# Patient Record
Sex: FEMALE | Race: WHITE | NOT HISPANIC OR LATINO | Employment: OTHER | URBAN - METROPOLITAN AREA
[De-identification: names, ages, dates, MRNs, and addresses within clinical notes are randomized per-mention and may not be internally consistent; named-entity substitution may affect disease eponyms.]

---

## 2017-01-01 LAB — INR PPP: 3.9 (ref 2–3.5)

## 2017-01-03 ENCOUNTER — ANTICOAGULATION MONITORING (OUTPATIENT)
Dept: VASCULAR LAB | Facility: MEDICAL CENTER | Age: 67
End: 2017-01-03

## 2017-01-03 DIAGNOSIS — G45.9 TRANSIENT CEREBRAL ISCHEMIA, UNSPECIFIED TYPE: ICD-10-CM

## 2017-01-03 DIAGNOSIS — Z79.01 LONG TERM CURRENT USE OF ANTICOAGULANT THERAPY: ICD-10-CM

## 2017-01-03 DIAGNOSIS — Z95.2 HX OF MECHANICAL AORTIC VALVE REPLACEMENT: ICD-10-CM

## 2017-01-16 LAB — INR PPP: 3.4 (ref 2–3.5)

## 2017-01-17 ENCOUNTER — ANTICOAGULATION MONITORING (OUTPATIENT)
Dept: VASCULAR LAB | Facility: MEDICAL CENTER | Age: 67
End: 2017-01-17

## 2017-01-17 DIAGNOSIS — Z95.2 HX OF MECHANICAL AORTIC VALVE REPLACEMENT: ICD-10-CM

## 2017-01-17 DIAGNOSIS — G45.9 TRANSIENT CEREBRAL ISCHEMIA, UNSPECIFIED TYPE: ICD-10-CM

## 2017-01-17 DIAGNOSIS — Z79.01 LONG TERM CURRENT USE OF ANTICOAGULANT THERAPY: ICD-10-CM

## 2017-01-18 DIAGNOSIS — E78.5 HYPERLIPIDEMIA, UNSPECIFIED HYPERLIPIDEMIA TYPE: ICD-10-CM

## 2017-01-18 NOTE — PROGRESS NOTES
Anticoagulation Summary as of 1/17/2017     INR goal 2.5-3.5   Selected INR 3.4 (1/16/2017)   Maintenance plan 4 mg (4 mg x 1) on Mon; 8 mg (4 mg x 2) all other days   Weekly total 52 mg   Plan last modified Yinka Church PHARMD (7/11/2016)   Next INR check 1/30/2017   Priority Routine   Target end date Indefinite    Indications   Hx of mechanical aortic valve replacement [V43.3] [Z95.2]  Long term current use of anticoagulant therapy [Z79.01]  Transient cerebral ischemia [G45.9]         Anticoagulation Episode Summary     INR check location Home Draw    Preferred lab     Send INR reminders to     Rosalio Acevedo       Anticoagulation Care Providers     Provider Role Specialty Phone number    Conrado Nava M.D. Referring Cardiac Electrophysiology [phone]    Yinka Church PHARMD Responsible          Anticoagulation Patient Findings   Negatives Missed Doses, Extra Doses, Medication Changes, Antibiotic Use, Diet Changes, Dental/Other Procedures, Hospitalization, Bleeding Gums, Nose Bleeds, Blood in Urine, Blood in Stool, Any Bruising, Other Complaints        Spoke with patient today regarding therapeutic INR of 3.4.  Patient denies any signs/symptoms of bruising or bleeding or any changes in diet and medications.  Instructed patient to call clinic with any questions or concerns.  Pt is to continue with current warfarin dosing regimen.  Follow up in 2 weeks.    Yinka Church PHARMD

## 2017-01-30 ENCOUNTER — ANTICOAGULATION MONITORING (OUTPATIENT)
Dept: VASCULAR LAB | Facility: MEDICAL CENTER | Age: 67
End: 2017-01-30

## 2017-01-30 DIAGNOSIS — Z79.01 LONG TERM CURRENT USE OF ANTICOAGULANT THERAPY: ICD-10-CM

## 2017-01-30 DIAGNOSIS — G45.9 TRANSIENT CEREBRAL ISCHEMIA, UNSPECIFIED TYPE: ICD-10-CM

## 2017-01-30 DIAGNOSIS — Z95.2 HX OF MECHANICAL AORTIC VALVE REPLACEMENT: ICD-10-CM

## 2017-01-30 LAB — INR PPP: 3.2 (ref 2–3.5)

## 2017-01-31 ENCOUNTER — ANTICOAGULATION MONITORING (OUTPATIENT)
Dept: VASCULAR LAB | Facility: MEDICAL CENTER | Age: 67
End: 2017-01-31

## 2017-01-31 DIAGNOSIS — G45.0 VERTEBROBASILAR ARTERY SYNDROME: ICD-10-CM

## 2017-01-31 DIAGNOSIS — Z95.2 HX OF MECHANICAL AORTIC VALVE REPLACEMENT: ICD-10-CM

## 2017-01-31 DIAGNOSIS — Z79.01 LONG TERM CURRENT USE OF ANTICOAGULANT THERAPY: ICD-10-CM

## 2017-01-31 NOTE — PROGRESS NOTES
Anticoagulation Summary as of 1/30/2017     INR goal 2.5-3.5   Selected INR 3.2 (1/30/2017)   Maintenance plan 4 mg (4 mg x 1) on Mon; 8 mg (4 mg x 2) all other days   Weekly total 52 mg   No change documented Raheem Miles, Med Ass't   Plan last modified Yinka Church PHARMD (7/11/2016)   Next INR check 2/13/2017   Priority Routine   Target end date Indefinite    Indications   Hx of mechanical aortic valve replacement [V43.3] [Z95.2]  Long term current use of anticoagulant therapy [Z79.01]  Transient cerebral ischemia [G45.9]         Anticoagulation Episode Summary     INR check location Home Draw    Preferred lab     Send INR reminders to     Rosalio EL      Anticoagulation Care Providers     Provider Role Specialty Phone number    Conrado Nava M.D. Referring Cardiac Electrophysiology [phone]    Yinka Church PHARMD Responsible          Anticoagulation Patient Findings   Negatives Missed Doses, Extra Doses, Medication Changes, Antibiotic Use, Diet Changes, Dental/Other Procedures, Hospitalization, Bleeding Gums, Nose Bleeds, Blood in Urine, Blood in Stool, Any Bruising, Other Complaints        Spoke with patient to report a therapeutic INR.  Pt instructed to continue with current warfarin dosing regimen. Pt denies any s/s of bleeding, bruising, clotting or any changes to diet or medication.  Will follow up in 2 weeks.    Raheem Miles, Med Ass't    2/1/2017    Concur with plan outlined above    Juan Arias, Pasha

## 2017-02-09 ENCOUNTER — TELEPHONE (OUTPATIENT)
Dept: VASCULAR LAB | Facility: MEDICAL CENTER | Age: 67
End: 2017-02-09

## 2017-02-10 NOTE — TELEPHONE ENCOUNTER
Received documentation from [facility] that prior authorization for praluent was approved.  Medical assistant to inform patient    Michael J. Bloch, MD  [facility]

## 2017-02-13 LAB — INR PPP: 3.2 (ref 2–3.5)

## 2017-02-15 ENCOUNTER — ANTICOAGULATION MONITORING (OUTPATIENT)
Dept: VASCULAR LAB | Facility: MEDICAL CENTER | Age: 67
End: 2017-02-15

## 2017-02-15 DIAGNOSIS — D68.9 COAGULOPATHY (HCC): ICD-10-CM

## 2017-02-15 DIAGNOSIS — G45.0 VERTEBROBASILAR ARTERY SYNDROME: ICD-10-CM

## 2017-02-15 DIAGNOSIS — Z95.2 HX OF MECHANICAL AORTIC VALVE REPLACEMENT: ICD-10-CM

## 2017-02-15 NOTE — PROGRESS NOTES
Anticoagulation Summary as of 2/15/2017     INR goal 2.5-3.5   Selected INR 3.2 (2/13/2017)   Maintenance plan 4 mg (4 mg x 1) on Mon; 8 mg (4 mg x 2) all other days   Weekly total 52 mg   Plan last modified Yinka Church PHARMD (2/15/2017)   Next INR check 2/27/2017   Target end date Indefinite    Indications   Hx of mechanical aortic valve replacement [V43.3] [Z95.2]  TIA (transient ischemic attack) [G45.9]  Coagulopathy (CMS-HCC) [D68.9]         Anticoagulation Episode Summary     INR check location     Preferred lab     Send INR reminders to     Comments       Anticoagulation Care Providers     Provider Role Specialty Phone number    Michael J Bloch, M.D. Referring Internal Medicine [phone]    Yinka Church PHARMD Responsible          Anticoagulation Patient Findings    Left voicemail message to report a therapeutic INR of 3.2.  Pt to continue with current warfarin dosing regimen. Requested pt contact the clinic for any s/s of unusual bleeding, bruising, clotting or any changes to diet or medication. FU 2 weeks.  Yinka Church, JACK

## 2017-02-16 ENCOUNTER — TELEPHONE (OUTPATIENT)
Dept: VASCULAR LAB | Facility: MEDICAL CENTER | Age: 67
End: 2017-02-16

## 2017-02-16 NOTE — TELEPHONE ENCOUNTER
[facility]    Received VM from pt stating she started Doxycycline about 7 days ago.  She had a recent INR from yesterday that was therapeutic.  Not likely doxycycline will affect her dosing regimen.  Called pt and informed her to continue warfarin and follow up at next scheduled interval.    [place], PHARMD

## 2017-03-01 ENCOUNTER — ANTICOAGULATION MONITORING (OUTPATIENT)
Dept: VASCULAR LAB | Facility: MEDICAL CENTER | Age: 67
End: 2017-03-01

## 2017-03-01 DIAGNOSIS — Z95.2 HX OF MECHANICAL AORTIC VALVE REPLACEMENT: ICD-10-CM

## 2017-03-01 DIAGNOSIS — G45.0 VERTEBROBASILAR ARTERY SYNDROME: ICD-10-CM

## 2017-03-01 DIAGNOSIS — D68.9 COAGULOPATHY (HCC): ICD-10-CM

## 2017-03-01 LAB — INR PPP: 4 (ref 2–3.5)

## 2017-03-02 NOTE — PROGRESS NOTES
Anticoagulation Summary as of 3/1/2017     INR goal 2.5-3.5   Selected INR 4.0! (3/1/2017)   Maintenance plan 4 mg (4 mg x 1) on Mon; 8 mg (4 mg x 2) all other days   Weekly total 52 mg   Plan last modified Yinka Church PHARMD (3/1/2017)   Next INR check 3/15/2017   Target end date Indefinite    Indications   Hx of mechanical aortic valve replacement [V43.3] [Z95.2]  TIA (transient ischemic attack) [G45.9]  Coagulopathy (CMS-HCC) [D68.9]         Anticoagulation Episode Summary     INR check location     Preferred lab     Send INR reminders to     Comments       Anticoagulation Care Providers     Provider Role Specialty Phone number    Michael J Bloch, M.D. Referring Internal Medicine [phone]    Yinka Church PHARMD Responsible          Anticoagulation Patient Findings   Positives Diet Changes    Negatives Missed Doses, Extra Doses, Medication Changes, Antibiotic Use, Dental/Other Procedures, Hospitalization, Bleeding Gums, Nose Bleeds, Blood in Urine, Blood in Stool, Any Bruising, Other Complaints    Comments Less greens this past week        Spoke with patient today regarding supratherapeutic INR of 4.0.  Patient denies any signs/symptoms of bruising or bleeding or any changes in diet and medications.  Instructed patient to call clinic with any questions or concerns.  Instructed patient to take 4mg X 1, then resume current warfarin regimen.  Follow up in 2 weeks.    Yinka Church, PIPPAD

## 2017-03-06 ENCOUNTER — HOSPITAL ENCOUNTER (OUTPATIENT)
Dept: CARDIOLOGY | Facility: MEDICAL CENTER | Age: 67
End: 2017-03-06
Attending: INTERNAL MEDICINE
Payer: MEDICARE

## 2017-03-06 DIAGNOSIS — Z95.2 HX OF MECHANICAL AORTIC VALVE REPLACEMENT: ICD-10-CM

## 2017-03-06 LAB
LV EJECT FRACT  99904: 55
LV EJECT FRACT MOD 2C 99903: 46.23
LV EJECT FRACT MOD 4C 99902: 29.29
LV EJECT FRACT MOD BP 99901: 33.71

## 2017-03-06 PROCEDURE — 93306 TTE W/DOPPLER COMPLETE: CPT | Mod: 26 | Performed by: INTERNAL MEDICINE

## 2017-03-06 PROCEDURE — 93306 TTE W/DOPPLER COMPLETE: CPT

## 2017-03-14 ENCOUNTER — HOSPITAL ENCOUNTER (OUTPATIENT)
Dept: LAB | Facility: MEDICAL CENTER | Age: 67
End: 2017-03-14
Attending: NURSE PRACTITIONER
Payer: MEDICARE

## 2017-03-14 LAB
INR PPP: 3.37 (ref 0.87–1.13)
PROTHROMBIN TIME: 35.1 SEC (ref 12–14.6)

## 2017-03-14 PROCEDURE — 85610 PROTHROMBIN TIME: CPT

## 2017-03-14 PROCEDURE — 36415 COLL VENOUS BLD VENIPUNCTURE: CPT

## 2017-03-15 ENCOUNTER — ANTICOAGULATION MONITORING (OUTPATIENT)
Dept: VASCULAR LAB | Facility: MEDICAL CENTER | Age: 67
End: 2017-03-15

## 2017-03-15 DIAGNOSIS — Z95.2 HX OF MECHANICAL AORTIC VALVE REPLACEMENT: ICD-10-CM

## 2017-03-15 DIAGNOSIS — D68.9 COAGULOPATHY (HCC): ICD-10-CM

## 2017-03-15 DIAGNOSIS — G45.0 VERTEBROBASILAR ARTERY SYNDROME: ICD-10-CM

## 2017-03-15 NOTE — PROGRESS NOTES
OP Anticoagulation Telephone Note    Date: 3/15/2017  Anticoagulation Summary as of 3/15/2017     INR goal 2.5-3.5   Selected INR 3.37 (3/14/2017)   Maintenance plan 4 mg (4 mg x 1) on Mon; 8 mg (4 mg x 2) all other days   Weekly total 52 mg   No change documented Maryellen Alvares, Med Ass't   Plan last modified Yinka Church PHARMD (3/1/2017)   Next INR check 3/28/2017   Target end date Indefinite    Indications   Hx of mechanical aortic valve replacement [V43.3] [Z95.2]  TIA (transient ischemic attack) [G45.9]  Coagulopathy (CMS-HCC) [D68.9]         Anticoagulation Episode Summary     INR check location     Preferred lab     Send INR reminders to     Comments       Anticoagulation Care Providers     Provider Role Specialty Phone number    Michael J Bloch, M.D. Referring Internal Medicine [phone]    Yinka Church PHARMD Responsible          Anticoagulation Patient Findings   Negatives Missed Doses, Extra Doses, Medication Changes, Antibiotic Use, Diet Changes, Dental/Other Procedures, Hospitalization, Bleeding Gums, Nose Bleeds, Blood in Urine, Blood in Stool, Any Bruising, Other Complaints      Plan:  Left message on patient's answering machine/ voicemail. Instructed patient to call back with any concerns regarding any unusual bleeding or bruising, any medication or diet changes, or any signs or symptoms of thrombosis. Instructed patient to resume medication as outlined above. Patient to follow up in 2 weeks.     Maryellen Alvares, Medical Assistant    I have reviewed and agree with the plan above on 03/16/2017      Katelyn Perez, PIPPAD

## 2017-03-16 ENCOUNTER — ANTICOAGULATION MONITORING (OUTPATIENT)
Dept: VASCULAR LAB | Facility: MEDICAL CENTER | Age: 67
End: 2017-03-16

## 2017-03-16 DIAGNOSIS — G45.0 VERTEBROBASILAR ARTERY SYNDROME: ICD-10-CM

## 2017-03-16 DIAGNOSIS — Z95.2 HX OF MECHANICAL AORTIC VALVE REPLACEMENT: ICD-10-CM

## 2017-03-16 DIAGNOSIS — D68.9 COAGULOPATHY (HCC): ICD-10-CM

## 2017-03-30 ENCOUNTER — HOSPITAL ENCOUNTER (OUTPATIENT)
Dept: LAB | Facility: MEDICAL CENTER | Age: 67
End: 2017-03-30
Attending: NURSE PRACTITIONER
Payer: MEDICARE

## 2017-03-30 LAB
INR PPP: 3.37 (ref 0.87–1.13)
PROTHROMBIN TIME: 35.1 SEC (ref 12–14.6)

## 2017-03-30 PROCEDURE — 85610 PROTHROMBIN TIME: CPT

## 2017-03-30 PROCEDURE — 36415 COLL VENOUS BLD VENIPUNCTURE: CPT

## 2017-04-04 ENCOUNTER — ANTICOAGULATION MONITORING (OUTPATIENT)
Dept: VASCULAR LAB | Facility: MEDICAL CENTER | Age: 67
End: 2017-04-04

## 2017-04-04 DIAGNOSIS — G45.0 VERTEBROBASILAR ARTERY SYNDROME: ICD-10-CM

## 2017-04-04 DIAGNOSIS — Z95.2 HX OF MECHANICAL AORTIC VALVE REPLACEMENT: ICD-10-CM

## 2017-04-04 DIAGNOSIS — D68.9 COAGULOPATHY (HCC): ICD-10-CM

## 2017-04-04 NOTE — PROGRESS NOTES
Anticoagulation Summary as of 4/4/2017     INR goal 2.5-3.5   Selected INR 3.37 (3/30/2017)   Maintenance plan 4 mg (4 mg x 1) on Mon; 8 mg (4 mg x 2) all other days   Weekly total 52 mg   Plan last modified Yinka Church PHARMD (3/1/2017)   Next INR check 4/20/2017   Target end date Indefinite    Indications   Hx of mechanical aortic valve replacement [V43.3] [Z95.2]  TIA (transient ischemic attack) [G45.9]  Coagulopathy (CMS-HCC) [D68.9]         Anticoagulation Episode Summary     INR check location     Preferred lab     Send INR reminders to     Comments TTR is 50%, consider alternative      Anticoagulation Care Providers     Provider Role Specialty Phone number    Michael J Bloch, M.D. Referring Internal Medicine [phone]    Yinka Church PHARMD Responsible          Anticoagulation Patient Findings   Negatives Missed Doses, Extra Doses, Medication Changes, Antibiotic Use, Diet Changes, Dental/Other Procedures, Hospitalization, Bleeding Gums, Nose Bleeds, Blood in Urine, Blood in Stool, Any Bruising, Other Complaints        Spoke with patient today regarding therapeutic INR of 3.37.  Patient denies any signs/symptoms of bruising or bleeding or any changes in diet and medications.  Instructed patient to call clinic with any questions or concerns.  Pt is to continue with current warfarin dosing regimen.  Follow up in 3 weeks.    Yinka Church PHARMD

## 2017-04-11 ENCOUNTER — HOSPITAL ENCOUNTER (OUTPATIENT)
Dept: LAB | Facility: MEDICAL CENTER | Age: 67
End: 2017-04-11
Attending: INTERNAL MEDICINE
Payer: MEDICARE

## 2017-04-11 DIAGNOSIS — E78.5 DYSLIPIDEMIA: ICD-10-CM

## 2017-04-11 DIAGNOSIS — E03.9 HYPOTHYROIDISM, UNSPECIFIED TYPE: ICD-10-CM

## 2017-04-11 LAB
ALBUMIN SERPL BCP-MCNC: 3.8 G/DL (ref 3.2–4.9)
ALBUMIN/GLOB SERPL: 1.3 G/DL
ALP SERPL-CCNC: 57 U/L (ref 30–99)
ALT SERPL-CCNC: 13 U/L (ref 2–50)
ANION GAP SERPL CALC-SCNC: 5 MMOL/L (ref 0–11.9)
AST SERPL-CCNC: 18 U/L (ref 12–45)
BILIRUB SERPL-MCNC: 0.6 MG/DL (ref 0.1–1.5)
BUN SERPL-MCNC: 21 MG/DL (ref 8–22)
CALCIUM SERPL-MCNC: 9.6 MG/DL (ref 8.5–10.5)
CHLORIDE SERPL-SCNC: 106 MMOL/L (ref 96–112)
CHOLEST SERPL-MCNC: 200 MG/DL (ref 100–199)
CO2 SERPL-SCNC: 26 MMOL/L (ref 20–33)
CREAT SERPL-MCNC: 0.91 MG/DL (ref 0.5–1.4)
GFR SERPL CREATININE-BSD FRML MDRD: >60 ML/MIN/1.73 M 2
GLOBULIN SER CALC-MCNC: 3 G/DL (ref 1.9–3.5)
GLUCOSE SERPL-MCNC: 96 MG/DL (ref 65–99)
HDLC SERPL-MCNC: 49 MG/DL
LDLC SERPL CALC-MCNC: 102 MG/DL
POTASSIUM SERPL-SCNC: 4.5 MMOL/L (ref 3.6–5.5)
PROT SERPL-MCNC: 6.8 G/DL (ref 6–8.2)
SODIUM SERPL-SCNC: 137 MMOL/L (ref 135–145)
TRIGL SERPL-MCNC: 247 MG/DL (ref 0–149)
TSH SERPL DL<=0.005 MIU/L-ACNC: 0.33 UIU/ML (ref 0.3–3.7)

## 2017-04-11 PROCEDURE — 80053 COMPREHEN METABOLIC PANEL: CPT

## 2017-04-11 PROCEDURE — 36415 COLL VENOUS BLD VENIPUNCTURE: CPT

## 2017-04-11 PROCEDURE — 82570 ASSAY OF URINE CREATININE: CPT

## 2017-04-11 PROCEDURE — 80061 LIPID PANEL: CPT

## 2017-04-11 PROCEDURE — 82043 UR ALBUMIN QUANTITATIVE: CPT

## 2017-04-11 PROCEDURE — 84443 ASSAY THYROID STIM HORMONE: CPT

## 2017-04-12 LAB
CREAT UR-MCNC: 120.3 MG/DL
MICROALBUMIN UR-MCNC: <0.7 MG/DL
MICROALBUMIN/CREAT UR: NORMAL MG/G (ref 0–30)

## 2017-04-13 ENCOUNTER — OFFICE VISIT (OUTPATIENT)
Dept: VASCULAR LAB | Facility: MEDICAL CENTER | Age: 67
End: 2017-04-13
Attending: INTERNAL MEDICINE
Payer: MEDICARE

## 2017-04-13 VITALS
WEIGHT: 227 LBS | DIASTOLIC BLOOD PRESSURE: 44 MMHG | HEART RATE: 69 BPM | BODY MASS INDEX: 42.86 KG/M2 | HEIGHT: 61 IN | SYSTOLIC BLOOD PRESSURE: 120 MMHG

## 2017-04-13 DIAGNOSIS — Z79.01 LONG TERM CURRENT USE OF ANTICOAGULANT THERAPY: ICD-10-CM

## 2017-04-13 DIAGNOSIS — E78.5 HYPERLIPIDEMIA, UNSPECIFIED HYPERLIPIDEMIA TYPE: ICD-10-CM

## 2017-04-13 DIAGNOSIS — Z95.2 HX OF MECHANICAL AORTIC VALVE REPLACEMENT: ICD-10-CM

## 2017-04-13 DIAGNOSIS — I25.811 CORONARY ARTERY DISEASE INVOLVING NATIVE ARTERY OF TRANSPLANTED HEART WITHOUT ANGINA PECTORIS: ICD-10-CM

## 2017-04-13 PROCEDURE — 3017F COLORECTAL CA SCREEN DOC REV: CPT | Mod: 8P | Performed by: INTERNAL MEDICINE

## 2017-04-13 PROCEDURE — 99212 OFFICE O/P EST SF 10 MIN: CPT

## 2017-04-13 PROCEDURE — G8432 DEP SCR NOT DOC, RNG: HCPCS | Performed by: INTERNAL MEDICINE

## 2017-04-13 PROCEDURE — G8598 ASA/ANTIPLAT THER USED: HCPCS | Performed by: INTERNAL MEDICINE

## 2017-04-13 PROCEDURE — G8419 CALC BMI OUT NRM PARAM NOF/U: HCPCS | Performed by: INTERNAL MEDICINE

## 2017-04-13 PROCEDURE — 1101F PT FALLS ASSESS-DOCD LE1/YR: CPT | Mod: 8P | Performed by: INTERNAL MEDICINE

## 2017-04-13 PROCEDURE — 1036F TOBACCO NON-USER: CPT | Performed by: INTERNAL MEDICINE

## 2017-04-13 PROCEDURE — 3014F SCREEN MAMMO DOC REV: CPT | Performed by: INTERNAL MEDICINE

## 2017-04-13 PROCEDURE — 4040F PNEUMOC VAC/ADMIN/RCVD: CPT | Mod: 8P | Performed by: INTERNAL MEDICINE

## 2017-04-13 PROCEDURE — 99214 OFFICE O/P EST MOD 30 MIN: CPT | Performed by: INTERNAL MEDICINE

## 2017-04-13 RX ORDER — EZETIMIBE 10 MG/1
10 TABLET ORAL DAILY
Qty: 30 TAB | Refills: 11 | Status: SHIPPED | OUTPATIENT
Start: 2017-04-13 | End: 2018-09-20 | Stop reason: SDUPTHER

## 2017-04-13 ASSESSMENT — ENCOUNTER SYMPTOMS
BRUISES/BLEEDS EASILY: 0
CLAUDICATION: 0
COUGH: 0
HEADACHES: 0
MYALGIAS: 0
PALPITATIONS: 0
FOCAL WEAKNESS: 0
SHORTNESS OF BREATH: 0

## 2017-04-13 NOTE — PROGRESS NOTES
[facility] Follow Up Visit    Date of Service: 4/13/17    Yenifer Beasley is here for follow up of dyslipidemia, chronic anticoagulation, and hypothyroidism    HPI  Pertinent Interval History since last visit:   Tolerating praluent - no side-effects  Good adherence  No bleeding on anticoagulation  Had recent echo  Not taking BP at home  Took zetia in the past, but not regularly  Current Prescription Lipid Lowering Medications - including dose:   Statin: None  Non-Statin: praluent 150 mg 2x per week  Current Lipid Lowering and Related Supplements:   Omega 3s  Vit D  coQ  Any Current Side Effects Potentially Related to Lipid Lowering therapy?   No  Current Adherence to Lipid Lowering Therapies:  Complete  Any Previous History of Statin Intolerance?   Yes, Details: severe myalgias on multiple statins  Baseline Lipids Prior to Treatment:  Shown here:  LDL-C: 170      SOCIAL HISTORY  History   Smoking status   • Never Smoker    Smokeless tobacco   • Never Used      Change in weight: up about 10-15 pounds  Exercise habits: sporadic irregular exercise   Diet: common adult, low carbohydrate    Review of Systems   Respiratory: Negative for cough and shortness of breath.    Cardiovascular: Positive for leg swelling. Negative for chest pain, palpitations and claudication.   Musculoskeletal: Negative for myalgias.   Neurological: Negative for focal weakness and headaches.   Endo/Heme/Allergies: Does not bruise/bleed easily.     Physical Exam   Constitutional: She appears well-developed and well-nourished.   Cardiovascular: Normal rate, regular rhythm and intact distal pulses.  Exam reveals no gallop and no friction rub.    Murmur heard.  Pulmonary/Chest: No respiratory distress. She has no wheezes. She has no rales.   Abdominal: There is no tenderness.   Musculoskeletal: She exhibits no edema.   Neurological: No cranial nerve deficit.   Skin: No rash noted. No erythema.   Vitals reviewed.      DATA REVIEW  Most  Recent Lipid Panel:   Lab Results   Component Value Date    CHOLSTRLTOT 200* 04/11/2017    CHOLSTRLTOT 247* 12/31/2015    TRIGLYCERIDE 247* 04/11/2017    TRIGLYCERIDE 314* 12/31/2015    HDL 49 04/11/2017    HDL 45 12/31/2015    * 04/11/2017       Other Pertinent Blood Work:   Lab Results   Component Value Date    SODIUM 137 04/11/2017    POTASSIUM 4.5 04/11/2017    CHLORIDE 106 04/11/2017    CO2 26 04/11/2017    ANION 5.0 04/11/2017    GLUCOSE 96 04/11/2017    BUN 21 04/11/2017    CREATININE 0.91 04/11/2017    CALCIUM 9.6 04/11/2017    ASTSGOT 18 04/11/2017    ALTSGPT 13 04/11/2017    ALKPHOSPHAT 57 04/11/2017    TBILIRUBIN 0.6 04/11/2017    ALBUMIN 3.8 04/11/2017    AGRATIO 1.3 04/11/2017    TSHULTRASEN 0.330 04/11/2017     Other:  NA    Recent Imaging Studies:      Echo march 2017:  1. Left ventricular ejection fraction is visually estimated to be 55%.   Normal regional wall motion. Grade I diastolic dysfunction.  2. Known mechanical aortic valve that is functioning normally.   3. Mild mitral stenosis.  4. Estimated right ventricular systolic pressure is 40 mmHg.      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):   Possible  ACC/AHA Indication for Statin Therapy, toby all that apply:   Established ASCVD: Indication for High intensity statin    Calculated Risk for ASCVD, if applicable:  N/A  Other Significant Risk Markers, if any, toby all that apply:  Family history of premature ASCVD in first degree relative  National Lipid Association (NLA) Goal (if applicable):  LDL-C:   <70 mg/dL and nonHDL <100 - improved but still above goal  Lifestyle Recommendations  From Today’s Visit:    Diet - low simple carb like [place]  Exercise - daily home exercise for 15-20 min  Non-Statin Medications Recommendations from Today’s Visit:   Restart zetia 10 mg daily  Indication for PCSK9 Inhibitor, if applicable:  ASCVD with suboptimal control of LDL-C despite maximally tolerated statin - continue praluent 150 mg 2x permonth  Supplements Recommended at this visit:  Continue omega 3's, vit D and coq10  Recommendations for Other Cardiovascular Risk Factors, toby all that apply:   Hypertension- appears well controlled. Continue current rx   IFG - intensity TLC; recheck fasting glucose; consider a1c in future  Anticoagulation - continue warfarin and f/u in anticoag clinic  Other issues  - Hypothyroidism - seems under reasonable control per TSH; continue current thyroid replection; otherwise defer management to PCP    Studies Ordered at Todays Visit: None, but will repeat echo in march 2017 if not ordered sooner by cards    Blood Work Ordered At Today’s visit:   Fasting lipid panel; lp(a) cmp, tsh  Follow-Up:   1) anticoagulation clinic as scheduled  2) vascular care with me in 6 months    Michael J Bloch, M.D.    CC:  Alina Hale M.D.

## 2017-04-20 LAB — INR PPP: 3 (ref 2–3.5)

## 2017-04-21 ENCOUNTER — HOSPITAL ENCOUNTER (OUTPATIENT)
Dept: RADIOLOGY | Facility: MEDICAL CENTER | Age: 67
End: 2017-04-21
Attending: FAMILY MEDICINE
Payer: MEDICARE

## 2017-04-21 DIAGNOSIS — Z12.31 VISIT FOR SCREENING MAMMOGRAM: ICD-10-CM

## 2017-04-21 PROCEDURE — 77063 BREAST TOMOSYNTHESIS BI: CPT

## 2017-04-24 ENCOUNTER — ANTICOAGULATION MONITORING (OUTPATIENT)
Dept: VASCULAR LAB | Facility: MEDICAL CENTER | Age: 67
End: 2017-04-24

## 2017-04-24 DIAGNOSIS — Z95.2 HX OF MECHANICAL AORTIC VALVE REPLACEMENT: ICD-10-CM

## 2017-04-24 DIAGNOSIS — D68.9 COAGULOPATHY (HCC): ICD-10-CM

## 2017-04-24 DIAGNOSIS — G45.0 VERTEBROBASILAR ARTERY SYNDROME: ICD-10-CM

## 2017-04-24 NOTE — PROGRESS NOTES
Anticoagulation Summary as of 4/24/2017     INR goal 2.5-3.5   Selected INR 3.0 (4/20/2017)   Maintenance plan 4 mg (4 mg x 1) on Mon; 8 mg (4 mg x 2) all other days   Weekly total 52 mg   Plan last modified Yinka Church PHARMD (3/1/2017)   Next INR check 5/4/2017   Target end date Indefinite    Indications   Hx of mechanical aortic valve replacement [V43.3] [Z95.2]  TIA (transient ischemic attack) [G45.9]  Coagulopathy (CMS-HCC) [D68.9]         Anticoagulation Episode Summary     INR check location     Preferred lab     Send INR reminders to     Comments TTR is 50%, consider alternative      Anticoagulation Care Providers     Provider Role Specialty Phone number    Michael J Bloch, M.D. Referring Internal Medicine [phone]    Yinka Church PHARMD Responsible          Anticoagulation Patient Findings   Negatives Missed Doses, Extra Doses, Medication Changes, Antibiotic Use, Diet Changes, Dental/Other Procedures, Hospitalization, Bleeding Gums, Nose Bleeds, Blood in Urine, Blood in Stool, Any Bruising, Other Complaints        Spoke with patient today regarding therapeutic INR of 3.0.  Patient denies any signs/symptoms of bruising or bleeding or any changes in diet and medications.  Instructed patient to call clinic with any questions or concerns.  Pt is to continue with current warfarin dosing regimen.  Follow up in 2 weeks.    Yinka Church PHARMD

## 2017-05-04 ENCOUNTER — ANTICOAGULATION MONITORING (OUTPATIENT)
Dept: VASCULAR LAB | Facility: MEDICAL CENTER | Age: 67
End: 2017-05-04

## 2017-05-04 DIAGNOSIS — D68.9 COAGULOPATHY (HCC): ICD-10-CM

## 2017-05-04 DIAGNOSIS — Z95.2 HX OF MECHANICAL AORTIC VALVE REPLACEMENT: ICD-10-CM

## 2017-05-04 LAB — INR PPP: 4.5 (ref 2–3.5)

## 2017-05-18 DIAGNOSIS — Z95.2 HX OF MECHANICAL AORTIC VALVE REPLACEMENT: ICD-10-CM

## 2017-05-20 LAB — INR PPP: 4.5 (ref 2–3.5)

## 2017-05-22 ENCOUNTER — ANTICOAGULATION MONITORING (OUTPATIENT)
Dept: VASCULAR LAB | Facility: MEDICAL CENTER | Age: 67
End: 2017-05-22

## 2017-05-22 DIAGNOSIS — Z95.2 HX OF MECHANICAL AORTIC VALVE REPLACEMENT: ICD-10-CM

## 2017-05-22 DIAGNOSIS — D68.9 COAGULOPATHY (HCC): ICD-10-CM

## 2017-05-22 NOTE — PROGRESS NOTES
OP Anticoagulation Service Note    Date: 5/22/2017    Anticoagulation Summary as of 5/22/2017     INR goal 2.5-3.5   Selected INR 4.5! (5/20/2017)   Maintenance plan 4 mg (4 mg x 1) on Mon, Fri; 8 mg (4 mg x 2) all other days   Weekly total 48 mg   Plan last modified Bushra Smith PHARMD (5/22/2017)   Next INR check 5/29/2017   Target end date Indefinite    Indications   Hx of mechanical aortic valve replacement [V43.3] [Z95.2]  TIA (transient ischemic attack) [G45.9]  Coagulopathy (CMS-HCC) [D68.9]         Anticoagulation Episode Summary     INR check location     Preferred lab     Send INR reminders to     Comments TTR is 50%, consider alternative      Anticoagulation Care Providers     Provider Role Specialty Phone number    Michael J Bloch, M.D. Referring Internal Medicine [phone]    Yinka Church PHARMD Responsible          Anticoagulation Patient Findings      Plan:  INR is supratherapeutic. Left message on patient's answering machine/voicemail. Instructed patient to call back with any concerns regarding any unusual bleeding or bruising, an medication or diet changes or any signs or symptoms of thrombosis. Instructed patient to HOLD x 1 dose then begin reduced weekly regimen.  Patient to follow up in 1 week.         Bushra Smith, PIPPAD

## 2017-05-29 LAB — INR PPP: 3.1 (ref 2–3.5)

## 2017-05-31 ENCOUNTER — ANTICOAGULATION MONITORING (OUTPATIENT)
Dept: VASCULAR LAB | Facility: MEDICAL CENTER | Age: 67
End: 2017-05-31

## 2017-05-31 DIAGNOSIS — Z95.2 HX OF MECHANICAL AORTIC VALVE REPLACEMENT: ICD-10-CM

## 2017-05-31 DIAGNOSIS — G45.9 TRANSIENT CEREBRAL ISCHEMIA, UNSPECIFIED TYPE: ICD-10-CM

## 2017-05-31 DIAGNOSIS — D68.9 COAGULOPATHY (HCC): ICD-10-CM

## 2017-05-31 NOTE — PROGRESS NOTES
Anticoagulation Summary as of 5/31/2017     INR goal 2.5-3.5   Selected INR 3.1 (5/29/2017)   Maintenance plan 4 mg (4 mg x 1) on Mon, Fri; 8 mg (4 mg x 2) all other days   Weekly total 48 mg   Plan last modified Bushra Smith, PHARMD (5/22/2017)   Next INR check 6/5/2017   Target end date Indefinite    Indications   Hx of mechanical aortic valve replacement [V43.3] [Z95.2]  TIA (transient ischemic attack) [G45.9]  Coagulopathy (CMS-HCC) [D68.9]         Anticoagulation Episode Summary     INR check location     Preferred lab     Send INR reminders to     Comments TTR is 50%, consider alternative      Anticoagulation Care Providers     Provider Role Specialty Phone number    Michael J Bloch, M.D. Referring Internal Medicine [phone]    Yinka Church, PIPPAD Responsible          Anticoagulation Patient Findings    Left voicemail message to report a therapeutic INR of 3.1.  Pt to continue with current warfarin dosing regimen. Requested pt contact the clinic for any s/s of unusual bleeding, bruising, clotting or any changes to diet or medication. FU 1 weeks.  Yinka Church, PIPPAD

## 2017-06-09 ENCOUNTER — HOSPITAL ENCOUNTER (OUTPATIENT)
Dept: LAB | Facility: MEDICAL CENTER | Age: 67
End: 2017-06-09
Attending: INTERNAL MEDICINE
Payer: MEDICARE

## 2017-06-09 ENCOUNTER — HOSPITAL ENCOUNTER (OUTPATIENT)
Dept: RADIOLOGY | Facility: MEDICAL CENTER | Age: 67
End: 2017-06-09
Attending: FAMILY MEDICINE
Payer: MEDICARE

## 2017-06-09 DIAGNOSIS — Z95.2 HX OF MECHANICAL AORTIC VALVE REPLACEMENT: ICD-10-CM

## 2017-06-09 DIAGNOSIS — E03.9 UNSPECIFIED HYPOTHYROIDISM: ICD-10-CM

## 2017-06-09 DIAGNOSIS — Z78.0 MENOPAUSE: ICD-10-CM

## 2017-06-09 LAB
INR PPP: 2.51 (ref 0.87–1.13)
PROTHROMBIN TIME: 27.9 SEC (ref 12–14.6)

## 2017-06-09 PROCEDURE — 36415 COLL VENOUS BLD VENIPUNCTURE: CPT

## 2017-06-09 PROCEDURE — 85610 PROTHROMBIN TIME: CPT

## 2017-06-12 ENCOUNTER — ANTICOAGULATION MONITORING (OUTPATIENT)
Dept: VASCULAR LAB | Facility: MEDICAL CENTER | Age: 67
End: 2017-06-12

## 2017-06-12 DIAGNOSIS — D68.9 COAGULOPATHY (HCC): ICD-10-CM

## 2017-06-12 DIAGNOSIS — G45.9 TRANSIENT CEREBRAL ISCHEMIA, UNSPECIFIED TYPE: ICD-10-CM

## 2017-06-12 DIAGNOSIS — Z95.2 HX OF MECHANICAL AORTIC VALVE REPLACEMENT: ICD-10-CM

## 2017-06-12 NOTE — PROGRESS NOTES
Pt called to report she started Zetia about 3 weeks ago. Instructed pt continue with current warfarin dosing regimen. Follow up as planned    PIPPA SosaD

## 2017-06-12 NOTE — PROGRESS NOTES
Anticoagulation Summary as of 6/12/2017     INR goal 2.5-3.5   Selected INR 2.51 (6/9/2017)   Maintenance plan 4 mg (4 mg x 1) on Mon, Fri; 8 mg (4 mg x 2) all other days   Weekly total 48 mg   Plan last modified Bushra Smith, PHARMD (5/22/2017)   Next INR check 6/30/2017   Target end date Indefinite    Indications   Hx of mechanical aortic valve replacement [V43.3] [Z95.2]  TIA (transient ischemic attack) [G45.9]  Coagulopathy (CMS-HCC) [D68.9]         Anticoagulation Episode Summary     INR check location     Preferred lab     Send INR reminders to     Comments TTR is 50%, consider alternative      Anticoagulation Care Providers     Provider Role Specialty Phone number    Michael J Bloch, M.D. Referring Internal Medicine [phone]    Yinka Church, PIPPAD Responsible          Anticoagulation Patient Findings    Left voicemail message to report a therapeutic INR of 2.51.  Pt to continue with current warfarin dosing regimen. Requested pt contact the clinic for any s/s of unusual bleeding, bruising, clotting or any changes to diet or medication. FU 3 weeks.  Yinka Church, PIPPAD

## 2017-06-28 ENCOUNTER — ANTICOAGULATION MONITORING (OUTPATIENT)
Dept: VASCULAR LAB | Facility: MEDICAL CENTER | Age: 67
End: 2017-06-28

## 2017-06-28 ENCOUNTER — HOSPITAL ENCOUNTER (OUTPATIENT)
Dept: LAB | Facility: MEDICAL CENTER | Age: 67
End: 2017-06-28
Attending: NURSE PRACTITIONER
Payer: MEDICARE

## 2017-06-28 DIAGNOSIS — Z95.2 HX OF MECHANICAL AORTIC VALVE REPLACEMENT: ICD-10-CM

## 2017-06-28 DIAGNOSIS — G45.9 TRANSIENT CEREBRAL ISCHEMIA, UNSPECIFIED TYPE: ICD-10-CM

## 2017-06-28 DIAGNOSIS — D68.9 COAGULOPATHY (HCC): ICD-10-CM

## 2017-06-28 LAB
INR PPP: 2.4 (ref 0.87–1.13)
PROTHROMBIN TIME: 26.9 SEC (ref 12–14.6)

## 2017-06-28 PROCEDURE — 85610 PROTHROMBIN TIME: CPT

## 2017-06-28 PROCEDURE — 36415 COLL VENOUS BLD VENIPUNCTURE: CPT

## 2017-06-28 NOTE — PROGRESS NOTES
Anticoagulation Summary as of 6/28/2017     INR goal 2.5-3.5   Selected INR 2.40! (6/28/2017)   Maintenance plan 4 mg (4 mg x 1) on Mon, Fri; 8 mg (4 mg x 2) all other days   Weekly total 48 mg   Plan last modified Bushra Smith, PHARMD (5/22/2017)   Next INR check 7/12/2017   Target end date Indefinite    Indications   Hx of mechanical aortic valve replacement [V43.3] [Z95.2]  TIA (transient ischemic attack) [G45.9]  Coagulopathy (CMS-HCC) [D68.9]         Anticoagulation Episode Summary     INR check location     Preferred lab     Send INR reminders to     Comments TTR is 50%, consider alternative      Anticoagulation Care Providers     Provider Role Specialty Phone number    Michael J Bloch, M.D. Referring Internal Medicine [phone]    Yinka Church, PIPPAD Responsible          Anticoagulation Patient Findings    INR is SUB therapeutic.   Pt missed a pill sometime last week, found it under her chair.   Pt denies any unusual s/s of bleeding, bruising, clotting or any changes to diet or medications. Pt confirms normal warfarin dosing schedule.     Will bolus pt's warfarin dose today and then resume her normal warfarin dosing regimen.    INR in 2 weeks.     Estela Sanchez, PHARMD

## 2017-07-14 ENCOUNTER — HOSPITAL ENCOUNTER (OUTPATIENT)
Dept: LAB | Facility: MEDICAL CENTER | Age: 67
End: 2017-07-14
Attending: NURSE PRACTITIONER
Payer: MEDICARE

## 2017-07-14 ENCOUNTER — ANTICOAGULATION MONITORING (OUTPATIENT)
Dept: VASCULAR LAB | Facility: MEDICAL CENTER | Age: 67
End: 2017-07-14

## 2017-07-14 ENCOUNTER — HOSPITAL ENCOUNTER (OUTPATIENT)
Dept: LAB | Facility: MEDICAL CENTER | Age: 67
End: 2017-07-14
Attending: FAMILY MEDICINE
Payer: MEDICARE

## 2017-07-14 DIAGNOSIS — G45.9 TRANSIENT CEREBRAL ISCHEMIA, UNSPECIFIED TYPE: ICD-10-CM

## 2017-07-14 DIAGNOSIS — D68.9 COAGULOPATHY (HCC): ICD-10-CM

## 2017-07-14 DIAGNOSIS — Z95.2 HX OF MECHANICAL AORTIC VALVE REPLACEMENT: ICD-10-CM

## 2017-07-14 LAB
ALBUMIN SERPL BCP-MCNC: 4.1 G/DL (ref 3.2–4.9)
ALBUMIN/GLOB SERPL: 1.6 G/DL
ALP SERPL-CCNC: 50 U/L (ref 30–99)
ALT SERPL-CCNC: 13 U/L (ref 2–50)
ANION GAP SERPL CALC-SCNC: 8 MMOL/L (ref 0–11.9)
AST SERPL-CCNC: 22 U/L (ref 12–45)
BILIRUB SERPL-MCNC: 0.5 MG/DL (ref 0.1–1.5)
BUN SERPL-MCNC: 26 MG/DL (ref 8–22)
CALCIUM SERPL-MCNC: 9.6 MG/DL (ref 8.5–10.5)
CHLORIDE SERPL-SCNC: 103 MMOL/L (ref 96–112)
CO2 SERPL-SCNC: 23 MMOL/L (ref 20–33)
CREAT SERPL-MCNC: 0.99 MG/DL (ref 0.5–1.4)
EST. AVERAGE GLUCOSE BLD GHB EST-MCNC: 117 MG/DL
GFR SERPL CREATININE-BSD FRML MDRD: 56 ML/MIN/1.73 M 2
GLOBULIN SER CALC-MCNC: 2.6 G/DL (ref 1.9–3.5)
GLUCOSE SERPL-MCNC: 98 MG/DL (ref 65–99)
HBA1C MFR BLD: 5.7 % (ref 0–5.6)
INR PPP: 2.15 (ref 0.87–1.13)
POTASSIUM SERPL-SCNC: 4.4 MMOL/L (ref 3.6–5.5)
PROT SERPL-MCNC: 6.7 G/DL (ref 6–8.2)
PROTHROMBIN TIME: 24.7 SEC (ref 12–14.6)
SODIUM SERPL-SCNC: 134 MMOL/L (ref 135–145)
TSH SERPL DL<=0.005 MIU/L-ACNC: 1.96 UIU/ML (ref 0.3–3.7)

## 2017-07-14 PROCEDURE — 36415 COLL VENOUS BLD VENIPUNCTURE: CPT

## 2017-07-14 PROCEDURE — 85610 PROTHROMBIN TIME: CPT

## 2017-07-14 NOTE — PROGRESS NOTES
OP Anticoagulation Telephone Note    Date: 7/14/2017       Plan:  Left message with patient.  Pt is again, subtherapeutic. Clinic phone number left with patient and they were instructed to call back with any questions or concerns. Recheck INR in 1wk. Will continue to increase warfarin dosing as follows:      Anticoagulation Summary as of 7/14/2017     INR goal 2.5-3.5   Selected INR 2.15! (7/14/2017)   Maintenance plan 8 mg (4 mg x 2) every day   Weekly total 56 mg   Plan last modified Delia Joseph, A.P.N. (7/14/2017)   Next INR check 7/21/2017   Target end date Indefinite    Indications   Hx of mechanical aortic valve replacement [V43.3] [Z95.2]  TIA (transient ischemic attack) [G45.9]  Coagulopathy (CMS-HCC) [D68.9]         Anticoagulation Episode Summary     INR check location     Preferred lab     Send INR reminders to     Comments TTR is 50%, consider alternative      Anticoagulation Care Providers     Provider Role Specialty Phone number    Michael J Bloch, M.D. Referring Internal Medicine [phone]    Yinka Church, PHARMD Responsible              TAMERA Flores  [facility]

## 2017-07-21 ENCOUNTER — HOSPITAL ENCOUNTER (OUTPATIENT)
Dept: LAB | Facility: MEDICAL CENTER | Age: 67
End: 2017-07-21
Attending: NURSE PRACTITIONER
Payer: MEDICARE

## 2017-07-21 ENCOUNTER — ANTICOAGULATION MONITORING (OUTPATIENT)
Dept: VASCULAR LAB | Facility: MEDICAL CENTER | Age: 67
End: 2017-07-21

## 2017-07-21 DIAGNOSIS — G45.9 TRANSIENT CEREBRAL ISCHEMIA, UNSPECIFIED TYPE: ICD-10-CM

## 2017-07-21 DIAGNOSIS — D68.9 COAGULOPATHY (HCC): ICD-10-CM

## 2017-07-21 DIAGNOSIS — Z95.2 HX OF MECHANICAL AORTIC VALVE REPLACEMENT: ICD-10-CM

## 2017-07-21 LAB
INR PPP: 2.87 (ref 0.87–1.13)
PROTHROMBIN TIME: 31 SEC (ref 12–14.6)

## 2017-07-21 PROCEDURE — 36415 COLL VENOUS BLD VENIPUNCTURE: CPT

## 2017-07-21 PROCEDURE — 85610 PROTHROMBIN TIME: CPT

## 2017-07-21 NOTE — PROGRESS NOTES
OP Anticoagulation Telephone Note    Date: 7/21/2017  Anticoagulation Summary as of 7/21/2017     INR goal 2.5-3.5   Selected INR 2.87 (7/21/2017)   Maintenance plan 8 mg (4 mg x 2) every day   Weekly total 56 mg   No change documented Maryellen Alvares, Med Ass't   Plan last modified STEPH Guerrero (7/14/2017)   Next INR check 8/4/2017   Target end date Indefinite    Indications   Hx of mechanical aortic valve replacement [V43.3] [Z95.2]  TIA (transient ischemic attack) [G45.9]  Coagulopathy (CMS-HCC) [D68.9]         Anticoagulation Episode Summary     INR check location     Preferred lab     Send INR reminders to     Comments TTR is 50%, consider alternative      Anticoagulation Care Providers     Provider Role Specialty Phone number    Michael J Bloch, M.D. Referring Internal Medicine [phone]    Yinka Church, PHARMD Responsible          Anticoagulation Patient Findings   Negatives Missed Doses, Extra Doses, Medication Changes, Antibiotic Use, Diet Changes, Dental/Other Procedures, Hospitalization, Bleeding Gums, Nose Bleeds, Blood in Urine, Blood in Stool, Any Bruising, Other Complaints      Plan:  Spoke with patient on the phone. Patient is therapeutic today. Patient denies any changes in medications or diet. Patient denies any signs or symptoms of bleeding or clotting. Instructed patient to call clinic if any unusual bleeding or bruising occurs. Will continue dosing as outlined above. Will follow-up with patient in 2 weeks.    Maryellen Alvares, Medical Assistant

## 2017-08-09 ENCOUNTER — ANTICOAGULATION MONITORING (OUTPATIENT)
Dept: VASCULAR LAB | Facility: MEDICAL CENTER | Age: 67
End: 2017-08-09

## 2017-08-09 ENCOUNTER — APPOINTMENT (OUTPATIENT)
Dept: LAB | Facility: MEDICAL CENTER | Age: 67
End: 2017-08-09
Attending: NURSE PRACTITIONER
Payer: MEDICARE

## 2017-08-09 DIAGNOSIS — Z95.2 HX OF MECHANICAL AORTIC VALVE REPLACEMENT: ICD-10-CM

## 2017-08-09 DIAGNOSIS — G45.9 TRANSIENT CEREBRAL ISCHEMIA, UNSPECIFIED TYPE: ICD-10-CM

## 2017-08-09 DIAGNOSIS — D68.9 COAGULOPATHY (HCC): ICD-10-CM

## 2017-08-09 LAB
INR PPP: 4.2 (ref 0.87–1.13)
PROTHROMBIN TIME: 41.8 SEC (ref 12–14.6)

## 2017-08-09 PROCEDURE — 36415 COLL VENOUS BLD VENIPUNCTURE: CPT

## 2017-08-09 PROCEDURE — 85610 PROTHROMBIN TIME: CPT

## 2017-08-10 NOTE — PROGRESS NOTES
Anticoagulation Summary as of 8/9/2017     INR goal 2.5-3.5   Selected INR 4.20! (8/9/2017)   Maintenance plan 8 mg (4 mg x 2) every day   Weekly total 56 mg   Plan last modified STEPH Guerrero (7/14/2017)   Next INR check 8/23/2017   Target end date Indefinite    Indications   Hx of mechanical aortic valve replacement [V43.3] [Z95.2]  TIA (transient ischemic attack) [G45.9]  Coagulopathy (CMS-HCC) [D68.9]         Anticoagulation Episode Summary     INR check location     Preferred lab     Send INR reminders to     Comments TTR is 50%, consider alternative      Anticoagulation Care Providers     Provider Role Specialty Phone number    Michael J Bloch, M.D. Referring Internal Medicine [phone]    Yinka Church, PIPPAD Responsible          Anticoagulation Patient Findings      Left voicemail message to report a SUPRA therapeutic INR of 4.2.    Will have pt take reduced dose of warfarin today of 2 mg and then   Pt to continue with current warfarin dosing regimen. Requested pt contact the clinic for any s/s of unusual bleeding, bruising, clotting or any changes to diet or medication.    FU INR in 2 weeks.    Estela Sanchez, PHARMD

## 2017-08-23 ENCOUNTER — HOSPITAL ENCOUNTER (OUTPATIENT)
Dept: LAB | Facility: MEDICAL CENTER | Age: 67
End: 2017-08-23
Attending: NURSE PRACTITIONER
Payer: MEDICARE

## 2017-08-23 LAB
INR PPP: 3.18 (ref 0.87–1.13)
PROTHROMBIN TIME: 33.6 SEC (ref 12–14.6)

## 2017-08-23 PROCEDURE — 36415 COLL VENOUS BLD VENIPUNCTURE: CPT

## 2017-08-23 PROCEDURE — 85610 PROTHROMBIN TIME: CPT

## 2017-08-24 ENCOUNTER — ANTICOAGULATION MONITORING (OUTPATIENT)
Dept: VASCULAR LAB | Facility: MEDICAL CENTER | Age: 67
End: 2017-08-24

## 2017-08-24 DIAGNOSIS — G45.9 TRANSIENT CEREBRAL ISCHEMIA, UNSPECIFIED TYPE: ICD-10-CM

## 2017-08-24 DIAGNOSIS — Z95.2 HX OF MECHANICAL AORTIC VALVE REPLACEMENT: ICD-10-CM

## 2017-08-24 DIAGNOSIS — D68.9 COAGULOPATHY (HCC): ICD-10-CM

## 2017-08-24 NOTE — PROGRESS NOTES
Anticoagulation Summary as of 8/24/2017     INR goal 2.5-3.5   Selected INR 3.18 (8/23/2017)   Maintenance plan 8 mg (4 mg x 2) every day   Weekly total 56 mg   Plan last modified STEPH Guerrero (7/14/2017)   Next INR check 9/6/2017   Target end date Indefinite    Indications   Hx of mechanical aortic valve replacement [V43.3] [Z95.2]  TIA (transient ischemic attack) [G45.9]  Coagulopathy (CMS-HCC) [D68.9]         Anticoagulation Episode Summary     INR check location     Preferred lab     Send INR reminders to     Comments TTR is 50%, consider alternative      Anticoagulation Care Providers     Provider Role Specialty Phone number    Michael J Bloch, M.D. Referring Internal Medicine [phone]    Yinka Church, PIPPAD Responsible          Anticoagulation Patient Findings      Left voicemail message to report a therapeutic INR of 3.18.    Pt to continue with current warfarin dosing regimen. Requested pt contact the clinic for any s/s of unusual bleeding, bruising, clotting or any changes to diet or medication.    FU INR in 2 weeks.    Estela Sanchez, PHARMD

## 2017-09-06 ENCOUNTER — ANTICOAGULATION MONITORING (OUTPATIENT)
Dept: VASCULAR LAB | Facility: MEDICAL CENTER | Age: 67
End: 2017-09-06

## 2017-09-06 ENCOUNTER — HOSPITAL ENCOUNTER (OUTPATIENT)
Dept: LAB | Facility: MEDICAL CENTER | Age: 67
End: 2017-09-06
Attending: NURSE PRACTITIONER
Payer: MEDICARE

## 2017-09-06 DIAGNOSIS — G45.9 TRANSIENT CEREBRAL ISCHEMIA, UNSPECIFIED TYPE: ICD-10-CM

## 2017-09-06 DIAGNOSIS — D68.9 COAGULOPATHY (HCC): ICD-10-CM

## 2017-09-06 DIAGNOSIS — Z95.2 HX OF MECHANICAL AORTIC VALVE REPLACEMENT: ICD-10-CM

## 2017-09-06 LAB
INR PPP: 3.93 (ref 0.87–1.13)
PROTHROMBIN TIME: 39.7 SEC (ref 12–14.6)

## 2017-09-06 PROCEDURE — 85610 PROTHROMBIN TIME: CPT

## 2017-09-06 PROCEDURE — 36415 COLL VENOUS BLD VENIPUNCTURE: CPT

## 2017-09-07 NOTE — PROGRESS NOTES
Anticoagulation Summary  As of 9/6/2017    INR goal:   2.5-3.5   TTR:   42.7 % (6.4 mo)   Today's INR:   3.93!   Maintenance plan:   4 mg (4 mg x 1) on Wed; 8 mg (4 mg x 2) all other days   Weekly total:   52 mg   Plan last modified:   Yinka Church PharmD (9/6/2017)   Next INR check:   9/20/2017   Target end date:   Indefinite    Indications    Hx of mechanical aortic valve replacement [V43.3] [Z95.2]  TIA (transient ischemic attack) [G45.9]  Coagulopathy (CMS-HCC) [D68.9]             Anticoagulation Episode Summary     INR check location:       Preferred lab:       Send INR reminders to:       Comments:   TTR is 50%, consider alternative      Anticoagulation Care Providers     Provider Role Specialty Phone number    Michael J Bloch, M.D. Referring Internal Medicine [phone]    Yinka Church PharmD Responsible          Anticoagulation Patient Findings  Patient Findings     Positives:   Change in diet/appetite    Negatives:   Signs/symptoms of thrombosis, Signs/symptoms of bleeding, Laboratory test error suspected, Change in health, Change in alcohol use, Change in activity, Upcoming invasive procedure, Emergency department visit, Upcoming dental procedure, Missed doses, Extra doses, Change in medications, Hospital admission, Bruising, Other complaints    Comments:   Less greens         Spoke with patient today regarding supratherapeutic INR of 3.93.  Patient denies any signs/symptoms of bruising or bleeding or any changes in diet and medications.  Instructed patient to call clinic with any questions or concerns.  Instructed patient to decrease weekly warfarin regimen by ~7% as detailed above.  Follow up in 2 weeks.    Yinka Church PharmD

## 2017-09-21 ENCOUNTER — HOSPITAL ENCOUNTER (OUTPATIENT)
Dept: LAB | Facility: MEDICAL CENTER | Age: 67
End: 2017-09-21
Attending: NURSE PRACTITIONER
Payer: MEDICARE

## 2017-09-21 DIAGNOSIS — Z95.2 HX OF MECHANICAL AORTIC VALVE REPLACEMENT: ICD-10-CM

## 2017-09-21 LAB
INR PPP: 2.85 (ref 0.87–1.13)
PROTHROMBIN TIME: 30.8 SEC (ref 12–14.6)

## 2017-09-21 PROCEDURE — 85610 PROTHROMBIN TIME: CPT

## 2017-09-21 PROCEDURE — 36415 COLL VENOUS BLD VENIPUNCTURE: CPT

## 2017-09-22 ENCOUNTER — ANTICOAGULATION MONITORING (OUTPATIENT)
Dept: VASCULAR LAB | Facility: MEDICAL CENTER | Age: 67
End: 2017-09-22

## 2017-09-22 DIAGNOSIS — D68.9 COAGULOPATHY (HCC): ICD-10-CM

## 2017-09-22 DIAGNOSIS — Z95.2 HX OF MECHANICAL AORTIC VALVE REPLACEMENT: ICD-10-CM

## 2017-09-22 NOTE — PROGRESS NOTES
Anticoagulation Summary  As of 9/22/2017    INR goal:   2.5-3.5   TTR:   44.0 % (6.9 mo)   Today's INR:   2.85 (9/21/2017)   Maintenance plan:   4 mg (4 mg x 1) on Wed; 8 mg (4 mg x 2) all other days   Weekly total:   52 mg   Plan last modified:   Yinka Church PharmD (9/6/2017)   Next INR check:   10/12/2017   Target end date:   Indefinite    Indications    Hx of mechanical aortic valve replacement [V43.3] [Z95.2]  TIA (transient ischemic attack) [G45.9]  Coagulopathy (CMS-HCC) [D68.9]             Anticoagulation Episode Summary     INR check location:       Preferred lab:       Send INR reminders to:       Comments:   TTR is 50%, consider alternative      Anticoagulation Care Providers     Provider Role Specialty Phone number    Michael J Bloch, M.D. Referring Internal Medicine [phone]    Yinka Church PharmD Responsible          Anticoagulation Patient Findings      Left voicemail message to report a therapeutic INR of 2.85.    Pt to continue with current warfarin dosing regimen. Requested pt contact the clinic for any s/s of unusual bleeding, bruising, clotting or any changes to diet or medication.    FU INR in 3 weeks.    Estela Sanchez, PharmD

## 2017-10-11 LAB — INR PPP: 2.6 (ref 2–3.5)

## 2017-10-12 ENCOUNTER — HOSPITAL ENCOUNTER (OUTPATIENT)
Dept: LAB | Facility: MEDICAL CENTER | Age: 67
End: 2017-10-12
Attending: INTERNAL MEDICINE
Payer: MEDICARE

## 2017-10-12 ENCOUNTER — HOSPITAL ENCOUNTER (OUTPATIENT)
Dept: LAB | Facility: MEDICAL CENTER | Age: 67
End: 2017-10-12
Attending: NURSE PRACTITIONER
Payer: MEDICARE

## 2017-10-12 DIAGNOSIS — E78.5 HYPERLIPIDEMIA, UNSPECIFIED HYPERLIPIDEMIA TYPE: ICD-10-CM

## 2017-10-12 DIAGNOSIS — Z95.2 HX OF MECHANICAL AORTIC VALVE REPLACEMENT: ICD-10-CM

## 2017-10-12 LAB
ALBUMIN SERPL BCP-MCNC: 4.1 G/DL (ref 3.2–4.9)
ALBUMIN/GLOB SERPL: 1.5 G/DL
ALP SERPL-CCNC: 56 U/L (ref 30–99)
ALT SERPL-CCNC: 14 U/L (ref 2–50)
ANION GAP SERPL CALC-SCNC: 9 MMOL/L (ref 0–11.9)
AST SERPL-CCNC: 22 U/L (ref 12–45)
BILIRUB SERPL-MCNC: 0.5 MG/DL (ref 0.1–1.5)
BUN SERPL-MCNC: 28 MG/DL (ref 8–22)
CALCIUM SERPL-MCNC: 9.5 MG/DL (ref 8.5–10.5)
CHLORIDE SERPL-SCNC: 105 MMOL/L (ref 96–112)
CHOLEST SERPL-MCNC: 153 MG/DL (ref 100–199)
CO2 SERPL-SCNC: 22 MMOL/L (ref 20–33)
CREAT SERPL-MCNC: 1.03 MG/DL (ref 0.5–1.4)
GFR SERPL CREATININE-BSD FRML MDRD: 54 ML/MIN/1.73 M 2
GLOBULIN SER CALC-MCNC: 2.8 G/DL (ref 1.9–3.5)
GLUCOSE SERPL-MCNC: 101 MG/DL (ref 65–99)
HDLC SERPL-MCNC: 49 MG/DL
INR PPP: 2.69 (ref 0.87–1.13)
LDLC SERPL CALC-MCNC: 57 MG/DL
POTASSIUM SERPL-SCNC: 4.5 MMOL/L (ref 3.6–5.5)
PROT SERPL-MCNC: 6.9 G/DL (ref 6–8.2)
PROTHROMBIN TIME: 29.4 SEC (ref 12–14.6)
SODIUM SERPL-SCNC: 136 MMOL/L (ref 135–145)
TRIGL SERPL-MCNC: 234 MG/DL (ref 0–149)
TSH SERPL DL<=0.005 MIU/L-ACNC: 1.54 UIU/ML (ref 0.3–3.7)

## 2017-10-12 PROCEDURE — 36415 COLL VENOUS BLD VENIPUNCTURE: CPT

## 2017-10-12 PROCEDURE — 85610 PROTHROMBIN TIME: CPT

## 2017-10-12 PROCEDURE — 84443 ASSAY THYROID STIM HORMONE: CPT

## 2017-10-12 PROCEDURE — 83695 ASSAY OF LIPOPROTEIN(A): CPT

## 2017-10-12 PROCEDURE — 80061 LIPID PANEL: CPT

## 2017-10-12 PROCEDURE — 80053 COMPREHEN METABOLIC PANEL: CPT

## 2017-10-13 ENCOUNTER — ANTICOAGULATION MONITORING (OUTPATIENT)
Dept: VASCULAR LAB | Facility: MEDICAL CENTER | Age: 67
End: 2017-10-13

## 2017-10-13 DIAGNOSIS — G45.9 TRANSIENT CEREBRAL ISCHEMIA, UNSPECIFIED TYPE: ICD-10-CM

## 2017-10-13 DIAGNOSIS — D68.9 COAGULOPATHY (HCC): ICD-10-CM

## 2017-10-13 DIAGNOSIS — Z95.2 HX OF MECHANICAL AORTIC VALVE REPLACEMENT: ICD-10-CM

## 2017-10-13 NOTE — PROGRESS NOTES
Anticoagulation Summary  As of 10/13/2017    INR goal:   2.5-3.5   TTR:   49.1 % (7.6 mo)   Today's INR:   2.69 (10/12/2017)   Maintenance plan:   4 mg (4 mg x 1) on Wed; 8 mg (4 mg x 2) all other days   Weekly total:   52 mg   No change documented:   Raheem Miles Med Ass't   Plan last modified:   Yinka Church PharmD (9/6/2017)   Next INR check:   11/2/2017   Target end date:   Indefinite    Indications    Hx of mechanical aortic valve replacement [V43.3] [Z95.2]  TIA (transient ischemic attack) [G45.9]  Coagulopathy (CMS-HCC) [D68.9]             Anticoagulation Episode Summary     INR check location:       Preferred lab:       Send INR reminders to:       Comments:   TTR is 50%, consider alternative      Anticoagulation Care Providers     Provider Role Specialty Phone number    Michael J Bloch, M.D. Referring Internal Medicine [phone]    Yinka Church, PharmD Responsible          Anticoagulation Patient Findings  Patient Findings     Negatives:   Signs/symptoms of thrombosis, Signs/symptoms of bleeding, Laboratory test error suspected, Change in health, Change in alcohol use, Change in activity, Upcoming invasive procedure, Emergency department visit, Upcoming dental procedure, Missed doses, Extra doses, Change in medications, Change in diet/appetite, Hospital admission, Bruising, Other complaints        Spoke with patient to report a therapeutic INR.  Pt instructed to continue with current warfarin dosing regimen. Pt denies any s/s of bleeding, bruising, clotting or any changes to diet or medication.  Will follow up in 3 weeks.    Raheem Miles, Med Ass't

## 2017-10-14 LAB — LPA SERPL-MCNC: 57 MG/DL

## 2017-10-16 ENCOUNTER — ANTICOAGULATION MONITORING (OUTPATIENT)
Dept: VASCULAR LAB | Facility: MEDICAL CENTER | Age: 67
End: 2017-10-16

## 2017-10-16 DIAGNOSIS — G45.9 TRANSIENT CEREBRAL ISCHEMIA, UNSPECIFIED TYPE: ICD-10-CM

## 2017-10-16 DIAGNOSIS — D68.9 COAGULOPATHY (HCC): ICD-10-CM

## 2017-10-16 DIAGNOSIS — Z95.2 HX OF MECHANICAL AORTIC VALVE REPLACEMENT: ICD-10-CM

## 2017-10-19 ENCOUNTER — OFFICE VISIT (OUTPATIENT)
Dept: VASCULAR LAB | Facility: MEDICAL CENTER | Age: 67
End: 2017-10-19
Attending: INTERNAL MEDICINE
Payer: MEDICARE

## 2017-10-19 VITALS
WEIGHT: 229.4 LBS | OXYGEN SATURATION: 94 % | BODY MASS INDEX: 45.04 KG/M2 | DIASTOLIC BLOOD PRESSURE: 54 MMHG | HEIGHT: 60 IN | SYSTOLIC BLOOD PRESSURE: 130 MMHG | HEART RATE: 90 BPM

## 2017-10-19 DIAGNOSIS — Z79.01 LONG TERM CURRENT USE OF ANTICOAGULANT THERAPY: ICD-10-CM

## 2017-10-19 DIAGNOSIS — I25.811 CORONARY ARTERY DISEASE INVOLVING NATIVE ARTERY OF TRANSPLANTED HEART WITHOUT ANGINA PECTORIS: ICD-10-CM

## 2017-10-19 DIAGNOSIS — E78.5 DYSLIPIDEMIA: ICD-10-CM

## 2017-10-19 DIAGNOSIS — E78.5 HYPERLIPIDEMIA, UNSPECIFIED HYPERLIPIDEMIA TYPE: ICD-10-CM

## 2017-10-19 DIAGNOSIS — Z95.2 HX OF MECHANICAL AORTIC VALVE REPLACEMENT: ICD-10-CM

## 2017-10-19 PROCEDURE — 99214 OFFICE O/P EST MOD 30 MIN: CPT | Performed by: INTERNAL MEDICINE

## 2017-10-19 PROCEDURE — 99212 OFFICE O/P EST SF 10 MIN: CPT

## 2017-10-19 ASSESSMENT — ENCOUNTER SYMPTOMS
HEADACHES: 0
MYALGIAS: 0
NERVOUS/ANXIOUS: 0
BRUISES/BLEEDS EASILY: 0
BLOOD IN STOOL: 0
CLAUDICATION: 0
DEPRESSION: 0
FOCAL WEAKNESS: 0
COUGH: 0
PALPITATIONS: 0
SHORTNESS OF BREATH: 0

## 2017-10-19 NOTE — PROGRESS NOTES
[facility] Follow Up Visit    Date of Service: 10/19/17    Yenifer Beasley is here for follow up of dyslipidemia, htn, chronic anticoagulation, and hypothyroidism    HPI  Pertinent Interval History since last visit:   Tolerating praluent - no side-effects  Good adherence  No bleeding on anticoagulation  Had recent echo in march  Not taking BP at home  Back on zetia  No bleeding on anticaoagulation  PCP has adjusted thyroid dose  Not checking BP at home  Current Prescription Lipid Lowering Medications - including dose:   Statin: None  Non-Statin: praluent 150 mg 2x per week  Current Lipid Lowering and Related Supplements:   Omega 3s  Vit D  coQ  Any Current Side Effects Potentially Related to Lipid Lowering therapy?   No  Current Adherence to Lipid Lowering Therapies:  Complete  Any Previous History of Statin Intolerance?   Yes, Details: severe myalgias on multiple statins  Baseline Lipids Prior to Treatment:  Shown here:  LDL-C: 170      SOCIAL HISTORY  History   Smoking Status   • Never Smoker   Smokeless Tobacco   • Never Used      Change in weight: lost and regained  Exercise habits: doing a lot more exercise  Diet: common adult, low carbohydrate    Review of Systems   Respiratory: Negative for cough and shortness of breath.    Cardiovascular: Positive for leg swelling. Negative for chest pain, palpitations and claudication.   Gastrointestinal: Negative for blood in stool and melena.   Genitourinary: Negative for hematuria.   Musculoskeletal: Positive for joint pain. Negative for myalgias.   Neurological: Negative for focal weakness and headaches.   Endo/Heme/Allergies: Does not bruise/bleed easily.   Psychiatric/Behavioral: Negative for depression. The patient is not nervous/anxious.      Physical Exam   Constitutional: No distress.   Cardiovascular: Normal rate, regular rhythm and intact distal pulses.    Murmur heard.  Pulmonary/Chest: No respiratory distress. She has no wheezes. She has no rales.    Musculoskeletal: She exhibits edema.   Tr edema bilat   Neurological: No cranial nerve deficit.   Skin: No rash noted. She is not diaphoretic. No erythema.   Vitals reviewed.      DATA REVIEW  Most Recent Lipid Panel:   Lab Results   Component Value Date    CHOLSTRLTOT 153 10/12/2017    TRIGLYCERIDE 234 (H) 10/12/2017    HDL 49 10/12/2017    LDL 57 10/12/2017       Other Pertinent Blood Work:   Lab Results   Component Value Date    SODIUM 136 10/12/2017    POTASSIUM 4.5 10/12/2017    CHLORIDE 105 10/12/2017    CO2 22 10/12/2017    ANION 9.0 10/12/2017    GLUCOSE 101 (H) 10/12/2017    BUN 28 (H) 10/12/2017    CREATININE 1.03 10/12/2017    CALCIUM 9.5 10/12/2017    ASTSGOT 22 10/12/2017    ALTSGPT 14 10/12/2017    ALKPHOSPHAT 56 10/12/2017    TBILIRUBIN 0.5 10/12/2017    ALBUMIN 4.1 10/12/2017    AGRATIO 1.5 10/12/2017    LIPOPROTA 57 (H) 10/12/2017    TSHULTRASEN 1.540 10/12/2017     Other:  Lp(a) - 57 (oct 2017)  TSH 1.5  (oct 2017)    Recent Imaging Studies:      Echo march 2017:  1. Left ventricular ejection fraction is visually estimated to be 55%.   Normal regional wall motion. Grade I diastolic dysfunction.  2. Known mechanical aortic valve that is functioning normally.   3. Mild mitral stenosis.  4. Estimated right ventricular systolic pressure is 40 mmHg.    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  LDL at goal and nonHDL nearly so  Lifestyle Recommendations From Today’s Visit:    Diet - low simple carb like [place]  Exercise - daily home exercise for 15-20 min  Non-Statin Medications Recommendations from Today’s Visit:   - Continue zetia 10 mg daily  - Consider addition of fibrate if nonHDL >130 in future  Indication for PCSK9 Inhibitor, if applicable:  ASCVD with suboptimal control of LDL-C despite maximally tolerated statin - continue praluent 150 mg 2x permonth  Supplements Recommended at this visit:  - Continue omega 3's, vit D and coq10  Recommendations for Other Cardiovascular Risk Factors, toby all that apply:   Hypertension- appears well controlled. Continue current rx   IFG -relatively stable.  Continue TLC  Anticoagulation - continue warfarin and f/u in anticoag clinic  Other issues  - Hypothyroidism - seems under reasonable control per TSH; continue current thyroid replection; otherwise defer management to PCP    Studies Ordered at Todays Visit:   None, but will repeat echo in march 2019 if not ordered sooner by cards  Blood Work Ordered At Today’s visit:   Fasting lipid panel; cmp, tsh  Follow-Up:   1) anticoagulation clinic as scheduled  2) vascular care with vasc med in 6 months    Michael J Bloch, M.D.    CC:  Alina Hale M.D.

## 2017-11-08 ENCOUNTER — HOSPITAL ENCOUNTER (OUTPATIENT)
Dept: LAB | Facility: MEDICAL CENTER | Age: 67
End: 2017-11-08
Attending: NURSE PRACTITIONER
Payer: MEDICARE

## 2017-11-08 LAB
INR PPP: 3.01 (ref 0.87–1.13)
PROTHROMBIN TIME: 30.9 SEC (ref 12–14.6)

## 2017-11-08 PROCEDURE — 85610 PROTHROMBIN TIME: CPT

## 2017-11-08 PROCEDURE — 36415 COLL VENOUS BLD VENIPUNCTURE: CPT

## 2017-11-09 ENCOUNTER — ANTICOAGULATION MONITORING (OUTPATIENT)
Dept: VASCULAR LAB | Facility: MEDICAL CENTER | Age: 67
End: 2017-11-09

## 2017-11-09 DIAGNOSIS — Z95.2 HX OF MECHANICAL AORTIC VALVE REPLACEMENT: ICD-10-CM

## 2017-11-09 DIAGNOSIS — G45.0 VERTEBROBASILAR ARTERY SYNDROME: ICD-10-CM

## 2017-11-09 DIAGNOSIS — D68.9 COAGULOPATHY (HCC): ICD-10-CM

## 2017-11-09 NOTE — PROGRESS NOTES
Anticoagulation Summary  As of 11/9/2017    INR goal:   2.5-3.5   TTR:   54.4 % (8.5 mo)   Today's INR:   3.01 (11/8/2017)   Maintenance plan:   4 mg (4 mg x 1) on Wed; 8 mg (4 mg x 2) all other days   Weekly total:   52 mg   Plan last modified:   Yinka Church PharmD (9/6/2017)   Next INR check:   12/8/2017   Target end date:   Indefinite    Indications    Hx of mechanical aortic valve replacement [V43.3] [Z95.2]  TIA (transient ischemic attack) [G45.9]  Coagulopathy (CMS-HCC) [D68.9]             Anticoagulation Episode Summary     INR check location:       Preferred lab:       Send INR reminders to:       Comments:   TTR is 50%, consider alternative      Anticoagulation Care Providers     Provider Role Specialty Phone number    Michael J Bloch, M.D. Referring Internal Medicine [phone]    Yinka Church, Pasha Responsible          Anticoagulation Patient Findings    See note by Meme Valle 08/06/2014    Katelyn Perez PharmD

## 2017-12-06 ENCOUNTER — HOSPITAL ENCOUNTER (OUTPATIENT)
Dept: LAB | Facility: MEDICAL CENTER | Age: 67
End: 2017-12-06
Attending: NURSE PRACTITIONER
Payer: MEDICARE

## 2017-12-06 ENCOUNTER — ANTICOAGULATION MONITORING (OUTPATIENT)
Dept: VASCULAR LAB | Facility: MEDICAL CENTER | Age: 67
End: 2017-12-06

## 2017-12-06 DIAGNOSIS — G45.0 VERTEBROBASILAR ARTERY SYNDROME: ICD-10-CM

## 2017-12-06 DIAGNOSIS — D68.9 COAGULOPATHY (HCC): ICD-10-CM

## 2017-12-06 DIAGNOSIS — Z95.2 HX OF MECHANICAL AORTIC VALVE REPLACEMENT: ICD-10-CM

## 2017-12-06 LAB
INR PPP: 2.54 (ref 0.87–1.13)
PROTHROMBIN TIME: 27 SEC (ref 12–14.6)

## 2017-12-06 PROCEDURE — 85610 PROTHROMBIN TIME: CPT

## 2017-12-06 PROCEDURE — 36415 COLL VENOUS BLD VENIPUNCTURE: CPT

## 2017-12-07 NOTE — PROGRESS NOTES
OP Anticoagulation Telephone Note    Date: 12/6/2017  Anticoagulation Summary  As of 12/6/2017    INR goal:   2.5-3.5   TTR:   58.9 % (9.5 mo)   Today's INR:   2.54   Maintenance plan:   4 mg (4 mg x 1) on Wed; 8 mg (4 mg x 2) all other days   Weekly total:   52 mg   No change documented:   Maryellen Alvares, Med Ass't   Plan last modified:   Yinka Church PharmD (9/6/2017)   Next INR check:   1/3/2018   Target end date:   Indefinite    Indications    Hx of mechanical aortic valve replacement [V43.3] [Z95.2]  TIA (transient ischemic attack) [G45.9]  Coagulopathy (CMS-HCC) [D68.9]             Anticoagulation Episode Summary     INR check location:       Preferred lab:       Send INR reminders to:       Comments:   TTR is 50%, consider alternative      Anticoagulation Care Providers     Provider Role Specialty Phone number    Michael J Bloch, M.D. Referring Internal Medicine [phone]    Yinka Church, PharmD Responsible          Anticoagulation Patient Findings  Patient Findings     Negatives:   Signs/symptoms of thrombosis, Signs/symptoms of bleeding, Laboratory test error suspected, Change in health, Change in alcohol use, Change in activity, Upcoming invasive procedure, Emergency department visit, Upcoming dental procedure, Missed doses, Extra doses, Change in medications, Change in diet/appetite, Hospital admission, Bruising, Other complaints      Plan:  Left message on patient's answering machine/ voicemail. Instructed patient to call back with any concerns regarding any unusual bleeding or bruising, any medication or diet changes, or any signs or symptoms of thrombosis. Instructed patient to resume medication as outlined above. Patient to follow up in 4 weeks.     Maryellen Alvares, Medical Assistant

## 2017-12-08 ENCOUNTER — ANTICOAGULATION MONITORING (OUTPATIENT)
Dept: VASCULAR LAB | Facility: MEDICAL CENTER | Age: 67
End: 2017-12-08

## 2017-12-08 DIAGNOSIS — Z95.2 HX OF MECHANICAL AORTIC VALVE REPLACEMENT: ICD-10-CM

## 2017-12-08 DIAGNOSIS — D68.9 COAGULOPATHY (HCC): ICD-10-CM

## 2017-12-08 DIAGNOSIS — G45.0 VERTEBROBASILAR ARTERY SYNDROME: ICD-10-CM

## 2017-12-08 LAB — INR PPP: 2.5 (ref 2–3.5)

## 2017-12-14 ENCOUNTER — TELEPHONE (OUTPATIENT)
Dept: VASCULAR LAB | Facility: MEDICAL CENTER | Age: 67
End: 2017-12-14

## 2017-12-14 ENCOUNTER — HOSPITAL ENCOUNTER (OUTPATIENT)
Dept: LAB | Facility: MEDICAL CENTER | Age: 67
End: 2017-12-14
Attending: NURSE PRACTITIONER
Payer: MEDICARE

## 2017-12-14 LAB
INR PPP: 2.38 (ref 0.87–1.13)
PROTHROMBIN TIME: 25.7 SEC (ref 12–14.6)

## 2017-12-14 PROCEDURE — 85610 PROTHROMBIN TIME: CPT

## 2017-12-14 PROCEDURE — 36415 COLL VENOUS BLD VENIPUNCTURE: CPT

## 2017-12-14 NOTE — TELEPHONE ENCOUNTER
Spoke with Yenifer, who reports hematuria/urgency. Abdominal pain.  Requested she get her INR drawn to r/o supratherapeutic iNR  Otherwise recommend UTI screening with pcp or UC  Katelyn Perez, EmilyD

## 2017-12-18 ENCOUNTER — ANTICOAGULATION MONITORING (OUTPATIENT)
Dept: VASCULAR LAB | Facility: MEDICAL CENTER | Age: 67
End: 2017-12-18

## 2017-12-18 DIAGNOSIS — D68.9 COAGULOPATHY (HCC): ICD-10-CM

## 2017-12-18 DIAGNOSIS — Z95.2 HX OF MECHANICAL AORTIC VALVE REPLACEMENT: ICD-10-CM

## 2017-12-18 DIAGNOSIS — G45.0 VERTEBROBASILAR ARTERY SYNDROME: ICD-10-CM

## 2017-12-19 NOTE — PROGRESS NOTES
Anticoagulation Summary  As of 12/18/2017    INR goal:   2.5-3.5   TTR:   57.9 % (9.7 mo)   Today's INR:   2.38! (12/14/2017)   Maintenance plan:   4 mg (4 mg x 1) on Wed; 8 mg (4 mg x 2) all other days   Weekly total:   52 mg   Plan last modified:   Yinka Church PharmD (9/6/2017)   Next INR check:   1/3/2018   Target end date:   Indefinite    Indications    Hx of mechanical aortic valve replacement [V43.3] [Z95.2]  TIA (transient ischemic attack) [G45.9]  Coagulopathy (CMS-HCC) [D68.9]             Anticoagulation Episode Summary     INR check location:       Preferred lab:       Send INR reminders to:       Comments:   TTR is 50%, consider alternative      Anticoagulation Care Providers     Provider Role Specialty Phone number    Michael J Bloch, M.D. Referring Internal Medicine [phone]    Yinka Church, Pasha Responsible          Anticoagulation Patient Findings    Left voicemail message to report a SUB therapeutic INR of 2.4.  Pt to continue with current warfarin dosing regimen as INR is from 4 days ago.. Requested pt contact the clinic for any s/s of unusual bleeding, bruising, clotting or any changes to diet or medication. Requested pt to have medical f/u if she still has hematuria/urgency.  FU 2 weeks.    [place], PharmD

## 2017-12-20 DIAGNOSIS — E78.5 HYPERLIPIDEMIA, UNSPECIFIED HYPERLIPIDEMIA TYPE: ICD-10-CM

## 2018-01-04 ENCOUNTER — ANTICOAGULATION MONITORING (OUTPATIENT)
Dept: VASCULAR LAB | Facility: MEDICAL CENTER | Age: 68
End: 2018-01-04

## 2018-01-04 ENCOUNTER — HOSPITAL ENCOUNTER (OUTPATIENT)
Dept: LAB | Facility: MEDICAL CENTER | Age: 68
End: 2018-01-04
Attending: NURSE PRACTITIONER
Payer: MEDICARE

## 2018-01-04 DIAGNOSIS — Z95.2 HX OF MECHANICAL AORTIC VALVE REPLACEMENT: ICD-10-CM

## 2018-01-04 DIAGNOSIS — D68.9 COAGULOPATHY (HCC): ICD-10-CM

## 2018-01-04 LAB
INR PPP: 2.39 (ref 0.87–1.13)
PROTHROMBIN TIME: 25.8 SEC (ref 12–14.6)

## 2018-01-04 PROCEDURE — 85610 PROTHROMBIN TIME: CPT

## 2018-01-04 PROCEDURE — 36415 COLL VENOUS BLD VENIPUNCTURE: CPT

## 2018-01-05 NOTE — PROGRESS NOTES
Anticoagulation Summary  As of 1/4/2018    INR goal:   2.5-3.5   TTR:   54.0 % (10.4 mo)   Today's INR:   2.39!   Maintenance plan:   8 mg (4 mg x 2) every day   Weekly total:   56 mg   Plan last modified:   Estela Sanchez, PharmD (1/4/2018)   Next INR check:   1/18/2018   Target end date:   Indefinite    Indications    Hx of mechanical aortic valve replacement [V43.3] [Z95.2]  TIA (transient ischemic attack) [G45.9]  Coagulopathy (CMS-HCC) [D68.9]             Anticoagulation Episode Summary     INR check location:       Preferred lab:       Send INR reminders to:       Comments:   TTR is 50%, consider alternative      Anticoagulation Care Providers     Provider Role Specialty Phone number    Michael J Bloch, M.D. Referring Internal Medicine [phone]    Yinka Church, PharmD Responsible          Anticoagulation Patient Findings      Spoke with patient.  INR is SUB therapeutic.   Pt denies any unusual s/s of bleeding, bruising, clotting or any changes to diet or medications. Denies any etoh, cranberries, supplements, or illness.   Pt verifies warfarin weekly dosing.     Will have pt start an increased weekly dose of 7%.     Repeat INR in 2 weeks.     Estela Sanchez, PharmD

## 2018-01-19 ENCOUNTER — HOSPITAL ENCOUNTER (OUTPATIENT)
Dept: LAB | Facility: MEDICAL CENTER | Age: 68
End: 2018-01-19
Attending: NURSE PRACTITIONER
Payer: MEDICARE

## 2018-01-19 LAB
INR PPP: 3.94 (ref 0.87–1.13)
PROTHROMBIN TIME: 38.3 SEC (ref 12–14.6)

## 2018-01-19 PROCEDURE — 36415 COLL VENOUS BLD VENIPUNCTURE: CPT

## 2018-01-19 PROCEDURE — 85610 PROTHROMBIN TIME: CPT

## 2018-01-22 ENCOUNTER — ANTICOAGULATION MONITORING (OUTPATIENT)
Dept: VASCULAR LAB | Facility: MEDICAL CENTER | Age: 68
End: 2018-01-22

## 2018-01-22 DIAGNOSIS — D68.9 COAGULOPATHY (HCC): ICD-10-CM

## 2018-01-22 DIAGNOSIS — Z95.2 HX OF MECHANICAL AORTIC VALVE REPLACEMENT: ICD-10-CM

## 2018-01-22 NOTE — PROGRESS NOTES
Anticoagulation Summary  As of 1/22/2018    INR goal:   2.5-3.5   TTR:   54.5 % (10.9 mo)   Today's INR:   3.94! (1/19/2018)   Maintenance plan:   4 mg (4 mg x 1) on Mon; 8 mg (4 mg x 2) all other days   Weekly total:   52 mg   Plan last modified:   Coleman Dozier PharmD (1/22/2018)   Next INR check:   2/5/2018   Target end date:   Indefinite    Indications    Hx of mechanical aortic valve replacement [V43.3] [Z95.2]  TIA (transient ischemic attack) [G45.9]  Coagulopathy (CMS-HCC) [D68.9]             Anticoagulation Episode Summary     INR check location:       Preferred lab:       Send INR reminders to:       Comments:   TTR is 50%, consider alternative      Anticoagulation Care Providers     Provider Role Specialty Phone number    Michael J Bloch, M.D. Referring Internal Medicine [phone]    Yinka Church, PharmD Responsible          Anticoagulation Patient Findings      INR  supra-therapeutic.   Left a voice message for the patient, asked patient to please call the anticoagulation clinic if they have any signs/symptoms of bleeding and/or thrombosis or any changes to diet or medications.    Follow up appointment in 2 week(s)    Decrease weekly warfarin dose as noted       Coleman Dozier, PharmD

## 2018-01-31 ENCOUNTER — TELEPHONE (OUTPATIENT)
Dept: VASCULAR LAB | Facility: MEDICAL CENTER | Age: 68
End: 2018-01-31

## 2018-01-31 DIAGNOSIS — Z95.2 HX OF MECHANICAL AORTIC VALVE REPLACEMENT: ICD-10-CM

## 2018-01-31 DIAGNOSIS — Z95.2 S/P AORTIC VALVE REPLACEMENT: ICD-10-CM

## 2018-02-02 ENCOUNTER — HOSPITAL ENCOUNTER (OUTPATIENT)
Dept: LAB | Facility: MEDICAL CENTER | Age: 68
End: 2018-02-02
Attending: NURSE PRACTITIONER
Payer: MEDICARE

## 2018-02-02 LAB
INR PPP: 3.04 (ref 0.87–1.13)
PROTHROMBIN TIME: 31.2 SEC (ref 12–14.6)

## 2018-02-02 PROCEDURE — 36415 COLL VENOUS BLD VENIPUNCTURE: CPT

## 2018-02-02 PROCEDURE — 85610 PROTHROMBIN TIME: CPT

## 2018-02-05 ENCOUNTER — ANTICOAGULATION MONITORING (OUTPATIENT)
Dept: VASCULAR LAB | Facility: MEDICAL CENTER | Age: 68
End: 2018-02-05

## 2018-02-05 DIAGNOSIS — Z95.2 HX OF MECHANICAL AORTIC VALVE REPLACEMENT: ICD-10-CM

## 2018-02-05 DIAGNOSIS — D68.9 COAGULOPATHY (HCC): ICD-10-CM

## 2018-02-05 DIAGNOSIS — G45.9 TRANSIENT CEREBRAL ISCHEMIA, UNSPECIFIED TYPE: ICD-10-CM

## 2018-02-06 NOTE — PROGRESS NOTES
Anticoagulation Summary  As of 2/5/2018    INR goal:   2.5-3.5   TTR:   54.3 % (11.4 mo)   Today's INR:   3.04 (2/2/2018)   Maintenance plan:   4 mg (4 mg x 1) on Mon; 8 mg (4 mg x 2) all other days   Weekly total:   52 mg   Plan last modified:   Emily MccrayD (1/22/2018)   Next INR check:   2/16/2018   Target end date:   Indefinite    Indications    Hx of mechanical aortic valve replacement [V43.3] [Z95.2]  TIA (transient ischemic attack) [G45.9]  Coagulopathy (CMS-HCC) [D68.9]             Anticoagulation Episode Summary     INR check location:       Preferred lab:       Send INR reminders to:       Comments:   TTR is 50%, consider alternative      Anticoagulation Care Providers     Provider Role Specialty Phone number    Michael J Bloch, M.D. Referring Internal Medicine [phone]    Yinka Church, PharmD Responsible          Anticoagulation Patient Findings  Patient Findings     Negatives:   Signs/symptoms of thrombosis, Signs/symptoms of bleeding, Laboratory test error suspected, Change in health, Change in alcohol use, Change in activity, Upcoming invasive procedure, Emergency department visit, Upcoming dental procedure, Missed doses, Extra doses, Change in medications, Change in diet/appetite, Hospital admission, Bruising, Other complaints        Spoke with patient today regarding therapeutic INR of 3.04.  Patient denies any signs/symptoms of bruising or bleeding or any changes in diet and medications.  Instructed patient to call clinic with any questions or concerns.  Pt is to continue with current warfarin dosing regimen.  Follow up in 2 weeks, to reduce risk of adverse events related to this high risk medication,  Warfarin.    Yinka Church, EmilyD

## 2018-02-07 ENCOUNTER — ANTICOAGULATION MONITORING (OUTPATIENT)
Dept: VASCULAR LAB | Facility: MEDICAL CENTER | Age: 68
End: 2018-02-07

## 2018-02-07 DIAGNOSIS — D68.9 COAGULOPATHY (HCC): ICD-10-CM

## 2018-02-07 DIAGNOSIS — G45.0 VERTEBROBASILAR ARTERY SYNDROME: ICD-10-CM

## 2018-02-07 DIAGNOSIS — Z95.2 HX OF MECHANICAL AORTIC VALVE REPLACEMENT: ICD-10-CM

## 2018-02-21 ENCOUNTER — HOSPITAL ENCOUNTER (OUTPATIENT)
Dept: LAB | Facility: MEDICAL CENTER | Age: 68
End: 2018-02-21
Attending: NURSE PRACTITIONER
Payer: MEDICARE

## 2018-02-21 LAB
INR PPP: 3.32 (ref 0.87–1.13)
PROTHROMBIN TIME: 33.4 SEC (ref 12–14.6)

## 2018-02-21 PROCEDURE — 85610 PROTHROMBIN TIME: CPT

## 2018-02-21 PROCEDURE — 36415 COLL VENOUS BLD VENIPUNCTURE: CPT

## 2018-02-22 ENCOUNTER — ANTICOAGULATION MONITORING (OUTPATIENT)
Dept: VASCULAR LAB | Facility: MEDICAL CENTER | Age: 68
End: 2018-02-22

## 2018-02-22 DIAGNOSIS — D68.9 COAGULOPATHY (HCC): ICD-10-CM

## 2018-02-22 DIAGNOSIS — Z95.2 HX OF MECHANICAL AORTIC VALVE REPLACEMENT: ICD-10-CM

## 2018-02-22 DIAGNOSIS — G45.0 VERTEBROBASILAR ARTERY SYNDROME: ICD-10-CM

## 2018-02-22 NOTE — PROGRESS NOTES
Anticoagulation Summary  As of 2/22/2018    INR goal:   2.5-3.5   TTR:   56.7 % (1 y)   Today's INR:   3.32 (2/21/2018)   Maintenance plan:   4 mg (4 mg x 1) on Mon; 8 mg (4 mg x 2) all other days   Weekly total:   52 mg   Plan last modified:   Coleman Dozier, Pasha (1/22/2018)   Next INR check:   3/29/2018   Target end date:   Indefinite    Indications    Hx of mechanical aortic valve replacement [V43.3] [Z95.2]  TIA (transient ischemic attack) [G45.9]  Coagulopathy (CMS-HCC) [D68.9]             Anticoagulation Episode Summary     INR check location:       Preferred lab:       Send INR reminders to:       Comments:   TTR is 50%, consider alternative      Anticoagulation Care Providers     Provider Role Specialty Phone number    Michael J Bloch, M.D. Referring Internal Medicine [phone]    Yinka Church, PharmD Responsible          Anticoagulation Patient Findings        Spoke to patient on the phone.   INR  therapeutic.   Denies signs/symptoms of bleeding and/or thrombosis.   Denies changes to diet or medications.   Follow up appointment in 5 week(s).    Continue weekly warfarin dose as noted      Coleman Dozier, PharmD

## 2018-02-27 ENCOUNTER — ANTICOAGULATION MONITORING (OUTPATIENT)
Dept: VASCULAR LAB | Facility: MEDICAL CENTER | Age: 68
End: 2018-02-27

## 2018-02-27 DIAGNOSIS — D68.9 COAGULOPATHY (HCC): ICD-10-CM

## 2018-02-27 DIAGNOSIS — G45.8 OTHER SPECIFIED TRANSIENT CEREBRAL ISCHEMIAS: ICD-10-CM

## 2018-02-27 DIAGNOSIS — Z95.2 HX OF MECHANICAL AORTIC VALVE REPLACEMENT: ICD-10-CM

## 2018-03-07 ENCOUNTER — TELEPHONE (OUTPATIENT)
Dept: VASCULAR LAB | Facility: MEDICAL CENTER | Age: 68
End: 2018-03-07

## 2018-03-08 ENCOUNTER — TELEPHONE (OUTPATIENT)
Dept: VASCULAR LAB | Facility: MEDICAL CENTER | Age: 68
End: 2018-03-08

## 2018-03-08 NOTE — TELEPHONE ENCOUNTER
Received documentation from [employer] that PA for praluent was approved  MA to inform patient    Michael J Bloch, MD  Certified Clinical Lipid Specialist  Medial Director, [facility]

## 2018-03-28 ENCOUNTER — TELEPHONE (OUTPATIENT)
Dept: VASCULAR LAB | Facility: MEDICAL CENTER | Age: 68
End: 2018-03-28

## 2018-03-28 NOTE — TELEPHONE ENCOUNTER
Spoke with patient this morning regarding co pay for Praluent. patient states that her co pay is over $300. Informed patient about the PASS program, she states that she is unable to pay $300 out of pocket for the Praluent/year as she is only living on disability. unfortunately if the patient is unable to pay the out of pocket expense she does not qualify for the program. I let the patient know that I will explore more options as far as co pay assistance. Patient will come in to  another sample as she is out of the medication.

## 2018-03-29 ENCOUNTER — HOSPITAL ENCOUNTER (OUTPATIENT)
Dept: LAB | Facility: MEDICAL CENTER | Age: 68
End: 2018-03-29
Attending: NURSE PRACTITIONER
Payer: MEDICARE

## 2018-03-29 ENCOUNTER — ANTICOAGULATION MONITORING (OUTPATIENT)
Dept: VASCULAR LAB | Facility: MEDICAL CENTER | Age: 68
End: 2018-03-29

## 2018-03-29 DIAGNOSIS — G45.8 OTHER SPECIFIED TRANSIENT CEREBRAL ISCHEMIAS: ICD-10-CM

## 2018-03-29 DIAGNOSIS — Z95.2 HX OF MECHANICAL AORTIC VALVE REPLACEMENT: ICD-10-CM

## 2018-03-29 DIAGNOSIS — D68.9 COAGULOPATHY (HCC): ICD-10-CM

## 2018-03-29 LAB
INR PPP: 3.13 (ref 0.87–1.13)
PROTHROMBIN TIME: 31.9 SEC (ref 12–14.6)

## 2018-03-29 PROCEDURE — 36415 COLL VENOUS BLD VENIPUNCTURE: CPT

## 2018-03-29 PROCEDURE — 85610 PROTHROMBIN TIME: CPT

## 2018-03-30 ENCOUNTER — ANTICOAGULATION MONITORING (OUTPATIENT)
Dept: VASCULAR LAB | Facility: MEDICAL CENTER | Age: 68
End: 2018-03-30

## 2018-03-30 DIAGNOSIS — Z95.2 HX OF MECHANICAL AORTIC VALVE REPLACEMENT: ICD-10-CM

## 2018-03-30 DIAGNOSIS — G45.9 TRANSIENT CEREBRAL ISCHEMIA, UNSPECIFIED TYPE: ICD-10-CM

## 2018-03-30 DIAGNOSIS — D68.9 COAGULOPATHY (HCC): ICD-10-CM

## 2018-03-30 LAB — INR PPP: 3.1 (ref 2–3.5)

## 2018-03-30 NOTE — PROGRESS NOTES
OP Anticoagulation Telephone Note    Date: 3/30/2018  Anticoagulation Summary  As of 3/30/2018    INR goal:   2.5-3.5   TTR:   60.7 % (1.1 y)   Today's INR:   3.13 (3/29/2018)   Maintenance plan:   4 mg (4 mg x 1) on Mon; 8 mg (4 mg x 2) all other days   Weekly total:   52 mg   No change documented:   Maryellen Alvares, Med Ass't   Plan last modified:   Emily MccrayD (1/22/2018)   Next INR check:   5/4/2018   Target end date:   Indefinite    Indications    Hx of mechanical aortic valve replacement [V43.3] [Z95.2]  TIA (transient ischemic attack) [G45.9]  Coagulopathy (CMS-HCC) [D68.9]             Anticoagulation Episode Summary     INR check location:       Preferred lab:       Send INR reminders to:       Comments:   TTR is 50%, consider alternative      Anticoagulation Care Providers     Provider Role Specialty Phone number    Michael J Bloch, M.D. Referring Internal Medicine [phone]    Yinka Church, PharmD Responsible          Anticoagulation Patient Findings  Patient Findings     Negatives:   Signs/symptoms of thrombosis, Signs/symptoms of bleeding, Laboratory test error suspected, Change in health, Change in alcohol use, Change in activity, Upcoming invasive procedure, Emergency department visit, Upcoming dental procedure, Missed doses, Extra doses, Change in medications, Change in diet/appetite, Hospital admission, Bruising, Other complaints      Plan:  Left message on patient's answering machine/ voicemail. Instructed patient to call back with any concerns regarding any unusual bleeding or bruising, any medication or diet changes, or any signs or symptoms of thrombosis. Instructed patient to resume medication as outlined above. Patient to follow up in 5 weeks.     Maryellen Alvarse, Medical Assistant    I have reviewed and am in agreement with the above stated plan on 3-30-18.  Yinka Church, PharmD

## 2018-04-02 ENCOUNTER — ANTICOAGULATION MONITORING (OUTPATIENT)
Dept: VASCULAR LAB | Facility: MEDICAL CENTER | Age: 68
End: 2018-04-02

## 2018-04-02 DIAGNOSIS — Z95.2 HX OF MECHANICAL AORTIC VALVE REPLACEMENT: ICD-10-CM

## 2018-04-02 DIAGNOSIS — G45.9 TRANSIENT CEREBRAL ISCHEMIA, UNSPECIFIED TYPE: ICD-10-CM

## 2018-04-02 DIAGNOSIS — D68.9 COAGULOPATHY (HCC): ICD-10-CM

## 2018-04-02 NOTE — PROGRESS NOTES
Anticoagulation Summary  As of 4/2/2018    INR goal:   2.5-3.5   TTR:   60.7 % (1.1 y)   Today's INR:   3.1 (3/30/2018)   Maintenance plan:   4 mg (4 mg x 1) on Mon; 8 mg (4 mg x 2) all other days   Weekly total:   52 mg   Plan last modified:   Coleman Dozier PharmD (1/22/2018)   Next INR check:   4/30/2018   Target end date:   Indefinite    Indications    Hx of mechanical aortic valve replacement [V43.3] [Z95.2]  TIA (transient ischemic attack) [G45.9]  Coagulopathy (CMS-HCC) [D68.9]             Anticoagulation Episode Summary     INR check location:       Preferred lab:       Send INR reminders to:       Comments:   TTR is 50%, consider alternative      Anticoagulation Care Providers     Provider Role Specialty Phone number    Michael J Bloch, M.D. Referring Internal Medicine [phone]    Yinka Church, PharmD Responsible          Anticoagulation Patient Findings      INR  therapeutic.   Left a voice message for the patient, asked patient to please call the anticoagulation clinic if they have any signs/symptoms of bleeding and/or thrombosis or any changes to diet or medications.    Follow up appointment in 4 week(s)    Continue weekly warfarin dose as noted      Coleman Dozier, PharmD

## 2018-04-10 ENCOUNTER — HOSPITAL ENCOUNTER (OUTPATIENT)
Dept: LAB | Facility: MEDICAL CENTER | Age: 68
End: 2018-04-10
Attending: FAMILY MEDICINE
Payer: MEDICARE

## 2018-04-10 ENCOUNTER — NON-PROVIDER VISIT (OUTPATIENT)
Dept: VASCULAR LAB | Facility: MEDICAL CENTER | Age: 68
End: 2018-04-10
Attending: INTERNAL MEDICINE
Payer: MEDICARE

## 2018-04-10 DIAGNOSIS — G45.9 TRANSIENT CEREBRAL ISCHEMIA, UNSPECIFIED TYPE: ICD-10-CM

## 2018-04-10 DIAGNOSIS — E78.5 HYPERLIPIDEMIA, UNSPECIFIED HYPERLIPIDEMIA TYPE: ICD-10-CM

## 2018-04-10 DIAGNOSIS — Z95.2 HX OF MECHANICAL AORTIC VALVE REPLACEMENT: ICD-10-CM

## 2018-04-10 DIAGNOSIS — D68.9 COAGULOPATHY (HCC): ICD-10-CM

## 2018-04-10 LAB — INR PPP: 4 (ref 2–3.5)

## 2018-04-10 PROCEDURE — 85610 PROTHROMBIN TIME: CPT

## 2018-04-10 PROCEDURE — 87086 URINE CULTURE/COLONY COUNT: CPT

## 2018-04-10 PROCEDURE — 99213 OFFICE O/P EST LOW 20 MIN: CPT

## 2018-04-10 NOTE — PROGRESS NOTES
OP Anticoagulation Service Note    Date: 4/10/2018  There were no vitals filed for this visit.    Anticoagulation Summary  As of 4/10/2018    INR goal:   2.5-3.5   TTR:   60.2 % (1.1 y)   Today's INR:   4!   Maintenance plan:   4 mg (4 mg x 1) on Mon; 8 mg (4 mg x 2) all other days   Weekly total:   52 mg   Plan last modified:   Coleman Dozier, PharmD (1/22/2018)   Next INR check:   4/24/2018   Target end date:   Indefinite    Indications    Hx of mechanical aortic valve replacement [V43.3] [Z95.2]  TIA (transient ischemic attack) [G45.9]  Coagulopathy (CMS-HCC) [D68.9]             Anticoagulation Episode Summary     INR check location:       Preferred lab:       Send INR reminders to:       Comments:   TTR is 50%, consider alternative      Anticoagulation Care Providers     Provider Role Specialty Phone number    Michael J Bloch, M.D. Referring Internal Medicine [phone]    Yinka Church, PharmD Responsible          Anticoagulation Patient Findings      HPI:   Yenifermargoth Pang Gale seen in clinic today, they are here today for a INR check on anticoagulation therapy with warfarin because they have a PMH of a TIA and a mechanical aortic valve    The reason for today's visit is to prevent morbidity and mortality from a stroke  and to reduce the risk of bleeding while on a anticoagulant.     Reason for today's visit (per our collaborative practice policy) is because their last INR was 3.1  on  3/30/2018.   Intervention at the last visit:  none    Additional education provided today regarding reducing bleed risk and dietary constraints:  About diet   She also had some general questions about Praluent,      Any upcoming procedures:   none    Confirmed warfarin dosing regimen  Interval Changes with foods rich in vitamin K: No  Interval Changes in ETOH:   No  Interval Changes in smoking status:  No  Interval Changes in medication:  No   Cost restriction:  No    S/S of bleeding or bruising:  No  Signs/symptoms   thrombosis since the last appt:  No  Bleed risk is:  moderate,     3 vitals included with today's appt :No  (BP, HR, weight, ht, RR)     Assessment:   INR  supra-therapeutic.     Possible reason(s) for INR today:   she was on Augmentin     Intervention required today to prevent:    They are at a increased risk of bleeding because INR above goal.    They have a TTR of 60.2  which is not at target (TTR target/goal is 100%) and requires close follow up to prevent a adverse event (the lower the TTR the higher risk of clots, strokes, or bleeding).     She is not able to afford Praluent as it is 300 per month, so I gave her two samples. She will see our APRN in a few weeks and will get labs before that appt for LDLs. Our MA are looking into options but she makes too much money. We will continue to help her with medications samples as long as we have them.     She is having more frequent UTIs and Praluent is noted to have UTIs as a S/E. If they become too bothersome she might need to take some time off the med to make sure they clear up.      Plan:  Hold today then continue weekly warfarin dose as noted      Follow up:  Follow up appointment in 1 week(s)       Other info:  Pt educated to contact our clinic with any changes in medications or s/s of bleeding or thrombosis    CHEST guidelines recommend frequent INR monitoring at regular intervals (a few days up to a max of 12 weeks) to ensure they are on the proper dose of warfarin and not having any complications from therapy.  INRs can dramatically change over a short time period due to diet, medications, and medical conditions.

## 2018-04-12 LAB
BACTERIA UR CULT: NORMAL
SIGNIFICANT IND 70042: NORMAL
SITE SITE: NORMAL
SOURCE SOURCE: NORMAL

## 2018-04-13 ENCOUNTER — ANTICOAGULATION MONITORING (OUTPATIENT)
Dept: VASCULAR LAB | Facility: MEDICAL CENTER | Age: 68
End: 2018-04-13

## 2018-04-13 DIAGNOSIS — D68.9 COAGULOPATHY (HCC): ICD-10-CM

## 2018-04-13 DIAGNOSIS — Z95.2 HX OF MECHANICAL AORTIC VALVE REPLACEMENT: ICD-10-CM

## 2018-04-13 LAB — INR PPP: 4 (ref 2–3.5)

## 2018-04-13 NOTE — PROGRESS NOTES
Not able to report inr of 4, the VM is full. She does have a appt with us in clinic on Monday     Coleman Dozier, EmilyD

## 2018-04-16 ENCOUNTER — HOSPITAL ENCOUNTER (OUTPATIENT)
Dept: CARDIOLOGY | Facility: MEDICAL CENTER | Age: 68
End: 2018-04-16
Attending: NURSE PRACTITIONER
Payer: MEDICARE

## 2018-04-16 ENCOUNTER — ANTICOAGULATION VISIT (OUTPATIENT)
Dept: VASCULAR LAB | Facility: MEDICAL CENTER | Age: 68
End: 2018-04-16
Attending: INTERNAL MEDICINE
Payer: MEDICARE

## 2018-04-16 ENCOUNTER — TELEPHONE (OUTPATIENT)
Dept: VASCULAR LAB | Facility: MEDICAL CENTER | Age: 68
End: 2018-04-16

## 2018-04-16 VITALS — SYSTOLIC BLOOD PRESSURE: 114 MMHG | DIASTOLIC BLOOD PRESSURE: 58 MMHG | HEART RATE: 70 BPM

## 2018-04-16 DIAGNOSIS — Z95.2 S/P AORTIC VALVE REPLACEMENT: ICD-10-CM

## 2018-04-16 DIAGNOSIS — Z95.2 HX OF MECHANICAL AORTIC VALVE REPLACEMENT: ICD-10-CM

## 2018-04-16 DIAGNOSIS — G45.9 TRANSIENT CEREBRAL ISCHEMIA, UNSPECIFIED TYPE: ICD-10-CM

## 2018-04-16 DIAGNOSIS — D68.9 COAGULOPATHY (HCC): ICD-10-CM

## 2018-04-16 LAB
INR BLD: 4.3 (ref 0.9–1.2)
INR PPP: 4.3 (ref 2–3.5)
LV EJECT FRACT  99904: 65
LV EJECT FRACT MOD 2C 99903: 75.81
LV EJECT FRACT MOD 4C 99902: 61.76
LV EJECT FRACT MOD BP 99901: 68.02

## 2018-04-16 PROCEDURE — 93306 TTE W/DOPPLER COMPLETE: CPT | Mod: 26 | Performed by: INTERNAL MEDICINE

## 2018-04-16 PROCEDURE — 93306 TTE W/DOPPLER COMPLETE: CPT

## 2018-04-16 PROCEDURE — 85610 PROTHROMBIN TIME: CPT

## 2018-04-16 PROCEDURE — 99212 OFFICE O/P EST SF 10 MIN: CPT | Performed by: NURSE PRACTITIONER

## 2018-04-16 NOTE — PROGRESS NOTES
Anticoagulation Summary  As of 4/16/2018    INR goal:   2.5-3.5   TTR:   59.3 % (1.1 y)   Today's INR:   4.3!   Maintenance plan:   4 mg (4 mg x 1) on Mon, Fri; 8 mg (4 mg x 2) all other days   Weekly total:   48 mg   Plan last modified:   ELI GarnerPMARRY (4/16/2018)   Next INR check:   4/23/2018   Target end date:   Indefinite    Indications    Hx of mechanical aortic valve replacement [V43.3] [Z95.2]  TIA (transient ischemic attack) [G45.9]  Coagulopathy (CMS-HCC) [D68.9]             Anticoagulation Episode Summary     INR check location:       Preferred lab:       Send INR reminders to:       Comments:   TTR is 50%, consider alternative      Anticoagulation Care Providers     Provider Role Specialty Phone number    Michael J Bloch, M.D. Referring Internal Medicine [phone]    Yinka Church, PharmD Responsible          Anticoagulation Patient Findings      HPI:  Yenifer Beasley seen in clinic today for follow up on anticoagulation therapy in the presence of TIA, MVR. Denies any changes to current medical/health status since last appointment. Denies any medication or diet changes. No current symptoms of bleeding or thrombosis reported.    A/P:   INR supratherapeutic. INR trending high. Will hold tonight and decrease regimen. BP recorded in vitals.    Follow up appointment in 1 week(s).    Next Appointment: Monday, April 23, 2018 at 9:45 am @ [facility].     Bernadine PHILIP

## 2018-04-17 ENCOUNTER — TELEPHONE (OUTPATIENT)
Dept: VASCULAR LAB | Facility: MEDICAL CENTER | Age: 68
End: 2018-04-17

## 2018-04-23 ENCOUNTER — ANTICOAGULATION VISIT (OUTPATIENT)
Dept: MEDICAL GROUP | Facility: PHYSICIAN GROUP | Age: 68
End: 2018-04-23
Payer: MEDICARE

## 2018-04-23 DIAGNOSIS — Z79.01 CHRONIC ANTICOAGULATION: ICD-10-CM

## 2018-04-23 DIAGNOSIS — Z95.2 HX OF MECHANICAL AORTIC VALVE REPLACEMENT: ICD-10-CM

## 2018-04-23 LAB — INR PPP: 4.6 (ref 2–3.5)

## 2018-04-23 PROCEDURE — 99211 OFF/OP EST MAY X REQ PHY/QHP: CPT | Performed by: INTERNAL MEDICINE

## 2018-04-23 PROCEDURE — 85610 PROTHROMBIN TIME: CPT | Performed by: INTERNAL MEDICINE

## 2018-04-23 NOTE — PROGRESS NOTES
Anticoagulation Summary  As of 4/23/2018    INR goal:   2.5-3.5   TTR:   58.3 % (1.2 y)   Today's INR:   4.6!   Maintenance plan:   4 mg (4 mg x 1) on Mon, Wed, Fri; 8 mg (4 mg x 2) all other days   Weekly total:   44 mg   Plan last modified:   Yinka Church, PharmD (4/23/2018)   Next INR check:   4/30/2018   Target end date:   Indefinite    Indications    Chronic anticoagulation [Z79.01]  Hx of mechanical aortic valve replacement [V43.3] [Z95.2]  TIA (transient ischemic attack) [G45.9]             Anticoagulation Episode Summary     INR check location:       Preferred lab:       Send INR reminders to:       Comments:   TTR is 50%, consider alternative      Anticoagulation Care Providers     Provider Role Specialty Phone number    Michael J Bloch, M.D. Referring Internal Medicine [phone]    Yinka Church, PharmD Responsible          Anticoagulation Patient Findings  Patient Findings     Negatives:   Signs/symptoms of thrombosis, Signs/symptoms of bleeding, Laboratory test error suspected, Change in health, Change in alcohol use, Change in activity, Upcoming invasive procedure, Emergency department visit, Upcoming dental procedure, Missed doses, Extra doses, Change in medications, Change in diet/appetite, Hospital admission, Bruising, Other complaints          HPI:   Yenifer Beasley seen in clinic today, on anticoagulation therapy with warfarin for anticoagulation due to history of mechanical aortic valve replacement, TIA, and coagulopathy.    Patient's previous INR was supratherapeutic at 4.3 on 4/16/2018, at which time patient was instructed to hold a dose and then decrease the weekly regimen by taking only one tablet on Friday.  She returns to clinic today to recheck INR to ensure it is therapeutic and thus preventing possible clotting and/or bleeding/bruising complications.    CHADS-VASc = N/A  (unadjusted ischemic stroke risk/year:  N/A)    Does patient have any changes to current medical/health status  since last appt (Y/N):  N  Does patient have any signs/symptoms of bleeding and/or thrombosis since the last appt (Y/N):  N  Does patient have any interval changes to diet or medications since last appt (Y/N):  N  Are there any complications or cost restrictions with current therapy (Y/N):  N      Vitals:  BP declined  HR declined    Weight  declined   Height   1.524m     Asssessment:      INR supratherapeutic at 4.6, therefore patient is at increased risk of bleeding complications   Reason(s) for out of range INR today:  Warfarin dosing too high      Plan:  HOLD tonight's dose, then decrease weekly regimen by ~8% by decreasing Wednesday's dose to one tablet (4 mg)     Follow up:  Because warfarin is a high risk medication and current CHEST guidelines recommend regular monitoring intervals (few days up to 12 weeks), will have patient return to clinic in 1 weeks to recheck INR.    Kirsty Willoughby, pharmacy intern  Yinka Church, PharmD

## 2018-04-24 ENCOUNTER — HOSPITAL ENCOUNTER (OUTPATIENT)
Dept: RADIOLOGY | Facility: MEDICAL CENTER | Age: 68
End: 2018-04-24
Attending: FAMILY MEDICINE
Payer: MEDICARE

## 2018-04-24 DIAGNOSIS — Z12.39 SCREENING BREAST EXAMINATION: ICD-10-CM

## 2018-04-24 PROCEDURE — 77067 SCR MAMMO BI INCL CAD: CPT

## 2018-04-30 ENCOUNTER — ANTICOAGULATION VISIT (OUTPATIENT)
Dept: MEDICAL GROUP | Facility: PHYSICIAN GROUP | Age: 68
End: 2018-04-30
Payer: MEDICARE

## 2018-04-30 DIAGNOSIS — Z79.01 CHRONIC ANTICOAGULATION: Primary | ICD-10-CM

## 2018-04-30 LAB — INR PPP: 3.2 (ref 2–3.5)

## 2018-04-30 PROCEDURE — 85610 PROTHROMBIN TIME: CPT | Performed by: NURSE PRACTITIONER

## 2018-04-30 PROCEDURE — 99999 PR NO CHARGE: CPT | Performed by: NURSE PRACTITIONER

## 2018-04-30 NOTE — PROGRESS NOTES
Anticoagulation Summary  As of 4/30/2018    INR goal:   2.5-3.5   TTR:   57.7 % (1.2 y)   Today's INR:   3.2   Warfarin maintenance plan:   4 mg (4 mg x 1) on Mon, Wed, Fri; 8 mg (4 mg x 2) all other days   Weekly warfarin total:   44 mg   Plan last modified:   Yinka Church, PharmD (4/23/2018)   Next INR check:   5/7/2018   Target end date:   Indefinite    Indications    Chronic anticoagulation [Z79.01]  Hx of mechanical aortic valve replacement [V43.3] [Z95.2]  TIA (transient ischemic attack) [G45.9]             Anticoagulation Episode Summary     INR check location:       Preferred lab:       Send INR reminders to:       Comments:         Anticoagulation Care Providers     Provider Role Specialty Phone number    Michael J Bloch, M.D. Referring Internal Medicine [phone]    Yinka Church, PharmD Responsible          Anticoagulation Patient Findings  Patient Findings     Negatives:   Signs/symptoms of thrombosis, Signs/symptoms of bleeding, Laboratory test error suspected, Change in health, Change in alcohol use, Change in activity, Upcoming invasive procedure, Emergency department visit, Upcoming dental procedure, Missed doses, Extra doses, Change in medications, Change in diet/appetite, Hospital admission, Bruising, Other complaints        HPI:   Yenifer Beasley seen in clinic today, on anticoagulation therapy with warfarin for stroke prevention due to history of mechanical valve replacement and TIA.    Patient's previous INR was supratherapeutic at 4.6 on 4-23-18, at which time patient was instructed to hold one dose, then decrease weekly warfarin regimen.  She returns to clinic today to recheck INR to ensure it is therapeutic and thus preventing possible clotting and/or bleeding/bruising complications.    CHADS-VASc = n/a  (unadjusted ischemic stroke risk/year:  n/a)    Does patient have any changes to current medical/health status since last appt (Y/N):  Recurrent UTI's over the last four to five  "months  Does patient have any signs/symptoms of bleeding and/or thrombosis since the last appt (Y/N):  NO  Does patient have any interval changes to diet or medications since last appt (Y/N):  NO  Are there any complications or cost restrictions with current therapy (Y/N):  NO      Vitals:  BP check declined at today's visit, cuff too small    Weight  declines   Height   5' 0\"     Asssessment:      INR therapeutic at 3.2, therefore decreasing patient's risk of stroke and/or bleeding complications   Reason(s) for out of range INR today:  n/a      Plan:  Pt is to continue with current warfarin dosing regimen in order to maintain INR in therapeutic range.     Follow up:  Because warfarin is a high risk medication and current CHEST guidelines recommend regular monitoring intervals (few days up to 12 weeks), will have patient visit lab in 1 weeks to recheck INR.  She would like to be seen back in clinic on subsequent appointments, will need to schedule based on next INR via lab.    Yinka Church, PharmD    "

## 2018-05-07 ENCOUNTER — HOSPITAL ENCOUNTER (OUTPATIENT)
Dept: LAB | Facility: MEDICAL CENTER | Age: 68
End: 2018-05-07
Attending: INTERNAL MEDICINE
Payer: MEDICARE

## 2018-05-07 ENCOUNTER — HOSPITAL ENCOUNTER (OUTPATIENT)
Dept: LAB | Facility: MEDICAL CENTER | Age: 68
End: 2018-05-07
Attending: NURSE PRACTITIONER
Payer: MEDICARE

## 2018-05-07 DIAGNOSIS — E78.5 HYPERLIPIDEMIA, UNSPECIFIED HYPERLIPIDEMIA TYPE: ICD-10-CM

## 2018-05-07 LAB
ALBUMIN SERPL BCP-MCNC: 4 G/DL (ref 3.2–4.9)
ALBUMIN/GLOB SERPL: 1.5 G/DL
ALP SERPL-CCNC: 52 U/L (ref 30–99)
ALT SERPL-CCNC: 13 U/L (ref 2–50)
ANION GAP SERPL CALC-SCNC: 8 MMOL/L (ref 0–11.9)
AST SERPL-CCNC: 19 U/L (ref 12–45)
BILIRUB SERPL-MCNC: 0.5 MG/DL (ref 0.1–1.5)
BUN SERPL-MCNC: 22 MG/DL (ref 8–22)
CALCIUM SERPL-MCNC: 9.2 MG/DL (ref 8.5–10.5)
CHLORIDE SERPL-SCNC: 107 MMOL/L (ref 96–112)
CHOLEST SERPL-MCNC: 161 MG/DL (ref 100–199)
CO2 SERPL-SCNC: 24 MMOL/L (ref 20–33)
CREAT SERPL-MCNC: 0.88 MG/DL (ref 0.5–1.4)
GLOBULIN SER CALC-MCNC: 2.6 G/DL (ref 1.9–3.5)
GLUCOSE SERPL-MCNC: 116 MG/DL (ref 65–99)
HDLC SERPL-MCNC: 48 MG/DL
INR PPP: 2.78 (ref 0.87–1.13)
LDLC SERPL CALC-MCNC: 59 MG/DL
POTASSIUM SERPL-SCNC: 4.4 MMOL/L (ref 3.6–5.5)
PROT SERPL-MCNC: 6.6 G/DL (ref 6–8.2)
PROTHROMBIN TIME: 29 SEC (ref 12–14.6)
SODIUM SERPL-SCNC: 139 MMOL/L (ref 135–145)
TRIGL SERPL-MCNC: 268 MG/DL (ref 0–149)
TSH SERPL DL<=0.005 MIU/L-ACNC: 1.13 UIU/ML (ref 0.38–5.33)

## 2018-05-07 PROCEDURE — 84443 ASSAY THYROID STIM HORMONE: CPT

## 2018-05-07 PROCEDURE — 85610 PROTHROMBIN TIME: CPT

## 2018-05-07 PROCEDURE — 80061 LIPID PANEL: CPT

## 2018-05-07 PROCEDURE — 80053 COMPREHEN METABOLIC PANEL: CPT

## 2018-05-07 PROCEDURE — 36415 COLL VENOUS BLD VENIPUNCTURE: CPT

## 2018-05-08 ENCOUNTER — ANTICOAGULATION MONITORING (OUTPATIENT)
Dept: VASCULAR LAB | Facility: MEDICAL CENTER | Age: 68
End: 2018-05-08

## 2018-05-08 DIAGNOSIS — Z95.2 HX OF MECHANICAL AORTIC VALVE REPLACEMENT: ICD-10-CM

## 2018-05-08 DIAGNOSIS — Z79.01 CHRONIC ANTICOAGULATION: ICD-10-CM

## 2018-05-08 DIAGNOSIS — G45.0 VERTEBROBASILAR ARTERY SYNDROME: ICD-10-CM

## 2018-05-08 NOTE — PROGRESS NOTES
Unable to reach patient. voicemailbox is full  Emergency contact left voicemail message  Katelyn Perez, EmilyD

## 2018-05-08 NOTE — PROGRESS NOTES
OP Anticoagulation Service Note    Date: 5/8/2018  Anticoagulation Summary  As of 5/8/2018    INR goal:   2.5-3.5   TTR:   58.3 % (1.2 y)   Today's INR:   2.78 (5/7/2018)   Warfarin maintenance plan:   4 mg (4 mg x 1) on Mon, Wed, Fri; 8 mg (4 mg x 2) all other days   Weekly warfarin total:   44 mg   No change documented:   Donell Ernandez Ass't   Plan last modified:   Yinka Church, PharmD (4/23/2018)   Next INR check:   5/14/2018   Target end date:   Indefinite    Indications    Chronic anticoagulation [Z79.01]  Hx of mechanical aortic valve replacement [V43.3] [Z95.2]  TIA (transient ischemic attack) [G45.9]             Anticoagulation Episode Summary     INR check location:       Preferred lab:       Send INR reminders to:       Comments:         Anticoagulation Care Providers     Provider Role Specialty Phone number    Michael J Bloch, M.D. Referring Internal Medicine [phone]    Yinka Church, PharmD Responsible          Anticoagulation Patient Findings  Patient Findings     Negatives:   Signs/symptoms of thrombosis, Signs/symptoms of bleeding, Laboratory test error suspected, Change in health, Change in alcohol use, Change in activity, Upcoming invasive procedure, Emergency department visit, Upcoming dental procedure, Missed doses, Extra doses, Change in medications, Change in diet/appetite, Hospital admission, Bruising, Other complaints        Plan: Unable to reach pt or LM. VM is full. Will call back later.    Donell Ernandez. Ass't  [facility]

## 2018-05-09 NOTE — PROGRESS NOTES
Anticoagulation Summary  As of 5/8/2018    INR goal:   2.5-3.5   TTR:   58.3 % (1.2 y)   Today's INR:   2.78 (5/7/2018)   Warfarin maintenance plan:   4 mg (4 mg x 1) on Mon, Wed, Fri; 8 mg (4 mg x 2) all other days   Weekly warfarin total:   44 mg   No change documented:   Donell Ernandez Ass't   Plan last modified:   Yinka Church, PharmD (4/23/2018)   Next INR check:   5/21/2018   Target end date:   Indefinite    Indications    Chronic anticoagulation [Z79.01]  Hx of mechanical aortic valve replacement [V43.3] [Z95.2]  TIA (transient ischemic attack) [G45.9]             Anticoagulation Episode Summary     INR check location:       Preferred lab:       Send INR reminders to:       Comments:         Anticoagulation Care Providers     Provider Role Specialty Phone number    Michael J Bloch, M.D. Referring Internal Medicine [phone]    Yinka Church, PharmD Responsible          Anticoagulation Patient Findings  Patient Findings     Negatives:   Signs/symptoms of thrombosis, Signs/symptoms of bleeding, Laboratory test error suspected, Change in health, Change in alcohol use, Change in activity, Upcoming invasive procedure, Emergency department visit, Upcoming dental procedure, Missed doses, Extra doses, Change in medications, Change in diet/appetite, Hospital admission, Bruising, Other complaints        Spoke with patient today regarding therapeutic INR of 2.78.  Patient denies any signs/symptoms of bruising or bleeding or any changes in diet and medications.  Instructed patient to call clinic with any questions or concerns.  Pt is to continue with current warfarin dosing regimen.  Follow up in 2 weeks, to reduce risk of adverse events related to this high risk medication,  Warfarin.    Yinka Church, PharmD

## 2018-05-11 ENCOUNTER — ANTICOAGULATION MONITORING (OUTPATIENT)
Dept: VASCULAR LAB | Facility: MEDICAL CENTER | Age: 68
End: 2018-05-11

## 2018-05-11 ENCOUNTER — OFFICE VISIT (OUTPATIENT)
Dept: VASCULAR LAB | Facility: MEDICAL CENTER | Age: 68
End: 2018-05-11
Attending: INTERNAL MEDICINE
Payer: MEDICARE

## 2018-05-11 VITALS
HEART RATE: 60 BPM | WEIGHT: 225.5 LBS | DIASTOLIC BLOOD PRESSURE: 60 MMHG | SYSTOLIC BLOOD PRESSURE: 117 MMHG | HEIGHT: 60 IN | BODY MASS INDEX: 44.27 KG/M2

## 2018-05-11 DIAGNOSIS — L08.9 SKIN INFECTION: ICD-10-CM

## 2018-05-11 DIAGNOSIS — E78.5 HYPERLIPIDEMIA, UNSPECIFIED HYPERLIPIDEMIA TYPE: ICD-10-CM

## 2018-05-11 DIAGNOSIS — G45.0 VERTEBROBASILAR ARTERY SYNDROME: ICD-10-CM

## 2018-05-11 DIAGNOSIS — I10 ESSENTIAL HYPERTENSION: ICD-10-CM

## 2018-05-11 DIAGNOSIS — E87.1 HYPONATREMIA: ICD-10-CM

## 2018-05-11 DIAGNOSIS — Z95.2 HX OF MECHANICAL AORTIC VALVE REPLACEMENT: ICD-10-CM

## 2018-05-11 DIAGNOSIS — Z79.01 CHRONIC ANTICOAGULATION: ICD-10-CM

## 2018-05-11 DIAGNOSIS — E78.5 DYSLIPIDEMIA: ICD-10-CM

## 2018-05-11 DIAGNOSIS — E03.9 HYPOTHYROIDISM, UNSPECIFIED TYPE: ICD-10-CM

## 2018-05-11 LAB — INR PPP: 2.7 (ref 2–3.5)

## 2018-05-11 PROCEDURE — 99212 OFFICE O/P EST SF 10 MIN: CPT | Performed by: NURSE PRACTITIONER

## 2018-05-11 PROCEDURE — 99213 OFFICE O/P EST LOW 20 MIN: CPT | Performed by: NURSE PRACTITIONER

## 2018-05-11 RX ORDER — AMOXICILLIN AND CLAVULANATE POTASSIUM 875; 125 MG/1; MG/1
1 TABLET, FILM COATED ORAL 2 TIMES DAILY
Qty: 20 TAB | Refills: 0 | Status: SHIPPED | OUTPATIENT
Start: 2018-05-11 | End: 2018-07-20

## 2018-05-11 ASSESSMENT — ENCOUNTER SYMPTOMS
HEADACHES: 0
COUGH: 0
NERVOUS/ANXIOUS: 0
PALPITATIONS: 0
CLAUDICATION: 0
BRUISES/BLEEDS EASILY: 0
BLOOD IN STOOL: 0
MYALGIAS: 0
SHORTNESS OF BREATH: 0

## 2018-05-11 NOTE — PROGRESS NOTES
OP Anticoagulation Telephone Note    Date: 5/11/2018  Anticoagulation Summary  As of 5/11/2018    INR goal:   2.5-3.5   TTR:   58.3 % (1.2 y)   Today's INR:   2.7 (5/7/2018)   Warfarin maintenance plan:   4 mg (4 mg x 1) on Mon, Wed, Fri; 8 mg (4 mg x 2) all other days   Weekly warfarin total:   44 mg   No change documented:   Maryellen Alvares, Med Ass't   Plan last modified:   Yinka Church, PharmD (4/23/2018)   Next INR check:   5/21/2018   Target end date:   Indefinite    Indications    Chronic anticoagulation [Z79.01]  Hx of mechanical aortic valve replacement [V43.3] [Z95.2]  TIA (transient ischemic attack) [G45.9]             Anticoagulation Episode Summary     INR check location:       Preferred lab:       Send INR reminders to:       Comments:         Anticoagulation Care Providers     Provider Role Specialty Phone number    Michael J Bloch, M.D. Referring Internal Medicine [phone]    Yinka Church, PharmD Responsible          Anticoagulation Patient Findings  Patient Findings     Negatives:   Signs/symptoms of thrombosis, Signs/symptoms of bleeding, Laboratory test error suspected, Change in health, Change in alcohol use, Change in activity, Upcoming invasive procedure, Emergency department visit, Upcoming dental procedure, Missed doses, Extra doses, Change in medications, Change in diet/appetite, Hospital admission, Bruising, Other complaints      Plan:  Left message on patient's answering machine/ voicemail. Instructed patient to call back with any concerns regarding any unusual bleeding or bruising, any medication or diet changes, or any signs or symptoms of thrombosis. Instructed patient to resume medication as outlined above. Patient to follow up in 2 weeks.     Maryellen Alvares, Medical Assistant    I have reviewed and agree with the plan above on 05/11/18    [place], PharmD

## 2018-05-11 NOTE — PROGRESS NOTES
"[facility] Follow [facility] Visit    Date of Service: 5/11/18    Yenifer Beasley is here for follow up of dyslipidemia, htn, chronic anticoagulation, and hypothyroidism    HPI  Pertinent Interval History since last visit:   Tolerating praluent - no side-effects  Having difficulties paying for Praluent; in process of patient assistance.  Samples given today  Good med adherence  No bleeding on anticoagulation  Not taking BP at home  C/o \"skin infection\"; unable to [place]CP [place]  Current Prescription Lipid Lowering Medications - including dose:   Statin: None  Non-Statin: praluent 150 mg 2x per week  Zetia  Current Lipid Lowering and Related Supplements:   Omega 3s, 1000u daily  Vit D  coQ  Any Current Side Effects Potentially Related to Lipid Lowering therapy?   No  Current Adherence to Lipid Lowering Therapies:  Complete  Any Previous History of Statin Intolerance?   Yes, Details: severe myalgias on multiple statins  Baseline Lipids Prior to Treatment:  Shown here:  LDL-C: 170      SOCIAL HISTORY  History   Smoking Status   • Never Smoker   Smokeless Tobacco   • Never Used      Change in weight: down about 5lbs  Exercise habits: mod exercise  Diet: common adult, consistent vit K    Review of Systems   Constitutional: Negative for malaise/fatigue.   Respiratory: Negative for cough and shortness of breath.    Cardiovascular: Positive for leg swelling. Negative for chest pain, palpitations and claudication.   Gastrointestinal: Negative for blood in stool and melena.   Genitourinary: Negative for hematuria.   Musculoskeletal: Positive for joint pain. Negative for myalgias.   Neurological: Negative for headaches.   Endo/Heme/Allergies: Does not bruise/bleed easily.   Psychiatric/Behavioral: The patient is not nervous/anxious.      Physical Exam   Constitutional: She is oriented to person, place, and time. She appears well-developed and well-nourished. No distress.   Cardiovascular: Normal " rate, regular rhythm and intact distal pulses.    Murmur heard.  Pulmonary/Chest: Effort normal and breath sounds normal. No respiratory distress. She has no wheezes.   Musculoskeletal: Normal range of motion. She exhibits edema.   Tr edema bilat   Neurological: She is alert and oriented to person, place, and time.   Skin: Skin is warm. She is not diaphoretic. There is erythema.   Psychiatric: She has a normal mood and affect. Her behavior is normal.   Vitals reviewed.      DATA REVIEW  Most Recent Lipid Panel:   Lab Results   Component Value Date    CHOLSTRLTOT 161 05/07/2018    TRIGLYCERIDE 268 (H) 05/07/2018    HDL 48 05/07/2018    LDL 59 05/07/2018       Other Pertinent Blood Work:   Lab Results   Component Value Date    SODIUM 139 05/07/2018    POTASSIUM 4.4 05/07/2018    CHLORIDE 107 05/07/2018    CO2 24 05/07/2018    ANION 8.0 05/07/2018    GLUCOSE 116 (H) 05/07/2018    BUN 22 05/07/2018    CREATININE 0.88 05/07/2018    CALCIUM 9.2 05/07/2018    ASTSGOT 19 05/07/2018    ALTSGPT 13 05/07/2018    ALKPHOSPHAT 52 05/07/2018    TBILIRUBIN 0.5 05/07/2018    ALBUMIN 4.0 05/07/2018    AGRATIO 1.5 05/07/2018    LIPOPROTA 57 (H) 10/12/2017    TSHULTRASEN 1.130 05/07/2018     Other:  Lp(a) - 57 (oct 2017)  TSH 1.5  (oct 2017)    Recent Imaging Studies:      Echo march 2017:  1. Left ventricular ejection fraction is visually estimated to be 55%.   Normal regional wall motion. Grade I diastolic dysfunction.  2. Known mechanical aortic valve that is functioning normally.   3. Mild mitral stenosis.  4. Estimated right ventricular systolic pressure is 40 mmHg.    ASSESSMENT AND PLAN  Patient Type, check all that apply:   Secondary Prevention  Established Atherosclerotic Cardiovascular Disease (ASCVD)  Yes, Details: CAD s/p CABG  Other Established (non-atherosclerotic) Vascular/Heart Disease, if Present:    Valvular heart disease s/p mechanical AVR - continue anticoagulation; recheck echo every 1-2 years  Evidence of  Heterozygous Familial Hypercholesterolemia (FH):   Possible  ACC/AHA Indication for Statin Therapy, toby all that apply:   Established ASCVD: Indication for High intensity statin    Calculated Risk for ASCVD, if applicable:  N/A  Other Significant Risk Markers, if any, toby all that apply:  Family history of premature ASCVD in first degree relative   High lp(a)  National Lipid Association (NLA) Goal (if applicable):  LDL-C:   <70 mg/dL and nonHDL <100 - improved with addition of zetia.  LDL at goal and nonHDL nearly so  5/7/18: NonHDL-113, LDL-59  Lifestyle Recommendations From Today’s Visit:    Diet - low simple carb like [place]; limiting sugars  Exercise - daily home exercise for 15-20 min  Non-Statin Medications Recommendations from Today’s Visit:   - Continue zetia 10 mg daily  - Consider addition of fibrate if nonHDL >130 in future  Indication for PCSK9 Inhibitor, if applicable:  ASCVD with suboptimal control of LDL-C despite maximally tolerated statin - continue praluent 150 mg 2x per month  Supplements Recommended at this visit:  - Continue omega 3's although increase to 3,000u/day, vit D and coq10  Recommendations for Other Cardiovascular Risk Factors, toby all that apply:   Hypertension- appears well controlled. Continue current rx   IFG -relatively stable.  Continue TLC  Anticoagulation - continue warfarin and f/u in anticoag clinic  Other issues  - Hypothyroidism - seems under reasonable control per TSH; continue current thyroid replection; otherwise defer management to PCP  - Skin infection- pt with possible cellulitis; states augmentin has worked well for her in the past.  Will order today, as courtesy.  Pt to f/u with PCP  Studies Ordered at Todays Visit:   None, but will repeat echo in March 2019 if not ordered sooner by cards  Blood Work Ordered At Today’s visit:   NMR, CMP, CBC  Follow-Up:   1) anticoagulation clinic as scheduled  2) vascular care with vasc med in 6 months    Delia MORAN  STEPH Joseph    CC:  Marion Jones M.D.

## 2018-05-14 ENCOUNTER — TELEPHONE (OUTPATIENT)
Dept: VASCULAR LAB | Facility: MEDICAL CENTER | Age: 68
End: 2018-05-14

## 2018-05-14 DIAGNOSIS — L08.9 SKIN INFECTION: ICD-10-CM

## 2018-05-14 NOTE — TELEPHONE ENCOUNTER
Received call back from insurance PA stating augmentin recently prescribed by our office has a high copay.  Changed to amoxicillin 500mg PO Q12 hrs.  MA called into [facility].    Lisbeth PHILIP  [facility]

## 2018-05-15 ENCOUNTER — TELEPHONE (OUTPATIENT)
Dept: VASCULAR LAB | Facility: MEDICAL CENTER | Age: 68
End: 2018-05-15

## 2018-05-15 NOTE — TELEPHONE ENCOUNTER
Left message for patient to call back and set up a time with me to enroll in the PASS program for her Praluent.

## 2018-05-23 DIAGNOSIS — Z86.73 HISTORY OF STROKE: ICD-10-CM

## 2018-06-01 ENCOUNTER — ANTICOAGULATION MONITORING (OUTPATIENT)
Dept: VASCULAR LAB | Facility: MEDICAL CENTER | Age: 68
End: 2018-06-01

## 2018-06-01 DIAGNOSIS — Z95.2 HX OF MECHANICAL AORTIC VALVE REPLACEMENT: ICD-10-CM

## 2018-06-01 DIAGNOSIS — Z79.01 CHRONIC ANTICOAGULATION: ICD-10-CM

## 2018-06-01 LAB — INR PPP: 1.6 (ref 2–3.5)

## 2018-06-01 NOTE — PROGRESS NOTES
Anticoagulation Summary  As of 6/1/2018    INR goal:   2.5-3.5   TTR:   56.1 % (1.3 y)   Today's INR:   1.6!   Warfarin maintenance plan:   4 mg (4 mg x 1) on Mon, Wed, Fri; 8 mg (4 mg x 2) all other days   Weekly warfarin total:   44 mg   Plan last modified:   Yinka Church PharmD (4/23/2018)   Next INR check:   6/5/2018   Target end date:   Indefinite    Indications    Chronic anticoagulation [Z79.01]  Hx of mechanical aortic valve replacement [V43.3] [Z95.2]  TIA (transient ischemic attack) [G45.9]             Anticoagulation Episode Summary     INR check location:       Preferred lab:       Send INR reminders to:       Comments:         Anticoagulation Care Providers     Provider Role Specialty Phone number    Michael J Bloch, M.D. Referring Internal Medicine [phone]    Yinka Church, PharmD Responsible          Anticoagulation Patient Findings  Patient Findings     Negatives:   Signs/symptoms of thrombosis, Signs/symptoms of bleeding, Laboratory test error suspected, Change in health, Change in alcohol use, Change in activity, Upcoming invasive procedure, Emergency department visit, Upcoming dental procedure, Missed doses, Extra doses, Change in medications, Change in diet/appetite, Hospital admission, Bruising, Other complaints        Left voicemail message to report a SUB therapeutic INR of 1.6.  Pt to bolus 12 mg x 2 days then continue with current warfarin dosing regimen. Requested pt contact the clinic to discuss initiating enoxaparin.  Follow up in 4 days.     [place], EmilyD

## 2018-06-05 ENCOUNTER — HOSPITAL ENCOUNTER (OUTPATIENT)
Dept: LAB | Facility: MEDICAL CENTER | Age: 68
End: 2018-06-05
Attending: NURSE PRACTITIONER
Payer: MEDICARE

## 2018-06-05 ENCOUNTER — ANTICOAGULATION MONITORING (OUTPATIENT)
Dept: VASCULAR LAB | Facility: MEDICAL CENTER | Age: 68
End: 2018-06-05

## 2018-06-05 DIAGNOSIS — Z95.2 HX OF MECHANICAL AORTIC VALVE REPLACEMENT: ICD-10-CM

## 2018-06-05 DIAGNOSIS — Z79.01 CHRONIC ANTICOAGULATION: ICD-10-CM

## 2018-06-05 LAB
INR PPP: 2.6 (ref 2–3.5)
INR PPP: 2.69 (ref 0.87–1.13)
PROTHROMBIN TIME: 28.3 SEC (ref 12–14.6)

## 2018-06-05 PROCEDURE — 85610 PROTHROMBIN TIME: CPT

## 2018-06-05 PROCEDURE — 36415 COLL VENOUS BLD VENIPUNCTURE: CPT

## 2018-06-06 NOTE — PROGRESS NOTES
OP Anticoagulation Telephone Note    Date: 6/5/2018  Anticoagulation Summary  As of 6/5/2018    INR goal:   2.5-3.5   TTR:   55.8 % (1.3 y)   Today's INR:   2.69   Warfarin maintenance plan:   4 mg (4 mg x 1) on Mon, Wed, Fri; 8 mg (4 mg x 2) all other days   Weekly warfarin total:   44 mg   No change documented:   Maryellen Alvares, Med Ass't   Plan last modified:   Yinka Church, PharmD (4/23/2018)   Next INR check:   6/12/2018   Target end date:   Indefinite    Indications    Chronic anticoagulation [Z79.01]  Hx of mechanical aortic valve replacement [V43.3] [Z95.2]  TIA (transient ischemic attack) [G45.9]             Anticoagulation Episode Summary     INR check location:       Preferred lab:       Send INR reminders to:       Comments:         Anticoagulation Care Providers     Provider Role Specialty Phone number    Michael J Bloch, M.D. Referring Internal Medicine [phone]    Yinka Church, PharmD Responsible          Anticoagulation Patient Findings  Patient Findings     Negatives:   Signs/symptoms of thrombosis, Signs/symptoms of bleeding, Laboratory test error suspected, Change in health, Change in alcohol use, Change in activity, Upcoming invasive procedure, Emergency department visit, Upcoming dental procedure, Missed doses, Extra doses, Change in medications, Change in diet/appetite, Hospital admission, Bruising, Other complaints      Plan:  Spoke with patient on the phone. Patient is therapeutic today. Patient denies any changes in medications or diet. Patient denies any signs or symptoms of bleeding or clotting. Instructed patient to call clinic if any unusual bleeding or bruising occurs. Will continue dosing as outlined above. Will follow-up with patient in 1 week.    Maryellen Alvares, Medical Assistant    I have reviewed and agree with the plan above on 06/06/2018      Katelyn Perez, EmilyD

## 2018-06-11 ENCOUNTER — ANTICOAGULATION MONITORING (OUTPATIENT)
Dept: VASCULAR LAB | Facility: MEDICAL CENTER | Age: 68
End: 2018-06-11

## 2018-06-11 DIAGNOSIS — Z79.01 CHRONIC ANTICOAGULATION: ICD-10-CM

## 2018-06-11 DIAGNOSIS — Z95.2 HX OF MECHANICAL AORTIC VALVE REPLACEMENT: ICD-10-CM

## 2018-06-15 ENCOUNTER — ANTICOAGULATION VISIT (OUTPATIENT)
Dept: MEDICAL GROUP | Facility: PHYSICIAN GROUP | Age: 68
End: 2018-06-15
Payer: MEDICARE

## 2018-06-15 DIAGNOSIS — Z95.2 HX OF MECHANICAL AORTIC VALVE REPLACEMENT: ICD-10-CM

## 2018-06-15 DIAGNOSIS — Z79.01 CHRONIC ANTICOAGULATION: Primary | ICD-10-CM

## 2018-06-15 LAB — INR PPP: 1.9 (ref 2–3.5)

## 2018-06-15 PROCEDURE — 99211 OFF/OP EST MAY X REQ PHY/QHP: CPT | Performed by: FAMILY MEDICINE

## 2018-06-15 PROCEDURE — 85610 PROTHROMBIN TIME: CPT | Performed by: FAMILY MEDICINE

## 2018-06-15 NOTE — PROGRESS NOTES
Anticoagulation Summary  As of 6/15/2018    INR goal:   2.5-3.5   TTR:   54.8 % (1.3 y)   Today's INR:   1.9!   Warfarin maintenance plan:   4 mg (4 mg x 1) on Mon, Fri; 8 mg (4 mg x 2) all other days   Weekly warfarin total:   48 mg   Plan last modified:   [place], PharmD (6/15/2018)   Next INR check:   6/22/2018   Target end date:   Indefinite    Indications    Chronic anticoagulation [Z79.01]  Hx of mechanical aortic valve replacement [V43.3] [Z95.2]  TIA (transient ischemic attack) [G45.9]             Anticoagulation Episode Summary     INR check location:       Preferred lab:       Send INR reminders to:       Comments:         Anticoagulation Care Providers     Provider Role Specialty Phone number    Michael J Bloch, M.D. Referring Internal Medicine [phone]    Yinka Church, PharmD Responsible          Anticoagulation Patient Findings      HPI:  Yenifer Pang Gale seen in clinic today, on anticoagulation therapy with warfarin for TIA.  Reason for today's visit (per our collaborative practice policy) is because their last INR was 2.7 on 6/5. Intervention at the last visit:none  Changes to current medical/health status since last appt: none  No signs/symptoms of bleeding and/or thrombosis since the last appt.    No interval changes to diet or any interval changes to medications since last appt.   No complications or cost restrictions with current therapy.   Declines vitals.  Confirmed dosing regimen.     A/P   INR  SUB-therapeutic.   Possible reason(s) INR is not in range today: Maintenance dose is too low.  Pt wishes to forgo bridging.   Bolus today 12 mg, then begin increased regimen.     Follow up appointment in 1 weeks to reduce risk of adverse events related to this high risk medication, Warfarin.    Purpose of next visit:    They are at a increased risk of stroke because INR is below goal.     Other info:  Pt educated to contact our clinic with any changes in medications or s/s of bleeding or  thrombosis  CHEST guidelines recommend frequent INR monitoring at regular intervals (a few days up to a max of 12 weeks) to ensure they are on the proper dose of warfarin and not having any complications from therapy. INRs can dramatically change over a short time period due to diet, medications, and medical conditions.     [place], PharmD

## 2018-06-22 ENCOUNTER — ANTICOAGULATION VISIT (OUTPATIENT)
Dept: MEDICAL GROUP | Facility: PHYSICIAN GROUP | Age: 68
End: 2018-06-22
Payer: MEDICARE

## 2018-06-22 VITALS — HEART RATE: 78 BPM | SYSTOLIC BLOOD PRESSURE: 123 MMHG | DIASTOLIC BLOOD PRESSURE: 78 MMHG

## 2018-06-22 DIAGNOSIS — Z79.01 CHRONIC ANTICOAGULATION: ICD-10-CM

## 2018-06-22 DIAGNOSIS — Z95.2 HX OF MECHANICAL AORTIC VALVE REPLACEMENT: ICD-10-CM

## 2018-06-22 LAB — INR PPP: 3.2 (ref 2–3.5)

## 2018-06-22 PROCEDURE — 99999 PR NO CHARGE: CPT | Performed by: FAMILY MEDICINE

## 2018-06-22 PROCEDURE — 85610 PROTHROMBIN TIME: CPT | Performed by: INTERNAL MEDICINE

## 2018-06-22 NOTE — PROGRESS NOTES
Anticoagulation Summary  As of 6/22/2018    INR goal:   2.5-3.5   TTR:   --   Today's INR:   3.2   Warfarin maintenance plan:   4 mg (4 mg x 1) on Mon, Fri; 8 mg (4 mg x 2) all other days   Weekly warfarin total:   48 mg   Plan last modified:   [place], PharmD (6/15/2018)   Next INR check:   7/6/2018   Target end date:   Indefinite    Indications    Chronic anticoagulation [Z79.01]  Hx of mechanical aortic valve replacement [V43.3] [Z95.2]  TIA (transient ischemic attack) [G45.9]             Anticoagulation Episode Summary     INR check location:       Preferred lab:       Send INR reminders to:       Comments:         Anticoagulation Care Providers     Provider Role Specialty Phone number    Michael J Bloch, M.D. Referring Internal Medicine [phone]    Yinka Church, PharmD Responsible          Anticoagulation Patient Findings  Patient Findings     Negatives:   Signs/symptoms of thrombosis, Signs/symptoms of bleeding, Laboratory test error suspected, Change in health, Change in alcohol use, Change in activity, Upcoming invasive procedure, Emergency department visit, Upcoming dental procedure, Missed doses, Extra doses, Change in medications, Change in diet/appetite, Hospital admission, Bruising, Other complaints        Encounter Vitals  Standard Vitals  Vitals  Blood Pressure : 123/78  Pulse: 78  Encounter Vitals  Blood Pressure : 123/78  BP Location: Right, Upper Arm  Patient BP Position: Sitting  Pulse: 78  Pulmonary-Specific Vitals     Durable Medical Equipment-Specific Vitals       History of Present Illness: follow up appointment for chronic anticoagulation with the high risk medication, warfarin for stroke prevention with a mechanical aortic valve and previous TIA.    INR therapeutic  No dose adjustment  F/U INR in 2 weeks  Elisabet seeing increase from 12mg dose still  Ancipitate next INR to be slightly lower  No complaints from patient    Dioni Stauffer, PharmD

## 2018-07-03 ENCOUNTER — ANTICOAGULATION MONITORING (OUTPATIENT)
Dept: VASCULAR LAB | Facility: MEDICAL CENTER | Age: 68
End: 2018-07-03

## 2018-07-03 DIAGNOSIS — Z95.2 HX OF MECHANICAL AORTIC VALVE REPLACEMENT: ICD-10-CM

## 2018-07-03 DIAGNOSIS — G45.9 TRANSIENT CEREBRAL ISCHEMIA, UNSPECIFIED TYPE: ICD-10-CM

## 2018-07-03 DIAGNOSIS — Z79.01 CHRONIC ANTICOAGULATION: ICD-10-CM

## 2018-07-03 NOTE — PROGRESS NOTES
Patient came in to receive samples of Praluent 150 mg.     LOT: [id number]  EXP: 2/2019    Raheem Miles, [facility]  [facility]

## 2018-07-13 ENCOUNTER — TELEPHONE (OUTPATIENT)
Dept: VASCULAR LAB | Facility: MEDICAL CENTER | Age: 68
End: 2018-07-13

## 2018-07-14 NOTE — TELEPHONE ENCOUNTER
"Pt called and left msg, concerned that \"new medication\" is causing her some memory loss. Left msg and advised that she medical attention if she is concerned she could be having another TIA, or having any other extreme symptoms. Otherwise advised that she call the clinic back next week to discuss.     Estela Sanchez, PharmD    "

## 2018-07-20 ENCOUNTER — ANTICOAGULATION VISIT (OUTPATIENT)
Dept: MEDICAL GROUP | Facility: PHYSICIAN GROUP | Age: 68
End: 2018-07-20
Payer: MEDICARE

## 2018-07-20 DIAGNOSIS — G45.9 TRANSIENT CEREBRAL ISCHEMIA, UNSPECIFIED TYPE: ICD-10-CM

## 2018-07-20 DIAGNOSIS — Z79.01 CHRONIC ANTICOAGULATION: Primary | ICD-10-CM

## 2018-07-20 DIAGNOSIS — Z95.2 HX OF MECHANICAL AORTIC VALVE REPLACEMENT: ICD-10-CM

## 2018-07-20 LAB — INR PPP: 2.6 (ref 2–3.5)

## 2018-07-20 PROCEDURE — 85610 PROTHROMBIN TIME: CPT | Performed by: FAMILY MEDICINE

## 2018-07-20 PROCEDURE — 99999 PR NO CHARGE: CPT | Performed by: FAMILY MEDICINE

## 2018-07-20 NOTE — PROGRESS NOTES
Anticoagulation Summary  As of 7/20/2018    INR goal:   2.5-3.5   TTR:   57.3 % (1.4 y)   Today's INR:   2.6   Warfarin maintenance plan:   4 mg (4 mg x 1) on Mon, Fri; 8 mg (4 mg x 2) all other days   Weekly warfarin total:   48 mg   Plan last modified:   [place], PharmD (6/15/2018)   Next INR check:   8/24/2018   Target end date:   Indefinite    Indications    Chronic anticoagulation [Z79.01]  Hx of mechanical aortic valve replacement [V43.3] [Z95.2]  TIA (transient ischemic attack) [G45.9]             Anticoagulation Episode Summary     INR check location:       Preferred lab:       Send INR reminders to:       Comments:         Anticoagulation Care Providers     Provider Role Specialty Phone number    Michael J Bloch, M.D. Referring Internal Medicine [phone]    Yinka Church, PharmD Responsible          Anticoagulation Patient Findings  Patient Findings     Positives:   Change in medications    Negatives:   Signs/symptoms of thrombosis, Signs/symptoms of bleeding, Laboratory test error suspected, Change in health, Change in alcohol use, Change in activity, Upcoming invasive procedure, Emergency department visit, Upcoming dental procedure, Missed doses, Extra doses, Change in diet/appetite, Hospital admission, Bruising, Other complaints        HPI:   Yenifer Beasley seen in clinic today, on anticoagulation therapy with warfarin for stroke prevention due to history of mechanical aortic valve replacement and TIA.    Patient's previous INR was therapeutic at 3.2 on 6-22-18, at which time patient was instructed to continue with current warfarin regimen.  She has missed a couple appointments, but returns to clinic today to recheck INR to ensure it is therapeutic and thus preventing possible clotting and/or bleeding/bruising complications.    CHADS-VASc = n/a  (unadjusted ischemic stroke risk/year:  n/a)    Does patient have any changes to current medical/health status since last appt (Y/N):  NO  Does  "patient have any signs/symptoms of bleeding and/or thrombosis since the last appt (Y/N):  NO  Does patient have any interval changes to diet or medications since last appt (Y/N):  Was started on gabapentin, had to discontinue due to adverse effects (loss of memory, sedation)  Are there any complications or cost restrictions with current therapy (Y/N):  NO      Vitals:  BP check declined at today's visit, cuff too small    Weight  declines   Height   5' 0\"     Asssessment:      INR remains therapeutic at 2.6, therefore decreasing patient's risk of stroke and/or bleeding complications due to warfarin.   Reason(s) for out of range INR today:  n/a      Plan:  Pt is to continue with current warfarin dosing regimen in order to maintain INR in therapeutic range.     Follow up:  Because warfarin is a high risk medication and current CHEST guidelines recommend regular monitoring intervals (few days up to 12 weeks), will have patient return to clinic in 5 weeks to recheck INR.    Yinka Church, PharmD    "

## 2018-08-21 ENCOUNTER — ANTICOAGULATION MONITORING (OUTPATIENT)
Dept: VASCULAR LAB | Facility: MEDICAL CENTER | Age: 68
End: 2018-08-21

## 2018-08-21 DIAGNOSIS — Z95.2 HX OF MECHANICAL AORTIC VALVE REPLACEMENT: ICD-10-CM

## 2018-08-21 DIAGNOSIS — Z79.01 CHRONIC ANTICOAGULATION: ICD-10-CM

## 2018-08-21 DIAGNOSIS — G45.9 TRANSIENT CEREBRAL ISCHEMIA, UNSPECIFIED TYPE: ICD-10-CM

## 2018-08-21 NOTE — PROGRESS NOTES
Patient came in to receive Praluent 150 mg Samples.     LOT: [id number]  EXP: 2/28/2019    Raheem Miles, [facility]

## 2018-08-24 ENCOUNTER — HOSPITAL ENCOUNTER (OUTPATIENT)
Dept: LAB | Facility: MEDICAL CENTER | Age: 68
End: 2018-08-24
Attending: NURSE PRACTITIONER
Payer: MEDICARE

## 2018-08-24 LAB
INR PPP: 2.48 (ref 0.87–1.13)
INR PPP: 2.48 (ref 2–3.5)
PROTHROMBIN TIME: 26.5 SEC (ref 12–14.6)

## 2018-08-24 PROCEDURE — 85610 PROTHROMBIN TIME: CPT

## 2018-08-24 PROCEDURE — 36415 COLL VENOUS BLD VENIPUNCTURE: CPT

## 2018-08-27 ENCOUNTER — ANTICOAGULATION MONITORING (OUTPATIENT)
Dept: VASCULAR LAB | Facility: MEDICAL CENTER | Age: 68
End: 2018-08-27

## 2018-08-27 DIAGNOSIS — Z79.01 CHRONIC ANTICOAGULATION: ICD-10-CM

## 2018-08-27 DIAGNOSIS — Z95.2 HX OF MECHANICAL AORTIC VALVE REPLACEMENT: ICD-10-CM

## 2018-08-27 DIAGNOSIS — G45.9 TRANSIENT CEREBRAL ISCHEMIA, UNSPECIFIED TYPE: ICD-10-CM

## 2018-08-27 NOTE — PROGRESS NOTES
Anticoagulation Summary  As of 8/27/2018    INR goal:   2.5-3.5   TTR:   58.9 % (1.5 y)   Today's INR:   2.48! (8/24/2018)   Warfarin maintenance plan:   4 mg (4 mg x 1) on Mon, Fri; 8 mg (4 mg x 2) all other days   Weekly warfarin total:   48 mg   Plan last modified:   [place], PharmD (6/15/2018)   Next INR check:      Target end date:   Indefinite    Indications    Chronic anticoagulation [Z79.01]  Hx of mechanical aortic valve replacement [V43.3] [Z95.2]  TIA (transient ischemic attack) [G45.9]             Anticoagulation Episode Summary     INR check location:       Preferred lab:       Send INR reminders to:       Comments:         Anticoagulation Care Providers     Provider Role Specialty Phone number    Michael J Bloch, M.D. Referring Internal Medicine [phone]    Yinka Church, PharmD Responsible          Anticoagulation Patient Findings        Spoke with patient by phone.     INR  sub-therapeutic.    Changes to current medical/health status since last appt: none.   Denies signs/symptoms of bleeding and/or thrombosis since the last appt.   Denies any interval changes to diet  Denies any interval changes to medications since last appt.   Denies any complications or cost restrictions with current therapy  Follow up appointment in 2 week(s).      Take 8 mg today one time only then back to usual dose and test in clinic in 2 weeks.   The patient is on a high risk medication and is sub- therapeutic. This could lead to clot formation or risk of stroke. Therefore this patient requires close monitoring and follow up.          CHEST guidelines recommend frequent INR monitoring at regular intervals (a few days up to a max of 12 weeks) to ensure they are on the proper dose of warfarin and not having any complications from therapy.  INRs can dramatically change over a short time period due to diet, medications, and medical conditions.     The patient instructed to go to the ER for falls with a head injury,   blood in urine or stool or any bleeding that last longer than 20 min.      RADHA Meza.

## 2018-08-27 NOTE — PROGRESS NOTES
Attempted to reach pt regarding INR   INR   Date Value Ref Range Status   08/24/2018 2.48 (H) 0.87 - 1.13 Final     Comment:     INR - Non-therapeutic Reference Range: 0.87-1.13  INR - Therapeutic Reference Range: 2.0-4.0       Will try to reach pt at later date    Bushra Smith, Pharm D

## 2018-08-30 ENCOUNTER — ANTICOAGULATION MONITORING (OUTPATIENT)
Dept: VASCULAR LAB | Facility: MEDICAL CENTER | Age: 68
End: 2018-08-30

## 2018-08-30 DIAGNOSIS — Z95.2 HX OF MECHANICAL AORTIC VALVE REPLACEMENT: ICD-10-CM

## 2018-08-30 DIAGNOSIS — Z79.01 CHRONIC ANTICOAGULATION: ICD-10-CM

## 2018-09-14 ENCOUNTER — ANTICOAGULATION VISIT (OUTPATIENT)
Dept: MEDICAL GROUP | Facility: PHYSICIAN GROUP | Age: 68
End: 2018-09-14
Payer: MEDICARE

## 2018-09-14 DIAGNOSIS — G45.9 TRANSIENT CEREBRAL ISCHEMIA, UNSPECIFIED TYPE: ICD-10-CM

## 2018-09-14 DIAGNOSIS — Z79.01 CHRONIC ANTICOAGULATION: Primary | ICD-10-CM

## 2018-09-14 DIAGNOSIS — Z95.2 HX OF MECHANICAL AORTIC VALVE REPLACEMENT: ICD-10-CM

## 2018-09-14 LAB — INR PPP: 3.2 (ref 2–3.5)

## 2018-09-14 PROCEDURE — 85610 PROTHROMBIN TIME: CPT | Performed by: FAMILY MEDICINE

## 2018-09-14 PROCEDURE — 99999 PR NO CHARGE: CPT | Performed by: FAMILY MEDICINE

## 2018-09-14 NOTE — PROGRESS NOTES
Anticoagulation Summary  As of 9/14/2018    INR goal:   2.5-3.5   TTR:   60.4 % (1.6 y)   Today's INR:   3.2   Warfarin maintenance plan:   4 mg (4 mg x 1) on Mon, Fri; 8 mg (4 mg x 2) all other days   Weekly warfarin total:   48 mg   Plan last modified:   [place], PharmD (6/15/2018)   Next INR check:   11/9/2018   Target end date:   Indefinite    Indications    Chronic anticoagulation [Z79.01]  Hx of mechanical aortic valve replacement [V43.3] [Z95.2]  TIA (transient ischemic attack) [G45.9]             Anticoagulation Episode Summary     INR check location:       Preferred lab:       Send INR reminders to:       Comments:         Anticoagulation Care Providers     Provider Role Specialty Phone number    Michael J Bloch, M.D. Referring Internal Medicine [phone]    Yinka hCurch, PharmD Responsible          Anticoagulation Patient Findings  Patient Findings     Positives:   Change in diet/appetite    Negatives:   Signs/symptoms of thrombosis, Signs/symptoms of bleeding, Laboratory test error suspected, Change in health, Change in alcohol use, Change in activity, Upcoming invasive procedure, Emergency department visit, Upcoming dental procedure, Missed doses, Extra doses, Change in medications, Hospital admission, Bruising, Other complaints        HPI:   Yenifer Beasley seen in clinic today, on anticoagulation therapy with warfarin for stroke prevention due to history of mechanical aortic valve and TIA.    Patient's previous INR was therapeutic at 2.6 on 7-20-18, at which time patient was instructed to continue with current warfarin regimen.  She returns to clinic today to recheck INR to ensure it is therapeutic and thus preventing possible clotting and/or bleeding/bruising complications.    CHADS-VASc = n/a  (unadjusted ischemic stroke risk/year:  n/a)    Does patient have any changes to current medical/health status since last appt (Y/N):  NO  Does patient have any signs/symptoms of bleeding and/or  "thrombosis since the last appt (Y/N):  NO  Does patient have any interval changes to diet or medications since last appt (Y/N):  Started drinking powdered supplement from [employer], she is unsure of exact ingredients   Are there any complications or cost restrictions with current therapy (Y/N):  NO      Vitals:  BP check declined at today's visit, cuff too small    Weight  Declines    Height   5' 0\"     Asssessment:      INR remains therapeutic at 3.2, therefore decreasing patient's risk of stroke and/or bleeding complications.   Reason(s) for out of range INR today:  n/a      Plan:  Pt is to continue with current warfarin dosing regimen in order to maintain INR in therapeutic range.     Follow up:  Because warfarin is a high risk medication and current CHEST guidelines recommend regular monitoring intervals (few days up to 12 weeks), will have patient return to clinic in 8 weeks to recheck INR.    Yinka Church, PharmD    "

## 2018-09-20 DIAGNOSIS — E78.5 HYPERLIPIDEMIA, UNSPECIFIED HYPERLIPIDEMIA TYPE: ICD-10-CM

## 2018-09-20 RX ORDER — EZETIMIBE 10 MG/1
TABLET ORAL
Qty: 90 TAB | Refills: 3 | Status: SHIPPED | OUTPATIENT
Start: 2018-09-20 | End: 2020-01-02

## 2018-09-26 ENCOUNTER — HOSPITAL ENCOUNTER (OUTPATIENT)
Dept: RADIOLOGY | Facility: MEDICAL CENTER | Age: 68
End: 2018-09-26
Attending: NURSE PRACTITIONER
Payer: MEDICARE

## 2018-09-26 DIAGNOSIS — M17.0 BILATERAL PRIMARY OSTEOARTHRITIS OF KNEE: ICD-10-CM

## 2018-09-26 DIAGNOSIS — M79.672 LEFT FOOT PAIN: ICD-10-CM

## 2018-09-26 PROCEDURE — 73560 X-RAY EXAM OF KNEE 1 OR 2: CPT | Mod: RT

## 2018-09-26 PROCEDURE — 73620 X-RAY EXAM OF FOOT: CPT | Mod: LT

## 2018-10-15 ENCOUNTER — OFFICE VISIT (OUTPATIENT)
Dept: URGENT CARE | Facility: PHYSICIAN GROUP | Age: 68
End: 2018-10-15
Payer: MEDICARE

## 2018-10-15 VITALS
DIASTOLIC BLOOD PRESSURE: 68 MMHG | TEMPERATURE: 98.4 F | OXYGEN SATURATION: 96 % | HEART RATE: 87 BPM | WEIGHT: 232 LBS | BODY MASS INDEX: 45.55 KG/M2 | RESPIRATION RATE: 16 BRPM | HEIGHT: 60 IN | SYSTOLIC BLOOD PRESSURE: 142 MMHG

## 2018-10-15 DIAGNOSIS — R25.2 SPASM: ICD-10-CM

## 2018-10-15 DIAGNOSIS — M25.561 ACUTE PAIN OF RIGHT KNEE: ICD-10-CM

## 2018-10-15 PROCEDURE — 99213 OFFICE O/P EST LOW 20 MIN: CPT | Performed by: FAMILY MEDICINE

## 2018-10-15 RX ORDER — CYCLOBENZAPRINE HCL 10 MG
10 TABLET ORAL 3 TIMES DAILY PRN
Qty: 20 TAB | Refills: 0 | Status: SHIPPED | OUTPATIENT
Start: 2018-10-15 | End: 2021-01-13

## 2018-10-15 ASSESSMENT — ENCOUNTER SYMPTOMS
ROS SKIN COMMENTS: NO ABRASION OR LACERATION
WEIGHT LOSS: 0
FEVER: 0

## 2018-10-15 NOTE — LETTER
October 15, 2018         Patient: Yenifer Beasley   YOB: 1950   Date of Visit: 10/15/2018           To Whom it May Concern:    Yenifer Beasley was seen in my clinic on 10/15/2018. Please excuse 10/17 and 10/20/2018.       Sincerely,           Michelet Parra M.D.  Electronically Signed

## 2018-10-15 NOTE — PROGRESS NOTES
Subjective:      Yenifer Beasley is a 67 y.o. female who presents with Knee Pain (foot issue x 1 week)            1 week right knee pain possibly triggered by right foot pain that is improving. Knee pain has worsened over the last 2 days and is now severe with limp. She recently got an xray that showed mild OA. There is occas locking. She also feel muscle spasm and is wondering if muscle relaxer will improve sx's.  Improvement with NSAID that she takes sparingly due to coumadin. No other aggravating or alleviating factors.          Review of Systems   Constitutional: Negative for fever and weight loss.   Musculoskeletal:        No hip or ankle pain   Skin:        No abrasion or laceration         .  Medications, Allergies, and current problem list reviewed today in Epic     Objective:     /68 (BP Location: Left arm, Patient Position: Sitting, BP Cuff Size: Large adult)   Pulse 87   Temp 36.9 °C (98.4 °F)   Resp 16   Ht 1.524 m (5')   Wt 105.2 kg (232 lb)   SpO2 96%   BMI 45.31 kg/m²      Physical Exam   Constitutional: She is oriented to person, place, and time. She appears well-developed and well-nourished. No distress.   HENT:   Head: Normocephalic and atraumatic.   Eyes: Conjunctivae are normal.   Musculoskeletal:   Right knee: no point tenderness or deformity. No obvious effusion. FROM. Stable. Distal neuro/vascular intact.      Neurological: She is alert and oriented to person, place, and time.   Skin: Skin is warm and dry.               Assessment/Plan:     1. Acute pain of right knee     2. Spasm  cyclobenzaprine (FLEXERIL) 10 MG Tab     Differential diagnosis, natural history, supportive care, and indications for immediate follow-up discussed at length.     Discussed differential diagnosis including internal derangement.  At this point she wants to stay with conservative treatment and follow-up with primary care if it does not get better.

## 2018-11-09 ENCOUNTER — ANTICOAGULATION VISIT (OUTPATIENT)
Dept: MEDICAL GROUP | Facility: PHYSICIAN GROUP | Age: 68
End: 2018-11-09
Payer: MEDICARE

## 2018-11-09 DIAGNOSIS — Z79.01 CHRONIC ANTICOAGULATION: Primary | ICD-10-CM

## 2018-11-09 DIAGNOSIS — G45.9 TIA (TRANSIENT ISCHEMIC ATTACK): ICD-10-CM

## 2018-11-09 DIAGNOSIS — Z95.2 HX OF MECHANICAL AORTIC VALVE REPLACEMENT: ICD-10-CM

## 2018-11-09 LAB — INR PPP: 3.5 (ref 2–3.5)

## 2018-11-09 PROCEDURE — 85610 PROTHROMBIN TIME: CPT | Performed by: FAMILY MEDICINE

## 2018-11-09 PROCEDURE — 99999 PR NO CHARGE: CPT | Performed by: FAMILY MEDICINE

## 2018-11-09 NOTE — PROGRESS NOTES
Anticoagulation Summary  As of 11/9/2018    INR goal:   2.5-3.5   TTR:   64.0 % (1.7 y)   Today's INR:   3.5   Warfarin maintenance plan:   4 mg (4 mg x 1) on Mon, Fri; 8 mg (4 mg x 2) all other days   Weekly warfarin total:   48 mg   Plan last modified:   [place], PharmD (6/15/2018)   Next INR check:   1/11/2019   Target end date:   Indefinite    Indications    Chronic anticoagulation [Z79.01]  Hx of mechanical aortic valve replacement [V43.3] [Z95.2]  TIA (transient ischemic attack) [G45.9]             Anticoagulation Episode Summary     INR check location:       Preferred lab:       Send INR reminders to:       Comments:         Anticoagulation Care Providers     Provider Role Specialty Phone number    Michael J Bloch, M.D. Referring Internal Medicine [phone]    Yinka Church, PharmD Responsible          Anticoagulation Patient Findings  Patient Findings     Negatives:   Signs/symptoms of thrombosis, Signs/symptoms of bleeding, Laboratory test error suspected, Change in health, Change in alcohol use, Change in activity, Upcoming invasive procedure, Emergency department visit, Upcoming dental procedure, Missed doses, Extra doses, Change in medications, Change in diet/appetite, Hospital admission, Bruising, Other complaints        HPI:   Yenifer Beasley seen in clinic today, on anticoagulation therapy with warfarin for stroke prevention due to history of mechanical aortic valve and TIA.    Patient's previous INR was therapeutic at 3.2 on 9-14-18, at which time patient was instructed to continue with current warfarin regimen.  She returns to clinic today to recheck INR to ensure it is therapeutic and thus preventing possible clotting and/or bleeding/bruising complications.    CHADS-VASc = n/a  (unadjusted ischemic stroke risk/year:  n/a)    Does patient have any changes to current medical/health status since last appt (Y/N):  NO  Does patient have any signs/symptoms of bleeding and/or thrombosis since the  "last appt (Y/N):  NO  Does patient have any interval changes to diet or medications since last appt (Y/N):  Started to take Flex-ALL last couple weeks  Are there any complications or cost restrictions with current therapy (Y/N):  NO      Vitals:  BP declines, cuff too small    Weight  declines   Height   5' 0\"     Asssessment:      INR remains therapeutic at 3.5, therefore decreasing patient's risk of stroke and/or bleeding complications.   Reason(s) for out of range INR today:  n/a      Plan:  Pt is to continue with current warfarin dosing regimen in order to maintain INR in therapeutic range.     Follow up:  Because warfarin is a high risk medication and current CHEST guidelines recommend regular monitoring intervals (few days up to 12 weeks), will have patient return to clinic in 9 weeks to recheck INR.    Yinka Church, PharmD    "

## 2018-11-18 ENCOUNTER — HOSPITAL ENCOUNTER (OUTPATIENT)
Facility: MEDICAL CENTER | Age: 68
End: 2018-11-18
Attending: EMERGENCY MEDICINE
Payer: MEDICARE

## 2018-11-18 ENCOUNTER — OFFICE VISIT (OUTPATIENT)
Dept: URGENT CARE | Facility: PHYSICIAN GROUP | Age: 68
End: 2018-11-18
Payer: MEDICARE

## 2018-11-18 VITALS
RESPIRATION RATE: 20 BRPM | TEMPERATURE: 98.1 F | DIASTOLIC BLOOD PRESSURE: 84 MMHG | WEIGHT: 235 LBS | HEART RATE: 91 BPM | BODY MASS INDEX: 45.9 KG/M2 | OXYGEN SATURATION: 96 % | SYSTOLIC BLOOD PRESSURE: 132 MMHG

## 2018-11-18 DIAGNOSIS — N30.00 ACUTE CYSTITIS WITHOUT HEMATURIA: ICD-10-CM

## 2018-11-18 DIAGNOSIS — L02.612 CELLULITIS AND ABSCESS OF TOE OF LEFT FOOT: ICD-10-CM

## 2018-11-18 DIAGNOSIS — L03.032 CELLULITIS AND ABSCESS OF TOE OF LEFT FOOT: ICD-10-CM

## 2018-11-18 LAB
APPEARANCE UR: CLEAR
BILIRUB UR STRIP-MCNC: NEGATIVE MG/DL
COLOR UR AUTO: YELLOW
GLUCOSE UR STRIP.AUTO-MCNC: NEGATIVE MG/DL
KETONES UR STRIP.AUTO-MCNC: NEGATIVE MG/DL
LEUKOCYTE ESTERASE UR QL STRIP.AUTO: NORMAL
NITRITE UR QL STRIP.AUTO: NEGATIVE
PH UR STRIP.AUTO: 5 [PH] (ref 5–8)
PROT UR QL STRIP: NEGATIVE MG/DL
RBC UR QL AUTO: NEGATIVE
SP GR UR STRIP.AUTO: 1.02
UROBILINOGEN UR STRIP-MCNC: 0.2 MG/DL

## 2018-11-18 PROCEDURE — 87086 URINE CULTURE/COLONY COUNT: CPT

## 2018-11-18 PROCEDURE — 99214 OFFICE O/P EST MOD 30 MIN: CPT | Performed by: EMERGENCY MEDICINE

## 2018-11-18 PROCEDURE — 81002 URINALYSIS NONAUTO W/O SCOPE: CPT | Performed by: EMERGENCY MEDICINE

## 2018-11-18 RX ORDER — AMOXICILLIN AND CLAVULANATE POTASSIUM 875; 125 MG/1; MG/1
1 TABLET, FILM COATED ORAL 2 TIMES DAILY
Qty: 20 TAB | Refills: 0 | Status: SHIPPED | OUTPATIENT
Start: 2018-11-18 | End: 2018-11-28

## 2018-11-18 NOTE — LETTER
November 18, 2018        Yenifer Beasley  [address] [place] [address]        Dear Yenifer:    OFF WORK FOR THE NEXT TWO DAYS FOR MEDICAL REASONS.    If you have any questions or concerns, please don't hesitate to call.        Sincerely,        Broderick Gentile M.D.    Electronically Signed

## 2018-11-19 ASSESSMENT — ENCOUNTER SYMPTOMS
VOMITING: 0
NAUSEA: 0
SHORTNESS OF BREATH: 0
FEVER: 0
NERVOUS/ANXIOUS: 0
FALLS: 0
ABDOMINAL PAIN: 0
CHILLS: 0
PALPITATIONS: 0
BACK PAIN: 0
EYE DISCHARGE: 0
EYE REDNESS: 0

## 2018-11-19 NOTE — PROGRESS NOTES
Subjective:      Yenifer Beasley is a 68 y.o. female who presents with Foot Swelling (left foot celulitis x 1 week)            HPI  Pt states her left foot is painful and red  And she has recurrent cellulitis  No trauma or gout in her history she uses a cane but also has a walker , but her knee issues are on the right, no fever, NVD or DM.    PMH:  has a past medical history of Arthritis; CAD (coronary artery disease) (2007); Diabetes; Diabetic neuropathy ([facility]) (1/6/2011); Fall; Gastritis (7/10); GERD (gastroesophageal reflux disease); Hyperlipidemia; Hypertension; Hypothyroidism; Infectious disease; Migraine; Neuropathy in diabetes (HCC); HILDA (obstructive sleep apnea); S/P aortic valve replacement (2007); TIA (transient ischemic attack) (2005); Tricuspid regurgitation mild- mod (1/6/2011); Unspecified hemorrhagic conditions; and Upper GI bleed ON JULY 2007 (1/6/2011). She also has no past medical history of Asthma.  MEDS:   Current Outpatient Prescriptions:   •  amoxicillin-clavulanate (AUGMENTIN) 875-125 MG Tab, Take 1 Tab by mouth 2 times a day for 10 days., Disp: 20 Tab, Rfl: 0  •  cyclobenzaprine (FLEXERIL) 10 MG Tab, Take 1 Tab by mouth 3 times a day as needed for Moderate Pain or Muscle Spasms., Disp: 20 Tab, Rfl: 0  •  ezetimibe (ZETIA) 10 MG Tab, TAKE 1 TABLET BY MOUTH EVERY DAY, Disp: 90 Tab, Rfl: 3  •  warfarin (COUMADIN) 4 MG Tab, TAKE 1 TO 2 TABLETS BY MOUTH EVERY DAY AS DIRECTED BY THE COUMADIN CLINIC, Disp: 180 Tab, Rfl: 1  •  Alirocumab (PRALUENT) 150 MG/ML Solution Pen-injector, Inject 150 mg as instructed every 14 days., Disp: 6 PEN, Rfl: 3  •  furosemide (LASIX) 20 MG Tab, Take 1 Tab by mouth every day., Disp: 30 Tab, Rfl: 11  •  albuterol 108 (90 BASE) MCG/ACT Aero Soln inhalation aerosol, Inhale 2 Puffs by mouth every four hours as needed for Shortness of Breath., Disp: 1 Inhaler, Rfl: 0  •  Apoaequorin 10 MG Cap, Take  by mouth., Disp: , Rfl:   •  lisinopril (PRINIVIL) 20 MG TABS, Take 20  "mg by mouth 2 times a day., Disp: , Rfl:   •  potassium chloride SA (K-DUR) 20 MEQ TBCR, Take 20 mEq by mouth every day., Disp: , Rfl:   •  vitamin D (CHOLECALCIFEROL) 1000 UNIT TABS, Take 1,000 Units by mouth every day.  , Disp: , Rfl:   •  docosahexanoic acid (OMEGA 3 FA) 1000 MG CAPS, Take 1,000 mg by mouth 2 Times a Day., Disp: , Rfl:   •  levothyroxine (LEVOXYL) 137 MCG TABS, Take 1 Tab by mouth every day., Disp: 30 Each, Rfl: 3  •  tramadol (ULTRAM) 50 MG TABS, Take 2 Tabs by mouth 3 times a day., Disp: 180 Each, Rfl: 0  ALLERGIES:   Allergies   Allergen Reactions   • Asa [Aspirin]      GI BLEED   • Cymbalta [Duloxetine Hcl]      Feels like a \"zombie\"   • Lyrica [Pregabalin]      SPACED OUT   • Nsaids      GI BLEED   • Tricor      Breathing problems     • Trilipix [Choline Fenofibrate]      SOB     SURGHX:   Past Surgical History:   Procedure Laterality Date   • MITRAL VALVE REPLACE  2007    AORTIC VALVE   • SHOULDER SURGERY  6/2001    NEER DECOMOPRESSION LEFT SHOULDER   • CARPAL TUNNEL RELEASE  1998    B/L   • TONSILLECTOMY  1995    PARTIAL PALATOPLASTY   • ABDOMINAL HYSTERECTOMY TOTAL      TAHBSOO   • CHOLECYSTECTOMY     • CORONARY ARTERY BYPASS, 1      Triple Bypass   • HERNIA REPAIR      VENTRAL HERNIA REPAIR   • PRIMARY C SECTION       SOCHX:  reports that she has never smoked. She has never used smokeless tobacco. She reports that she does not drink alcohol or use drugs.  FH: family history includes Heart Disease in her brother and mother.  Review of Systems   Constitutional: Negative for chills and fever.   Eyes: Negative for discharge and redness.   Respiratory: Negative for shortness of breath.    Cardiovascular: Negative for chest pain and palpitations.   Gastrointestinal: Negative for abdominal pain, nausea and vomiting.   Genitourinary: Positive for dysuria.   Musculoskeletal: Positive for joint pain. Negative for back pain and falls.   Skin: Negative for rash.        Left slight red compared to " right   Psychiatric/Behavioral: The patient is not nervous/anxious.           Objective:     /84 (BP Location: Left arm, Patient Position: Sitting, BP Cuff Size: Large adult)   Pulse 91   Temp 36.7 °C (98.1 °F)   Resp 20   Wt 106.6 kg (235 lb)   SpO2 96%   BMI 45.90 kg/m²      Physical Exam   Constitutional: She appears well-developed and well-nourished. No distress.   HENT:   Head: Normocephalic and atraumatic.   Cardiovascular: Normal rate.    Pulmonary/Chest: Effort normal.   Musculoskeletal: Normal range of motion. She exhibits edema and tenderness.   Left foot swollen compared to right with only sl erythema , alexandr wound or ulcers.   Neurological: She is alert.   Skin: She is not diaphoretic.   Nursing note and vitals reviewed.            DX foot pt declined, 9/26 dx showed OA increasing  Assessment/Plan:     1. Cellulitis  of left foot    Pt agrees begrudenly to work note and staying home with rest and elevation, willstart Augmentin 875 , she has PMD appointment in 2 days,  - amoxicillin-clavulanate (AUGMENTIN) 875-125 MG Tab; Take 1 Tab by mouth 2 times a day for 10 days.  Dispense: 20 Tab; Refill: 0    2. Acute cystitis without hematuria    - POCT Urinalysis  - URINE CULTURE(NEW); Future  - amoxicillin-clavulanate (AUGMENTIN) 875-125 MG Tab; Take 1 Tab by mouth 2 times a day for 10 days.  Dispense: 20 Tab; Refill: 0  I will call with the urine culture

## 2018-11-20 ENCOUNTER — TELEPHONE (OUTPATIENT)
Dept: URGENT CARE | Facility: PHYSICIAN GROUP | Age: 68
End: 2018-11-20

## 2018-11-20 LAB
BACTERIA UR CULT: NORMAL
SIGNIFICANT IND 70042: NORMAL
SITE SITE: NORMAL
SOURCE SOURCE: NORMAL

## 2018-11-27 ENCOUNTER — APPOINTMENT (OUTPATIENT)
Dept: VASCULAR LAB | Facility: MEDICAL CENTER | Age: 68
End: 2018-11-27
Payer: MEDICARE

## 2018-12-20 ENCOUNTER — HOSPITAL ENCOUNTER (OUTPATIENT)
Dept: LAB | Facility: MEDICAL CENTER | Age: 68
End: 2018-12-20
Attending: NURSE PRACTITIONER
Payer: MEDICARE

## 2018-12-20 DIAGNOSIS — E87.1 HYPONATREMIA: ICD-10-CM

## 2018-12-20 DIAGNOSIS — I10 ESSENTIAL HYPERTENSION: ICD-10-CM

## 2018-12-20 LAB
ALBUMIN SERPL BCP-MCNC: 4.1 G/DL (ref 3.2–4.9)
ALBUMIN/GLOB SERPL: 1.5 G/DL
ALP SERPL-CCNC: 59 U/L (ref 30–99)
ALT SERPL-CCNC: 10 U/L (ref 2–50)
ANION GAP SERPL CALC-SCNC: 9 MMOL/L (ref 0–11.9)
AST SERPL-CCNC: 14 U/L (ref 12–45)
BILIRUB SERPL-MCNC: 0.5 MG/DL (ref 0.1–1.5)
BUN SERPL-MCNC: 20 MG/DL (ref 8–22)
CALCIUM SERPL-MCNC: 9.9 MG/DL (ref 8.5–10.5)
CHLORIDE SERPL-SCNC: 105 MMOL/L (ref 96–112)
CO2 SERPL-SCNC: 23 MMOL/L (ref 20–33)
CREAT SERPL-MCNC: 1.03 MG/DL (ref 0.5–1.4)
CRP SERPL HS-MCNC: 0.8 MG/DL (ref 0–0.75)
ERYTHROCYTE [DISTWIDTH] IN BLOOD BY AUTOMATED COUNT: 44.7 FL (ref 35.9–50)
ERYTHROCYTE [SEDIMENTATION RATE] IN BLOOD BY WESTERGREN METHOD: 27 MM/HOUR (ref 0–30)
GLOBULIN SER CALC-MCNC: 2.7 G/DL (ref 1.9–3.5)
GLUCOSE SERPL-MCNC: 104 MG/DL (ref 65–99)
HCT VFR BLD AUTO: 43.7 % (ref 37–47)
HGB BLD-MCNC: 14.3 G/DL (ref 12–16)
MCH RBC QN AUTO: 28.8 PG (ref 27–33)
MCHC RBC AUTO-ENTMCNC: 32.7 G/DL (ref 33.6–35)
MCV RBC AUTO: 88.1 FL (ref 81.4–97.8)
POTASSIUM SERPL-SCNC: 4.6 MMOL/L (ref 3.6–5.5)
PROT SERPL-MCNC: 6.8 G/DL (ref 6–8.2)
RBC # BLD AUTO: 4.96 M/UL (ref 4.2–5.4)
RHEUMATOID FACT SER IA-ACNC: <10 IU/ML (ref 0–14)
SODIUM SERPL-SCNC: 137 MMOL/L (ref 135–145)
URATE SERPL-MCNC: 9.2 MG/DL (ref 1.9–8.2)
WBC # BLD AUTO: 6 K/UL (ref 4.8–10.8)

## 2018-12-20 PROCEDURE — 86140 C-REACTIVE PROTEIN: CPT

## 2018-12-20 PROCEDURE — 85652 RBC SED RATE AUTOMATED: CPT

## 2018-12-20 PROCEDURE — 84550 ASSAY OF BLOOD/URIC ACID: CPT

## 2018-12-20 PROCEDURE — 80053 COMPREHEN METABOLIC PANEL: CPT

## 2018-12-20 PROCEDURE — 36415 COLL VENOUS BLD VENIPUNCTURE: CPT

## 2018-12-20 PROCEDURE — 83704 LIPOPROTEIN BLD QUAN PART: CPT

## 2018-12-20 PROCEDURE — 85018 HEMOGLOBIN: CPT

## 2018-12-20 PROCEDURE — 85014 HEMATOCRIT: CPT

## 2018-12-20 PROCEDURE — 85048 AUTOMATED LEUKOCYTE COUNT: CPT

## 2018-12-20 PROCEDURE — 85041 AUTOMATED RBC COUNT: CPT

## 2018-12-20 PROCEDURE — 86038 ANTINUCLEAR ANTIBODIES: CPT

## 2018-12-20 PROCEDURE — 86431 RHEUMATOID FACTOR QUANT: CPT

## 2018-12-20 PROCEDURE — 80061 LIPID PANEL: CPT | Mod: XU

## 2018-12-20 PROCEDURE — 86039 ANTINUCLEAR ANTIBODIES (ANA): CPT

## 2018-12-22 LAB
ANA INTERPRETIVE COMMENT Q5143: NORMAL
ANTINUCLEAR ANTIBODY (ANA), HEP-2, IGG Q5142: NORMAL
NUCLEAR IGG SER QL IA: DETECTED

## 2018-12-23 LAB
CHOLEST SERPL-MCNC: 253 MG/DL
HDL PARTICAL NO Q4363: 36.6 UMOL/L
HDL SIZE Q4361: <8 NM
HDLC SERPL-MCNC: 43 MG/DL (ref 40–59)
HLD.LARGE SERPL-SCNC: ABNORMAL UMOL/L
L VLDL PART NO Q4357: 4.2 NMOL/L
LDL SERPL QN: 19.8 NM
LDL SERPL-SCNC: 2435 NMOL/L
LDL SMALL SERPL-SCNC: >1085 NMOL/L
LDLC SERPL CALC-MCNC: 160 MG/DL
PATHOLOGY STUDY: ABNORMAL
TRIGL SERPL-MCNC: 252 MG/DL (ref 30–149)
VLDL SIZE Q4362: 49.5 NM

## 2018-12-26 ENCOUNTER — TELEPHONE (OUTPATIENT)
Dept: VASCULAR LAB | Facility: MEDICAL CENTER | Age: 68
End: 2018-12-26

## 2018-12-26 ENCOUNTER — OFFICE VISIT (OUTPATIENT)
Dept: VASCULAR LAB | Facility: MEDICAL CENTER | Age: 68
End: 2018-12-26
Attending: INTERNAL MEDICINE
Payer: MEDICARE

## 2018-12-26 VITALS
HEIGHT: 60 IN | DIASTOLIC BLOOD PRESSURE: 48 MMHG | WEIGHT: 235.8 LBS | BODY MASS INDEX: 46.3 KG/M2 | SYSTOLIC BLOOD PRESSURE: 108 MMHG | HEART RATE: 77 BPM

## 2018-12-26 DIAGNOSIS — E78.5 HYPERLIPIDEMIA, UNSPECIFIED HYPERLIPIDEMIA TYPE: ICD-10-CM

## 2018-12-26 DIAGNOSIS — Z95.2 S/P AORTIC VALVE REPLACEMENT: ICD-10-CM

## 2018-12-26 DIAGNOSIS — I10 ESSENTIAL HYPERTENSION: ICD-10-CM

## 2018-12-26 PROCEDURE — 99214 OFFICE O/P EST MOD 30 MIN: CPT | Performed by: NURSE PRACTITIONER

## 2018-12-26 PROCEDURE — 99212 OFFICE O/P EST SF 10 MIN: CPT | Performed by: NURSE PRACTITIONER

## 2018-12-26 ASSESSMENT — ENCOUNTER SYMPTOMS
NERVOUS/ANXIOUS: 0
BRUISES/BLEEDS EASILY: 0
COUGH: 0
SHORTNESS OF BREATH: 0
PALPITATIONS: 0
CLAUDICATION: 0
HEADACHES: 0
BLOOD IN STOOL: 0
MYALGIAS: 0

## 2018-12-26 NOTE — TELEPHONE ENCOUNTER
PASS enrollment faxed. Looks like pt has an approval on file that is good until 3/6/19 scanned in media.     Will wait for determination

## 2018-12-26 NOTE — PROGRESS NOTES
[facility] Follow Up Visit    Date of Service: 12/26/18    Yenifer Beasley is here for follow up of dyslipidemia, HTN, chronic anticoagulation, and hypothyroidism    HPI  Pertinent Interval History since last visit:   Stopped Praluent due to a co-pay issue-- given new paperwork for PASS, filled out and submitted today  Given 1 month of samples   Prior to this she was tolerating Praluent well, recent labs were done while off PCSK9i  No bleeding problems on warfarin  Not taking BP at home  On Zetia, aixa well    Current Prescription Lipid Lowering Medications - including dose:   Statin: None  Non-Statin: Restart Praluent 150 mg 2x per week  Zetia  Current Lipid Lowering and Related Supplements:   Omega 3s, 1000u daily  Vit D  coQ  Any Current Side Effects Potentially Related to Lipid Lowering therapy?   No  Current Adherence to Lipid Lowering Therapies:  Complete  Any Previous History of Statin Intolerance?   Yes, Details: severe myalgias on multiple statins  Baseline Lipids Prior to Treatment:  Shown here:  LDL-C: 170      SOCIAL HISTORY  History   Smoking Status   • Never Smoker   Smokeless Tobacco   • Never Used      Change in weight: down about 5lbs  Exercise habits: mod exercise  Diet: common adult, consistent vit K    Review of Systems   Constitutional: Negative for malaise/fatigue.   Respiratory: Negative for cough and shortness of breath.    Cardiovascular: Positive for leg swelling. Negative for chest pain, palpitations and claudication.   Gastrointestinal: Negative for blood in stool and melena.   Genitourinary: Negative for hematuria.   Musculoskeletal: Positive for joint pain. Negative for myalgias.   Neurological: Negative for headaches.   Endo/Heme/Allergies: Does not bruise/bleed easily.   Psychiatric/Behavioral: The patient is not nervous/anxious.      Physical Exam   Constitutional: She is oriented to person, place, and time. She appears well-developed and well-nourished. No distress.    Cardiovascular: Normal rate, regular rhythm and intact distal pulses.    Murmur heard.  Pulmonary/Chest: Effort normal and breath sounds normal. No respiratory distress. She has no wheezes.   Musculoskeletal: Normal range of motion. She exhibits edema.   Tr edema bilat   Neurological: She is alert and oriented to person, place, and time.   Skin: Skin is warm. She is not diaphoretic. There is erythema.   Psychiatric: She has a normal mood and affect. Her behavior is normal.   Vitals reviewed.    DATA REVIEW  Most Recent Lipid Panel:   Lab Results   Component Value Date    CHOLSTRLTOT 253 (H) 12/20/2018    CHOLSTRLTOT 161 05/07/2018    TRIGLYCERIDE 252 (H) 12/20/2018    TRIGLYCERIDE 268 (H) 05/07/2018    HDL 43 12/20/2018    HDL 48 05/07/2018    LDL 59 05/07/2018       Other Pertinent Blood Work:   Lab Results   Component Value Date    SODIUM 137 12/20/2018    POTASSIUM 4.6 12/20/2018    CHLORIDE 105 12/20/2018    CO2 23 12/20/2018    ANION 9.0 12/20/2018    GLUCOSE 104 (H) 12/20/2018    BUN 20 12/20/2018    CREATININE 1.03 12/20/2018    CALCIUM 9.9 12/20/2018    ASTSGOT 14 12/20/2018    ALTSGPT 10 12/20/2018    ALKPHOSPHAT 59 12/20/2018    TBILIRUBIN 0.5 12/20/2018    ALBUMIN 4.1 12/20/2018    AGRATIO 1.5 12/20/2018    CREACTPROT 0.80 (H) 12/20/2018    LIPOPROTA 57 (H) 10/12/2017    TSHULTRASEN 1.130 05/07/2018     Other:  Lp(a) - 57 (oct 2017)  TSH 1.5  (oct 2017)    Recent Imaging Studies:      Echo march 2017:  1. Left ventricular ejection fraction is visually estimated to be 55%.   Normal regional wall motion. Grade I diastolic dysfunction.  2. Known mechanical aortic valve that is functioning normally.   3. Mild mitral stenosis.  4. Estimated right ventricular systolic pressure is 40 mmHg.    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  LDL/nonHDL above goal due to stopping PCSK9i  12/20/18: NonHDL-210, LDL-160  Lifestyle Recommendations From Today’s Visit:    Diet - low simple carb like [place]; limiting sugars  Exercise - daily home exercise for 15-20 min  Non-Statin Medications Recommendations from Today’s Visit:   - Restart Praluent 150mg   - Continue zetia 10 mg daily  - Consider addition of fibrate if nonHDL >130 in future  Indication for PCSK9 Inhibitor, if applicable:  ASCVD with suboptimal control of LDL-C despite maximally tolerated statin - continue praluent 150 mg 2x per month  Supplements Recommended at this visit:  - Continue omega 3's although increase to 3,000u/day, vit D and coq10  Recommendations for Other Cardiovascular Risk Factors, toby all that apply:   Hypertension- appears well controlled. Continue current rx   IFG -relatively stable.  Continue TLC  Anticoagulation - continue warfarin and f/u in anticoag clinic  Other issues  - Hypothyroidism - seems under reasonable control per TSH; continue current thyroid replection; otherwise defer management to PCP  - Skin infection- pt has had cellulitis in foot; no open wounds; rec continued follow up with PCP.     Studies Ordered at Todays Visit:   Echo ordered today-- due in March  Blood Work Ordered At Today’s visit:   NMR, CMP  Follow-Up:   1)  anticoagulation clinic as scheduled  2) vascular care with vasc med in 6-8 weeks    RADHA Anderson.    CC:  Marion Jones M.D.

## 2019-01-02 DIAGNOSIS — Z95.2 H/O MECHANICAL AORTIC VALVE REPLACEMENT: ICD-10-CM

## 2019-01-02 RX ORDER — WARFARIN SODIUM 4 MG/1
TABLET ORAL
Qty: 180 TAB | Refills: 2 | Status: SHIPPED | OUTPATIENT
Start: 2019-01-02 | End: 2019-01-03 | Stop reason: SDUPTHER

## 2019-01-03 DIAGNOSIS — Z95.2 H/O MECHANICAL AORTIC VALVE REPLACEMENT: ICD-10-CM

## 2019-01-03 RX ORDER — WARFARIN SODIUM 4 MG/1
TABLET ORAL
Qty: 180 TAB | Refills: 2 | Status: SHIPPED | OUTPATIENT
Start: 2019-01-03 | End: 2019-12-26

## 2019-01-11 ENCOUNTER — ANTICOAGULATION VISIT (OUTPATIENT)
Dept: MEDICAL GROUP | Facility: PHYSICIAN GROUP | Age: 69
End: 2019-01-11
Payer: MEDICARE

## 2019-01-11 DIAGNOSIS — Z95.2 HX OF MECHANICAL AORTIC VALVE REPLACEMENT: ICD-10-CM

## 2019-01-11 DIAGNOSIS — Z79.01 CHRONIC ANTICOAGULATION: Primary | ICD-10-CM

## 2019-01-11 DIAGNOSIS — G45.9 TIA (TRANSIENT ISCHEMIC ATTACK): ICD-10-CM

## 2019-01-11 LAB — INR PPP: 4.6 (ref 2–3.5)

## 2019-01-11 PROCEDURE — 85610 PROTHROMBIN TIME: CPT | Performed by: FAMILY MEDICINE

## 2019-01-11 PROCEDURE — 99211 OFF/OP EST MAY X REQ PHY/QHP: CPT | Performed by: FAMILY MEDICINE

## 2019-01-11 NOTE — PROGRESS NOTES
Anticoagulation Summary  As of 1/11/2019    INR goal:   2.5-3.5   TTR:   58.1 % (1.9 y)   Today's INR:   4.6!   Warfarin maintenance plan:   4 mg (4 mg x 1) on Mon, Fri; 8 mg (4 mg x 2) all other days   Weekly warfarin total:   48 mg   Plan last modified:   [place], PharmD (6/15/2018)   Next INR check:   2/1/2019   Target end date:   Indefinite    Indications    Chronic anticoagulation [Z79.01]  Hx of mechanical aortic valve replacement [V43.3] [Z95.2]  TIA (transient ischemic attack) [G45.9]             Anticoagulation Episode Summary     INR check location:       Preferred lab:       Send INR reminders to:       Comments:         Anticoagulation Care Providers     Provider Role Specialty Phone number    Michael J Bloch, M.D. Referring Internal Medicine [phone]    Yinka Church, PharmD Responsible          Anticoagulation Patient Findings  Patient Findings     Positives:   Change in diet/appetite    Negatives:   Signs/symptoms of thrombosis, Signs/symptoms of bleeding, Laboratory test error suspected, Change in health, Change in alcohol use, Change in activity, Upcoming invasive procedure, Emergency department visit, Upcoming dental procedure, Missed doses, Extra doses, Change in medications, Hospital admission, Bruising, Other complaints    Comments:   Less greens due to shanon lettuce recall        HPI:   Yenifer Beasley seen in clinic today, on anticoagulation therapy with warfarin for stroke prevention due to history of mechanical aortic valve and TIA.    Patient's previous INR was therapeutic at 3.5 on 11-9-18, at which time patient was instructed to continue with current warfarin regimen.  She returns to clinic today to recheck INR to ensure it is therapeutic and thus preventing possible clotting and/or bleeding/bruising complications.    CHADS-VASc = n/a  (unadjusted ischemic stroke risk/year:  n/a)    Does patient have any changes to current medical/health status since last appt (Y/N):  NO  Does  "patient have any signs/symptoms of bleeding and/or thrombosis since the last appt (Y/N):  NO  Does patient have any interval changes to diet or medications since last appt (Y/N):  Has not been eating salads as normal due to fears from shanon lettuce recall.  Are there any complications or cost restrictions with current therapy (Y/N):  NO      Vitals:  BP check declined, patient in hurry to get to work   Weight  declines   Height   5' 0\"     Asssessment:      INR supratherapeutic at 4.6, therefore increasing patient's risk of bleeding complications due to warfarin.   Reason(s) for out of range INR today:  Likely due to less greens as above.      Plan:  Instructed patient to HOLD X 1, then resume current warfarin regimen in order to bring INR to therapeutic range.     Follow up:  Because warfarin is a high risk medication and current CHEST guidelines recommend regular monitoring intervals (few days up to 12 weeks), will have patient return to clinic in 3 weeks to recheck INR.    Yinka Church, PharmD    "

## 2019-01-16 ENCOUNTER — TELEPHONE (OUTPATIENT)
Dept: VASCULAR LAB | Facility: MEDICAL CENTER | Age: 69
End: 2019-01-16

## 2019-01-17 NOTE — TELEPHONE ENCOUNTER
Received PASS approval. Left message informing patient of approval. Letter scanned in media.

## 2019-02-01 ENCOUNTER — ANTICOAGULATION VISIT (OUTPATIENT)
Dept: MEDICAL GROUP | Facility: PHYSICIAN GROUP | Age: 69
End: 2019-02-01
Payer: MEDICARE

## 2019-02-01 DIAGNOSIS — Z79.01 CHRONIC ANTICOAGULATION: Primary | ICD-10-CM

## 2019-02-01 DIAGNOSIS — G45.9 TIA (TRANSIENT ISCHEMIC ATTACK): ICD-10-CM

## 2019-02-01 DIAGNOSIS — Z95.2 HX OF MECHANICAL AORTIC VALVE REPLACEMENT: ICD-10-CM

## 2019-02-01 LAB — INR PPP: 4.3 (ref 2–3.5)

## 2019-02-01 PROCEDURE — 99211 OFF/OP EST MAY X REQ PHY/QHP: CPT | Performed by: FAMILY MEDICINE

## 2019-02-01 PROCEDURE — 85610 PROTHROMBIN TIME: CPT | Performed by: FAMILY MEDICINE

## 2019-02-01 NOTE — PROGRESS NOTES
Anticoagulation Summary  As of 2019    INR goal:   2.5-3.5   TTR:   56.4 % (1.9 y)   INR used for dosin.3! (2019)   Warfarin maintenance plan:   4 mg (4 mg x 1) every Mon, Wed, Fri; 8 mg (4 mg x 2) all other days   Weekly warfarin total:   44 mg   Plan last modified:   Yinka Church, PharmD (2019)   Next INR check:   2/15/2019   Target end date:   Indefinite    Indications    Chronic anticoagulation [Z79.01]  Hx of mechanical aortic valve replacement [V43.3] [Z95.2]  TIA (transient ischemic attack) [G45.9]             Anticoagulation Episode Summary     INR check location:       Preferred lab:       Send INR reminders to:       Comments:         Anticoagulation Care Providers     Provider Role Specialty Phone number    Michael J Bloch, M.D. Referring Internal Medicine [phone]    Yinka Church, PharmD Responsible          Anticoagulation Patient Findings  Patient Findings     Positives:   Change in diet/appetite    Negatives:   Signs/symptoms of thrombosis, Signs/symptoms of bleeding, Laboratory test error suspected, Change in health, Change in alcohol use, Change in activity, Upcoming invasive procedure, Emergency department visit, Upcoming dental procedure, Missed doses, Extra doses, Change in medications, Hospital admission, Bruising, Other complaints    Comments:   Began adding a bit more greens back to diet        HPI:   Yenifer Beasley seen in clinic today, on anticoagulation therapy with warfarin for stroke prevention due to history of mechanical mitral valve replacement and TIA.    Patient's previous INR was supratherapeutic at 4.6 on 19, at which time patient was instructed to hold one dose, then resume current warfarin regimen.  She returns to clinic today to recheck INR to ensure it is therapeutic and thus preventing possible clotting and/or bleeding/bruising complications.    CHADS-VASc = n/a  (unadjusted ischemic stroke risk/year:  n/a)    Does patient have any changes to  "current medical/health status since last appt (Y/N):  NO  Does patient have any signs/symptoms of bleeding and/or thrombosis since the last appt (Y/N):  NO  Does patient have any interval changes to diet or medications since last appt (Y/N):  Bit more greens to diet since last visit  Are there any complications or cost restrictions with current therapy (Y/N):  NO      Vitals:  BP check declined today      Weight  declines   Height   5' 0\"     Asssessment:      INR remains supratherapeutic at 4.3, therefore increasing patient's bleeding risk.   Reason(s) for out of range INR today:  Dose too high, high pain and stress level currently.      Plan:  Instructed patient to HOLD X 1, then decrease weekly warfarin regimen by ~8% as detailed above in order to bring INR to therapeutic range.     Follow up:  Because warfarin is a high risk medication and current CHEST guidelines recommend regular monitoring intervals (few days up to 12 weeks), will have patient return to clinic in 2 weeks to recheck INR.    Yinka Church, PharmD    "

## 2019-02-16 LAB — INR PPP: 2.8 (ref 2–3.5)

## 2019-02-19 ENCOUNTER — ANTICOAGULATION MONITORING (OUTPATIENT)
Dept: VASCULAR LAB | Facility: MEDICAL CENTER | Age: 69
End: 2019-02-19

## 2019-02-19 DIAGNOSIS — Z95.2 HX OF MECHANICAL AORTIC VALVE REPLACEMENT: ICD-10-CM

## 2019-02-19 DIAGNOSIS — Z79.01 CHRONIC ANTICOAGULATION: ICD-10-CM

## 2019-02-19 DIAGNOSIS — G45.9 TIA (TRANSIENT ISCHEMIC ATTACK): ICD-10-CM

## 2019-02-19 NOTE — PROGRESS NOTES
Anticoagulation Summary  As of 2019    INR goal:   2.5-3.5   TTR:   56.2 % (2 y)   INR used for dosin.8 (2019)   Warfarin maintenance plan:   4 mg (4 mg x 1) every Mon, Wed, Fri; 8 mg (4 mg x 2) all other days   Weekly warfarin total:   44 mg   No change documented:   Donell Chanel Ass't   Plan last modified:   Yinka Church PharmD (2019)   Next INR check:   3/5/2019   Target end date:   Indefinite    Indications    Chronic anticoagulation [Z79.01]  Hx of mechanical aortic valve replacement [V43.3] [Z95.2]  TIA (transient ischemic attack) [G45.9]             Anticoagulation Episode Summary     INR check location:       Preferred lab:       Send INR reminders to:       Comments:         Anticoagulation Care Providers     Provider Role Specialty Phone number    Michael J Bloch, M.D. Referring Internal Medicine [phone]    Yinka Church, Pasha Responsible          Anticoagulation Patient Findings  Patient Findings     Negatives:   Signs/symptoms of thrombosis, Signs/symptoms of bleeding, Laboratory test error suspected, Change in health, Change in alcohol use, Change in activity, Upcoming invasive procedure, Emergency department visit, Upcoming dental procedure, Missed doses, Extra doses, Change in medications, Change in diet/appetite, Hospital admission, Bruising, Other complaints      Left voicemail message to report 2.8 therapeutic INR of 2.0-3.0.  Patient to continue with current warfarin dosing regimen. Requested pt contact the clinic for any change to diet or medication, and to report any signs or symptoms of bleeding, bruising or clotting.  Pt to follow up in 2 weeks.  Donell Chanel Ass't    I have reviewed and am in agreement with the above stated plan on 19.  Yinka Church, Pasha

## 2019-02-22 ENCOUNTER — APPOINTMENT (OUTPATIENT)
Dept: VASCULAR LAB | Facility: MEDICAL CENTER | Age: 69
End: 2019-02-22
Payer: MEDICARE

## 2019-02-22 ENCOUNTER — TELEPHONE (OUTPATIENT)
Dept: VASCULAR LAB | Facility: MEDICAL CENTER | Age: 69
End: 2019-02-22

## 2019-02-22 NOTE — TELEPHONE ENCOUNTER
Pt called to reschedule vascular appointment as she has not had her labs drawn  Katelyn Perez, Clinical Pharmacist

## 2019-03-01 ENCOUNTER — ANTICOAGULATION VISIT (OUTPATIENT)
Dept: MEDICAL GROUP | Facility: PHYSICIAN GROUP | Age: 69
End: 2019-03-01
Payer: MEDICARE

## 2019-03-01 DIAGNOSIS — Z95.2 HX OF MECHANICAL AORTIC VALVE REPLACEMENT: ICD-10-CM

## 2019-03-01 DIAGNOSIS — Z79.01 CHRONIC ANTICOAGULATION: ICD-10-CM

## 2019-03-01 DIAGNOSIS — G45.9 TIA (TRANSIENT ISCHEMIC ATTACK): ICD-10-CM

## 2019-03-01 LAB — INR PPP: 2.6 (ref 2–3.5)

## 2019-03-01 PROCEDURE — 85610 PROTHROMBIN TIME: CPT | Performed by: FAMILY MEDICINE

## 2019-03-01 PROCEDURE — 93793 ANTICOAG MGMT PT WARFARIN: CPT | Performed by: FAMILY MEDICINE

## 2019-03-01 NOTE — PROGRESS NOTES
Anticoagulation Summary  As of 3/1/2019    INR goal:   2.5-3.5   TTR:   57.0 % (2 y)   INR used for dosin.6 (3/1/2019)   Warfarin maintenance plan:   4 mg (4 mg x 1) every Mon, Wed, Fri; 8 mg (4 mg x 2) all other days   Weekly warfarin total:   44 mg   Plan last modified:   Yinka Church, PharmD (2019)   Next INR check:   3/29/2019   Target end date:   Indefinite    Indications    Chronic anticoagulation [Z79.01]  Hx of mechanical aortic valve replacement [V43.3] [Z95.2]  TIA (transient ischemic attack) [G45.9]             Anticoagulation Episode Summary     INR check location:       Preferred lab:       Send INR reminders to:       Comments:         Anticoagulation Care Providers     Provider Role Specialty Phone number    Michael J Bloch, M.D. Referring Internal Medicine [phone]    Yinka Church, PharmD Responsible          Anticoagulation Patient Findings  Patient Findings     Negatives:   Signs/symptoms of thrombosis, Signs/symptoms of bleeding, Laboratory test error suspected, Change in health, Change in alcohol use, Change in activity, Upcoming invasive procedure, Emergency department visit, Upcoming dental procedure, Missed doses, Extra doses, Change in medications, Change in diet/appetite, Hospital admission, Bruising, Other complaints        HPI:   Yenifer Beasley seen in clinic today, on anticoagulation therapy with warfarin for stroke prevention due to history of mechanical aortic valve and TIA.    Patient's previous INR was therapeutic at 2.8 on 19, at which time patient was instructed to continue with current warfarin regimen.  She returns to clinic today to recheck INR to ensure it is therapeutic and thus preventing possible clotting and/or bleeding/bruising complications.    CHADS-VASc = n/a  (unadjusted ischemic stroke risk/year:  n/a)    Does patient have any changes to current medical/health status since last appt (Y/N):  NO  Does patient have any signs/symptoms of bleeding  "and/or thrombosis since the last appt (Y/N):  NO  Does patient have any interval changes to diet or medications since last appt (Y/N):  NO  Are there any complications or cost restrictions with current therapy (Y/N):  NO      Vitals:  BP check declined at today's visit      Weight  declines   Height   5' 0\"     Asssessment:      INR remains therapeutic at 2.6, therefore decreasing patient's risk of stroke and/or bleeding complications.   Reason(s) for out of range INR today:  n/a      Plan:  Pt is to continue with current warfarin dosing regimen in order to maintain INR in therapeutic range.     Follow up:  Because warfarin is a high risk medication and current CHEST guidelines recommend regular monitoring intervals (few days up to 12 weeks), will have patient return to clinic in 4 weeks to recheck INR.    Yinka Church, PharmD    "

## 2019-03-04 ENCOUNTER — ANTICOAGULATION MONITORING (OUTPATIENT)
Dept: VASCULAR LAB | Facility: MEDICAL CENTER | Age: 69
End: 2019-03-04

## 2019-03-04 DIAGNOSIS — Z95.2 HX OF MECHANICAL AORTIC VALVE REPLACEMENT: ICD-10-CM

## 2019-03-04 DIAGNOSIS — G45.9 TIA (TRANSIENT ISCHEMIC ATTACK): ICD-10-CM

## 2019-03-04 DIAGNOSIS — Z79.01 CHRONIC ANTICOAGULATION: ICD-10-CM

## 2019-03-25 DIAGNOSIS — Z95.2 HX OF MECHANICAL AORTIC VALVE REPLACEMENT: ICD-10-CM

## 2019-03-29 ENCOUNTER — ANTICOAGULATION VISIT (OUTPATIENT)
Dept: MEDICAL GROUP | Facility: PHYSICIAN GROUP | Age: 69
End: 2019-03-29
Payer: MEDICARE

## 2019-03-29 ENCOUNTER — HOSPITAL ENCOUNTER (OUTPATIENT)
Dept: LAB | Facility: MEDICAL CENTER | Age: 69
End: 2019-03-29
Attending: NURSE PRACTITIONER
Payer: MEDICARE

## 2019-03-29 DIAGNOSIS — G45.9 TIA (TRANSIENT ISCHEMIC ATTACK): ICD-10-CM

## 2019-03-29 DIAGNOSIS — I10 ESSENTIAL HYPERTENSION: ICD-10-CM

## 2019-03-29 DIAGNOSIS — Z79.01 CHRONIC ANTICOAGULATION: ICD-10-CM

## 2019-03-29 DIAGNOSIS — Z95.2 HX OF MECHANICAL AORTIC VALVE REPLACEMENT: ICD-10-CM

## 2019-03-29 DIAGNOSIS — E78.5 HYPERLIPIDEMIA, UNSPECIFIED HYPERLIPIDEMIA TYPE: ICD-10-CM

## 2019-03-29 LAB
ALBUMIN SERPL BCP-MCNC: 4.2 G/DL (ref 3.2–4.9)
ALBUMIN/GLOB SERPL: 1.6 G/DL
ALP SERPL-CCNC: 49 U/L (ref 30–99)
ALT SERPL-CCNC: 12 U/L (ref 2–50)
ANION GAP SERPL CALC-SCNC: 8 MMOL/L (ref 0–11.9)
AST SERPL-CCNC: 18 U/L (ref 12–45)
BILIRUB SERPL-MCNC: 0.5 MG/DL (ref 0.1–1.5)
BUN SERPL-MCNC: 29 MG/DL (ref 8–22)
CALCIUM SERPL-MCNC: 9.1 MG/DL (ref 8.5–10.5)
CHLORIDE SERPL-SCNC: 108 MMOL/L (ref 96–112)
CO2 SERPL-SCNC: 23 MMOL/L (ref 20–33)
CREAT SERPL-MCNC: 0.96 MG/DL (ref 0.5–1.4)
FASTING STATUS PATIENT QL REPORTED: NORMAL
GLOBULIN SER CALC-MCNC: 2.6 G/DL (ref 1.9–3.5)
GLUCOSE SERPL-MCNC: 108 MG/DL (ref 65–99)
INR PPP: 2.8 (ref 2–3.5)
POTASSIUM SERPL-SCNC: 4.8 MMOL/L (ref 3.6–5.5)
PROT SERPL-MCNC: 6.8 G/DL (ref 6–8.2)
SODIUM SERPL-SCNC: 139 MMOL/L (ref 135–145)

## 2019-03-29 PROCEDURE — 80061 LIPID PANEL: CPT

## 2019-03-29 PROCEDURE — 83704 LIPOPROTEIN BLD QUAN PART: CPT

## 2019-03-29 PROCEDURE — 93793 ANTICOAG MGMT PT WARFARIN: CPT | Performed by: INTERNAL MEDICINE

## 2019-03-29 PROCEDURE — 85610 PROTHROMBIN TIME: CPT | Performed by: INTERNAL MEDICINE

## 2019-03-29 PROCEDURE — 80053 COMPREHEN METABOLIC PANEL: CPT

## 2019-03-29 PROCEDURE — 36415 COLL VENOUS BLD VENIPUNCTURE: CPT

## 2019-03-29 NOTE — PROGRESS NOTES
Anticoagulation Summary  As of 3/29/2019    INR goal:   2.5-3.5   TTR:   58.6 % (2.1 y)   INR used for dosin.8 (3/29/2019)   Warfarin maintenance plan:   4 mg (4 mg x 1) every Mon, Wed, Fri; 8 mg (4 mg x 2) all other days   Weekly warfarin total:   44 mg   Plan last modified:   Yinka Church, PharmD (2019)   Next INR check:   5/10/2019   Target end date:   Indefinite    Indications    Chronic anticoagulation [Z79.01]  Hx of mechanical aortic valve replacement [V43.3] [Z95.2]  TIA (transient ischemic attack) [G45.9]             Anticoagulation Episode Summary     INR check location:       Preferred lab:       Send INR reminders to:       Comments:         Anticoagulation Care Providers     Provider Role Specialty Phone number    Michael J Bloch, M.D. Referring Internal Medicine [phone]    Yinka Church, PharmD Responsible          Anticoagulation Patient Findings  Patient Findings     Negatives:   Signs/symptoms of thrombosis, Signs/symptoms of bleeding, Laboratory test error suspected, Change in health, Change in alcohol use, Change in activity, Upcoming invasive procedure, Emergency department visit, Upcoming dental procedure, Missed doses, Extra doses, Change in medications, Change in diet/appetite, Hospital admission, Bruising, Other complaints        HPI:   Yenifer Beasley seen in clinic today, on anticoagulation therapy with warfarin for stroke prevention due to history of mechanical aortic valve and TIA.    Patient's previous INR was therapeutic at 2.6 on 3-1-19, at which time patient was instructed to continue with current warfarin regimen.  She returns to clinic today to recheck INR to ensure it is therapeutic and thus preventing possible clotting and/or bleeding/bruising complications.    CHADS-VASc = n/a  (unadjusted ischemic stroke risk/year:  n/a)    Does patient have any changes to current medical/health status since last appt (Y/N):  NO  Does patient have any signs/symptoms of bleeding  "and/or thrombosis since the last appt (Y/N):  NO  Does patient have any interval changes to diet or medications since last appt (Y/N):  NO  Are there any complications or cost restrictions with current therapy (Y/N):  NO      Vitals:  BP check declined today      Weight  declines   Height   5' 0\"     Asssessment:      INR remains therapeutic at 2.8, therefore decreasing patient's risk of stroke and/or bleeding complications.   Reason(s) for out of range INR today:  n/a      Plan:  Pt is to continue with current warfarin dosing regimen in order to maintain INR in therapeutic range.     Follow up:  Because warfarin is a high risk medication and current CHEST guidelines recommend regular monitoring intervals (few days up to 12 weeks), will have patient return to clinic in 6 weeks to recheck INR.    Yinka Church, PharmD    "

## 2019-04-02 LAB
CHOLEST SERPL-MCNC: 125 MG/DL
FASTING STATUS PATIENT QL REPORTED: NORMAL
HDL PARTICAL NO Q4363: 34.8 UMOL/L
HDL SIZE Q4361: 8.6 NM
HDLC SERPL-MCNC: 50 MG/DL (ref 40–59)
HLD.LARGE SERPL-SCNC: 3.7 UMOL/L
L VLDL PART NO Q4357: 5.3 NMOL/L
LDL SERPL QN: 20.5 NM
LDL SERPL-SCNC: 891 NMOL/L
LDL SMALL SERPL-SCNC: 329 NMOL/L
LDLC SERPL CALC-MCNC: 37 MG/DL
PATHOLOGY STUDY: ABNORMAL
TRIGL SERPL-MCNC: 190 MG/DL (ref 30–149)
VLDL SIZE Q4362: 52.3 NM

## 2019-04-04 ENCOUNTER — HOSPITAL ENCOUNTER (OUTPATIENT)
Dept: CARDIOLOGY | Facility: MEDICAL CENTER | Age: 69
End: 2019-04-04
Attending: NURSE PRACTITIONER
Payer: MEDICARE

## 2019-04-04 DIAGNOSIS — I10 ESSENTIAL HYPERTENSION: ICD-10-CM

## 2019-04-04 LAB
LV EJECT FRACT  99904: 65
LV EJECT FRACT MOD 2C 99903: 64.98
LV EJECT FRACT MOD 4C 99902: 54.89
LV EJECT FRACT MOD BP 99901: 57.68

## 2019-04-04 PROCEDURE — 93306 TTE W/DOPPLER COMPLETE: CPT | Mod: 26 | Performed by: INTERNAL MEDICINE

## 2019-04-04 PROCEDURE — 93306 TTE W/DOPPLER COMPLETE: CPT

## 2019-04-05 ENCOUNTER — OFFICE VISIT (OUTPATIENT)
Dept: VASCULAR LAB | Facility: MEDICAL CENTER | Age: 69
End: 2019-04-05
Attending: INTERNAL MEDICINE
Payer: MEDICARE

## 2019-04-05 ENCOUNTER — TELEPHONE (OUTPATIENT)
Dept: VASCULAR LAB | Facility: MEDICAL CENTER | Age: 69
End: 2019-04-05

## 2019-04-05 VITALS
WEIGHT: 235 LBS | BODY MASS INDEX: 46.13 KG/M2 | HEART RATE: 95 BPM | DIASTOLIC BLOOD PRESSURE: 74 MMHG | SYSTOLIC BLOOD PRESSURE: 109 MMHG | HEIGHT: 60 IN

## 2019-04-05 DIAGNOSIS — G45.9 TIA (TRANSIENT ISCHEMIC ATTACK): ICD-10-CM

## 2019-04-05 DIAGNOSIS — E78.5 HYPERLIPIDEMIA, UNSPECIFIED HYPERLIPIDEMIA TYPE: ICD-10-CM

## 2019-04-05 DIAGNOSIS — Z95.2 S/P AORTIC VALVE REPLACEMENT: ICD-10-CM

## 2019-04-05 PROCEDURE — 99214 OFFICE O/P EST MOD 30 MIN: CPT | Performed by: NURSE PRACTITIONER

## 2019-04-05 PROCEDURE — 99212 OFFICE O/P EST SF 10 MIN: CPT | Performed by: NURSE PRACTITIONER

## 2019-04-05 ASSESSMENT — ENCOUNTER SYMPTOMS
SHORTNESS OF BREATH: 0
HEADACHES: 0
BRUISES/BLEEDS EASILY: 0
NERVOUS/ANXIOUS: 0
MYALGIAS: 0
COUGH: 0
PALPITATIONS: 0
CLAUDICATION: 0
BLOOD IN STOOL: 0

## 2019-04-05 NOTE — TELEPHONE ENCOUNTER
Echo reviewed  Velocities across AVR are unchanged from last year.  Increase mitral annular calcifications, but only mild MR  EF maintained.  Pending further recs from cards will continue medical management and repeat echo in one year.    Michael Bloch, MD  Vascular Care

## 2019-04-05 NOTE — TELEPHONE ENCOUNTER
Pt came in for a follow up visit, patient states that she is receiving her Praluent ok throught [facility]

## 2019-04-05 NOTE — PROGRESS NOTES
[facility] Follow Up Visit    Date of Service: 04/05/19    Yenifer Beasley is here for follow up of dyslipidemia, HTN, chronic anticoagulation, and hypothyroidism    HPI  Pertinent Interval History since last visit:   Back on Praluent, no current problems  No home BP readings  On warfarin, no bleeding problems  Having some SOB when talking  No dyspnea on exertion  Denies CP   Minor LE swelling    Current Prescription Lipid Lowering Medications - including dose:   Statin: None  Non-Statin: Continue Praluent 150 mg 2x per week  Continue Zetia  Current Lipid Lowering and Related Supplements:   Omega 3s, 1000u daily  Vit D  CoQ10  Any Current Side Effects Potentially Related to Lipid Lowering therapy?   No  Current Adherence to Lipid Lowering Therapies:  Complete  Any Previous History of Statin Intolerance?   Yes, Details: severe myalgias on multiple statins  Baseline Lipids Prior to Treatment:  Shown here:  LDL-C: 170      SOCIAL HISTORY  History   Smoking Status   • Never Smoker   Smokeless Tobacco   • Never Used      Change in weight: down about 5lbs  Exercise habits: mod exercise  Diet: common adult, consistent vit K    Review of Systems   Constitutional: Negative for malaise/fatigue.   Respiratory: Negative for cough and shortness of breath.    Cardiovascular: Positive for leg swelling. Negative for chest pain, palpitations and claudication.   Gastrointestinal: Negative for blood in stool and melena.   Genitourinary: Negative for hematuria.   Musculoskeletal: Positive for joint pain. Negative for myalgias.   Neurological: Negative for headaches.   Endo/Heme/Allergies: Does not bruise/bleed easily.   Psychiatric/Behavioral: The patient is not nervous/anxious.      Physical Exam   Constitutional: She is oriented to person, place, and time. She appears well-developed and well-nourished. No distress.   Cardiovascular: Normal rate, regular rhythm and intact distal pulses.    Murmur heard.  Pulmonary/Chest:  Effort normal and breath sounds normal. No respiratory distress. She has no wheezes.   Musculoskeletal: Normal range of motion. She exhibits edema.   Tr edema bilat   Neurological: She is alert and oriented to person, place, and time.   Skin: Skin is warm. She is not diaphoretic. There is erythema.   Psychiatric: She has a normal mood and affect. Her behavior is normal.   Vitals reviewed.    DATA REVIEW  Most Recent Lipid Panel:   Lab Results   Component Value Date    CHOLSTRLTOT 125 03/29/2019    CHOLSTRLTOT 161 05/07/2018    TRIGLYCERIDE 190 (H) 03/29/2019    TRIGLYCERIDE 268 (H) 05/07/2018    HDL 50 03/29/2019    HDL 48 05/07/2018    LDL 59 05/07/2018     Other Pertinent Blood Work:   Lab Results   Component Value Date    SODIUM 139 03/29/2019    POTASSIUM 4.8 03/29/2019    CHLORIDE 108 03/29/2019    CO2 23 03/29/2019    ANION 8.0 03/29/2019    GLUCOSE 108 (H) 03/29/2019    BUN 29 (H) 03/29/2019    CREATININE 0.96 03/29/2019    CALCIUM 9.1 03/29/2019    ASTSGOT 18 03/29/2019    ALTSGPT 12 03/29/2019    ALKPHOSPHAT 49 03/29/2019    TBILIRUBIN 0.5 03/29/2019    ALBUMIN 4.2 03/29/2019    AGRATIO 1.6 03/29/2019    CREACTPROT 0.80 (H) 12/20/2018    LIPOPROTA 57 (H) 10/12/2017    TSHULTRASEN 1.130 05/07/2018     Other:  Lp(a) - 57 (oct 2017)  TSH 1.5  (oct 2017)    Recent Imaging Studies:      Echo march 2017:  1. Left ventricular ejection fraction is visually estimated to be 55%.   Normal regional wall motion. Grade I diastolic dysfunction.  2. Known mechanical aortic valve that is functioning normally.   3. Mild mitral stenosis.  4. Estimated right ventricular systolic pressure is 40 mmHg.    Echo 4/4/19  CONCLUSIONS  Normal left ventricular systolic function.  Left ventricular ejection fraction is visually estimated to be 65-70%.  Mild concentric left ventricular hypertrophy.  Mild mitral stenosis.  Severe mitral annular calcification.  Mild mitral regurgitation.  Bioprosthetic aortic valve with increased  velocities.  Right heart pressures are consistent with moderate pulmonary   hypertension.  Severely dilated left atrium.  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  LDL/nonHDL above goal due to stopping PCSK9i  03/29/19: NonHDL-75, LDL-37  Lifestyle Recommendations From Today’s Visit:    Diet - low simple carb like [place]; limiting sugars  Exercise - daily home exercise for 15-20 min  Non-Statin Medications Recommendations from Today’s Visit:   - Restart Praluent 150mg   - Continue zetia 10 mg daily  - Consider addition of fibrate if nonHDL >130 in future  Indication for PCSK9 Inhibitor, if applicable:  ASCVD with suboptimal control of LDL-C despite maximally tolerated statin - continue praluent 150 mg 2x per month  Supplements Recommended at this visit:  - Continue omega 3's although increase to 3,000u/day, vit D and coq10  Recommendations for Other Cardiovascular Risk Factors, toby all that apply:   Hypertension- appears well controlled. Continue current rx   IFG -relatively stable.  Continue TLC  Anticoagulation - continue warfarin and f/u in anticoag clinic  Other issues  - Hypothyroidism - seems under reasonable  control per TSH; continue current thyroid replection; otherwise defer management to PCP  - Skin infection- pt has had cellulitis in foot; no open wounds; rec continued follow up with PCP.     Studies Ordered at Todays Visit:   Echo- due March 2020 (current echo results sent to Dr. Bloch for interpretation-- will discuss results and call pt)  Blood Work Ordered At Today’s visit:   Lipid, CMP  Follow-Up:   1) Anticoagulation clinic as scheduled  2) Vascular care 6 months or sooner if needed    RADHA Anderson.    CC:  Marion Jones M.D.

## 2019-04-25 ENCOUNTER — HOSPITAL ENCOUNTER (OUTPATIENT)
Dept: RADIOLOGY | Facility: MEDICAL CENTER | Age: 69
End: 2019-04-25
Attending: FAMILY MEDICINE
Payer: MEDICARE

## 2019-04-25 DIAGNOSIS — Z12.31 VISIT FOR SCREENING MAMMOGRAM: ICD-10-CM

## 2019-04-25 PROCEDURE — 77063 BREAST TOMOSYNTHESIS BI: CPT

## 2019-05-10 ENCOUNTER — ANTICOAGULATION VISIT (OUTPATIENT)
Dept: MEDICAL GROUP | Facility: PHYSICIAN GROUP | Age: 69
End: 2019-05-10
Payer: MEDICARE

## 2019-05-10 DIAGNOSIS — Z79.01 CHRONIC ANTICOAGULATION: ICD-10-CM

## 2019-05-10 DIAGNOSIS — Z95.2 HX OF MECHANICAL AORTIC VALVE REPLACEMENT: ICD-10-CM

## 2019-05-10 DIAGNOSIS — G45.9 TIA (TRANSIENT ISCHEMIC ATTACK): ICD-10-CM

## 2019-05-10 LAB — INR PPP: 2.7 (ref 2–3.5)

## 2019-05-10 PROCEDURE — 93793 ANTICOAG MGMT PT WARFARIN: CPT | Performed by: INTERNAL MEDICINE

## 2019-05-10 PROCEDURE — 85610 PROTHROMBIN TIME: CPT | Performed by: INTERNAL MEDICINE

## 2019-05-10 NOTE — PROGRESS NOTES
Anticoagulation Summary  As of 5/10/2019    INR goal:   2.5-3.5   TTR:   60.7 % (2.2 y)   INR used for dosin.70 (5/10/2019)   Warfarin maintenance plan:   4 mg (4 mg x 1) every Mon, Wed, Fri; 8 mg (4 mg x 2) all other days   Weekly warfarin total:   44 mg   Plan last modified:   Yinka Church, PharmD (2019)   Next INR check:   2019   Target end date:   Indefinite    Indications    Chronic anticoagulation [Z79.01]  Hx of mechanical aortic valve replacement [V43.3] [Z95.2]  TIA (transient ischemic attack) [G45.9]             Anticoagulation Episode Summary     INR check location:       Preferred lab:       Send INR reminders to:       Comments:         Anticoagulation Care Providers     Provider Role Specialty Phone number    Michael J Bloch, M.D. Referring Internal Medicine [phone]    Yinka Church, PharmD Responsible          Anticoagulation Patient Findings  Patient Findings     Negatives:   Signs/symptoms of thrombosis, Signs/symptoms of bleeding, Laboratory test error suspected, Change in health, Change in alcohol use, Change in activity, Upcoming invasive procedure, Emergency department visit, Upcoming dental procedure, Missed doses, Extra doses, Change in medications, Change in diet/appetite, Hospital admission, Bruising, Other complaints        HPI:   Yenifer Beasley seen in clinic today, on anticoagulation therapy with warfarin for stroke prevention due to history of mechanical aortic valve and TIA.    Patient's previous INR was therapeutic at 2.8 on 3-29-19, at which time patient was instructed to continue with current warfarin regimen.  She returns to clinic today to recheck INR to ensure it is therapeutic and thus preventing possible clotting and/or bleeding/bruising complications.    CHADS-VASc = n/a  (unadjusted ischemic stroke risk/year:  n/a)    Does patient have any changes to current medical/health status since last appt (Y/N):  NO  Does patient have any signs/symptoms of bleeding  "and/or thrombosis since the last appt (Y/N):  NO  Does patient have any interval changes to diet or medications since last appt (Y/N):  NO  Are there any complications or cost restrictions with current therapy (Y/N):  NO      Vitals:  BP check declined at today's visit     Weight  declines   Height   5' 0\"     Asssessment:      INR remains therapeutic at 2.7, therefore decreasing patient's risk of stroke and/or bleeding complications.   Reason(s) for out of range INR today:  n/a      Plan:  Pt is to continue with current warfarin dosing regimen in order to maintain INR in therapeutic range.     Follow up:  Because warfarin is a high risk medication and current CHEST guidelines recommend regular monitoring intervals (few days up to 12 weeks), will have patient return to clinic in 8 weeks to recheck INR.    Yinka Church, PharmD    "

## 2019-05-22 DIAGNOSIS — Z79.01 CHRONIC ANTICOAGULATION: ICD-10-CM

## 2019-07-11 ENCOUNTER — ANTICOAGULATION VISIT (OUTPATIENT)
Dept: VASCULAR LAB | Facility: MEDICAL CENTER | Age: 69
End: 2019-07-11
Attending: INTERNAL MEDICINE
Payer: MEDICARE

## 2019-07-11 VITALS — DIASTOLIC BLOOD PRESSURE: 78 MMHG | SYSTOLIC BLOOD PRESSURE: 132 MMHG | HEART RATE: 90 BPM

## 2019-07-11 DIAGNOSIS — Z95.2 HX OF MECHANICAL AORTIC VALVE REPLACEMENT: ICD-10-CM

## 2019-07-11 DIAGNOSIS — Z79.01 CHRONIC ANTICOAGULATION: ICD-10-CM

## 2019-07-11 DIAGNOSIS — G45.9 TIA (TRANSIENT ISCHEMIC ATTACK): ICD-10-CM

## 2019-07-11 LAB
INR BLD: 4 (ref 0.9–1.2)
INR PPP: 4 (ref 2–3.5)

## 2019-07-11 PROCEDURE — 99212 OFFICE O/P EST SF 10 MIN: CPT | Performed by: NURSE PRACTITIONER

## 2019-07-11 PROCEDURE — 85610 PROTHROMBIN TIME: CPT

## 2019-07-11 NOTE — PROGRESS NOTES
Anticoagulation Summary  As of 2019    INR goal:   2.5-3.5   TTR:   60.8 % (2.4 y)   INR used for dosin.00! (2019)   Warfarin maintenance plan:   4 mg (4 mg x 1) every Mon, Wed, Fri; 8 mg (4 mg x 2) all other days   Weekly warfarin total:   44 mg   Plan last modified:   Yinka Church, PharmD (2019)   Next INR check:   2019   Target end date:   Indefinite    Indications    Chronic anticoagulation [Z79.01]  Hx of mechanical aortic valve replacement [V43.3] [Z95.2]  TIA (transient ischemic attack) [G45.9]             Anticoagulation Episode Summary     INR check location:       Preferred lab:       Send INR reminders to:       Comments:         Anticoagulation Care Providers     Provider Role Specialty Phone number    Michael J Bloch, M.D. Referring Internal Medicine [phone]    Yinka Church, PharmD Responsible          Anticoagulation Patient Findings      HPI:  Yenifer Beasley seen in clinic today for follow up on anticoagulation therapy in the presence of mAVR, TIA.   Denies any changes to current medical/health status since last appointment.   She is eating less greens but will resume her usual diet. Denies any medication changes.   No current symptoms of bleeding or thrombosis reported.    A/P:   INR supratherapeutic.   Decrease tonight then continue current regimen.   BP recorded in vitals.    Follow up appointment in 4 week(s) per pt's request.    Next Appointment:  at 8:30 am.    Bernadine PHILIP

## 2019-07-15 ENCOUNTER — OFFICE VISIT (OUTPATIENT)
Dept: CARDIOLOGY | Facility: MEDICAL CENTER | Age: 69
End: 2019-07-15
Payer: MEDICARE

## 2019-07-15 ENCOUNTER — TELEPHONE (OUTPATIENT)
Dept: CARDIOLOGY | Facility: MEDICAL CENTER | Age: 69
End: 2019-07-15

## 2019-07-15 ENCOUNTER — ANTICOAGULATION MONITORING (OUTPATIENT)
Dept: MEDICAL GROUP | Facility: PHYSICIAN GROUP | Age: 69
End: 2019-07-15

## 2019-07-15 VITALS
WEIGHT: 233.69 LBS | SYSTOLIC BLOOD PRESSURE: 136 MMHG | HEART RATE: 97 BPM | DIASTOLIC BLOOD PRESSURE: 70 MMHG | OXYGEN SATURATION: 99 % | BODY MASS INDEX: 45.88 KG/M2 | HEIGHT: 60 IN

## 2019-07-15 DIAGNOSIS — Z79.01 CHRONIC ANTICOAGULATION: ICD-10-CM

## 2019-07-15 DIAGNOSIS — Z95.2 HX OF MECHANICAL AORTIC VALVE REPLACEMENT: ICD-10-CM

## 2019-07-15 DIAGNOSIS — R07.89 OTHER CHEST PAIN: ICD-10-CM

## 2019-07-15 DIAGNOSIS — G45.9 TIA (TRANSIENT ISCHEMIC ATTACK): ICD-10-CM

## 2019-07-15 DIAGNOSIS — I48.19 PERSISTENT ATRIAL FIBRILLATION (HCC): ICD-10-CM

## 2019-07-15 LAB — EKG IMPRESSION: NORMAL

## 2019-07-15 PROCEDURE — 93000 ELECTROCARDIOGRAM COMPLETE: CPT | Performed by: INTERNAL MEDICINE

## 2019-07-15 PROCEDURE — 99204 OFFICE O/P NEW MOD 45 MIN: CPT | Performed by: INTERNAL MEDICINE

## 2019-07-15 RX ORDER — AMIODARONE HYDROCHLORIDE 200 MG/1
TABLET ORAL
Qty: 60 TAB | Refills: 5 | Status: ON HOLD | OUTPATIENT
Start: 2019-07-15 | End: 2019-08-02 | Stop reason: SDUPTHER

## 2019-07-15 RX ORDER — FUROSEMIDE 40 MG/1
40 TABLET ORAL DAILY
Qty: 30 TAB | Refills: 11 | Status: SHIPPED | OUTPATIENT
Start: 2019-07-15 | End: 2020-06-11 | Stop reason: SDUPTHER

## 2019-07-15 NOTE — TELEPHONE ENCOUNTER
Dr. Hawkins requests that the coumadin clinic monitor INR closely and adjust coumadin dosage as needed while patient is initiating loading dose and taking amiodarone.     To coumadin clinic

## 2019-07-15 NOTE — H&P (VIEW-ONLY)
"    Cardiology Initial Consultation Note    Date of note:    7/15/2019    Primary Care Provider: Marion Jones M.D.  Referring Provider: Marion Jones M.*     Patient Name: Yenifer Beasley   YOB: 1950  MRN:              [id number]    Chief Complaint: shortness of breath    Yenifer Beasley is a 68 y.o. female  patient presented today with complaints of shortness of breath.  She has history of coronary artery disease, prior bypass surgery and mechanical aortic valve replacement in 2007.  She also has paroxysmal atrial fibrillation in the past.  For last 1 year she has been noticing shortness of breath with exertion, however last few days it is significantly worse.  Walking from parking lot to the door makes really short of breath.  Denies chest pain or palpitations.  He also complains of weight gain, leg swelling.      ROS  Positive for weight gain, leg swelling, shortness of breath, fatigue.    All other systems reviewed and discussed using a comprehensive questionnaire and are negative.     Past medical history, family history, social history, allergies and labs are reviewed and updated as needed as documented below.    Past Medical History:   Diagnosis Date   • Arthritis     OA OF HANDS, KNEESM, HIPS AND BACK   • CAD (coronary artery disease) 2007    CABG   • Diabetes    • Diabetic neuropathy (HCC) 1/6/2011   • Fall     \"a couple years ago because of my neuopathy\" multiple falls   • Gastritis 7/10    H/O UGIB   • GERD (gastroesophageal reflux disease)    • Hyperlipidemia    • Hypertension    • Hypothyroidism    • Infectious disease     History of MRSA   • Migraine    • Neuropathy in diabetes (HCC)    • HILDA (obstructive sleep apnea)    • S/P aortic valve replacement 2007   • TIA (transient ischemic attack) 2005   • Tricuspid regurgitation mild- mod 1/6/2011   • Unspecified hemorrhagic conditions     Bleeds easily-GI bleeds with NSAIDS and ASA   • Upper GI bleed ON JULY 2007 " 1/6/2011         Past Surgical History:   Procedure Laterality Date   • MITRAL VALVE REPLACE  2007    AORTIC VALVE   • SHOULDER SURGERY  6/2001    NEER DECOMOPRESSION LEFT SHOULDER   • CARPAL TUNNEL RELEASE  1998    B/L   • TONSILLECTOMY  1995    PARTIAL PALATOPLASTY   • ABDOMINAL HYSTERECTOMY TOTAL      TAHBSOO   • CHOLECYSTECTOMY     • CORONARY ARTERY BYPASS, 1      Triple Bypass   • HERNIA REPAIR      VENTRAL HERNIA REPAIR   • PRIMARY C SECTION           Current Outpatient Prescriptions   Medication Sig Dispense Refill   • furosemide (LASIX) 40 MG Tab Take 1 Tab by mouth every day. 30 Tab 11   • amiodarone (CORDARONE) 200 MG Tab Take 400 mg (2 tabs) twice daily for 7 days, after that take one tablet daily. 60 Tab 5   • metoprolol (LOPRESSOR) 25 MG Tab Take 1 Tab by mouth 2 times a day. 60 Tab 11   • warfarin (COUMADIN) 4 MG Tab TAKE 1 TO 2 TABLETS BY MOUTH EVERY DAY AS DIRECTED BY THE COUMADIN CLINIC 180 Tab 2   • cyclobenzaprine (FLEXERIL) 10 MG Tab Take 1 Tab by mouth 3 times a day as needed for Moderate Pain or Muscle Spasms. 20 Tab 0   • ezetimibe (ZETIA) 10 MG Tab TAKE 1 TABLET BY MOUTH EVERY DAY 90 Tab 3   • Alirocumab (PRALUENT) 150 MG/ML Solution Pen-injector Inject 150 mg as instructed every 14 days. 6 PEN 3   • Apoaequorin 10 MG Cap Take  by mouth.     • lisinopril (PRINIVIL) 20 MG TABS Take 20 mg by mouth 2 times a day.     • potassium chloride SA (K-DUR) 20 MEQ TBCR Take 20 mEq by mouth every day.     • vitamin D (CHOLECALCIFEROL) 1000 UNIT TABS Take 1,000 Units by mouth every day.       • docosahexanoic acid (OMEGA 3 FA) 1000 MG CAPS Take 1,000 mg by mouth 2 Times a Day.     • levothyroxine (LEVOXYL) 137 MCG TABS Take 1 Tab by mouth every day. 30 Each 3   • tramadol (ULTRAM) 50 MG TABS Take 2 Tabs by mouth 3 times a day. 180 Each 0   • albuterol 108 (90 BASE) MCG/ACT Aero Soln inhalation aerosol Inhale 2 Puffs by mouth every four hours as needed for Shortness of Breath. (Patient not taking: Reported  "on 12/26/2018) 1 Inhaler 0     No current facility-administered medications for this visit.          Allergies   Allergen Reactions   • Asa [Aspirin]      GI BLEED   • Cymbalta [Duloxetine Hcl]      Feels like a \"zombie\"   • Latex    • Lyrica [Pregabalin]      SPACED OUT   • Nsaids      GI BLEED   • Tricor      Breathing problems     • Trilipix [Choline Fenofibrate]      SOB         Family History   Problem Relation Age of Onset   • Heart Disease Mother         a fib   • Heart Disease Brother         a fib         Social History     Social History   • Marital status:      Spouse name: N/A   • Number of children: N/A   • Years of education: N/A     Occupational History   • Not on file.     Social History Main Topics   • Smoking status: Never Smoker   • Smokeless tobacco: Never Used   • Alcohol use No   • Drug use: No   • Sexual activity: Not on file     Other Topics Concern   • Not on file     Social History Narrative   • No narrative on file         Physical Exam:  Ambulatory Vitals  /70 (BP Location: Left arm, Patient Position: Sitting, BP Cuff Size: Adult)   Pulse 97   Ht 1.524 m (5')   Wt 106 kg (233 lb 11 oz)   SpO2 99%    Oxygen Therapy:  Pulse Oximetry: 99 %  BP Readings from Last 4 Encounters:   07/15/19 136/70   07/11/19 132/78   04/05/19 109/74   12/26/18 108/48       Weight/BMI: Body mass index is 45.64 kg/m².  Wt Readings from Last 4 Encounters:   07/15/19 106 kg (233 lb 11 oz)   04/05/19 106.6 kg (235 lb)   12/26/18 107 kg (235 lb 12.8 oz)   11/18/18 106.6 kg (235 lb)     General: Well appearing and in no apparent distress  Head: atrumatic  Eyes: No conjunctival pallor   ENT: normal external appearance of nose and ears  Neck: JVD absent, carotid bruits absent  Lungs: respiratory sounds  normal, additional breath sounds absent  Heart: Irregularly irregular rhythm   No palpable thrills on palpation, mechanical aortic valve click, no rubs,   Lower extremity edema 2+  Pedal pulses " normal  Abdomen: soft, non tender, non distended.  Extremities/MSK: no clubbing, no cyanosis  Neurological: normal orientation, Gait normal   Psychiatric: Appropriate affect, intact judgement and insight  Skin: Warm extremities        Lab Data Review:  Lab Results   Component Value Date/Time    CHOLSTRLTOT 125 03/29/2019 09:14 AM    CHOLSTRLTOT 161 05/07/2018 09:12 AM    LDL 59 05/07/2018 09:12 AM    HDL 50 03/29/2019 09:14 AM    HDL 48 05/07/2018 09:12 AM    TRIGLYCERIDE 190 (H) 03/29/2019 09:14 AM    TRIGLYCERIDE 268 (H) 05/07/2018 09:12 AM       Lab Results   Component Value Date/Time    SODIUM 139 03/29/2019 09:14 AM    POTASSIUM 4.8 03/29/2019 09:14 AM    CHLORIDE 108 03/29/2019 09:14 AM    CO2 23 03/29/2019 09:14 AM    GLUCOSE 108 (H) 03/29/2019 09:14 AM    BUN 29 (H) 03/29/2019 09:14 AM    CREATININE 0.96 03/29/2019 09:14 AM    CREATININE 1.6 (H) 11/26/2007 12:28 AM     Lab Results   Component Value Date/Time    ALKPHOSPHAT 49 03/29/2019 09:14 AM    ASTSGOT 18 03/29/2019 09:14 AM    ALTSGPT 12 03/29/2019 09:14 AM    TBILIRUBIN 0.5 03/29/2019 09:14 AM      Lab Results   Component Value Date/Time    WBC 6.0 12/20/2018 10:18 AM     CABG 2007  OPERATION PERFORMED   1. Aortic valve replacement (21-mm St. Cristian mechanical valve).   2. Coronary artery bypass grafting x3 (left internal mammary artery to   the left anterior descending artery and reverse saphenous vein grafts to   the posterior descending artery, and second diagonal artery).   3. Left endoscopic vein harvest.   4. Intraoperative transesophageal echocardiography. SURGEON:  Dr. Awan FIRST ASSISTANT:  SHELTON Mclaughlin ANESTHESIOLOGIST:  Dr. Lyon ANESTHESIA:  General endotracheal.    CONCLUSIONS  Normal left ventricular systolic function.  Left ventricular ejection fraction is visually estimated to be 65-70%.  Mild concentric left ventricular hypertrophy.  Mild mitral stenosis.  Severe mitral annular calcification.  Mild mitral  regurgitation.  Mechanical aortic valve with normal gradients  Right heart pressures are consistent with moderate pulmonary   hypertension.  Severely dilated left atrium.    EKG shows atrial fibrillation with heart rate of 94    Medical Decision Makin-year-old female patient with  1.  Shortness of breath, likely secondary to diastolic heart failure, NYHA class III stage C  2.  Atrial fibrillation, unknown duration  3.  Mechanical aortic valve replacement, three-vessel bypass surgery in   4.  Hypertension  5.  Diabetes mellitus    Her symptoms of shortness of breath are likely related to diastolic heart failure, atrial fibrillation.  I advised her to increase Lasix to 40 mg daily.  Counseled on low-salt diet  We will start her on amiodarone 400 twice daily for 7 days, then 200 mg daily.  Will contact Coumadin clinic to adjust the Coumadin dose while she is taking amiodarone.  Schedule cardioversion in 2 to 3 weeks.  Start metoprolol 25 twice daily for rate control.  Recommend baby aspirin every day.  Will not start now because amiodarone can increase INR.  Continue lisinopril, warfarin, Praluent, Zetia    Return in about 2 months (around 9/15/2019).    This note was dictated using Dragon speech recognition software.    Caleb PARRA  Interventional cardiologist  [facility], [address]  [address] [address]  Phone: [phone]  Fax: [phone]

## 2019-07-15 NOTE — PROGRESS NOTES
"    Cardiology Initial Consultation Note    Date of note:    7/15/2019    Primary Care Provider: Marion Jones M.D.  Referring Provider: Marion Jones M.*     Patient Name: Yenifer Beasley   YOB: 1950  MRN:              [id number]    Chief Complaint: shortness of breath    Yenifer Beasley is a 68 y.o. female  patient presented today with complaints of shortness of breath.  She has history of coronary artery disease, prior bypass surgery and mechanical aortic valve replacement in 2007.  She also has paroxysmal atrial fibrillation in the past.  For last 1 year she has been noticing shortness of breath with exertion, however last few days it is significantly worse.  Walking from parking lot to the door makes really short of breath.  Denies chest pain or palpitations.  He also complains of weight gain, leg swelling.      ROS  Positive for weight gain, leg swelling, shortness of breath, fatigue.    All other systems reviewed and discussed using a comprehensive questionnaire and are negative.     Past medical history, family history, social history, allergies and labs are reviewed and updated as needed as documented below.    Past Medical History:   Diagnosis Date   • Arthritis     OA OF HANDS, KNEESM, HIPS AND BACK   • CAD (coronary artery disease) 2007    CABG   • Diabetes    • Diabetic neuropathy (HCC) 1/6/2011   • Fall     \"a couple years ago because of my neuopathy\" multiple falls   • Gastritis 7/10    H/O UGIB   • GERD (gastroesophageal reflux disease)    • Hyperlipidemia    • Hypertension    • Hypothyroidism    • Infectious disease     History of MRSA   • Migraine    • Neuropathy in diabetes (HCC)    • HILDA (obstructive sleep apnea)    • S/P aortic valve replacement 2007   • TIA (transient ischemic attack) 2005   • Tricuspid regurgitation mild- mod 1/6/2011   • Unspecified hemorrhagic conditions     Bleeds easily-GI bleeds with NSAIDS and ASA   • Upper GI bleed ON JULY 2007 " 1/6/2011         Past Surgical History:   Procedure Laterality Date   • MITRAL VALVE REPLACE  2007    AORTIC VALVE   • SHOULDER SURGERY  6/2001    NEER DECOMOPRESSION LEFT SHOULDER   • CARPAL TUNNEL RELEASE  1998    B/L   • TONSILLECTOMY  1995    PARTIAL PALATOPLASTY   • ABDOMINAL HYSTERECTOMY TOTAL      TAHBSOO   • CHOLECYSTECTOMY     • CORONARY ARTERY BYPASS, 1      Triple Bypass   • HERNIA REPAIR      VENTRAL HERNIA REPAIR   • PRIMARY C SECTION           Current Outpatient Prescriptions   Medication Sig Dispense Refill   • furosemide (LASIX) 40 MG Tab Take 1 Tab by mouth every day. 30 Tab 11   • amiodarone (CORDARONE) 200 MG Tab Take 400 mg (2 tabs) twice daily for 7 days, after that take one tablet daily. 60 Tab 5   • metoprolol (LOPRESSOR) 25 MG Tab Take 1 Tab by mouth 2 times a day. 60 Tab 11   • warfarin (COUMADIN) 4 MG Tab TAKE 1 TO 2 TABLETS BY MOUTH EVERY DAY AS DIRECTED BY THE COUMADIN CLINIC 180 Tab 2   • cyclobenzaprine (FLEXERIL) 10 MG Tab Take 1 Tab by mouth 3 times a day as needed for Moderate Pain or Muscle Spasms. 20 Tab 0   • ezetimibe (ZETIA) 10 MG Tab TAKE 1 TABLET BY MOUTH EVERY DAY 90 Tab 3   • Alirocumab (PRALUENT) 150 MG/ML Solution Pen-injector Inject 150 mg as instructed every 14 days. 6 PEN 3   • Apoaequorin 10 MG Cap Take  by mouth.     • lisinopril (PRINIVIL) 20 MG TABS Take 20 mg by mouth 2 times a day.     • potassium chloride SA (K-DUR) 20 MEQ TBCR Take 20 mEq by mouth every day.     • vitamin D (CHOLECALCIFEROL) 1000 UNIT TABS Take 1,000 Units by mouth every day.       • docosahexanoic acid (OMEGA 3 FA) 1000 MG CAPS Take 1,000 mg by mouth 2 Times a Day.     • levothyroxine (LEVOXYL) 137 MCG TABS Take 1 Tab by mouth every day. 30 Each 3   • tramadol (ULTRAM) 50 MG TABS Take 2 Tabs by mouth 3 times a day. 180 Each 0   • albuterol 108 (90 BASE) MCG/ACT Aero Soln inhalation aerosol Inhale 2 Puffs by mouth every four hours as needed for Shortness of Breath. (Patient not taking: Reported  "on 12/26/2018) 1 Inhaler 0     No current facility-administered medications for this visit.          Allergies   Allergen Reactions   • Asa [Aspirin]      GI BLEED   • Cymbalta [Duloxetine Hcl]      Feels like a \"zombie\"   • Latex    • Lyrica [Pregabalin]      SPACED OUT   • Nsaids      GI BLEED   • Tricor      Breathing problems     • Trilipix [Choline Fenofibrate]      SOB         Family History   Problem Relation Age of Onset   • Heart Disease Mother         a fib   • Heart Disease Brother         a fib         Social History     Social History   • Marital status:      Spouse name: N/A   • Number of children: N/A   • Years of education: N/A     Occupational History   • Not on file.     Social History Main Topics   • Smoking status: Never Smoker   • Smokeless tobacco: Never Used   • Alcohol use No   • Drug use: No   • Sexual activity: Not on file     Other Topics Concern   • Not on file     Social History Narrative   • No narrative on file         Physical Exam:  Ambulatory Vitals  /70 (BP Location: Left arm, Patient Position: Sitting, BP Cuff Size: Adult)   Pulse 97   Ht 1.524 m (5')   Wt 106 kg (233 lb 11 oz)   SpO2 99%    Oxygen Therapy:  Pulse Oximetry: 99 %  BP Readings from Last 4 Encounters:   07/15/19 136/70   07/11/19 132/78   04/05/19 109/74   12/26/18 108/48       Weight/BMI: Body mass index is 45.64 kg/m².  Wt Readings from Last 4 Encounters:   07/15/19 106 kg (233 lb 11 oz)   04/05/19 106.6 kg (235 lb)   12/26/18 107 kg (235 lb 12.8 oz)   11/18/18 106.6 kg (235 lb)     General: Well appearing and in no apparent distress  Head: atrumatic  Eyes: No conjunctival pallor   ENT: normal external appearance of nose and ears  Neck: JVD absent, carotid bruits absent  Lungs: respiratory sounds  normal, additional breath sounds absent  Heart: Irregularly irregular rhythm   No palpable thrills on palpation, mechanical aortic valve click, no rubs,   Lower extremity edema 2+  Pedal pulses " normal  Abdomen: soft, non tender, non distended.  Extremities/MSK: no clubbing, no cyanosis  Neurological: normal orientation, Gait normal   Psychiatric: Appropriate affect, intact judgement and insight  Skin: Warm extremities        Lab Data Review:  Lab Results   Component Value Date/Time    CHOLSTRLTOT 125 03/29/2019 09:14 AM    CHOLSTRLTOT 161 05/07/2018 09:12 AM    LDL 59 05/07/2018 09:12 AM    HDL 50 03/29/2019 09:14 AM    HDL 48 05/07/2018 09:12 AM    TRIGLYCERIDE 190 (H) 03/29/2019 09:14 AM    TRIGLYCERIDE 268 (H) 05/07/2018 09:12 AM       Lab Results   Component Value Date/Time    SODIUM 139 03/29/2019 09:14 AM    POTASSIUM 4.8 03/29/2019 09:14 AM    CHLORIDE 108 03/29/2019 09:14 AM    CO2 23 03/29/2019 09:14 AM    GLUCOSE 108 (H) 03/29/2019 09:14 AM    BUN 29 (H) 03/29/2019 09:14 AM    CREATININE 0.96 03/29/2019 09:14 AM    CREATININE 1.6 (H) 11/26/2007 12:28 AM     Lab Results   Component Value Date/Time    ALKPHOSPHAT 49 03/29/2019 09:14 AM    ASTSGOT 18 03/29/2019 09:14 AM    ALTSGPT 12 03/29/2019 09:14 AM    TBILIRUBIN 0.5 03/29/2019 09:14 AM      Lab Results   Component Value Date/Time    WBC 6.0 12/20/2018 10:18 AM     CABG 2007  OPERATION PERFORMED   1. Aortic valve replacement (21-mm St. Cristian mechanical valve).   2. Coronary artery bypass grafting x3 (left internal mammary artery to   the left anterior descending artery and reverse saphenous vein grafts to   the posterior descending artery, and second diagonal artery).   3. Left endoscopic vein harvest.   4. Intraoperative transesophageal echocardiography. SURGEON:  Dr. Awan FIRST ASSISTANT:  SHELTON Mclaughlin ANESTHESIOLOGIST:  Dr. Lyon ANESTHESIA:  General endotracheal.    CONCLUSIONS  Normal left ventricular systolic function.  Left ventricular ejection fraction is visually estimated to be 65-70%.  Mild concentric left ventricular hypertrophy.  Mild mitral stenosis.  Severe mitral annular calcification.  Mild mitral  regurgitation.  Mechanical aortic valve with normal gradients  Right heart pressures are consistent with moderate pulmonary   hypertension.  Severely dilated left atrium.    EKG shows atrial fibrillation with heart rate of 94    Medical Decision Makin-year-old female patient with  1.  Shortness of breath, likely secondary to diastolic heart failure, NYHA class III stage C  2.  Atrial fibrillation, unknown duration  3.  Mechanical aortic valve replacement, three-vessel bypass surgery in   4.  Hypertension  5.  Diabetes mellitus    Her symptoms of shortness of breath are likely related to diastolic heart failure, atrial fibrillation.  I advised her to increase Lasix to 40 mg daily.  Counseled on low-salt diet  We will start her on amiodarone 400 twice daily for 7 days, then 200 mg daily.  Will contact Coumadin clinic to adjust the Coumadin dose while she is taking amiodarone.  Schedule cardioversion in 2 to 3 weeks.  Start metoprolol 25 twice daily for rate control.  Recommend baby aspirin every day.  Will not start now because amiodarone can increase INR.  Continue lisinopril, warfarin, Praluent, Zetia    Return in about 2 months (around 9/15/2019).    This note was dictated using Dragon speech recognition software.    Caleb PARRA  Interventional cardiologist  [facility], [address]ld[address].  [address] [address]  Phone: [phone]  Fax: [phone]

## 2019-07-16 ENCOUNTER — TELEPHONE (OUTPATIENT)
Dept: CARDIOLOGY | Facility: MEDICAL CENTER | Age: 69
End: 2019-07-16

## 2019-07-16 NOTE — TELEPHONE ENCOUNTER
Yesi Campbell R.N.   Phone Number: [phone]             AK/Gillian     Pt saw AK yesterday and was told to call today if did no hear back from coumadin clinic about dosing. States they did call to schedule appt but did not discuss dosing. Please call pt to advise at  [phone].      Spoke with patient regarding above. Advised to wait until Thursday because they need the lab work first to see if they need to adjust the dose at all. She also asked if someone would call her when cardioversion was scheduled. Advised that someone should be calling her to schedule that her shortly.     To Shikha-- just want to make sure you know about her, if so thank you, if not can we work on getting her scheduled. I did advise it may be a few days because I was not sure where you were at in scheduling. Thank you!

## 2019-07-17 NOTE — TELEPHONE ENCOUNTER
Patient scheduled for cardioversion on 8-2-19 at [facility] with Dr. Hawkins at 1:15. FYI To Dr. Hawkins.

## 2019-07-18 ENCOUNTER — ANTICOAGULATION VISIT (OUTPATIENT)
Dept: VASCULAR LAB | Facility: MEDICAL CENTER | Age: 69
End: 2019-07-18
Attending: INTERNAL MEDICINE
Payer: MEDICARE

## 2019-07-18 ENCOUNTER — ANTICOAGULATION MONITORING (OUTPATIENT)
Dept: VASCULAR LAB | Facility: MEDICAL CENTER | Age: 69
End: 2019-07-18

## 2019-07-18 DIAGNOSIS — Z95.2 HX OF MECHANICAL AORTIC VALVE REPLACEMENT: ICD-10-CM

## 2019-07-18 DIAGNOSIS — I48.91 ATRIAL FIBRILLATION, UNSPECIFIED TYPE (HCC): ICD-10-CM

## 2019-07-18 DIAGNOSIS — Z79.01 CHRONIC ANTICOAGULATION: ICD-10-CM

## 2019-07-18 DIAGNOSIS — G45.9 TIA (TRANSIENT ISCHEMIC ATTACK): ICD-10-CM

## 2019-07-18 LAB
INR PPP: 3.1 (ref 2–3.5)
INR PPP: 3.1 (ref 2–3.5)

## 2019-07-18 PROCEDURE — 99212 OFFICE O/P EST SF 10 MIN: CPT | Performed by: NURSE PRACTITIONER

## 2019-07-18 PROCEDURE — 85610 PROTHROMBIN TIME: CPT

## 2019-07-18 NOTE — PROGRESS NOTES
Anticoagulation Summary  As of 7/18/2019    INR goal:   2.5-3.5   TTR:   60.7 % (2.4 y)   INR used for dosing:   3.10 (7/18/2019)   Warfarin maintenance plan:   8 mg (4 mg x 2) every Sun, Tue, Thu; 4 mg (4 mg x 1) all other days   Weekly warfarin total:   40 mg   Plan last modified:   STEPH Garner (7/18/2019)   Next INR check:   7/25/2019   Target end date:   Indefinite    Indications    Chronic anticoagulation [Z79.01]  Hx of mechanical aortic valve replacement [V43.3] [Z95.2]  TIA (transient ischemic attack) [G45.9]  Atrial fibrillation (HCC) [I48.91] [I48.91]             Anticoagulation Episode Summary     INR check location:       Preferred lab:       Send INR reminders to:       Comments:         Anticoagulation Care Providers     Provider Role Specialty Phone number    Michael J Bloch, M.D. Referring Internal Medicine [phone]    Yinka Church, PharmD Responsible          Anticoagulation Patient Findings    Left VM for patient to inform the last INR reading of 3.1.   Patient is being treated for A.fib and mechanical heart valve and has a [id number] score of at least 7.     Patient is therapeutic and within goal range of 2.5-3.5 and was told to continue current regimen of  8mg on Sunday, Tuesday, and Thursday and 4mg all other days.     Told patient to call back if patient changed any doses, experiences any signs or symptoms of bleeding, or started/stopped any medications.     Patient should follow up in 1 week(s) for INR and monitoring per protocol.    reynaldo lundberg    I have reviewed and concur with the above plan     Coleman Dozier, EmilyD

## 2019-07-18 NOTE — PROGRESS NOTES
Anticoagulation Summary  As of 7/18/2019    INR goal:   2.5-3.5   TTR:   60.7 % (2.4 y)   INR used for dosing:   3.10 (7/18/2019)   Warfarin maintenance plan:   8 mg (4 mg x 2) every Sun, Tue, Thu; 4 mg (4 mg x 1) all other days   Weekly warfarin total:   40 mg   Plan last modified:   Bernadine Kelley, A.P.NSabrina (7/18/2019)   Next INR check:   7/26/2019   Target end date:   Indefinite    Indications    Chronic anticoagulation [Z79.01]  Hx of mechanical aortic valve replacement [V43.3] [Z95.2]  TIA (transient ischemic attack) [G45.9]             Anticoagulation Episode Summary     INR check location:       Preferred lab:       Send INR reminders to:       Comments:         Anticoagulation Care Providers     Provider Role Specialty Phone number    Michael J Bloch, M.D. Referring Internal Medicine [phone]    Yinka Church, PharmD Responsible          Anticoagulation Patient Findings      HPI:  Yenifer Beasley seen in clinic today for follow up on anticoagulation therapy in the presence of TIA, AVR, AF.   Pt diagnosed with AF, started on metoprolol and amiodarone on Monday. Furosemide dose increased. Cardioversion scheduled 8/2/19.  Denies any diet changes.   No current symptoms of bleeding or thrombosis reported.    A/P:   INR therapeutic.   Will decrease regimen to account for one time dose decrease last week and the fact that she started amiodarone.     Follow up appointment in 1 week(s).    Next Appointment: Friday, July 26, 2019 at 10:30 am.    Bernadine PHILIP

## 2019-07-19 LAB — INR BLD: 3.1 (ref 0.9–1.2)

## 2019-07-26 ENCOUNTER — ANTICOAGULATION VISIT (OUTPATIENT)
Dept: VASCULAR LAB | Facility: MEDICAL CENTER | Age: 69
End: 2019-07-26
Attending: INTERNAL MEDICINE
Payer: MEDICARE

## 2019-07-26 DIAGNOSIS — Z79.01 CHRONIC ANTICOAGULATION: ICD-10-CM

## 2019-07-26 DIAGNOSIS — Z95.2 HX OF MECHANICAL AORTIC VALVE REPLACEMENT: ICD-10-CM

## 2019-07-26 DIAGNOSIS — I48.91 ATRIAL FIBRILLATION, UNSPECIFIED TYPE (HCC): ICD-10-CM

## 2019-07-26 DIAGNOSIS — G45.9 TIA (TRANSIENT ISCHEMIC ATTACK): ICD-10-CM

## 2019-07-26 LAB
INR BLD: 4.3 (ref 0.9–1.2)
INR PPP: 4.3 (ref 2–3.5)

## 2019-07-26 PROCEDURE — 99212 OFFICE O/P EST SF 10 MIN: CPT

## 2019-07-26 PROCEDURE — 85610 PROTHROMBIN TIME: CPT

## 2019-07-31 LAB
ANION GAP SERPL CALC-SCNC: 11 MMOL/L (ref 0–11.9)
BUN SERPL-MCNC: 30 MG/DL (ref 8–22)
CALCIUM SERPL-MCNC: 8.6 MG/DL (ref 8.5–10.5)
CHLORIDE SERPL-SCNC: 107 MMOL/L (ref 96–112)
CO2 SERPL-SCNC: 22 MMOL/L (ref 20–33)
CREAT SERPL-MCNC: 1.38 MG/DL (ref 0.5–1.4)
EKG IMPRESSION: NORMAL
ERYTHROCYTE [DISTWIDTH] IN BLOOD BY AUTOMATED COUNT: 45.4 FL (ref 35.9–50)
GLUCOSE SERPL-MCNC: 112 MG/DL (ref 65–99)
HCT VFR BLD AUTO: 47.2 % (ref 37–47)
HGB BLD-MCNC: 14.9 G/DL (ref 12–16)
INR PPP: 3.09 (ref 0.87–1.13)
MCH RBC QN AUTO: 27.9 PG (ref 27–33)
MCHC RBC AUTO-ENTMCNC: 31.6 G/DL (ref 33.6–35)
MCV RBC AUTO: 88.2 FL (ref 81.4–97.8)
PLATELET # BLD AUTO: 221 K/UL (ref 164–446)
PMV BLD AUTO: 10.2 FL (ref 9–12.9)
POTASSIUM SERPL-SCNC: 4.5 MMOL/L (ref 3.6–5.5)
PROTHROMBIN TIME: 33 SEC (ref 12–14.6)
RBC # BLD AUTO: 5.35 M/UL (ref 4.2–5.4)
SODIUM SERPL-SCNC: 140 MMOL/L (ref 135–145)
WBC # BLD AUTO: 8.3 K/UL (ref 4.8–10.8)

## 2019-07-31 PROCEDURE — 85610 PROTHROMBIN TIME: CPT

## 2019-07-31 PROCEDURE — 80048 BASIC METABOLIC PNL TOTAL CA: CPT

## 2019-07-31 PROCEDURE — 85027 COMPLETE CBC AUTOMATED: CPT

## 2019-07-31 PROCEDURE — 36415 COLL VENOUS BLD VENIPUNCTURE: CPT

## 2019-07-31 PROCEDURE — 93005 ELECTROCARDIOGRAM TRACING: CPT | Performed by: INTERNAL MEDICINE

## 2019-07-31 PROCEDURE — 93010 ELECTROCARDIOGRAM REPORT: CPT | Performed by: INTERNAL MEDICINE

## 2019-08-02 ENCOUNTER — ANESTHESIA (OUTPATIENT)
Dept: CARDIOLOGY | Facility: MEDICAL CENTER | Age: 69
End: 2019-08-02
Payer: MEDICARE

## 2019-08-02 ENCOUNTER — ANESTHESIA EVENT (OUTPATIENT)
Dept: CARDIOLOGY | Facility: MEDICAL CENTER | Age: 69
End: 2019-08-02
Payer: MEDICARE

## 2019-08-02 ENCOUNTER — HOSPITAL ENCOUNTER (OUTPATIENT)
Facility: MEDICAL CENTER | Age: 69
End: 2019-08-02
Attending: INTERNAL MEDICINE | Admitting: INTERNAL MEDICINE
Payer: MEDICARE

## 2019-08-02 VITALS
HEART RATE: 55 BPM | HEIGHT: 61 IN | OXYGEN SATURATION: 95 % | TEMPERATURE: 97.4 F | WEIGHT: 233.69 LBS | BODY MASS INDEX: 44.12 KG/M2 | DIASTOLIC BLOOD PRESSURE: 58 MMHG | RESPIRATION RATE: 18 BRPM | SYSTOLIC BLOOD PRESSURE: 151 MMHG

## 2019-08-02 DIAGNOSIS — Z01.810 PRE-OPERATIVE CARDIOVASCULAR EXAMINATION: ICD-10-CM

## 2019-08-02 DIAGNOSIS — I48.19 PERSISTENT ATRIAL FIBRILLATION (HCC): ICD-10-CM

## 2019-08-02 DIAGNOSIS — Z01.812 PRE-OPERATIVE LABORATORY EXAMINATION: ICD-10-CM

## 2019-08-02 DIAGNOSIS — I48.91 ATRIAL FIBRILLATION, UNSPECIFIED TYPE (HCC): ICD-10-CM

## 2019-08-02 LAB
EKG IMPRESSION: NORMAL
EKG IMPRESSION: NORMAL
INR PPP: 3 (ref 2–3.5)
INR PPP: 3.26 (ref 0.87–1.13)
PROTHROMBIN TIME: 34.5 SEC (ref 12–14.6)

## 2019-08-02 PROCEDURE — 160002 HCHG RECOVERY MINUTES (STAT)

## 2019-08-02 PROCEDURE — 93005 ELECTROCARDIOGRAM TRACING: CPT | Performed by: INTERNAL MEDICINE

## 2019-08-02 PROCEDURE — 93010 ELECTROCARDIOGRAM REPORT: CPT | Mod: 59,76 | Performed by: INTERNAL MEDICINE

## 2019-08-02 PROCEDURE — 700111 HCHG RX REV CODE 636 W/ 250 OVERRIDE (IP)

## 2019-08-02 PROCEDURE — 92960 CARDIOVERSION ELECTRIC EXT: CPT | Performed by: INTERNAL MEDICINE

## 2019-08-02 PROCEDURE — 85610 PROTHROMBIN TIME: CPT

## 2019-08-02 RX ORDER — AMIODARONE HYDROCHLORIDE 200 MG/1
200 TABLET ORAL DAILY
Qty: 90 TAB | Refills: 3 | Status: SHIPPED | OUTPATIENT
Start: 2019-08-02 | End: 2020-07-16

## 2019-08-02 RX ORDER — SODIUM CHLORIDE, SODIUM LACTATE, POTASSIUM CHLORIDE, CALCIUM CHLORIDE 600; 310; 30; 20 MG/100ML; MG/100ML; MG/100ML; MG/100ML
INJECTION, SOLUTION INTRAVENOUS CONTINUOUS
Status: DISCONTINUED | OUTPATIENT
Start: 2019-08-02 | End: 2019-08-02 | Stop reason: HOSPADM

## 2019-08-02 RX ORDER — ONDANSETRON 2 MG/ML
4 INJECTION INTRAMUSCULAR; INTRAVENOUS
Status: DISCONTINUED | OUTPATIENT
Start: 2019-08-02 | End: 2019-08-02 | Stop reason: HOSPADM

## 2019-08-02 NOTE — DISCHARGE INSTRUCTIONS
ACTIVITY: Rest and take it easy for the first 24 hours.  A responsible adult is recommended to remain with you during that time.  It is normal to feel sleepy.  We encourage you to not do anything that requires balance, judgment or coordination.    MILD FLU-LIKE SYMPTOMS ARE NORMAL. YOU MAY EXPERIENCE GENERALIZED MUSCLE ACHES, THROAT IRRITATION, HEADACHE AND/OR SOME NAUSEA.    FOR 24 HOURS DO NOT:  Drive, operate machinery or run household appliances.  Drink beer or alcoholic beverages.   Make important decisions or sign legal documents.    SPECIAL INSTRUCTIONS: see below    DIET: To avoid nausea, slowly advance diet as tolerated, avoiding spicy or greasy foods for the first day.  Add more substantial food to your diet according to your physician's instructions.  Babies can be fed formula or breast milk as soon as they are hungry.  INCREASE FLUIDS AND FIBER TO AVOID CONSTIPATION.    SURGICAL DRESSING/BATHING: see below    FOLLOW-UP APPOINTMENT:  A follow-up appointment should be arranged with your doctor; call to schedule.    You should CALL YOUR PHYSICIAN if you develop:  Fever greater than 101 degrees F.  Pain not relieved by medication, or persistent nausea or vomiting.  Excessive bleeding (blood soaking through dressing) or unexpected drainage from the wound.  Extreme redness or swelling around the incision site, drainage of pus or foul smelling drainage.  Inability to urinate or empty your bladder within 8 hours.  Problems with breathing or chest pain.    You should call 911 if you develop problems with breathing or chest pain.  If you are unable to contact your doctor or surgical center, you should go to the nearest emergency room or urgent care center.  Physician's telephone #: [phone]    If any questions arise, call your doctor.  If your doctor is not available, please feel free to call the Surgical Center at [phone].  The Center is open Monday through Friday from 7AM to 7PM.  You can also call the  HEALTH HOTLINE open 24 hours/day, 7 days/week and speak to a nurse at [phone], or toll free at [phone].    A registered nurse may call you a few days after your surgery to see how you are doing after your procedure.    MEDICATIONS: Resume taking daily medication.  Take prescribed pain medication with food.  If no medication is prescribed, you may take non-aspirin pain medication if needed.  PAIN MEDICATION CAN BE VERY CONSTIPATING.  Take a stool softener or laxative such as senokot, pericolace, or milk of magnesia if needed.    If your physician has prescribed pain medication that includes Acetaminophen (Tylenol), do not take additional Acetaminophen (Tylenol) while taking the prescribed medication.    Depression / Suicide Risk    As you are discharged from this [facility] facility, it is important to learn how to keep safe from harming yourself.    Recognize the warning signs:  · Abrupt changes in personality, positive or negative- including increase in energy   · Giving away possessions  · Change in eating patterns- significant weight changes-  positive or negative  · Change in sleeping patterns- unable to sleep or sleeping all the time   · Unwillingness or inability to communicate  · Depression  · Unusual sadness, discouragement and loneliness  · Talk of wanting to die  · Neglect of personal appearance   · Rebelliousness- reckless behavior  · Withdrawal from people/activities they love  · Confusion- inability to concentrate     If you or a loved one observes any of these behaviors or has concerns about self-harm, here's what you can do:  · Talk about it- your feelings and reasons for harming yourself  · Remove any means that you might use to hurt yourself (examples: pills, rope, extension cords, firearm)  · Get professional help from the community (Mental Health, Substance Abuse, psychological counseling)  · Do not be alone:Call your Safe Contact- someone whom you trust who will be there for  you.  · Call your local CRISIS HOTLINE 508-6949 or 282-272-2636  · Call your local Children's Mobile Crisis Response Team [place] (232) 446-5626 or www.stylemarks  · Call the toll free National Suicide Prevention Hotlines   · National Suicide Prevention Lifeline 724-634-SDNV (7067)  · [employer] Line Network 800-SUICIDE (242-8096)      Electrical Cardioversion  Electrical cardioversion is the delivery of a jolt of electricity to restore a normal rhythm to the heart. A rhythm that is too fast or is not regular keeps the heart from pumping well. In this procedure, sticky patches or metal paddles are placed on the chest to deliver electricity to the heart from a device.  This procedure may be done in an emergency if:  · There is low or no blood pressure as a result of the heart rhythm.  · Normal rhythm must be restored as fast as possible to protect the brain and heart from further damage.  · It may save a life.  This procedure may also be done for irregular or fast heart rhythms that are not immediately life-threatening.  Tell a health care provider about:  · Any allergies you have.  · All medicines you are taking, including vitamins, herbs, eye drops, creams, and over-the-counter medicines.  · Any problems you or family members have had with anesthetic medicines.  · Any blood disorders you have.  · Any surgeries you have had.  · Any medical conditions you have.  · Whether you are pregnant or may be pregnant.  What are the risks?  Generally, this is a safe procedure. However, problems may occur, including:  · Allergic reactions to medicines.  · A blood clot that breaks free and travels to other parts of your body.  · The possible return of an abnormal heart rhythm within hours or days after the procedure.  · Your heart stopping (cardiac arrest ). This is rare.  What happens before the procedure?  Medicines  · Your health care provider may have you start taking:  ¨ Blood-thinning medicines  (anticoagulants) so your blood does not clot as easily.  ¨ Medicines may be given to help stabilize your heart rate and rhythm.  · Ask your health care provider about changing or stopping your regular medicines. This is especially important if you are taking diabetes medicines or blood thinners.  General instructions  · Plan to have someone take you home from the hospital or clinic.  · If you will be going home right after the procedure, plan to have someone with you for 24 hours.  · Follow instructions from your health care provider about eating or drinking restrictions.  What happens during the procedure?  · To lower your risk of infection:  ¨ Your health care team will wash or sanitize their hands.  ¨ Your skin will be washed with soap.  · An IV tube will be inserted into one of your veins.  · You will be given a medicine to help you relax (sedative).  · Sticky patches (electrodes) or metal paddles may be placed on your chest.  · An electrical shock will be delivered.  The procedure may vary among health care providers and hospitals.  What happens after the procedure?  · Your blood pressure, heart rate, breathing rate, and blood oxygen level will be monitored until the medicines you were given have worn off.  · Do not drive for 24 hours if you were given a sedative.  · Your heart rhythm will be watched to make sure it does not change.  This information is not intended to replace advice given to you by your health care provider. Make sure you discuss any questions you have with your health care provider.  Document Released: 12/08/2003 Document Revised: 08/16/2017 Document Reviewed: 06/23/2017  [employer] Interactive Patient Education © 2017 [employer]

## 2019-08-02 NOTE — PROCEDURES
Procedure performed: External DC Cardioversion    Assistant: None    Anesthesia: Per anesthesia services    Indication: Atrial fibrillation    Preprocedural Diagnosis:   Atrial Fibrillation  Postprocedural Diagnosis: Sinus Rhythm    Description of procedure:    Patient was brought to the pre/post procedure area of the cath lab. Informed consent was obtained. Defibrillator pads were placed in the anterior and posterior position.  Adequate sedation was obtained with assistance of anesthesia.  Patient was successfully cardioverted with 200 J synchronized biphasic energy into sinus rhythm. she was monitored in the recovery area until criteria met and will be discharged home.    Conclusion: Successful DC cardioversion    EBL: None    Complications: None      Electronically signed:   Caleb Hawkins  Interventional Cardiologist [facility]

## 2019-08-02 NOTE — OR NURSING
1240: Patient arrived from echo room s/p cardioversion with anesthesiologist and RN. Patient is alert and awake. Updated on POC.    1310: Patient tolerating PO intake.    1340: Criteria met to discharge patient.    1355: Discharge paperwork reviewed with patient and brother. Discussed activity, site care, worsening symptoms, and follow-up. Verbalized understanding. No further questions. PIV removed, tip intact.    1420: Patient escorted out in wheelchair with RN. Discharge instructions and personal belongings in possession of the patient. Copy of discharge instructions signed and placed in the chart. Patient discharged to home with brother.

## 2019-08-02 NOTE — ANESTHESIA PREPROCEDURE EVALUATION
Relevant Problems   ANESTHESIA   (+) HILDA (obstructive sleep apnea)      PULMONARY   (+) Pneumonia due to organism      NEURO   (+) TIA (transient ischemic attack)   (+) Transient cerebral ischemia      CARDIAC   (+) Atrial fibrillation (HCC) [I48.91]   (+) CAD (coronary artery disease)   (+) HTN (hypertension)   (+) Migraine   (+) TIA (transient ischemic attack)   (+) Transient cerebral ischemia      GI   (+) GERD (gastroesophageal reflux disease)      ENDO   (+) Hypothyroidism      Other   (+) Arthritis   (+) Chest pain at rest   (+) Dyslipidemia   (+) Hyperlipidemia   (+) Neuropathy in diabetes (HCC)   (+) Palpitations   (+) S/P aortic valve replacement   (+) Tricuspid regurgitation mild- mod       Physical Exam    Airway   Mallampati: II  Neck ROM: limited       Cardiovascular   Rhythm: irregular  Rate: normal     Dental - normal exam         Pulmonary - normal exam     Abdominal   (+) obese     Neurological              Anesthesia Plan    ASA 3   ASA physical status 3 criteria: hypertension - poorly controlled, respiratory insufficiency or compromise, CVA or TIA - history (> 3 months), morbid obesity - BMI greater than or equal to 40 and COPD    Plan - general             Induction: intravenous          Informed Consent:    Anesthetic plan and risks discussed with patient.

## 2019-08-05 ENCOUNTER — ANTICOAGULATION MONITORING (OUTPATIENT)
Dept: VASCULAR LAB | Facility: MEDICAL CENTER | Age: 69
End: 2019-08-05

## 2019-08-05 DIAGNOSIS — I48.91 ATRIAL FIBRILLATION, UNSPECIFIED TYPE (HCC): ICD-10-CM

## 2019-08-05 DIAGNOSIS — Z79.01 CHRONIC ANTICOAGULATION: ICD-10-CM

## 2019-08-05 DIAGNOSIS — Z95.2 HX OF MECHANICAL AORTIC VALVE REPLACEMENT: ICD-10-CM

## 2019-08-05 DIAGNOSIS — G45.9 TIA (TRANSIENT ISCHEMIC ATTACK): ICD-10-CM

## 2019-08-05 NOTE — PROGRESS NOTES
Anticoagulation Summary  As of 8/5/2019    INR goal:   2.5-3.5   TTR:   60.4 % (2.4 y)   INR used for dosing:   3.26 (8/2/2019)   Warfarin maintenance plan:   8 mg (4 mg x 2) every Sun, Tue, Thu; 4 mg (4 mg x 1) all other days   Weekly warfarin total:   40 mg   No change documented:   Raheem JONES'Rayelías, Med Ass't   Plan last modified:   ELI GarnerPMARRY (7/18/2019)   Next INR check:   8/9/2019   Target end date:   Indefinite    Indications    Chronic anticoagulation [Z79.01]  Hx of mechanical aortic valve replacement [V43.3] [Z95.2]  TIA (transient ischemic attack) [G45.9]  Atrial fibrillation (HCC) [I48.91] [I48.91]             Anticoagulation Episode Summary     INR check location:       Preferred lab:       Send INR reminders to:       Comments:         Anticoagulation Care Providers     Provider Role Specialty Phone number    Michael J Bloch, M.D. Referring Internal Medicine [phone]    Yinka Church, PharmD Responsible          Anticoagulation Patient Findings  Patient Findings     Negatives:   Signs/symptoms of thrombosis, Signs/symptoms of bleeding, Laboratory test error suspected, Change in health, Change in alcohol use, Change in activity, Upcoming invasive procedure, Emergency department visit, Upcoming dental procedure, Missed doses, Extra doses, Change in medications, Change in diet/appetite, Hospital admission, Bruising, Other complaints        Left voicemail message to report therapeutic INR of 3.26.  Patient to continue with current warfarin dosing regimen. Requested pt contact the clinic for any change to diet or medication, and to report any signs or symptoms of bleeding, bruising or clotting.  Pt to follow up in 1 week.    Raheem JONES'Rayelías, Med Ass't     I have reviewed and concur with the above plan on 08/05/2019.  Katelyn Perez, Clinical Pharmacist, CDE, CACP

## 2019-08-12 ENCOUNTER — ANTICOAGULATION VISIT (OUTPATIENT)
Dept: VASCULAR LAB | Facility: MEDICAL CENTER | Age: 69
End: 2019-08-12
Attending: INTERNAL MEDICINE
Payer: MEDICARE

## 2019-08-12 ENCOUNTER — TELEPHONE (OUTPATIENT)
Dept: CARDIOLOGY | Facility: MEDICAL CENTER | Age: 69
End: 2019-08-12

## 2019-08-12 VITALS — SYSTOLIC BLOOD PRESSURE: 125 MMHG | DIASTOLIC BLOOD PRESSURE: 58 MMHG | HEART RATE: 46 BPM

## 2019-08-12 DIAGNOSIS — G45.9 TIA (TRANSIENT ISCHEMIC ATTACK): ICD-10-CM

## 2019-08-12 DIAGNOSIS — Z95.2 HX OF MECHANICAL AORTIC VALVE REPLACEMENT: ICD-10-CM

## 2019-08-12 DIAGNOSIS — I48.91 ATRIAL FIBRILLATION, UNSPECIFIED TYPE (HCC): ICD-10-CM

## 2019-08-12 DIAGNOSIS — Z79.01 CHRONIC ANTICOAGULATION: ICD-10-CM

## 2019-08-12 LAB
INR BLD: 2.8 (ref 0.9–1.2)
INR PPP: 2.8 (ref 2–3.5)

## 2019-08-12 PROCEDURE — 85610 PROTHROMBIN TIME: CPT

## 2019-08-12 PROCEDURE — 99211 OFF/OP EST MAY X REQ PHY/QHP: CPT

## 2019-08-12 NOTE — TELEPHONE ENCOUNTER
Received pt's BP and HR log via [employer] and forwarded to Dr. Hawkins for review and pending recommendations. Pt notified via [employer].

## 2019-08-12 NOTE — TELEPHONE ENCOUNTER
"Spoke with pt via phone. Pt stated when she went to the Coumadin clinic today, her /58 HR46. Pt stated she feels SOB however no more than before she started taking her Amiodarone, she states, \"It's actually less than before.\" Pt denies other symptoms (ie. Light-headedness, dizziness). Pt reports able to perform day-to-day functions. Pt stated that prior to getting her BP and HR checked at the coumadin clinic, she had taken her metoprolol this morning. Educated pt on instructions for taking her BP, advised to take her BP at the same time daily, at least 2 hours after she wakes up, and to rest 5-10 minutes before checking. Pt verbalized understanding. Pt has a 2-week log of her BP and HR that she will send via [employer] for review, pending recommendations from Dr. Hawkins.      "

## 2019-08-12 NOTE — PROGRESS NOTES
Anticoagulation Summary  As of 2019    INR goal:   2.5-3.5   TTR:   60.8 % (2.5 y)   INR used for dosin.80 (2019)   Warfarin maintenance plan:   8 mg (4 mg x 2) every Sun, Tue, u; 4 mg (4 mg x 1) all other days   Weekly warfarin total:   40 mg   Plan last modified:   ELI GarnerPMARRY (2019)   Next INR check:   2019   Target end date:   Indefinite    Indications    Chronic anticoagulation [Z79.01]  Hx of mechanical aortic valve replacement [V43.3] [Z95.2]  TIA (transient ischemic attack) [G45.9]  Atrial fibrillation (HCC) [I48.91] [I48.91]             Anticoagulation Episode Summary     INR check location:       Preferred lab:       Send INR reminders to:       Comments:         Anticoagulation Care Providers     Provider Role Specialty Phone number    Michael J Bloch, M.D. Referring Internal Medicine [phone]    Yinka Church, PharmD Responsible          Anticoagulation Patient Findings        History of Present Illness: follow up appointment for chronic anticoagulation with the high risk medication, warfarin for mechanical aortic valve, TIA    Pt remains therapeutic today. Continue current dosing regimen.  Follow up in 4 weeks, to reduce the risk of adverse events related to this high risk medication, warfarin.    Katelyn Perez, Clinical Pharmacist

## 2019-08-16 NOTE — TELEPHONE ENCOUNTER
Test Result Question     You  Caleb Beasleyis Bird 18 hours ago (3:06 PM)         [address],     Thank you for your patience while we waited for Dr. Hawkins's reply.   He reviewed your BP and HR log and isn't advising any changes to your medications at this time.   However, if you are feeling symptomatic (tiredness, dizzyness, light-headed, etc.) please let us know right away.     Thanks,     July ORO/MENDOZA Beltrán R.N. 20 hours ago (12:37 PM)      Make sure she is taking 200 daily of amiodarone.   Decrease metoprolol to 12.5 BID.       Called pt via phone due to her having not read her [employer] message. Pt c/o at times feeling tired and SOB with exertion, denied worsening symptoms. Advised pt on [place] medication recommendations, no change at this time, pt verbalized understanding.    Pt stated that she saw her PCP recently. Per PCP would like copy of her recent OV notes. Faxed copy of most recent OV notes to Dr. Jones's office at [phone].

## 2019-09-09 ENCOUNTER — ANTICOAGULATION VISIT (OUTPATIENT)
Dept: VASCULAR LAB | Facility: MEDICAL CENTER | Age: 69
End: 2019-09-09
Attending: INTERNAL MEDICINE
Payer: MEDICARE

## 2019-09-09 VITALS — HEART RATE: 42 BPM | DIASTOLIC BLOOD PRESSURE: 49 MMHG | SYSTOLIC BLOOD PRESSURE: 103 MMHG

## 2019-09-09 DIAGNOSIS — Z79.01 CHRONIC ANTICOAGULATION: ICD-10-CM

## 2019-09-09 DIAGNOSIS — I48.91 ATRIAL FIBRILLATION, UNSPECIFIED TYPE (HCC): ICD-10-CM

## 2019-09-09 DIAGNOSIS — Z95.2 HX OF MECHANICAL AORTIC VALVE REPLACEMENT: ICD-10-CM

## 2019-09-09 DIAGNOSIS — G45.9 TIA (TRANSIENT ISCHEMIC ATTACK): ICD-10-CM

## 2019-09-09 LAB
INR BLD: 3 (ref 0.9–1.2)
INR PPP: 3 (ref 2–3.5)

## 2019-09-09 PROCEDURE — 85610 PROTHROMBIN TIME: CPT

## 2019-09-09 PROCEDURE — 99211 OFF/OP EST MAY X REQ PHY/QHP: CPT

## 2019-09-09 NOTE — PROGRESS NOTES
Anticoagulation Summary  As of 9/9/2019    INR goal:   2.5-3.5   TTR:   62.0 % (2.5 y)   INR used for dosing:   3.00 (9/9/2019)   Warfarin maintenance plan:   8 mg (4 mg x 2) every Sun, Tue, Thu; 4 mg (4 mg x 1) all other days   Weekly warfarin total:   40 mg   Plan last modified:   ELI GarnerPMARRY (7/18/2019)   Next INR check:   10/7/2019   Target end date:   Indefinite    Indications    Chronic anticoagulation [Z79.01]  Hx of mechanical aortic valve replacement [V43.3] [Z95.2]  TIA (transient ischemic attack) [G45.9]  Atrial fibrillation (HCC) [I48.91] [I48.91]             Anticoagulation Episode Summary     INR check location:       Preferred lab:       Send INR reminders to:       Comments:         Anticoagulation Care Providers     Provider Role Specialty Phone number    Michael J Bloch, M.D. Referring Internal Medicine [phone]    Yinka Church, PharmD Responsible          Anticoagulation Patient Findings    HPI:  Yenifer Beasley, on anticoagulation therapy with warfarin for mechanical aortic valve replacement, atrial fibrillation, and hx of TIA.  Changes to current medical/health status since last appt: none  Denies signs/symptoms of bleeding and/or thrombosis since the last appt.    Denies any interval changes to diet  Denies any interval changes to medications since last appt.   Denies any complications or cost restrictions with current therapy.   Vitals recorded in chart.    A/P   INR is therapeutic at 3.0  Pt to continue current warfarin therapy regimen.   Pt to contact clinic for any s/s of unusual bleeding, bruising, clotting, or any changes to diet or medication.     Next INR in 4 week(s).    Tricia Sadler, PharmD

## 2019-09-16 ENCOUNTER — ANTICOAGULATION MONITORING (OUTPATIENT)
Dept: VASCULAR LAB | Facility: MEDICAL CENTER | Age: 69
End: 2019-09-16

## 2019-09-16 DIAGNOSIS — I48.91 ATRIAL FIBRILLATION, UNSPECIFIED TYPE (HCC): ICD-10-CM

## 2019-09-16 DIAGNOSIS — Z95.2 HX OF MECHANICAL AORTIC VALVE REPLACEMENT: ICD-10-CM

## 2019-09-16 DIAGNOSIS — Z79.01 CHRONIC ANTICOAGULATION: ICD-10-CM

## 2019-09-16 DIAGNOSIS — G45.9 TIA (TRANSIENT ISCHEMIC ATTACK): ICD-10-CM

## 2019-10-07 ENCOUNTER — APPOINTMENT (OUTPATIENT)
Dept: VASCULAR LAB | Facility: MEDICAL CENTER | Age: 69
End: 2019-10-07
Attending: INTERNAL MEDICINE
Payer: MEDICARE

## 2019-10-08 ENCOUNTER — OFFICE VISIT (OUTPATIENT)
Dept: VASCULAR LAB | Facility: MEDICAL CENTER | Age: 69
End: 2019-10-08
Attending: INTERNAL MEDICINE
Payer: MEDICARE

## 2019-10-08 VITALS
BODY MASS INDEX: 44.18 KG/M2 | HEIGHT: 61 IN | SYSTOLIC BLOOD PRESSURE: 122 MMHG | WEIGHT: 234 LBS | HEART RATE: 48 BPM | DIASTOLIC BLOOD PRESSURE: 46 MMHG

## 2019-10-08 DIAGNOSIS — Z95.2 HX OF MECHANICAL AORTIC VALVE REPLACEMENT: ICD-10-CM

## 2019-10-08 DIAGNOSIS — E78.5 HYPERLIPIDEMIA, UNSPECIFIED HYPERLIPIDEMIA TYPE: ICD-10-CM

## 2019-10-08 DIAGNOSIS — R73.01 IMPAIRED FASTING GLUCOSE: ICD-10-CM

## 2019-10-08 DIAGNOSIS — Z79.01 CHRONIC ANTICOAGULATION: ICD-10-CM

## 2019-10-08 DIAGNOSIS — I25.811 CORONARY ARTERY DISEASE INVOLVING NATIVE ARTERY OF TRANSPLANTED HEART WITHOUT ANGINA PECTORIS: ICD-10-CM

## 2019-10-08 DIAGNOSIS — I48.91 ATRIAL FIBRILLATION, UNSPECIFIED TYPE (HCC): ICD-10-CM

## 2019-10-08 DIAGNOSIS — G45.9 TIA (TRANSIENT ISCHEMIC ATTACK): ICD-10-CM

## 2019-10-08 LAB — INR PPP: 5 (ref 2–3.5)

## 2019-10-08 PROCEDURE — 99214 OFFICE O/P EST MOD 30 MIN: CPT | Performed by: NURSE PRACTITIONER

## 2019-10-08 PROCEDURE — 85610 PROTHROMBIN TIME: CPT | Performed by: NURSE PRACTITIONER

## 2019-10-08 PROCEDURE — 99212 OFFICE O/P EST SF 10 MIN: CPT | Performed by: NURSE PRACTITIONER

## 2019-10-08 ASSESSMENT — ENCOUNTER SYMPTOMS
COUGH: 0
PALPITATIONS: 0
MYALGIAS: 0
BLOOD IN STOOL: 0
SHORTNESS OF BREATH: 0
NERVOUS/ANXIOUS: 0
BRUISES/BLEEDS EASILY: 0
HEADACHES: 0
CLAUDICATION: 0

## 2019-10-08 NOTE — PROGRESS NOTES
Anticoagulation Summary  As of 10/8/2019    INR goal:   2.5-3.5   TTR:   60.9 % (2.6 y)   INR used for dosin.00! (10/8/2019)   Warfarin maintenance plan:   8 mg (4 mg x 2) every Sun, Tue, u; 4 mg (4 mg x 1) all other days   Weekly warfarin total:   40 mg   Plan last modified:   STEPH Garner (2019)   Next INR check:   10/15/2019   Target end date:   Indefinite    Indications    Chronic anticoagulation [Z79.01]  Hx of mechanical aortic valve replacement [V43.3] [Z95.2]  TIA (transient ischemic attack) [G45.9]  Atrial fibrillation (HCC) [I48.91] [I48.91]             Anticoagulation Episode Summary     INR check location:       Preferred lab:       Send INR reminders to:       Comments:         Anticoagulation Care Providers     Provider Role Specialty Phone number    Michael J Bloch, M.D. Referring Internal Medicine [phone]    Yinka Church, PharmD Responsible          Anticoagulation Patient Findings    Pt is supratherapeutic today. Denies any changes in medications or diet. Med rec completed and reviewed. Patient denies any s/sx of bleeding or clotting. Will hold tonight's dose then continue dosing as outlined in calendar. Will follow-up with pt in 1 week.    Lisbeth PHILIP  [facility]

## 2019-10-08 NOTE — PROGRESS NOTES
[facility] Follow Up Visit    Date of Service: 10/08/19    Yenifer Beasley is here for follow up of dyslipidemia, HTN, chronic anticoagulation, and hypothyroidism    HPI  Pertinent Interval History since last visit:   Had a cardioversion for her a-fib in August  Continues to have SOB, mostly on exertion  Now on amio  Has f/u with cards in next week  On Praluent, no current problems  No home BP readings  On warfarin, no bleeding problems  Denies CP   Minor LE swelling  No new lipid panel    Current Prescription Lipid Lowering Medications - including dose:   Statin: None  Non-Statin: Continue Praluent 150 mg 2x per week  Continue Zetia  Current Lipid Lowering and Related Supplements:   Omega 3s, 1000u daily  Vit D  CoQ10  Any Current Side Effects Potentially Related to Lipid Lowering therapy?   No  Current Adherence to Lipid Lowering Therapies:  Complete  Any Previous History of Statin Intolerance?   Yes, Details: severe myalgias on multiple statins  Baseline Lipids Prior to Treatment:  Shown here:  LDL-C: 170      SOCIAL HISTORY  Social History     Tobacco Use   Smoking Status Never Smoker   Smokeless Tobacco Never Used      Change in weight: down about 5lbs  Exercise habits: mod exercise  Diet: common adult, consistent vit K    Review of Systems   Constitutional: Negative for malaise/fatigue.   Respiratory: Negative for cough and shortness of breath.    Cardiovascular: Positive for leg swelling. Negative for chest pain, palpitations and claudication.   Gastrointestinal: Negative for blood in stool and melena.   Genitourinary: Negative for hematuria.   Musculoskeletal: Positive for joint pain. Negative for myalgias.   Neurological: Negative for headaches.   Endo/Heme/Allergies: Does not bruise/bleed easily.   Psychiatric/Behavioral: The patient is not nervous/anxious.      Physical Exam   Constitutional: She is oriented to person, place, and time. She appears well-developed and well-nourished. No  distress.   Cardiovascular: Normal rate, regular rhythm and intact distal pulses.   Murmur heard.  Pulmonary/Chest: Effort normal and breath sounds normal. No respiratory distress. She has no wheezes.   Musculoskeletal: Normal range of motion. She exhibits edema.   Tr edema bilat   Neurological: She is alert and oriented to person, place, and time.   Skin: Skin is warm and dry. She is not diaphoretic. No erythema.   Psychiatric: She has a normal mood and affect. Her behavior is normal.   Vitals reviewed.    DATA REVIEW  Most Recent Lipid Panel:   Lab Results   Component Value Date    CHOLSTRLTOT 125 03/29/2019    CHOLSTRLTOT 161 05/07/2018    TRIGLYCERIDE 190 (H) 03/29/2019    TRIGLYCERIDE 268 (H) 05/07/2018    HDL 50 03/29/2019    HDL 48 05/07/2018    LDL 59 05/07/2018     Other Pertinent Blood Work:   Lab Results   Component Value Date    SODIUM 140 07/31/2019    POTASSIUM 4.5 07/31/2019    CHLORIDE 107 07/31/2019    CO2 22 07/31/2019    ANION 11.0 07/31/2019    GLUCOSE 112 (H) 07/31/2019    BUN 30 (H) 07/31/2019    CREATININE 1.38 07/31/2019    CALCIUM 8.6 07/31/2019    ASTSGOT 18 03/29/2019    ALTSGPT 12 03/29/2019    ALKPHOSPHAT 49 03/29/2019    TBILIRUBIN 0.5 03/29/2019    ALBUMIN 4.2 03/29/2019    AGRATIO 1.6 03/29/2019    CREACTPROT 0.80 (H) 12/20/2018    LIPOPROTA 57 (H) 10/12/2017    TSHULTRASEN 1.130 05/07/2018     Other:  Lp(a) - 57 (oct 2017)  TSH 1.5  (oct 2017)    Recent Imaging Studies:      Echo march 2017:  1. Left ventricular ejection fraction is visually estimated to be 55%.   Normal regional wall motion. Grade I diastolic dysfunction.  2. Known mechanical aortic valve that is functioning normally.   3. Mild mitral stenosis.  4. Estimated right ventricular systolic pressure is 40 mmHg.    Echo 4/4/19  CONCLUSIONS  Normal left ventricular systolic function.  Left ventricular ejection fraction is visually estimated to be 65-70%.  Mild concentric left ventricular hypertrophy.  Mild mitral  stenosis.  Severe mitral annular calcification.  Mild mitral regurgitation.  Bioprosthetic aortic valve with increased velocities.  Right heart pressures are consistent with moderate pulmonary   hypertension.  Severely dilated left atrium.  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  LDL/nonHDL above goal due to stopping PCSK9i  03/29/19: NonHDL-75, LDL-37  Need updated labs.  Lifestyle Recommendations From Today’s Visit:    Diet - low simple carb like [place]; limiting sugars  Exercise - daily home exercise for 15-20 min  Non-Statin Medications Recommendations from Today’s Visit:   - Restart Praluent 150mg   - Continue zetia 10 mg daily  - Consider addition of fibrate if nonHDL >130 in future  Indication for PCSK9 Inhibitor, if applicable:  ASCVD with suboptimal control of LDL-C despite maximally tolerated statin - continue praluent 150 mg 2x per month  Supplements Recommended at this visit:  - Continue omega 3's at 3,000u/day, vit D and coq10  Recommendations for Other Cardiovascular Risk Factors, toby all that apply:   Hypertension- appears well controlled. Continue current rx   IFG -relatively stable.   Continue TLC  Anticoagulation - continue warfarin and f/u in anticoag clinic  Other issues  - Hypothyroidism - seems under reasonable control per TSH; continue current thyroid replection; otherwise defer management to PCP  - Skin infection- pt has had cellulitis in foot; no open wounds; rec continued follow up with PCP.   - SOB- usually occurs with exertion. Continue work-up with cardiology regarding etiology.      Studies Ordered at Todays Visit:   Echo- due March 2020   Blood Work Ordered At Today’s visit:   Lipid, CMP  Follow-Up:   1) Anticoagulation clinic as scheduled  2) Vascular care 6 months or sooner if needed  3) F/u with cardiology     STEPH Anderson    CC:  Marion Jones M.D.

## 2019-10-10 ENCOUNTER — ANTICOAGULATION MONITORING (OUTPATIENT)
Dept: VASCULAR LAB | Facility: MEDICAL CENTER | Age: 69
End: 2019-10-10

## 2019-10-10 DIAGNOSIS — Z79.01 CHRONIC ANTICOAGULATION: ICD-10-CM

## 2019-10-10 DIAGNOSIS — G45.9 TIA (TRANSIENT ISCHEMIC ATTACK): ICD-10-CM

## 2019-10-10 DIAGNOSIS — Z95.2 HX OF MECHANICAL AORTIC VALVE REPLACEMENT: ICD-10-CM

## 2019-10-14 ENCOUNTER — ANTICOAGULATION MONITORING (OUTPATIENT)
Dept: VASCULAR LAB | Facility: MEDICAL CENTER | Age: 69
End: 2019-10-14

## 2019-10-14 ENCOUNTER — HOSPITAL ENCOUNTER (OUTPATIENT)
Dept: LAB | Facility: MEDICAL CENTER | Age: 69
End: 2019-10-14
Attending: NURSE PRACTITIONER
Payer: MEDICARE

## 2019-10-14 DIAGNOSIS — I48.91 ATRIAL FIBRILLATION, UNSPECIFIED TYPE (HCC): ICD-10-CM

## 2019-10-14 DIAGNOSIS — Z95.2 HX OF MECHANICAL AORTIC VALVE REPLACEMENT: ICD-10-CM

## 2019-10-14 DIAGNOSIS — G45.9 TIA (TRANSIENT ISCHEMIC ATTACK): ICD-10-CM

## 2019-10-14 DIAGNOSIS — I25.811 CORONARY ARTERY DISEASE INVOLVING NATIVE ARTERY OF TRANSPLANTED HEART WITHOUT ANGINA PECTORIS: ICD-10-CM

## 2019-10-14 DIAGNOSIS — R73.01 IMPAIRED FASTING GLUCOSE: ICD-10-CM

## 2019-10-14 DIAGNOSIS — E78.5 HYPERLIPIDEMIA, UNSPECIFIED HYPERLIPIDEMIA TYPE: ICD-10-CM

## 2019-10-14 DIAGNOSIS — Z79.01 CHRONIC ANTICOAGULATION: ICD-10-CM

## 2019-10-14 LAB
ALBUMIN SERPL BCP-MCNC: 3.8 G/DL (ref 3.2–4.9)
ALBUMIN/GLOB SERPL: 1.5 G/DL
ALP SERPL-CCNC: 50 U/L (ref 30–99)
ALT SERPL-CCNC: 9 U/L (ref 2–50)
ANION GAP SERPL CALC-SCNC: 8 MMOL/L (ref 0–11.9)
AST SERPL-CCNC: 17 U/L (ref 12–45)
BILIRUB SERPL-MCNC: 0.5 MG/DL (ref 0.1–1.5)
BUN SERPL-MCNC: 26 MG/DL (ref 8–22)
CALCIUM SERPL-MCNC: 9.2 MG/DL (ref 8.5–10.5)
CHLORIDE SERPL-SCNC: 107 MMOL/L (ref 96–112)
CHOLEST SERPL-MCNC: 145 MG/DL (ref 100–199)
CO2 SERPL-SCNC: 26 MMOL/L (ref 20–33)
CREAT SERPL-MCNC: 1.31 MG/DL (ref 0.5–1.4)
CREAT UR-MCNC: 65.3 MG/DL
GLOBULIN SER CALC-MCNC: 2.6 G/DL (ref 1.9–3.5)
GLUCOSE SERPL-MCNC: 96 MG/DL (ref 65–99)
HDLC SERPL-MCNC: 55 MG/DL
INR PPP: 2.81 (ref 0.87–1.13)
LDLC SERPL CALC-MCNC: 44 MG/DL
MICROALBUMIN UR-MCNC: <0.7 MG/DL
MICROALBUMIN/CREAT UR: NORMAL MG/G (ref 0–30)
NT-PROBNP SERPL IA-MCNC: 904 PG/ML (ref 0–125)
POTASSIUM SERPL-SCNC: 4.7 MMOL/L (ref 3.6–5.5)
PROT SERPL-MCNC: 6.4 G/DL (ref 6–8.2)
PROTHROMBIN TIME: 30.6 SEC (ref 12–14.6)
SODIUM SERPL-SCNC: 141 MMOL/L (ref 135–145)
TRIGL SERPL-MCNC: 229 MG/DL (ref 0–149)

## 2019-10-14 PROCEDURE — 36415 COLL VENOUS BLD VENIPUNCTURE: CPT

## 2019-10-14 PROCEDURE — 85610 PROTHROMBIN TIME: CPT

## 2019-10-14 PROCEDURE — 82570 ASSAY OF URINE CREATININE: CPT

## 2019-10-14 PROCEDURE — 83880 ASSAY OF NATRIURETIC PEPTIDE: CPT

## 2019-10-14 PROCEDURE — 80061 LIPID PANEL: CPT

## 2019-10-14 PROCEDURE — 83036 HEMOGLOBIN GLYCOSYLATED A1C: CPT

## 2019-10-14 PROCEDURE — 80053 COMPREHEN METABOLIC PANEL: CPT

## 2019-10-14 PROCEDURE — 82043 UR ALBUMIN QUANTITATIVE: CPT

## 2019-10-14 NOTE — PROGRESS NOTES
Anticoagulation Summary  As of 10/14/2019    INR goal:   2.5-3.5   TTR:   60.7 % (2.6 y)   INR used for dosin.81 (10/14/2019)   Warfarin maintenance plan:   8 mg (4 mg x 2) every Sun, Tue, Thu; 4 mg (4 mg x 1) all other days   Weekly warfarin total:   40 mg   No change documented:   Raheem Miles, Med Ass't   Plan last modified:   STEPH Garner (2019)   Next INR check:   2019   Target end date:   Indefinite    Indications    Chronic anticoagulation [Z79.01]  Hx of mechanical aortic valve replacement [V43.3] [Z95.2]  TIA (transient ischemic attack) [G45.9]  Atrial fibrillation (HCC) [I48.91] [I48.91]             Anticoagulation Episode Summary     INR check location:       Preferred lab:       Send INR reminders to:       Comments:         Anticoagulation Care Providers     Provider Role Specialty Phone number    Michael J Bloch, M.D. Referring Internal Medicine [phone]    Yinka Church, PharmD Responsible          Anticoagulation Patient Findings  Patient Findings     Negatives:   Signs/symptoms of thrombosis, Signs/symptoms of bleeding, Laboratory test error suspected, Change in health, Change in alcohol use, Change in activity, Upcoming invasive procedure, Emergency department visit, Upcoming dental procedure, Missed doses, Extra doses, Change in medications, Change in diet/appetite, Hospital admission, Bruising, Other complaints        Spoke with patient to report a therapeutic INR.  Pt instructed to continue with current warfarin dosing regimen. Pt denies any s/s of bleeding, bruising, clotting or any changes to diet or medication.  Will follow up in 4 weeks.    Raheem Miles, Med Ass't    I have reviewed and am in agreement with the above stated plan on 10-14-19.  Yinka Church, PharmYADIRA, BCACP

## 2019-10-15 ENCOUNTER — APPOINTMENT (OUTPATIENT)
Dept: VASCULAR LAB | Facility: MEDICAL CENTER | Age: 69
End: 2019-10-15
Payer: MEDICARE

## 2019-10-16 ENCOUNTER — ANTICOAGULATION MONITORING (OUTPATIENT)
Dept: VASCULAR LAB | Facility: MEDICAL CENTER | Age: 69
End: 2019-10-16

## 2019-10-16 ENCOUNTER — TELEPHONE (OUTPATIENT)
Dept: VASCULAR LAB | Facility: MEDICAL CENTER | Age: 69
End: 2019-10-16

## 2019-10-16 DIAGNOSIS — I48.91 ATRIAL FIBRILLATION, UNSPECIFIED TYPE (HCC): ICD-10-CM

## 2019-10-16 DIAGNOSIS — G45.9 TIA (TRANSIENT ISCHEMIC ATTACK): ICD-10-CM

## 2019-10-16 DIAGNOSIS — Z79.01 CHRONIC ANTICOAGULATION: ICD-10-CM

## 2019-10-16 DIAGNOSIS — Z95.2 HX OF MECHANICAL AORTIC VALVE REPLACEMENT: ICD-10-CM

## 2019-10-16 LAB
EST. AVERAGE GLUCOSE BLD GHB EST-MCNC: 126 MG/DL
HBA1C MFR BLD: 6 % (ref 0–5.6)

## 2019-10-17 ENCOUNTER — OFFICE VISIT (OUTPATIENT)
Dept: CARDIOLOGY | Facility: MEDICAL CENTER | Age: 69
End: 2019-10-17
Payer: MEDICARE

## 2019-10-17 VITALS
BODY MASS INDEX: 44.72 KG/M2 | HEART RATE: 52 BPM | WEIGHT: 236.88 LBS | SYSTOLIC BLOOD PRESSURE: 128 MMHG | HEIGHT: 61 IN | DIASTOLIC BLOOD PRESSURE: 74 MMHG | OXYGEN SATURATION: 97 %

## 2019-10-17 DIAGNOSIS — R07.89 CHEST PRESSURE: ICD-10-CM

## 2019-10-17 DIAGNOSIS — E66.9 CLASS 1 OBESITY WITHOUT SERIOUS COMORBIDITY WITH BODY MASS INDEX (BMI) OF 31.0 TO 31.9 IN ADULT, UNSPECIFIED OBESITY TYPE: ICD-10-CM

## 2019-10-17 PROBLEM — J45.909 ASTHMA: Status: ACTIVE | Noted: 2019-10-17

## 2019-10-17 LAB — EKG IMPRESSION: NORMAL

## 2019-10-17 PROCEDURE — 93000 ELECTROCARDIOGRAM COMPLETE: CPT | Performed by: INTERNAL MEDICINE

## 2019-10-17 PROCEDURE — 99214 OFFICE O/P EST MOD 30 MIN: CPT | Performed by: INTERNAL MEDICINE

## 2019-10-17 NOTE — PROGRESS NOTES
"      Cardiology Follow-up Consultation Note    Date of note:    10/17/2019    Primary Care Provider: Marion Jones M.D.    Name:             Yenifer Beasley   YOB: 1950  MRN:               [id number]    CC: Follow-up mechanical aortic valve replacement, shortness of breath    Patient ID/HPI:   68-year-old female patient with history of mechanical aortic valve replacement, obesity, hypertension, hyperlipidemia, mild mitral stenosis, mild pulmonary hypertension, atrial fibrillation status post cardioversion here for follow-up.  Since last evaluated by me her shortness of breath is improved but completely did not go away.  She still has shortness of breath with more than usual activity, not enough to interfere with her daily living her daily activities but noticeable with exertion.  He denies chest pain, compliant with medications.    ROS  Arthritis, joint pains.  All other systems reviewed and negative.    Past Medical History:   Diagnosis Date   • Arthritis     OA OF HANDS, KNEESM, HIPS AND BACK   • CAD (coronary artery disease) 2007    CABG   • Diabetes    • Diabetic neuropathy (HCC) 1/6/2011   • Fall     \"a couple years ago because of my neuopathy\" multiple falls   • Gastritis 7/10    H/O UGIB   • GERD (gastroesophageal reflux disease)    • Hyperlipidemia    • Hypertension    • Hypothyroidism    • Infectious disease     History of MRSA   • Migraine    • Neuropathy in diabetes ([facility])    • HILDA (obstructive sleep apnea)    • S/P aortic valve replacement 2007   • TIA (transient ischemic attack) 2005   • Tricuspid regurgitation mild- mod 1/6/2011   • Unspecified hemorrhagic conditions     Bleeds easily-GI bleeds with NSAIDS and ASA   • Upper GI bleed ON JULY 2007 1/6/2011         Past Surgical History:   Procedure Laterality Date   • MITRAL VALVE REPLACE  2007    AORTIC VALVE   • SHOULDER SURGERY  6/2001    NEER DECOMOPRESSION LEFT SHOULDER   • CARPAL TUNNEL RELEASE  1998    B/L   • " "TONSILLECTOMY  1995    PARTIAL PALATOPLASTY   • ABDOMINAL HYSTERECTOMY TOTAL      TAHBSOO   • CHOLECYSTECTOMY     • CORONARY ARTERY BYPASS, 1      Triple Bypass   • HERNIA REPAIR      VENTRAL HERNIA REPAIR   • PRIMARY C SECTION           Current Outpatient Medications   Medication Sig Dispense Refill   • Alirocumab (PRALUENT) 150 MG/ML Solution Pen-injector Inject 150 mg as instructed every 14 days. 6 PEN 3   • amiodarone (CORDARONE) 200 MG Tab Take 1 Tab by mouth every day. 90 Tab 3   • metoprolol (LOPRESSOR) 25 MG Tab Take 0.5 Tabs by mouth 2 times a day. 30 Tab 11   • furosemide (LASIX) 40 MG Tab Take 1 Tab by mouth every day. 30 Tab 11   • warfarin (COUMADIN) 4 MG Tab TAKE 1 TO 2 TABLETS BY MOUTH EVERY DAY AS DIRECTED BY THE COUMADIN CLINIC 180 Tab 2   • ezetimibe (ZETIA) 10 MG Tab TAKE 1 TABLET BY MOUTH EVERY DAY 90 Tab 3   • lisinopril (PRINIVIL) 20 MG TABS Take 20 mg by mouth 2 times a day.     • potassium chloride SA (K-DUR) 20 MEQ TBCR Take 20 mEq by mouth every day.     • vitamin D (CHOLECALCIFEROL) 1000 UNIT TABS Take 1,000 Units by mouth every day.       • docosahexanoic acid (OMEGA 3 FA) 1000 MG CAPS Take 1,000 mg by mouth 2 Times a Day.     • levothyroxine (LEVOXYL) 137 MCG TABS Take 1 Tab by mouth every day. 30 Each 3   • tramadol (ULTRAM) 50 MG TABS Take 2 Tabs by mouth 3 times a day. 180 Each 0   • cyclobenzaprine (FLEXERIL) 10 MG Tab Take 1 Tab by mouth 3 times a day as needed for Moderate Pain or Muscle Spasms. 20 Tab 0   • albuterol 108 (90 BASE) MCG/ACT Aero Soln inhalation aerosol Inhale 2 Puffs by mouth every four hours as needed for Shortness of Breath. (Patient not taking: Reported on 12/26/2018) 1 Inhaler 0     No current facility-administered medications for this visit.          Allergies   Allergen Reactions   • Hmg-Coa-R Inhibitors    • Asa [Aspirin]      GI BLEED   • Cymbalta [Duloxetine Hcl]      Feels like a \"zombie\"   • Latex    • Lyrica [Pregabalin]      SPACED OUT   • Nsaids      " "GI BLEED   • Tricor      Breathing problems     • Trilipix [Choline Fenofibrate]      SOB         Family History   Problem Relation Age of Onset   • Heart Disease Mother         a fib   • Heart Disease Brother         a fib         Social History     Socioeconomic History   • Marital status:      Spouse name: Not on file   • Number of children: Not on file   • Years of education: Not on file   • Highest education level: Not on file   Occupational History   • Not on file   Social Needs   • Financial resource strain: Not on file   • Food insecurity:     Worry: Not on file     Inability: Not on file   • Transportation needs:     Medical: Not on file     Non-medical: Not on file   Tobacco Use   • Smoking status: Never Smoker   • Smokeless tobacco: Never Used   Substance and Sexual Activity   • Alcohol use: No   • Drug use: No   • Sexual activity: Not on file   Lifestyle   • Physical activity:     Days per week: Not on file     Minutes per session: Not on file   • Stress: Not on file   Relationships   • Social connections:     Talks on phone: Not on file     Gets together: Not on file     Attends Mandaen service: Not on file     Active member of club or organization: Not on file     Attends meetings of clubs or organizations: Not on file     Relationship status: Not on file   • Intimate partner violence:     Fear of current or ex partner: Not on file     Emotionally abused: Not on file     Physically abused: Not on file     Forced sexual activity: Not on file   Other Topics Concern   • Not on file   Social History Narrative   • Not on file         Physical Exam:  Ambulatory Vitals  /74 (BP Location: Left arm, Patient Position: Sitting, BP Cuff Size: Adult)   Pulse (!) 52   Ht 1.549 m (5' 1\")   Wt 107.5 kg (236 lb 14.2 oz)   SpO2 97%    Oxygen Therapy:  Pulse Oximetry: 97 %  BP Readings from Last 4 Encounters:   10/17/19 128/74   10/08/19 122/46   09/09/19 103/49   08/12/19 125/58       Weight/BMI: " Body mass index is 44.76 kg/m².  Wt Readings from Last 4 Encounters:   10/17/19 107.5 kg (236 lb 14.2 oz)   10/08/19 106.1 kg (234 lb)   08/02/19 106 kg (233 lb 11 oz)   07/15/19 106 kg (233 lb 11 oz)       General: Well appearing and in no apparent distress  Head: atrumatic  Eyes: No conjunctival pallor   ENT: normal external appearance of nose and ears  Neck: JVD absent, carotid bruits absent  Lungs: respiratory sounds  normal, additional breath sounds absent  Heart: Regular rhythm,   No palpable thrills on palpation, mechanical click, murmurs absent, no rubs,   Lower extremity edema absent.   Pedal pulses normal  Abdomen: soft, non tender, non distended.  Extremities/MSK: no clubbing, no cyanosis  Neurological: normal orientation, Gait normal   Psychiatric: Appropriate affect, intact judgement and insight  Skin: Warm extremities      Lab Data Review:  Lab Results   Component Value Date/Time    CHOLSTRLTOT 145 10/14/2019 10:20 AM    LDL 44 10/14/2019 10:20 AM    HDL 55 10/14/2019 10:20 AM    TRIGLYCERIDE 229 (H) 10/14/2019 10:20 AM       Lab Results   Component Value Date/Time    SODIUM 141 10/14/2019 10:20 AM    POTASSIUM 4.7 10/14/2019 10:20 AM    CHLORIDE 107 10/14/2019 10:20 AM    CO2 26 10/14/2019 10:20 AM    GLUCOSE 96 10/14/2019 10:20 AM    BUN 26 (H) 10/14/2019 10:20 AM    CREATININE 1.31 10/14/2019 10:20 AM    CREATININE 1.6 (H) 11/26/2007 12:28 AM     Lab Results   Component Value Date/Time    ALKPHOSPHAT 50 10/14/2019 10:20 AM    ASTSGOT 17 10/14/2019 10:20 AM    ALTSGPT 9 10/14/2019 10:20 AM    TBILIRUBIN 0.5 10/14/2019 10:20 AM      Lab Results   Component Value Date/Time    WBC 8.3 07/31/2019 07:54 AM     Echo 4/4:  Normal left ventricular systolic function.  Left ventricular ejection fraction is visually estimated to be 65-70%.  Mild concentric left ventricular hypertrophy.  Mild mitral stenosis.  Severe mitral annular calcification.  Mild mitral regurgitation.  Bioprosthetic aortic valve with  increased velocities.  Right heart pressures are consistent with moderate pulmonary   hypertension.  Severely dilated left atrium.    EKG today shows sinus bradycardia.    Impression and Plan:  68-year-old female patient with  1.  Shortness of breath, likely secondary to diastolic heart failure, NYHA class II stage C  2.  Atrial fibrillation post cardioversion, maintaining sinus rhythm on amiodarone  3.  Mechanical aortic valve replacement, three-vessel bypass surgery in 2007  4.  Hypertension  5.  Diabetes mellitus    -She is maintaining sinus rhythm.  Her symptoms improved from class III to class II but did not go away.  -Her shortness of breath is likely combination of mild mitral stenosis, diastolic dysfunction, mild pulmonary hypertension.  -I am afraid of kidney injury if we go up on Lasix, her symptoms are not lifestyle limiting I recommended regular exercise, weight loss.  Referred her to weight loss nutrition counseling  -Blood pressure is well controlled, continue lisinopril, metoprolol.  Continue warfarin for anticoagulation.    Return in about 6 months (around 4/17/2020).      Caleb PARRA  Interventional cardiologist  [facility] [place] [facility], [facility]  [address] [address]  Phone: [phone]  Fax: [phone]

## 2019-10-21 ENCOUNTER — TELEPHONE (OUTPATIENT)
Dept: VASCULAR LAB | Facility: MEDICAL CENTER | Age: 69
End: 2019-10-21

## 2019-10-24 ENCOUNTER — TELEPHONE (OUTPATIENT)
Dept: VASCULAR LAB | Facility: MEDICAL CENTER | Age: 69
End: 2019-10-24

## 2019-10-24 NOTE — TELEPHONE ENCOUNTER
Diplomat called to report that this patient receives her praluent from the .  Katelyn Perez, Clinical Pharmacist, CDE, CACP

## 2019-11-06 ENCOUNTER — TELEPHONE (OUTPATIENT)
Dept: VASCULAR LAB | Facility: MEDICAL CENTER | Age: 69
End: 2019-11-06

## 2019-11-06 NOTE — TELEPHONE ENCOUNTER
Left message for patient to call back to figure out a day and time that she can come in to fill out 2020 paperwork for P[employer]

## 2019-11-12 ENCOUNTER — TELEPHONE (OUTPATIENT)
Dept: VASCULAR LAB | Facility: MEDICAL CENTER | Age: 69
End: 2019-11-12

## 2019-11-12 ENCOUNTER — ANTICOAGULATION VISIT (OUTPATIENT)
Dept: VASCULAR LAB | Facility: MEDICAL CENTER | Age: 69
End: 2019-11-12
Attending: INTERNAL MEDICINE
Payer: MEDICARE

## 2019-11-12 VITALS — DIASTOLIC BLOOD PRESSURE: 58 MMHG | SYSTOLIC BLOOD PRESSURE: 141 MMHG | HEART RATE: 51 BPM

## 2019-11-12 DIAGNOSIS — Z79.01 CHRONIC ANTICOAGULATION: ICD-10-CM

## 2019-11-12 DIAGNOSIS — I48.91 ATRIAL FIBRILLATION, UNSPECIFIED TYPE (HCC): ICD-10-CM

## 2019-11-12 DIAGNOSIS — G45.9 TIA (TRANSIENT ISCHEMIC ATTACK): ICD-10-CM

## 2019-11-12 DIAGNOSIS — Z95.2 HX OF MECHANICAL AORTIC VALVE REPLACEMENT: ICD-10-CM

## 2019-11-12 LAB
INR BLD: 5.9 (ref 0.9–1.2)
INR PPP: 5.9 (ref 2–3.5)

## 2019-11-12 PROCEDURE — 85610 PROTHROMBIN TIME: CPT

## 2019-11-12 PROCEDURE — 99212 OFFICE O/P EST SF 10 MIN: CPT

## 2019-11-12 NOTE — PROGRESS NOTES
Anticoagulation Summary  As of 2019    INR goal:   2.5-3.5   TTR:   59.6 % (2.7 y)   INR used for dosin.90! (2019)   Warfarin maintenance plan:   8 mg (4 mg x 2) every Sun, e, Thu; 4 mg (4 mg x 1) all other days   Weekly warfarin total:   40 mg   Plan last modified:   STEPH Garner (2019)   Next INR check:   2019   Target end date:   Indefinite    Indications    Chronic anticoagulation [Z79.01]  Hx of mechanical aortic valve replacement [V43.3] [Z95.2]  TIA (transient ischemic attack) [G45.9]  Atrial fibrillation (HCC) [I48.91] [I48.91]             Anticoagulation Episode Summary     INR check location:       Preferred lab:       Send INR reminders to:       Comments:         Anticoagulation Care Providers     Provider Role Specialty Phone number    Michael J Bloch, M.D. Referring Internal Medicine [phone]    Yinka Church, PharmD Responsible          Anticoagulation Patient Findings  Patient Findings     Negatives:   Signs/symptoms of thrombosis, Signs/symptoms of bleeding, Laboratory test error suspected, Change in health, Change in alcohol use, Change in activity, Upcoming invasive procedure, Emergency department visit, Upcoming dental procedure, Missed doses, Extra doses, Change in medications, Change in diet/appetite, Hospital admission, Bruising, Other complaints              History of Present Illness: follow up appointment for chronic anticoagulation with the high risk medication, warfarin for mechanical aortic valve    Last INR was at goal, pt is now supra therapeutic today, confirmed no ETOH, no duplicate dosing no changes in appetite/diet.   Will HOLD dose tonight, then resume current dosing regimen. Follow up in 2 weeks, to reduce the risk of adverse events related to this high risk medication, warfarin.    Katelyn Perez, Clinical Pharmacist

## 2019-11-12 NOTE — TELEPHONE ENCOUNTER
Eligibility forms for patient assistance for Praluent completed )PASS).  Medical assistant to complete  Await coverage determination    Michael J Bloch, MD  Certified Clinical Lipid Specialist  Medial Director, [facility]

## 2019-11-15 ENCOUNTER — ANTICOAGULATION MONITORING (OUTPATIENT)
Dept: VASCULAR LAB | Facility: MEDICAL CENTER | Age: 69
End: 2019-11-15

## 2019-11-15 DIAGNOSIS — G45.9 TIA (TRANSIENT ISCHEMIC ATTACK): ICD-10-CM

## 2019-11-15 DIAGNOSIS — I48.91 ATRIAL FIBRILLATION, UNSPECIFIED TYPE (HCC): ICD-10-CM

## 2019-11-15 DIAGNOSIS — Z95.2 HX OF MECHANICAL AORTIC VALVE REPLACEMENT: ICD-10-CM

## 2019-11-15 DIAGNOSIS — Z79.01 CHRONIC ANTICOAGULATION: ICD-10-CM

## 2019-11-25 ENCOUNTER — ANTICOAGULATION VISIT (OUTPATIENT)
Dept: VASCULAR LAB | Facility: MEDICAL CENTER | Age: 69
End: 2019-11-25
Attending: INTERNAL MEDICINE
Payer: MEDICARE

## 2019-11-25 VITALS — HEART RATE: 47 BPM | SYSTOLIC BLOOD PRESSURE: 151 MMHG | DIASTOLIC BLOOD PRESSURE: 58 MMHG

## 2019-11-25 DIAGNOSIS — G45.9 TIA (TRANSIENT ISCHEMIC ATTACK): ICD-10-CM

## 2019-11-25 DIAGNOSIS — Z95.2 HX OF MECHANICAL AORTIC VALVE REPLACEMENT: ICD-10-CM

## 2019-11-25 DIAGNOSIS — Z79.01 CHRONIC ANTICOAGULATION: ICD-10-CM

## 2019-11-25 DIAGNOSIS — I48.91 ATRIAL FIBRILLATION, UNSPECIFIED TYPE (HCC): ICD-10-CM

## 2019-11-25 LAB
INR BLD: 6 (ref 0.9–1.2)
INR PPP: 6 (ref 2–3.5)

## 2019-11-25 PROCEDURE — 85610 PROTHROMBIN TIME: CPT

## 2019-11-25 PROCEDURE — 99212 OFFICE O/P EST SF 10 MIN: CPT

## 2019-11-25 NOTE — PROGRESS NOTES
Anticoagulation Summary  As of 2019    INR goal:   2.5-3.5   TTR:   58.8 % (2.7 y)   INR used for dosin.00! (2019)   Warfarin maintenance plan:   8 mg (4 mg x 2) every Tue, Thu; 4 mg (4 mg x 1) all other days   Weekly warfarin total:   36 mg   Plan last modified:   Tricia Sadler PharmD (2019)   Next INR check:   2019   Target end date:   Indefinite    Indications    Chronic anticoagulation [Z79.01]  Hx of mechanical aortic valve replacement [V43.3] [Z95.2]  TIA (transient ischemic attack) [G45.9]  Atrial fibrillation (HCC) [I48.91] [I48.91]             Anticoagulation Episode Summary     INR check location:       Preferred lab:       Send INR reminders to:       Comments:         Anticoagulation Care Providers     Provider Role Specialty Phone number    Michael J Bloch, M.D. Referring Internal Medicine [phone]    Yinka Church, PharmD Responsible          Anticoagulation Patient Findings    HPI:  Yenifer Beasley seen in clinic today, on anticoagulation therapy with warfarin for mechanical mitral valve and hx of TIA.  Changes to current medical/health status since last appt: none  Denies signs/symptoms of bleeding and/or thrombosis since the last appt.    Denies any interval changes to diet  Denies any interval changes to medications since last appt.   Denies any complications or cost restrictions with current therapy.     A/P   INR is SUPRA-therapeutic at 6.0  Patient was supra-therapeutic at the last appointment as well. Therefore, decreased the patient's weekly regimen by 10%    Follow up appointment in 2 week(s).    Tricia Sadler, EmilyD

## 2019-12-09 ENCOUNTER — ANTICOAGULATION VISIT (OUTPATIENT)
Dept: VASCULAR LAB | Facility: MEDICAL CENTER | Age: 69
End: 2019-12-09
Attending: INTERNAL MEDICINE
Payer: MEDICARE

## 2019-12-09 VITALS — HEART RATE: 51 BPM | SYSTOLIC BLOOD PRESSURE: 145 MMHG | DIASTOLIC BLOOD PRESSURE: 57 MMHG

## 2019-12-09 DIAGNOSIS — Z95.2 HX OF MECHANICAL AORTIC VALVE REPLACEMENT: ICD-10-CM

## 2019-12-09 DIAGNOSIS — Z79.01 CHRONIC ANTICOAGULATION: ICD-10-CM

## 2019-12-09 DIAGNOSIS — G45.9 TIA (TRANSIENT ISCHEMIC ATTACK): ICD-10-CM

## 2019-12-09 DIAGNOSIS — I48.91 ATRIAL FIBRILLATION, UNSPECIFIED TYPE (HCC): ICD-10-CM

## 2019-12-09 LAB
INR BLD: 5.2 (ref 0.9–1.2)
INR PPP: 5.2 (ref 2–3.5)

## 2019-12-09 PROCEDURE — 85610 PROTHROMBIN TIME: CPT

## 2019-12-09 PROCEDURE — 99212 OFFICE O/P EST SF 10 MIN: CPT | Performed by: NURSE PRACTITIONER

## 2019-12-09 NOTE — PROGRESS NOTES
Anticoagulation Summary  As of 2019    INR goal:   2.5-3.5   TTR:   58.0 % (2.8 y)   INR used for dosin.20! (2019)   Warfarin maintenance plan:   4 mg (4 mg x 1) every day   Weekly warfarin total:   28 mg   Plan last modified:   ELI GarnerPMARRY (2019)   Next INR check:   2019   Target end date:   Indefinite    Indications    Chronic anticoagulation [Z79.01]  Hx of mechanical aortic valve replacement [V43.3] [Z95.2]  TIA (transient ischemic attack) [G45.9]  Atrial fibrillation (HCC) [I48.91] [I48.91]             Anticoagulation Episode Summary     INR check location:       Preferred lab:       Send INR reminders to:       Comments:         Anticoagulation Care Providers     Provider Role Specialty Phone number    Michael J Bloch, M.D. Referring Internal Medicine [phone]    Yinka Church, PharmD Responsible          Anticoagulation Patient Findings      HPI:  Yenifer Beasley seen in clinic today for follow up on anticoagulation therapy in the presence of TIA, AF, AVR.   Denies any changes to current medical/health status since last appointment.   Eating a little less greens than normal. Denies any medication changes.   No current symptoms of bleeding or thrombosis reported.    A/P:   INR supratherapeutic despite dose decreases.   HOLD two doses and began reduced regimen. Cautioned about avoiding falls and monitoring for prolonged bleeding.  BP recorded in vitals.    Follow up appointment in 2 week(s).    Next Appointment: Monday, Dec 23, 2019 at 10:15 am.    Bernadine PHILIP

## 2019-12-11 ENCOUNTER — ANTICOAGULATION MONITORING (OUTPATIENT)
Dept: VASCULAR LAB | Facility: MEDICAL CENTER | Age: 69
End: 2019-12-11

## 2019-12-11 DIAGNOSIS — I48.91 ATRIAL FIBRILLATION, UNSPECIFIED TYPE (HCC): ICD-10-CM

## 2019-12-11 DIAGNOSIS — Z95.2 HX OF MECHANICAL AORTIC VALVE REPLACEMENT: ICD-10-CM

## 2019-12-11 DIAGNOSIS — Z79.01 CHRONIC ANTICOAGULATION: ICD-10-CM

## 2019-12-11 DIAGNOSIS — G45.9 TIA (TRANSIENT ISCHEMIC ATTACK): ICD-10-CM

## 2019-12-23 ENCOUNTER — ANTICOAGULATION VISIT (OUTPATIENT)
Dept: VASCULAR LAB | Facility: MEDICAL CENTER | Age: 69
End: 2019-12-23
Attending: INTERNAL MEDICINE
Payer: MEDICARE

## 2019-12-23 DIAGNOSIS — Z95.2 HX OF MECHANICAL AORTIC VALVE REPLACEMENT: ICD-10-CM

## 2019-12-23 DIAGNOSIS — Z79.01 CHRONIC ANTICOAGULATION: ICD-10-CM

## 2019-12-23 DIAGNOSIS — G45.9 TIA (TRANSIENT ISCHEMIC ATTACK): ICD-10-CM

## 2019-12-23 DIAGNOSIS — I48.91 ATRIAL FIBRILLATION, UNSPECIFIED TYPE (HCC): ICD-10-CM

## 2019-12-23 LAB — INR PPP: 2.8 (ref 2–3.5)

## 2019-12-23 PROCEDURE — 85610 PROTHROMBIN TIME: CPT

## 2019-12-23 PROCEDURE — 99211 OFF/OP EST MAY X REQ PHY/QHP: CPT

## 2019-12-23 NOTE — PROGRESS NOTES
Anticoagulation Summary  As of 2019    INR goal:   2.5-3.5   TTR:   57.6 % (2.8 y)   INR used for dosin.80 (2019)   Warfarin maintenance plan:   4 mg (4 mg x 1) every day   Weekly warfarin total:   28 mg   Plan last modified:   STEPH Garner (2019)   Next INR check:   2020   Target end date:   Indefinite    Indications    Chronic anticoagulation [Z79.01]  Hx of mechanical aortic valve replacement [V43.3] [Z95.2]  TIA (transient ischemic attack) [G45.9]  Atrial fibrillation (HCC) [I48.91] [I48.91]             Anticoagulation Episode Summary     INR check location:       Preferred lab:       Send INR reminders to:       Comments:         Anticoagulation Care Providers     Provider Role Specialty Phone number    Michael J Bloch, M.D. Referring Internal Medicine [phone]    Yinka Church, PharmD Responsible                  Anticoagulation Patient Findings  Patient Findings     Negatives:   Signs/symptoms of thrombosis, Signs/symptoms of bleeding, Laboratory test error suspected, Change in health, Change in alcohol use, Change in activity, Upcoming invasive procedure, Emergency department visit, Upcoming dental procedure, Missed doses, Extra doses, Change in medications, Change in diet/appetite, Hospital admission, Bruising, Other complaints          HPI:  Yenifer Beasley seen in clinic today, on anticoagulation therapy with warfarin for hx of TIA, mechanical AVR  Changes to current medical/health status since last appt: none  Denies signs/symptoms of bleeding and/or thrombosis since the last appt.    Denies any interval changes to diet  Denies any interval changes to medications since last appt.   Denies any complications or cost restrictions with current therapy.   BP check declined      A/P   INR  therapeutic.   Pt is to continue with current warfarin dosing regimen.    Follow up appointment in 2 week(s).    Sybil Frazier, PharmD

## 2019-12-26 ENCOUNTER — ANTICOAGULATION MONITORING (OUTPATIENT)
Dept: MEDICAL GROUP | Facility: PHYSICIAN GROUP | Age: 69
End: 2019-12-26

## 2019-12-26 DIAGNOSIS — Z95.2 HX OF MECHANICAL AORTIC VALVE REPLACEMENT: ICD-10-CM

## 2019-12-26 DIAGNOSIS — Z79.01 CHRONIC ANTICOAGULATION: ICD-10-CM

## 2019-12-26 DIAGNOSIS — G45.9 TIA (TRANSIENT ISCHEMIC ATTACK): ICD-10-CM

## 2019-12-26 LAB — INR BLD: 2.8 (ref 0.9–1.2)

## 2020-01-01 DIAGNOSIS — E78.5 HYPERLIPIDEMIA, UNSPECIFIED HYPERLIPIDEMIA TYPE: ICD-10-CM

## 2020-01-02 RX ORDER — EZETIMIBE 10 MG/1
TABLET ORAL
Qty: 90 TAB | Refills: 3 | Status: SHIPPED | OUTPATIENT
Start: 2020-01-02 | End: 2020-12-08

## 2020-01-06 ENCOUNTER — ANTICOAGULATION VISIT (OUTPATIENT)
Dept: VASCULAR LAB | Facility: MEDICAL CENTER | Age: 70
End: 2020-01-06
Attending: INTERNAL MEDICINE
Payer: MEDICARE

## 2020-01-06 DIAGNOSIS — Z95.2 HX OF MECHANICAL AORTIC VALVE REPLACEMENT: ICD-10-CM

## 2020-01-06 DIAGNOSIS — I48.91 ATRIAL FIBRILLATION, UNSPECIFIED TYPE (HCC): ICD-10-CM

## 2020-01-06 DIAGNOSIS — Z79.01 CHRONIC ANTICOAGULATION: ICD-10-CM

## 2020-01-06 DIAGNOSIS — G45.9 TIA (TRANSIENT ISCHEMIC ATTACK): ICD-10-CM

## 2020-01-06 LAB
INR BLD: 3.2 (ref 0.9–1.2)
INR PPP: 3.2 (ref 2–3.5)

## 2020-01-06 PROCEDURE — 85610 PROTHROMBIN TIME: CPT

## 2020-01-06 PROCEDURE — 99211 OFF/OP EST MAY X REQ PHY/QHP: CPT | Performed by: NURSE PRACTITIONER

## 2020-01-06 NOTE — PROGRESS NOTES
Anticoagulation Summary  As of 1/6/2020    INR goal:   2.5-3.5   TTR:   58.2 % (2.9 y)   INR used for dosing:   3.20 (1/6/2020)   Warfarin maintenance plan:   4 mg (4 mg x 1) every day   Weekly warfarin total:   28 mg   Plan last modified:   Bernadine Kelley ASabrinaPMARRY (12/9/2019)   Next INR check:   1/27/2020   Target end date:   Indefinite    Indications    Chronic anticoagulation [Z79.01]  Hx of mechanical aortic valve replacement [V43.3] [Z95.2]  TIA (transient ischemic attack) [G45.9]  Atrial fibrillation (HCC) [I48.91] [I48.91]             Anticoagulation Episode Summary     INR check location:       Preferred lab:       Send INR reminders to:       Comments:         Anticoagulation Care Providers     Provider Role Specialty Phone number    Michael J Bloch, M.D. Referring Internal Medicine [phone]    Yinka Church, PharmD Responsible          Anticoagulation Patient Findings      HPI:  Yenifer Beasley seen in clinic today for follow up on anticoagulation therapy in the presence of mAVR, AF, TIA hx.   Denies any changes to current medical/health status since last appointment.   Denies any medication or diet changes.   No current symptoms of bleeding or thrombosis reported.    A/P:   INR therapeutic.   Continue current regimen.   BP declined.    Follow up appointment in 3 week(s).    Next Appointment: Monday, Jan 27, 2020 at 10:45 am.    Bernadine PHILIP

## 2020-01-07 ENCOUNTER — ANTICOAGULATION MONITORING (OUTPATIENT)
Dept: VASCULAR LAB | Facility: MEDICAL CENTER | Age: 70
End: 2020-01-07

## 2020-01-07 DIAGNOSIS — G45.9 TIA (TRANSIENT ISCHEMIC ATTACK): ICD-10-CM

## 2020-01-07 DIAGNOSIS — Z79.01 CHRONIC ANTICOAGULATION: ICD-10-CM

## 2020-01-07 DIAGNOSIS — Z95.2 HX OF MECHANICAL AORTIC VALVE REPLACEMENT: ICD-10-CM

## 2020-01-07 DIAGNOSIS — I48.91 ATRIAL FIBRILLATION, UNSPECIFIED TYPE (HCC): ICD-10-CM

## 2020-01-27 ENCOUNTER — ANTICOAGULATION VISIT (OUTPATIENT)
Dept: VASCULAR LAB | Facility: MEDICAL CENTER | Age: 70
End: 2020-01-27
Attending: INTERNAL MEDICINE
Payer: MEDICARE

## 2020-01-27 DIAGNOSIS — Z79.01 CHRONIC ANTICOAGULATION: ICD-10-CM

## 2020-01-27 DIAGNOSIS — Z95.2 HX OF MECHANICAL AORTIC VALVE REPLACEMENT: ICD-10-CM

## 2020-01-27 DIAGNOSIS — I48.91 ATRIAL FIBRILLATION, UNSPECIFIED TYPE (HCC): ICD-10-CM

## 2020-01-27 DIAGNOSIS — G45.9 TIA (TRANSIENT ISCHEMIC ATTACK): ICD-10-CM

## 2020-01-27 LAB
INR BLD: 2.7 (ref 0.9–1.2)
INR PPP: 2.7 (ref 2–3.5)

## 2020-01-27 PROCEDURE — 99211 OFF/OP EST MAY X REQ PHY/QHP: CPT | Performed by: NURSE PRACTITIONER

## 2020-01-27 PROCEDURE — 85610 PROTHROMBIN TIME: CPT

## 2020-01-27 NOTE — PROGRESS NOTES
Anticoagulation Summary  As of 2020    INR goal:   2.5-3.5   TTR:   59.0 % (2.9 y)   INR used for dosin.70 (2020)   Warfarin maintenance plan:   4 mg (4 mg x 1) every day   Weekly warfarin total:   28 mg   No change documented:   ELI GarnerPMARRY   Plan last modified:   STEPH Garner (2019)   Next INR check:   2020   Target end date:   Indefinite    Indications    Chronic anticoagulation [Z79.01]  Hx of mechanical aortic valve replacement [V43.3] [Z95.2]  TIA (transient ischemic attack) [G45.9]  Atrial fibrillation (HCC) [I48.91] [I48.91]             Anticoagulation Episode Summary     INR check location:       Preferred lab:       Send INR reminders to:       Comments:         Anticoagulation Care Providers     Provider Role Specialty Phone number    Michael J Bloch, M.D. Referring Internal Medicine [phone]    Yinka Church, PharmD Responsible          Anticoagulation Patient Findings      HPI:  Yenifer Beasley seen in clinic today for follow up on anticoagulation therapy in the presence of mAVR, TIA, AF hx.   Denies any changes to current medical/health status since last appointment.   Denies any medication or diet changes.   No current symptoms of bleeding or thrombosis reported.    A/P:   INR therapeutic.   Continue current regimen.   BP declined.    Follow up appointment in 4 week(s).    Next Appointment: Monday, 2020 at 11:00 am.    Bernadine PHILIP

## 2020-02-24 ENCOUNTER — TELEPHONE (OUTPATIENT)
Dept: VASCULAR LAB | Facility: MEDICAL CENTER | Age: 70
End: 2020-02-24

## 2020-02-24 ENCOUNTER — ANTICOAGULATION VISIT (OUTPATIENT)
Dept: VASCULAR LAB | Facility: MEDICAL CENTER | Age: 70
End: 2020-02-24
Attending: INTERNAL MEDICINE
Payer: MEDICARE

## 2020-02-24 DIAGNOSIS — Z79.01 CHRONIC ANTICOAGULATION: ICD-10-CM

## 2020-02-24 DIAGNOSIS — G45.9 TIA (TRANSIENT ISCHEMIC ATTACK): ICD-10-CM

## 2020-02-24 DIAGNOSIS — Z95.2 H/O MECHANICAL AORTIC VALVE REPLACEMENT: ICD-10-CM

## 2020-02-24 DIAGNOSIS — Z95.2 HX OF MECHANICAL AORTIC VALVE REPLACEMENT: ICD-10-CM

## 2020-02-24 DIAGNOSIS — I48.91 ATRIAL FIBRILLATION, UNSPECIFIED TYPE (HCC): ICD-10-CM

## 2020-02-24 LAB
INR BLD: 3.1 (ref 0.9–1.2)
INR PPP: 3.1 (ref 2–3.5)
INR PPP: 3.1 (ref 2–3.5)

## 2020-02-24 PROCEDURE — 99211 OFF/OP EST MAY X REQ PHY/QHP: CPT

## 2020-02-24 PROCEDURE — 85610 PROTHROMBIN TIME: CPT

## 2020-02-24 NOTE — PROGRESS NOTES
Anticoagulation Summary  As of 2/24/2020    INR goal:   2.5-3.5   TTR:   60.1 % (3 y)   INR used for dosing:   3.10 (2/24/2020)   Warfarin maintenance plan:   4 mg (4 mg x 1) every day   Weekly warfarin total:   28 mg   Plan last modified:   STEPH Garner (12/9/2019)   Next INR check:   3/30/2020   Target end date:   Indefinite    Indications    Chronic anticoagulation [Z79.01]  Hx of mechanical aortic valve replacement [V43.3] [Z95.2]  TIA (transient ischemic attack) [G45.9]  Atrial fibrillation (HCC) [I48.91] [I48.91]             Anticoagulation Episode Summary     INR check location:       Preferred lab:       Send INR reminders to:       Comments:         Anticoagulation Care Providers     Provider Role Specialty Phone number    Michael J Bloch, M.D. Referring Internal Medicine [phone]    Emily MartinezD Responsible          Anticoagulation Patient Findings    HPI:  Yenifer Beasley seen in clinic today, on anticoagulation therapy with warfarin for mechanical valve  Changes to current medical/health status since last appt: none  Denies signs/symptoms of bleeding and/or thrombosis since the last appt.    Denies any interval changes to diet  Denies any interval changes to medications since last appt.   Denies any complications or cost restrictions with current therapy.     A/P   INR is therapeutic.   Pt to continue current warfarin therapy regimen.   Pt to contact clinic for any s/s of unusual bleeding, bruising, clotting, or any changes to diet or medication.     Follow up appointment in 5 week(s).    Tricia Sadler, EmilyD

## 2020-02-25 ENCOUNTER — ANTICOAGULATION MONITORING (OUTPATIENT)
Dept: VASCULAR LAB | Facility: MEDICAL CENTER | Age: 70
End: 2020-02-25

## 2020-02-25 DIAGNOSIS — G45.9 TIA (TRANSIENT ISCHEMIC ATTACK): ICD-10-CM

## 2020-02-25 DIAGNOSIS — Z95.2 HX OF MECHANICAL AORTIC VALVE REPLACEMENT: ICD-10-CM

## 2020-02-25 DIAGNOSIS — Z79.01 CHRONIC ANTICOAGULATION: ICD-10-CM

## 2020-02-25 NOTE — PROGRESS NOTES
Anticoagulation Summary  As of 2/25/2020    INR goal:   2.5-3.5   TTR:   60.1 % (3 y)   INR used for dosing:   3.10 (2/24/2020)   Warfarin maintenance plan:   4 mg (4 mg x 1) every day   Weekly warfarin total:   28 mg   Plan last modified:   STEPH Garner (12/9/2019)   Next INR check:   3/24/2020   Target end date:   Indefinite    Indications    Chronic anticoagulation [Z79.01]  Hx of mechanical aortic valve replacement [V43.3] [Z95.2]  TIA (transient ischemic attack) [G45.9]  Atrial fibrillation (HCC) [I48.91] [I48.91]             Anticoagulation Episode Summary     INR check location:       Preferred lab:       Send INR reminders to:       Comments:         Anticoagulation Care Providers     Provider Role Specialty Phone number    Michael J Bloch, M.D. Referring Internal Medicine [phone]    Emily MartinezD Responsible          Anticoagulation Patient Findings      INR  therapeutic.   Left a voice message for the patient, asked patient to please call the anticoagulation clinic if they have any signs/symptoms of bleeding and/or thrombosis or any changes to diet or medications.    Follow up appointment in 4 week(s)    Continue weekly warfarin dose as noted      Coleman Dozier, PharmD, MS, BCACP, LCC    This note was created using voice recognition software (Dragon). The accuracy of the dictation is limited by the abilities of the software. I have reviewed the note prior to signing, however some errors in grammar and context are still possible. If you have any questions related to this note please do not hesitate to contact our office.

## 2020-02-27 ENCOUNTER — TELEPHONE (OUTPATIENT)
Dept: VASCULAR LAB | Facility: MEDICAL CENTER | Age: 70
End: 2020-02-27

## 2020-03-03 ENCOUNTER — TELEPHONE (OUTPATIENT)
Dept: VASCULAR LAB | Facility: MEDICAL CENTER | Age: 70
End: 2020-03-03

## 2020-03-03 NOTE — TELEPHONE ENCOUNTER
Left vm to inform them that their imaging studies have been ordered for Echo. They can call scheduling office to book the apt at [phone] option #2. If they need the test done in another facility they need to contact us at [phone] and leave their name and date of birth with the facility they would like the orders sent to.

## 2020-03-17 ENCOUNTER — HOSPITAL ENCOUNTER (OUTPATIENT)
Dept: CARDIOLOGY | Facility: MEDICAL CENTER | Age: 70
End: 2020-03-17
Attending: NURSE PRACTITIONER
Payer: MEDICARE

## 2020-03-30 ENCOUNTER — ANTICOAGULATION MONITORING (OUTPATIENT)
Dept: VASCULAR LAB | Facility: MEDICAL CENTER | Age: 70
End: 2020-03-30

## 2020-03-30 DIAGNOSIS — G45.9 TIA (TRANSIENT ISCHEMIC ATTACK): ICD-10-CM

## 2020-03-30 DIAGNOSIS — Z95.2 HX OF MECHANICAL AORTIC VALVE REPLACEMENT: ICD-10-CM

## 2020-03-30 DIAGNOSIS — I48.91 ATRIAL FIBRILLATION, UNSPECIFIED TYPE (HCC): ICD-10-CM

## 2020-03-30 DIAGNOSIS — Z79.01 CHRONIC ANTICOAGULATION: ICD-10-CM

## 2020-03-30 LAB — INR PPP: 2.6 (ref 2–3.5)

## 2020-03-30 NOTE — PROGRESS NOTES
Anticoagulation Summary  As of 3/30/2020    INR goal:   2.5-3.5   TTR:   61.3 % (3.1 y)   INR used for dosin.60 (3/30/2020)   Warfarin maintenance plan:   4 mg (4 mg x 1) every day   Weekly warfarin total:   28 mg   Plan last modified:   STEPH Garner (2019)   Next INR check:   2020   Target end date:   Indefinite    Indications    Chronic anticoagulation [Z79.01]  Hx of mechanical aortic valve replacement [V43.3] [Z95.2]  TIA (transient ischemic attack) [G45.9]  Atrial fibrillation (HCC) [I48.91] [I48.91]             Anticoagulation Episode Summary     INR check location:       Preferred lab:       Send INR reminders to:       Comments:         Anticoagulation Care Providers     Provider Role Specialty Phone number    Michael J Bloch, M.D. Referring Internal Medicine [phone]    Yinka Church, PharmD Responsible          Anticoagulation Patient Findings  Patient Findings     Negatives:   Signs/symptoms of thrombosis, Signs/symptoms of bleeding, Laboratory test error suspected, Change in health, Change in alcohol use, Change in activity, Upcoming invasive procedure, Emergency department visit, Upcoming dental procedure, Missed doses, Extra doses, Change in medications, Change in diet/appetite, Hospital admission, Bruising, Other complaints        Spoke with patient today regarding therapeutic INR of 2.6.  Patient denies any signs/symptoms of bruising or bleeding or any changes in diet and medications.  Instructed patient to call clinic with any questions or concerns.  Pt is to continue with current warfarin dosing regimen.  Follow up in 4 weeks, to reduce risk of adverse events related to this high risk medication,  Warfarin.    Yinka Church, PharmD, BCACP

## 2020-04-03 ENCOUNTER — TELEPHONE (OUTPATIENT)
Dept: VASCULAR LAB | Facility: MEDICAL CENTER | Age: 70
End: 2020-04-03

## 2020-04-03 NOTE — TELEPHONE ENCOUNTER
Pt called in stating that she fell off her porch last night and landed on her knee and R toe.  There is no swelling, but a small bruise to her toe.  She just wanted to make us aware.  The bruise is not growing, there is no swelling, warmth, or redness.  She did not hit her head.  She will f/u with her PCP.    TAMERA Flores  [facility]

## 2020-04-07 ENCOUNTER — TELEPHONE (OUTPATIENT)
Dept: VASCULAR LAB | Facility: MEDICAL CENTER | Age: 70
End: 2020-04-07

## 2020-04-07 NOTE — TELEPHONE ENCOUNTER
[facility]    Pt reports receiving her PCSK9 and tolerates without complaint.  Reminded pt of appointment in June.     [place], PharmD

## 2020-04-14 ENCOUNTER — TELEPHONE (OUTPATIENT)
Dept: HEALTH INFORMATION MANAGEMENT | Facility: OTHER | Age: 70
End: 2020-04-14

## 2020-04-14 NOTE — TELEPHONE ENCOUNTER
1. Caller Name: Yenifer Beasley                     Call Back Number:   [facility] PCP or Specialty Provider: Yes Dr. Marion Jones        2.  Does patient have any active symptoms of respiratory illness (fever OR cough OR shortness of breath OR sore throat)? Yes, the patient reports the following respiratory symptoms: shortness of breath, same due to heart condition    3.  Does patient have any comoribidities? CHF    4.  Has the patient traveled in the last 14 days OR had any known contact with someone who is suspected or confirmed to have COVID-19?  No.    5. Disposition: Advised to perform self care, monitor for worsening symptoms and to call back in 3 days if no improvement    Note routed to [facility] Provider: CORRY only.

## 2020-04-16 ENCOUNTER — TELEMEDICINE (OUTPATIENT)
Dept: DERMATOLOGY | Facility: IMAGING CENTER | Age: 70
End: 2020-04-16
Payer: MEDICARE

## 2020-04-16 DIAGNOSIS — L30.9 DERMATITIS: ICD-10-CM

## 2020-04-16 PROCEDURE — 99202 OFFICE O/P NEW SF 15 MIN: CPT | Mod: 95,CR | Performed by: DERMATOLOGY

## 2020-04-16 RX ORDER — TRIAMCINOLONE ACETONIDE 1 MG/G
OINTMENT TOPICAL
Qty: 15 G | Refills: 1 | Status: SHIPPED | OUTPATIENT
Start: 2020-04-16 | End: 2022-02-03

## 2020-04-16 NOTE — PROGRESS NOTES
"VIRTUAL VIDEO DERMATOLOGY VISIT     As a means of avoiding spread of COVID-19, this visit is being conducted by synchronous video with patient. This video visit was initiated by the patient and they verbally consented.      Location of the patient: [place]   Location of provider: [place]    Chief complaint: rash on lips     HPI: started on right side, then went to left side, then went away  Onset: 3 months  Symptoms: tight, flaky, \"inflamed\"  Aggravating factors: lotion  Alleviating factors: OTC hydrocortisone  New creams/topicals: OTC lotion and hydrocortisone  Other exposures: none  Treatments: OTC hydrocortisone - tried for weeks, gets better but doesn't go away  Also notice blisters on calf that looked like herpes but resolved after using burn cream.  Hx cold sores and genital herpes.  Currently on Bactrim for cellulitis on foot, started 7 days ago.    History of skin cancer: No   History of precancers: actinic keratoses    Past Medical History:   Diagnosis Date   • Arthritis     OA OF HANDS, KNEESM, HIPS AND BACK   • Asthma 10/17/2019   • CAD (coronary artery disease) 2007    CABG   • Diabetes    • Diabetic neuropathy ([facility]) 1/6/2011   • Fall     \"a couple years ago because of my neuopathy\" multiple falls   • Gastritis 7/10    H/O UGIB   • GERD (gastroesophageal reflux disease)    • Hyperlipidemia    • Hypertension    • Hypothyroidism    • Infectious disease     History of MRSA   • Migraine    • Neuropathy in diabetes ([facility])    • HILDA (obstructive sleep apnea)    • S/P aortic valve replacement 2007   • TIA (transient ischemic attack) 2005   • Tricuspid regurgitation mild- mod 1/6/2011   • Unspecified hemorrhagic conditions     Bleeds easily-GI bleeds with NSAIDS and ASA   • Upper GI bleed ON JULY 2007 1/6/2011        Family History   Problem Relation Age of Onset   • Heart Disease Mother         a fib   • Heart Disease Brother         a fib        Social History     Socioeconomic History   • Marital status:      " "Spouse name: Not on file   • Number of children: Not on file   • Years of education: Not on file   • Highest education level: Not on file   Occupational History   • Not on file   Social Needs   • Financial resource strain: Not on file   • Food insecurity     Worry: Not on file     Inability: Not on file   • Transportation needs     Medical: Not on file     Non-medical: Not on file   Tobacco Use   • Smoking status: Never Smoker   • Smokeless tobacco: Never Used   Substance and Sexual Activity   • Alcohol use: No   • Drug use: No   • Sexual activity: Not on file   Lifestyle   • Physical activity     Days per week: Not on file     Minutes per session: Not on file   • Stress: Not on file   Relationships   • Social connections     Talks on phone: Not on file     Gets together: Not on file     Attends Religion service: Not on file     Active member of club or organization: Not on file     Attends meetings of clubs or organizations: Not on file     Relationship status: Not on file   • Intimate partner violence     Fear of current or ex partner: Not on file     Emotionally abused: Not on file     Physically abused: Not on file     Forced sexual activity: Not on file   Other Topics Concern   • Not on file   Social History Narrative   • Not on file        Allergies   Allergen Reactions   • Hmg-Coa-R Inhibitors    • Asa [Aspirin]      GI BLEED   • Cymbalta [Duloxetine Hcl]      Feels like a \"zombie\"   • Latex    • Lyrica [Pregabalin]      SPACED OUT   • Nsaids      GI BLEED   • Tricor      Breathing problems     • Trilipix [Choline Fenofibrate]      SOB        MEDICATIONS:  Medications relevant to specialty reviewed.    Current Outpatient Medications:   •  triamcinolone acetonide (KENALOG) 0.1 % Ointment, Apply twice daily to affected area of lip until the skin is smooth or up to 2 weeks., Disp: 15 g, Rfl: 1  •  ezetimibe (ZETIA) 10 MG Tab, TAKE 1 TABLET BY MOUTH EVERY DAY, Disp: 90 Tab, Rfl: 3  •  warfarin (COUMADIN) 4 MG " Tab, TAKE 1 TABLET BY MOUTH EVERY DAY AS DIRECTED BY THE [facility], Disp: 90 Tab, Rfl: 2  •  Alirocumab (PRALUENT) 150 MG/ML Solution Pen-injector, Inject 150 mg as instructed every 14 days., Disp: 6 PEN, Rfl: 3  •  amiodarone (CORDARONE) 200 MG Tab, Take 1 Tab by mouth every day., Disp: 90 Tab, Rfl: 3  •  metoprolol (LOPRESSOR) 25 MG Tab, Take 0.5 Tabs by mouth 2 times a day., Disp: 30 Tab, Rfl: 11  •  furosemide (LASIX) 40 MG Tab, Take 1 Tab by mouth every day., Disp: 30 Tab, Rfl: 11  •  cyclobenzaprine (FLEXERIL) 10 MG Tab, Take 1 Tab by mouth 3 times a day as needed for Moderate Pain or Muscle Spasms., Disp: 20 Tab, Rfl: 0  •  albuterol 108 (90 BASE) MCG/ACT Aero Soln inhalation aerosol, Inhale 2 Puffs by mouth every four hours as needed for Shortness of Breath. (Patient not taking: Reported on 12/26/2018), Disp: 1 Inhaler, Rfl: 0  •  lisinopril (PRINIVIL) 20 MG TABS, Take 20 mg by mouth 2 times a day., Disp: , Rfl:   •  potassium chloride SA (K-DUR) 20 MEQ TBCR, Take 20 mEq by mouth every day., Disp: , Rfl:   •  vitamin D (CHOLECALCIFEROL) 1000 UNIT TABS, Take 1,000 Units by mouth every day.  , Disp: , Rfl:   •  docosahexanoic acid (OMEGA 3 FA) 1000 MG CAPS, Take 1,000 mg by mouth 2 Times a Day., Disp: , Rfl:   •  levothyroxine (LEVOXYL) 137 MCG TABS, Take 1 Tab by mouth every day., Disp: 30 Each, Rfl: 3  •  tramadol (ULTRAM) 50 MG TABS, Take 2 Tabs by mouth 3 times a day., Disp: 180 Each, Rfl: 0     REVIEW OF SYSTEMS:   Positive for skin (see HPI)  Negative for fevers, chills and cough       EXAM:  There were no vitals taken for this visit.  Constitutional: Well-developed, well-nourished, and in no distress.   Neurological: Alert and oriented.    A focused skin exam limited by quality of video was performed including the affected areas of the head (including face). Notable findings on exam today listed below.   Additionally, photos sent by patient via [email or web address] were reviewed.    - left upper  lip (cutenaous lip and vermillion border) with ill defined pink scaly patch       IMPRESSION / PLAN:    1. Dermatitis vs actinic cheilitis  - discussed ddx above and tx options, will treat first for eczema and labial HSV given patient history (has Valtrex already at home) and if no improvement in 2 weeks, pt to call and will plan to treat for actinic cheilitis  - start Valtrex 2 g BID x 1 day (empiric tx given history of recurrent cold sores and genital HSV)  - triamcinolone 0.1%  ointment BID to affected areas of the lips x 2 weeks   - We reviewed risks/benefits/expectations of treatment in detail, including side effects of long term topical steroid use (such as striae, atrophy and telangiectasias).    - call in 2 weeks to update me on progress, if not improving, will plan to treat for actinic cheilitis with Efudex BID x 2-3 weeks      Return to clinic in: Return if symptoms worsen or fail to improve. and as needed for any new or changing skin lesions.    Milena Vásquez M.D.

## 2020-05-04 ENCOUNTER — ANTICOAGULATION MONITORING (OUTPATIENT)
Dept: VASCULAR LAB | Facility: MEDICAL CENTER | Age: 70
End: 2020-05-04

## 2020-05-04 DIAGNOSIS — Z79.01 CHRONIC ANTICOAGULATION: ICD-10-CM

## 2020-05-04 DIAGNOSIS — I48.91 ATRIAL FIBRILLATION, UNSPECIFIED TYPE (HCC): ICD-10-CM

## 2020-05-04 DIAGNOSIS — G45.9 TIA (TRANSIENT ISCHEMIC ATTACK): ICD-10-CM

## 2020-05-04 DIAGNOSIS — Z95.2 HX OF MECHANICAL AORTIC VALVE REPLACEMENT: ICD-10-CM

## 2020-05-04 LAB — INR PPP: 3.5 (ref 2–3.5)

## 2020-05-04 NOTE — PROGRESS NOTES
Anticoagulation Summary  As of 5/4/2020    INR goal:   2.5-3.5   TTR:   62.5 % (3.2 y)   INR used for dosing:   3.50 (5/4/2020)   Warfarin maintenance plan:   4 mg (4 mg x 1) every day   Weekly warfarin total:   28 mg   Plan last modified:   STEPH Garner (12/9/2019)   Next INR check:   6/15/2020   Target end date:   Indefinite    Indications    Chronic anticoagulation [Z79.01]  Hx of mechanical aortic valve replacement [V43.3] [Z95.2]  TIA (transient ischemic attack) [G45.9]  Atrial fibrillation (HCC) [I48.91] [I48.91]             Anticoagulation Episode Summary     INR check location:       Preferred lab:       Send INR reminders to:       Comments:         Anticoagulation Care Providers     Provider Role Specialty Phone number    Michael J Bloch, M.D. Referring Internal Medicine [phone]    Emily MartinezD Responsible          Anticoagulation Patient Findings      Left voicemail message to report a therapeutic INR of 3.5.    Pt to continue with current warfarin dosing regimen. Requested pt contact the clinic for any s/s of unusual bleeding, bruising, clotting or any changes to diet or medication.    FU INR in 6 week(s).    Sybil Frazier, PharmD

## 2020-05-15 DIAGNOSIS — Z79.01 CHRONIC ANTICOAGULATION: ICD-10-CM

## 2020-05-22 ENCOUNTER — TELEPHONE (OUTPATIENT)
Dept: VASCULAR LAB | Facility: MEDICAL CENTER | Age: 70
End: 2020-05-22

## 2020-05-22 NOTE — TELEPHONE ENCOUNTER
Patient called requesting we reach out to her medical supply company to get her supplies from [employer]. Left  for Conrado at [facility].

## 2020-05-22 NOTE — TELEPHONE ENCOUNTER
Spoke to Conrado at [facility] he advised to have the patient call [facility] [phone] option 3 and talk to customer care they will assist the pt with strips until her shipment comes in / lft  for pt provided this information.

## 2020-06-05 ENCOUNTER — TELEPHONE (OUTPATIENT)
Dept: VASCULAR LAB | Facility: MEDICAL CENTER | Age: 70
End: 2020-06-05

## 2020-06-05 NOTE — TELEPHONE ENCOUNTER
6/5/20- Pt called to inform that [facility] faxed the form needed for her test strips refill, I called and spoke to Karan at [facility] and he will have this faxed over urgently to [phone] ATTN: Nguyen

## 2020-06-10 ENCOUNTER — TELEPHONE (OUTPATIENT)
Dept: VASCULAR LAB | Facility: MEDICAL CENTER | Age: 70
End: 2020-06-10

## 2020-06-11 ENCOUNTER — TELEPHONE (OUTPATIENT)
Dept: CARDIOLOGY | Facility: MEDICAL CENTER | Age: 70
End: 2020-06-11

## 2020-06-11 DIAGNOSIS — Z95.2 HX OF MECHANICAL AORTIC VALVE REPLACEMENT: ICD-10-CM

## 2020-06-11 RX ORDER — FUROSEMIDE 40 MG/1
40 TABLET ORAL DAILY
Qty: 90 TAB | Refills: 1 | Status: SHIPPED | OUTPATIENT
Start: 2020-06-11 | End: 2020-12-23

## 2020-06-11 RX ORDER — FUROSEMIDE 40 MG/1
40 TABLET ORAL DAILY
Qty: 90 TAB | Refills: 1 | Status: SHIPPED
Start: 2020-06-11 | End: 2020-06-11

## 2020-06-11 NOTE — TELEPHONE ENCOUNTER
AK/carlos    Pt calling to report metoprolol causing many side effects, rash on lip, blisters on legs, loud buzzing sound in ears, hallucinations, pt has read about metoprolol and understands these are caused by metoprolol.      Please call Yenifer .

## 2020-06-11 NOTE — TELEPHONE ENCOUNTER
"S/w pt who explains that she has had a rash on her upper lip on and off since February but does admit that she has a hx of herpes when mentioning that most rashs on the lips are viral in nature.   She explains that she saw dermatology and was given some sort of \"acetone cream\" for the fluid filled blisters on her legs which helps but then comes back. She can see an outline of a \"flower\" when she wakes up in the middle of the night which she describes as hallucinations.   Pt says she knows the buzz in her ear is metoprolol because it happens about 1 hour every time after taking this medication.   She admits these issues are not severe, just irritating.   Average BP and HR readings have been per her home monitor: 126/58 HR 49    She requests lasix at a 90 day supply. This will be done as requested.     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 She requested that Dr. Hawkins provide her a medical necessity letter stating that it is against medical advised for her to return to work. 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 She said her pain doctor said to ask her cardiologist.     Advice to pt:   Explained to pt that her reported side effects are very rare and likely not all related to metoprolol. However, given she is on a very small dose, she can trial off the medication for 1-2 weeks to see if this changes her symptoms. Advised that she continue to monitor BP and HR daily and update us with the outcome. Explained these recommendations would be confirmed with Dr. Hawkins.     Regarding letter, advised that providing work excuse letters is not common practice in our department and that we are generally recommending following CDC guidelines. She seemed upset about " this but will go back and ask her pain doctor again.     She has an upcoming appt with Dr. Hawkins 7/14.   To Dr. Hawkins

## 2020-06-12 NOTE — TELEPHONE ENCOUNTER
Message   Received: Today   Message Contents   Caleb Hawkins M.D.  Dottie Genao R.N.    Caller: Unspecified (Today,  9:15 AM)               Sounds good. I cant provide excuse from work. Good job explaining everything to her.      Noted

## 2020-06-16 ENCOUNTER — APPOINTMENT (OUTPATIENT)
Dept: VASCULAR LAB | Facility: MEDICAL CENTER | Age: 70
End: 2020-06-16
Attending: INTERNAL MEDICINE
Payer: MEDICARE

## 2020-06-17 LAB — INR PPP: 3.1 (ref 2–3.5)

## 2020-06-19 ENCOUNTER — ANTICOAGULATION MONITORING (OUTPATIENT)
Dept: MEDICAL GROUP | Facility: PHYSICIAN GROUP | Age: 70
End: 2020-06-19

## 2020-06-19 DIAGNOSIS — Z79.01 CHRONIC ANTICOAGULATION: ICD-10-CM

## 2020-06-19 DIAGNOSIS — I48.91 ATRIAL FIBRILLATION, UNSPECIFIED TYPE (HCC): ICD-10-CM

## 2020-06-19 DIAGNOSIS — Z95.2 HX OF MECHANICAL AORTIC VALVE REPLACEMENT: ICD-10-CM

## 2020-06-19 DIAGNOSIS — G45.9 TIA (TRANSIENT ISCHEMIC ATTACK): ICD-10-CM

## 2020-06-19 NOTE — PROGRESS NOTES
Anticoagulation Summary  As of 6/19/2020    INR goal:   2.5-3.5   TTR:   63.8 % (3.3 y)   INR used for dosing:   3.10 (6/17/2020)   Warfarin maintenance plan:   4 mg (4 mg x 1) every day   Weekly warfarin total:   28 mg   Plan last modified:   STEPH Garner (12/9/2019)   Next INR check:   7/3/2020   Target end date:   Indefinite    Indications    Chronic anticoagulation [Z79.01]  Hx of mechanical aortic valve replacement [V43.3] [Z95.2]  TIA (transient ischemic attack) [G45.9]  Atrial fibrillation (HCC) [I48.91] [I48.91]             Anticoagulation Episode Summary     INR check location:       Preferred lab:       Send INR reminders to:       Comments:         Anticoagulation Care Providers     Provider Role Specialty Phone number    Michael J Bloch, M.D. Referring Internal Medicine [phone]    Yinka Church, PharmD Responsible          Anticoagulation Patient Findings  Patient Findings     Negatives:   Signs/symptoms of thrombosis, Signs/symptoms of bleeding, Laboratory test error suspected, Change in health, Change in alcohol use, Change in activity, Upcoming invasive procedure, Emergency department visit, Upcoming dental procedure, Missed doses, Extra doses, Change in medications, Change in diet/appetite, Hospital admission, Bruising, Other complaints        Spoke with patient today regarding therapeutic INR of 3.1.  Patient denies any signs/symptoms of bruising or bleeding or any changes in diet and medications.  Instructed patient to call clinic with any questions or concerns.  Pt is to continue with current warfarin dosing regimen.  Follow up in 2 weeks, to reduce risk of adverse events related to this high risk medication,  Warfarin.    Yinka Church, PharmD, BCACP

## 2020-07-07 ENCOUNTER — ANTICOAGULATION MONITORING (OUTPATIENT)
Dept: VASCULAR LAB | Facility: MEDICAL CENTER | Age: 70
End: 2020-07-07

## 2020-07-07 DIAGNOSIS — Z95.2 HX OF MECHANICAL AORTIC VALVE REPLACEMENT: ICD-10-CM

## 2020-07-07 DIAGNOSIS — I48.91 ATRIAL FIBRILLATION, UNSPECIFIED TYPE (HCC): ICD-10-CM

## 2020-07-07 DIAGNOSIS — Z79.01 CHRONIC ANTICOAGULATION: ICD-10-CM

## 2020-07-07 DIAGNOSIS — G45.9 TIA (TRANSIENT ISCHEMIC ATTACK): ICD-10-CM

## 2020-07-07 LAB — INR PPP: 3.5 (ref 2–3.5)

## 2020-07-07 NOTE — PROGRESS NOTES
Anticoagulation Summary  As of 7/7/2020    INR goal:   2.5-3.5   TTR:   64.4 % (3.4 y)   INR used for dosing:   3.50 (7/7/2020)   Warfarin maintenance plan:   4 mg (4 mg x 1) every day   Weekly warfarin total:   28 mg   Plan last modified:   STEPH Garner (12/9/2019)   Next INR check:   8/4/2020   Target end date:   Indefinite    Indications    Chronic anticoagulation [Z79.01]  Hx of mechanical aortic valve replacement [V43.3] [Z95.2]  TIA (transient ischemic attack) [G45.9]  Atrial fibrillation (HCC) [I48.91] [I48.91]             Anticoagulation Episode Summary     INR check location:       Preferred lab:       Send INR reminders to:       Comments:   [employer] home meter      Anticoagulation Care Providers     Provider Role Specialty Phone number    Michael J Bloch, M.D. Referring Internal Medicine [phone]    Yinka Church, PharmD Responsible          Anticoagulation Patient Findings  Patient Findings     Negatives:   Signs/symptoms of thrombosis, Signs/symptoms of bleeding, Laboratory test error suspected, Change in health, Change in alcohol use, Change in activity, Upcoming invasive procedure, Emergency department visit, Upcoming dental procedure, Missed doses, Extra doses, Change in medications, Change in diet/appetite, Hospital admission, Bruising, Other complaints        Spoke with patient today regarding therapeutic INR of 3.5.  Patient denies any signs/symptoms of bruising or bleeding or any changes in diet and medications.  Instructed patient to call clinic with any questions or concerns.  Pt is to continue with current warfarin dosing regimen.  Follow up in 4 weeks, to reduce risk of adverse events related to this high risk medication,  Warfarin.    Yinka Church, PharmD, BCACP

## 2020-07-15 DIAGNOSIS — I48.91 ATRIAL FIBRILLATION, UNSPECIFIED TYPE (HCC): Primary | ICD-10-CM

## 2020-07-17 DIAGNOSIS — I10 ESSENTIAL HYPERTENSION: Primary | ICD-10-CM

## 2020-07-17 RX ORDER — AMIODARONE HYDROCHLORIDE 200 MG/1
TABLET ORAL
Qty: 90 TAB | Refills: 2 | Status: SHIPPED | OUTPATIENT
Start: 2020-07-17 | End: 2021-02-05

## 2020-08-12 LAB — INR PPP: 2.8 (ref 2–3.5)

## 2020-08-13 ENCOUNTER — ANTICOAGULATION MONITORING (OUTPATIENT)
Dept: VASCULAR LAB | Facility: MEDICAL CENTER | Age: 70
End: 2020-08-13

## 2020-08-13 DIAGNOSIS — Z95.2 HX OF MECHANICAL AORTIC VALVE REPLACEMENT: ICD-10-CM

## 2020-08-13 DIAGNOSIS — G45.9 TIA (TRANSIENT ISCHEMIC ATTACK): ICD-10-CM

## 2020-08-13 DIAGNOSIS — Z79.01 CHRONIC ANTICOAGULATION: ICD-10-CM

## 2020-08-13 DIAGNOSIS — I48.91 ATRIAL FIBRILLATION, UNSPECIFIED TYPE (HCC): ICD-10-CM

## 2020-08-13 NOTE — PROGRESS NOTES
Anticoagulation Summary  As of 2020    INR goal:  2.5-3.5   TTR:  65.4 % (3.5 y)   INR used for dosin.80 (2020)   Warfarin maintenance plan:  4 mg (4 mg x 1) every day   Weekly warfarin total:  28 mg   Plan last modified:  STEPH Garner (2019)   Next INR check:  2020   Target end date:  Indefinite    Indications    Chronic anticoagulation [Z79.01]  Hx of mechanical aortic valve replacement [V43.3] [Z95.2]  TIA (transient ischemic attack) [G45.9]  Atrial fibrillation (HCC) [I48.91] [I48.91]             Anticoagulation Episode Summary     INR check location:      Preferred lab:      Send INR reminders to:      Comments:  [employer] home meter      Anticoagulation Care Providers     Provider Role Specialty Phone number    Michael J Bloch, M.D. Referring Internal Medicine [phone]    Emily MartinezD Responsible          Anticoagulation Patient Findings      INR  therapeutic.   Left a voice message for the patient, asked patient to please call the anticoagulation clinic if they have any signs/symptoms of bleeding and/or thrombosis or any changes to diet or medications.    Follow up appointment in 2 week(s)    Continue weekly warfarin dose as noted      Coleman Dozier, PharmD, MS, BCACP, LCC    This note was created using voice recognition software (Dragon). The accuracy of the dictation is limited by the abilities of the software. I have reviewed the note prior to signing, however some errors in grammar and context are still possible. If you have any questions related to this note please do not hesitate to contact our office.

## 2020-09-01 ENCOUNTER — APPOINTMENT (OUTPATIENT)
Dept: CARDIOLOGY | Facility: MEDICAL CENTER | Age: 70
End: 2020-09-01
Attending: NURSE PRACTITIONER
Payer: MEDICARE

## 2020-09-10 ENCOUNTER — ANTICOAGULATION MONITORING (OUTPATIENT)
Dept: VASCULAR LAB | Facility: MEDICAL CENTER | Age: 70
End: 2020-09-10

## 2020-09-10 DIAGNOSIS — Z95.2 HX OF MECHANICAL AORTIC VALVE REPLACEMENT: ICD-10-CM

## 2020-09-10 DIAGNOSIS — I48.91 ATRIAL FIBRILLATION, UNSPECIFIED TYPE (HCC): ICD-10-CM

## 2020-09-10 DIAGNOSIS — G45.9 TIA (TRANSIENT ISCHEMIC ATTACK): ICD-10-CM

## 2020-09-10 DIAGNOSIS — Z79.01 CHRONIC ANTICOAGULATION: ICD-10-CM

## 2020-09-10 LAB — INR PPP: 3.6 (ref 2–3.5)

## 2020-09-10 NOTE — PROGRESS NOTES
Anticoagulation Summary  As of 9/10/2020    INR goal:  2.5-3.5   TTR:  65.9 % (3.5 y)   INR used for dosing:  3.60 (9/10/2020)   Warfarin maintenance plan:  4 mg (4 mg x 1) every day   Weekly warfarin total:  28 mg   Plan last modified:  STEPH Garner (12/9/2019)   Next INR check:  9/24/2020   Target end date:  Indefinite    Indications    Chronic anticoagulation [Z79.01]  Hx of mechanical aortic valve replacement [V43.3] [Z95.2]  TIA (transient ischemic attack) [G45.9]  Atrial fibrillation (HCC) [I48.91] [I48.91]             Anticoagulation Episode Summary     INR check location:      Preferred lab:      Send INR reminders to:      Comments:  [employer] home meter      Anticoagulation Care Providers     Provider Role Specialty Phone number    Michael J Bloch, M.D. Referring Internal Medicine [phone]    Yinka Church, PharmD Responsible              Spoke with patient today regarding supratherapeutic INR of 3.6.  Patient denies any signs/symptoms of bruising or bleeding or any changes in diet and medications.  Instructed patient to call clinic with any questions or concerns.    Patient is to take half a dose (2 mg) today and continue on with the current weekly regimen.    Follow up in 2 weeks, to reduce risk of adverse events related to this high risk medication,  Warfarin.    Kirit Guerrero, Pharmacy Intern

## 2020-09-21 ENCOUNTER — APPOINTMENT (OUTPATIENT)
Dept: DERMATOLOGY | Facility: IMAGING CENTER | Age: 70
End: 2020-09-21
Payer: MEDICARE

## 2020-09-30 LAB — INR PPP: 5 (ref 2–3.5)

## 2020-10-01 ENCOUNTER — ANTICOAGULATION MONITORING (OUTPATIENT)
Dept: MEDICAL GROUP | Facility: PHYSICIAN GROUP | Age: 70
End: 2020-10-01

## 2020-10-01 DIAGNOSIS — Z95.2 HX OF MECHANICAL AORTIC VALVE REPLACEMENT: ICD-10-CM

## 2020-10-01 DIAGNOSIS — G45.9 TIA (TRANSIENT ISCHEMIC ATTACK): ICD-10-CM

## 2020-10-01 DIAGNOSIS — Z79.01 CHRONIC ANTICOAGULATION: ICD-10-CM

## 2020-10-01 NOTE — PROGRESS NOTES
OP Telephone Anticoagulation Service Note    Date: 10/1/2020      Anticoagulation Summary  As of 10/1/2020    INR goal:  2.5-3.5   TTR:  64.9 % (3.6 y)   INR used for dosin.00 (2020)   Warfarin maintenance plan:  4 mg (4 mg x 1) every day   Weekly warfarin total:  28 mg   Plan last modified:  STEPH Garner (2019)   Next INR check:  10/5/2020   Target end date:  Indefinite    Indications    Chronic anticoagulation [Z79.01]  Hx of mechanical aortic valve replacement [V43.3] [Z95.2]  TIA (transient ischemic attack) [G45.9]  Atrial fibrillation (HCC) [I48.91] [I48.91]             Anticoagulation Episode Summary     INR check location:      Preferred lab:      Send INR reminders to:      Comments:  [employer] home meter      Anticoagulation Care Providers     Provider Role Specialty Phone number    Michael J Bloch, M.D. Referring Internal Medicine [phone]    Yinka Church, PharmD Responsible          Anticoagulation Patient Findings        Plan: INR is high. Left message on patient's answering machine/voicemail. Instructed patient to call back with any concerns regarding any unusual bleeding or bruising, any medication or diet changes or any signs or symptoms of thrombosis. Instructed patient HOLD x 2 days then 2 mg then to resume medication as outlined above. Patient to follow up in 4 days.     Bushra Smith, PharmD    Addendum:  Pt did not receive instructions on , so she held her warfarin of her own volition. She was on clindamycin x 7D 2/2 a UTI which was completed on . Pt was also taking an OTC cranberry product which explains the elevation in INR - counseled pt to avoid cranberry. Counseled pt to hold x a total of 2 days and then take a decreased dose of 2 mg Fri and to then resume her current regimen.

## 2020-10-05 ENCOUNTER — ANTICOAGULATION MONITORING (OUTPATIENT)
Dept: VASCULAR LAB | Facility: MEDICAL CENTER | Age: 70
End: 2020-10-05

## 2020-10-05 DIAGNOSIS — Z95.2 HX OF MECHANICAL AORTIC VALVE REPLACEMENT: ICD-10-CM

## 2020-10-05 DIAGNOSIS — G45.9 TIA (TRANSIENT ISCHEMIC ATTACK): ICD-10-CM

## 2020-10-05 DIAGNOSIS — Z79.01 CHRONIC ANTICOAGULATION: ICD-10-CM

## 2020-10-05 LAB — INR PPP: 1.5 (ref 2–3.5)

## 2020-10-06 NOTE — PROGRESS NOTES
Anticoagulation Summary  As of 10/5/2020    INR goal:  2.5-3.5   TTR:  64.8 % (3.6 y)   INR used for dosin.50 (10/5/2020)   Warfarin maintenance plan:  4 mg (4 mg x 1) every day   Weekly warfarin total:  28 mg   Plan last modified:  Coleman Dozier PharmD (10/6/2020)   Next INR check:  10/9/2020   Target end date:  Indefinite    Indications    Chronic anticoagulation [Z79.01]  Hx of mechanical aortic valve replacement [V43.3] [Z95.2]  TIA (transient ischemic attack) [G45.9]  Atrial fibrillation (HCC) [I48.91] [I48.91]             Anticoagulation Episode Summary     INR check location:      Preferred lab:      Send INR reminders to:      Comments:  [employer] home meter      Anticoagulation Care Providers     Provider Role Specialty Phone number    Michael J Bloch, M.D. Referring Internal Medicine [phone]    Emily MartinezD Responsible          Anticoagulation Patient Findings    Spoke to patient on the phone.   INR  sub-therapeutic.  No Lovenox per pt  Denies signs/symptoms of bleeding and/or thrombosis.   Denies changes to diet or medications.   Follow up appointment in 3 days     6mg today then continue the same warfarin dose, as noted above.       Coleman Dozier, PharmD, MS, BCACP, LCC    This note was created using voice recognition software (Dragon). The accuracy of the dictation is limited by the abilities of the software. I have reviewed the note prior to signing, however some errors in grammar and context are still possible. If you have any questions related to this note please do not hesitate to contact our office.

## 2020-10-09 ENCOUNTER — ANTICOAGULATION MONITORING (OUTPATIENT)
Dept: VASCULAR LAB | Facility: MEDICAL CENTER | Age: 70
End: 2020-10-09

## 2020-10-09 DIAGNOSIS — I48.91 ATRIAL FIBRILLATION, UNSPECIFIED TYPE (HCC): ICD-10-CM

## 2020-10-09 DIAGNOSIS — Z95.2 HX OF MECHANICAL AORTIC VALVE REPLACEMENT: ICD-10-CM

## 2020-10-09 DIAGNOSIS — Z79.01 CHRONIC ANTICOAGULATION: ICD-10-CM

## 2020-10-09 DIAGNOSIS — G45.9 TIA (TRANSIENT ISCHEMIC ATTACK): ICD-10-CM

## 2020-10-09 LAB — INR PPP: 2.1 (ref 2–3.5)

## 2020-10-09 NOTE — PROGRESS NOTES
OP   Telephone Anticoagulation Service Note      Anticoagulation Summary  As of 10/9/2020    INR goal:  2.5-3.5   TTR:  64.6 % (3.6 y)   INR used for dosin.10 (10/9/2020)   Warfarin maintenance plan:  4 mg (4 mg x 1) every day   Weekly warfarin total:  28 mg   Plan last modified:  Emily MccrayD (10/6/2020)   Next INR check:  10/16/2020   Target end date:  Indefinite    Indications    Chronic anticoagulation [Z79.01]  Hx of mechanical aortic valve replacement [V43.3] [Z95.2]  TIA (transient ischemic attack) [G45.9]  Atrial fibrillation (HCC) [I48.91] [I48.91]             Anticoagulation Episode Summary     INR check location:      Preferred lab:      Send INR reminders to:      Comments:  [employer] home meter      Anticoagulation Care Providers     Provider Role Specialty Phone number    Michael J Bloch, M.D. Referring Internal Medicine [phone]    Yinka Church, PharmD Responsible          Anticoagulation Patient Findings  Patient Findings     Negatives:  Signs/symptoms of thrombosis, Signs/symptoms of bleeding, Laboratory test error suspected, Change in health, Change in alcohol use, Change in activity, Upcoming invasive procedure, Emergency department visit, Upcoming dental procedure, Missed doses, Extra doses, Change in medications, Change in diet/appetite, Hospital admission, Bruising, Other complaints         Spoke with the patient on the phone today, reporting a SUB-therapeutic INR of 2.1.  Confirmed the current warfarin dosing regimen and patient compliance. Patient denies any interval changes to diet and/or medications. Patient denies any signs/symptoms of bleeding or clotting.  Patient will again bolus with 6mg TONIGHT, as a one time boost, and then resume her current regimen of 4mg QD and retest again in 1 week.     Porter Corrales  PharmD

## 2020-10-10 ENCOUNTER — IMMUNIZATION (OUTPATIENT)
Dept: SOCIAL WORK | Facility: CLINIC | Age: 70
End: 2020-10-10
Payer: MEDICARE

## 2020-10-10 DIAGNOSIS — Z23 NEED FOR VACCINATION: ICD-10-CM

## 2020-10-10 PROCEDURE — 90662 IIV NO PRSV INCREASED AG IM: CPT | Performed by: REGISTERED NURSE

## 2020-10-10 PROCEDURE — G0008 ADMIN INFLUENZA VIRUS VAC: HCPCS | Performed by: REGISTERED NURSE

## 2020-10-16 ENCOUNTER — TELEPHONE (OUTPATIENT)
Dept: VASCULAR LAB | Facility: MEDICAL CENTER | Age: 70
End: 2020-10-16

## 2020-10-16 NOTE — TELEPHONE ENCOUNTER
[facility]    Pt missed appt on 9/16.  Left VM with pt requesting a call back to discuss making f/u appt.    [place], PharmD

## 2020-10-20 ASSESSMENT — ENCOUNTER SYMPTOMS
BRUISES/BLEEDS EASILY: 0
SHORTNESS OF BREATH: 0
COUGH: 0
CLAUDICATION: 0
NERVOUS/ANXIOUS: 0
MYALGIAS: 0
PALPITATIONS: 0
HEADACHES: 0
BLOOD IN STOOL: 0

## 2020-10-21 ENCOUNTER — OFFICE VISIT (OUTPATIENT)
Dept: VASCULAR LAB | Facility: MEDICAL CENTER | Age: 70
End: 2020-10-21
Payer: MEDICARE

## 2020-10-21 DIAGNOSIS — Z95.2 S/P AORTIC VALVE REPLACEMENT: ICD-10-CM

## 2020-10-21 DIAGNOSIS — Z79.01 CHRONIC ANTICOAGULATION: ICD-10-CM

## 2020-10-21 DIAGNOSIS — I48.91 ATRIAL FIBRILLATION, UNSPECIFIED TYPE (HCC): ICD-10-CM

## 2020-10-21 DIAGNOSIS — I10 ESSENTIAL HYPERTENSION: ICD-10-CM

## 2020-10-21 DIAGNOSIS — Z95.2 H/O MECHANICAL AORTIC VALVE REPLACEMENT: ICD-10-CM

## 2020-10-21 DIAGNOSIS — I25.811 CORONARY ARTERY DISEASE INVOLVING NATIVE ARTERY OF TRANSPLANTED HEART WITHOUT ANGINA PECTORIS: ICD-10-CM

## 2020-10-21 DIAGNOSIS — E78.5 DYSLIPIDEMIA: ICD-10-CM

## 2020-10-21 DIAGNOSIS — R73.01 IMPAIRED FASTING GLUCOSE: ICD-10-CM

## 2020-10-21 DIAGNOSIS — G45.9 TIA (TRANSIENT ISCHEMIC ATTACK): ICD-10-CM

## 2020-10-21 PROCEDURE — 99213 OFFICE O/P EST LOW 20 MIN: CPT | Mod: 95,CR | Performed by: NURSE PRACTITIONER

## 2020-10-21 RX ORDER — WARFARIN SODIUM 4 MG/1
TABLET ORAL
Qty: 180 TAB | Refills: 2 | Status: SHIPPED | OUTPATIENT
Start: 2020-10-21 | End: 2021-11-15

## 2020-10-21 ASSESSMENT — ENCOUNTER SYMPTOMS
CLAUDICATION: 0
ABDOMINAL PAIN: 0
BLOOD IN STOOL: 0
BRUISES/BLEEDS EASILY: 0
COUGH: 0
HEADACHES: 0
NERVOUS/ANXIOUS: 0
MYALGIAS: 0
PALPITATIONS: 0
SHORTNESS OF BREATH: 0

## 2020-10-21 NOTE — PROGRESS NOTES
This visit was conducted via [employer] video call using secure and encrypted video conferencing technology due to covid-19 restrictions.   The patient was in a private location in the state of [place].    The patient's identity was confirmed and verbal consent was obtained for this virtual visit.    [facility] Follow Up Visit    Date of Service: 10/21/2020    Yenifer Beasley is here for follow up of dyslipidemia, HTN, chronic anticoagulation, and hypothyroidism    HPI  Pertinent Interval History since last visit:   Occasional minor palpitations, resolve quickly  Overdue for echo and labs  Has f/u with cards in Dec  On Praluent, no current problems, has one month of injections left  No home BP readings  On warfarin, no bleeding problems  Denies CP   Minor LE swelling  Needs warfarin refilled   Needs Praluent refilled     Current Prescription Lipid Lowering Medications - including dose:   Statin: None  Non-Statin: Continue Praluent 150 mg 2x per week  Continue Zetia  Current Lipid Lowering and Related Supplements:   Omega 3s, 1000u daily  Vit D  CoQ10  Any Current Side Effects Potentially Related to Lipid Lowering therapy?   No  Current Adherence to Lipid Lowering Therapies:  Complete  Any Previous History of Statin Intolerance?   Yes, Details: severe myalgias on multiple statins  Baseline Lipids Prior to Treatment:  Shown here:  LDL-C: 170      SOCIAL HISTORY  Social History     Tobacco Use   Smoking Status Never Smoker   Smokeless Tobacco Never Used      Change in weight: down about 5lbs  Exercise habits: mod exercise  Diet: common adult, consistent vit K    Review of Systems   Constitutional: Negative for malaise/fatigue.   Respiratory: Negative for cough and shortness of breath.    Cardiovascular: Negative for chest pain, palpitations, claudication and leg swelling.   Gastrointestinal: Negative for abdominal pain, blood in stool and melena.   Genitourinary: Negative for hematuria.   Musculoskeletal:  Positive for joint pain. Negative for myalgias.   Neurological: Negative for headaches.   Endo/Heme/Allergies: Does not bruise/bleed easily.   Psychiatric/Behavioral: The patient is not nervous/anxious.      Physical Exam   Constitutional: She appears well-developed.   Pulmonary/Chest: Effort normal.   Neurological: She is alert.   Psychiatric: She has a normal mood and affect. Her behavior is normal.     DATA REVIEW  Most Recent Lipid Panel:   Lab Results   Component Value Date    CHOLSTRLTOT 145 10/14/2019    TRIGLYCERIDE 229 (H) 10/14/2019    HDL 55 10/14/2019    LDL 44 10/14/2019     Other Pertinent Blood Work:   Lab Results   Component Value Date    SODIUM 141 10/14/2019    POTASSIUM 4.7 10/14/2019    CHLORIDE 107 10/14/2019    CO2 26 10/14/2019    ANION 8.0 10/14/2019    GLUCOSE 96 10/14/2019    BUN 26 (H) 10/14/2019    CREATININE 1.31 10/14/2019    CALCIUM 9.2 10/14/2019    ASTSGOT 17 10/14/2019    ALTSGPT 9 10/14/2019    ALKPHOSPHAT 50 10/14/2019    TBILIRUBIN 0.5 10/14/2019    ALBUMIN 3.8 10/14/2019    AGRATIO 1.5 10/14/2019    CREACTPROT 0.80 (H) 12/20/2018     Other:  Lp(a) - 57 (oct 2017)  TSH 1.5  (oct 2017)    Recent Imaging Studies:      Echo march 2017:  1. Left ventricular ejection fraction is visually estimated to be 55%.   Normal regional wall motion. Grade I diastolic dysfunction.  2. Known mechanical aortic valve that is functioning normally.   3. Mild mitral stenosis.  4. Estimated right ventricular systolic pressure is 40 mmHg.    Echo 4/4/19  CONCLUSIONS  Normal left ventricular systolic function.  Left ventricular ejection fraction is visually estimated to be 65-70%.  Mild concentric left ventricular hypertrophy.  Mild mitral stenosis.  Severe mitral annular calcification.  Mild mitral regurgitation.  Bioprosthetic aortic valve with increased velocities.  Right heart pressures are consistent with moderate pulmonary   hypertension.  Severely dilated left atrium.  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  LDL/nonHDL above goal due to stopping PCSK9i  03/29/19: NonHDL-75, LDL-37  Need updated labs-- stressed importance-- will not be able to refill her PCSK9i without new labs.  Lifestyle Recommendations From Today’s Visit:    Diet - low simple carb like [place]; limiting sugars  Exercise - daily home exercise for 15-20 min  Non-Statin Medications Recommendations from Today’s Visit:   - Continue Praluent 150mg   - Continue zetia 10 mg daily  - Consider addition of fibrate if nonHDL >130 in future  Indication for PCSK9 Inhibitor, if applicable:  ASCVD with suboptimal control of LDL-C despite maximally tolerated statin - continue praluent 150 mg 2x per month  Supplements Recommended at this visit:  - Continue omega 3's at 3,000u/day, Vit D and CoQ10  Recommendations for Other Cardiovascular Risk Factors, toby all that apply:   Hypertension- appears well controlled. Continue current rx   IFG -relatively stable.  Continue TLC  Anticoagulation - continue warfarin and f/u in anticoag clinic  Other issues  - Hypothyroidism - seems under reasonable control per TSH; continue current thyroid replection;  otherwise defer management to PCP  - Skin infection- pt has had cellulitis in foot and hand.  Resolved now.   - SOB- usually occurs with exertion.  Could be due to deconditioning.  Continue work-up with cardiology regarding etiology.  - Fear of COVID pandemic-- explained the importance of maintaining all healthcare appts and encouraged she request virtual visits if needed, but do not delay care as there is a greater risk of MI/stroke.  She understands and will have labs drawn in the next 2 weeks.          Studies Ordered at Todays Visit:   Echo- due March 2020 (delayed d/t Covid)-- she has sched for Dec 2020  Blood Work Ordered At Today’s visit:   As ordered  Follow-Up:   1) Anticoagulation clinic as scheduled  2) Vascular care 2 months  3) F/u with cardiology     STEPH Anderson    CC:  Marion Jones M.D.

## 2020-10-21 NOTE — PROGRESS NOTES
[facility] Follow Up Visit    Date of Service: 10/21/20  Yenifer Beasley is here for follow up of dyslipidemia, HTN, chronic anticoagulation, and hypothyroidism    HPI  Pertinent Interval History since last visit:   Had a cardioversion for her a-fib in August  Continues to have SOB, mostly on exertion  Now on amio  Has f/u with cards in next week  On Praluent, no current problems  No home BP readings  On warfarin, no bleeding problems  Denies CP   Minor LE swelling  No new lipid panel    Current Prescription Lipid Lowering Medications - including dose:   Statin: None  Non-Statin: Continue Praluent 150 mg 2x per week  Continue Zetia  Current Lipid Lowering and Related Supplements:   Omega 3s, 1000u daily  Vit D  CoQ10  Any Current Side Effects Potentially Related to Lipid Lowering therapy?   No  Current Adherence to Lipid Lowering Therapies:  Complete  Any Previous History of Statin Intolerance?   Yes, Details: severe myalgias on multiple statins  Baseline Lipids Prior to Treatment:  Shown here:  LDL-C: 170      SOCIAL HISTORY  Social History     Tobacco Use   Smoking Status Never Smoker   Smokeless Tobacco Never Used      Change in weight: down about 5lbs  Exercise habits: mod exercise  Diet: common adult, consistent vit K    Review of Systems   Constitutional: Negative for malaise/fatigue.   Respiratory: Negative for cough and shortness of breath.    Cardiovascular: Positive for leg swelling. Negative for chest pain, palpitations and claudication.   Gastrointestinal: Negative for blood in stool and melena.   Genitourinary: Negative for hematuria.   Musculoskeletal: Positive for joint pain. Negative for myalgias.   Neurological: Negative for headaches.   Endo/Heme/Allergies: Does not bruise/bleed easily.   Psychiatric/Behavioral: The patient is not nervous/anxious.      Physical Exam   Constitutional: She is oriented to person, place, and time. She appears well-developed and well-nourished. No distress.    Cardiovascular: Normal rate, regular rhythm and intact distal pulses.   Murmur heard.  Pulmonary/Chest: Effort normal and breath sounds normal. No respiratory distress. She has no wheezes.   Musculoskeletal: Normal range of motion.         General: Edema present.      Comments: Tr edema bilat   Neurological: She is alert and oriented to person, place, and time.   Skin: Skin is warm and dry. She is not diaphoretic. No erythema.   Psychiatric: She has a normal mood and affect. Her behavior is normal.   Vitals reviewed.    DATA REVIEW  Most Recent Lipid Panel:   Lab Results   Component Value Date    CHOLSTRLTOT 145 10/14/2019    TRIGLYCERIDE 229 (H) 10/14/2019    HDL 55 10/14/2019    LDL 44 10/14/2019     Other Pertinent Blood Work:   Lab Results   Component Value Date    SODIUM 141 10/14/2019    POTASSIUM 4.7 10/14/2019    CHLORIDE 107 10/14/2019    CO2 26 10/14/2019    ANION 8.0 10/14/2019    GLUCOSE 96 10/14/2019    BUN 26 (H) 10/14/2019    CREATININE 1.31 10/14/2019    CALCIUM 9.2 10/14/2019    ASTSGOT 17 10/14/2019    ALTSGPT 9 10/14/2019    ALKPHOSPHAT 50 10/14/2019    TBILIRUBIN 0.5 10/14/2019    ALBUMIN 3.8 10/14/2019    AGRATIO 1.5 10/14/2019    CREACTPROT 0.80 (H) 12/20/2018     Other:  Lp(a) - 57 (oct 2017)  TSH 1.5  (oct 2017)    Recent Imaging Studies:      Echo march 2017:  1. Left ventricular ejection fraction is visually estimated to be 55%.   Normal regional wall motion. Grade I diastolic dysfunction.  2. Known mechanical aortic valve that is functioning normally.   3. Mild mitral stenosis.  4. Estimated right ventricular systolic pressure is 40 mmHg.    Echo 4/4/19  CONCLUSIONS  Normal left ventricular systolic function.  Left ventricular ejection fraction is visually estimated to be 65-70%.  Mild concentric left ventricular hypertrophy.  Mild mitral stenosis.  Severe mitral annular calcification.  Mild mitral regurgitation.  Bioprosthetic aortic valve with increased velocities.  Right heart  pressures are consistent with moderate pulmonary   hypertension.  Severely dilated left atrium.  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  LDL/nonHDL above goal due to stopping PCSK9i  03/29/19: NonHDL-75, LDL-37  Need updated labs.  Lifestyle Recommendations From Today’s Visit:    Diet - low simple carb like [place]; limiting sugars  Exercise - daily home exercise for 15-20 min  Non-Statin Medications Recommendations from Today’s Visit:   - Restart Praluent 150mg   - Continue zetia 10 mg daily  - Consider addition of fibrate if nonHDL >130 in future  Indication for PCSK9 Inhibitor, if applicable:  ASCVD with suboptimal control of LDL-C despite maximally tolerated statin - continue praluent 150 mg 2x per month  Supplements Recommended at this visit:  - Continue omega 3's at 3,000u/day, vit D and coq10  Recommendations for Other Cardiovascular Risk Factors, toby all that apply:   Hypertension- appears well controlled. Continue current rx   IFG -relatively stable.  Continue TLC  Anticoagulation - continue warfarin and f/u in anticoag clinic  Other issues  - Hypothyroidism - seems under reasonable control per TSH;  continue current thyroid replection; otherwise defer management to PCP  - Skin infection- pt has had cellulitis in foot; no open wounds; rec continued follow up with PCP.   - SOB- usually occurs with exertion. Continue work-up with cardiology regarding etiology.    - HTN continue current meds and monitor home B     Studies Ordered at Todays Visit:   Echo- due March 2020   Blood Work Ordered At Today’s visit:   Lipid, CMP  Follow-Up:   1) Anticoagulation clinic as scheduled  2) Vascular care 6 months or sooner if needed  3) F/u with cardiology     RADHA Meza.    CC:  Marion Jones M.D.

## 2020-10-22 ENCOUNTER — ANTICOAGULATION MONITORING (OUTPATIENT)
Dept: VASCULAR LAB | Facility: MEDICAL CENTER | Age: 70
End: 2020-10-22

## 2020-10-22 DIAGNOSIS — G45.9 TIA (TRANSIENT ISCHEMIC ATTACK): ICD-10-CM

## 2020-10-22 DIAGNOSIS — Z95.2 HX OF MECHANICAL AORTIC VALVE REPLACEMENT: ICD-10-CM

## 2020-10-22 DIAGNOSIS — Z79.01 CHRONIC ANTICOAGULATION: ICD-10-CM

## 2020-10-22 LAB — INR PPP: 2.9 (ref 2–3.5)

## 2020-10-22 NOTE — PROGRESS NOTES
Anticoagulation Summary  As of 10/22/2020    INR goal:  2.5-3.5   TTR:  64.4 % (3.7 y)   INR used for dosin.90 (10/22/2020)   Warfarin maintenance plan:  4 mg (4 mg x 1) every day   Weekly warfarin total:  28 mg   Plan last modified:  Coleman Dozier, PharmD (10/6/2020)   Next INR check:  2020   Target end date:  Indefinite    Indications    Chronic anticoagulation [Z79.01]  Hx of mechanical aortic valve replacement [V43.3] [Z95.2]  TIA (transient ischemic attack) [G45.9]  Atrial fibrillation (HCC) [I48.91] [I48.91]             Anticoagulation Episode Summary     INR check location:      Preferred lab:      Send INR reminders to:      Comments:  [employer] home meter      Anticoagulation Care Providers     Provider Role Specialty Phone number    Michael J Bloch, M.D. Referring Internal Medicine [phone]    Yikna Church, PharmD Responsible          Anticoagulation Patient Findings     Left voicemail message to report a therapeutic INR of 2.9.  Pt to continue with current warfarin dosing regimen. Requested pt contact the clinic for any s/s of unusual bleeding, bruising, clotting or any changes to diet or medication. FU 2 weeks.    Lindsay Choudhary, Pharmacy Intern

## 2020-10-27 DIAGNOSIS — I25.811 CORONARY ARTERY DISEASE INVOLVING NATIVE ARTERY OF TRANSPLANTED HEART WITHOUT ANGINA PECTORIS: ICD-10-CM

## 2020-10-27 DIAGNOSIS — Z79.01 CHRONIC ANTICOAGULATION: ICD-10-CM

## 2020-10-27 DIAGNOSIS — E78.5 DYSLIPIDEMIA: ICD-10-CM

## 2020-10-28 NOTE — PROGRESS NOTES
Oly RAMIREZ [facility]             Patient called today regardin - Patient needs new standing order     2 - Patient has new pharmacy:     [facility]   [address] [place] [id number]   Phone Number - [phone]   Fax Number - [phone]     3. Patient needs Praluent refill sent to new pharmacy     Did all of the above.  Sybil Frazier, EmilyD

## 2020-11-12 ENCOUNTER — ANTICOAGULATION MONITORING (OUTPATIENT)
Dept: VASCULAR LAB | Facility: MEDICAL CENTER | Age: 70
End: 2020-11-12

## 2020-11-12 DIAGNOSIS — Z95.2 HX OF MECHANICAL AORTIC VALVE REPLACEMENT: ICD-10-CM

## 2020-11-12 DIAGNOSIS — Z79.01 CHRONIC ANTICOAGULATION: ICD-10-CM

## 2020-11-12 DIAGNOSIS — G45.9 TIA (TRANSIENT ISCHEMIC ATTACK): ICD-10-CM

## 2020-11-12 LAB — INR PPP: 4 (ref 2–3.5)

## 2020-11-13 NOTE — PROGRESS NOTES
Anticoagulation Summary  As of 2020    INR goal:  2.5-3.5   TTR:  64.3 % (3.7 y)   INR used for dosin.00 (2020)   Warfarin maintenance plan:  4 mg (4 mg x 1) every day   Weekly warfarin total:  28 mg   Plan last modified:  Coleman Dozier, PharmD (10/6/2020)   Next INR check:  2020   Target end date:  Indefinite    Indications    Chronic anticoagulation [Z79.01]  Hx of mechanical aortic valve replacement [V43.3] [Z95.2]  TIA (transient ischemic attack) [G45.9]  Atrial fibrillation (HCC) [I48.91] [I48.91]             Anticoagulation Episode Summary     INR check location:      Preferred lab:      Send INR reminders to:      Comments:  [employer] home meter      Anticoagulation Care Providers     Provider Role Specialty Phone number    Michael J Bloch, M.D. Referring Internal Medicine [phone]    Yinka Church, PharmD Responsible          Anticoagulation Patient Findings      Spoke with pt.  INR is supratherapeutic.   Pt denies any unusual s/s of bleeding, bruising, clotting or any changes to  medications. Denies any etoh, cranberries, supplements, or illness.   Pt admits to eating less vit K this past week since her car broke down.  Pt verifies warfarin weekly dosing.     Will have pt reduce dose x 1 day then continue regimen  Pt will be getting her car fixed, so she will be able to buy vegetables    Repeat INR in 1.5 week(s).     Sybil Frazier, PharmD

## 2020-11-30 ENCOUNTER — ANTICOAGULATION MONITORING (OUTPATIENT)
Dept: VASCULAR LAB | Facility: MEDICAL CENTER | Age: 70
End: 2020-11-30

## 2020-11-30 DIAGNOSIS — Z95.2 HX OF MECHANICAL AORTIC VALVE REPLACEMENT: ICD-10-CM

## 2020-11-30 DIAGNOSIS — I48.91 ATRIAL FIBRILLATION, UNSPECIFIED TYPE (HCC): ICD-10-CM

## 2020-11-30 DIAGNOSIS — G45.9 TIA (TRANSIENT ISCHEMIC ATTACK): ICD-10-CM

## 2020-11-30 DIAGNOSIS — Z79.01 CHRONIC ANTICOAGULATION: ICD-10-CM

## 2020-11-30 LAB — INR PPP: 3.6 (ref 2–3.5)

## 2020-11-30 NOTE — PROGRESS NOTES
OP   Telephone Anticoagulation Service Note      Anticoagulation Summary  As of 11/30/2020    INR goal:  2.5-3.5   TTR:  63.4 % (3.8 y)   INR used for dosing:  3.60 (11/30/2020)   Warfarin maintenance plan:  4 mg (4 mg x 1) every day   Weekly warfarin total:  28 mg   Plan last modified:  Emily MccrayD (10/6/2020)   Next INR check:  12/14/2020   Target end date:  Indefinite    Indications    Chronic anticoagulation [Z79.01]  Hx of mechanical aortic valve replacement [V43.3] [Z95.2]  TIA (transient ischemic attack) [G45.9]  Atrial fibrillation (HCC) [I48.91] [I48.91]             Anticoagulation Episode Summary     INR check location:      Preferred lab:      Send INR reminders to:      Comments:  [employer] home meter      Anticoagulation Care Providers     Provider Role Specialty Phone number    Michael J Bloch, M.D. Referring Internal Medicine [phone]    Emily MartinezD Responsible          Anticoagulation Patient Findings  Patient Findings     Positives:  Missed doses, Change in diet/appetite         Spoke with the patient on the phone today, reporting a SUPRA-therapeutic INR of 3.6.   Confirmed the current warfarin dosing regimen and patient compliance.  Patient reports that her car was broken and she has not been able to get to the store for fresh greens so she has not eaten any over the last few weeks.. She has eaten some already today. She also reports missing her dose last night.   Patient denies any interval changes to medications. Patient denies any signs/symptoms of bleeding or clotting.  Patient reports she has resumed her green intake, and patient is now 0.1 within goal range, so will have her continue with the current dosing regimen. Patient will retest again in 2 weeks (per pt preference).     Porter DiazD

## 2020-12-08 DIAGNOSIS — E78.5 HYPERLIPIDEMIA, UNSPECIFIED HYPERLIPIDEMIA TYPE: ICD-10-CM

## 2020-12-08 RX ORDER — EZETIMIBE 10 MG/1
TABLET ORAL
Qty: 90 TAB | Refills: 3 | Status: SHIPPED | OUTPATIENT
Start: 2020-12-08 | End: 2021-11-15

## 2020-12-15 ENCOUNTER — APPOINTMENT (OUTPATIENT)
Dept: CARDIOLOGY | Facility: MEDICAL CENTER | Age: 70
End: 2020-12-15
Attending: NURSE PRACTITIONER
Payer: MEDICARE

## 2020-12-17 ENCOUNTER — TELEPHONE (OUTPATIENT)
Dept: VASCULAR LAB | Facility: MEDICAL CENTER | Age: 70
End: 2020-12-17

## 2020-12-17 NOTE — TELEPHONE ENCOUNTER
Spoke with the pt regarding the scheduled echo in dec. Offered to reschedule her. She had to cancel due to an infected foot and inability to walk on it. Rescheduled for [facility] at [facility], think it might be easier for her to get in there. RADHA Meza.

## 2020-12-21 ENCOUNTER — TELEPHONE (OUTPATIENT)
Dept: VASCULAR LAB | Facility: MEDICAL CENTER | Age: 70
End: 2020-12-21

## 2020-12-21 DIAGNOSIS — Z95.2 HX OF MECHANICAL AORTIC VALVE REPLACEMENT: ICD-10-CM

## 2020-12-22 ENCOUNTER — TELEPHONE (OUTPATIENT)
Dept: VASCULAR LAB | Facility: MEDICAL CENTER | Age: 70
End: 2020-12-22

## 2020-12-22 NOTE — TELEPHONE ENCOUNTER
----- Message from Oly Adan sent at 12/22/2020  3:00 PM PST -----  Regarding: Patient Call - Leg Swelling & Medication Questions  Pt called to let clinic know that her left foot got infected so she called [facility] who prescribed her Clindamycin capsules on 12/14/20. As of this morning her right foot is acting symptomatic wants to know if she should adjust her Coumadin.     She wasn't able to get her labs done Monday because she couldn't get out of her chair.     Please call pt.

## 2020-12-22 NOTE — TELEPHONE ENCOUNTER
Informed pt that clindamycin does not interact with warfarin. No need to change her warfarin dosing. However, Pt will get INR checked ASAP since she is overdue.    Sybil Frazier, EmilyD

## 2020-12-23 RX ORDER — FUROSEMIDE 40 MG/1
TABLET ORAL
Qty: 100 TAB | Refills: 0 | Status: SHIPPED | OUTPATIENT
Start: 2020-12-23 | End: 2021-01-18

## 2020-12-30 ENCOUNTER — HOSPITAL ENCOUNTER (OUTPATIENT)
Dept: LAB | Facility: MEDICAL CENTER | Age: 70
End: 2020-12-30
Attending: NURSE PRACTITIONER
Payer: MEDICARE

## 2020-12-30 DIAGNOSIS — R73.01 IMPAIRED FASTING GLUCOSE: ICD-10-CM

## 2020-12-30 DIAGNOSIS — Z79.01 CHRONIC ANTICOAGULATION: ICD-10-CM

## 2020-12-30 DIAGNOSIS — I48.91 ATRIAL FIBRILLATION, UNSPECIFIED TYPE (HCC): ICD-10-CM

## 2020-12-30 DIAGNOSIS — I10 ESSENTIAL HYPERTENSION: ICD-10-CM

## 2020-12-30 DIAGNOSIS — E78.5 DYSLIPIDEMIA: ICD-10-CM

## 2020-12-30 LAB
CREAT UR-MCNC: 281.05 MG/DL
INR PPP: 1.86 (ref 0.87–1.13)
MICROALBUMIN UR-MCNC: 1.3 MG/DL
MICROALBUMIN/CREAT UR: 5 MG/G (ref 0–30)
PROTHROMBIN TIME: 22 SEC (ref 12–14.6)

## 2020-12-30 PROCEDURE — 85025 COMPLETE CBC W/AUTO DIFF WBC: CPT

## 2020-12-30 PROCEDURE — 80061 LIPID PANEL: CPT

## 2020-12-30 PROCEDURE — 36415 COLL VENOUS BLD VENIPUNCTURE: CPT

## 2020-12-30 PROCEDURE — 84443 ASSAY THYROID STIM HORMONE: CPT

## 2020-12-30 PROCEDURE — 85610 PROTHROMBIN TIME: CPT

## 2020-12-30 PROCEDURE — 80053 COMPREHEN METABOLIC PANEL: CPT

## 2020-12-30 PROCEDURE — 83036 HEMOGLOBIN GLYCOSYLATED A1C: CPT

## 2020-12-30 PROCEDURE — 82043 UR ALBUMIN QUANTITATIVE: CPT

## 2020-12-30 PROCEDURE — 82570 ASSAY OF URINE CREATININE: CPT

## 2020-12-30 PROCEDURE — 83880 ASSAY OF NATRIURETIC PEPTIDE: CPT

## 2020-12-31 ENCOUNTER — ANTICOAGULATION MONITORING (OUTPATIENT)
Dept: VASCULAR LAB | Facility: MEDICAL CENTER | Age: 70
End: 2020-12-31

## 2020-12-31 DIAGNOSIS — I48.91 ATRIAL FIBRILLATION, UNSPECIFIED TYPE (HCC): ICD-10-CM

## 2020-12-31 DIAGNOSIS — G45.9 TIA (TRANSIENT ISCHEMIC ATTACK): ICD-10-CM

## 2020-12-31 DIAGNOSIS — Z79.01 CHRONIC ANTICOAGULATION: ICD-10-CM

## 2020-12-31 DIAGNOSIS — Z95.2 HX OF MECHANICAL AORTIC VALVE REPLACEMENT: ICD-10-CM

## 2020-12-31 LAB
ALBUMIN SERPL BCP-MCNC: 3.8 G/DL (ref 3.2–4.9)
ALBUMIN/GLOB SERPL: 1.1 G/DL
ALP SERPL-CCNC: 64 U/L (ref 30–99)
ALT SERPL-CCNC: 13 U/L (ref 2–50)
ANION GAP SERPL CALC-SCNC: 13 MMOL/L (ref 7–16)
AST SERPL-CCNC: 24 U/L (ref 12–45)
BASOPHILS # BLD AUTO: 0.8 % (ref 0–1.8)
BASOPHILS # BLD: 0.08 K/UL (ref 0–0.12)
BILIRUB SERPL-MCNC: 0.5 MG/DL (ref 0.1–1.5)
BUN SERPL-MCNC: 19 MG/DL (ref 8–22)
CALCIUM SERPL-MCNC: 10.1 MG/DL (ref 8.5–10.5)
CHLORIDE SERPL-SCNC: 100 MMOL/L (ref 96–112)
CHOLEST SERPL-MCNC: 224 MG/DL (ref 100–199)
CO2 SERPL-SCNC: 23 MMOL/L (ref 20–33)
CREAT SERPL-MCNC: 1.32 MG/DL (ref 0.5–1.4)
EOSINOPHIL # BLD AUTO: 0.01 K/UL (ref 0–0.51)
EOSINOPHIL NFR BLD: 0.1 % (ref 0–6.9)
ERYTHROCYTE [DISTWIDTH] IN BLOOD BY AUTOMATED COUNT: 42.7 FL (ref 35.9–50)
EST. AVERAGE GLUCOSE BLD GHB EST-MCNC: 123 MG/DL
FASTING STATUS PATIENT QL REPORTED: NORMAL
GLOBULIN SER CALC-MCNC: 3.5 G/DL (ref 1.9–3.5)
GLUCOSE SERPL-MCNC: 103 MG/DL (ref 65–99)
HBA1C MFR BLD: 5.9 % (ref 0–5.6)
HCT VFR BLD AUTO: 46.6 % (ref 37–47)
HDLC SERPL-MCNC: 44 MG/DL
HGB BLD-MCNC: 14.5 G/DL (ref 12–16)
IMM GRANULOCYTES # BLD AUTO: 0.05 K/UL (ref 0–0.11)
IMM GRANULOCYTES NFR BLD AUTO: 0.5 % (ref 0–0.9)
LDLC SERPL CALC-MCNC: 152 MG/DL
LYMPHOCYTES # BLD AUTO: 1.07 K/UL (ref 1–4.8)
LYMPHOCYTES NFR BLD: 10.2 % (ref 22–41)
MCH RBC QN AUTO: 27.6 PG (ref 27–33)
MCHC RBC AUTO-ENTMCNC: 31.1 G/DL (ref 33.6–35)
MCV RBC AUTO: 88.8 FL (ref 81.4–97.8)
MONOCYTES # BLD AUTO: 0.51 K/UL (ref 0–0.85)
MONOCYTES NFR BLD AUTO: 4.9 % (ref 0–13.4)
NEUTROPHILS # BLD AUTO: 8.78 K/UL (ref 2–7.15)
NEUTROPHILS NFR BLD: 83.5 % (ref 44–72)
NRBC # BLD AUTO: 0 K/UL
NRBC BLD-RTO: 0 /100 WBC
NT-PROBNP SERPL IA-MCNC: 1306 PG/ML (ref 0–125)
PLATELET # BLD AUTO: 454 K/UL (ref 164–446)
PMV BLD AUTO: 10 FL (ref 9–12.9)
POTASSIUM SERPL-SCNC: 4.3 MMOL/L (ref 3.6–5.5)
PROT SERPL-MCNC: 7.3 G/DL (ref 6–8.2)
RBC # BLD AUTO: 5.25 M/UL (ref 4.2–5.4)
SODIUM SERPL-SCNC: 136 MMOL/L (ref 135–145)
TRIGL SERPL-MCNC: 138 MG/DL (ref 0–149)
TSH SERPL DL<=0.005 MIU/L-ACNC: 3.6 UIU/ML (ref 0.38–5.33)
WBC # BLD AUTO: 10.5 K/UL (ref 4.8–10.8)

## 2020-12-31 NOTE — PROGRESS NOTES
Anticoagulation Summary  As of 2020    INR goal:  2.5-3.5   TTR:  63.3 % (3.8 y)   INR used for dosin.86 (2020)   Warfarin maintenance plan:  4 mg (4 mg x 1) every day   Weekly warfarin total:  28 mg   Plan last modified:  Emily MccrayD (10/6/2020)   Next INR check:  2021   Target end date:  Indefinite    Indications    Chronic anticoagulation [Z79.01]  Hx of mechanical aortic valve replacement [V43.3] [Z95.2]  TIA (transient ischemic attack) [G45.9]  Atrial fibrillation (HCC) [I48.91] [I48.91]             Anticoagulation Episode Summary     INR check location:      Preferred lab:      Send INR reminders to:      Comments:  [employer] home meter      Anticoagulation Care Providers     Provider Role Specialty Phone number    Michael J Bloch, M.D. Referring Internal Medicine [phone]    Yinka Church, PharmD Responsible          Anticoagulation Patient Findings  Patient Findings     Positives:  Change in health (not feeling well last three weeks, ), Change in diet/appetite (inconsistent intake of greens)    Negatives:  Signs/symptoms of thrombosis, Signs/symptoms of bleeding, Laboratory test error suspected, Change in alcohol use, Change in activity, Upcoming invasive procedure, Emergency department visit, Upcoming dental procedure, Missed doses, Extra doses, Change in medications, Hospital admission, Bruising, Other complaints        Spoke with patient today regarding subtherapeutic INR of 1.86.  Patient denies any signs/symptoms of bruising or bleeding or any changes in medications.  Instructed patient to call clinic with any questions or concerns.  Patient states she hasn't felt well for several weeks and her dietary habits, especially greens, has been inconsistent.  Instructed her to bolus with 6mg X 1, then resume current warfarin regimen.  Follow up in 2 weeks, to reduce risk of adverse events related to this high risk medication,  Warfarin.    Yinka Church, PharmD,  BCACP

## 2021-01-06 ENCOUNTER — HOSPITAL ENCOUNTER (OUTPATIENT)
Dept: RADIOLOGY | Facility: MEDICAL CENTER | Age: 71
End: 2021-01-06
Attending: PHYSICIAN ASSISTANT
Payer: MEDICARE

## 2021-01-06 DIAGNOSIS — M79.89 SWELLING OF LIMB: ICD-10-CM

## 2021-01-06 PROCEDURE — 93970 EXTREMITY STUDY: CPT

## 2021-01-12 ASSESSMENT — ENCOUNTER SYMPTOMS
PALPITATIONS: 0
BRUISES/BLEEDS EASILY: 0
CLAUDICATION: 0
ABDOMINAL PAIN: 0
BLOOD IN STOOL: 0
MYALGIAS: 0
COUGH: 0
NERVOUS/ANXIOUS: 0
HEADACHES: 0

## 2021-01-12 NOTE — PROGRESS NOTES
This visit was conducted via [facility] [employer] video call using secure and encrypted video conferencing technology due to covid-19 restrictions.   The patient was in a private location in the state of [place].    The patient's identity was confirmed and verbal consent was obtained for this virtual visit.    [facility] Follow [facility]    Date of Service: 1/13/21    Yenifer Beasley is here for follow up of dyslipidemia, HTN, chronic anticoagulation, and hypothyroidism    HPI  Pertinent Interval History since last visit:   Occasional minor palpitations, resolve quickly  Echo scheduled  Has f/u with cards in Feb  On Praluent, has missed several doses due to new prescription and copay changes  Is working on the issue  No home BP readings   On warfarin, no bleeding problems  Denies CP   Minor LE swelling  Has had redness and swelling of karla great toes since Lasix dose increased  Has had elevated uric acids in the past per pt report  Having difficulty walking and is now in a w/ch due to gout pain    Current Prescription Lipid Lowering Medications - including dose: Has missed several doses   Statin: None  Non-Statin: Continue Praluent 150 mg 2x per week  Continue Zetia  Current Lipid Lowering and Related Supplements:   Omega 3s, 1000u daily  Vit D  CoQ10  Any Current Side Effects Potentially Related to Lipid Lowering therapy?   No  Current Adherence to Lipid Lowering Therapies:  Complete  Any Previous History of Statin Intolerance?   Yes, Details: severe myalgias on multiple statins  Baseline Lipids Prior to Treatment:  Shown here:  LDL-C: 152      SOCIAL HISTORY  Social History     Tobacco Use   Smoking Status Never Smoker   Smokeless Tobacco Never Used      Change in weight: down about 5lbs  Exercise habits: mod exercise  Diet: common adult, consistent vit K    Review of Systems   Constitutional: Negative for malaise/fatigue.   HENT: Negative for nosebleeds.    Respiratory: Positive for shortness of breath. Negative  for cough.    Cardiovascular: Positive for leg swelling. Negative for chest pain, palpitations and claudication.   Gastrointestinal: Negative for abdominal pain, blood in stool and melena.   Genitourinary: Negative for hematuria.   Musculoskeletal: Positive for joint pain. Negative for myalgias.   Neurological: Negative for headaches.   Endo/Heme/Allergies: Does not bruise/bleed easily.   Psychiatric/Behavioral: The patient is not nervous/anxious.      Physical Exam   Constitutional: She appears well-developed.   Pulmonary/Chest: Effort normal.   Neurological: She is alert.   Psychiatric: She has a normal mood and affect. Her behavior is normal.     DATA REVIEW  Most Recent Lipid Panel:   Lab Results   Component Value Date    CHOLSTRLTOT 224 (H) 12/30/2020    TRIGLYCERIDE 138 12/30/2020    HDL 44 12/30/2020     (H) 12/30/2020     Other Pertinent Blood Work:   Lab Results   Component Value Date    SODIUM 136 12/30/2020    POTASSIUM 4.3 12/30/2020    CHLORIDE 100 12/30/2020    CO2 23 12/30/2020    ANION 13.0 12/30/2020    GLUCOSE 103 (H) 12/30/2020    BUN 19 12/30/2020    CREATININE 1.32 12/30/2020    CALCIUM 10.1 12/30/2020    ASTSGOT 24 12/30/2020    ALTSGPT 13 12/30/2020    ALKPHOSPHAT 64 12/30/2020    TBILIRUBIN 0.5 12/30/2020    ALBUMIN 3.8 12/30/2020    AGRATIO 1.1 12/30/2020    TSHULTRASEN 3.600 12/30/2020     Other:  Lp(a) - 57 (oct 2017)  TSH 1.5  (oct 2017)    Recent Imaging Studies:      Echo march 2017:  1. Left ventricular ejection fraction is visually estimated to be 55%.   Normal regional wall motion. Grade I diastolic dysfunction.  2. Known mechanical aortic valve that is functioning normally.   3. Mild mitral stenosis.  4. Estimated right ventricular systolic pressure is 40 mmHg.    Echo 4/4/19  CONCLUSIONS  Normal left ventricular systolic function.  Left ventricular ejection fraction is visually estimated to be 65-70%.  Mild concentric left ventricular hypertrophy.  Mild mitral  stenosis.  Severe mitral annular calcification.  Mild mitral regurgitation.  Bioprosthetic aortic valve with increased velocities.  Right heart pressures are consistent with moderate pulmonary   hypertension.  Severely dilated left atrium.  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  LDL/nonHDL above goal due to stopping PCSK9i   ; Non   Need updated labs after she starts back on the PCSK9I  Lifestyle Recommendations From Today’s Visit:    Diet - low simple carb like [place]; limiting sugars  Exercise - daily home exercise for 15-20 min  Non-Statin Medications Recommendations from Today’s Visit:   - Continue Praluent 150mg when able  - Continue zetia 10 mg daily  - Consider addition of fibrate if nonHDL >130 in future or if she is unable to get the PCSK9I  Indication for PCSK9 Inhibitor, if applicable:  ASCVD with suboptimal control of LDL-C despite maximally tolerated statin - continue praluent 150 mg 2x per month, missed doses  Supplements Recommended at this visit:  - Continue omega 3's at 3,000u/day, Vit D and  CoQ10  Recommendations for Other Cardiovascular Risk Factors, toby all that apply:   Hypertension- appears well controlled. Continue current rx, but will discuss with cardiologist possibility of decreasing Lasix back to 20 mg and adding Spironolactone 25mg due to gout eppisodes   IFG -relatively stable.  Continue TLC  Anticoagulation - continue warfarin and f/u in anticoag clinic  Other issues  - Hypothyroidism - seems under reasonable control per TSH; continue current thyroid replection; otherwise defer management to PCP  - Skin infection- pt has had cellulitis in foot and hand.  Resolved now. But now has gout in both feet  - SOB- usually occurs with exertion.  Could be due to deconditioning.  Continue work-up with cardiology regarding etiology.  - Fear of COVID pandemic-- explained the importance of maintaining all healthcare appts and encouraged she request virtual visits if needed, but do not delay care as there is a greater risk of MI/stroke.  She understands and will have labs drawn in the next 2 weeks.          Studies Ordered at Todays Visit:   Echo- due March 2020 (delayed d/t Covid)-- she has sched for 1/29/21  Blood Work Ordered At Today’s visit: bmp and possibly bnp if card wished it ordered.   As ordered    Time: 30min, chart prep, lab review, face-2-face time, documentation, providing lab orders, and  Prescription review and communication with cardiologist.     Follow-Up:   1) Anticoagulation clinic as scheduled  2) Vascular care 2 months  3) F/u with cardiology     STEPH Meza    CC:  Marion Jones M.D.

## 2021-01-13 ENCOUNTER — OFFICE VISIT (OUTPATIENT)
Dept: VASCULAR LAB | Facility: MEDICAL CENTER | Age: 71
End: 2021-01-13
Payer: MEDICARE

## 2021-01-13 DIAGNOSIS — Z95.2 S/P AORTIC VALVE REPLACEMENT: ICD-10-CM

## 2021-01-13 DIAGNOSIS — Z79.01 CHRONIC ANTICOAGULATION: ICD-10-CM

## 2021-01-13 DIAGNOSIS — I48.91 ATRIAL FIBRILLATION, UNSPECIFIED TYPE (HCC): ICD-10-CM

## 2021-01-13 DIAGNOSIS — I10 ESSENTIAL HYPERTENSION: ICD-10-CM

## 2021-01-13 DIAGNOSIS — E03.9 HYPOTHYROIDISM, UNSPECIFIED TYPE: ICD-10-CM

## 2021-01-13 DIAGNOSIS — E78.5 DYSLIPIDEMIA: ICD-10-CM

## 2021-01-13 DIAGNOSIS — E11.9 TYPE 2 DIABETES MELLITUS WITHOUT COMPLICATION, WITHOUT LONG-TERM CURRENT USE OF INSULIN (HCC): ICD-10-CM

## 2021-01-13 DIAGNOSIS — E78.5 HYPERLIPIDEMIA, UNSPECIFIED HYPERLIPIDEMIA TYPE: ICD-10-CM

## 2021-01-13 DIAGNOSIS — R94.4 DECREASED GFR: ICD-10-CM

## 2021-01-13 PROCEDURE — 99214 OFFICE O/P EST MOD 30 MIN: CPT | Mod: 95,CR | Performed by: NURSE PRACTITIONER

## 2021-01-13 RX ORDER — LEVOTHYROXINE SODIUM 0.12 MG/1
125 TABLET ORAL
COMMUNITY
End: 2021-12-21 | Stop reason: SDUPTHER

## 2021-01-13 ASSESSMENT — ENCOUNTER SYMPTOMS: SHORTNESS OF BREATH: 1

## 2021-01-14 ENCOUNTER — TELEPHONE (OUTPATIENT)
Dept: VASCULAR LAB | Facility: MEDICAL CENTER | Age: 71
End: 2021-01-14

## 2021-01-14 RX ORDER — COLCHICINE 0.6 MG/1
1.2 TABLET ORAL
COMMUNITY
End: 2021-03-22

## 2021-01-14 RX ORDER — ALLOPURINOL 100 MG/1
100 TABLET ORAL
COMMUNITY
End: 2021-03-22 | Stop reason: SINTOL

## 2021-01-15 ENCOUNTER — APPOINTMENT (OUTPATIENT)
Dept: RADIOLOGY | Facility: MEDICAL CENTER | Age: 71
End: 2021-01-15
Attending: FAMILY MEDICINE
Payer: MEDICARE

## 2021-01-15 ENCOUNTER — ANTICOAGULATION MONITORING (OUTPATIENT)
Dept: VASCULAR LAB | Facility: MEDICAL CENTER | Age: 71
End: 2021-01-15

## 2021-01-15 DIAGNOSIS — Z79.01 CHRONIC ANTICOAGULATION: ICD-10-CM

## 2021-01-15 DIAGNOSIS — I48.91 ATRIAL FIBRILLATION, UNSPECIFIED TYPE (HCC): ICD-10-CM

## 2021-01-15 DIAGNOSIS — G45.9 TIA (TRANSIENT ISCHEMIC ATTACK): ICD-10-CM

## 2021-01-15 DIAGNOSIS — Z95.2 HX OF MECHANICAL AORTIC VALVE REPLACEMENT: ICD-10-CM

## 2021-01-15 LAB — INR PPP: 4.3 (ref 2–3.5)

## 2021-01-15 NOTE — TELEPHONE ENCOUNTER
Pt is starting colchicine and allopurinol for gout.  Allopurinol may increase INR. Pt to start allopurinol tonight. She is due for her INR. She will check INR tomorrow 1/15/21    Sybil Frazier, EmilyD

## 2021-01-15 NOTE — PROGRESS NOTES
OP   Telephone Anticoagulation Service Note      Anticoagulation Summary  As of 1/15/2021    INR goal:  2.5-3.5   TTR:  63.1 % (3.9 y)   INR used for dosin.30 (1/15/2021)   Warfarin maintenance plan:  4 mg (4 mg x 1) every day   Weekly warfarin total:  28 mg   Plan last modified:  Emily MccrayD (10/6/2020)   Next INR check:  2021   Target end date:  Indefinite    Indications    Chronic anticoagulation [Z79.01]  Hx of mechanical aortic valve replacement [V43.3] [Z95.2]  TIA (transient ischemic attack) [G45.9]  Atrial fibrillation (HCC) [I48.91] [I48.91]             Anticoagulation Episode Summary     INR check location:      Preferred lab:      Send INR reminders to:      Comments:  [employer] home meter      Anticoagulation Care Providers     Provider Role Specialty Phone number    Michael J Bloch, M.D. Referring Internal Medicine [phone]    Emily MartinezD Responsible          Anticoagulation Patient Findings    Left a message on the patient's voicemail today, informing the patient of a SUPRA-therapeutic INR of 4.3.   Instructed the patient to call the clinic with any changes to diet or medications, with any signs/sx of bleeding or clotting or with any questions or concerns.     Patient instructed to HOLD warfarin TONIGHT ONLY, then to resume her current dosing regimen.   Patient asked to retest again in 1 week.     Porter DiazD

## 2021-01-15 NOTE — TELEPHONE ENCOUNTER
----- Message from Oly Adan sent at 1/14/2021  3:46 PM PST -----  Regarding: Patient Call - Medication Questions  Pt is taking 2 new medications but didn't mention what and wants to know if their okay to take with warfarin. Please call pt.

## 2021-01-18 ENCOUNTER — TELEPHONE (OUTPATIENT)
Dept: VASCULAR LAB | Facility: MEDICAL CENTER | Age: 71
End: 2021-01-18

## 2021-01-18 DIAGNOSIS — I10 ESSENTIAL HYPERTENSION: ICD-10-CM

## 2021-01-18 RX ORDER — FUROSEMIDE 20 MG/1
20 TABLET ORAL DAILY
Qty: 100 TAB | Refills: 1 | Status: SHIPPED | OUTPATIENT
Start: 2021-01-18 | End: 2021-05-24 | Stop reason: SINTOL

## 2021-01-18 RX ORDER — SPIRONOLACTONE 25 MG/1
25 TABLET ORAL DAILY
Qty: 90 TAB | Refills: 3 | Status: SHIPPED | OUTPATIENT
Start: 2021-01-18 | End: 2021-03-23

## 2021-01-18 NOTE — TELEPHONE ENCOUNTER
Received call from pt. She experienced n/v/d on allopurinol, which also explains her supratherapeutic INR. She informed pt she stopped taking allopurinol and her GI issues have resolved.    Pt was able to skip her warfarin dose on 1/15. Pt to continue current regimen.    F/u INR on Friday 1/22    Sybil Frazier, EmilyD

## 2021-01-18 NOTE — TELEPHONE ENCOUNTER
Ok with cardiology to decrease the Lasix back to 20 mg and then order spironolactone 25 mg. Pt informed of the change in medications. BMP ordered for 4 weeks,. RADHA Meza.

## 2021-01-18 NOTE — TELEPHONE ENCOUNTER
----- Message from Laura Garrison, Med Ass't sent at 1/18/2021  7:54 AM PST -----  PT has some concerns starting a new medication and her INR being out of range this last time.

## 2021-01-25 ENCOUNTER — ANTICOAGULATION MONITORING (OUTPATIENT)
Dept: MEDICAL GROUP | Facility: PHYSICIAN GROUP | Age: 71
End: 2021-01-25

## 2021-01-25 DIAGNOSIS — I48.91 ATRIAL FIBRILLATION, UNSPECIFIED TYPE (HCC): ICD-10-CM

## 2021-01-25 DIAGNOSIS — G45.9 TIA (TRANSIENT ISCHEMIC ATTACK): ICD-10-CM

## 2021-01-25 DIAGNOSIS — Z95.2 HX OF MECHANICAL AORTIC VALVE REPLACEMENT: ICD-10-CM

## 2021-01-25 DIAGNOSIS — Z79.01 CHRONIC ANTICOAGULATION: ICD-10-CM

## 2021-01-25 LAB — INR PPP: 4.5 (ref 2–3.5)

## 2021-01-25 NOTE — PROGRESS NOTES
Anticoagulation Summary  As of 2021    INR goal:  2.5-3.5   TTR:  62.6 % (3.9 y)   INR used for dosin.50 (2021)   Warfarin maintenance plan:  2 mg (4 mg x 0.5) every Tue; 4 mg (4 mg x 1) all other days   Weekly warfarin total:  26 mg   Plan last modified:  Sybil Frazier PharmD (2021)   Next INR check:  2021   Target end date:  Indefinite    Indications    Chronic anticoagulation [Z79.01]  Hx of mechanical aortic valve replacement [V43.3] [Z95.2]  TIA (transient ischemic attack) [G45.9]  Atrial fibrillation (HCC) [I48.91] [I48.91]             Anticoagulation Episode Summary     INR check location:      Preferred lab:      Send INR reminders to:      Comments:  [employer] home meter      Anticoagulation Care Providers     Provider Role Specialty Phone number    Michael J Bloch, M.D. Referring Internal Medicine [phone]    Yinka Church, PharmD Responsible          Anticoagulation Patient Findings      Spoke with pt.  INR is supratherapeutic.   Pt denies any unusual s/s of bleeding, bruising, clotting or any changes to diet or medications. Denies any etoh, cranberries, supplements.   Pt verifies warfarin weekly dosing.   Pt has been dealing with GI side effects relating to her medication changes    Will have pt hold x 1 day then reduce weekly regimen    Repeat INR in 2 week(s).     Sybil Frazier, PharmD

## 2021-01-29 ENCOUNTER — APPOINTMENT (OUTPATIENT)
Dept: CARDIOLOGY | Facility: MEDICAL CENTER | Age: 71
End: 2021-01-29
Attending: NURSE PRACTITIONER
Payer: MEDICARE

## 2021-02-02 DIAGNOSIS — I48.91 ATRIAL FIBRILLATION, UNSPECIFIED TYPE (HCC): ICD-10-CM

## 2021-02-05 RX ORDER — AMIODARONE HYDROCHLORIDE 200 MG/1
TABLET ORAL
Qty: 100 TAB | Refills: 0 | Status: SHIPPED | OUTPATIENT
Start: 2021-02-05 | End: 2021-06-25

## 2021-02-12 ENCOUNTER — ANTICOAGULATION MONITORING (OUTPATIENT)
Dept: VASCULAR LAB | Facility: MEDICAL CENTER | Age: 71
End: 2021-02-12

## 2021-02-12 DIAGNOSIS — G45.9 TIA (TRANSIENT ISCHEMIC ATTACK): ICD-10-CM

## 2021-02-12 DIAGNOSIS — Z95.2 HX OF MECHANICAL AORTIC VALVE REPLACEMENT: ICD-10-CM

## 2021-02-12 DIAGNOSIS — Z79.01 CHRONIC ANTICOAGULATION: ICD-10-CM

## 2021-02-12 DIAGNOSIS — I48.91 ATRIAL FIBRILLATION, UNSPECIFIED TYPE (HCC): ICD-10-CM

## 2021-02-12 LAB — INR PPP: 2.5 (ref 2–3.5)

## 2021-02-13 NOTE — PROGRESS NOTES
OP Telephone Anticoagulation Service Note    Date: 2021      Anticoagulation Summary  As of 2021    INR goal:  2.5-3.5   TTR:  62.5 % (4 y)   INR used for dosin.50 (2021)   Warfarin maintenance plan:  2 mg (4 mg x 0.5) every Tue; 4 mg (4 mg x 1) all other days   Weekly warfarin total:  26 mg   Plan last modified:  Emily TillmanD (2021)   Next INR check:  2021   Target end date:  Indefinite    Indications    Chronic anticoagulation [Z79.01]  Hx of mechanical aortic valve replacement [V43.3] [Z95.2]  TIA (transient ischemic attack) [G45.9]  Atrial fibrillation (HCC) [I48.91] [I48.91]             Anticoagulation Episode Summary     INR check location:      Preferred lab:      Send INR reminders to:      Comments:  [employer] home meter      Anticoagulation Care Providers     Provider Role Specialty Phone number    Michael J Bloch, M.D. Referring Internal Medicine [phone]    Yinka Church, PharmD Responsible          Anticoagulation Patient Findings      INR therpeutic at 2.5.  Left voicemail message for pt.  Verified regimen.  Instructed pt to continue on with current regimen.  Told pt to call if any s/s of bleeding or medication changes.  Check INR in 2 week(s).  Instructed pt to call clinic at [phone] if there are any questions.    Chris Randolph, EmilyD

## 2021-02-16 ENCOUNTER — APPOINTMENT (OUTPATIENT)
Dept: RADIOLOGY | Facility: MEDICAL CENTER | Age: 71
End: 2021-02-16
Attending: FAMILY MEDICINE
Payer: MEDICARE

## 2021-02-17 ENCOUNTER — APPOINTMENT (OUTPATIENT)
Dept: VASCULAR LAB | Facility: MEDICAL CENTER | Age: 71
End: 2021-02-17
Payer: MEDICARE

## 2021-03-02 ENCOUNTER — HOSPITAL ENCOUNTER (OUTPATIENT)
Dept: LAB | Facility: MEDICAL CENTER | Age: 71
End: 2021-03-02
Attending: NURSE PRACTITIONER
Payer: MEDICARE

## 2021-03-02 DIAGNOSIS — Z79.01 CHRONIC ANTICOAGULATION: ICD-10-CM

## 2021-03-02 LAB
ANION GAP SERPL CALC-SCNC: 11 MMOL/L (ref 7–16)
BUN SERPL-MCNC: 25 MG/DL (ref 8–22)
CALCIUM SERPL-MCNC: 9.5 MG/DL (ref 8.5–10.5)
CHLORIDE SERPL-SCNC: 98 MMOL/L (ref 96–112)
CO2 SERPL-SCNC: 23 MMOL/L (ref 20–33)
CREAT SERPL-MCNC: 1.29 MG/DL (ref 0.5–1.4)
GLUCOSE SERPL-MCNC: 106 MG/DL (ref 65–99)
INR PPP: 2.94 (ref 0.87–1.13)
POTASSIUM SERPL-SCNC: 5 MMOL/L (ref 3.6–5.5)
PROTHROMBIN TIME: 31.5 SEC (ref 12–14.6)
SODIUM SERPL-SCNC: 132 MMOL/L (ref 135–145)

## 2021-03-02 PROCEDURE — 80048 BASIC METABOLIC PNL TOTAL CA: CPT

## 2021-03-02 PROCEDURE — 36415 COLL VENOUS BLD VENIPUNCTURE: CPT

## 2021-03-02 PROCEDURE — 85610 PROTHROMBIN TIME: CPT

## 2021-03-03 ENCOUNTER — ANTICOAGULATION MONITORING (OUTPATIENT)
Dept: VASCULAR LAB | Facility: MEDICAL CENTER | Age: 71
End: 2021-03-03

## 2021-03-03 ENCOUNTER — HOSPITAL ENCOUNTER (OUTPATIENT)
Dept: CARDIOLOGY | Facility: MEDICAL CENTER | Age: 71
End: 2021-03-03
Attending: NURSE PRACTITIONER
Payer: MEDICARE

## 2021-03-03 DIAGNOSIS — I48.91 ATRIAL FIBRILLATION, UNSPECIFIED TYPE (HCC): ICD-10-CM

## 2021-03-03 DIAGNOSIS — Z79.01 CHRONIC ANTICOAGULATION: ICD-10-CM

## 2021-03-03 DIAGNOSIS — Z23 NEED FOR VACCINATION: ICD-10-CM

## 2021-03-03 DIAGNOSIS — Z95.2 H/O MECHANICAL AORTIC VALVE REPLACEMENT: ICD-10-CM

## 2021-03-03 DIAGNOSIS — Z95.2 HX OF MECHANICAL AORTIC VALVE REPLACEMENT: ICD-10-CM

## 2021-03-03 DIAGNOSIS — G45.9 TIA (TRANSIENT ISCHEMIC ATTACK): ICD-10-CM

## 2021-03-03 LAB
LV EJECT FRACT  99904: 65
LV EJECT FRACT MOD 2C 99903: 62.2
LV EJECT FRACT MOD 4C 99902: 72.69
LV EJECT FRACT MOD BP 99901: 66.48

## 2021-03-03 PROCEDURE — 93306 TTE W/DOPPLER COMPLETE: CPT | Mod: 26 | Performed by: INTERNAL MEDICINE

## 2021-03-03 PROCEDURE — 93306 TTE W/DOPPLER COMPLETE: CPT

## 2021-03-03 NOTE — PROGRESS NOTES
Anticoagulation Summary  As of 3/3/2021    INR goal:  2.5-3.5   TTR:  62.9 % (4 y)   INR used for dosin.94 (3/2/2021)   Warfarin maintenance plan:  2 mg (4 mg x 0.5) every Tue; 4 mg (4 mg x 1) all other days   Weekly warfarin total:  26 mg   Plan last modified:  Emily TillmanD (2021)   Next INR check:  3/16/2021   Target end date:  Indefinite    Indications    Chronic anticoagulation [Z79.01]  Hx of mechanical aortic valve replacement [V43.3] [Z95.2]  TIA (transient ischemic attack) [G45.9]  Atrial fibrillation (HCC) [I48.91] [I48.91]             Anticoagulation Episode Summary     INR check location:      Preferred lab:      Send INR reminders to:      Comments:  [employer] home meter      Anticoagulation Care Providers     Provider Role Specialty Phone number    Michael J Bloch, M.D. Referring Internal Medicine [phone]    Yinka Church, PharmD Responsible          Anticoagulation Patient Findings          Left voicemail message to report a  therapeutic INR of 2.94.  Pt to continue with current warfarin dosing regimen. Requested pt contact the clinic for any s/s of unusual bleeding, bruising, clotting or any changes to diet or medication.  Follow up in 4 weeks, to reduce the risk of adverse events related to this high risk medication, warfarin.    Katelyn Perez, Clinical Pharmacist

## 2021-03-04 ENCOUNTER — ANTICOAGULATION MONITORING (OUTPATIENT)
Dept: VASCULAR LAB | Facility: MEDICAL CENTER | Age: 71
End: 2021-03-04

## 2021-03-04 ENCOUNTER — TELEPHONE (OUTPATIENT)
Dept: VASCULAR LAB | Facility: MEDICAL CENTER | Age: 71
End: 2021-03-04

## 2021-03-04 DIAGNOSIS — Z95.2 HX OF MECHANICAL AORTIC VALVE REPLACEMENT: ICD-10-CM

## 2021-03-04 DIAGNOSIS — G45.9 TIA (TRANSIENT ISCHEMIC ATTACK): ICD-10-CM

## 2021-03-04 DIAGNOSIS — Z79.01 CHRONIC ANTICOAGULATION: ICD-10-CM

## 2021-03-04 DIAGNOSIS — I48.91 ATRIAL FIBRILLATION, UNSPECIFIED TYPE (HCC): ICD-10-CM

## 2021-03-05 NOTE — TELEPHONE ENCOUNTER
Echo reviewed.   Well functioning mechanical avr  Moderate MR.  Will defer management of valvular heart disease any further indicated surveillance imaging to cards Michael Bloch, MD  [facility]

## 2021-03-15 ENCOUNTER — HOSPITAL ENCOUNTER (OUTPATIENT)
Dept: RADIOLOGY | Facility: MEDICAL CENTER | Age: 71
End: 2021-03-15
Attending: FAMILY MEDICINE
Payer: MEDICARE

## 2021-03-15 DIAGNOSIS — Z12.31 ENCOUNTER FOR SCREENING MAMMOGRAM FOR BREAST CANCER: ICD-10-CM

## 2021-03-15 PROCEDURE — 77063 BREAST TOMOSYNTHESIS BI: CPT

## 2021-03-22 ENCOUNTER — OFFICE VISIT (OUTPATIENT)
Dept: VASCULAR LAB | Facility: MEDICAL CENTER | Age: 71
End: 2021-03-22
Payer: MEDICARE

## 2021-03-22 DIAGNOSIS — Z95.2 HX OF MECHANICAL AORTIC VALVE REPLACEMENT: ICD-10-CM

## 2021-03-22 DIAGNOSIS — Z95.2 S/P AORTIC VALVE REPLACEMENT: ICD-10-CM

## 2021-03-22 DIAGNOSIS — I50.9 CONGESTIVE HEART FAILURE, UNSPECIFIED HF CHRONICITY, UNSPECIFIED HEART FAILURE TYPE (HCC): ICD-10-CM

## 2021-03-22 DIAGNOSIS — E78.5 DYSLIPIDEMIA: ICD-10-CM

## 2021-03-22 DIAGNOSIS — I10 ESSENTIAL HYPERTENSION: ICD-10-CM

## 2021-03-22 PROCEDURE — 99213 OFFICE O/P EST LOW 20 MIN: CPT | Mod: 95,CR | Performed by: NURSE PRACTITIONER

## 2021-03-22 ASSESSMENT — ENCOUNTER SYMPTOMS
SHORTNESS OF BREATH: 1
NERVOUS/ANXIOUS: 0
ABDOMINAL PAIN: 0
COUGH: 0
HEADACHES: 0
CLAUDICATION: 0
PALPITATIONS: 0
DIZZINESS: 0
BRUISES/BLEEDS EASILY: 0
BLOOD IN STOOL: 1
MYALGIAS: 0

## 2021-03-22 NOTE — PROGRESS NOTES
This visit was conducted via IPexpert video call using secure and encrypted video conferencing technology due to covid-[place] restrictions.   The patient was in a private location in the state of [place].    The patient's identity was confirmed and verbal consent was obtained for this virtual visit.    [facility] Follow Up Visit    Date of Service: 3/22/21    Yenifer Beasley is here for follow up of dyslipidemia, HTN, chronic anticoagulation, and hypothyroidism    HPI  Pertinent Interval History since last visit:   Occasional minor palpitations, resolve quickly  Echo scheduled  Has f/u with cards tomorrow   On Praluent,Has only 2 more left, will look into funding   Is working on the issue  No home BP readings 140/63  HR 46 Not sure if accurate will take her cuff into her appt tomorrow with card to see if accurate  On warfarin, no bleeding problems, except an occasional hemorrhoid   Denies CP   Minor LE swelling  Improved gout off meds now since going down in the Lasix dose.       Current Prescription Lipid Lowering Medications - including dose: Has missed several doses   Statin: None  Non-Statin: Continue Praluent 150 mg 2x per week  Continue Zetia  Current Lipid Lowering and Related Supplements:   Omega 3s, 1000u daily  Vit D  CoQ10  Any Current Side Effects Potentially Related to Lipid Lowering therapy?   No  Current Adherence to Lipid Lowering Therapies:  Complete  Any Previous History of Statin Intolerance?   Yes, Details: severe myalgias on multiple statins  Baseline Lipids Prior to Treatment:  Shown here:  LDL-C: 152      SOCIAL HISTORY  Social History     Tobacco Use   Smoking Status Never Smoker   Smokeless Tobacco Never Used      Change in weight: down about 5lbs  Exercise habits: mod exercise  Diet: common adult, consistent vit K    Review of Systems   Constitutional: Negative for malaise/fatigue.   HENT: Positive for nosebleeds.    Respiratory: Positive for shortness of breath. Negative for cough.     Cardiovascular: Positive for leg swelling. Negative for chest pain, palpitations and claudication.   Gastrointestinal: Positive for blood in stool (hemorrhoids but stopped quickly). Negative for abdominal pain and melena.   Genitourinary: Negative for hematuria.   Musculoskeletal: Positive for joint pain. Negative for myalgias.   Neurological: Negative for dizziness and headaches.   Endo/Heme/Allergies: Does not bruise/bleed easily.   Psychiatric/Behavioral: The patient is not nervous/anxious.      Physical Exam   Constitutional: She appears well-developed.   Pulmonary/Chest: Effort normal.   Neurological: She is alert.   Psychiatric: She has a normal mood and affect. Her behavior is normal.     DATA REVIEW  Most Recent Lipid Panel:   Lab Results   Component Value Date    CHOLSTRLTOT 224 (H) 12/30/2020    TRIGLYCERIDE 138 12/30/2020    HDL 44 12/30/2020     (H) 12/30/2020     Other Pertinent Blood Work:   Lab Results   Component Value Date    SODIUM 132 (L) 03/02/2021    POTASSIUM 5.0 03/02/2021    CHLORIDE 98 03/02/2021    CO2 23 03/02/2021    ANION 11.0 03/02/2021    GLUCOSE 106 (H) 03/02/2021    BUN 25 (H) 03/02/2021    CREATININE 1.29 03/02/2021    CALCIUM 9.5 03/02/2021    ASTSGOT 24 12/30/2020    ALTSGPT 13 12/30/2020    ALKPHOSPHAT 64 12/30/2020    TBILIRUBIN 0.5 12/30/2020    ALBUMIN 3.8 12/30/2020    AGRATIO 1.1 12/30/2020    TSHULTRASEN 3.600 12/30/2020     Other:  Lp(a) - 57 (oct 2017)  TSH 1.5  (oct 2017)    Recent Imaging Studies:      Echo march 2017:  1. Left ventricular ejection fraction is visually estimated to be 55%.   Normal regional wall motion. Grade I diastolic dysfunction.  2. Known mechanical aortic valve that is functioning normally.   3. Mild mitral stenosis.  4. Estimated right ventricular systolic pressure is 40 mmHg.    Echo 4/4/19  CONCLUSIONS  Normal left ventricular systolic function.  Left ventricular ejection fraction is visually estimated to be 65-70%.  Mild concentric  left ventricular hypertrophy.  Mild mitral stenosis.  Severe mitral annular calcification.  Mild mitral regurgitation.  Bioprosthetic aortic valve with increased velocities.  Right heart pressures are consistent with moderate pulmonary   hypertension.  Severely dilated left atrium.  Echo 3/3/21  Normal left ventricular chamber size.  Left ventricular ejection fraction is visually estimated to be 65%.  Mild concentric left ventricular hypertrophy.  Dilated inferior vena cava without inspiratory collapse.  Moderate mitral stenosis.  Mean transvalvular gradient is 7 mmHg at a heart rate of 64 BPM.  Structurally normal tricuspid valve without significant stenosis.  Normal function of mechanical prosthetic valve.  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  LDL/nonHDL above goal due to stopping PCSK9i   ; Non   Need updated labs after she starts back on the PCSK9I  Lifestyle Recommendations From Today’s Visit:    Diet - low simple carb like [place]; limiting sugars  Exercise - daily home exercise for 15-20 min  Non-Statin  Medications Recommendations from Today’s Visit: Pt will check into assistance programs for help  - Continue Praluent 150mg when able  - Continue zetia 10 mg daily  - Consider addition of fibrate if nonHDL >130 in future or if she is unable to get the PCSK9I  Indication for PCSK9 Inhibitor, if applicable:  ASCVD with suboptimal control of LDL-C despite maximally tolerated statin - continue praluent 150 mg 2x per month, missed doses  Supplements Recommended at this visit:  - Continue omega 3's at 3,000u/day, Vit D and CoQ10  Recommendations for Other Cardiovascular Risk Factors, toby all that apply:   HTN: Unsure if BP cuff is accurate, will check it against cuff at cardiology office tomorrow ,Continue with Lasix back to 20 mg and Spironolactone 25 mg. Lasix dose decreased due to gout episodes. Will call me on Thursday to let me know if BP cuff is accurate will need to trial  Peripheral alpha blocker.   IFG -relatively stable.  Continue TLC  Anticoagulation - continue warfarin and f/u in anticoag clinic  Other issues  - Hypothyroidism - seems under reasonable control per TSH; continue current thyroid replection; otherwise defer management to PCP  - Skin infection- pt has had cellulitis in foot and hand.  Resolved now. But now has gout in both feet  - SOB- usually occurs with exertion.  Could be due to deconditioning.  Continue work-up with cardiology regarding etiology.  - Fear of COVID pandemic-- explained the importance of maintaining all healthcare appts and encouraged she request virtual visits if needed, but do not delay care as there is a greater risk of MI/stroke.  She has had her first Covid 10 vaccine and is scheduled for the second one.  - Hyponatremia-Electrolyte replacement daily or decrease water intake Hold Potassium replacement for now.          Studies Ordered at Todays Visit:   None - Call into office this week to let me know if her BP cuff is correlating with the office cuff.   Blood Work Ordered At  Today’s visit:CMP, BNP and lipid   As ordered    Follow-Up:   1) Anticoagulation clinic as scheduled  2) Vascular care 2 months  3) F/u with cardiology tomorrow  Time: 20-29min - - chart review/prep, review of other providers' records, imaging/lab review, face-to-face time for history/examination, ordering, prescribing,  review of results/meds/ treatment plan with patient/family/caregiver, documentation in EMR, care coordination (as needed)  RADHA Meza.    CC:  Marion Jones M.D.

## 2021-03-23 ENCOUNTER — OFFICE VISIT (OUTPATIENT)
Dept: CARDIOLOGY | Facility: MEDICAL CENTER | Age: 71
End: 2021-03-23
Payer: MEDICARE

## 2021-03-23 VITALS
HEIGHT: 62 IN | WEIGHT: 226 LBS | RESPIRATION RATE: 16 BRPM | SYSTOLIC BLOOD PRESSURE: 138 MMHG | DIASTOLIC BLOOD PRESSURE: 80 MMHG | HEART RATE: 55 BPM | BODY MASS INDEX: 41.59 KG/M2 | OXYGEN SATURATION: 95 %

## 2021-03-23 DIAGNOSIS — Z82.49 FAMILY HISTORY OF ABDOMINAL AORTIC ANEURYSM (AAA): ICD-10-CM

## 2021-03-23 DIAGNOSIS — I25.10 ATHEROSCLEROSIS OF NATIVE CORONARY ARTERY OF NATIVE HEART WITHOUT ANGINA PECTORIS: ICD-10-CM

## 2021-03-23 DIAGNOSIS — H93.13 TINNITUS OF BOTH EARS: ICD-10-CM

## 2021-03-23 DIAGNOSIS — Z95.2 S/P AORTIC VALVE REPLACEMENT: ICD-10-CM

## 2021-03-23 DIAGNOSIS — Z13.6 SCREENING FOR AAA (ABDOMINAL AORTIC ANEURYSM): ICD-10-CM

## 2021-03-23 LAB — EKG IMPRESSION: NORMAL

## 2021-03-23 PROCEDURE — 93000 ELECTROCARDIOGRAM COMPLETE: CPT | Performed by: INTERNAL MEDICINE

## 2021-03-23 PROCEDURE — 99214 OFFICE O/P EST MOD 30 MIN: CPT | Performed by: INTERNAL MEDICINE

## 2021-03-23 ASSESSMENT — FIBROSIS 4 INDEX: FIB4 SCORE: 1.01

## 2021-03-23 NOTE — PROGRESS NOTES
"      Cardiology Follow-up Consultation Note    Date of note:    3/23/2021    Primary Care Provider: Marion Jones M.D.    Name:             Yenifer Beasley   YOB: 1950  MRN:               [id number]    CC: Follow-up mechanical aortic valve, coronary artery disease status post CABG, paroxysmal atrial fibrillation, mitral stenosis    Patient ID/HPI:   70-year-old female patient here for follow-up. She feels well, her shortness of breath is stable. She lost about 10 pounds. Denies chest pains, dizziness. She complains of tinnitus both ears, fullness. Her dad passed away from abdominal aortic aneurysm.  She is working with vascular clinic regarding her hyperlipidemia.    ROS  Fatigue, dyspnea on exertion, joint pains.  All other systems reviewed and negative    Past Medical History:   Diagnosis Date   • Arthritis     OA OF HANDS, KNEESM, HIPS AND BACK   • Asthma 10/17/2019   • CAD (coronary artery disease) 2007    CABG   • Diabetes    • Diabetic neuropathy (HCC) 1/6/2011   • Fall     \"a couple years ago because of my neuopathy\" multiple falls   • Gastritis 7/10    H/O UGIB   • GERD (gastroesophageal reflux disease)    • Hyperlipidemia    • Hypertension    • Hypothyroidism    • Infectious disease     History of MRSA   • Migraine    • Neuropathy in diabetes (HCC)    • HILDA (obstructive sleep apnea)    • S/P aortic valve replacement 2007   • TIA (transient ischemic attack) 2005   • Tricuspid regurgitation mild- mod 1/6/2011   • Unspecified hemorrhagic conditions     Bleeds easily-GI bleeds with NSAIDS and ASA   • Upper GI bleed ON JULY 2007 1/6/2011         Past Surgical History:   Procedure Laterality Date   • MITRAL VALVE REPLACE  2007    AORTIC VALVE   • SHOULDER SURGERY  6/2001    NEER DECOMOPRESSION LEFT SHOULDER   • CARPAL TUNNEL RELEASE  1998    B/L   • TONSILLECTOMY  1995    PARTIAL PALATOPLASTY   • ABDOMINAL HYSTERECTOMY TOTAL      TAHBSOO   • CHOLECYSTECTOMY     • CORONARY ARTERY " "BYPASS, 1      Triple Bypass   • HERNIA REPAIR      VENTRAL HERNIA REPAIR   • PRIMARY C SECTION           Current Outpatient Medications   Medication Sig Dispense Refill   • amiodarone (CORDARONE) 200 MG Tab TAKE 1 TABLET BY MOUTH EVERY  Tab 0   • furosemide (LASIX) 20 MG Tab Take 1 Tab by mouth every day. 100 Tab 1   • levothyroxine (SYNTHROID) 125 MCG Tab Take 125 mcg by mouth Every morning on an empty stomach.     • ezetimibe (ZETIA) 10 MG Tab TAKE 1 TABLET BY MOUTH EVERY DAY 90 Tab 3   • Alirocumab 150 MG/ML Solution Auto-injector Inject 1 PEN as instructed every 14 days. 6 Each 3   • warfarin (COUMADIN) 4 MG Tab TAKE 1-2 TABLETs BY MOUTH EVERY DAY AS DIRECTED BY THE [facility] 180 Tab 2   • Mupirocin 2 % Kit by Apply externally route.     • triamcinolone acetonide (KENALOG) 0.1 % Ointment Apply twice daily to affected area of lip until the skin is smooth or up to 2 weeks. 15 g 1   • lisinopril (PRINIVIL) 20 MG TABS Take 20 mg by mouth 2 times a day.     • vitamin D (CHOLECALCIFEROL) 1000 UNIT TABS Take 1,000 Units by mouth every day.       • docosahexanoic acid (OMEGA 3 FA) 1000 MG CAPS Take 1,000 mg by mouth 2 Times a Day.     • tramadol (ULTRAM) 50 MG TABS Take 2 Tabs by mouth 3 times a day. 180 Each 0     No current facility-administered medications for this visit.         Allergies   Allergen Reactions   • Hmg-Coa-R Inhibitors    • Asa [Aspirin]      GI BLEED   • Cymbalta [Duloxetine Hcl]      Feels like a \"zombie\"   • Latex    • Lyrica [Pregabalin]      SPACED OUT   • Nsaids      GI BLEED   • Tricor      Breathing problems     • Trilipix [Choline Fenofibrate]      SOB         Family History   Problem Relation Age of Onset   • Heart Disease Mother         a fib   • Heart Disease Brother         a fib         Social History     Socioeconomic History   • Marital status:      Spouse name: Not on file   • Number of children: Not on file   • Years of education: Not on file   • " "Highest education level: Not on file   Occupational History   • Not on file   Tobacco Use   • Smoking status: Never Smoker   • Smokeless tobacco: Never Used   Substance and Sexual Activity   • Alcohol use: No   • Drug use: Never   • Sexual activity: Not on file   Other Topics Concern   • Not on file   Social History Narrative   • Not on file     Social Determinants of Health     Financial Resource Strain:    • Difficulty of Paying Living Expenses:    Food Insecurity:    • Worried About Running Out of Food in the Last Year:    • Ran Out of Food in the Last Year:    Transportation Needs:    • Lack of Transportation (Medical):    • Lack of Transportation (Non-Medical):    Physical Activity:    • Days of Exercise per Week:    • Minutes of Exercise per Session:    Stress:    • Feeling of Stress :    Social Connections:    • Frequency of Communication with Friends and Family:    • Frequency of Social Gatherings with Friends and Family:    • Attends [employer] Services:    • Active Member of Clubs or Organizations:    • Attends Club or Organization Meetings:    • Marital Status:    Intimate Partner Violence:    • Fear of Current or Ex-Partner:    • Emotionally Abused:    • Physically Abused:    • Sexually Abused:          Physical Exam:  Ambulatory Vitals  /80 (BP Location: Left arm, Patient Position: Sitting, BP Cuff Size: Adult)   Pulse (!) 55   Resp 16   Ht 1.575 m (5' 2\")   Wt 103 kg (226 lb)   SpO2 95%    Oxygen Therapy:  Pulse Oximetry: 95 %  BP Readings from Last 4 Encounters:   03/23/21 138/80   12/09/19 145/57   11/25/19 151/58   11/12/19 141/58       Weight/BMI: Body mass index is 41.34 kg/m².  Wt Readings from Last 4 Encounters:   03/23/21 103 kg (226 lb)   10/17/19 107 kg (236 lb 14.2 oz)   10/08/19 106 kg (234 lb)   08/02/19 106 kg (233 lb 11 oz)       General: Well appearing and in no apparent distress  Head: atrumatic  Eyes: No conjunctival pallor   ENT: normal external appearance of nose and " ears  Neck: JVD absent, carotid bruits absent  Lungs: respiratory sounds  normal, additional breath sounds absent  Heart: Regular rhythm,   No palpable thrills on palpation, mechanical aortic valve click, no rubs,   Lower extremity edema mild  Abdomen: soft, non tender, non distended.  Extremities/MSK: no clubbing, no cyanosis  Neurological: normal orientation, Gait normal   Psychiatric: Appropriate affect, intact judgement and insight  Skin: Warm extremities        Lab Data Review:  Lab Results   Component Value Date/Time    CHOLSTRLTOT 224 (H) 12/30/2020 12:29 PM     (H) 12/30/2020 12:29 PM    HDL 44 12/30/2020 12:29 PM    TRIGLYCERIDE 138 12/30/2020 12:29 PM       Lab Results   Component Value Date/Time    SODIUM 132 (L) 03/02/2021 10:28 AM    POTASSIUM 5.0 03/02/2021 10:28 AM    CHLORIDE 98 03/02/2021 10:28 AM    CO2 23 03/02/2021 10:28 AM    GLUCOSE 106 (H) 03/02/2021 10:28 AM    BUN 25 (H) 03/02/2021 10:28 AM    CREATININE 1.29 03/02/2021 10:28 AM    CREATININE 1.6 (H) 11/26/2007 12:28 AM     Lab Results   Component Value Date/Time    ALKPHOSPHAT 64 12/30/2020 12:29 PM    ASTSGOT 24 12/30/2020 12:29 PM    ALTSGPT 13 12/30/2020 12:29 PM    TBILIRUBIN 0.5 12/30/2020 12:29 PM      Lab Results   Component Value Date/Time    WBC 10.5 12/30/2020 12:29 PM     Echo 4/4:  Normal left ventricular systolic function.  Left ventricular ejection fraction is visually estimated to be 65-70%.  Mild concentric left ventricular hypertrophy.  Mild mitral stenosis.  Severe mitral annular calcification.  Mild mitral regurgitation.  Bioprosthetic aortic valve with increased velocities.  Right heart pressures are consistent with moderate pulmonary   hypertension.  Severely dilated left atrium.    Echo 3/3/21  CONCLUSIONS  Normal left ventricular chamber size.  Left ventricular ejection fraction is visually estimated to be 65%.  Mild concentric left ventricular hypertrophy.  Dilated inferior vena cava without inspiratory  collapse.  Moderate mitral stenosis.  Mean transvalvular gradient is 7 mmHg at a heart rate of 64 BPM.  Structurally normal tricuspid valve without significant stenosis.  Normal function of mechanical prosthetic valve.     EKG today shows sinus bradycardia with PACs     Impression and Plan:  68-year-old female patient with  1.  Shortness of breath, likely secondary to diastolic heart failure, NYHA class II stage C  2.  Atrial fibrillation post cardioversion, maintaining sinus rhythm on amiodarone  3.  Mechanical aortic valve replacement, three-vessel bypass surgery in 2007  4.  Hypertension  5.  Diabetes mellitus    She is stable from cardiac standpoint. She has bradycardia, asymptomatic. We will stop metoprolol.  Continue amiodarone.  Continue warfarin for anticoagulation.  Continue work with vascular medicine for hyperlipidemia.  Continue Lasix 20 mg daily, her lungs are clear, shortness of breath is stable.  Blood pressure is well controlled, continue lisinopril 20 twice daily.  Her potassium is on the higher side, stop potassium supplements.  We will get carotid Dopplers to evaluate her tinnitus, fullness in her ears, ultrasound to rule out AAA given her family history of AAA.    Return in about 6 months (around 9/23/2021).      Caleb PARRA  Interventional cardiologist  [facility]  [place] [facility], [facility]  [address] [address]  Phone: [phone]  Fax: [phone]

## 2021-04-04 LAB — INR PPP: 2.4 (ref 2–3.5)

## 2021-04-05 ENCOUNTER — ANTICOAGULATION MONITORING (OUTPATIENT)
Dept: MEDICAL GROUP | Facility: MEDICAL CENTER | Age: 71
End: 2021-04-05

## 2021-04-05 DIAGNOSIS — G45.9 TIA (TRANSIENT ISCHEMIC ATTACK): ICD-10-CM

## 2021-04-05 DIAGNOSIS — Z95.2 HX OF MECHANICAL AORTIC VALVE REPLACEMENT: ICD-10-CM

## 2021-04-05 DIAGNOSIS — I48.91 ATRIAL FIBRILLATION, UNSPECIFIED TYPE (HCC): ICD-10-CM

## 2021-04-05 DIAGNOSIS — Z79.01 CHRONIC ANTICOAGULATION: ICD-10-CM

## 2021-04-06 NOTE — PROGRESS NOTES
Anticoagulation Summary  As of 2021    INR goal:  2.5-3.5   TTR:  63.3 % (4.1 y)   INR used for dosin.40 (2021)   Warfarin maintenance plan:  2 mg (4 mg x 0.5) every Tue; 4 mg (4 mg x 1) all other days   Weekly warfarin total:  26 mg   Plan last modified:  Sybil Frazier PharmD (2021)   Next INR check:     Target end date:  Indefinite    Indications    Chronic anticoagulation [Z79.01]  Hx of mechanical aortic valve replacement [V43.3] [Z95.2]  TIA (transient ischemic attack) [G45.9]  Atrial fibrillation (HCC) [I48.91] [I48.91]             Anticoagulation Episode Summary     INR check location:      Preferred lab:      Send INR reminders to:      Comments:  [employer] home meter      Anticoagulation Care Providers     Provider Role Specialty Phone number    Michael J Bloch, M.D. Referring Internal Medicine [phone]    Yinka Church, PharmD Responsible          Anticoagulation Patient Findings     Spoke with patient today regarding sub-therapeutic INR of 2.40.  Patient denies any signs/symptoms of bruising or bleeding or any changes in medications. Patient drank 3 oz for the last 5 days. Instructed patient to call clinic with any questions or concerns. Instructed patient to bolus todays dose to 6 mg and continue normal warfarin regimen.     Follow up in 2 weeks, to reduce risk of adverse events related to this high risk medication,  Warfarin.    Discussed with Yinka Church, EmilyD.  Earl Rivera - Student Pharmacist

## 2021-04-14 ENCOUNTER — HOSPITAL ENCOUNTER (OUTPATIENT)
Dept: RADIOLOGY | Facility: MEDICAL CENTER | Age: 71
End: 2021-04-14
Attending: INTERNAL MEDICINE
Payer: MEDICARE

## 2021-04-14 ENCOUNTER — TELEPHONE (OUTPATIENT)
Dept: CARDIOLOGY | Facility: MEDICAL CENTER | Age: 71
End: 2021-04-14

## 2021-04-14 DIAGNOSIS — Z82.49 FAMILY HISTORY OF ABDOMINAL AORTIC ANEURYSM (AAA): ICD-10-CM

## 2021-04-14 DIAGNOSIS — H93.13 TINNITUS OF BOTH EARS: ICD-10-CM

## 2021-04-14 DIAGNOSIS — Z13.6 SCREENING FOR AAA (ABDOMINAL AORTIC ANEURYSM): ICD-10-CM

## 2021-04-14 PROCEDURE — 76775 US EXAM ABDO BACK WALL LIM: CPT

## 2021-04-14 PROCEDURE — 93880 EXTRACRANIAL BILAT STUDY: CPT | Mod: 26 | Performed by: INTERNAL MEDICINE

## 2021-04-14 PROCEDURE — 93880 EXTRACRANIAL BILAT STUDY: CPT

## 2021-04-14 NOTE — TELEPHONE ENCOUNTER
US-CAROTID DOPPLER BILAT  Order: [id number]  Status:  Final result   Visible to patient:  Yes (MyChart) Dx:  Tinnitus of both ears  Message  Received: Today  Message Contents   STEPHON Burch R.N.   Carotid disease mild, mild atherosclerosis in aorta, no aneurysm noted. Reassurance to patient. Follow up with vascular medicine team as planned and akinapelli for afib, valve replacement history.     No med changes, cont coumadin and zetia. SC   --------------------------------------------------------------------------    Called pt and unable to reach. Message left that a [employer]hart message would be sent regarding results but could call office if questions.

## 2021-04-19 ENCOUNTER — PATIENT MESSAGE (OUTPATIENT)
Dept: CARDIOLOGY | Facility: MEDICAL CENTER | Age: 71
End: 2021-04-19

## 2021-04-19 NOTE — PATIENT COMMUNICATION
"To Dr. Hawkins/Delia PHILIP: Please see VS, symptoms, and if she should continue spironolactone or not.     Called patient to discuss symptoms/concerns. Patient states she's felt \"a little lightheaded\" when ambulating. She denies association with position changes and states she feels ok when sitting. She reports she's been having \"inner ear issues\" for a while including a buzzing sound, occasional blood on a Qtip. She denies fever, pain, or other signs of infection, headache, swelling. She has not reached out to her PCP regarding these issues/symptoms, and I recommended to be worked up for possible non-cardiac related causes.    Patient reports she has never DC'd spironolactone and is needing a refill if Dr. Hawkins wants her to continue.   "

## 2021-04-20 NOTE — PATIENT COMMUNICATION
FW: Non-Urgent Medical Question  Received: Today  Message Contents   STEPHON Burch R.N.   Phone Number: [phone]   Its hard to tell why her spironolactone was stopped. She has hyperkalemia in the recent past so Im guessing this is why.     Her BP is slightly elevated but not too high. Let's continue to follow her BP readings for 1-2 more weeks, if goes consistently >140, then we can discuss med changes at that time.     Repeat labs should be pending. Recommend BMP in at least a month to recheck K and kidney function.     Lightheadedness could be hydration status, make sure she stays hydrated. If continues, let us know. SC

## 2021-04-29 ENCOUNTER — ANTICOAGULATION MONITORING (OUTPATIENT)
Dept: CARDIOLOGY | Facility: MEDICAL CENTER | Age: 71
End: 2021-04-29

## 2021-04-29 DIAGNOSIS — Z79.01 CHRONIC ANTICOAGULATION: ICD-10-CM

## 2021-04-29 DIAGNOSIS — G45.9 TIA (TRANSIENT ISCHEMIC ATTACK): ICD-10-CM

## 2021-04-29 DIAGNOSIS — I48.91 ATRIAL FIBRILLATION, UNSPECIFIED TYPE (HCC): ICD-10-CM

## 2021-04-29 DIAGNOSIS — Z95.2 HX OF MECHANICAL AORTIC VALVE REPLACEMENT: ICD-10-CM

## 2021-04-29 LAB — INR PPP: 1.9 (ref 2–3.5)

## 2021-04-30 NOTE — PROGRESS NOTES
OP Anticoagulation Service Note    Date: 2021    Anticoagulation Summary  As of 2021    INR goal:  2.5-3.5   TTR:  62.3 % (4.2 y)   INR used for dosin.90 (2021)   Warfarin maintenance plan:  4 mg (4 mg x 1) every day   Weekly warfarin total:  28 mg   Plan last modified:  Coleman Dozier, PharmD (2021)   Next INR check:  2021   Target end date:  Indefinite    Indications    Chronic anticoagulation [Z79.01]  Hx of mechanical aortic valve replacement [V43.3] [Z95.2]  TIA (transient ischemic attack) [G45.9]  Atrial fibrillation (HCC) [I48.91] [I48.91]             Anticoagulation Episode Summary     INR check location:      Preferred lab:      Send INR reminders to:      Comments:  [employer] home meter      Anticoagulation Care Providers     Provider Role Specialty Phone number    Michael J Bloch, M.D. Referring Internal Medicine [phone]    Emily MartinezD Responsible          Anticoagulation Patient Findings      Voice message for patient regarding their anticoagulant.     HPI:   The reason for today's call is to prevent morbidity and mortality from a blood clot and/or stroke and to reduce the risk of bleeding while on a anticoagulant.     PCP:  Marion Jones M.D.  [address] [place] [address] [id number]  [facility] [id number]    Assessment:     • INR  sub-therapeutic.       Current Outpatient Medications:   •  amiodarone, TAKE 1 TABLET BY MOUTH EVERY DAY  •  furosemide, 20 mg, Oral, DAILY  •  levothyroxine, 125 mcg, Oral, AM ES  •  ezetimibe, TAKE 1 TABLET BY MOUTH EVERY DAY  •  Alirocumab, 1 PEN, Subcutaneous, Q14 DAYS  •  warfarin, TAKE 1-2 TABLETs BY MOUTH EVERY DAY AS DIRECTED BY THE [facility]  •  Mupirocin, by Apply externally route.  •  triamcinolone acetonide, Apply twice daily to affected area of lip until the skin is smooth or up to 2 weeks.  •  lisinopril, 20 mg, Oral, BID  •  vitamin D, 1,000 Units, Oral, DAILY  •  docosahexanoic acid, 1,000 mg, Oral, BID  •   traMADol, 100 mg, Oral, TID      Plan:     • 6mg today then Increase weekly warfarin dose as noted      Follow-up:     • Our protocol suggests we test in 1 weeks.        Additional information discussed with patient:     • Asked patient to please call the anticoagulation clinic if they have any signs/symptoms of bleeding and/or thrombosis or any changes to diet or medications.      National recommendations regarding anticoagulation therapy:     The CHEST guidelines recommends frequent INR monitoring at regular intervals (a few days up to a max of 12 weeks) to ensure patients are on the proper dose of warfarin, and patients are not having any complications from therapy.  INRs can dramatically change over a short time period due to diet, medications, and medical conditions.       Coleman Dozier, PharmD, MS, BCACP, LCC  [facility]  Phone [phone] fax [phone]    This note was created using voice recognition software (Dragon). The accuracy of the dictation is limited by the abilities of the software. I have reviewed the note prior to signing, however some errors in grammar and context are still possible. If you have any questions related to this note please do not hesitate to contact our office.

## 2021-05-18 ENCOUNTER — PATIENT MESSAGE (OUTPATIENT)
Dept: HEALTH INFORMATION MANAGEMENT | Facility: OTHER | Age: 71
End: 2021-05-18

## 2021-05-19 ENCOUNTER — HOSPITAL ENCOUNTER (OUTPATIENT)
Facility: MEDICAL CENTER | Age: 71
End: 2021-05-19
Attending: NURSE PRACTITIONER
Payer: MEDICARE

## 2021-05-19 ENCOUNTER — HOSPITAL ENCOUNTER (OUTPATIENT)
Dept: LAB | Facility: MEDICAL CENTER | Age: 71
End: 2021-05-19
Attending: NURSE PRACTITIONER
Payer: MEDICARE

## 2021-05-19 DIAGNOSIS — I10 ESSENTIAL HYPERTENSION: ICD-10-CM

## 2021-05-19 DIAGNOSIS — Z79.01 CHRONIC ANTICOAGULATION: ICD-10-CM

## 2021-05-19 DIAGNOSIS — I50.9 CONGESTIVE HEART FAILURE, UNSPECIFIED HF CHRONICITY, UNSPECIFIED HEART FAILURE TYPE (HCC): ICD-10-CM

## 2021-05-19 LAB
ALBUMIN SERPL BCP-MCNC: 4 G/DL (ref 3.2–4.9)
ALBUMIN/GLOB SERPL: 1.4 G/DL
ALP SERPL-CCNC: 63 U/L (ref 30–99)
ALT SERPL-CCNC: 13 U/L (ref 2–50)
ANION GAP SERPL CALC-SCNC: 12 MMOL/L (ref 7–16)
AST SERPL-CCNC: 24 U/L (ref 12–45)
BILIRUB SERPL-MCNC: 0.5 MG/DL (ref 0.1–1.5)
BUN SERPL-MCNC: 25 MG/DL (ref 8–22)
CALCIUM SERPL-MCNC: 9.5 MG/DL (ref 8.5–10.5)
CHLORIDE SERPL-SCNC: 102 MMOL/L (ref 96–112)
CHOLEST SERPL-MCNC: 140 MG/DL (ref 100–199)
CO2 SERPL-SCNC: 22 MMOL/L (ref 20–33)
CREAT SERPL-MCNC: 1.38 MG/DL (ref 0.5–1.4)
GLOBULIN SER CALC-MCNC: 2.9 G/DL (ref 1.9–3.5)
GLUCOSE SERPL-MCNC: 88 MG/DL (ref 65–99)
HDLC SERPL-MCNC: 57 MG/DL
LDLC SERPL CALC-MCNC: 57 MG/DL
NT-PROBNP SERPL IA-MCNC: 862 PG/ML (ref 0–125)
POTASSIUM SERPL-SCNC: 4.5 MMOL/L (ref 3.6–5.5)
PROT SERPL-MCNC: 6.9 G/DL (ref 6–8.2)
SODIUM SERPL-SCNC: 136 MMOL/L (ref 135–145)
TRIGL SERPL-MCNC: 132 MG/DL (ref 0–149)

## 2021-05-19 PROCEDURE — 80053 COMPREHEN METABOLIC PANEL: CPT

## 2021-05-19 PROCEDURE — 80061 LIPID PANEL: CPT

## 2021-05-19 PROCEDURE — 36415 COLL VENOUS BLD VENIPUNCTURE: CPT

## 2021-05-19 PROCEDURE — 85610 PROTHROMBIN TIME: CPT

## 2021-05-19 PROCEDURE — 83880 ASSAY OF NATRIURETIC PEPTIDE: CPT

## 2021-05-20 ENCOUNTER — ANTICOAGULATION MONITORING (OUTPATIENT)
Dept: MEDICAL GROUP | Facility: MEDICAL CENTER | Age: 71
End: 2021-05-20

## 2021-05-20 DIAGNOSIS — G45.9 TIA (TRANSIENT ISCHEMIC ATTACK): ICD-10-CM

## 2021-05-20 DIAGNOSIS — Z79.01 CHRONIC ANTICOAGULATION: ICD-10-CM

## 2021-05-20 DIAGNOSIS — Z95.2 HX OF MECHANICAL AORTIC VALVE REPLACEMENT: ICD-10-CM

## 2021-05-20 LAB
INR PPP: 2.43 (ref 0.87–1.13)
PROTHROMBIN TIME: 27.2 SEC (ref 12–14.6)

## 2021-05-21 NOTE — PROGRESS NOTES
Anticoagulation Summary  As of 2021    INR goal:  2.5-3.5   TTR:  61.5 % (4.2 y)   INR used for dosin.43 (2021)   Warfarin maintenance plan:  6 mg (4 mg x 1.5) every Thu; 4 mg (4 mg x 1) all other days   Weekly warfarin total:  30 mg   Plan last modified:  Katelyn Perez (2021)   Next INR check:     Target end date:  Indefinite    Indications    Chronic anticoagulation [Z79.01]  Hx of mechanical aortic valve replacement [V43.3] [Z95.2]  TIA (transient ischemic attack) [G45.9]  Atrial fibrillation (HCC) [I48.91] [I48.91]             Anticoagulation Episode Summary     INR check location:      Preferred lab:      Send INR reminders to:      Comments:  [employer] home meter      Anticoagulation Care Providers     Provider Role Specialty Phone number    Michael J Bloch, M.D. Referring Internal Medicine [phone]    Yinka Church, PharmD Responsible          Anticoagulation Patient Findings          Left voicemail message to report a sub therapeutic INR of 2.43.  Pt to increase weekly dose by 7%. Requested pt contact the clinic for any s/s of unusual bleeding, bruising, clotting or any changes to diet or medication.  Follow up in 2 weeks, to reduce the risk of adverse events related to this high risk medication, warfarin.    Katelyn Perez, Clinical Pharmacist

## 2021-05-24 ENCOUNTER — OFFICE VISIT (OUTPATIENT)
Dept: VASCULAR LAB | Facility: MEDICAL CENTER | Age: 71
End: 2021-05-24
Payer: MEDICARE

## 2021-05-24 DIAGNOSIS — Z95.2 HX OF MECHANICAL AORTIC VALVE REPLACEMENT: ICD-10-CM

## 2021-05-24 DIAGNOSIS — E78.5 DYSLIPIDEMIA: ICD-10-CM

## 2021-05-24 DIAGNOSIS — G45.9 TIA (TRANSIENT ISCHEMIC ATTACK): ICD-10-CM

## 2021-05-24 DIAGNOSIS — I25.811 CORONARY ARTERY DISEASE INVOLVING NATIVE ARTERY OF TRANSPLANTED HEART WITHOUT ANGINA PECTORIS: ICD-10-CM

## 2021-05-24 DIAGNOSIS — I50.9 CONGESTIVE HEART FAILURE, UNSPECIFIED HF CHRONICITY, UNSPECIFIED HEART FAILURE TYPE (HCC): ICD-10-CM

## 2021-05-24 DIAGNOSIS — E03.9 HYPOTHYROIDISM, UNSPECIFIED TYPE: ICD-10-CM

## 2021-05-24 DIAGNOSIS — E11.9 TYPE 2 DIABETES MELLITUS WITHOUT COMPLICATION, WITHOUT LONG-TERM CURRENT USE OF INSULIN (HCC): ICD-10-CM

## 2021-05-24 DIAGNOSIS — I10 ESSENTIAL HYPERTENSION: ICD-10-CM

## 2021-05-24 DIAGNOSIS — Z95.2 S/P AORTIC VALVE REPLACEMENT: ICD-10-CM

## 2021-05-24 DIAGNOSIS — I48.91 ATRIAL FIBRILLATION, UNSPECIFIED TYPE (HCC): ICD-10-CM

## 2021-05-24 DIAGNOSIS — E87.1 HYPONATREMIA: ICD-10-CM

## 2021-05-24 DIAGNOSIS — Z79.01 LONG TERM CURRENT USE OF ANTICOAGULANT THERAPY: ICD-10-CM

## 2021-05-24 DIAGNOSIS — E78.5 HYPERLIPIDEMIA, UNSPECIFIED HYPERLIPIDEMIA TYPE: ICD-10-CM

## 2021-05-24 PROCEDURE — 99213 OFFICE O/P EST LOW 20 MIN: CPT | Mod: 95,CR | Performed by: NURSE PRACTITIONER

## 2021-05-24 RX ORDER — DOXAZOSIN 2 MG/1
2 TABLET ORAL DAILY
Qty: 30 TABLET | Refills: 5 | Status: SHIPPED | OUTPATIENT
Start: 2021-05-24 | End: 2021-09-22

## 2021-05-24 RX ORDER — SPIRONOLACTONE 25 MG/1
25 TABLET ORAL DAILY
Qty: 90 TABLET | Refills: 3 | Status: SHIPPED | OUTPATIENT
Start: 2021-05-24 | End: 2021-11-09

## 2021-05-24 ASSESSMENT — ENCOUNTER SYMPTOMS
MYALGIAS: 0
COUGH: 0
HEADACHES: 0
PALPITATIONS: 0
NERVOUS/ANXIOUS: 0
BRUISES/BLEEDS EASILY: 0
DIZZINESS: 0
CLAUDICATION: 0
BLOOD IN STOOL: 1
ABDOMINAL PAIN: 0
SHORTNESS OF BREATH: 1

## 2021-05-24 NOTE — PROGRESS NOTES
This visit was conducted via [employer] video call using secure and encrypted video conferencing technology due to covid-19 restrictions.   The patient was in a private location in the state of [place].    The patient's identity was confirmed and verbal consent was obtained for this virtual visit.    [facility] Follow Up Visit    Date of Service: 5/24/21    Yenifer Beasley is here for follow up of dyslipidemia, HTN, chronic anticoagulation, and hypothyroidism    HPI  Pertinent Interval History since last visit:   Occasional minor palpitations, resolve quickly  Echo scheduled  Has f/u with cards tomorrow   On Praluent-Still working on assistance program   Home BP readings 139/65  HR 51   On warfarin, no bleeding problems, except an occasional hemorrhoid   Denies CP   Minor LE swelling  Improved gout off meds now since going down in the Lasix dose.   Has tinnitus karla neg carotid duplex       Current Prescription Lipid Lowering Medications - including dose: Has missed several doses   Statin: None  Non-Statin: Continue Praluent 150 mg 2x per week  Continue Zetia  Current Lipid Lowering and Related Supplements:   Omega 3s, 1000u daily  Vit D  CoQ10  Any Current Side Effects Potentially Related to Lipid Lowering therapy?   No  Current Adherence to Lipid Lowering Therapies:  Complete  Any Previous History of Statin Intolerance?   Yes, Details: severe myalgias on multiple statins  Baseline Lipids Prior to Treatment:  Shown here:  LDL-C: 152      SOCIAL HISTORY  Social History     Tobacco Use   Smoking Status Never Smoker   Smokeless Tobacco Never Used      Change in weight: down about 5lbs  Exercise habits: mod exercise  Diet: common adult, consistent vit K    Review of Systems   Constitutional: Negative for malaise/fatigue.   HENT: Positive for nosebleeds.    Respiratory: Positive for shortness of breath. Negative for cough.    Cardiovascular: Positive for leg swelling. Negative for chest pain, palpitations and  claudication.   Gastrointestinal: Positive for blood in stool (hemorrhoids but stopped quickly). Negative for abdominal pain and melena.   Genitourinary: Negative for hematuria.   Musculoskeletal: Positive for joint pain. Negative for myalgias.   Neurological: Negative for dizziness and headaches.   Endo/Heme/Allergies: Does not bruise/bleed easily.   Psychiatric/Behavioral: The patient is not nervous/anxious.      Physical Exam  Constitutional:       Appearance: She is well-developed.   Pulmonary:      Effort: Pulmonary effort is normal.   Neurological:      Mental Status: She is alert.   Psychiatric:         Behavior: Behavior normal.       DATA REVIEW  Most Recent Lipid Panel:   Lab Results   Component Value Date    CHOLSTRLTOT 140 05/19/2021    TRIGLYCERIDE 132 05/19/2021    HDL 57 05/19/2021    LDL 57 05/19/2021     Other Pertinent Blood Work:   Lab Results   Component Value Date    SODIUM 136 05/19/2021    POTASSIUM 4.5 05/19/2021    CHLORIDE 102 05/19/2021    CO2 22 05/19/2021    ANION 12.0 05/19/2021    GLUCOSE 88 05/19/2021    BUN 25 (H) 05/19/2021    CREATININE 1.38 05/19/2021    CALCIUM 9.5 05/19/2021    ASTSGOT 24 05/19/2021    ALTSGPT 13 05/19/2021    ALKPHOSPHAT 63 05/19/2021    TBILIRUBIN 0.5 05/19/2021    ALBUMIN 4.0 05/19/2021    AGRATIO 1.4 05/19/2021    TSHULTRASEN 3.600 12/30/2020     Other:  Lp(a) - 57 (oct 2017)  TSH 1.5  (oct 2017)    Recent Imaging Studies:      Echo march 2017:  1. Left ventricular ejection fraction is visually estimated to be 55%.   Normal regional wall motion. Grade I diastolic dysfunction.  2. Known mechanical aortic valve that is functioning normally.   3. Mild mitral stenosis.  4. Estimated right ventricular systolic pressure is 40 mmHg.    Echo 4/4/19  CONCLUSIONS  Normal left ventricular systolic function.  Left ventricular ejection fraction is visually estimated to be 65-70%.  Mild concentric left ventricular hypertrophy.  Mild mitral stenosis.  Severe mitral annular  calcification.  Mild mitral regurgitation.  Bioprosthetic aortic valve with increased velocities.  Right heart pressures are consistent with moderate pulmonary   hypertension.  Severely dilated left atrium.  Echo 3/3/21  Normal left ventricular chamber size.  Left ventricular ejection fraction is visually estimated to be 65%.  Mild concentric left ventricular hypertrophy.  Dilated inferior vena cava without inspiratory collapse.  Moderate mitral stenosis.  Mean [id number] gradient is 7 mmHg at a heart rate of 64 BPM.  Structurally normal tricuspid valve without significant stenosis.  Normal function of mechanical prosthetic valve.  US aorta 4/14/21  Mild atherosclerotic change of the aorta. No evidence of aneurysm.  Carotid duplex 4/14/21  Mild plaque with no significant stenosis.  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  LDL/nonHDL above goal due to stopping PCSK9i   ; Non   Need updated labs after she starts back on the PCSK9I  Lifestyle Recommendations From Today’s Visit:    Diet - low simple carb like [place];  limiting sugars  Exercise - daily home exercise for 15-20 min  Non-Statin Medications Recommendations from Today’s Visit: Pt will check into assistance programs for help  - Continue Praluent 150mg when able  - Continue zetia 10 mg daily  - Consider addition of fibrate if nonHDL >130 in future or if she is unable to get the PCSK9I  Indication for PCSK9 Inhibitor, if applicable:  ASCVD with suboptimal control of LDL-C despite maximally tolerated statin - continue praluent 150 mg 2x per month, missed doses  Supplements Recommended at this visit:  - Continue omega 3's at 3,000u/day, Vit D and CoQ10  Recommendations for Other Cardiovascular Risk Factors, toby all that apply:   HTN: Unsure if BP cuff is accurate, will check it against cuff at ca, take 1/2 tab see below. History of Gout also so would not tolerate thiazide or loop diuretics. Peripheral alpha blocker trial of Doxazosin 2 mg starting with 1 mg and can advance if needed will check with her in 1 month after her BMP comes back.   IFG -relatively stable.  Continue TLC  Anticoagulation - continue warfarin and f/u in anticoag clinic  Other issues  - Hypothyroidism - seems under reasonable control per TSH; continue current thyroid replection; otherwise defer management to PCP  - Skin infection- pt has had cellulitis in foot and hand.  Resolved now. But now has gout in both feet  - SOB- usually occurs with exertion.  Could be due to deconditioning.  Continue work-up with cardiology regarding etiology.  - Fear of COVID pandemic-- explained the importance of maintaining all healthcare appts and encouraged she request virtual visits if needed, but do not delay care as there is a greater risk of MI/stroke.  She has had both of her Covid 10 vaccins.  - Hyponatremia-Electrolyte replacement daily or decrease water intake Hold Potassium replacement for now.        - Elevated BNP- cut in half presently-taken off Spironolactone but still on Lasix 20 mg   -Tinnitus karla-carotid  duplex ivory for possible cause. Stop Lasix for now to see if this decreases the tinnitus. I explained that it may not resolve with stopping the medication but I would not like to see any worsening of the symptoms or permanent hearing loss as Lasix can be ototoxic start back on Spironolactone 25 mg take only half tab for now. BMP and BNP in 4 weeks   Studies Ordered at Todays Visit: None -  Blood Work Ordered At Today’s visit:CMP, BNP and lipid   As ordered    Follow-Up:   1) Anticoagulation clinic as scheduled  2) Vascular care 2 months  3) F/u with cardiology per their recommendation  Time: 20-29min - - chart review/prep, review of other providers' records, imaging/lab review, face-to-face time for history/examination, ordering, prescribing,  review of results/meds/ treatment plan with patient/family/caregiver, documentation in EMR, care coordination (as needed)  STEPH Meza    CC:  Marion Jones M.D.

## 2021-05-28 ENCOUNTER — PATIENT OUTREACH (OUTPATIENT)
Dept: HEALTH INFORMATION MANAGEMENT | Facility: OTHER | Age: 71
End: 2021-05-28

## 2021-05-28 NOTE — PROGRESS NOTES
Outreach-Comprehensive Health Assessment. Member declined, not interested at this time.    Attempt #1

## 2021-06-18 ENCOUNTER — APPOINTMENT (OUTPATIENT)
Dept: MEDICAL GROUP | Facility: PHYSICIAN GROUP | Age: 71
End: 2021-06-18
Payer: MEDICARE

## 2021-06-18 ENCOUNTER — HOSPITAL ENCOUNTER (OUTPATIENT)
Dept: LAB | Facility: MEDICAL CENTER | Age: 71
End: 2021-06-18
Attending: NURSE PRACTITIONER
Payer: MEDICARE

## 2021-06-18 ENCOUNTER — ANTICOAGULATION MONITORING (OUTPATIENT)
Dept: MEDICAL GROUP | Facility: PHYSICIAN GROUP | Age: 71
End: 2021-06-18

## 2021-06-18 DIAGNOSIS — Z95.2 HX OF MECHANICAL AORTIC VALVE REPLACEMENT: ICD-10-CM

## 2021-06-18 DIAGNOSIS — G45.9 TIA (TRANSIENT ISCHEMIC ATTACK): ICD-10-CM

## 2021-06-18 DIAGNOSIS — Z79.01 CHRONIC ANTICOAGULATION: ICD-10-CM

## 2021-06-18 LAB
INR PPP: 3.93 (ref 0.87–1.13)
PROTHROMBIN TIME: 37.3 SEC (ref 12–14.6)

## 2021-06-18 PROCEDURE — 36415 COLL VENOUS BLD VENIPUNCTURE: CPT

## 2021-06-18 PROCEDURE — 85610 PROTHROMBIN TIME: CPT

## 2021-06-18 NOTE — PROGRESS NOTES
Anticoagulation Summary  As of 6/18/2021    INR goal:  2.5-3.5   TTR:  61.6 % (4.3 y)   INR used for dosing:  3.93 (6/18/2021)   Warfarin maintenance plan:  6 mg (4 mg x 1.5) every Thu; 4 mg (4 mg x 1) all other days   Weekly warfarin total:  30 mg   Plan last modified:  Katelyn LANDON Filter (5/20/2021)   Next INR check:  7/2/2021   Target end date:  Indefinite    Indications    Chronic anticoagulation [Z79.01]  Hx of mechanical aortic valve replacement [V43.3] [Z95.2]  TIA (transient ischemic attack) [G45.9]  Atrial fibrillation (HCC) [I48.91] [I48.91]             Anticoagulation Episode Summary     INR check location:      Preferred lab:      Send INR reminders to:      Comments:  [employer] home meter      Anticoagulation Care Providers     Provider Role Specialty Phone number    Michael J Bloch, M.D. Referring Internal Medicine [phone]    Yinka Church, PharmD Responsible          Anticoagulation Patient Findings    Left voicemail message to report a SUPRA therapeutic INR of 3.9.  Pt to reduce warfarin today then continue with current warfarin dosing regimen. Requested pt contact the clinic for any s/s of unusual bleeding, bruising, clotting or any changes to diet or medication. FU 2 weeks.    [place], EmilyD

## 2021-06-19 LAB — INR PPP: 3.9 (ref 2–3.5)

## 2021-06-21 ENCOUNTER — ANTICOAGULATION MONITORING (OUTPATIENT)
Dept: MEDICAL GROUP | Facility: MEDICAL CENTER | Age: 71
End: 2021-06-21

## 2021-06-21 DIAGNOSIS — Z79.01 CHRONIC ANTICOAGULATION: ICD-10-CM

## 2021-06-21 DIAGNOSIS — I48.91 ATRIAL FIBRILLATION, UNSPECIFIED TYPE (HCC): ICD-10-CM

## 2021-06-21 DIAGNOSIS — G45.9 TIA (TRANSIENT ISCHEMIC ATTACK): ICD-10-CM

## 2021-06-21 DIAGNOSIS — Z95.2 HX OF MECHANICAL AORTIC VALVE REPLACEMENT: ICD-10-CM

## 2021-06-21 NOTE — PROGRESS NOTES
PT had an INR on 06/18 which was assessed. PT then took INR on 06/19 and it was still suprathereapeutic. Called and left voicemail for PT to confirm current dosing and to recheck INR on 06/25       Angela Garrett, Pharmacy Intern    Discussed with Yinka Church PharmD

## 2021-06-22 NOTE — PROGRESS NOTES
Left vm message with emergency contact for patient to contact us regarding anticoagulation mangement.    Katelyn Perez, Clinical Pharmacist, CDE, CACP

## 2021-06-23 NOTE — PROGRESS NOTES
Anticoagulation Summary  As of 6/21/2021    INR goal:  2.5-3.5   TTR:  61.6 % (4.3 y)   INR used for dosing:  3.90 (6/19/2021)   Warfarin maintenance plan:  6 mg (4 mg x 1.5) every Tue, Thu; 4 mg (4 mg x 1) all other days   Weekly warfarin total:  32 mg   Plan last modified:  Sybil Frazier PharmD (6/23/2021)   Next INR check:  6/28/2021   Target end date:  Indefinite    Indications    Chronic anticoagulation [Z79.01]  Hx of mechanical aortic valve replacement [V43.3] [Z95.2]  TIA (transient ischemic attack) [G45.9]  Atrial fibrillation (HCC) [I48.91] [I48.91]             Anticoagulation Episode Summary     INR check location:      Preferred lab:      Send INR reminders to:      Comments:  [employer] home meter      Anticoagulation Care Providers     Provider Role Specialty Phone number    Michael J Bloch, M.D. Referring Internal Medicine [phone]    Emily MartinezD Responsible          Anticoagulation Patient Findings      Spoke with pt.  INR was supratherapeutic at 3.9. However this was a duplicate entry.    Pt did get our VM regarding INR on 6/18. However she has been taking 4 mg MWF, 6 mg AOD (high dosing regimen than previously instructed).      Pt denies any unusual s/s of bleeding, bruising, clotting or any changes to diet or medications. Denies any etoh, cranberries, supplements, or illness.     Will have pt take 4 mg daily until INR check on Monday 6/28.  Pt has a HM but wants a face to face appt to prevent disparities with dosing instructions.    Anticipate pt will need decreased weekly dosage to be ~32 mg (6 mg Tue/Thurs, 4 mg AOD).    Repeat INR in 5 days.     Sybil Frazier, EmilyD

## 2021-06-25 DIAGNOSIS — I48.91 ATRIAL FIBRILLATION, UNSPECIFIED TYPE (HCC): ICD-10-CM

## 2021-06-25 RX ORDER — AMIODARONE HYDROCHLORIDE 200 MG/1
TABLET ORAL
Qty: 100 TABLET | Refills: 0 | Status: SHIPPED | OUTPATIENT
Start: 2021-06-25 | End: 2021-09-17

## 2021-06-28 ENCOUNTER — ANTICOAGULATION VISIT (OUTPATIENT)
Dept: VASCULAR LAB | Facility: MEDICAL CENTER | Age: 71
End: 2021-06-28
Attending: NURSE PRACTITIONER
Payer: MEDICARE

## 2021-06-28 DIAGNOSIS — G45.9 TIA (TRANSIENT ISCHEMIC ATTACK): ICD-10-CM

## 2021-06-28 DIAGNOSIS — Z79.01 CHRONIC ANTICOAGULATION: ICD-10-CM

## 2021-06-28 DIAGNOSIS — Z95.2 HX OF MECHANICAL AORTIC VALVE REPLACEMENT: ICD-10-CM

## 2021-06-28 DIAGNOSIS — I48.91 ATRIAL FIBRILLATION, UNSPECIFIED TYPE (HCC): ICD-10-CM

## 2021-06-28 LAB — INR PPP: 5.2 (ref 2–3.5)

## 2021-06-28 PROCEDURE — 99212 OFFICE O/P EST SF 10 MIN: CPT

## 2021-06-28 PROCEDURE — 85610 PROTHROMBIN TIME: CPT

## 2021-06-28 NOTE — PROGRESS NOTES
Anticoagulation Summary  As of 2021    INR goal:  2.5-3.5   TTR:  61.2 % (4.3 y)   INR used for dosin.20 (2021)   Warfarin maintenance plan:  6 mg (4 mg x 1.5) every Tue, Thu; 4 mg (4 mg x 1) all other days   Weekly warfarin total:  32 mg   Plan last modified:  Sybil Frazier PharmD (2021)   Next INR check:  2021   Target end date:  Indefinite    Indications    Chronic anticoagulation [Z79.01]  Hx of mechanical aortic valve replacement [V43.3] [Z95.2]  TIA (transient ischemic attack) [G45.9]  Atrial fibrillation (HCC) [I48.91] [I48.91]             Anticoagulation Episode Summary     INR check location:      Preferred lab:      Send INR reminders to:      Comments:  [employer] home meter      Anticoagulation Care Providers     Provider Role Specialty Phone number    Michael J Bloch, M.D. Referring Internal Medicine [phone]    Yinka Church, PharmD Responsible          Anticoagulation Patient Findings  Patient Findings     Positives:  Extra doses          HPI:  Yenifer Pang Gale seen in clinic today, on anticoagulation therapy with warfarin for AF and hx of mAVR.  Changes to current medical/health status since last appt: none  Denies signs/symptoms of bleeding and/or thrombosis since the last appt.    Denies any interval changes to diet  Denies any interval changes to medications since last appt.   Denies any complications or cost restrictions with current therapy.   BP declined today.  Confirmed current dosing regimen. Patient reports that she realized she did NOT take out her previously filled med boxes so did not take the lower dose as directed. When she realized it, she skipped her dose completely (yesterday).     Patient is not on antiplatelet therapy.       A/P   INR is SUPRA-therapeutic today at 5.2.  Patient instructed to HOLD warfarin TONIGHT, then to take reduced dose of 2mg tomorrow, then to resume 4mg QD until next testing in 1 week.     Next appt: 2021   Via home monitor    Porter DiazD

## 2021-07-09 ENCOUNTER — HOSPITAL ENCOUNTER (OUTPATIENT)
Dept: LAB | Facility: MEDICAL CENTER | Age: 71
End: 2021-07-09
Attending: NURSE PRACTITIONER
Payer: MEDICARE

## 2021-07-09 DIAGNOSIS — I25.811 CORONARY ARTERY DISEASE INVOLVING NATIVE ARTERY OF TRANSPLANTED HEART WITHOUT ANGINA PECTORIS: ICD-10-CM

## 2021-07-09 DIAGNOSIS — I50.9 CONGESTIVE HEART FAILURE, UNSPECIFIED HF CHRONICITY, UNSPECIFIED HEART FAILURE TYPE (HCC): ICD-10-CM

## 2021-07-09 DIAGNOSIS — Z79.01 CHRONIC ANTICOAGULATION: ICD-10-CM

## 2021-07-09 LAB
ANION GAP SERPL CALC-SCNC: 13 MMOL/L (ref 7–16)
BUN SERPL-MCNC: 18 MG/DL (ref 8–22)
CALCIUM SERPL-MCNC: 9 MG/DL (ref 8.5–10.5)
CHLORIDE SERPL-SCNC: 101 MMOL/L (ref 96–112)
CO2 SERPL-SCNC: 22 MMOL/L (ref 20–33)
CREAT SERPL-MCNC: 1.27 MG/DL (ref 0.5–1.4)
FASTING STATUS PATIENT QL REPORTED: NORMAL
GLUCOSE SERPL-MCNC: 86 MG/DL (ref 65–99)
INR PPP: 2.49 (ref 0.87–1.13)
INR PPP: 2.49 (ref 2–3.5)
NT-PROBNP SERPL IA-MCNC: 805 PG/ML (ref 0–125)
POTASSIUM SERPL-SCNC: 4.6 MMOL/L (ref 3.6–5.5)
PROTHROMBIN TIME: 26.2 SEC (ref 12–14.6)
SODIUM SERPL-SCNC: 136 MMOL/L (ref 135–145)

## 2021-07-09 PROCEDURE — 83880 ASSAY OF NATRIURETIC PEPTIDE: CPT

## 2021-07-09 PROCEDURE — 85610 PROTHROMBIN TIME: CPT

## 2021-07-09 PROCEDURE — 36415 COLL VENOUS BLD VENIPUNCTURE: CPT

## 2021-07-09 PROCEDURE — 80048 BASIC METABOLIC PNL TOTAL CA: CPT

## 2021-07-12 ENCOUNTER — ANTICOAGULATION MONITORING (OUTPATIENT)
Dept: VASCULAR LAB | Facility: MEDICAL CENTER | Age: 71
End: 2021-07-12

## 2021-07-12 DIAGNOSIS — Z95.2 HX OF MECHANICAL AORTIC VALVE REPLACEMENT: ICD-10-CM

## 2021-07-12 DIAGNOSIS — Z79.01 CHRONIC ANTICOAGULATION: ICD-10-CM

## 2021-07-12 DIAGNOSIS — G45.9 TIA (TRANSIENT ISCHEMIC ATTACK): ICD-10-CM

## 2021-07-12 NOTE — PROGRESS NOTES
OP Anticoagulation Service Note    Date: 2021    Anticoagulation Summary  As of 2021    INR goal:  2.5-3.5   TTR:  61.0 % (4.4 y)   INR used for dosin.49 (2021)   Warfarin maintenance plan:  6 mg (4 mg x 1.5) every Tue, Thu; 4 mg (4 mg x 1) all other days   Weekly warfarin total:  32 mg   Plan last modified:  Sybil Frazier, PharmD (2021)   Next INR check:     Target end date:  Indefinite    Indications    Chronic anticoagulation [Z79.01]  Hx of mechanical aortic valve replacement [V43.3] [Z95.2]  TIA (transient ischemic attack) [G45.9]  Atrial fibrillation (HCC) [I48.91] [I48.91]             Anticoagulation Episode Summary     INR check location:      Preferred lab:      Send INR reminders to:      Comments:  [employer] home meter      Anticoagulation Care Providers     Provider Role Specialty Phone number    Michael J Bloch, M.D. Referring Internal Medicine [phone]    Yinka Church, PharmD Responsible          Anticoagulation Patient Findings      Voice message for patient regarding their anticoagulant.     HPI:   The reason for today's call is to prevent morbidity and mortality from a blood clot and/or stroke and to reduce the risk of bleeding while on a anticoagulant.     PCP:  Marion Jones M.D.  [address] [place] [address] [address]  [facility] [address]    Assessment:     • INR  sub-therapeutic.       Current Outpatient Medications:   •  amiodarone, TAKE 1 TABLET BY MOUTH EVERY DAY  •  spironolactone, 25 mg, Oral, DAILY  •  doxazosin, 2 mg, Oral, DAILY  •  levothyroxine, 125 mcg, Oral, AM ES  •  ezetimibe, TAKE 1 TABLET BY MOUTH EVERY DAY  •  Alirocumab, 1 PEN, Subcutaneous, Q14 DAYS  •  warfarin, TAKE 1-2 TABLETs BY MOUTH EVERY DAY AS DIRECTED BY THE [facility]ICO[facility]ATION [facility]  •  Mupirocin, by Apply externally route.  •  triamcinolone acetonide, Apply twice daily to affected area of lip until the skin is smooth or up to 2 weeks.  •  lisinopril, 20 mg, Oral, BID  •  vitamin D, 1,000  Units, Oral, DAILY  •  docosahexanoic acid, 1,000 mg, Oral, BID  •  traMADol, 100 mg, Oral, TID      Plan:     • 6mg today then continue the same warfarin dose, as noted above.   •     Follow-up:     • Our protocol suggests we test in 1 weeks.        Additional information discussed with patient:     • Asked patient to please call the anticoagulation clinic if they have any signs/symptoms of bleeding and/or thrombosis or any changes to diet or medications.      National recommendations regarding anticoagulation therapy:     The CHEST guidelines recommends frequent INR monitoring at regular intervals (a few days up to a max of 12 weeks) to ensure patients are on the proper dose of warfarin, and patients are not having any complications from therapy.  INRs can dramatically change over a short time period due to diet, medications, and medical conditions.       Coleman Dozier, PharmD, MS, BCACP, [facility]  Phone [phone] fax [phone]    This note was created using voice recognition software (Dragon). The accuracy of the dictation is limited by the abilities of the software. I have reviewed the note prior to signing, however some errors in grammar and context are still possible. If you have any questions related to this note please do not hesitate to contact our office.

## 2021-07-14 ENCOUNTER — APPOINTMENT (OUTPATIENT)
Dept: VASCULAR LAB | Facility: MEDICAL CENTER | Age: 71
End: 2021-07-14
Payer: MEDICARE

## 2021-07-16 ENCOUNTER — ANTICOAGULATION MONITORING (OUTPATIENT)
Dept: VASCULAR LAB | Facility: MEDICAL CENTER | Age: 71
End: 2021-07-16

## 2021-07-16 DIAGNOSIS — G45.9 TIA (TRANSIENT ISCHEMIC ATTACK): ICD-10-CM

## 2021-07-16 DIAGNOSIS — I48.91 ATRIAL FIBRILLATION, UNSPECIFIED TYPE (HCC): ICD-10-CM

## 2021-07-16 DIAGNOSIS — Z79.01 CHRONIC ANTICOAGULATION: ICD-10-CM

## 2021-07-16 DIAGNOSIS — Z95.2 HX OF MECHANICAL AORTIC VALVE REPLACEMENT: ICD-10-CM

## 2021-07-16 NOTE — PROGRESS NOTES
[facility]      Duplicate INR sent in via patient to [facility].   Called patient to verify that no new result was obtained as she was due to have INR tested today.   Patient reported that she has not retested with her HM yet, as she had to order more strips.   She will test again this next week.       Porter DiazD

## 2021-07-23 ENCOUNTER — ANTICOAGULATION MONITORING (OUTPATIENT)
Dept: MEDICAL GROUP | Facility: PHYSICIAN GROUP | Age: 71
End: 2021-07-23

## 2021-07-23 ENCOUNTER — HOSPITAL ENCOUNTER (OUTPATIENT)
Dept: LAB | Facility: MEDICAL CENTER | Age: 71
End: 2021-07-23
Attending: NURSE PRACTITIONER
Payer: MEDICARE

## 2021-07-23 ENCOUNTER — HOSPITAL ENCOUNTER (OUTPATIENT)
Dept: RADIOLOGY | Facility: MEDICAL CENTER | Age: 71
End: 2021-07-23
Attending: FAMILY MEDICINE
Payer: MEDICARE

## 2021-07-23 DIAGNOSIS — I48.91 ATRIAL FIBRILLATION, UNSPECIFIED TYPE (HCC): ICD-10-CM

## 2021-07-23 DIAGNOSIS — Z79.01 CHRONIC ANTICOAGULATION: ICD-10-CM

## 2021-07-23 DIAGNOSIS — G45.9 TIA (TRANSIENT ISCHEMIC ATTACK): ICD-10-CM

## 2021-07-23 DIAGNOSIS — R06.02 SHORTNESS OF BREATH: ICD-10-CM

## 2021-07-23 DIAGNOSIS — Z95.2 HX OF MECHANICAL AORTIC VALVE REPLACEMENT: ICD-10-CM

## 2021-07-23 LAB
INR PPP: 2.6 (ref 2–3.5)
INR PPP: 2.61 (ref 0.87–1.13)
PROTHROMBIN TIME: 27.1 SEC (ref 12–14.6)

## 2021-07-23 PROCEDURE — 85610 PROTHROMBIN TIME: CPT

## 2021-07-23 PROCEDURE — 71046 X-RAY EXAM CHEST 2 VIEWS: CPT

## 2021-07-23 PROCEDURE — 36415 COLL VENOUS BLD VENIPUNCTURE: CPT

## 2021-07-24 ENCOUNTER — ANTICOAGULATION MONITORING (OUTPATIENT)
Dept: VASCULAR LAB | Facility: MEDICAL CENTER | Age: 71
End: 2021-07-24

## 2021-07-24 DIAGNOSIS — Z95.2 HX OF MECHANICAL AORTIC VALVE REPLACEMENT: ICD-10-CM

## 2021-07-24 DIAGNOSIS — Z79.01 CHRONIC ANTICOAGULATION: ICD-10-CM

## 2021-07-24 DIAGNOSIS — G45.9 TIA (TRANSIENT ISCHEMIC ATTACK): ICD-10-CM

## 2021-07-24 NOTE — PROGRESS NOTES
Anticoagulation Summary  As of 2021    INR goal:  2.5-3.5   TTR:  61.3 % (4.4 y)   INR used for dosin.61 (2021)   Warfarin maintenance plan:  6 mg (4 mg x 1.5) every Tue, Thu; 4 mg (4 mg x 1) all other days   Weekly warfarin total:  32 mg   Plan last modified:  Sybil Frazier PharmD (2021)   Next INR check:  2021   Target end date:  Indefinite    Indications    Chronic anticoagulation [Z79.01]  Hx of mechanical aortic valve replacement [V43.3] [Z95.2]  TIA (transient ischemic attack) [G45.9]  Atrial fibrillation (HCC) [I48.91] [I48.91]             Anticoagulation Episode Summary     INR check location:      Preferred lab:      Send INR reminders to:      Comments:  [employer] home meter      Anticoagulation Care Providers     Provider Role Specialty Phone number    Michael J Bloch, M.D. Referring Internal Medicine [phone]    Yinka Church, PharmD Responsible          Anticoagulation Patient Findings          Left voicemail message to report a therapeutic INR of 2.6.  Pt to continue with current warfarin dosing regimen. Requested pt contact the clinic for any s/s of unusual bleeding, bruising, clotting or any changes to diet or medication. FU 2 weeks.    [place], PharmD

## 2021-07-26 NOTE — PROGRESS NOTES
OP Telephone Anticoagulation Service Note    Date: 2021      Anticoagulation Summary  As of 2021    INR goal:  2.5-3.5   TTR:  61.3 % (4.4 y)   INR used for dosin.60 (2021)   Warfarin maintenance plan:  4 mg (4 mg x 1) every day   Weekly warfarin total:  28 mg   Plan last modified:  Sybil Frazier PharmD (2021)   Next INR check:  2021   Target end date:  Indefinite    Indications    Chronic anticoagulation [Z79.01]  Hx of mechanical aortic valve replacement [V43.3] [Z95.2]  TIA (transient ischemic attack) [G45.9]  Atrial fibrillation (HCC) [I48.91] [I48.91]             Anticoagulation Episode Summary     INR check location:      Preferred lab:      Send INR reminders to:      Comments:  [employer] home meter      Anticoagulation Care Providers     Provider Role Specialty Phone number    Michael J Bloch, M.D. Referring Internal Medicine [phone]    Emily MartinezD Responsible          Anticoagulation Patient Findings        Plan: INR is in range. Left message on patient's answering machine/voicemail. Instructed patient to call back with any concerns regarding any unusual bleeding or bruising, any medication or diet changes or any signs or symptoms of thrombosis. Instructed patient to resume medication as outlined above. Patient to follow up in 2 week(s).         Bushra Smith, Pasha      ADDENDUM 21: Received call back from pt informing us she has been taking warfarin 4 mg daily. INR was therapeutic. Pt will continue with this dosing. Updated dosing calendar to reflect accurate dosing. F/u INR in 2 weeks. Sybil Frazier, EmilyD

## 2021-08-01 ENCOUNTER — PATIENT MESSAGE (OUTPATIENT)
Dept: CARDIOLOGY | Facility: MEDICAL CENTER | Age: 71
End: 2021-08-01

## 2021-08-02 NOTE — PATIENT COMMUNICATION
To Dr. Hawkins to advise: Any med changes/labs?     LE edema with weeping wounds  Stable SOB with exertion   Current weight 237 (weight at 3/23/21 FU was 226)  Hasn't taken Praluent since February/March due to cost (med rec updated)    Patient reports previously on furosemide 40mg BID but caused gout flare up 3-4x in 2021

## 2021-08-03 DIAGNOSIS — Z79.01 CHRONIC ANTICOAGULATION: ICD-10-CM

## 2021-08-03 RX ORDER — TORSEMIDE 20 MG/1
20 TABLET ORAL DAILY
Qty: 90 TABLET | Refills: 3 | Status: SHIPPED | OUTPATIENT
Start: 2021-08-03 | End: 2021-11-09

## 2021-08-03 NOTE — PATIENT COMMUNICATION
FW: Non-Urgent Medical Question  Received: Today  MENDOZA Oliveira, RYUDI.  Phone Number: [phone]   Have her try Torsemide 20 mg daily   ------------------------------------------------------  Prescription sent to [facility] pharmacy

## 2021-08-04 ENCOUNTER — TELEPHONE (OUTPATIENT)
Dept: CARDIOLOGY | Facility: MEDICAL CENTER | Age: 71
End: 2021-08-04

## 2021-08-05 ENCOUNTER — PATIENT MESSAGE (OUTPATIENT)
Dept: CARDIOLOGY | Facility: MEDICAL CENTER | Age: 71
End: 2021-08-05

## 2021-08-05 DIAGNOSIS — Z79.899 HIGH RISK MEDICATION USE: ICD-10-CM

## 2021-08-11 LAB — INR PPP: 3.3 (ref 2–3.5)

## 2021-08-12 ENCOUNTER — ANTICOAGULATION MONITORING (OUTPATIENT)
Dept: MEDICAL GROUP | Facility: MEDICAL CENTER | Age: 71
End: 2021-08-12

## 2021-08-12 DIAGNOSIS — G45.9 TIA (TRANSIENT ISCHEMIC ATTACK): ICD-10-CM

## 2021-08-12 DIAGNOSIS — Z95.2 HX OF MECHANICAL AORTIC VALVE REPLACEMENT: ICD-10-CM

## 2021-08-12 DIAGNOSIS — Z79.01 CHRONIC ANTICOAGULATION: ICD-10-CM

## 2021-08-12 NOTE — PROGRESS NOTES
OP Telephone Anticoagulation Service Note    Date: 8/12/2021      Anticoagulation Summary  As of 8/12/2021    INR goal:  2.5-3.5   TTR:  61.7 % (4.5 y)   INR used for dosing:  3.30 (8/11/2021)   Warfarin maintenance plan:  4 mg (4 mg x 1) every day   Weekly warfarin total:  28 mg   Plan last modified:  Sybil Frazier PharmD (7/26/2021)   Next INR check:     Target end date:  Indefinite    Indications    Chronic anticoagulation [Z79.01]  Hx of mechanical aortic valve replacement [V43.3] [Z95.2]  TIA (transient ischemic attack) [G45.9]  Atrial fibrillation (HCC) [I48.91] [I48.91]             Anticoagulation Episode Summary     INR check location:      Preferred lab:      Send INR reminders to:      Comments:  [employer] home meter      Anticoagulation Care Providers     Provider Role Specialty Phone number    Michael J Bloch, M.D. Referring Internal Medicine [phone]    Emily MartinezD Responsible          Anticoagulation Patient Findings        Plan: INR therapeutic. Left message on patient's answering machine/voicemail. Instructed patient to call back with any concerns regarding any unusual bleeding or bruising, any medication or diet changes or any signs or symptoms of thrombosis. Instructed patient to resume medication as outlined above. Patient to follow up in 4 week(s).         Bushra Smith, PharmD

## 2021-09-16 ENCOUNTER — TELEPHONE (OUTPATIENT)
Dept: HEALTH INFORMATION MANAGEMENT | Facility: OTHER | Age: 71
End: 2021-09-16

## 2021-09-16 DIAGNOSIS — I48.91 ATRIAL FIBRILLATION, UNSPECIFIED TYPE (HCC): ICD-10-CM

## 2021-09-16 DIAGNOSIS — Z79.899 HIGH RISK MEDICATION USE: ICD-10-CM

## 2021-09-16 LAB — INR PPP: 5.1 (ref 2–3.5)

## 2021-09-16 NOTE — TELEPHONE ENCOUNTER
Outcome: Member would like to know if she could get reimbursed for OTC medications. She ordered OTC medications from [employer] and not from the Over-the-counter Benefits program, informed member we don't do reimbursement for this she does have to order with OTC benefits progam, I will mail a catalog and order forms to mailing address. Member would also like to know if [facility] covers Regeneron, after confirming with [facility] lead and Summary of Benefits: informed patient it would be 20% coinsurance for all drugs covered. Member was informed more about Regeneron. No further questions at this time.     [employer] Verified: yes    Attempt # 1

## 2021-09-17 ENCOUNTER — ANTICOAGULATION MONITORING (OUTPATIENT)
Dept: VASCULAR LAB | Facility: MEDICAL CENTER | Age: 71
End: 2021-09-17

## 2021-09-17 DIAGNOSIS — Z79.01 CHRONIC ANTICOAGULATION: ICD-10-CM

## 2021-09-17 DIAGNOSIS — Z95.2 HX OF MECHANICAL AORTIC VALVE REPLACEMENT: ICD-10-CM

## 2021-09-17 DIAGNOSIS — I48.91 ATRIAL FIBRILLATION, UNSPECIFIED TYPE (HCC): ICD-10-CM

## 2021-09-17 DIAGNOSIS — G45.9 TIA (TRANSIENT ISCHEMIC ATTACK): ICD-10-CM

## 2021-09-17 RX ORDER — AMIODARONE HYDROCHLORIDE 200 MG/1
TABLET ORAL
Qty: 90 TABLET | Refills: 1 | Status: SHIPPED | OUTPATIENT
Start: 2021-09-17 | End: 2022-02-28

## 2021-09-17 NOTE — TELEPHONE ENCOUNTER
Ordered Free T4, TSH, Mag, CMP for high risk amiodarone use. Sent patient [employer] message to notify

## 2021-09-18 NOTE — PROGRESS NOTES
OP Anticoagulation Service Note    Date: 2021    Anticoagulation Summary  As of 2021    INR goal:  2.5-3.5   TTR:  60.7 % (4.6 y)   INR used for dosin.10 (2021)   Warfarin maintenance plan:  4 mg (4 mg x 1) every day   Weekly warfarin total:  28 mg   Plan last modified:  Sybil Frazier, PharmD (2021)   Next INR check:  2021   Target end date:  Indefinite    Indications    Chronic anticoagulation [Z79.01]  Hx of mechanical aortic valve replacement [V43.3] [Z95.2]  TIA (transient ischemic attack) [G45.9]  Atrial fibrillation (HCC) [I48.91] [I48.91]             Anticoagulation Episode Summary     INR check location:      Preferred lab:      Send INR reminders to:      Comments:  [employer] home meter      Anticoagulation Care Providers     Provider Role Specialty Phone number    Michael J Bloch, M.D. Referring Internal Medicine [phone]    Emily MartinezD Responsible          Anticoagulation Patient Findings       HPI:   The reason for today's call is to prevent morbidity and mortality from a blood clot and/or stroke and to reduce the risk of bleeding while on a anticoagulant.     PCP:  Marion Jones M.D.  [address]  [address]  [facility] [address]    Assessment:     • INR  supra-therapeutic.     Current Outpatient Medications:   •  amiodarone, TAKE 1 TABLET BY MOUTH EVERY DAY  •  torsemide, 20 mg, Oral, DAILY  •  spironolactone, 25 mg, Oral, DAILY  •  doxazosin, 2 mg, Oral, DAILY  •  levothyroxine, 125 mcg, Oral, AM ES  •  ezetimibe, TAKE 1 TABLET BY MOUTH EVERY DAY  •  warfarin, TAKE 1-2 TABLETs BY MOUTH EVERY DAY AS DIRECTED BY THE [facility] ANTICOAGULATION CLINIC  •  Mupirocin, by Apply externally route.  •  triamcinolone acetonide, Apply twice daily to affected area of lip until the skin is smooth or up to 2 weeks.  •  lisinopril, 20 mg, Oral, BID  •  vitamin D, 1,000 Units, Oral, DAILY  •  docosahexanoic acid, 1,000 mg, Oral, BID  •  traMADol, 100 mg, Oral,  TID      Plan:     • Hold x 2 then resume.     Follow-up:     • Our protocol suggests we test in 1 weeks.        Additional information discussed with patient:     • Asked patient to please call the anticoagulation clinic if they have any signs/symptoms of bleeding and/or thrombosis or any changes to diet or medications.      National recommendations regarding anticoagulation therapy:     The CHEST guidelines recommends frequent INR monitoring at regular intervals (a few days up to a max of 12 weeks) to ensure patients are on the proper dose of warfarin, and patients are not having any complications from therapy.  INRs can dramatically change over a short time period due to diet, medications, and medical conditions.       Coleman Dozier, PharmD, MS, BCACP, C  [facility]  Phone [phone] fax [phone]    This note was created using voice recognition software (Dragon). The accuracy of the dictation is limited by the abilities of the software. I have reviewed the note prior to signing, however some errors in grammar and context are still possible. If you have any questions related to this note please do not hesitate to contact our office.

## 2021-09-22 DIAGNOSIS — I10 ESSENTIAL HYPERTENSION: ICD-10-CM

## 2021-09-22 RX ORDER — DOXAZOSIN 2 MG/1
2 TABLET ORAL DAILY
Qty: 90 TABLET | Refills: 1 | Status: SHIPPED | OUTPATIENT
Start: 2021-09-22 | End: 2022-02-28

## 2021-09-23 ENCOUNTER — APPOINTMENT (OUTPATIENT)
Dept: SLEEP MEDICINE | Facility: MEDICAL CENTER | Age: 71
End: 2021-09-23
Payer: MEDICARE

## 2021-09-23 LAB — INR PPP: 1.9 (ref 2–3.5)

## 2021-09-24 ENCOUNTER — ANTICOAGULATION MONITORING (OUTPATIENT)
Dept: VASCULAR LAB | Facility: MEDICAL CENTER | Age: 71
End: 2021-09-24

## 2021-09-24 DIAGNOSIS — Z79.01 CHRONIC ANTICOAGULATION: ICD-10-CM

## 2021-09-24 DIAGNOSIS — G45.9 TIA (TRANSIENT ISCHEMIC ATTACK): ICD-10-CM

## 2021-09-24 DIAGNOSIS — I48.91 ATRIAL FIBRILLATION, UNSPECIFIED TYPE (HCC): ICD-10-CM

## 2021-09-24 DIAGNOSIS — Z95.2 HX OF MECHANICAL AORTIC VALVE REPLACEMENT: ICD-10-CM

## 2021-09-25 NOTE — PROGRESS NOTES
Anticoagulation Summary  As of 2021    INR goal:  2.5-3.5   TTR:  60.5 % (4.6 y)   INR used for dosin.90 (2021)   Warfarin maintenance plan:  4 mg (4 mg x 1) every day   Weekly warfarin total:  28 mg   Plan last modified:  Sybil Frazier, PharmD (2021)   Next INR check:  2021   Target end date:  Indefinite    Indications    Chronic anticoagulation [Z79.01]  Hx of mechanical aortic valve replacement [V43.3] [Z95.2]  TIA (transient ischemic attack) [G45.9]  Atrial fibrillation (HCC) [I48.91] [I48.91]             Anticoagulation Episode Summary     INR check location:      Preferred lab:      Send INR reminders to:      Comments:  [employer] home meter      Anticoagulation Care Providers     Provider Role Specialty Phone number    Michael J Bloch, M.D. Referring Internal Medicine [phone]    Yinka Church, PharmD Responsible          Anticoagulation Patient Findings  Patient Findings     Negatives:  Signs/symptoms of thrombosis, Signs/symptoms of bleeding, Laboratory test error suspected, Change in health, Change in alcohol use, Change in activity, Upcoming invasive procedure, Emergency department visit, Upcoming dental procedure, Missed doses, Extra doses, Change in medications, Change in diet/appetite, Hospital admission, Bruising, Other complaints              A/P    Bolus 6 mg x2 days then retest.     Follow up in 3 days.    [place], PharmD

## 2021-09-28 ENCOUNTER — ANTICOAGULATION MONITORING (OUTPATIENT)
Dept: VASCULAR LAB | Facility: MEDICAL CENTER | Age: 71
End: 2021-09-28

## 2021-09-28 DIAGNOSIS — G45.9 TIA (TRANSIENT ISCHEMIC ATTACK): ICD-10-CM

## 2021-09-28 DIAGNOSIS — Z95.2 HX OF MECHANICAL AORTIC VALVE REPLACEMENT: ICD-10-CM

## 2021-09-28 DIAGNOSIS — Z79.01 CHRONIC ANTICOAGULATION: ICD-10-CM

## 2021-09-28 LAB — INR PPP: 2.7 (ref 2–3.5)

## 2021-09-28 NOTE — PROGRESS NOTES
Anticoagulation Summary  As of 2021    INR goal:  2.5-3.5   TTR:  60.4 % (4.6 y)   INR used for dosin.70 (2021)   Warfarin maintenance plan:  4 mg (4 mg x 1) every day   Weekly warfarin total:  28 mg   Plan last modified:  Sybil Frazier, PharmD (2021)   Next INR check:  10/12/2021   Target end date:  Indefinite    Indications    Chronic anticoagulation [Z79.01]  Hx of mechanical aortic valve replacement [V43.3] [Z95.2]  TIA (transient ischemic attack) [G45.9]  Atrial fibrillation (HCC) [I48.91] [I48.91]             Anticoagulation Episode Summary     INR check location:      Preferred lab:      Send INR reminders to:      Comments:  [employer] home meter      Anticoagulation Care Providers     Provider Role Specialty Phone number    Michael J Bloch, M.D. Referring Internal Medicine [phone]    Yinka Church, PharmD Responsible          Anticoagulation Patient Findings          Spoke with Yenifer to report a therapeutic INR of 2.7. Continue current dosing regimen.  Follow up in 2 weeks, to reduce the risk of adverse events related to this high risk medication, warfarin.    Katelyn Perez, Clinical Pharmacist

## 2021-10-14 LAB — INR PPP: 2.6 (ref 2–3.5)

## 2021-10-15 ENCOUNTER — ANTICOAGULATION MONITORING (OUTPATIENT)
Dept: VASCULAR LAB | Facility: MEDICAL CENTER | Age: 71
End: 2021-10-15

## 2021-10-15 DIAGNOSIS — Z79.01 CHRONIC ANTICOAGULATION: ICD-10-CM

## 2021-10-15 DIAGNOSIS — G45.9 TIA (TRANSIENT ISCHEMIC ATTACK): ICD-10-CM

## 2021-10-15 DIAGNOSIS — I48.91 ATRIAL FIBRILLATION, UNSPECIFIED TYPE (HCC): ICD-10-CM

## 2021-10-15 DIAGNOSIS — Z95.2 HX OF MECHANICAL AORTIC VALVE REPLACEMENT: ICD-10-CM

## 2021-10-15 NOTE — PROGRESS NOTES
Anticoagulation Summary  As of 10/15/2021    INR goal:  2.5-3.5   TTR:  60.8 % (4.6 y)   INR used for dosin.60 (10/14/2021)   Warfarin maintenance plan:  4 mg (4 mg x 1) every day   Weekly warfarin total:  28 mg   Plan last modified:  Emily TillmanD (2021)   Next INR check:  10/28/2021   Target end date:  Indefinite    Indications    Chronic anticoagulation [Z79.01]  Hx of mechanical aortic valve replacement [V43.3] [Z95.2]  TIA (transient ischemic attack) [G45.9]  Atrial fibrillation (HCC) [I48.91] [I48.91]             Anticoagulation Episode Summary     INR check location:      Preferred lab:      Send INR reminders to:      Comments:  [employer] home meter      Anticoagulation Care Providers     Provider Role Specialty Phone number    Michael J Bloch, M.D. Referring Internal Medicine [phone]    Yinka Church, PharmD Responsible          Anticoagulation Patient Findings     HPI:  Yenifer Beasley, on anticoagulation therapy with warfarin for chronic anticoagulation.  Changes to current medical/health status since last appt: None  Denies signs/symptoms of bleeding and/or thrombosis since the last appt.    Denies any interval changes to diet  Denies any interval changes to medications since last appt.   Denies any complications or cost restrictions with current therapy.     A/P   INR  -therapeutic.   Pt will continue with current Warfarin dosing.    Next INR in 2 week(s).    Lebron Schroeder, Med Ass't      I have reviewed and concur with the above plan.     Coleman Dozier, PharmD, MS, BCACP, [facility]  [facility]  Phone: [phone],  Fax: [phone]  On call: [phone]

## 2021-11-03 ENCOUNTER — HOSPITAL ENCOUNTER (OUTPATIENT)
Dept: LAB | Facility: MEDICAL CENTER | Age: 71
End: 2021-11-03
Attending: NURSE PRACTITIONER
Payer: MEDICARE

## 2021-11-03 DIAGNOSIS — Z79.899 HIGH RISK MEDICATION USE: ICD-10-CM

## 2021-11-03 DIAGNOSIS — Z79.01 CHRONIC ANTICOAGULATION: ICD-10-CM

## 2021-11-03 DIAGNOSIS — I48.91 ATRIAL FIBRILLATION, UNSPECIFIED TYPE (HCC): ICD-10-CM

## 2021-11-03 LAB
ALBUMIN SERPL BCP-MCNC: 4.3 G/DL (ref 3.2–4.9)
ALBUMIN/GLOB SERPL: 1.7 G/DL
ALP SERPL-CCNC: 54 U/L (ref 30–99)
ALT SERPL-CCNC: 9 U/L (ref 2–50)
ANION GAP SERPL CALC-SCNC: 15 MMOL/L (ref 7–16)
AST SERPL-CCNC: 15 U/L (ref 12–45)
BILIRUB SERPL-MCNC: 0.5 MG/DL (ref 0.1–1.5)
BUN SERPL-MCNC: 48 MG/DL (ref 8–22)
CALCIUM SERPL-MCNC: 9.6 MG/DL (ref 8.5–10.5)
CHLORIDE SERPL-SCNC: 103 MMOL/L (ref 96–112)
CO2 SERPL-SCNC: 22 MMOL/L (ref 20–33)
CREAT SERPL-MCNC: 1.99 MG/DL (ref 0.5–1.4)
FASTING STATUS PATIENT QL REPORTED: NORMAL
GLOBULIN SER CALC-MCNC: 2.5 G/DL (ref 1.9–3.5)
GLUCOSE SERPL-MCNC: 76 MG/DL (ref 65–99)
INR PPP: 2.73 (ref 0.87–1.13)
INR PPP: 2.73 (ref 2–3.5)
MAGNESIUM SERPL-MCNC: 2.2 MG/DL (ref 1.5–2.5)
POTASSIUM SERPL-SCNC: 4.7 MMOL/L (ref 3.6–5.5)
PROT SERPL-MCNC: 6.8 G/DL (ref 6–8.2)
PROTHROMBIN TIME: 28.1 SEC (ref 12–14.6)
SODIUM SERPL-SCNC: 140 MMOL/L (ref 135–145)
T4 FREE SERPL-MCNC: 2.34 NG/DL (ref 0.93–1.7)
TSH SERPL DL<=0.005 MIU/L-ACNC: 2.14 UIU/ML (ref 0.38–5.33)

## 2021-11-03 PROCEDURE — 85610 PROTHROMBIN TIME: CPT

## 2021-11-03 PROCEDURE — 84439 ASSAY OF FREE THYROXINE: CPT

## 2021-11-03 PROCEDURE — 83735 ASSAY OF MAGNESIUM: CPT

## 2021-11-03 PROCEDURE — 36415 COLL VENOUS BLD VENIPUNCTURE: CPT

## 2021-11-03 PROCEDURE — 80053 COMPREHEN METABOLIC PANEL: CPT

## 2021-11-03 PROCEDURE — 84443 ASSAY THYROID STIM HORMONE: CPT

## 2021-11-04 ENCOUNTER — ANTICOAGULATION MONITORING (OUTPATIENT)
Dept: VASCULAR LAB | Facility: MEDICAL CENTER | Age: 71
End: 2021-11-04

## 2021-11-04 DIAGNOSIS — Z79.01 CHRONIC ANTICOAGULATION: ICD-10-CM

## 2021-11-04 DIAGNOSIS — G45.9 TIA (TRANSIENT ISCHEMIC ATTACK): ICD-10-CM

## 2021-11-04 DIAGNOSIS — Z95.2 HX OF MECHANICAL AORTIC VALVE REPLACEMENT: ICD-10-CM

## 2021-11-09 ENCOUNTER — OFFICE VISIT (OUTPATIENT)
Dept: CARDIOLOGY | Facility: MEDICAL CENTER | Age: 71
End: 2021-11-09
Payer: MEDICARE

## 2021-11-09 VITALS
BODY MASS INDEX: 42.1 KG/M2 | OXYGEN SATURATION: 96 % | DIASTOLIC BLOOD PRESSURE: 62 MMHG | HEART RATE: 62 BPM | SYSTOLIC BLOOD PRESSURE: 118 MMHG | HEIGHT: 62 IN | WEIGHT: 228.8 LBS | RESPIRATION RATE: 16 BRPM

## 2021-11-09 DIAGNOSIS — I48.91 ATRIAL FIBRILLATION, UNSPECIFIED TYPE (HCC): ICD-10-CM

## 2021-11-09 LAB — EKG IMPRESSION: NORMAL

## 2021-11-09 PROCEDURE — 99214 OFFICE O/P EST MOD 30 MIN: CPT | Performed by: INTERNAL MEDICINE

## 2021-11-09 PROCEDURE — 93000 ELECTROCARDIOGRAM COMPLETE: CPT | Performed by: INTERNAL MEDICINE

## 2021-11-09 RX ORDER — SPIRONOLACTONE 25 MG/1
TABLET ORAL
COMMUNITY
Start: 2021-07-21 | End: 2021-11-09

## 2021-11-09 RX ORDER — TORSEMIDE 20 MG/1
10 TABLET ORAL DAILY
Qty: 90 TABLET | Refills: 3 | Status: SHIPPED | OUTPATIENT
Start: 2021-11-09 | End: 2022-12-12

## 2021-11-09 RX ORDER — DOXAZOSIN 2 MG/1
TABLET ORAL
COMMUNITY
Start: 2021-07-21 | End: 2021-11-09

## 2021-11-09 ASSESSMENT — FIBROSIS 4 INDEX: FIB4 SCORE: 0.77

## 2021-11-09 NOTE — PROGRESS NOTES
"      Cardiology Follow-up Consultation Note    Date of note:    11/9/2021    Primary Care Provider: Marion Jones M.D.    Name:             Yenifer Beasley   YOB: 1950  MRN:               [id number]    CC: Follow-up shortness of breath, mechanical aortic valve replacement, hypertension    Patient ID/HPI:   71-year-old female patient with hypertension, diabetes mellitus, obesity, history of aortic mechanical prosthesis here for follow-up. Complains of lower extremity edema, shortness of breath with exertion. When she wakes up she notices palpitations but her heart rate is always normal.        Past Medical History:   Diagnosis Date   • Arthritis     OA OF HANDS, KNEESM, HIPS AND BACK   • Asthma 10/17/2019   • CAD (coronary artery disease) 2007    CABG   • Diabetes    • Diabetic neuropathy (HCC) 1/6/2011   • Fall     \"a couple years ago because of my neuopathy\" multiple falls   • Gastritis 7/10    H/O UGIB   • GERD (gastroesophageal reflux disease)    • Hyperlipidemia    • Hypertension    • Hypothyroidism    • Infectious disease     History of MRSA   • Migraine    • Neuropathy in diabetes (HCC)    • HILDA (obstructive sleep apnea)    • S/P aortic valve replacement 2007   • TIA (transient ischemic attack) 2005   • Tricuspid regurgitation mild- mod 1/6/2011   • Unspecified hemorrhagic conditions     Bleeds easily-GI bleeds with NSAIDS and ASA   • Upper GI bleed ON JULY 2007 1/6/2011         Past Surgical History:   Procedure Laterality Date   • MITRAL VALVE REPLACE  2007    AORTIC VALVE   • SHOULDER SURGERY  6/2001    NEER DECOMOPRESSION LEFT SHOULDER   • CARPAL TUNNEL RELEASE  1998    B/L   • TONSILLECTOMY  1995    PARTIAL PALATOPLASTY   • ABDOMINAL HYSTERECTOMY TOTAL      TAHBSOO   • CHOLECYSTECTOMY     • CORONARY ARTERY BYPASS, 1      Triple Bypass   • HERNIA REPAIR      VENTRAL HERNIA REPAIR   • PRIMARY C SECTION           Current Outpatient Medications   Medication Sig Dispense Refill   • " "diclofenac sodium (VOLTAREN) 1 % Gel Apply  topically 4 times a day as needed.     • torsemide (DEMADEX) 20 MG Tab Take 0.5 Tablets by mouth every day. 90 Tablet 3   • doxazosin (CARDURA) 2 MG Tab TAKE 1 TABLET BY MOUTH EVERY DAY (Patient taking differently: Take 1 mg by mouth every day.) 90 Tablet 1   • amiodarone (CORDARONE) 200 MG Tab TAKE 1 TABLET BY MOUTH EVERY DAY 90 Tablet 1   • levothyroxine (SYNTHROID) 125 MCG Tab Take 125 mcg by mouth Every morning on an empty stomach.     • ezetimibe (ZETIA) 10 MG Tab TAKE 1 TABLET BY MOUTH EVERY DAY 90 Tab 3   • warfarin (COUMADIN) 4 MG Tab TAKE 1-2 TABLETs BY MOUTH EVERY DAY AS DIRECTED BY THE [facility] 180 Tab 2   • Mupirocin 2 % Kit by Apply externally route.     • triamcinolone acetonide (KENALOG) 0.1 % Ointment Apply twice daily to affected area of lip until the skin is smooth or up to 2 weeks. 15 g 1   • lisinopril (PRINIVIL) 20 MG TABS Take 20 mg by mouth 2 times a day.     • vitamin D (CHOLECALCIFEROL) 1000 UNIT TABS Take 1,000 Units by mouth every day.       • docosahexanoic acid (OMEGA 3 FA) 1000 MG CAPS Take 1,000 mg by mouth 2 Times a Day.     • tramadol (ULTRAM) 50 MG TABS Take 2 Tabs by mouth 3 times a day. 180 Each 0     No current facility-administered medications for this visit.         Allergies   Allergen Reactions   • Hmg-Coa-R Inhibitors    • Asa [Aspirin]      GI BLEED   • Cymbalta [Duloxetine Hcl]      Feels like a \"zombie\"   • Latex    • Lyrica [Pregabalin]      SPACED OUT   • Nsaids      GI BLEED   • Tricor      Breathing problems     • Trilipix [Choline Fenofibrate]      SOB         Family History   Problem Relation Age of Onset   • Heart Disease Mother         a fib   • Heart Disease Brother         a fib             Physical Exam:  Ambulatory Vitals  /62 (BP Location: Left arm, Patient Position: Sitting, BP Cuff Size: Adult)   Pulse 62   Resp 16   Ht 1.575 m (5' 2\")   Wt 104 kg (228 lb 12.8 oz)   SpO2 96%  "   Oxygen Therapy:  Pulse Oximetry: 96 %  BP Readings from Last 4 Encounters:   11/09/21 118/62   03/23/21 138/80   12/09/19 145/57   11/25/19 151/58       Weight/BMI: Body mass index is 41.85 kg/m².  Wt Readings from Last 4 Encounters:   11/09/21 104 kg (228 lb 12.8 oz)   03/23/21 103 kg (226 lb)   10/17/19 107 kg (236 lb 14.2 oz)   10/08/19 106 kg (234 lb)       General: Well appearing and in no apparent distress  Head: atrumatic  Eyes: No conjunctival pallor   ENT: normal external appearance of nose and ears  Neck: JVD absent, carotid bruits absent  Lungs: respiratory sounds  normal, additional breath sounds absent  Heart: Regular rhythm, mechanical aortic valve click.  2+ lower extremity edema      Lab Data Review:  Lab Results   Component Value Date/Time    CHOLSTRLTOT 140 05/19/2021 01:49 PM    LDL 57 05/19/2021 01:49 PM    HDL 57 05/19/2021 01:49 PM    TRIGLYCERIDE 132 05/19/2021 01:49 PM       Lab Results   Component Value Date/Time    SODIUM 140 11/03/2021 12:43 PM    POTASSIUM 4.7 11/03/2021 12:43 PM    CHLORIDE 103 11/03/2021 12:43 PM    CO2 22 11/03/2021 12:43 PM    GLUCOSE 76 11/03/2021 12:43 PM    BUN 48 (H) 11/03/2021 12:43 PM    CREATININE 1.99 (H) 11/03/2021 12:43 PM    CREATININE 1.6 (H) 11/26/2007 12:28 AM     Lab Results   Component Value Date/Time    ALKPHOSPHAT 54 11/03/2021 12:43 PM    ASTSGOT 15 11/03/2021 12:43 PM    ALTSGPT 9 11/03/2021 12:43 PM    TBILIRUBIN 0.5 11/03/2021 12:43 PM      Lab Results   Component Value Date/Time    WBC 10.5 12/30/2020 12:29 PM     Echo 4/4:  Normal left ventricular systolic function.  Left ventricular ejection fraction is visually estimated to be 65-70%.  Mild concentric left ventricular hypertrophy.  Mild mitral stenosis.  Severe mitral annular calcification.  Mild mitral regurgitation.  Bioprosthetic aortic valve with increased velocities.  Right heart pressures are consistent with moderate pulmonary   hypertension.  Severely dilated left atrium.     Echo  3/3/21  CONCLUSIONS  Normal left ventricular chamber size.  Left ventricular ejection fraction is visually estimated to be 65%.  Mild concentric left ventricular hypertrophy.  Dilated inferior vena cava without inspiratory collapse.  Moderate mitral stenosis.  Mean transvalvular gradient is 7 mmHg at a heart rate of 64 BPM.  Structurally normal tricuspid valve without significant stenosis.  Normal function of mechanical prosthetic valve.     EKG today shows sinus bradycardia with PACs     Impression and Plan:  71-year-old female patient with  1.  Shortness of breath, likely secondary to diastolic heart failure, NYHA class II stage C  2.  Atrial fibrillation post cardioversion, maintaining sinus rhythm on amiodarone  3.  Mechanical aortic valve replacement, three-vessel bypass surgery in 2007  4.  Hypertension  5.  Diabetes mellitus    Most recent kidney function shows worsening creatinine.  Stop spironolactone.  Decrease torsemide to 10 mg daily.  She has significant swelling likely due to obesity.  Counseled on weight loss.  Recheck BMP in 2 weeks.  Continue warfarin, lisinopril, Zetia, amiodarone.    Return in about 6 months (around 5/9/2022).      Caleb PARRA  Interventional cardiologist  [facility], [address]  [address] [address]  Phone: [phone]  Fax: [phone]

## 2021-11-13 DIAGNOSIS — E78.5 HYPERLIPIDEMIA, UNSPECIFIED HYPERLIPIDEMIA TYPE: ICD-10-CM

## 2021-11-13 DIAGNOSIS — Z95.2 H/O MECHANICAL AORTIC VALVE REPLACEMENT: ICD-10-CM

## 2021-11-13 ASSESSMENT — ENCOUNTER SYMPTOMS: SLEEP DISTURBANCE: 0

## 2021-11-15 ENCOUNTER — OFFICE VISIT (OUTPATIENT)
Dept: SLEEP MEDICINE | Facility: MEDICAL CENTER | Age: 71
End: 2021-11-15
Payer: MEDICARE

## 2021-11-15 VITALS
HEART RATE: 70 BPM | BODY MASS INDEX: 42.33 KG/M2 | HEIGHT: 62 IN | RESPIRATION RATE: 20 BRPM | DIASTOLIC BLOOD PRESSURE: 50 MMHG | WEIGHT: 230 LBS | OXYGEN SATURATION: 95 % | SYSTOLIC BLOOD PRESSURE: 114 MMHG

## 2021-11-15 DIAGNOSIS — G47.33 OSA (OBSTRUCTIVE SLEEP APNEA): Primary | ICD-10-CM

## 2021-11-15 DIAGNOSIS — Z23 ENCOUNTER FOR IMMUNIZATION: ICD-10-CM

## 2021-11-15 PROCEDURE — G0008 ADMIN INFLUENZA VIRUS VAC: HCPCS | Performed by: STUDENT IN AN ORGANIZED HEALTH CARE EDUCATION/TRAINING PROGRAM

## 2021-11-15 PROCEDURE — 99203 OFFICE O/P NEW LOW 30 MIN: CPT | Mod: 25 | Performed by: STUDENT IN AN ORGANIZED HEALTH CARE EDUCATION/TRAINING PROGRAM

## 2021-11-15 PROCEDURE — 90662 IIV NO PRSV INCREASED AG IM: CPT | Performed by: STUDENT IN AN ORGANIZED HEALTH CARE EDUCATION/TRAINING PROGRAM

## 2021-11-15 RX ORDER — EZETIMIBE 10 MG/1
TABLET ORAL
Qty: 90 TABLET | Refills: 3 | Status: SHIPPED | OUTPATIENT
Start: 2021-11-15 | End: 2022-10-24

## 2021-11-15 RX ORDER — WARFARIN SODIUM 4 MG/1
TABLET ORAL
Qty: 100 TABLET | Refills: 1 | Status: SHIPPED | OUTPATIENT
Start: 2021-11-15 | End: 2022-03-15

## 2021-11-15 ASSESSMENT — FIBROSIS 4 INDEX: FIB4 SCORE: 0.77

## 2021-11-15 NOTE — PROGRESS NOTES
[facility] Consult Note     Date: 11/15/2021 / Time: 12:35 PM      Thank you for requesting a sleep medicine consultation on Yenifer Beasley at the sleep center. Presents today with the chief complaints of history of sleep apnea. She is referred by Marion Jones M.D.  [address] [place] [address] for evaluation and treatment of obstructive sleep apnea.     HISTORY OF PRESENT ILLNESS:     Yenifer Beasley is a 71 y.o. female with Atrial fibrillation, GERD, hypertension, palpitations, back pain,HFpEF, status post aortic valve replacement, migraines, hypothyroidism and history of moderate obstructive sleep apnea.  Presents to Sleep Clinic for evaluation of obstructive sleep apnea.    She was diagnosed with moderate obstructive sleep apnea in .   Last sleep study PSG 2013 showed AHI of 22.5, minimum O2 sat of 83 and time below 89% saturation of 28 minutes.  Following diagnosis she was placed on CPAP which she used for almost a year.  She stopped using it in  following the death of her fiancé.  States after he  she started sleeping in her recliner and did not use her machine following that.  She found using her CPAP machine the time was difficult due to nasal congestion.    She continues to follow with her cardiologist due to her multiple cardiac morbidities.  Recently she was started on new medication for her heart which she found made her tired in the afternoon.  She does take a nap occasionally in the afternoon unintentionally.  She is not currently driving due to her car breaking down.    As per supplemental questionnaire to be scanned or imported into chart:    [place] Sleepiness Score: 4    Sleep Schedule  Bedtime: Weekday & Weekend 11pm  Wake time: Weekday & Weekend 5am  Sleep-onset latency: 5-10min   Awakenings from sleep: 1  Difficulty falling back asleep: none   Bedroom partner: yes   Naps: Yes, sometimes     DAYTIME SYMPTOMS:   Excessive daytime  "sleepiness: No   Daytime fatigue: Yes  Difficulty concentrating: No   Memory problems: Yes  Irritability:No   Work/school performance issues: No   Sleepiness with driving: No   Caffeine/stimulant use: Yes 2 cups coffee  Alcohol use:No     SLEEP RELATED SYMPTOMS  Snoring: No   Witnessed apnea or gasping/choking: Yes  Dry mouth or mouth breathing: Yes  Sweating: No   Teeth grinding/biting: Yes  Morning headaches: No   Refreshed Upon Awakening: No  sometimes      SLEEP RELATED BEHAVIORS:  Parasomnias (walking, talking, eating, violence): No   Leg kicking: No   Restless legs - \"urge to move\": No   Nightmares: No  Recurrent: No   Dream enactment: No      NARCOLEPSY:  Cataplexy: No   Sleep paralysis: No   Sleep attacks: No   Hypnagogic/hypnopompic hallucinations: No     MEDICAL HISTORY  Past Medical History:   Diagnosis Date   • Allergic rhinitis    • Anticoagulation monitoring, special range    • Arthritis     OA OF HANDS, KNEESM, HIPS AND BACK   • Asthma 10/17/2019   • Atrial fibrillation (HCC)    • Back pain    • CAD (coronary artery disease) 2007    CABG   • Constipation    • Coronary heart disease    • Diabetes    • Diabetic neuropathy ([facility]) 1/6/2011   • Difficulty breathing    • Fall     \"a couple years ago because of my neuopathy\" multiple falls   • Gasping for breath    • Gastritis 7/10    H/O UGIB   • GERD (gastroesophageal reflux disease)    • Turkish measles    • GI bleed    • Hyperlipidemia    • Hypertension    • Hypothyroidism    • Infectious disease     History of MRSA   • Migraine    • Neuropathy in diabetes ([facility])    • HILDA (obstructive sleep apnea)    • Painful joint    • Palpitations    • Pneumonia    • Pulmonary hypertension ([facility])    • Rash    • S/P aortic valve replacement 2007   • Shortness of breath    • Sleep apnea    • Swelling of lower extremity    • TIA (transient ischemic attack) 2005   • Tonsillitis    • Tricuspid regurgitation mild- mod 1/6/2011   • Unspecified hemorrhagic conditions     Bleeds " easily-GI bleeds with NSAIDS and ASA   • Upper GI bleed ON 2011   • Valvular heart disease    • Weakness    • Wears glasses         SURGICAL HISTORY  Past Surgical History:   Procedure Laterality Date   • MITRAL VALVE REPLACE      AORTIC VALVE   • SHOULDER SURGERY  2001    NEER DECOMOPRESSION LEFT SHOULDER   • CARPAL TUNNEL RELEASE      B/L   • TONSILLECTOMY      PARTIAL PALATOPLASTY   • ABDOMINAL HYSTERECTOMY TOTAL      TAHBSOO   • AORTIC VALVE REPLACEMENT     • CHOLECYSTECTOMY     • CORONARY ARTERY BYPASS, 1      Triple Bypass   • HERNIA REPAIR      VENTRAL HERNIA REPAIR   • HYSTERECTOMY LAPAROSCOPY     • INTUBATION     • PRIMARY C SECTION     • SLEEVE,MEJIA VASO THIGH     • UVULOPHARYNGOPALATOPLASTY     • VENTILATOR CONTINUOUS          FAMILY HISTORY  Family History   Problem Relation Age of Onset   • Heart Disease Mother         a fib   • Cancer Mother         Cervical: palate:     • Heart Disease Brother         a fib   • Cancer Maternal Grandmother         Leukemia,  21 y.o. 193   • Cancer Maternal Aunt         Lung CA (+3 dtrs  from [place])       SOCIAL HISTORY  Social History     Socioeconomic History   • Marital status:      Spouse name: Not on file   • Number of children: Not on file   • Years of education: Not on file   • Highest education level: Not on file   Occupational History   • Not on file   Tobacco Use   • Smoking status: Never Smoker   • Smokeless tobacco: Never Used   Substance and Sexual Activity   • Alcohol use: No   • Drug use: Never   • Sexual activity: Not on file   Other Topics Concern   • Not on file   Social History Narrative   • Not on file     Social Determinants of Health     Financial Resource Strain:    • Difficulty of Paying Living Expenses: Not on file   Food Insecurity:    • Worried About Running Out of Food in the Last Year: Not on file   • Ran Out of Food in the Last Year: Not on file   Transportation Needs:    • Lack  of Transportation (Medical): Not on file   • Lack of Transportation (Non-Medical): Not on file   Physical Activity:    • Days of Exercise per Week: Not on file   • Minutes of Exercise per Session: Not on file   Stress:    • Feeling of Stress : Not on file   Social Connections:    • Frequency of Communication with Friends and Family: Not on file   • Frequency of Social Gatherings with Friends and Family: Not on file   • Attends Muslim Services: Not on file   • Active Member of Clubs or Organizations: Not on file   • Attends Club or Organization Meetings: Not on file   • Marital Status: Not on file   Intimate Partner Violence:    • Fear of Current or Ex-Partner: Not on file   • Emotionally Abused: Not on file   • Physically Abused: Not on file   • Sexually Abused: Not on file   Housing Stability:    • Unable to Pay for Housing in the Last Year: Not on file   • Number of Places Lived in the Last Year: Not on file   • Unstable Housing in the Last Year: Not on file        Occupation: Retired     CURRENT MEDICATIONS  Current Outpatient Medications   Medication Sig Dispense Refill   • diclofenac sodium (VOLTAREN) 1 % Gel Apply  topically 4 times a day as needed.     • torsemide (DEMADEX) 20 MG Tab Take 0.5 Tablets by mouth every day. 90 Tablet 3   • doxazosin (CARDURA) 2 MG Tab TAKE 1 TABLET BY MOUTH EVERY DAY (Patient taking differently: Take 1 mg by mouth every day.) 90 Tablet 1   • amiodarone (CORDARONE) 200 MG Tab TAKE 1 TABLET BY MOUTH EVERY DAY 90 Tablet 1   • levothyroxine (SYNTHROID) 125 MCG Tab Take 125 mcg by mouth Every morning on an empty stomach.     • ezetimibe (ZETIA) 10 MG Tab TAKE 1 TABLET BY MOUTH EVERY DAY 90 Tab 3   • warfarin (COUMADIN) 4 MG Tab TAKE 1-2 TABLETs BY MOUTH EVERY DAY AS DIRECTED BY THE [facility] 180 Tab 2   • Mupirocin 2 % Kit by Apply externally route.     • triamcinolone acetonide (KENALOG) 0.1 % Ointment Apply twice daily to affected area of lip until the skin is  "smooth or up to 2 weeks. 15 g 1   • lisinopril (PRINIVIL) 20 MG TABS Take 20 mg by mouth 2 times a day.     • vitamin D (CHOLECALCIFEROL) 1000 UNIT TABS Take 1,000 Units by mouth every day.       • docosahexanoic acid (OMEGA 3 FA) 1000 MG CAPS Take 1,000 mg by mouth 2 Times a Day.     • tramadol (ULTRAM) 50 MG TABS Take 2 Tabs by mouth 3 times a day. 180 Each 0     No current facility-administered medications for this visit.       REVIEW OF SYSTEMS  Constitutional: Denies fevers, Denies weight changes  Ears/Nose/Throat/Mouth: Denies nasal congestion or sore throat   Cardiovascular: Denies chest pain  Respiratory: Denies shortness of breath, Denies cough  Gastrointestinal/Hepatic: Denies nausea, vomiting  Sleep: see HPI    Physical Examination:  Vitals/ General Appearance:   Weight/BMI: Body mass index is 42.07 kg/m².  /50 (BP Location: Left arm, Patient Position: Sitting, BP Cuff Size: Large adult)   Pulse 70   Resp 20   Ht 1.575 m (5' 2\")   Wt 104 kg (230 lb)   SpO2 95%   Vitals:    11/15/21 1227   BP: 114/50   BP Location: Left arm   Patient Position: Sitting   BP Cuff Size: Large adult   Pulse: 70   Resp: 20   SpO2: 95%   Weight: 104 kg (230 lb)   Height: 1.575 m (5' 2\")       Pt. is alert and oriented to time, place and person. Cooperative and in no apparent distress.     Constitutional: Alert, no distress, well-groomed.  Skin: No rashes in visible areas.  Eye: Round. Conjunctiva clear, lids normal. No icterus.   ENT EXAM  Nasal alae/valves collapsible: Yes  Nasal septum deviation: Yes  Nasal turbinate hypertrophy: Left: Grade 2   Right: Grade 2  Hard palate narrow: No   Hard palate high: No   Soft palate/uvula (Mallampati score): 3  Tongue Scalloping: No   Retrognathia: No   Micrognathia: No   Cardiovascular:normal S1 and S2 heart sounds, regular rate and rhythm  Pulmonary:Clear to auscultation, No wheezes, No crackles.  Neurologic:Awake, alert and oriented x 3, Normal age appropriate gait, No " involuntary motions.  Extremities: No clubbing, cyanosis, or edema       ASSESSMENT AND PLAN   Yenifer Beasley is a 71 y.o. female with Atrial fibrillation, GERD, hypertension, palpitations, back pain,HFpEF, status post aortic valve replacement, migraines, hypothyroidism and history of moderate obstructive sleep apnea.  Presents to Sleep Clinic for evaluation of obstructive sleep apnea.    1. Yenifer Beasley  has Obstructive Sleep Apnea (HILDA). Yenifer Beasley has symptoms of snoring, choking/gasping during sleep, dry mouth, unrefreshed upon awakening.   She has known history of moderate obstructive sleep apnea.  Reviewed PSG report from 2013.    The pathophysiology of HILDA and the increased risk of cardiovascular morbidity from untreated HILDA is discussed in detail with the patient. She  also has HTN, heart disease, atrial fibrillation which can be worsened by HILDA.      We have discussed diagnostic options including in-laboratory, attended polysomnography and home sleep testing. We have also discussed treatment options including airway pressurization, reconstructive otolaryngologic surgery, dental appliances and weight management.    Strongly encouraged to consider in lab sleep study due to her chronic medical conditions and comorbidities.  This would also allow for potential for split-night study and titration portion of Pap therapy.  She requested to do home sleep apnea testing due to convenience of sleeping in her own home in recliner.    Highly suspect need to continue PAP therapy following sleep study.  Patient may benefit from in lab titration study versus restarting empiric CPAP therapy.     Subsequently,treatment options will be discussed based on the diagnostic study. Meanwhile, She is urged to avoid supine sleep, weight gain and alcoholic beverages since all of these can worsen HILDA. She is cautioned against drowsy driving. If She feels sleepy while driving, advised must pull over for a break/nap,  rather than persist on the road, in the interest of Pt's own safety and that of others on the road.    Plan  -  She  will be scheduled for an overnight PSG  to assess sleep related breathing disorder.  - Follow up 1-2 weeks after sleep study to discuss results and treatment options moving forward   -Advised to reach out via MyChart or by phone with any questions or concerns.     2.  Regarding treatment of other past medical problems and general health maintenance,  Pt is urged to follow up with PCP.      High dose influenza vaccination given during today's visit    Please note portions of this record was created using voice recognition software. I have made every reasonable attempt to correct obvious errors, but I expect that there are errors of grammar and possibly content I did not discover before finalizing the note.

## 2021-11-23 ENCOUNTER — HOSPITAL ENCOUNTER (OUTPATIENT)
Dept: LAB | Facility: MEDICAL CENTER | Age: 71
End: 2021-11-23
Attending: INTERNAL MEDICINE
Payer: MEDICARE

## 2021-11-23 ENCOUNTER — HOSPITAL ENCOUNTER (OUTPATIENT)
Dept: LAB | Facility: MEDICAL CENTER | Age: 71
End: 2021-11-23
Attending: NURSE PRACTITIONER
Payer: MEDICARE

## 2021-11-23 DIAGNOSIS — I48.91 ATRIAL FIBRILLATION, UNSPECIFIED TYPE (HCC): ICD-10-CM

## 2021-11-23 DIAGNOSIS — Z79.01 CHRONIC ANTICOAGULATION: ICD-10-CM

## 2021-11-23 LAB
ANION GAP SERPL CALC-SCNC: 11 MMOL/L (ref 7–16)
BUN SERPL-MCNC: 32 MG/DL (ref 8–22)
CALCIUM SERPL-MCNC: 9.1 MG/DL (ref 8.5–10.5)
CHLORIDE SERPL-SCNC: 107 MMOL/L (ref 96–112)
CO2 SERPL-SCNC: 22 MMOL/L (ref 20–33)
CREAT SERPL-MCNC: 1.69 MG/DL (ref 0.5–1.4)
GLUCOSE SERPL-MCNC: 101 MG/DL (ref 65–99)
INR PPP: 2.35 (ref 0.87–1.13)
POTASSIUM SERPL-SCNC: 4.9 MMOL/L (ref 3.6–5.5)
PROTHROMBIN TIME: 25 SEC (ref 12–14.6)
SODIUM SERPL-SCNC: 140 MMOL/L (ref 135–145)

## 2021-11-23 PROCEDURE — 80048 BASIC METABOLIC PNL TOTAL CA: CPT

## 2021-11-23 PROCEDURE — 85610 PROTHROMBIN TIME: CPT

## 2021-11-23 PROCEDURE — 36415 COLL VENOUS BLD VENIPUNCTURE: CPT

## 2021-11-24 ENCOUNTER — ANTICOAGULATION MONITORING (OUTPATIENT)
Dept: VASCULAR LAB | Facility: MEDICAL CENTER | Age: 71
End: 2021-11-24

## 2021-11-24 DIAGNOSIS — G45.9 TIA (TRANSIENT ISCHEMIC ATTACK): ICD-10-CM

## 2021-11-24 DIAGNOSIS — Z79.01 CHRONIC ANTICOAGULATION: ICD-10-CM

## 2021-11-24 DIAGNOSIS — Z95.2 HX OF MECHANICAL AORTIC VALVE REPLACEMENT: ICD-10-CM

## 2021-11-24 NOTE — PROGRESS NOTES
Anticoagulation Summary  As of 2021    INR goal:  2.5-3.5   TTR:  61.2 % (4.7 y)   INR used for dosin.35 (2021)   Warfarin maintenance plan:  4 mg (4 mg x 1) every day   Weekly warfarin total:  28 mg   Plan last modified:  Sybil Frazier PharmD (2021)   Next INR check:  2021   Target end date:  Indefinite    Indications    Chronic anticoagulation [Z79.01]  Hx of mechanical aortic valve replacement [V43.3] [Z95.2]  TIA (transient ischemic attack) [G45.9]  Atrial fibrillation (HCC) [I48.91] [I48.91]             Anticoagulation Episode Summary     INR check location:      Preferred lab:      Send INR reminders to:      Comments:  [employer] home meter      Anticoagulation Care Providers     Provider Role Specialty Phone number    Michael J Bloch, M.D. Referring Internal Medicine [phone]    Emily MartinezD Responsible          Anticoagulation Patient Findings      Left voicemail message to report a SUB-therapeutic INR of 2.35.    Will have pt take BOLUS dose of warfarin today of 6 mg and then pt to continue with current warfarin dosing regimen.     Requested pt contact the clinic for any s/s of unusual bleeding, bruising, clotting or any changes to diet or medication.    FU INR in 2 week(s).    Eileen Cartagena, Pharmacy Intern   Discussed with Sybil Frazier, Pasha

## 2021-12-17 LAB — INR PPP: 3.7 (ref 2–3.5)

## 2021-12-20 ENCOUNTER — ANTICOAGULATION MONITORING (OUTPATIENT)
Dept: MEDICAL GROUP | Facility: MEDICAL CENTER | Age: 71
End: 2021-12-20

## 2021-12-20 DIAGNOSIS — G45.9 TIA (TRANSIENT ISCHEMIC ATTACK): ICD-10-CM

## 2021-12-20 DIAGNOSIS — Z95.2 HX OF MECHANICAL AORTIC VALVE REPLACEMENT: ICD-10-CM

## 2021-12-20 DIAGNOSIS — Z79.01 CHRONIC ANTICOAGULATION: ICD-10-CM

## 2021-12-20 NOTE — PROGRESS NOTES
OP Telephone Anticoagulation Service Note    Date: 12/20/2021      Anticoagulation Summary  As of 12/20/2021    INR goal:  2.5-3.5   TTR:  61.4 % (4.8 y)   INR used for dosing:  3.70 (12/17/2021)   Warfarin maintenance plan:  4 mg (4 mg x 1) every day   Weekly warfarin total:  28 mg   Plan last modified:  Sybil Frazier PharmD (7/26/2021)   Next INR check:     Target end date:  Indefinite    Indications    Chronic anticoagulation [Z79.01]  Hx of mechanical aortic valve replacement [V43.3] [Z95.2]  TIA (transient ischemic attack) [G45.9]  Atrial fibrillation (HCC) [I48.91] [I48.91]             Anticoagulation Episode Summary     INR check location:      Preferred lab:      Send INR reminders to:      Comments:  [employer] home meter      Anticoagulation Care Providers     Provider Role Specialty Phone number    Michael J Bloch, M.D. Referring Internal Medicine [phone]    Yinka Church, PharmD Responsible          Anticoagulation Patient Findings  Patient Findings     Positives:  Other complaints (she moved and has had a lot going on)    Negatives:  Signs/symptoms of thrombosis, Signs/symptoms of bleeding, Laboratory test error suspected, Change in health, Change in alcohol use, Change in activity, Upcoming invasive procedure, Emergency department visit, Upcoming dental procedure, Missed doses, Extra doses, Change in medications, Change in diet/appetite, Hospital admission, Bruising            Plan: Spoke with patient on the phone.   Patient is supra therapeutic today.   Confirmed dosing. She skipped her dose on Saturday when INR was high  Pt is not on antiplatelet agent    Instructed patient to call clinic if any unusual bleeding or bruising occurs.   Will continue dosing as outlined.   Will follow-up with patient in 2 week(s).        Bushra Smith, EmilyD

## 2021-12-21 RX ORDER — LEVOTHYROXINE SODIUM 0.12 MG/1
125 TABLET ORAL
Qty: 90 TABLET | Refills: 0 | Status: SHIPPED | OUTPATIENT
Start: 2021-12-21 | End: 2022-04-08 | Stop reason: SDUPTHER

## 2021-12-30 ENCOUNTER — APPOINTMENT (OUTPATIENT)
Dept: SLEEP MEDICINE | Facility: MEDICAL CENTER | Age: 71
End: 2021-12-30
Attending: STUDENT IN AN ORGANIZED HEALTH CARE EDUCATION/TRAINING PROGRAM
Payer: MEDICARE

## 2022-01-07 ENCOUNTER — ANTICOAGULATION MONITORING (OUTPATIENT)
Dept: VASCULAR LAB | Facility: MEDICAL CENTER | Age: 72
End: 2022-01-07

## 2022-01-07 DIAGNOSIS — I48.91 ATRIAL FIBRILLATION, UNSPECIFIED TYPE (HCC): ICD-10-CM

## 2022-01-07 DIAGNOSIS — G45.9 TIA (TRANSIENT ISCHEMIC ATTACK): ICD-10-CM

## 2022-01-07 DIAGNOSIS — Z79.01 CHRONIC ANTICOAGULATION: ICD-10-CM

## 2022-01-07 DIAGNOSIS — Z95.2 HX OF MECHANICAL AORTIC VALVE REPLACEMENT: ICD-10-CM

## 2022-01-07 LAB — INR PPP: 3.4 (ref 2–3.5)

## 2022-01-08 NOTE — PROGRESS NOTES
OP   Telephone Anticoagulation Service Note      Anticoagulation Summary  As of 1/7/2022    INR goal:  2.5-3.5   TTR:  61.1 % (4.9 y)   INR used for dosing:  3.40 (1/7/2022)   Warfarin maintenance plan:  4 mg (4 mg x 1) every day   Weekly warfarin total:  28 mg   Plan last modified:  Sybil Adkins, PharmD (7/26/2021)   Next INR check:  1/21/2022   Target end date:  Indefinite    Indications    Chronic anticoagulation [Z79.01]  Hx of mechanical aortic valve replacement [V43.3] [Z95.2]  TIA (transient ischemic attack) [G45.9]  Atrial fibrillation (HCC) [I48.91] [I48.91]             Anticoagulation Episode Summary     INR check location:      Preferred lab:      Send INR reminders to:      Comments:  [employer] home meter      Anticoagulation Care Providers     Provider Role Specialty Phone number    Michael J Bloch, M.D. Referring Internal Medicine [phone]    Yinka Church, PharmD Responsible Pharmacy         Anticoagulation Patient Findings  Patient Findings     Negatives:  Signs/symptoms of thrombosis, Signs/symptoms of bleeding, Laboratory test error suspected, Change in health, Change in alcohol use, Change in activity, Upcoming invasive procedure, Emergency department visit, Upcoming dental procedure, Missed doses, Extra doses, Change in medications, Change in diet/appetite, Hospital admission, Bruising, Other complaints         Spoke with the patient on the phone today, reporting a therapeutic INR of 3.4.   Confirmed the current warfarin dosing regimen and patient compliance.  Patient denies any interval changes to diet and/or medications. Patient denies any signs/symptoms of bleeding or clotting.  Patient instructed to continue with the current warfarin dosing regimen, and asked to follow up again in 2 weeks.     Porter DiazD

## 2022-01-24 ENCOUNTER — PATIENT MESSAGE (OUTPATIENT)
Dept: HEALTH INFORMATION MANAGEMENT | Facility: OTHER | Age: 72
End: 2022-01-24
Payer: MEDICARE

## 2022-01-29 LAB — INR PPP: 3.2 (ref 2–3.5)

## 2022-01-31 ENCOUNTER — ANTICOAGULATION MONITORING (OUTPATIENT)
Dept: VASCULAR LAB | Facility: MEDICAL CENTER | Age: 72
End: 2022-01-31

## 2022-01-31 DIAGNOSIS — Z79.01 CHRONIC ANTICOAGULATION: ICD-10-CM

## 2022-01-31 DIAGNOSIS — Z95.2 HX OF MECHANICAL AORTIC VALVE REPLACEMENT: ICD-10-CM

## 2022-01-31 DIAGNOSIS — G45.9 TIA (TRANSIENT ISCHEMIC ATTACK): ICD-10-CM

## 2022-01-31 DIAGNOSIS — I48.91 ATRIAL FIBRILLATION, UNSPECIFIED TYPE (HCC): ICD-10-CM

## 2022-01-31 NOTE — PROGRESS NOTES
Anticoagulation Summary  As of 1/31/2022    INR goal:  2.5-3.5   TTR:  61.6 % (4.9 y)   INR used for dosing:  3.20 (1/29/2022)   Warfarin maintenance plan:  4 mg (4 mg x 1) every day   Weekly warfarin total:  28 mg   Plan last modified:  Sybil Adkins, PharmD (7/26/2021)   Next INR check:  2/14/2022   Target end date:  Indefinite    Indications    Chronic anticoagulation [Z79.01]  Hx of mechanical aortic valve replacement [V43.3] [Z95.2]  TIA (transient ischemic attack) [G45.9]  Atrial fibrillation (HCC) [I48.91] [I48.91]             Anticoagulation Episode Summary     INR check location:      Preferred lab:      Send INR reminders to:      Comments:  [employer] home meter      Anticoagulation Care Providers     Provider Role Specialty Phone number    Michael J Bloch, M.D. Referring Internal Medicine [phone]    Yinka Church, PharmD Responsible Pharmacy           Refer to Anticoagulation Patient Findings for HPI  Patient Findings     Negatives:  Signs/symptoms of thrombosis, Signs/symptoms of bleeding, Laboratory test error suspected, Change in health, Change in alcohol use, Change in activity, Upcoming invasive procedure, Emergency department visit, Upcoming dental procedure, Missed doses, Extra doses, Change in medications, Change in diet/appetite, Hospital admission, Bruising, Other complaints          Spoke with patient to report a therapeutic INR.      Pt is NOT on antiplatelet therapy.    Pt instructed to continue with current warfarin dosing regimen, confirms dosing.   Will follow up in 2 week(s).     Esha Styles, Pharmacy Intern

## 2022-02-01 ENCOUNTER — HOME STUDY (OUTPATIENT)
Dept: SLEEP MEDICINE | Facility: MEDICAL CENTER | Age: 72
End: 2022-02-01
Attending: STUDENT IN AN ORGANIZED HEALTH CARE EDUCATION/TRAINING PROGRAM
Payer: MEDICARE

## 2022-02-01 DIAGNOSIS — G47.33 OSA (OBSTRUCTIVE SLEEP APNEA): ICD-10-CM

## 2022-02-01 PROCEDURE — 95806 SLEEP STUDY UNATT&RESP EFFT: CPT | Performed by: STUDENT IN AN ORGANIZED HEALTH CARE EDUCATION/TRAINING PROGRAM

## 2022-02-03 ENCOUNTER — OFFICE VISIT (OUTPATIENT)
Dept: SLEEP MEDICINE | Facility: MEDICAL CENTER | Age: 72
End: 2022-02-03
Payer: MEDICARE

## 2022-02-03 VITALS
BODY MASS INDEX: 41.16 KG/M2 | HEART RATE: 64 BPM | RESPIRATION RATE: 16 BRPM | DIASTOLIC BLOOD PRESSURE: 76 MMHG | SYSTOLIC BLOOD PRESSURE: 126 MMHG | HEIGHT: 61 IN | OXYGEN SATURATION: 98 % | WEIGHT: 218 LBS

## 2022-02-03 DIAGNOSIS — G47.33 OSA (OBSTRUCTIVE SLEEP APNEA): Primary | ICD-10-CM

## 2022-02-03 PROCEDURE — 99213 OFFICE O/P EST LOW 20 MIN: CPT | Performed by: STUDENT IN AN ORGANIZED HEALTH CARE EDUCATION/TRAINING PROGRAM

## 2022-02-03 ASSESSMENT — FIBROSIS 4 INDEX: FIB4 SCORE: 0.77

## 2022-02-03 ASSESSMENT — PATIENT HEALTH QUESTIONNAIRE - PHQ9: CLINICAL INTERPRETATION OF PHQ2 SCORE: 0

## 2022-02-03 NOTE — PROCEDURES
DIAGNOSTIC HOME SLEEP TEST (HST) REPORT       PATIENT ID:  NAME:  Yenifer Beasley  MRN:               [id number]  YOB: 1950  DATE OF STUDY: 2/1/2022      Impression:     This study shows evidence of:     1.Mild obstructive sleep apnea with  Respiratory Event Index (BLANCA) of 12.3 per hour. These findings are based on the recording time (flow evaluation time). It is not possible with this device to determine a traditional apnea+hypopnea index (AHI) for total sleep time since EEG channels are not available.     2. Oxygenation O2 Sat. adriano was 82% and mean O2 sat was 87% and baseline O2 at 99 %. O2 sat was below 89% for 354.6 min of the flow evaluation time. Oxygen Desaturation (>= 4%) Index was elevated at 12.2/hr. AVG HR was 45.3 BPM.      TECHNICAL DESCRIPTION: [employer] NightOne device used was a type-III home study device. Home sleep study recording included: Airflow recording by nasal pressure transducer; Respiratory Effort by 1 RIP Band; Oxygen Saturation and heart rate by finger oximetry. A position sensor and a snore channel was also used.    Scoring Criteria: A modification of the the AASM Manual for the Scoring of Sleep and Associated Events, 2012, was used.   Obstructive apnea was scored by cessation of airflow for at least 10 seconds with continuing respiratory effort.  Central apnea was scored by cessation of airflow for at least 10 seconds with no effort.  Hypopnea was scored by a 30% or more reduction in airflow for at least 10 seconds accompanied by an arterial oxygen desaturation of 3% or more.  (For Medicare patients, hypopneas were scored by a 30% or more reduction in airflow for at least 10 seconds accompanied by an arterial oxygen saturation of 4% or more, as required by their insurance, CMS.        General sleep summary: . Total recording time is 9 hours and 3 minutes and total flow evaluation time is 8 hours and  51minutes. The patient spent 8 hours and  49 minutes in the supine position and 0 hours and 0 minutes in the nonsupine position.    Respiratory events:    Apneas: Total 10 (Obstructive apnea index 1.1/hr, Central apnea index 0 /hr, mixed 0 /hour)  Hypopneas: Total 99 with a Hypopnea index of 11.2 /hr    Recommendations:    1. CPAP titration study vs Auto CPAP trial .   2.   In general patients with sleep apnea are advised to avoid alcohol and sedatives and to not operate a motor vehicle while drowsy. In some cases alternative treatment options may prove effective in resolving sleep apnea in these options include upper airway surgery, the use of a dental orthotic or weight loss and positional therapy. Clinical correlation is required.         Yevgeniy Kim MD

## 2022-02-23 ENCOUNTER — ANTICOAGULATION MONITORING (OUTPATIENT)
Dept: VASCULAR LAB | Facility: MEDICAL CENTER | Age: 72
End: 2022-02-23
Payer: MEDICARE

## 2022-02-23 DIAGNOSIS — I48.91 ATRIAL FIBRILLATION, UNSPECIFIED TYPE (HCC): ICD-10-CM

## 2022-02-23 DIAGNOSIS — Z79.01 CHRONIC ANTICOAGULATION: ICD-10-CM

## 2022-02-23 DIAGNOSIS — Z95.2 HX OF MECHANICAL AORTIC VALVE REPLACEMENT: ICD-10-CM

## 2022-02-23 DIAGNOSIS — G45.9 TIA (TRANSIENT ISCHEMIC ATTACK): ICD-10-CM

## 2022-02-23 LAB — INR PPP: 3.2 (ref 2–3.5)

## 2022-02-24 NOTE — PROGRESS NOTES
Anticoagulation Summary  As of 2/23/2022    INR goal:  2.5-3.5   TTR:  62.1 % (5 y)   INR used for dosing:  3.20 (2/23/2022)   Warfarin maintenance plan:  4 mg (4 mg x 1) every day   Weekly warfarin total:  28 mg   Plan last modified:  Sybil Adkins, PharmD (7/26/2021)   Next INR check:  3/23/2022   Target end date:  Indefinite    Indications    Chronic anticoagulation [Z79.01]  Hx of mechanical aortic valve replacement [V43.3] [Z95.2]  TIA (transient ischemic attack) [G45.9]  Atrial fibrillation (HCC) [I48.91] [I48.91]             Anticoagulation Episode Summary     INR check location:      Preferred lab:      Send INR reminders to:      Comments:  [employer] home meter      Anticoagulation Care Providers     Provider Role Specialty Phone number    Michael J Bloch, M.D. Referring Internal Medicine [phone]    Yinka Church, PharmD Responsible Pharmacy         Anticoagulation Patient Findings  Patient Findings     Negatives:  Signs/symptoms of thrombosis, Signs/symptoms of bleeding, Laboratory test error suspected, Change in health, Change in alcohol use, Change in activity, Upcoming invasive procedure, Emergency department visit, Upcoming dental procedure, Missed doses, Extra doses, Change in medications, Change in diet/appetite, Hospital admission, Bruising, Other complaints        Spoke with patient today regarding therapeutic INR of 3.2.  Patient denies any signs/symptoms of bruising or bleeding or any changes in diet and medications.  Instructed patient to call clinic with any questions or concerns.    Pt is not on antiplatelet therapy    Pt is to continue with current warfarin dosing regimen.  Follow up in 4 weeks, to reduce risk of adverse events related to this high risk medication,  Warfarin.    Yinka Church, PharmD, BCACP

## 2022-02-27 DIAGNOSIS — I48.91 ATRIAL FIBRILLATION, UNSPECIFIED TYPE (HCC): ICD-10-CM

## 2022-02-28 RX ORDER — AMIODARONE HYDROCHLORIDE 200 MG/1
TABLET ORAL
Qty: 100 TABLET | Refills: 2 | Status: SHIPPED | OUTPATIENT
Start: 2022-02-28 | End: 2022-10-25

## 2022-03-10 ENCOUNTER — APPOINTMENT (OUTPATIENT)
Dept: SLEEP MEDICINE | Facility: MEDICAL CENTER | Age: 72
End: 2022-03-10
Attending: STUDENT IN AN ORGANIZED HEALTH CARE EDUCATION/TRAINING PROGRAM
Payer: MEDICARE

## 2022-03-14 ENCOUNTER — DOCUMENTATION (OUTPATIENT)
Dept: VASCULAR LAB | Facility: MEDICAL CENTER | Age: 72
End: 2022-03-14
Payer: MEDICARE

## 2022-03-14 DIAGNOSIS — E78.5 HYPERLIPIDEMIA, UNSPECIFIED HYPERLIPIDEMIA TYPE: ICD-10-CM

## 2022-03-15 ENCOUNTER — APPOINTMENT (OUTPATIENT)
Dept: MEDICAL GROUP | Facility: PHYSICIAN GROUP | Age: 72
End: 2022-03-15
Payer: MEDICARE

## 2022-03-15 DIAGNOSIS — Z95.2 H/O MECHANICAL AORTIC VALVE REPLACEMENT: ICD-10-CM

## 2022-03-15 RX ORDER — WARFARIN SODIUM 4 MG/1
TABLET ORAL
Qty: 100 TABLET | Refills: 1 | Status: SHIPPED | OUTPATIENT
Start: 2022-03-15 | End: 2022-09-27

## 2022-03-15 NOTE — PROGRESS NOTES
Patient documentation that result by PA was approved  Patient overdue for vascular medicine follow-up we will ask MA to call and reschedule    Michael J Bloch, MD  Certified Clinical Lipid Specialist  Medial Director, [facility]

## 2022-03-27 LAB — INR PPP: 3.7 (ref 2–3.5)

## 2022-03-28 ENCOUNTER — ANTICOAGULATION MONITORING (OUTPATIENT)
Dept: VASCULAR LAB | Facility: MEDICAL CENTER | Age: 72
End: 2022-03-28
Payer: MEDICARE

## 2022-03-28 DIAGNOSIS — G45.9 TIA (TRANSIENT ISCHEMIC ATTACK): ICD-10-CM

## 2022-03-28 DIAGNOSIS — Z79.01 CHRONIC ANTICOAGULATION: ICD-10-CM

## 2022-03-28 DIAGNOSIS — I48.91 ATRIAL FIBRILLATION, UNSPECIFIED TYPE (HCC): ICD-10-CM

## 2022-03-28 DIAGNOSIS — Z95.2 HX OF MECHANICAL AORTIC VALVE REPLACEMENT: ICD-10-CM

## 2022-03-28 NOTE — PROGRESS NOTES
OP Telephone Anticoagulation Service Note    Date: 3/28/2022      Anticoagulation Summary  As of 3/28/2022    INR goal:  2.5-3.5   TTR:  62.1 % (5.1 y)   INR used for dosing:  3.70 (3/27/2022)   Warfarin maintenance plan:  4 mg (4 mg x 1) every day   Weekly warfarin total:  28 mg   Plan last modified:  Sybil Adkins, PharmD (7/26/2021)   Next INR check:  4/10/2022   Target end date:  Indefinite    Indications    Chronic anticoagulation [Z79.01]  Hx of mechanical aortic valve replacement [V43.3] [Z95.2]  TIA (transient ischemic attack) [G45.9]  Atrial fibrillation (HCC) [I48.91] [I48.91]             Anticoagulation Episode Summary     INR check location:      Preferred lab:      Send INR reminders to:      Comments:  [employer] home meter      Anticoagulation Care Providers     Provider Role Specialty Phone number    Michael J Bloch, M.D. Referring Internal Medicine [phone]    Yinka Church, PharmD Responsible Pharmacy         Anticoagulation Patient Findings  Patient Findings     Positives:  Change in diet/appetite (maybe less salad)    Negatives:  Signs/symptoms of thrombosis, Signs/symptoms of bleeding, Laboratory test error suspected, Change in health, Change in alcohol use, Change in activity, Upcoming invasive procedure, Emergency department visit, Upcoming dental procedure, Missed doses, Extra doses, Change in medications, Hospital admission, Bruising, Other complaints            Plan: Spoke with patient on the phone.   Patient is supra therapeutic today.   Confirmed dosing.   Pt is not on antiplatelet agent  Instructed patient to call clinic if any unusual bleeding or bruising occurs.   Will have pt take 2 mg then continue dosing as outlined.   Will follow-up with patient in 2 week(s).          Bushra Smith, PharmD

## 2022-04-04 ENCOUNTER — OFFICE VISIT (OUTPATIENT)
Dept: DERMATOLOGY | Facility: IMAGING CENTER | Age: 72
End: 2022-04-04
Payer: MEDICARE

## 2022-04-04 DIAGNOSIS — L81.4 LENTIGINES: ICD-10-CM

## 2022-04-04 DIAGNOSIS — D22.9 MULTIPLE NEVI: ICD-10-CM

## 2022-04-04 DIAGNOSIS — I87.2 VENOUS STASIS DERMATITIS OF BOTH LOWER EXTREMITIES: ICD-10-CM

## 2022-04-04 DIAGNOSIS — E11.9 TYPE 2 DIABETES MELLITUS WITHOUT COMPLICATION, WITHOUT LONG-TERM CURRENT USE OF INSULIN (HCC): ICD-10-CM

## 2022-04-04 DIAGNOSIS — L68.9 EXCESS BODY AND FACIAL HAIR: ICD-10-CM

## 2022-04-04 DIAGNOSIS — L57.0 ACTINIC KERATOSIS: ICD-10-CM

## 2022-04-04 DIAGNOSIS — L82.1 SK (SEBORRHEIC KERATOSIS): ICD-10-CM

## 2022-04-04 DIAGNOSIS — D18.01 CHERRY ANGIOMA: ICD-10-CM

## 2022-04-04 DIAGNOSIS — Z12.83 SKIN CANCER SCREENING: ICD-10-CM

## 2022-04-04 PROCEDURE — 17000 DESTRUCT PREMALG LESION: CPT | Performed by: NURSE PRACTITIONER

## 2022-04-04 PROCEDURE — 99214 OFFICE O/P EST MOD 30 MIN: CPT | Mod: 25 | Performed by: NURSE PRACTITIONER

## 2022-04-04 PROCEDURE — 17003 DESTRUCT PREMALG LES 2-14: CPT | Performed by: NURSE PRACTITIONER

## 2022-04-04 RX ORDER — TRIAMCINOLONE ACETONIDE 1 MG/G
OINTMENT TOPICAL
Qty: 80 G | Refills: 3 | Status: SHIPPED | OUTPATIENT
Start: 2022-04-04 | End: 2022-04-08

## 2022-04-04 NOTE — PROGRESS NOTES
"DERMATOLOGY NOTE  FOLLOW UP VISIT       Chief complaint: Follow-Up (ATILIO)     B/l dorsum of hands, rough crusty spots      History of skin cancer: No  History of precancers/actinic keratoses: Yes, Details: AK's cryo tx  History of biopsies: yes, dorsum of L hand, unsure of results, was excised  History of blistering/severe sunburns:Yes, Details: childhood  Family history of skin cancer:Yes, Details: Mother BCC  Family history of atypical moles:No      Allergies   Allergen Reactions   • Hmg-Coa-R Inhibitors    • Asa [Aspirin]      GI BLEED   • Atorvastatin    • Cymbalta [Duloxetine Hcl]      Feels like a \"zombie\"   • Latex    • Lyrica [Pregabalin]      SPACED OUT   • Nsaids      GI BLEED   • Tricor      Breathing problems     • Trilipix [Choline Fenofibrate]      SOB        MEDICATIONS:  Medications relevant to specialty reviewed.     REVIEW OF SYSTEMS:   Positive for skin (see HPI)  Negative for fevers and chills       EXAM:  There were no vitals taken for this visit.  Constitutional: Well-developed, well-nourished, and in no distress.     A total body skin exam was performed excluding the genitals per patient preference and including the following areas: head (including face), neck, chest, abdomen, groin/buttocks, back, bilateral upper extremities, and bilateral lower extremities with the following pertinent findings listed below and/or in assessment/plan.     -sun exposed skin of trunk and b/l upper, lower extremities and face with scattered clinically benign light brown reticulated macules all of which were morphologically similar and none of which were suspicious for skin cancer today on exam    Several tan skin-colored stuck-on waxy papules scattered on the trunk and extremities    Multiple tan medium brown skin-colored macules papules scattered over the trunk, face and extremities    Several scattered 1-3mm bright red macules and thin papules on the trunk and extremities    IDN back of neck    Venous stasis " dermatitis b/l lower extremities    AK bilateral dorsum of hands    Monofilament testing with a 10 gram force: sensation intact: decreased on left  Visual Inspection: Feet without maceration, ulcers, fissures.  Pedal pulses: intact bilaterally    Facial hair    IMPRESSION / PLAN:    1. Actinic keratosis  CRYOTHERAPY:  Risks (including, but not limited to: hypo or hyperpigmentation, redness, blister, blood blister, recurrence, need for further treatment, infection, scar) and benefits of cryotherapy discussed. Patient verbally agreed to proceed with treatment. 1 cryotherapy freeze thaw cycles of 10 seconds were applied to 3 lesions on bilateral dorsum of hand with cryac. Patient tolerated procedure well. Aftercare instructions given.      2. Lentigines  - Benign-appearing nature of lesions discussed. Advised to return to clinic for any new or concerning changes.      3. Multiple nevi  - Benign-appearing nature of lesions discussed. Advised to return to clinic for any new or concerning changes.  - ABCDE's of melanoma discussed      4. SK (seborrheic keratosis)  - Benign-appearing nature of lesions discussed. Advised to return to clinic for any new or concerning changes.      5. Cherry angioma  - Benign-appearing nature of lesions discussed. Advised to return to clinic for any new or concerning changes.      6. Venous stasis dermatitis of both lower extremities  Stressed importance of not scratching rash  Stressed importance of emollient use  Advised to keep BLE elevated when able  Advised use of compression stockings  - triamcinolone acetonide (KENALOG) 0.1 % Ointment; AAA twice a day for 2-3 weeks, then  Can use 2 weeks on, 2 weeks off as needed  Dispense: 80 g; Refill: 3    7. Type 2 diabetes mellitus without complication, without long-term current use of insulin (HCC)    - Diabetic Monofilament Lower Extremity Exam    8. Excess body and facial hair  Advised to use as directed  - Eflornithine HCl 13.9 % Cream; AAA  twice day, leave on for at least 4 hour before washing off  Dispense: 45 g; Refill: 1    9. Skin cancer screening  Skin cancer education  - discussed importance of sun protective clothing, eyewear  - discussed importance of daily use of broad spectrum sunscreen with SPF 30 or greater, as well as need for reapplication ~every 2 hours when exposed to UVR  - discussed importance of regular self-exams, ideally once per month, every 12 months exams in clinic  - ABCDE's of melanoma discussed  - patient to bring any new or concerning lesions to my attention  - Patient educational handout provided and reviewed with patient    Discussed risks, benefits, alternative treatments as well as common side effects associated with prescribed treatment  Patient verbalized understanding and agrees with plan regarding the above       Please note that this dictation was created using voice recognition software. I have made every reasonable attempt to correct obvious errors, but I expect that there are errors of grammar and possibly content that I did not discover before finalizing the note.      Return to clinic in: Return in about 1 year (around 4/4/2023) for ATILIO. and as needed for any new or changing skin lesions.

## 2022-04-08 ENCOUNTER — OFFICE VISIT (OUTPATIENT)
Dept: MEDICAL GROUP | Facility: PHYSICIAN GROUP | Age: 72
End: 2022-04-08
Payer: MEDICARE

## 2022-04-08 VITALS
BODY MASS INDEX: 40.97 KG/M2 | HEART RATE: 60 BPM | TEMPERATURE: 97.8 F | WEIGHT: 217 LBS | RESPIRATION RATE: 14 BRPM | DIASTOLIC BLOOD PRESSURE: 66 MMHG | OXYGEN SATURATION: 97 % | HEIGHT: 61 IN | SYSTOLIC BLOOD PRESSURE: 118 MMHG

## 2022-04-08 DIAGNOSIS — E11.9 TYPE 2 DIABETES MELLITUS WITHOUT COMPLICATION, WITHOUT LONG-TERM CURRENT USE OF INSULIN (HCC): ICD-10-CM

## 2022-04-08 DIAGNOSIS — G47.33 OSA (OBSTRUCTIVE SLEEP APNEA): ICD-10-CM

## 2022-04-08 DIAGNOSIS — N18.32 STAGE 3B CHRONIC KIDNEY DISEASE: ICD-10-CM

## 2022-04-08 DIAGNOSIS — E03.9 HYPOTHYROIDISM, UNSPECIFIED TYPE: ICD-10-CM

## 2022-04-08 DIAGNOSIS — I48.91 ATRIAL FIBRILLATION, UNSPECIFIED TYPE (HCC): ICD-10-CM

## 2022-04-08 DIAGNOSIS — Z12.31 ENCOUNTER FOR SCREENING MAMMOGRAM FOR MALIGNANT NEOPLASM OF BREAST: ICD-10-CM

## 2022-04-08 DIAGNOSIS — E11.42 DIABETIC POLYNEUROPATHY ASSOCIATED WITH TYPE 2 DIABETES MELLITUS (HCC): ICD-10-CM

## 2022-04-08 DIAGNOSIS — D68.9 COAGULOPATHY (HCC): ICD-10-CM

## 2022-04-08 DIAGNOSIS — M19.90 ARTHRITIS: ICD-10-CM

## 2022-04-08 DIAGNOSIS — Z00.00 HEALTHCARE MAINTENANCE: ICD-10-CM

## 2022-04-08 PROBLEM — L08.9 SKIN INFECTION: Status: RESOLVED | Noted: 2018-05-11 | Resolved: 2022-04-08

## 2022-04-08 PROBLEM — Z79.01 CHRONIC ANTICOAGULATION: Status: RESOLVED | Noted: 2018-04-23 | Resolved: 2022-04-08

## 2022-04-08 PROBLEM — R73.01 IMPAIRED FASTING GLUCOSE: Status: RESOLVED | Noted: 2019-10-08 | Resolved: 2022-04-08

## 2022-04-08 PROCEDURE — 99204 OFFICE O/P NEW MOD 45 MIN: CPT | Performed by: FAMILY MEDICINE

## 2022-04-08 RX ORDER — LEVOTHYROXINE SODIUM 0.12 MG/1
125 TABLET ORAL
Qty: 90 TABLET | Refills: 0 | Status: SHIPPED | OUTPATIENT
Start: 2022-04-08 | End: 2022-06-28

## 2022-04-08 ASSESSMENT — PATIENT HEALTH QUESTIONNAIRE - PHQ9
5. POOR APPETITE OR OVEREATING: 0 - NOT AT ALL
CLINICAL INTERPRETATION OF PHQ2 SCORE: 1
SUM OF ALL RESPONSES TO PHQ QUESTIONS 1-9: 1

## 2022-04-08 ASSESSMENT — FIBROSIS 4 INDEX: FIB4 SCORE: 0.77

## 2022-04-08 NOTE — ASSESSMENT & PLAN NOTE
Chronic, is on warfarin daily and is [facility].  Denies any increased bleeding or bruising.

## 2022-04-08 NOTE — LETTER
[facility]  STEPHON Osorio  [address]  [place] [address]  Fax: [phone]   Authorization for Release/Disclosure of   Protected Health Information   Name: LIMA BEASLEY : 1950 SSN: [id number]   Address: [address] [place] [address]  [place] [address] Phone:    [phone] (home)    I authorize the entity listed below to release/disclose the PHI below to:   [facility]/STEPHON Osorio and STEPHON Osorio   Provider or Entity Name:      Address   City, [facility], [facility]   Phone:      Fax:     Reason for request: continuity of care   Information to be released:    [  ] LAST COLONOSCOPY,  including any PATH REPORT and follow-up  [  ] LAST FIT/COLOGUARD RESULT [  ] LAST DEXA  [  ] LAST MAMMOGRAM  [  ] LAST PAP  [  ] LAST LABS [x  ] RETINA EXAM REPORT  [  ] IMMUNIZATION RECORDS  [x  ] Release all info      [  ] Check here and initial the line next to each item to release ALL health information INCLUDING  _____ Care and treatment for drug and / or alcohol abuse  _____ HIV testing, infection status, or AIDS  _____ Genetic Testing    DATES OF SERVICE OR TIME PERIOD TO BE DISCLOSED: _____________  I understand and acknowledge that:  * This Authorization may be revoked at any time by you in writing, except if your health information has already been used or disclosed.  * Your health information that will be used or disclosed as a result of you signing this authorization could be re-disclosed by the recipient. If this occurs, your re-disclosed health information may no longer be protected by State or Federal laws.  * You may refuse to sign this Authorization. Your refusal will not affect your ability to obtain treatment.  * This Authorization becomes effective upon signing and will  on (date) __________.      If no date is indicated, this Authorization will  one (1) year from the signature date.    Name: Lima Beasley    Signature:   Date:     2022        PLEASE FAX REQUESTED RECORDS BACK TO: [phone]

## 2022-04-08 NOTE — PROGRESS NOTES
Subjective:     CC:   Chief Complaint   Patient presents with   • Establish Care     New Pt    • Medication Management     Thyroid refill        HISTORY OF THE PRESENT ILLNESS: Patient is a 71 y.o. female. This pleasant patient is here today to establish care and discuss current medical conditions and healthcare maintenance, she has no specific concerns today. Her prior PCP was Dr. Jones.   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  She states that she does live alone in an  however she lives on her daughter's property so her daughter is nearby.  Her specialists include:   Vascular-Dr. Bloch  Dermatology-Bernadine Reddy, APRN  Pulmonary-Dr. Kim  Cardiology-Dr. Hawkins    HILDA (obstructive sleep apnea)  Chronic, she states that she does have sleep apnea and has a CPAP at home however she is having another sleep study done as they feel like she might need oxygen titrated in as her oxygen on the last study showed that it dropped below 80% for 5 to 6 hours throughout the night.  Her sleep study is scheduled for May 21, 2022, and she does see pulmonary Dr. Kim.    Neuropathy in diabetes (HCC)  Chronic problem, states she has no sensations in her feet bilaterally and has had neuropathy for many years.  States she had an EMG done with her neurologist which could not find a reading.  She states that she takes tramadol 100 mg 3 times a day for her neuropathy.  She is not currently on gabapentin or Lyrica for her nerve pain    Atrial fibrillation (HCC) [I48.91]  Chronic, continues on Amiodarone 200 mg daily, Torsemide 10 mg daily, Warfarin, Doxazosin 2 mg daily.  She notes that she had a cardioversion done 2 years ago.  Her Coumadin is followed by anticoagulation clinic.    Arthritis  Osteoarthritis of the hands, knees, hips, and lower lumbar spine.  Patient states she uses diclofenac gel on her hands and knees and states the tramadol  also helps manage her pain.    DM (diabetes mellitus) (HCC)  Chronic, not on medication management at this time.  She does have chronic polyneuropathy due to her diabetes and her bilateral lower feet and some in her hands she is due for an updated A1c which we will do in a few weeks prior to her next appointment.  She does not check her blood sugars regularly at home.  Denies any symptoms of hypo or hyperglycemia.    Diabetic neuropathy (HCC)  Chronic, has been seen by neurology and had EMGs done in the past.  Currently is on tramadol 100 mg 3 times a day for pain management for her neuropathy.    Coagulopathy (HCC)  Chronic, is on warfarin daily and is managed by the [facility] anticoagulation clinic.  Denies any increased bleeding or bruising.    Hypothyroidism  Chronic, continues on Synthroid 125 mcg each morning on an empty stomach.  Denies any symptoms of hyper or hypothyroidism.  We will recheck TSH prior to her next visit.    Stage 3b chronic kidney disease (HCC)  Chronic, discussed increasing her hydration and avoiding nephrotoxic medications and NSAIDs.  Will get an updated CMP prior to next visit.      Current Outpatient Medications Ordered in Epic   Medication Sig Dispense Refill   • levothyroxine (SYNTHROID) 125 MCG Tab Take 1 Tablet by mouth every morning on an empty stomach. 90 Tablet 0   • Eflornithine HCl 13.9 % Cream AAA twice day, leave on for at least 4 hour before washing off 45 g 1   • warfarin (COUMADIN) 4 MG Tab Take one tablet by mouth daily or as directed by anticoagulation clinic 100 Tablet 1   • doxazosin (CARDURA) 2 MG Tab Take 1 Tablet by mouth every day. Needs appointment for future refills 90 Tablet 0   • amiodarone (CORDARONE) 200 MG Tab TAKE 1 TABLET BY MOUTH EVERY  Tablet 2   • ezetimibe (ZETIA) 10 MG Tab TAKE 1 TABLET BY MOUTH EVERY DAY 90 Tablet 3   • diclofenac sodium (VOLTAREN) 1 % Gel Apply  topically 4 times a day as needed.     • torsemide (DEMADEX) 20 MG Tab Take 0.5  "Tablets by mouth every day. 90 Tablet 3   • vitamin D (CHOLECALCIFEROL) 1000 UNIT TABS Take 1,000 Units by mouth every day.       • docosahexanoic acid (OMEGA 3 FA) 1000 MG CAPS Take 1,000 mg by mouth 2 Times a Day.     • tramadol (ULTRAM) 50 MG TABS Take 2 Tabs by mouth 3 times a day. 180 Each 0     No current Epic-ordered facility-administered medications on file.       Health Maintenance: Completed    ROS:   Gen: no fevers/chills, no changes in weight  Eyes: no changes in vision  ENT: no sore throat, no hearing loss, no bloody nose  Pulm: no sob, no cough  CV: no chest pain, no palpitations  GI: no nausea/vomiting, no diarrhea  : no dysuria  MSk: no myalgias  Skin: no rash  Neuro: no headaches, no numbness/tingling  Heme/Lymph: no easy bruising      Objective:       Exam: /66 (BP Location: Right arm, Patient Position: Sitting, BP Cuff Size: Adult)   Pulse 60   Temp 36.6 °C (97.8 °F) (Temporal)   Resp 14   Ht 1.549 m (5' 1\")   Wt 98.4 kg (217 lb)   SpO2 97%  Body mass index is 41 kg/m².    General: Normal appearing. No distress.  HEENT: Normocephalic. Eyes conjunctiva clear lids without ptosis, pupils equal and reactive to light accommodation, ears normal shape and contour, canals are clear bilaterally, tympanic membranes are benign, nasal mucosa benign, oropharynx is without erythema, edema or exudates.   Neck: Supple without JVD or bruit. Thyroid is not enlarged.  Pulmonary: Clear to ausculation.  Normal effort. No rales, ronchi, or wheezing.  Cardiovascular: Regular rate, mechanical aortic valve click. Carotid and radial pulses are intact and equal bilaterally.  Abdomen: Soft, nontender, nondistended. Normal bowel sounds. Liver and spleen are not palpable  Neurologic: Grossly nonfocal  Lymph: No cervical or supraclavicular lymph nodes are palpable  Skin: Warm and dry.  No obvious lesions.  Musculoskeletal: Normal gait. No extremity cyanosis, clubbing, 2+ pitting lower extremity edema psych: Normal " mood and affect. Alert and oriented x3. Judgment and insight is normal.      A chaperone was offered to the patient during today's exam. Patient declined chaperone.      Assessment & Plan:   71 y.o. female with the following -    1. Encounter for screening mammogram for malignant neoplasm of breast  - MA-SCREENING MAMMO BILAT W/TOMOSYNTHESIS W/CAD; Future    2. Atrial fibrillation, unspecified type (HCC)  Chronic, stable continues to be followed by cardiology  3. Type 2 diabetes mellitus without complication, without long-term current use of insulin (HCC)  - HEMOGLOBIN A1C; Future  - MICROALBUMIN CREAT RATIO URINE; Future    4. Healthcare maintenance  - CBC WITH DIFFERENTIAL; Future  - TSH WITH REFLEX TO FT4; Future  - VITAMIN D,25 HYDROXY; Future  - HEP C VIRUS ANTIBODY; Future    5. HILDA (obstructive sleep apnea)  Chronic, upcoming sleep study next month.  6. Diabetic polyneuropathy associated with type 2 diabetes mellitus (HCC)  Chronic, will get an updated A1c and follow-up thereafter.  7. Arthritis  Chronic, stable on tramadol and diclofenac gel.  8. Coagulopathy (HCC)  Chronic, stable on current regimen and followed by [facility] anticoagulation clinic  9. Hypothyroidism, unspecified type  Chronic, continues on levothyroxine 125 mcg and will update her TSH and adjust medications if needed.  Other orders  - levothyroxine (SYNTHROID) 125 MCG Tab; Take 1 Tablet by mouth every morning on an empty stomach.  Dispense: 90 Tablet; Refill: 0       I spent a total of 49 minutes with record review, exam, communication with the patient, communication with other providers, and documentation of this encounter.    Return in about 5 weeks (around 5/13/2022) for F/U labs.    Please note that this dictation was created using voice recognition software. I have made every reasonable attempt to correct obvious errors, but I expect that there are errors of grammar and possibly content that I did not discover before finalizing the  note.    Annual Health Assessment Questions:    1.  Are you currently engaging in any exercise or physical activity? No    2.  How would you describe your mood or emotional well-being today? good    3.  Have you had any falls in the last year? Yes    4.  Have you noticed any problems with your balance or had difficulty walking? Yes    5.  In the last six months have you experienced any leakage of urine? Yes    6. DPA/Advanced Directive: Patient has Advanced Directive on file.

## 2022-04-08 NOTE — ASSESSMENT & PLAN NOTE
Osteoarthritis of the hands, knees, hips, and lower lumbar spine.  Patient states she uses diclofenac gel on her hands and knees and states the tramadol also helps manage her pain.

## 2022-04-08 NOTE — ASSESSMENT & PLAN NOTE
Chronic, continues on Amiodarone 200 mg daily, Torsemide 10 mg daily, Warfarin, Doxazosin 2 mg daily.  She notes that she had a cardioversion done 2 years ago.  Her Coumadin is followed by anticoagulation clinic.

## 2022-04-08 NOTE — LETTER
[facility]  STEPHON Osorio  [address]  [place] [address]  Fax: [phone]   Authorization for Release/Disclosure of   Protected Health Information   Name: LIMA BEASLEY : 1950 SSN: [id number]   Address: [address] [place] [address] Rd  [place] [address] Phone:    [phone] (home)    I authorize the entity listed below to release/disclose the PHI below to:   [facility]/STEPHON Osorio and STEPHON Osorio   Provider or Entity Name:     Address   City, State, Zip   Phone:      Fax:     Reason for request: continuity of care   Information to be released:    [  ] LAST COLONOSCOPY,  including any PATH REPORT and follow-up  [  ] LAST FIT/COLOGUARD RESULT [  ] LAST DEXA  [  ] LAST MAMMOGRAM  [  ] LAST PAP  [  ] LAST LABS [  ] RETINA EXAM REPORT  [  ] IMMUNIZATION RECORDS  [  ] Release all info      [  ] Check here and initial the line next to each item to release ALL health information INCLUDING  _____ Care and treatment for drug and / or alcohol abuse  _____ HIV testing, infection status, or AIDS  _____ Genetic Testing    DATES OF SERVICE OR TIME PERIOD TO BE DISCLOSED: _____________  I understand and acknowledge that:  * This Authorization may be revoked at any time by you in writing, except if your health information has already been used or disclosed.  * Your health information that will be used or disclosed as a result of you signing this authorization could be re-disclosed by the recipient. If this occurs, your re-disclosed health information may no longer be protected by State or Federal laws.  * You may refuse to sign this Authorization. Your refusal will not affect your ability to obtain treatment.  * This Authorization becomes effective upon signing and will  on (date) __________.      If no date is indicated, this Authorization will  one (1) year from the signature date.    Name: Lima Beasley    Signature:   Date:     2022       PLEASE  FAX REQUESTED RECORDS BACK TO: [phone]

## 2022-04-08 NOTE — ASSESSMENT & PLAN NOTE
Chronic problem, states she has no sensations in her feet bilaterally and has had neuropathy for many years.  States she had an EMG done with her neurologist which could not find a reading.  She states that she takes tramadol 100 mg 3 times a day for her neuropathy.  She is not currently on gabapentin or Lyrica for her nerve pain

## 2022-04-08 NOTE — LETTER
[employer] [facility]  EDU Osorio.  [address]  [place] [address]  Fax: [phone]   Authorization for Release/Disclosure of   Protected Health Information   Name: LIMA FIGUEREDO : 1950 SSN: [id number]   Address: [address] [place] [address]  [place] [address] Phone:    [phone] (home)    I authorize the entity listed below to release/disclose the PHI below to:   [employer] [facility]/STEPHON Osorio and STEPHON Osorio   Provider or Entity Name:   [facility]    Address   City, State, [facility]   Phone:      Fax:     Reason for request: continuity of care   Information to be released:    [  ] LAST COLONOSCOPY,  including any PATH REPORT and follow-up  [  ] LAST FIT/COLOGUARD RESULT [  ] LAST DEXA  [  ] LAST MAMMOGRAM  [  ] LAST PAP  [  ] LAST LABS [  ] RETINA EXAM REPORT  [  ] IMMUNIZATION RECORDS  [ x ] Release all info      [  ] Check here and initial the line next to each item to release ALL health information INCLUDING  _____ Care and treatment for drug and / or alcohol abuse  _____ HIV testing, infection status, or AIDS  _____ Genetic Testing    DATES OF SERVICE OR TIME PERIOD TO BE DISCLOSED: _____________  I understand and acknowledge that:  * This Authorization may be revoked at any time by you in writing, except if your health information has already been used or disclosed.  * Your health information that will be used or disclosed as a result of you signing this authorization could be re-disclosed by the recipient. If this occurs, your re-disclosed health information may no longer be protected by State or Federal laws.  * You may refuse to sign this Authorization. Your refusal will not affect your ability to obtain treatment.  * This Authorization becomes effective upon signing and will  on (date) __________.      If no date is indicated, this Authorization will  one (1) year from the signature date.    Name: Lima Pang  Gale    Signature:   Date:     4/8/2022       PLEASE FAX REQUESTED RECORDS BACK TO: [phone]

## 2022-04-08 NOTE — ASSESSMENT & PLAN NOTE
Chronic, continues on Synthroid 125 mcg each morning on an empty stomach.  Denies any symptoms of hyper or hypothyroidism.  We will recheck TSH prior to her next visit.

## 2022-04-08 NOTE — ASSESSMENT & PLAN NOTE
Chronic, not on medication management at this time.  She does have chronic polyneuropathy due to her diabetes and her bilateral lower feet and some in her hands she is due for an updated A1c which we will do in a few weeks prior to her next appointment.  She does not check her blood sugars regularly at home.  Denies any symptoms of hypo or hyperglycemia.

## 2022-04-08 NOTE — LETTER
[employer]  STEPHON Osorio  [address]  [place] [address]  Fax: [phone]   Authorization for Release/Disclosure of   Protected Health Information   Name: LIMA BEASLEY : 1950 SSN: [id number]   Address: [address] [place] [address] Rd  [place] [address] Phone:    [phone] (home)    I authorize the entity listed below to release/disclose the PHI below to:   [facility]/STEPHON Osorio and STEPHON Osorio   Provider or Entity Name:  [facility] Address   City, State, [facility]   Phone:      Fax:     Reason for request: continuity of care   Information to be released:    [x  ] LAST COLONOSCOPY,  including any PATH REPORT and follow-up  [  ] LAST FIT/COLOGUARD RESULT [  ] LAST DEXA  [  ] LAST MAMMOGRAM  [  ] LAST PAP  [  ] LAST LABS [  ] RETINA EXAM REPORT  [  ] IMMUNIZATION RECORDS  [x  ] Release all info      [  ] Check here and initial the line next to each item to release ALL health information INCLUDING  _____ Care and treatment for drug and / or alcohol abuse  _____ HIV testing, infection status, or AIDS  _____ Genetic Testing    DATES OF SERVICE OR TIME PERIOD TO BE DISCLOSED: _____________  I understand and acknowledge that:  * This Authorization may be revoked at any time by you in writing, except if your health information has already been used or disclosed.  * Your health information that will be used or disclosed as a result of you signing this authorization could be re-disclosed by the recipient. If this occurs, your re-disclosed health information may no longer be protected by State or Federal laws.  * You may refuse to sign this Authorization. Your refusal will not affect your ability to obtain treatment.  * This Authorization becomes effective upon signing and will  on (date) __________.      If no date is indicated, this Authorization will  one (1) year from the signature date.    Name: Lima Beasley    Signature:   Date:     2022        PLEASE FAX REQUESTED RECORDS BACK TO: [phone]

## 2022-04-08 NOTE — ASSESSMENT & PLAN NOTE
Chronic, she states that she does have sleep apnea and has a CPAP at home however she is having another sleep study done as they feel like she might need oxygen titrated in as her oxygen on the last study showed that it dropped below 80% for 5 to 6 hours throughout the night.  Her sleep study is scheduled for May 21, 2022, and she does see pulmonary Dr. Kim.

## 2022-04-08 NOTE — ASSESSMENT & PLAN NOTE
Chronic, has been seen by neurology and had EMGs done in the past.  Currently is on tramadol 100 mg 3 times a day for pain management for her neuropathy.

## 2022-04-12 PROBLEM — N18.32 STAGE 3B CHRONIC KIDNEY DISEASE: Status: ACTIVE | Noted: 2022-04-12

## 2022-04-12 NOTE — ASSESSMENT & PLAN NOTE
Chronic, discussed increasing her hydration and avoiding nephrotoxic medications and NSAIDs.  Will get an updated CMP prior to next visit.

## 2022-04-14 ENCOUNTER — ANTICOAGULATION MONITORING (OUTPATIENT)
Dept: MEDICAL GROUP | Facility: MEDICAL CENTER | Age: 72
End: 2022-04-14
Payer: MEDICARE

## 2022-04-14 DIAGNOSIS — G45.9 TIA (TRANSIENT ISCHEMIC ATTACK): ICD-10-CM

## 2022-04-14 DIAGNOSIS — Z95.2 HX OF MECHANICAL AORTIC VALVE REPLACEMENT: ICD-10-CM

## 2022-04-14 LAB — INR PPP: 3.2 (ref 2–3.5)

## 2022-04-15 NOTE — PROGRESS NOTES
Anticoagulation Summary  As of 4/14/2022    INR goal:  2.5-3.5   TTR:  62.0 % (5.1 y)   INR used for dosing:  3.20 (4/14/2022)   Warfarin maintenance plan:  4 mg (4 mg x 1) every day   Weekly warfarin total:  28 mg   Plan last modified:  Emily MaradiagaD (7/26/2021)   Next INR check:  4/28/2022   Target end date:  Indefinite    Indications    Chronic anticoagulation (Resolved) [Z79.01]  Hx of mechanical aortic valve replacement [V43.3] [Z95.2]  TIA (transient ischemic attack) [G45.9]  Atrial fibrillation (HCC) [I48.91] [I48.91]             Anticoagulation Episode Summary     INR check location:      Preferred lab:      Send INR reminders to:      Comments:  [employer] home meter      Anticoagulation Care Providers     Provider Role Specialty Phone number    Michael J Bloch, M.D. Referring Internal Medicine [phone]    Emily MartinezD Responsible Pharmacy           Refer to Anticoagulation Patient Findings for HPI  Patient Findings     Negatives:  Signs/symptoms of thrombosis, Signs/symptoms of bleeding, Laboratory test error suspected, Change in health, Change in alcohol use, Change in activity, Upcoming invasive procedure, Emergency department visit, Upcoming dental procedure, Missed doses, Extra doses, Change in medications, Change in diet/appetite, Hospital admission, Bruising, Other complaints          Spoke with patient to report a therapeutic INR.      Pt instructed to continue with current warfarin dosing regimen, confirms dosing.   Will follow up in 2 week(s).     Emily SouzaD, BCACP

## 2022-04-26 ENCOUNTER — ANTICOAGULATION MONITORING (OUTPATIENT)
Dept: CARDIOLOGY | Facility: MEDICAL CENTER | Age: 72
End: 2022-04-26

## 2022-04-26 ENCOUNTER — HOSPITAL ENCOUNTER (OUTPATIENT)
Dept: LAB | Facility: MEDICAL CENTER | Age: 72
End: 2022-04-26
Attending: FAMILY MEDICINE
Payer: MEDICARE

## 2022-04-26 ENCOUNTER — HOSPITAL ENCOUNTER (OUTPATIENT)
Dept: LAB | Facility: MEDICAL CENTER | Age: 72
End: 2022-04-26
Attending: NURSE PRACTITIONER
Payer: MEDICARE

## 2022-04-26 ENCOUNTER — HOSPITAL ENCOUNTER (OUTPATIENT)
Dept: LAB | Facility: MEDICAL CENTER | Age: 72
End: 2022-04-26
Attending: INTERNAL MEDICINE
Payer: MEDICARE

## 2022-04-26 DIAGNOSIS — Z00.00 HEALTHCARE MAINTENANCE: ICD-10-CM

## 2022-04-26 DIAGNOSIS — Z95.2 HX OF MECHANICAL AORTIC VALVE REPLACEMENT: ICD-10-CM

## 2022-04-26 DIAGNOSIS — E78.5 HYPERLIPIDEMIA, UNSPECIFIED HYPERLIPIDEMIA TYPE: ICD-10-CM

## 2022-04-26 DIAGNOSIS — G45.9 TIA (TRANSIENT ISCHEMIC ATTACK): ICD-10-CM

## 2022-04-26 DIAGNOSIS — Z79.01 CHRONIC ANTICOAGULATION: ICD-10-CM

## 2022-04-26 DIAGNOSIS — E11.9 TYPE 2 DIABETES MELLITUS WITHOUT COMPLICATION, WITHOUT LONG-TERM CURRENT USE OF INSULIN (HCC): ICD-10-CM

## 2022-04-26 LAB
25(OH)D3 SERPL-MCNC: 58 NG/ML (ref 30–100)
ALBUMIN SERPL BCP-MCNC: 4.3 G/DL (ref 3.2–4.9)
ALBUMIN/GLOB SERPL: 1.8 G/DL
ALP SERPL-CCNC: 54 U/L (ref 30–99)
ALT SERPL-CCNC: 13 U/L (ref 2–50)
ANION GAP SERPL CALC-SCNC: 13 MMOL/L (ref 7–16)
AST SERPL-CCNC: 18 U/L (ref 12–45)
BASOPHILS # BLD AUTO: 0.7 % (ref 0–1.8)
BASOPHILS # BLD: 0.05 K/UL (ref 0–0.12)
BILIRUB SERPL-MCNC: 0.4 MG/DL (ref 0.1–1.5)
BUN SERPL-MCNC: 38 MG/DL (ref 8–22)
CALCIUM SERPL-MCNC: 9.1 MG/DL (ref 8.5–10.5)
CHLORIDE SERPL-SCNC: 104 MMOL/L (ref 96–112)
CHOLEST SERPL-MCNC: 202 MG/DL (ref 100–199)
CO2 SERPL-SCNC: 24 MMOL/L (ref 20–33)
CREAT SERPL-MCNC: 1.61 MG/DL (ref 0.5–1.4)
CREAT UR-MCNC: 110.7 MG/DL
EOSINOPHIL # BLD AUTO: 0.06 K/UL (ref 0–0.51)
EOSINOPHIL NFR BLD: 0.8 % (ref 0–6.9)
ERYTHROCYTE [DISTWIDTH] IN BLOOD BY AUTOMATED COUNT: 48.2 FL (ref 35.9–50)
EST. AVERAGE GLUCOSE BLD GHB EST-MCNC: 105 MG/DL
FASTING STATUS PATIENT QL REPORTED: NORMAL
GFR SERPLBLD CREATININE-BSD FMLA CKD-EPI: 34 ML/MIN/1.73 M 2
GLOBULIN SER CALC-MCNC: 2.4 G/DL (ref 1.9–3.5)
GLUCOSE SERPL-MCNC: 100 MG/DL (ref 65–99)
HBA1C MFR BLD: 5.3 % (ref 4–5.6)
HCT VFR BLD AUTO: 40.4 % (ref 37–47)
HCV AB SER QL: NORMAL
HDLC SERPL-MCNC: 58 MG/DL
HGB BLD-MCNC: 13 G/DL (ref 12–16)
IMM GRANULOCYTES # BLD AUTO: 0.02 K/UL (ref 0–0.11)
IMM GRANULOCYTES NFR BLD AUTO: 0.3 % (ref 0–0.9)
INR PPP: 2.29 (ref 0.87–1.13)
LDLC SERPL CALC-MCNC: 127 MG/DL
LYMPHOCYTES # BLD AUTO: 0.98 K/UL (ref 1–4.8)
LYMPHOCYTES NFR BLD: 13.6 % (ref 22–41)
MCH RBC QN AUTO: 28.6 PG (ref 27–33)
MCHC RBC AUTO-ENTMCNC: 32.2 G/DL (ref 33.6–35)
MCV RBC AUTO: 88.8 FL (ref 81.4–97.8)
MICROALBUMIN UR-MCNC: <1.2 MG/DL
MICROALBUMIN/CREAT UR: NORMAL MG/G (ref 0–30)
MONOCYTES # BLD AUTO: 0.43 K/UL (ref 0–0.85)
MONOCYTES NFR BLD AUTO: 6 % (ref 0–13.4)
NEUTROPHILS # BLD AUTO: 5.64 K/UL (ref 2–7.15)
NEUTROPHILS NFR BLD: 78.6 % (ref 44–72)
NRBC # BLD AUTO: 0 K/UL
NRBC BLD-RTO: 0 /100 WBC
PLATELET # BLD AUTO: 179 K/UL (ref 164–446)
PMV BLD AUTO: 11.8 FL (ref 9–12.9)
POTASSIUM SERPL-SCNC: 4.4 MMOL/L (ref 3.6–5.5)
PROT SERPL-MCNC: 6.7 G/DL (ref 6–8.2)
PROTHROMBIN TIME: 24.5 SEC (ref 12–14.6)
RBC # BLD AUTO: 4.55 M/UL (ref 4.2–5.4)
SODIUM SERPL-SCNC: 141 MMOL/L (ref 135–145)
TRIGL SERPL-MCNC: 85 MG/DL (ref 0–149)
TSH SERPL DL<=0.005 MIU/L-ACNC: 1.95 UIU/ML (ref 0.38–5.33)
WBC # BLD AUTO: 7.2 K/UL (ref 4.8–10.8)

## 2022-04-26 PROCEDURE — 82043 UR ALBUMIN QUANTITATIVE: CPT

## 2022-04-26 PROCEDURE — 36415 COLL VENOUS BLD VENIPUNCTURE: CPT

## 2022-04-26 PROCEDURE — 82570 ASSAY OF URINE CREATININE: CPT

## 2022-04-26 PROCEDURE — 84443 ASSAY THYROID STIM HORMONE: CPT

## 2022-04-26 PROCEDURE — 80053 COMPREHEN METABOLIC PANEL: CPT

## 2022-04-26 PROCEDURE — 85025 COMPLETE CBC W/AUTO DIFF WBC: CPT

## 2022-04-26 PROCEDURE — 83036 HEMOGLOBIN GLYCOSYLATED A1C: CPT

## 2022-04-26 PROCEDURE — 85610 PROTHROMBIN TIME: CPT

## 2022-04-26 PROCEDURE — 86803 HEPATITIS C AB TEST: CPT

## 2022-04-26 PROCEDURE — 80061 LIPID PANEL: CPT

## 2022-04-26 PROCEDURE — 82306 VITAMIN D 25 HYDROXY: CPT

## 2022-04-26 NOTE — PROGRESS NOTES
Anticoagulation Summary  As of 2022    INR goal:  2.5-3.5   TTR:  62.1 % (5.2 y)   INR used for dosin.29 (2022)   Warfarin maintenance plan:  4 mg (4 mg x 1) every day   Weekly warfarin total:  28 mg   Plan last modified:  Sybil Adkins, PharmD (2021)   Next INR check:  2022   Target end date:  Indefinite    Indications    Chronic anticoagulation (Resolved) [Z79.01]  Hx of mechanical aortic valve replacement [V43.3] [Z95.2]  TIA (transient ischemic attack) [G45.9]  Atrial fibrillation (HCC) [I48.91] [I48.91]             Anticoagulation Episode Summary     INR check location:      Preferred lab:      Send INR reminders to:      Comments:  [employer] home meter      Anticoagulation Care Providers     Provider Role Specialty Phone number    Michael J Bloch, M.D. Referring Internal Medicine [phone]    Yinka Church, PharmD Responsible Pharmacy         Anticoagulation Patient Findings          Spoke with Yenifer to report a sub therapeutic INR of 2.29. Will bolus dose with 6mg today. Continue current dosing regimen.  Follow up in 4 weeks, to reduce the risk of adverse events related to this high risk medication, warfarin.    Katelyn Perez, Clinical Pharmacist

## 2022-04-27 ENCOUNTER — APPOINTMENT (OUTPATIENT)
Dept: MEDICAL GROUP | Facility: PHYSICIAN GROUP | Age: 72
End: 2022-04-27
Payer: MEDICARE

## 2022-04-30 LAB — INR PPP: 2.2 (ref 2–3.5)

## 2022-05-02 ENCOUNTER — APPOINTMENT (OUTPATIENT)
Dept: VASCULAR LAB | Facility: MEDICAL CENTER | Age: 72
End: 2022-05-02
Attending: INTERNAL MEDICINE
Payer: MEDICARE

## 2022-05-02 ENCOUNTER — ANTICOAGULATION MONITORING (OUTPATIENT)
Dept: VASCULAR LAB | Facility: MEDICAL CENTER | Age: 72
End: 2022-05-02
Payer: MEDICARE

## 2022-05-02 DIAGNOSIS — G45.9 TIA (TRANSIENT ISCHEMIC ATTACK): ICD-10-CM

## 2022-05-02 DIAGNOSIS — Z95.2 HX OF MECHANICAL AORTIC VALVE REPLACEMENT: ICD-10-CM

## 2022-05-02 PROBLEM — D68.59 HYPERCOAGULABLE STATE (HCC): Status: ACTIVE | Noted: 2022-05-02

## 2022-05-02 ASSESSMENT — ENCOUNTER SYMPTOMS
CLAUDICATION: 0
ABDOMINAL PAIN: 0
DIZZINESS: 0
SHORTNESS OF BREATH: 1
BRUISES/BLEEDS EASILY: 0
NERVOUS/ANXIOUS: 0
MYALGIAS: 0
BLOOD IN STOOL: 1
HEADACHES: 0
COUGH: 0
PALPITATIONS: 0

## 2022-05-02 NOTE — PROGRESS NOTES
[facility] Follow Up Visit    Date of Service: 5/24/21    Yenifer Beasley is here for follow up of dyslipidemia, HTN, chronic anticoagulation, and hypothyroidism    HPI  Pertinent Interval History since last visit:   Occasional minor palpitations, resolve quickly  Has f/u with cards in Aug   On Praluent-Pt prior auth approved   Home BP readings 139/65  HR 51 ***  On warfarin, no bleeding problems, except an occasional hemorrhoid   Denies CP   Minor LE swelling  Improved gout off meds now since changing to Torsemide.   Has tinnitus karla ***       Current Prescription Lipid Lowering Medications - including dose: Has missed several doses   Statin: None  Non-Statin: Continue Praluent 150 mg 2x per week  Continue Zetia  Current Lipid Lowering and Related Supplements:   Omega 3s, 1000u daily  Vit D  CoQ10  Any Current Side Effects Potentially Related to Lipid Lowering therapy?   No  Current Adherence to Lipid Lowering Therapies:  Complete  Any Previous History of Statin Intolerance?   Yes, Details: severe myalgias on multiple statins  Baseline Lipids Prior to Treatment:  Shown here:  LDL-C: 152      SOCIAL HISTORY  Social History     Tobacco Use   Smoking Status Never Smoker   Smokeless Tobacco Never Used      Change in weight: down about 5lbs  Exercise habits: mod exercise  Diet: common adult, consistent vit K    Review of Systems   Constitutional: Negative for malaise/fatigue.   HENT: Positive for nosebleeds.    Respiratory: Positive for shortness of breath. Negative for cough.    Cardiovascular: Positive for leg swelling. Negative for chest pain, palpitations and claudication.   Gastrointestinal: Positive for blood in stool (hemorrhoids but stopped quickly). Negative for abdominal pain and melena.   Genitourinary: Negative for hematuria.   Musculoskeletal: Positive for joint pain. Negative for myalgias.   Neurological: Negative for dizziness and headaches.   Endo/Heme/Allergies: Does not bruise/bleed  easily.   Psychiatric/Behavioral: The patient is not nervous/anxious.      Physical Exam  Constitutional:       Appearance: She is well-developed.   Pulmonary:      Effort: Pulmonary effort is normal.   Neurological:      Mental Status: She is alert.   Psychiatric:         Behavior: Behavior normal.       DATA REVIEW  Most Recent Lipid Panel:   Lab Results   Component Value Date    CHOLSTRLTOT 202 (H) 04/26/2022    TRIGLYCERIDE 85 04/26/2022    HDL 58 04/26/2022     (H) 04/26/2022     Other Pertinent Blood Work:   Lab Results   Component Value Date    SODIUM 141 04/26/2022    POTASSIUM 4.4 04/26/2022    CHLORIDE 104 04/26/2022    CO2 24 04/26/2022    ANION 13.0 04/26/2022    GLUCOSE 100 (H) 04/26/2022    BUN 38 (H) 04/26/2022    CREATININE 1.61 (H) 04/26/2022    CALCIUM 9.1 04/26/2022    ASTSGOT 18 04/26/2022    ALTSGPT 13 04/26/2022    ALKPHOSPHAT 54 04/26/2022    TBILIRUBIN 0.4 04/26/2022    ALBUMIN 4.3 04/26/2022    AGRATIO 1.8 04/26/2022    TSHULTRASEN 1.950 04/26/2022     Other:  Lp(a) - 57 (oct 2017)  TSH 1.5  (oct 2017)    Recent Imaging Studies:      Echo march 2017:  1. Left ventricular ejection fraction is visually estimated to be 55%.   Normal regional wall motion. Grade I diastolic dysfunction.  2. Known mechanical aortic valve that is functioning normally.   3. Mild mitral stenosis.  4. Estimated right ventricular systolic pressure is 40 mmHg.    Echo 4/4/19  CONCLUSIONS  Normal left ventricular systolic function.  Left ventricular ejection fraction is visually estimated to be 65-70%.  Mild concentric left ventricular hypertrophy.  Mild mitral stenosis.  Severe mitral annular calcification.  Mild mitral regurgitation.  Bioprosthetic aortic valve with increased velocities.  Right heart pressures are consistent with moderate pulmonary   hypertension.  Severely dilated left atrium.  Echo 3/3/21  Normal left ventricular chamber size.  Left ventricular ejection fraction is visually estimated to be  65%.  Mild concentric left ventricular hypertrophy.  Dilated inferior vena cava without inspiratory collapse.  Moderate mitral stenosis.  Mean [id number] gradient is 7 mmHg at a heart rate of 64 BPM.  Structurally normal tricuspid valve without significant stenosis.  Normal function of mechanical prosthetic valve.  US aorta 4/14/21  Mild atherosclerotic change of the aorta. No evidence of aneurysm.  Carotid duplex 4/14/21  Mild plaque with no significant stenosis.  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  LDL/nonHDL above goal due to stopping PCSK9i   ; Non   Need updated labs after she starts back on the PCSK9I  Lifestyle Recommendations From Today’s Visit:    Diet - low simple carb like [place]; limiting sugars  Exercise - daily home exercise for 15-20 min  Non-Statin Medications Recommendations from Today’s Visit: Pt will check into assistance programs for help  - Continue Praluent 150mg when able  - Continue zetia 10 mg daily  - Consider addition of fibrate if nonHDL >130 in future or if she is unable to get the  PCSK9I  Indication for PCSK9 Inhibitor, if applicable:  ASCVD with suboptimal control of LDL-C despite maximally tolerated statin - continue praluent 150 mg 2x per month, missed doses  Supplements Recommended at this visit:  - Continue omega 3's at 3,000u/day, Vit D and CoQ10  Recommendations for Other Cardiovascular Risk Factors, toby all that apply:   HTN: Unsure if BP cuff is accurate, will check it against cuff at ca, take 1/2 tab see below. History of Gout also so would not tolerate thiazid. Peripheral alpha blocker Continue Doxazosin 2 mg   IFG -relatively stable.  Continue TLC  Anticoagulation - continue warfarin and f/u in [facility]  Atrial fibrillation controlled on Amiodarone.  Secondary hypercoagulable state due to Atrial Fibrillation/Flutter on Warfarin.    Other issues  - Hypothyroidism - seems under reasonable control per TSH; continue current thyroid replection; otherwise defer management to PCP  - Skin infection- pt has had cellulitis in foot and hand.  Resolved now.   - SOB- usually occurs with exertion.  Could be due to deconditioning.  Continue work-up with cardiology regarding etiology.  - Fear of COVID pandemic-- explained the importance of maintaining all healthcare appts and encouraged she request virtual visits if needed, but do not delay care as there is a greater risk of MI/stroke.  She has had both of her Covid 10 vaccins.  - Hyponatremia-Electrolyte replacement daily or decrease water intake Hold Potassium replacement for now.        - Elevated BNP- cut in half presently-taken off Spironolactone but still onTorsemide 20 mg   -Tinnitus karla-carotid duplex ivory for possible cause. Stop Lasix for now to see if this decreases the tinnitus. I explained that it may not resolve with stopping the medication but I would not like to see any worsening of the symptoms or permanent hearing loss as Lasix can be ototoxic start back on Spironolactone 25 mg take only half tab for now. BMP and BNP in 4  weeks   Studies Ordered at Todays Visit: None -  Blood Work Ordered At Today’s visit:CMP, BNP and lipid   As ordered    Follow-Up:   1) Anticoagulation clinic as scheduled  2) Vascular care 2 months  3) F/u with cardiology per their recommendation    Time: 20-29min - - chart review/prep, review of other providers' records, imaging/lab review, face-to-face time for history/examination, ordering, prescribing,  review of results/meds/ treatment plan with patient/family/caregiver, documentation in EMR, care coordination (as needed)  STEPH Meza    CC:  Marion Jones M.D.

## 2022-05-02 NOTE — PROGRESS NOTES
Anticoagulation Summary  As of 2022    INR goal:  2.5-3.5   TTR:  62.0 % (5.2 y)   INR used for dosin.20 (2022)   Warfarin maintenance plan:  4 mg (4 mg x 1) every day   Weekly warfarin total:  28 mg   Plan last modified:  Sybil Adkins, Pasha (2021)   Next INR check:  2022   Target end date:  Indefinite    Indications    Chronic anticoagulation (Resolved) [Z79.01]  Hx of mechanical aortic valve replacement [V43.3] [Z95.2]  TIA (transient ischemic attack) [G45.9]  Atrial fibrillation (HCC) [I48.91] [I48.91]             Anticoagulation Episode Summary     INR check location:      Preferred lab:      Send INR reminders to:      Comments:  [employer] home meter      Anticoagulation Care Providers     Provider Role Specialty Phone number    Michael J Bloch, M.D. Referring Internal Medicine [phone]    Yinka Church, PharmD Responsible Pharmacy         Anticoagulation Patient Findings      Left voicemail message to report a subtherapeutic INR of 2.2.    Will have pt take bolus dose of warfarin today of 6 mg and then   Pt to continue with current warfarin dosing regimen. Requested pt contact the clinic for any s/s of unusual bleeding, bruising, clotting or any changes to diet or medication.    FU INR in 1 week(s).    Sybil Adkins, PharmD

## 2022-05-13 ENCOUNTER — APPOINTMENT (OUTPATIENT)
Dept: MEDICAL GROUP | Facility: PHYSICIAN GROUP | Age: 72
End: 2022-05-13
Payer: MEDICARE

## 2022-05-16 ENCOUNTER — OFFICE VISIT (OUTPATIENT)
Dept: VASCULAR LAB | Facility: MEDICAL CENTER | Age: 72
End: 2022-05-16
Attending: NURSE PRACTITIONER
Payer: MEDICARE

## 2022-05-16 VITALS
BODY MASS INDEX: 40.78 KG/M2 | HEIGHT: 61 IN | WEIGHT: 216 LBS | DIASTOLIC BLOOD PRESSURE: 70 MMHG | SYSTOLIC BLOOD PRESSURE: 133 MMHG | HEART RATE: 59 BPM

## 2022-05-16 DIAGNOSIS — E78.5 HYPERLIPIDEMIA, UNSPECIFIED HYPERLIPIDEMIA TYPE: ICD-10-CM

## 2022-05-16 DIAGNOSIS — G45.9 TIA (TRANSIENT ISCHEMIC ATTACK): ICD-10-CM

## 2022-05-16 DIAGNOSIS — I48.91 ATRIAL FIBRILLATION, UNSPECIFIED TYPE (HCC): ICD-10-CM

## 2022-05-16 DIAGNOSIS — Z95.2 HX OF MECHANICAL AORTIC VALVE REPLACEMENT: ICD-10-CM

## 2022-05-16 LAB — INR PPP: 2.8 (ref 2–3.5)

## 2022-05-16 PROCEDURE — 99214 OFFICE O/P EST MOD 30 MIN: CPT | Performed by: NURSE PRACTITIONER

## 2022-05-16 PROCEDURE — 85610 PROTHROMBIN TIME: CPT | Performed by: NURSE PRACTITIONER

## 2022-05-16 PROCEDURE — 99213 OFFICE O/P EST LOW 20 MIN: CPT | Performed by: NURSE PRACTITIONER

## 2022-05-16 RX ORDER — ALIROCUMAB 150 MG/ML
150 INJECTION, SOLUTION SUBCUTANEOUS
Qty: 6 ML | Refills: 3 | Status: SHIPPED | OUTPATIENT
Start: 2022-05-16 | End: 2023-06-08 | Stop reason: SDUPTHER

## 2022-05-16 ASSESSMENT — FIBROSIS 4 INDEX: FIB4 SCORE: 1.95

## 2022-05-16 NOTE — PROGRESS NOTES
OP Anticoagulation Service Note    Date: 2022  Blood Pressure : 133/70  Pulse: (!) 59    Plan:  Pt is therapeutic.  Denies any s/s of bleeding or clotting.  Denies any changes in medications or diet. Will continue warfarin dosing as follows.  Follow up appointment in 2 week(s).     Anticoagulation Summary  As of 2022    INR goal:  2.5-3.5   TTR:  61.6 % (5.2 y)   INR used for dosin.80 (2022)   Warfarin maintenance plan:  4 mg (4 mg x 1) every day   Weekly warfarin total:  28 mg   Plan last modified:  Sybil Adkins, PharmD (2021)   Next INR check:  2022   Target end date:  Indefinite    Indications    Chronic anticoagulation (Resolved) [Z79.01]  Hx of mechanical aortic valve replacement [V43.3] [Z95.2]  TIA (transient ischemic attack) [G45.9]  Atrial fibrillation (HCC) [I48.91] [I48.91]             Anticoagulation Episode Summary     INR check location:      Preferred lab:      Send INR reminders to:      Comments:  [employer] home meter      Anticoagulation Care Providers     Provider Role Specialty Phone number    Michael J Bloch, M.D. Referring Internal Medicine [phone]    Yinka Church, PharmD Responsible Pharmacy             TAMERA Flores  [facility]

## 2022-05-16 NOTE — PROGRESS NOTES
[facility] Follow Up Visit    Date of Service: 5/16/22    Yenifer Beasley is here for follow up of dyslipidemia, HTN, chronic anticoagulation, and hypothyroidism    HPI  Pertinent Interval History since last visit: Not on Praluent- was told she would have to pay $600 for the Rx which is just not doable.  Remains on Zetia, vit d and otc omega 3s  No chest pain, SOB  Cardiology decreased doxazosin to 1mg qhs  On warfarin, no bleeding issues  Still with LE swelling, unchanged  No open sores/wounds  Is having sleep study this mo  Just est with new PCP  Had fasting labs      Current Prescription Lipid Lowering Medications - including dose: Has missed several doses   Statin: None  Non-Statin: None; off Praluent d/t cost  Continue Zetia  Current Lipid Lowering and Related Supplements:   Omega 3s, 1000u daily  Vit D  Any Current Side Effects Potentially Related to Lipid Lowering therapy?   No  Current Adherence to Lipid Lowering Therapies:  Complete  Any Previous History of Statin Intolerance?   Yes, Details: severe myalgias on multiple statins  Baseline Lipids Prior to Treatment:  Shown here:  LDL-C: 152      SOCIAL HISTORY  Social History     Tobacco Use   Smoking Status Never Smoker   Smokeless Tobacco Never Used      Change in weight: none  Exercise habits: mod exercise  Diet: common adult, consistent vit K    Physical Exam  Constitutional:       General: She is not in acute distress.     Appearance: She is well-developed. She is obese. She is not diaphoretic.   Cardiovascular:      Rate and Rhythm: Normal rate.      Heart sounds: Murmur heard.   Pulmonary:      Effort: Pulmonary effort is normal. No respiratory distress.      Breath sounds: Normal breath sounds.   Musculoskeletal:         General: Swelling present.   Skin:     General: Skin is warm and dry.      Coloration: Skin is not pale.      Findings: No erythema.   Neurological:      Mental Status: She is alert and oriented to person, place, and  time.      Motor: No weakness.      Coordination: Coordination normal.   Psychiatric:         Mood and Affect: Mood normal.         Behavior: Behavior normal.         Thought Content: Thought content normal.       DATA REVIEW  Most Recent Lipid Panel:   Lab Results   Component Value Date    CHOLSTRLTOT 202 (H) 04/26/2022    TRIGLYCERIDE 85 04/26/2022    HDL 58 04/26/2022     (H) 04/26/2022     Other Pertinent Blood Work:   Lab Results   Component Value Date    SODIUM 141 04/26/2022    POTASSIUM 4.4 04/26/2022    CHLORIDE 104 04/26/2022    CO2 24 04/26/2022    ANION 13.0 04/26/2022    GLUCOSE 100 (H) 04/26/2022    BUN 38 (H) 04/26/2022    CREATININE 1.61 (H) 04/26/2022    CALCIUM 9.1 04/26/2022    ASTSGOT 18 04/26/2022    ALTSGPT 13 04/26/2022    ALKPHOSPHAT 54 04/26/2022    TBILIRUBIN 0.4 04/26/2022    ALBUMIN 4.3 04/26/2022    AGRATIO 1.8 04/26/2022    TSHULTRASEN 1.950 04/26/2022     Other:  Lp(a) - 57 (oct 2017)  TSH 1.5  (oct 2017)    Recent Imaging Studies:      Echo march 2017:  1. Left ventricular ejection fraction is visually estimated to be 55%.   Normal regional wall motion. Grade I diastolic dysfunction.  2. Known mechanical aortic valve that is functioning normally.   3. Mild mitral stenosis.  4. Estimated right ventricular systolic pressure is 40 mmHg.    Echo 4/4/19  CONCLUSIONS  Normal left ventricular systolic function.  Left ventricular ejection fraction is visually estimated to be 65-70%.  Mild concentric left ventricular hypertrophy.  Mild mitral stenosis.  Severe mitral annular calcification.  Mild mitral regurgitation.  Bioprosthetic aortic valve with increased velocities.  Right heart pressures are consistent with moderate pulmonary   hypertension.  Severely dilated left atrium.  Echo 3/3/21  Normal left ventricular chamber size.  Left ventricular ejection fraction is visually estimated to be 65%.  Mild concentric left ventricular hypertrophy.  Dilated inferior vena cava without  inspiratory collapse.  Moderate mitral stenosis.  Mean [id number] gradient is 7 mmHg at a heart rate of 64 BPM.  Structurally normal tricuspid valve without significant stenosis.  Normal function of mechanical prosthetic valve.  US aorta 4/14/21  Mild atherosclerotic change of the aorta. No evidence of aneurysm.  Carotid duplex 4/14/21  Mild plaque with no significant stenosis.  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):   Possible  ACC/AHA Indication for Statin Therapy, toby all that apply:   Established ASCVD: Indication for High intensity statin    Calculated Risk for ASCVD, if applicable:  N/A  Other Significant Risk Markers, if any, toby all that apply:  Family history of premature ASCVD in first degree relative   High lp(a)  National Lipid Association (NLA) Goal (if applicable):Is having difficulty with PCSK9I due to new rules -Will see if MA can help   LDL-C:   <70 mg/dL and nonHDL <100 -   LDL/nonHDL above goal due to stopping PCSK9i  Need updated labs after she starts back PCSK9I  Lifestyle Recommendations From Today’s Visit:    Diet - low simple carb like [place]; limiting sugars  Exercise - daily home exercise for 15-20 min  Non-Statin Medications Recommendations from Today’s Visit: Pt will check into assistance programs for help  - Restart Praluent 150mg when able  - Continue zetia 10 mg daily  - Consider addition of fibrate if nonHDL >130 in future or if she is unable to get the PCSK9I  Indication for PCSK9 Inhibitor, if applicable:  ASCVD with suboptimal control of LDL-C despite maximally tolerated statin  Supplements Recommended at  this visit:  - Continue omega 3's at 3,000u/day, Vit D   Recommendations for Other Cardiovascular Risk Factors, toby all that apply:   HTN: good readings at home; cont dox 1mg qhs, can inc as tolerated/needed.  Sleep study as scheduled  IFG -relatively stable.  Continue TLC  Anticoagulation - continue warfarin and f/u with anticoag clinic  Other issues  - Hypothyroidism - seems under reasonable control per TSH; continue current thyroid replection; otherwise defer management to PCP    Studies Ordered at Todays Visit: echo  Blood Work Ordered At Today’s visit: cmp, lipids    Follow-Up:   1) Anticoagulation; check INR in 2wks via   2) Vascular medicine in 3 months (call if unable to get Praluent)  3) F/u with cardiology; overdue    RADHA Guerrero.    CC:  TAMERA Hauser Dr.

## 2022-05-17 ENCOUNTER — OFFICE VISIT (OUTPATIENT)
Dept: MEDICAL GROUP | Facility: PHYSICIAN GROUP | Age: 72
End: 2022-05-17
Payer: MEDICARE

## 2022-05-17 ENCOUNTER — TELEPHONE (OUTPATIENT)
Dept: VASCULAR LAB | Facility: MEDICAL CENTER | Age: 72
End: 2022-05-17
Payer: MEDICARE

## 2022-05-17 VITALS
WEIGHT: 215 LBS | SYSTOLIC BLOOD PRESSURE: 130 MMHG | DIASTOLIC BLOOD PRESSURE: 70 MMHG | HEART RATE: 63 BPM | RESPIRATION RATE: 16 BRPM | TEMPERATURE: 98 F | HEIGHT: 61 IN | BODY MASS INDEX: 40.59 KG/M2 | OXYGEN SATURATION: 93 %

## 2022-05-17 DIAGNOSIS — R60.0 LOWER LEG EDEMA: ICD-10-CM

## 2022-05-17 DIAGNOSIS — N18.32 STAGE 3B CHRONIC KIDNEY DISEASE: ICD-10-CM

## 2022-05-17 DIAGNOSIS — E11.9 TYPE 2 DIABETES MELLITUS WITHOUT COMPLICATION, WITHOUT LONG-TERM CURRENT USE OF INSULIN (HCC): ICD-10-CM

## 2022-05-17 DIAGNOSIS — E11.42 DIABETIC POLYNEUROPATHY ASSOCIATED WITH TYPE 2 DIABETES MELLITUS (HCC): ICD-10-CM

## 2022-05-17 DIAGNOSIS — Z00.00 HEALTHCARE MAINTENANCE: ICD-10-CM

## 2022-05-17 PROCEDURE — 99215 OFFICE O/P EST HI 40 MIN: CPT | Performed by: FAMILY MEDICINE

## 2022-05-17 PROCEDURE — RXMED WILLOW AMBULATORY MEDICATION CHARGE: Performed by: NURSE PRACTITIONER

## 2022-05-17 RX ORDER — TRIAMCINOLONE ACETONIDE 1 MG/G
1 CREAM TOPICAL 2 TIMES DAILY
COMMUNITY
End: 2022-08-18

## 2022-05-17 ASSESSMENT — FIBROSIS 4 INDEX: FIB4 SCORE: 1.95

## 2022-05-17 NOTE — PROGRESS NOTES
Subjective:     CC:   Chief Complaint   Patient presents with   • Lab Results     Blood Work        HPI:   Yenifer presents today with lab work follow up.  She notes that she had a follow-up appointment with vascular yesterday and she is going to be restarting her Praluent injections every 14 days for her elevated cholesterol.    Stage 3b chronic kidney disease (HCC)  Chronic, discussed increasing her hydration and avoiding nephrotoxic medications and NSAIDs.  BP is controlled today in clinic 130/70, she has had a longstanding history of prediabetes however most recent A1c was at 5.3.  GFR (CKD-EPI) >60 mL/min/1.73 m 2 34 Abnormal      Bun 8 - 22 mg/dL 38 High     Creatinine 0.50 - 1.40 mg/dL 1.61 High        DM (diabetes mellitus) (HCC)  Chronic, not on medication management at this time.  She does have chronic polyneuropathy due to her diabetes and her bilateral lower feet and some in her hands she is due for an updated A1c which we will do in a few weeks prior to her next appointment.  She does not check her blood sugars regularly at home.  Denies any symptoms of hypo or hyperglycemia.  Most recent A1c at 5.3.  Over the last 8 years she has been in a prediabetic range.  She does have chronic kidney disease likely secondary to uncontrolled blood sugars in the past.    Neuropathy in diabetes (HCC)  Chronic problem, states she has no sensations in her feet bilaterally and has had neuropathy for many years.  States she had an EMG done with her neurologist which could not find a reading.  She states that she takes tramadol 100 mg 3 times a day for her neuropathy.  She is not currently on gabapentin or Lyrica for her nerve pain    Lower leg edema  Chronic, she continues of chronic chronic lower leg swelling, in clinic today she does have some blistering scattered over her lower legs due to an increase in swelling.  She states that she has been not very good over the last few days regarding her sodium intake and has  noticed her swelling has worsened.  She continues to have ongoing neuropathy in lower extremities due to her past history of uncontrolled blood sugars.  She continues to be followed by cardiology and vascular.  Current medication regimen is torsemide 20 mg, half tab daily.  She could benefit from an increased dose however her kidney function cannot tolerate increased dose of medication at this time.  Discussed continuing to try to increase her fluid intake, currently only drinking 16 ounces daily and lowering her sodium intake.      Current Outpatient Medications Ordered in Epic   Medication Sig Dispense Refill   • triamcinolone acetonide (KENALOG) 0.1 % Cream Apply 1 Application topically 2 times a day. 1 application daily 14 days on and 14 days off     • Alirocumab (PRALUENT) 150 MG/ML Solution Auto-injector Inject 150 mg under the skin every 14 days. 6 mL 3   • levothyroxine (SYNTHROID) 125 MCG Tab Take 1 Tablet by mouth every morning on an empty stomach. 90 Tablet 0   • Eflornithine HCl 13.9 % Cream AAA twice day, leave on for at least 4 hour before washing off 45 g 1   • warfarin (COUMADIN) 4 MG Tab Take one tablet by mouth daily or as directed by anticoagulation clinic 100 Tablet 1   • doxazosin (CARDURA) 2 MG Tab Take 1 Tablet by mouth every day. Needs appointment for future refills 90 Tablet 0   • amiodarone (CORDARONE) 200 MG Tab TAKE 1 TABLET BY MOUTH EVERY  Tablet 2   • ezetimibe (ZETIA) 10 MG Tab TAKE 1 TABLET BY MOUTH EVERY DAY 90 Tablet 3   • diclofenac sodium (VOLTAREN) 1 % Gel Apply  topically 4 times a day as needed.     • torsemide (DEMADEX) 20 MG Tab Take 0.5 Tablets by mouth every day. 90 Tablet 3   • vitamin D (CHOLECALCIFEROL) 1000 UNIT TABS Take 1,000 Units by mouth every day.       • docosahexanoic acid (OMEGA 3 FA) 1000 MG CAPS Take 1,000 mg by mouth 2 Times a Day.     • tramadol (ULTRAM) 50 MG TABS Take 2 Tabs by mouth 3 times a day. 180 Each 0     No current Epic-ordered  "facility-administered medications on file.       Health Maintenance: Completed    ROS:  Gen: no fevers/chills, no changes in weight  Eyes: no changes in vision  ENT: no sore throat, no hearing loss, no bloody nose  Pulm: no sob, no cough  CV: no chest pain, no palpitations  GI: no nausea/vomiting, no diarrhea  : no dysuria  MSk: no myalgias  Skin: no rash, + bilateral lower extremity edema with blistering  Neuro: no headaches, no numbness/tingling  Heme/Lymph: no easy bruising      Objective:     Exam:  /70 (BP Location: Left arm, Patient Position: Sitting, BP Cuff Size: Adult)   Pulse 63   Temp 36.7 °C (98 °F) (Temporal)   Resp 16   Ht 1.549 m (5' 1\")   Wt 97.5 kg (215 lb)   SpO2 93%   BMI 40.62 kg/m²  Body mass index is 40.62 kg/m².    Gen: Alert and oriented, No apparent distress.  Neck: Neck is supple without lymphadenopathy.  Lungs: Normal effort, CTA bilaterally, no wheezes, rhonchi, or rales  CV: Regular rate and rhythm. + murmurs/mechanical click, rubs, or gallops.  Ext: No clubbing, cyanosis, + 3+ pitting edema, small areas with hemosiderin staining as well as small blistering due to increased edema, no signs or symptoms of infection.    A chaperone was offered to the patient during today's exam. Patient declined chaperone.      Assessment & Plan:     71 y.o. female with the following -     1. Healthcare maintenance  - Referral to Genetic Research Studies    2. Stage 3b chronic kidney disease (HCC)  Chronic, continue to monitor.  If kidney function continues to decline she will consider referral to nephrology.  3. Type 2 diabetes mellitus without complication, without long-term current use of insulin (HCC)  Chronic, A1c in clinic today is within a normal range at 5.3.  She is currently not on medication management at this time.  4. Diabetic polyneuropathy associated with type 2 diabetes mellitus (HCC)  Chronic, continues to take tramadol 100 mg 3 times daily for her neuropathy.  5. Lower leg " edema  Chronic, discussed continuing with leg elevation, sodium restriction 2 g or less daily, encouraged her to wear compression stockings.  Is continuing to be followed by cardiology and vascular and will continue to monitor.  Other orders  - triamcinolone acetonide (KENALOG) 0.1 % Cream; Apply 1 Application topically 2 times a day. 1 application daily 14 days on and 14 days off       I spent a total of 45 minutes with record review, exam, communication with the patient, communication with other providers, and documentation of this encounter.      Return in about 6 months (around 11/17/2022) for a1c in clinic , AW[facility].    Please note that this dictation was created using voice recognition software. I have made every reasonable attempt to correct obvious errors, but I expect that there are errors of grammar and possibly content that I did not discover before finalizing the note.

## 2022-05-17 NOTE — ASSESSMENT & PLAN NOTE
Chronic, not on medication management at this time.  She does have chronic polyneuropathy due to her diabetes and her bilateral lower feet and some in her hands she is due for an updated A1c which we will do in a few weeks prior to her next appointment.  She does not check her blood sugars regularly at home.  Denies any symptoms of hypo or hyperglycemia.  Most recent A1c at 5.3.  Over the last 8 years she has been in a prediabetic range.  She does have chronic kidney disease likely secondary to uncontrolled blood sugars in the past.

## 2022-05-17 NOTE — ASSESSMENT & PLAN NOTE
Chronic, she continues of chronic chronic lower leg swelling, in clinic today she does have some blistering scattered over her lower legs due to an increase in swelling.  She states that she has been not very good over the last few days regarding her sodium intake and has noticed her swelling has worsened.  She continues to have ongoing neuropathy in lower extremities due to her past history of uncontrolled blood sugars.  She continues to be followed by cardiology and vascular.  Current medication regimen is torsemide 20 mg, half tab daily.  She could benefit from an increased dose however her kidney function cannot tolerate increased dose of medication at this time.  Discussed continuing to try to increase her fluid intake, currently only drinking 16 ounces daily and lowering her sodium intake.

## 2022-05-17 NOTE — ASSESSMENT & PLAN NOTE
Chronic, discussed increasing her hydration and avoiding nephrotoxic medications and NSAIDs.  BP is controlled today in clinic 130/70, she has had a longstanding history of prediabetes however most recent A1c was at 5.3.  GFR (CKD-EPI) >60 mL/min/1.73 m 2 34 Abnormal      Bun 8 - 22 mg/dL 38 High     Creatinine 0.50 - 1.40 mg/dL 1.61 High

## 2022-05-17 NOTE — TELEPHONE ENCOUNTER
Contacted patient at [phone] to discuss [facility] and services/benefits offered. No answer, left voicemail.      Socorro Brady  Rx Coordinator   [phone]

## 2022-05-17 NOTE — PATIENT INSTRUCTIONS
Low sodium diet , less than 2 G a day or 2000 mg     Over the counter Terbinafine cream- antifungal

## 2022-05-18 ENCOUNTER — PHARMACY VISIT (OUTPATIENT)
Dept: PHARMACY | Facility: MEDICAL CENTER | Age: 72
End: 2022-05-18
Payer: MEDICARE

## 2022-05-18 ENCOUNTER — DOCUMENTATION (OUTPATIENT)
Dept: PHARMACY | Facility: MEDICAL CENTER | Age: 72
End: 2022-05-18
Payer: MEDICARE

## 2022-05-18 NOTE — PROGRESS NOTES
PHARMACIST PRE SCREEN - **New Onboarding**     List Drug Allergies: aspirin; atorvastatin; cymbalta; HMG-COA-R inhibitors; NSAIDs; tricor; trilipix; lyrica  List Non-Drug Allergies: latex  List ICD-10: E78.5  List Diagnosis: Hyperlipidemia, unspecified hyperlipidemia type  List Goals of Therapy:   • Achieve goal LDL levels (< 70 mg/dL) to reduce risk of cardiovascular events (secondary)   • Implement lifestyle modifications: diet, exercise, weight loss, and smoking cessation  Drug Therapy (name/formulation/dose/route of admin/freq): Alirocumab (PRALUENT) 150 MG/ML Solution Auto-injector, 1 pen SC Q 14 days   • Appropriateness Review (Y/N + If being prescribed off label, notate supporting reference): Y    • Dose appropriateness (Y/N + BSA, height/weight if applicable): Y    • Any Renal/Hepatic adjustments needed (Y/N + explain)? N    • Comorbidity and Past Medical History reviewed in EMR. Any comorbidities, PMH, precautions, or contraindications that pose medication safety concern (Y/N + document and reach out to provider if appropriate)? N    • Any relevant lab work needed that affect the initial start date (Y/N + document and reach out to provider if appropriate)?  N   • EMR med list reviewed. List DDI’s:    ? Cat D warfarin + amiodarone = may lead ti increased risk for bleeding, cotn to monitor.     ? Cat C amiodarone + doxazosin = hypotensive effects, cont to monitor and  on fall risk prevention.    • Patient’s ability to self-administer medication: No issues identified per EMR    **Patient pre-emptively opt out of clinical services. **

## 2022-05-21 ENCOUNTER — SLEEP STUDY (OUTPATIENT)
Dept: SLEEP MEDICINE | Facility: MEDICAL CENTER | Age: 72
End: 2022-05-21
Attending: STUDENT IN AN ORGANIZED HEALTH CARE EDUCATION/TRAINING PROGRAM
Payer: MEDICARE

## 2022-05-21 DIAGNOSIS — G47.33 OSA (OBSTRUCTIVE SLEEP APNEA): ICD-10-CM

## 2022-05-21 PROCEDURE — 95811 POLYSOM 6/>YRS CPAP 4/> PARM: CPT | Performed by: STUDENT IN AN ORGANIZED HEALTH CARE EDUCATION/TRAINING PROGRAM

## 2022-05-23 ENCOUNTER — HOSPITAL ENCOUNTER (OUTPATIENT)
Dept: RADIOLOGY | Facility: MEDICAL CENTER | Age: 72
End: 2022-05-23
Attending: FAMILY MEDICINE
Payer: MEDICARE

## 2022-05-23 ENCOUNTER — DOCUMENTATION (OUTPATIENT)
Dept: VASCULAR LAB | Facility: MEDICAL CENTER | Age: 72
End: 2022-05-23
Payer: MEDICARE

## 2022-05-23 DIAGNOSIS — Z12.31 ENCOUNTER FOR SCREENING MAMMOGRAM FOR MALIGNANT NEOPLASM OF BREAST: ICD-10-CM

## 2022-05-23 PROCEDURE — 77063 BREAST TOMOSYNTHESIS BI: CPT

## 2022-05-23 NOTE — PROGRESS NOTES
Spoke with Edson regarding patients Praluent. Medication was delivered on 5/17/22 with a $0 co pay.

## 2022-05-24 NOTE — PROCEDURES
MONTAGE: Standard  STUDY TYPE: Treatment    RECORDING TECHNIQUE:   After the scalp was prepared, gold plated electrodes were applied to the scalp according to the International 10-20 System. EEG (electroencephalogram) was continuously monitored from the O1-M2, O2-M1, C3-M2, C4-M1, F3-M2, and F4-M1. EOGs (electrooculograms) were monitored by electrodes placed at the left and right outer canthi. Chin EMG (electromyogram) was monitored by electrodes placed on the mentalis and sub-mentalis muscles. Nasal and oral airflow were monitored using a triple port thermocouple as well as oronasal pressure transducer. Respiratory effort was measured by inductive plethysmography technology employing abdominal and thoracic belts. Blood oxygen saturation and pulse were monitored by pulse oximetry. Heart rhythm was monitored by surface electrocardiogram. Leg EMG was studied using surface electrodes placed on left and right anterior tibialis. A microphone was used to monitor tracheal sounds and snoring. Body position was monitored and documented by technician observation.     SCORING CRITERIA:   A modification of the AASM manual for scoring of sleep and associated events was used. Obstructive apneas were scored by cessation of airflow for at least 10 seconds with continuing respiratory effort. Central apneas were scored by cessation of airflow for at least 10 seconds with no respiratory effort. Hypopneas were scored by a 30% or more reduction in airflow for at least 10 seconds accompanied by arterial oxygen desaturation of 3% or an arousal. For CMS (Medicare) patients, per AASM rule 1B, hypopneas are scored by 30% with mild reduction in airflow for at least 10 seconds accompanied by arterial saturation decreased at 4%.    Study start time was 11:05:01 PM. Diagnostic recording time was 7h 33.5m with a total sleep time of 4h 27.0m resulting in a sleep efficiency of 58.88%%. Sleep latency from the start of the study was 41 minutes and  the latency from sleep to REM was 340 minutes. In total,109 arousals were scored for an arousal index of 24.5.    Respiratory:  There were a total of 2 apneas consisting of 1 obstructive apneas, 0 mixed apneas, and 1 central apneas. A total of 74 hypopneas were scored. The apnea index was 0.45 per hour and the hypopnea index was 16.63 per hour resulting in an overall AHI of 17.08. AHI during rem was 6.0 and AHI while supine was 17.08.  Oximetry:  There was a mean oxygen saturation of 94.0%. The minimum oxygen saturation in NREM was 82.0 % and in REM was 88.0%. The patient spent 2.3 minutes of TST with SaO2 <88%.  Cardiac:  The highest heart rate seen while awake was 79 BPM while the highest heart rate during sleep was 53 BPM with an average sleeping heart rate of 42 BPM.  Limb Movements:  There were a total of 10 PLMs during sleep which resulted in a PLMS index of 2.2. Of these, 6 were associated with arousals which resulted in a PLMS arousal index of 1.3.  Titration:   CPAP was tried from 5 to 15cm H2O. BiPAP was tried at 18/14cm H2O.  This was a fully attended sleep study. This test was technically adequate. This patient was titrated on CPAP starting at 5 cm of water pressure. Patient was titrated up to BiPAP 18/14 cm of water pressure. Patient did best at 11 cm of water pressure. Patient spent 34 minutes at that pressure and their AHI was 1.7 which is considered treated obstructive sleep apnea.     Impression:  1.  Mild sleep apnea seen on previous home sleep study with significant nocturnal hypoxia.  2.  The study showed improvement in respiratory events with CPAP therapy and oxygen saturation level remaining above 90% majority of the night  3.  Responded well to CPAP therapy    Recommendations:  I recommend auto CPAP of 7-11 cm with N 30 mask.     I also recommend 30 day compliance download to assess the efficacy to the recommended pressure, measure leak, apnea hypopnea index and compliance for further  outpatient monitoring and management of CPAP therapy. In some cases alternative treatment options may be proven effective in resolving sleep apnea. These options include upper airway surgery, the use of a dental orthotic, weight loss orpositional therapy. Clinical correlation is required. In general patients with sleep apnea are advised to avoid alcohol, sedatives and not to operate a motor vehicle while drowsy.  Untreated sleep apnea increases the risk for cardiovascular and neurovascular disease.

## 2022-05-26 LAB — INR PPP: 2.6 (ref 2–3.5)

## 2022-05-27 ENCOUNTER — ANTICOAGULATION MONITORING (OUTPATIENT)
Dept: VASCULAR LAB | Facility: MEDICAL CENTER | Age: 72
End: 2022-05-27
Payer: MEDICARE

## 2022-05-27 DIAGNOSIS — I48.91 ATRIAL FIBRILLATION, UNSPECIFIED TYPE (HCC): ICD-10-CM

## 2022-05-27 DIAGNOSIS — Z95.2 HX OF MECHANICAL AORTIC VALVE REPLACEMENT: ICD-10-CM

## 2022-05-27 DIAGNOSIS — G45.9 TIA (TRANSIENT ISCHEMIC ATTACK): ICD-10-CM

## 2022-05-27 NOTE — PROGRESS NOTES
Anticoagulation Summary  As of 2022    INR goal:  2.5-3.5   TTR:  61.9 % (5.2 y)   INR used for dosin.60 (2022)   Warfarin maintenance plan:  4 mg (4 mg x 1) every day   Weekly warfarin total:  28 mg   Plan last modified:  Emily MaradiagaD (2021)   Next INR check:  6/10/2022   Target end date:  Indefinite    Indications    Chronic anticoagulation (Resolved) [Z79.01]  Hx of mechanical aortic valve replacement [V43.3] [Z95.2]  TIA (transient ischemic attack) [G45.9]  Atrial fibrillation (HCC) [I48.91] [I48.91]             Anticoagulation Episode Summary     INR check location:      Preferred lab:      Send INR reminders to:      Comments:  [facility] home meter      Anticoagulation Care Providers     Provider Role Specialty Phone number    Michael J Bloch, M.D. Referring Internal Medicine [phone]    Yinka Church, PharmD Responsible Pharmacy         Anticoagulation Patient Findings     Spoke to the patient on the phone. Patient denies any signs of bleeding or bruising. Patient's INR is therapeutic at  2.6. No recent changes in diet or medications. Patient instructed to continue the current warfarin dosing as shown above. Follow-up in 2 weeks.    Connie Irby, Pharm.D, BC-ACP, BC-ADM

## 2022-05-31 ENCOUNTER — OFFICE VISIT (OUTPATIENT)
Dept: SLEEP MEDICINE | Facility: MEDICAL CENTER | Age: 72
End: 2022-05-31
Payer: MEDICARE

## 2022-05-31 VITALS
WEIGHT: 219 LBS | RESPIRATION RATE: 16 BRPM | HEIGHT: 61 IN | BODY MASS INDEX: 41.35 KG/M2 | SYSTOLIC BLOOD PRESSURE: 152 MMHG | DIASTOLIC BLOOD PRESSURE: 74 MMHG | HEART RATE: 70 BPM | OXYGEN SATURATION: 95 %

## 2022-05-31 DIAGNOSIS — G47.33 OSA (OBSTRUCTIVE SLEEP APNEA): Primary | ICD-10-CM

## 2022-05-31 PROCEDURE — 99213 OFFICE O/P EST LOW 20 MIN: CPT | Performed by: STUDENT IN AN ORGANIZED HEALTH CARE EDUCATION/TRAINING PROGRAM

## 2022-05-31 ASSESSMENT — FIBROSIS 4 INDEX: FIB4 SCORE: 1.95

## 2022-05-31 NOTE — PROGRESS NOTES
"[facility] Follow-up Visit    CC: Follow-up to discuss sleep study results      HPI:  Yenifer Beasley is a 71 y.o.female  with GERD, atrial fibrillation, hypertension, palpitations, chronic back pain, HFpEF, status post aortic valve replacement, migraines, hypothyroidism and obstructive sleep apnea.  Presents today to discuss recent PSG titration study results.    She reports neither sleep study went \"okay\".  She did have trouble with it being cold at times.  She ended up finding the nasal mask which she tried nursing home through the study to be more comfortable than the other mask she tried.  She did have to use a nasal mask with a chinstrap.    She reports her previous CPAP she believes was set to around 10 at times she felt like this was too much.  However the night of the sleep study she did not find it to be too interfering to her sleep.  She is open to restarting CPAP therapy.      Sleep History  PSG 7/27/2013 showed AHI of 22.5, minimum O2 sat of 83 and time below 89% saturation of 28 minutes.  Following diagnosis she was placed on CPAP which she used for almost a year.  2/1/2021 HST showed mild obstructive sleep apnea with overall AHI of 12.3 events per hour.  Of significance it did note that there were 354 minutes where oxygen saturation was below 89%    Patient Active Problem List    Diagnosis Date Noted   • Lower leg edema 05/17/2022   • Hypercoagulable state (HCC) 05/02/2022   • Stage 3b chronic kidney disease ([facility]) 04/12/2022   • Asthma 10/17/2019   • Atrial fibrillation ([facility]) [I48.91] 07/18/2019   • Transient cerebral ischemia 09/08/2015   • Hx of mechanical aortic valve replacement [V43.3] 05/05/2015   • Long term current use of anticoagulant therapy 05/05/2015   • Supratherapeutic INR 04/23/2014   • DM (diabetes mellitus) ([facility]) 04/23/2014   • Elevated LFTs 04/23/2014   • Anemia 04/23/2014   • Coagulopathy ([facility]) 04/22/2014   • Palpitations 02/28/2013   • HTN (hypertension) 02/17/2011   • Hot " "flashes, menopausal 02/17/2011   • Tricuspid regurgitation mild- mod 01/06/2011   • Vitamin d deficiency 01/06/2011   • Diabetic neuropathy (HCC) 01/06/2011   • Dyslipidemia 11/30/2010   • Arthritis    • Gastritis    • Migraine    • Hypothyroidism    • CAD (coronary artery disease)    • S/P aortic valve replacement    • Hyperlipidemia    • Neuropathy in diabetes ([facility])    • HILDA (obstructive sleep apnea)    • GERD (gastroesophageal reflux disease)    • TIA (transient ischemic attack)        Past Medical History:   Diagnosis Date   • Allergic rhinitis    • Anticoagulation monitoring, special range    • Arthritis     OA OF HANDS, KNEESM, HIPS AND BACK   • Asthma 10/17/2019    childhood asthma   • Atrial fibrillation ([facility])    • Back pain    • CAD (coronary artery disease) 2007    CABG   • Chest pain at rest 11/30/2010   • Chronic anticoagulation 4/23/2018   • Constipation    • Coronary heart disease    • Diabetes    • Diabetes     DIAB FEET EXAM:  1/6/11   • Diabetic neuropathy ([facility]) 1/6/2011   • Difficulty breathing    • Fall     \"a couple years ago because of my neuopathy\" multiple falls   • Gasping for breath    • Gastritis 7/10    H/O UGIB   • GERD (gastroesophageal reflux disease)    • Ma[place] measles    • GI bleed    • Hyperlipidemia    • Hypertension    • Hyponatremia 4/22/2014   • Hypothyroidism    • Impaired fasting glucose 10/8/2019   • Infectious disease     History of MRSA   • Migraine    • Need for influenza vaccination 11/30/2010    ICD-10 transition   • Neuropathy in diabetes ([facility])    • HILDA (obstructive sleep apnea)    • Painful joint    • Palpitations    • Pneumonia    • Pneumonia due to organism 4/22/2014    Diagnois update 10/1/2016   • Pulmonary hypertension ([facility])    • Rash    • S/P aortic valve replacement 2007   • Shortness of breath    • Skin infection 5/11/2018   • Sleep apnea    • Swelling of lower extremity    • TIA (transient ischemic attack) 2005   • Tonsillitis    • Tricuspid regurgitation " mild- mod 2011   • Unspecified hemorrhagic conditions     Bleeds easily-GI bleeds with NSAIDS and ASA   • Upper GI bleed ON 2011   • Upper GI bleed ON 2011   • URTI (acute upper respiratory infection) 2010   • UTI (urinary tract infection) 2014   • Valvular heart disease    • Weakness    • Wears glasses         Past Surgical History:   Procedure Laterality Date   • MITRAL VALVE REPLACE      AORTIC VALVE   • SHOULDER SURGERY  2001    NEER DECOMOPRESSION LEFT SHOULDER   • CARPAL TUNNEL RELEASE      B/L   • TONSILLECTOMY      PARTIAL PALATOPLASTY   • ABDOMINAL HYSTERECTOMY TOTAL      TAHBSOO   • AORTIC VALVE REPLACEMENT     • CHOLECYSTECTOMY     • CORONARY ARTERY BYPASS, 1      Triple Bypass   • HERNIA REPAIR      VENTRAL HERNIA REPAIR   • HYSTERECTOMY LAPAROSCOPY     • INTUBATION     • PRIMARY C SECTION     • SLEEVE,MEJIA VASO THIGH     • UVULOPHARYNGOPALATOPLASTY     • VENTILATOR CONTINUOUS         Family History   Problem Relation Age of Onset   • Heart Disease Mother         a fib   • Cancer Mother         Cervical: palate:     • Heart Disease Brother         a fib   • Cancer Maternal Grandmother         Leukemia,  21 y.o. 1932   • Cancer Maternal Aunt         Lung CA (+3 dtrs  from [place])       Social History     Socioeconomic History   • Marital status:      Spouse name: Not on file   • Number of children: Not on file   • Years of education: Not on file   • Highest education level: Not on file   Occupational History   • Not on file   Tobacco Use   • Smoking status: Never Smoker   • Smokeless tobacco: Never Used   Vaping Use   • Vaping Use: Never used   Substance and Sexual Activity   • Alcohol use: No   • Drug use: Never   • Sexual activity: Not on file   Other Topics Concern   • Not on file   Social History Narrative   • Not on file     Social Determinants of Health     Financial Resource Strain: Not on file   Food  Insecurity: Not on file   Transportation Needs: Not on file   Physical Activity: Not on file   Stress: Not on file   Social Connections: Not on file   Intimate Partner Violence: Not on file   Housing Stability: Not on file       Current Outpatient Medications   Medication Sig Dispense Refill   • doxazosin (CARDURA) 2 MG Tab TAKE 1 TABLET BY MOUTH EVERY DAY. NEEDS APPOINTMENT FOR FUTURE REFILLS 30 Tablet 0   • triamcinolone acetonide (KENALOG) 0.1 % Cream Apply 1 Application topically 2 times a day. 1 application daily 14 days on and 14 days off     • Alirocumab (PRALUENT) 150 MG/ML Solution Auto-injector Inject 150 mg under the skin every 14 days. 6 mL 3   • levothyroxine (SYNTHROID) 125 MCG Tab Take 1 Tablet by mouth every morning on an empty stomach. 90 Tablet 0   • Eflornithine HCl 13.9 % Cream AAA twice day, leave on for at least 4 hour before washing off 45 g 1   • warfarin (COUMADIN) 4 MG Tab Take one tablet by mouth daily or as directed by anticoagulation clinic 100 Tablet 1   • amiodarone (CORDARONE) 200 MG Tab TAKE 1 TABLET BY MOUTH EVERY  Tablet 2   • ezetimibe (ZETIA) 10 MG Tab TAKE 1 TABLET BY MOUTH EVERY DAY 90 Tablet 3   • diclofenac sodium (VOLTAREN) 1 % Gel Apply  topically 4 times a day as needed.     • torsemide (DEMADEX) 20 MG Tab Take 0.5 Tablets by mouth every day. 90 Tablet 3   • vitamin D (CHOLECALCIFEROL) 1000 UNIT TABS Take 1,000 Units by mouth every day.       • docosahexanoic acid (OMEGA 3 FA) 1000 MG CAPS Take 1,000 mg by mouth 2 Times a Day.     • tramadol (ULTRAM) 50 MG TABS Take 2 Tabs by mouth 3 times a day. 180 Each 0     No current facility-administered medications for this visit.        ALLERGIES: Asa [aspirin], Atorvastatin, Cymbalta [duloxetine hcl], Hmg-coa-r inhibitors, Latex, Nsaids, Tricor, Trilipix [choline fenofibrate], and Lyrica [pregabalin]    ROS  Constitutional: Denies fevers, Denies weight changes  Ears/Nose/Throat/Mouth: Denies nasal congestion or sore throat  "  Cardiovascular: Denies chest pain  Respiratory: Denies shortness of breath, Denies cough  Gastrointestinal/Hepatic: Denies nausea, vomiting  Sleep: see HPI      PHYSICAL EXAM  BP (!) 152/74 (BP Location: Left arm, Patient Position: Sitting, BP Cuff Size: Adult)   Pulse 70   Resp 16   Ht 1.549 m (5' 1\")   Wt 99.3 kg (219 lb)   SpO2 95%   BMI 41.38 kg/m²   Appearance: Well-nourished, well-developed, no acute distress  Eyes:  No scleral icterus , EOMI  ENMT: masked  Musculoskeletal:  Grossly normal; gait and station normal; digits and nails normal  Skin:  No rashes, petechiae, cyanosis  Neurologic: without focal signs; oriented to person, time, place, and purpose; judgement intact      Medical Decision Making   Assessment and Plan  Yenifer Beasley is a 71 y.o.female  with GERD, atrial fibrillation, hypertension, palpitations, chronic back pain, HFpEF, status post aortic valve replacement, migraines, hypothyroidism and obstructive sleep apnea.  Presents today to discuss recent PSG titration study results.    The medical record was reviewed.    Obstructive sleep apnea   Reviewed recent PSG titration study with patient showing an an improvement in AHI with Pap therapy.  Oxygenation also improved with Pap therapy.  Based off of previous sleep study she meets criteria for mild obstructive sleep apnea.      We discussed the pathophysiology of obstructive sleep apnea (HILDA) and risk factors for the disease. We also discussed possible consequences of untreated HILDA, including excessive daytime sleepiness and fatigue, cognitive dysfunction, cardiovascular complications such as elevated blood pressure, heart attacks, cardiac arrhythmias, and strokes. We discussed how HILDA typically gets worse with age. We discussed treatment options for HILDA, including the gold standard therapy (PAP), alternative options such as a mandibular advancement device (custom-made oral appliances) and surgeries. We will proceed CPAP therapy. "     RECOMMENDATIONS  -Start Auto CPAP at pressures 7-11 cm H2O  -Discussed importance of adherence/compliance   -Prescription generated for supplies   -Patient counseled to avoid driving when sleepy. Encouraged to anticipate sleepiness, consider taking a 10 min nap prior to driving, alternate with another , or pull over if sleepy while driving  -Advised to contact our office or myself with any questions via [employer]    Positive airway pressure will favorably impact many of the adverse conditions and effects provoked by HILDA.    Have advised the patient to follow up with the appropriate healthcare practitioners for all other medical problems and issues.    Return for 1-2 months after starting CPAP .      Please note portions of this record was created using voice recognition software. I have made every reasonable attempt to correct obvious errors, but I expect that there are errors of grammar and possibly content I did not discover before finalizing the note.

## 2022-06-15 NOTE — PROGRESS NOTES
Anticoagulation Summary  As of 2022    INR goal:  2.5-3.5   TTR:  61.9 % (5.2 y)   INR used for dosin.60 (2022)   Warfarin maintenance plan:  4 mg (4 mg x 1) every day   Weekly warfarin total:  28 mg   Plan last modified:  Sybil Adkins, PharmD (2021)   Next INR check:     Target end date:  Indefinite    Indications    Chronic anticoagulation (Resolved) [Z79.01]  Hx of mechanical aortic valve replacement [V43.3] [Z95.2]  TIA (transient ischemic attack) [G45.9]  Atrial fibrillation (HCC) [I48.91] [I48.91]             Anticoagulation Episode Summary     INR check location:      Preferred lab:      Send INR reminders to:      Comments:  [employer] home meter      Anticoagulation Care Providers     Provider Role Specialty Phone number    Michael J Bloch, M.D. Referring Internal Medicine [phone]    Yinka Church, PharmD Responsible Pharmacy         Anticoagulation Patient Findings     Left voicemail message to report a therapeutic INR of 2.60.  Pt to continue with current warfarin dosing regimen. Requested pt contact the clinic for any s/s of unusual bleeding, bruising, clotting or any changes to diet or medication. FU 2 weeks.    Juan David Tang, Pharmacy Intern               Abdominal Pain, N/V/D

## 2022-06-20 ENCOUNTER — ANTICOAGULATION MONITORING (OUTPATIENT)
Dept: VASCULAR LAB | Facility: MEDICAL CENTER | Age: 72
End: 2022-06-20
Payer: MEDICARE

## 2022-06-20 DIAGNOSIS — Z95.2 HX OF MECHANICAL AORTIC VALVE REPLACEMENT: ICD-10-CM

## 2022-06-20 DIAGNOSIS — G45.9 TIA (TRANSIENT ISCHEMIC ATTACK): ICD-10-CM

## 2022-06-20 LAB — INR PPP: 3 (ref 2–3.5)

## 2022-06-20 NOTE — PROGRESS NOTES
Anticoagulation Summary  As of 6/20/2022    INR goal:  2.5-3.5   TTR:  62.3 % (5.3 y)   INR used for dosing:  3.00 (6/20/2022)   Warfarin maintenance plan:  4 mg (4 mg x 1) every day   Weekly warfarin total:  28 mg   Plan last modified:  Sybil Adkins, PharmD (7/26/2021)   Next INR check:  7/4/2022   Target end date:  Indefinite    Indications    Chronic anticoagulation (Resolved) [Z79.01]  Hx of mechanical aortic valve replacement [V43.3] [Z95.2]  TIA (transient ischemic attack) [G45.9]  Atrial fibrillation (HCC) [I48.91] [I48.91]             Anticoagulation Episode Summary     INR check location:      Preferred lab:      Send INR reminders to:      Comments:  [employer] home meter      Anticoagulation Care Providers     Provider Role Specialty Phone number    Michael J Bloch, M.D. Referring Internal Medicine [phone]    Yinka Church, PharmD Responsible Pharmacy         Anticoagulation Patient Findings          HPI:  Yenifer Beasley, on anticoagulation therapy with warfarin for valve replacement.   Changes to current medical/health status since last appt: none  Denies signs/symptoms of bleeding and/or thrombosis since the last appt.    Denies any interval changes to diet  Denies any interval changes to medications since last appt.   Denies any complications or cost restrictions with current therapy.     A/P   INR  therapeutic.   Pt is to continue with current warfarin dosing regimen.     Next INR in 2 week(s).    [place], PharmD

## 2022-06-24 DIAGNOSIS — I10 ESSENTIAL HYPERTENSION: ICD-10-CM

## 2022-06-27 RX ORDER — DOXAZOSIN 2 MG/1
2 TABLET ORAL DAILY
Qty: 30 TABLET | Refills: 1 | Status: SHIPPED | OUTPATIENT
Start: 2022-06-27 | End: 2022-06-29

## 2022-06-28 RX ORDER — LEVOTHYROXINE SODIUM 0.12 MG/1
TABLET ORAL
Qty: 100 TABLET | Refills: 2 | Status: SHIPPED | OUTPATIENT
Start: 2022-06-28 | End: 2023-05-03 | Stop reason: SDUPTHER

## 2022-06-29 DIAGNOSIS — I10 ESSENTIAL HYPERTENSION: ICD-10-CM

## 2022-06-29 RX ORDER — DOXAZOSIN 2 MG/1
2 TABLET ORAL DAILY
Qty: 90 TABLET | Refills: 1 | Status: SHIPPED | OUTPATIENT
Start: 2022-06-29 | End: 2022-12-05 | Stop reason: SDUPTHER

## 2022-07-06 LAB — INR PPP: 2.3 (ref 2–3.5)

## 2022-07-07 ENCOUNTER — ANTICOAGULATION MONITORING (OUTPATIENT)
Dept: VASCULAR LAB | Facility: MEDICAL CENTER | Age: 72
End: 2022-07-07
Payer: MEDICARE

## 2022-07-07 DIAGNOSIS — G45.9 TIA (TRANSIENT ISCHEMIC ATTACK): ICD-10-CM

## 2022-07-07 DIAGNOSIS — Z95.2 HX OF MECHANICAL AORTIC VALVE REPLACEMENT: ICD-10-CM

## 2022-07-07 DIAGNOSIS — I48.91 ATRIAL FIBRILLATION, UNSPECIFIED TYPE (HCC): ICD-10-CM

## 2022-07-07 NOTE — PROGRESS NOTES
Anticoagulation Summary  As of 2022    INR goal:  2.5-3.5   TTR:  62.3 % (5.4 y)   INR used for dosin.30 (2022)   Warfarin maintenance plan:  4 mg (4 mg x 1) every day   Weekly warfarin total:  28 mg   Plan last modified:  Sybil Adkins PharmD (2021)   Next INR check:  2022   Target end date:  Indefinite    Indications    Chronic anticoagulation (Resolved) [Z79.01]  Hx of mechanical aortic valve replacement [V43.3] [Z95.2]  TIA (transient ischemic attack) [G45.9]  Atrial fibrillation (HCC) [I48.91] [I48.91]             Anticoagulation Episode Summary     INR check location:      Preferred lab:      Send INR reminders to:      Comments:  [employer] home meter      Anticoagulation Care Providers     Provider Role Specialty Phone number    Michael J Bloch, M.D. Referring Internal Medicine [phone]    Yinka Church, PharmD Responsible Pharmacy           Refer to Anticoagulation Patient Findings for HPI  Patient Findings     Negatives:  Signs/symptoms of thrombosis, Signs/symptoms of bleeding, Laboratory test error suspected, Change in health, Change in alcohol use, Change in activity, Upcoming invasive procedure, Emergency department visit, Upcoming dental procedure, Missed doses, Extra doses, Change in medications, Change in diet/appetite, Hospital admission, Bruising, Other complaints          Spoke with pt.  INR is subtherapeutic.     Pt verifies warfarin weekly dosing.     Will have pt bolus x 1 day with 6 mg then continue regimen    Repeat INR in 2 week(s).     Sybil Adkins, EmilyD

## 2022-07-24 LAB — INR PPP: 3.5 (ref 2–3.5)

## 2022-07-25 ENCOUNTER — ANTICOAGULATION MONITORING (OUTPATIENT)
Dept: MEDICAL GROUP | Facility: PHYSICIAN GROUP | Age: 72
End: 2022-07-25
Payer: MEDICARE

## 2022-07-25 DIAGNOSIS — I48.91 ATRIAL FIBRILLATION, UNSPECIFIED TYPE (HCC): ICD-10-CM

## 2022-07-25 DIAGNOSIS — Z95.2 HX OF MECHANICAL AORTIC VALVE REPLACEMENT: ICD-10-CM

## 2022-07-25 DIAGNOSIS — G45.9 TIA (TRANSIENT ISCHEMIC ATTACK): ICD-10-CM

## 2022-07-25 NOTE — PROGRESS NOTES
OP Telephone Anticoagulation Service Note    Date: 7/25/2022      Anticoagulation Summary  As of 7/25/2022    INR goal:  2.5-3.5   TTR:  62.5 % (5.4 y)   INR used for dosing:  3.50 (7/24/2022)   Warfarin maintenance plan:  4 mg (4 mg x 1) every day   Weekly warfarin total:  28 mg   Plan last modified:  Sybil Adkins PharmD (7/26/2021)   Next INR check:  8/8/2022   Target end date:  Indefinite    Indications    Chronic anticoagulation (Resolved) [Z79.01]  Hx of mechanical aortic valve replacement [V43.3] [Z95.2]  TIA (transient ischemic attack) [G45.9]  Atrial fibrillation (HCC) [I48.91] [I48.91]             Anticoagulation Episode Summary     INR check location:      Preferred lab:      Send INR reminders to:      Comments:  [employer] home meter      Anticoagulation Care Providers     Provider Role Specialty Phone number    Michael J Bloch, M.D. Referring Internal Medicine [phone]    Emily MartinezD Responsible Pharmacy         Anticoagulation Patient Findings        Plan: Spoke with patient on the phone.   Patient is  therapeutic today.   Confirmed dosing.   Pt is not on antiplatelet agent    Instructed patient to call clinic if any unusual bleeding or bruising occurs.   Will continue dosing as outlined.   Will follow-up with patient in 2 week(s).          Bushra Smith, PharmD

## 2022-07-26 ENCOUNTER — PATIENT MESSAGE (OUTPATIENT)
Dept: MEDICAL GROUP | Facility: PHYSICIAN GROUP | Age: 72
End: 2022-07-26
Payer: MEDICARE

## 2022-07-26 RX ORDER — LISINOPRIL 10 MG/1
10 TABLET ORAL 2 TIMES DAILY
COMMUNITY
End: 2022-07-26

## 2022-07-26 RX ORDER — LISINOPRIL 20 MG/1
20 TABLET ORAL 2 TIMES DAILY
COMMUNITY
End: 2022-07-26 | Stop reason: SDUPTHER

## 2022-07-27 RX ORDER — LISINOPRIL 20 MG/1
20 TABLET ORAL 2 TIMES DAILY
Qty: 200 TABLET | Refills: 3 | Status: SHIPPED | OUTPATIENT
Start: 2022-07-27 | End: 2023-09-01 | Stop reason: SDUPTHER

## 2022-08-08 ENCOUNTER — HOSPITAL ENCOUNTER (OUTPATIENT)
Dept: CARDIOLOGY | Facility: MEDICAL CENTER | Age: 72
End: 2022-08-08
Attending: NURSE PRACTITIONER
Payer: MEDICARE

## 2022-08-08 DIAGNOSIS — I48.91 ATRIAL FIBRILLATION, UNSPECIFIED TYPE (HCC): ICD-10-CM

## 2022-08-08 DIAGNOSIS — Z95.2 HX OF MECHANICAL AORTIC VALVE REPLACEMENT: ICD-10-CM

## 2022-08-08 LAB
LV EJECT FRACT  99904: 75
LV EJECT FRACT MOD 2C 99903: 80.54
LV EJECT FRACT MOD 4C 99902: 70.85
LV EJECT FRACT MOD BP 99901: 75.91

## 2022-08-08 PROCEDURE — 93306 TTE W/DOPPLER COMPLETE: CPT

## 2022-08-08 PROCEDURE — 93306 TTE W/DOPPLER COMPLETE: CPT | Mod: 26 | Performed by: INTERNAL MEDICINE

## 2022-08-09 ENCOUNTER — ANTICOAGULATION MONITORING (OUTPATIENT)
Dept: VASCULAR LAB | Facility: MEDICAL CENTER | Age: 72
End: 2022-08-09
Payer: MEDICARE

## 2022-08-09 ENCOUNTER — DOCUMENTATION (OUTPATIENT)
Dept: VASCULAR LAB | Facility: MEDICAL CENTER | Age: 72
End: 2022-08-09
Payer: MEDICARE

## 2022-08-09 DIAGNOSIS — G45.9 TIA (TRANSIENT ISCHEMIC ATTACK): ICD-10-CM

## 2022-08-09 DIAGNOSIS — Z95.2 HX OF MECHANICAL AORTIC VALVE REPLACEMENT: ICD-10-CM

## 2022-08-09 LAB — INR PPP: 2.5 (ref 2–3.5)

## 2022-08-09 NOTE — PROGRESS NOTES
Echocardiogram reviewed.  Significant valvular heart disease.  She does have a follow-up appointment scheduled with structural heart disease clinic later this week.  Differential for further work-up and management and timing of follow-up echocardiogram to structural heart disease clinic    Michael Bloch, MD  Vascular Medicine

## 2022-08-09 NOTE — PROGRESS NOTES
Anticoagulation Summary  As of 2022    INR goal:  2.5-3.5   TTR:  62.8 % (5.5 y)   INR used for dosin.50 (2022)   Warfarin maintenance plan:  4 mg (4 mg x 1) every day   Weekly warfarin total:  28 mg   Plan last modified:  Sybil Adkins, PharmD (2021)   Next INR check:  2022   Target end date:  Indefinite    Indications    Chronic anticoagulation (Resolved) [Z79.01]  Hx of mechanical aortic valve replacement [V43.3] [Z95.2]  TIA (transient ischemic attack) [G45.9]  Atrial fibrillation (HCC) [I48.91] [I48.91]             Anticoagulation Episode Summary     INR check location:      Preferred lab:      Send INR reminders to:      Comments:  [employer] home meter      Anticoagulation Care Providers     Provider Role Specialty Phone number    Michael J Bloch, M.D. Referring Internal Medicine [phone]    Yinka Church, PharmD Responsible Pharmacy         Anticoagulation Patient Findings          HPI:  Yenifer Beasley, on anticoagulation therapy with warfarin for Hx valve replacement.   Changes to current medical/health status since last appt: none  Denies signs/symptoms of bleeding and/or thrombosis since the last appt.    Denies any interval changes to diet  Denies any interval changes to medications since last appt.   Denies any complications or cost restrictions with current therapy.     A/P   INR  therapeutic.   Pt is to continue with current warfarin dosing regimen.     Next INR in 2 week(s).    [place], PharmD

## 2022-08-11 PROCEDURE — RXMED WILLOW AMBULATORY MEDICATION CHARGE: Performed by: NURSE PRACTITIONER

## 2022-08-18 ENCOUNTER — OFFICE VISIT (OUTPATIENT)
Dept: CARDIOLOGY | Facility: MEDICAL CENTER | Age: 72
End: 2022-08-18
Payer: MEDICARE

## 2022-08-18 ENCOUNTER — TELEPHONE (OUTPATIENT)
Dept: CARDIOLOGY | Facility: MEDICAL CENTER | Age: 72
End: 2022-08-18

## 2022-08-18 VITALS
SYSTOLIC BLOOD PRESSURE: 150 MMHG | DIASTOLIC BLOOD PRESSURE: 70 MMHG | RESPIRATION RATE: 16 BRPM | BODY MASS INDEX: 39.65 KG/M2 | WEIGHT: 210 LBS | HEIGHT: 61 IN | OXYGEN SATURATION: 97 % | HEART RATE: 56 BPM

## 2022-08-18 DIAGNOSIS — I48.19 PERSISTENT ATRIAL FIBRILLATION (HCC): ICD-10-CM

## 2022-08-18 DIAGNOSIS — I35.0 NONRHEUMATIC AORTIC VALVE STENOSIS: ICD-10-CM

## 2022-08-18 LAB — EKG IMPRESSION: NORMAL

## 2022-08-18 PROCEDURE — 99214 OFFICE O/P EST MOD 30 MIN: CPT | Performed by: INTERNAL MEDICINE

## 2022-08-18 PROCEDURE — 93000 ELECTROCARDIOGRAM COMPLETE: CPT | Performed by: INTERNAL MEDICINE

## 2022-08-18 RX ORDER — LATANOPROST 50 UG/ML
1 SOLUTION/ DROPS OPHTHALMIC
COMMUNITY
Start: 2022-06-02 | End: 2022-08-18

## 2022-08-18 RX ORDER — BIMATOPROST 0.1 MG/ML
SOLUTION/ DROPS OPHTHALMIC
COMMUNITY
Start: 2022-08-13 | End: 2022-11-22

## 2022-08-18 RX ORDER — AMOXICILLIN 500 MG/1
CAPSULE ORAL
COMMUNITY
Start: 2022-07-20 | End: 2022-08-18

## 2022-08-18 RX ORDER — TRIAMCINOLONE ACETONIDE 1 MG/G
OINTMENT TOPICAL
COMMUNITY
Start: 2022-07-25 | End: 2024-01-09 | Stop reason: SDUPTHER

## 2022-08-18 ASSESSMENT — FIBROSIS 4 INDEX: FIB4 SCORE: 1.95

## 2022-08-18 NOTE — TELEPHONE ENCOUNTER
FARZANA Nation saw this patient today and ordered:     CL-LEFT HEART CATHETERIZATION WITH POSSIBLE INTERVENTION [[id number]]    Thanks

## 2022-08-18 NOTE — PROGRESS NOTES
"      Cardiology Follow-up Consultation Note    Date of note:    8/18/2022  Primary Care Provider: Marion Jones M.D.    Name:             Yenifer Beasley   YOB: 1950  MRN:               [id number]    CC: Follow-up shortness of breath, mechanical aortic valve replacement, hypertension    Patient ID/HPI:   71-year-old female patient with hypertension, diabetes mellitus, obesity, history of aortic mechanical prosthesis here for follow-up.  Dyspnea on exertion, lower extremity edema has been persistent.  Occasionally develops blisters.      Past Medical History:   Diagnosis Date    Allergic rhinitis     Anticoagulation monitoring, special range     Arthritis     OA OF HANDS, KNEESM, HIPS AND BACK    Asthma 10/17/2019    childhood asthma    Atrial fibrillation (HCC)     Back pain     CAD (coronary artery disease) 2007    CABG    Chest pain at rest 11/30/2010    Chronic anticoagulation 4/23/2018    Constipation     Coronary heart disease     Diabetes     Diabetes     DIAB FEET EXAM:  1/6/11    Diabetic neuropathy (HCC) 1/6/2011    Difficulty breathing     Fall     \"a couple years ago because of my neuopathy\" multiple falls    Gasping for breath     Gastritis 7/10    H/O UGIB    GERD (gastroesophageal reflux disease)     Armenian measles     GI bleed     Hyperlipidemia     Hypertension     Hyponatremia 4/22/2014    Hypothyroidism     Impaired fasting glucose 10/8/2019    Infectious disease     History of MRSA    Migraine     Need for influenza vaccination 11/30/2010    ICD-10 transition    Neuropathy in diabetes (HCC)     HILDA (obstructive sleep apnea)     Painful joint     Palpitations     Pneumonia     Pneumonia due to organism 4/22/2014    Diagnois update 10/1/2016    Pulmonary hypertension (HCC)     Rash     S/P aortic valve replacement 2007    Shortness of breath     Skin infection 5/11/2018    Sleep apnea     Swelling of lower extremity     TIA (transient ischemic attack) 2005    Tonsillitis  "    Tricuspid regurgitation mild- mod 1/6/2011    Unspecified hemorrhagic conditions     Bleeds easily-GI bleeds with NSAIDS and ASA    Upper GI bleed ON JULY 2007 1/6/2011    Upper GI bleed ON JULY 2007 1/6/2011    URTI (acute upper respiratory infection) 11/30/2010    UTI (urinary tract infection) 4/24/2014    Valvular heart disease     Weakness     Wears glasses          Past Surgical History:   Procedure Laterality Date    MITRAL VALVE REPLACE  2007    AORTIC VALVE    SHOULDER SURGERY  6/2001    NEER DECOMOPRESSION LEFT SHOULDER    CARPAL TUNNEL RELEASE  1998    B/L    TONSILLECTOMY  1995    PARTIAL PALATOPLASTY    ABDOMINAL HYSTERECTOMY TOTAL      TAHBSOO    AORTIC VALVE REPLACEMENT      CHOLECYSTECTOMY      CORONARY ARTERY BYPASS, 1      Triple Bypass    HERNIA REPAIR      VENTRAL HERNIA REPAIR    HYSTERECTOMY LAPAROSCOPY      INTUBATION      PRIMARY C SECTION      SLEEVE,MEJIA VASO THIGH      UVULOPHARYNGOPALATOPLASTY      VENTILATOR CONTINUOUS           Current Outpatient Medications   Medication Sig Dispense Refill    LUMIGAN 0.01 % Solution       triamcinolone acetonide (KENALOG) 0.1 % Ointment APPLY TO AFFECTED AREA TWICE A DAY FOR 2-3 WEEKS, THEN CAN USE 2 WEEKS ON, 2 WEEKS OFF AS NEEDED      lisinopril (PRINIVIL) 20 MG Tab Take 1 Tablet by mouth 2 times a day. 200 Tablet 3    doxazosin (CARDURA) 2 MG Tab TAKE 1 TABLET BY MOUTH EVERY DAY. NEEDS APPOINTMENT FOR FUTURE REFILLS 90 Tablet 1    levothyroxine (SYNTHROID) 125 MCG Tab TAKE 1 TABLET BY MOUTH EVERY DAY IN THE MORNING ON AN EMPTY STOMACH 100 Tablet 2    Alirocumab (PRALUENT) 150 MG/ML Solution Auto-injector Inject 150 mg under the skin every 14 days. 6 mL 3    warfarin (COUMADIN) 4 MG Tab Take one tablet by mouth daily or as directed by anticoagulation clinic 100 Tablet 1    amiodarone (CORDARONE) 200 MG Tab TAKE 1 TABLET BY MOUTH EVERY  Tablet 2    ezetimibe (ZETIA) 10 MG Tab TAKE 1 TABLET BY MOUTH EVERY DAY 90 Tablet 3    diclofenac sodium  "(VOLTAREN) 1 % Gel Apply  topically 4 times a day as needed.      torsemide (DEMADEX) 20 MG Tab Take 0.5 Tablets by mouth every day. 90 Tablet 3    vitamin D (CHOLECALCIFEROL) 1000 UNIT TABS Take 1,000 Units by mouth every day.        docosahexanoic acid (OMEGA 3 FA) 1000 MG CAPS Take 1,000 mg by mouth 2 Times a Day.      tramadol (ULTRAM) 50 MG TABS Take 2 Tabs by mouth 3 times a day. 180 Each 0    Eflornithine HCl 13.9 % Cream AAA twice day, leave on for at least 4 hour before washing off 45 g 1     No current facility-administered medications for this visit.         Allergies   Allergen Reactions    Asa [Aspirin]      GI BLEED    Atorvastatin Shortness of Breath    Cymbalta [Duloxetine Hcl] Unspecified     Feels like a \"zombie\"    Hmg-Coa-R Inhibitors     Latex Swelling    Nsaids      GI BLEED    Tricor Shortness of Breath     Breathing problems      Trilipix [Choline Fenofibrate] Shortness of Breath     SOB    Lyrica [Pregabalin]      SPACED OUT         Family History   Problem Relation Age of Onset    Heart Disease Mother         a fib    Cancer Mother         Cervical: palate:      Heart Disease Brother         a fib    Cancer Maternal Grandmother         Leukemia,  21 y.o. 1931    Cancer Maternal Aunt         Lung CA (+3 dtrs  from [place])             Physical Exam:  Ambulatory Vitals  BP (!) 150/70 (BP Location: Left arm, Patient Position: Sitting, BP Cuff Size: Adult)   Pulse (!) 56   Resp 16   Ht 1.549 m (5' 1\")   Wt 95.3 kg (210 lb)   SpO2 97%    Oxygen Therapy:  Pulse Oximetry: 97 %  BP Readings from Last 4 Encounters:   22 (!) 150/70   22 (!) 152/74   22 130/70   22 133/70       Weight/BMI: Body mass index is 39.68 kg/m².  Wt Readings from Last 4 Encounters:   22 95.3 kg (210 lb)   22 99.3 kg (219 lb)   22 97.5 kg (215 lb)   22 98 kg (216 lb)       General: Well appearing and in no apparent distress  Head: atrumatic  Eyes: No " conjunctival pallor   ENT: normal external appearance of nose and ears  Neck: JVD absent, carotid bruits absent  Lungs: respiratory sounds  normal, additional breath sounds absent  Heart: Regular rhythm, 3 x 6 systolic murmur aortic area mechanical aortic valve click.  2+ lower extremity edema      Lab Data Review:  Lab Results   Component Value Date/Time    CHOLSTRLTOT 202 (H) 04/26/2022 10:43 AM     (H) 04/26/2022 10:43 AM    HDL 58 04/26/2022 10:43 AM    TRIGLYCERIDE 85 04/26/2022 10:43 AM       Lab Results   Component Value Date/Time    SODIUM 141 04/26/2022 10:43 AM    POTASSIUM 4.4 04/26/2022 10:43 AM    CHLORIDE 104 04/26/2022 10:43 AM    CO2 24 04/26/2022 10:43 AM    GLUCOSE 100 (H) 04/26/2022 10:43 AM    BUN 38 (H) 04/26/2022 10:43 AM    CREATININE 1.61 (H) 04/26/2022 10:43 AM    CREATININE 1.6 (H) 11/26/2007 12:28 AM     Lab Results   Component Value Date/Time    ALKPHOSPHAT 54 04/26/2022 10:43 AM    ASTSGOT 18 04/26/2022 10:43 AM    ALTSGPT 13 04/26/2022 10:43 AM    TBILIRUBIN 0.4 04/26/2022 10:43 AM      Lab Results   Component Value Date/Time    WBC 7.2 04/26/2022 10:44 AM     Echo 4/4:  Normal left ventricular systolic function.  Left ventricular ejection fraction is visually estimated to be 65-70%.  Mild concentric left ventricular hypertrophy.  Mild mitral stenosis.  Severe mitral annular calcification.  Mild mitral regurgitation.  Bioprosthetic aortic valve with increased velocities.  Right heart pressures are consistent with moderate pulmonary   hypertension.  Severely dilated left atrium.     Echo 3/3/21  CONCLUSIONS  Normal left ventricular chamber size.  Left ventricular ejection fraction is visually estimated to be 65%.  Mild concentric left ventricular hypertrophy.  Dilated inferior vena cava without inspiratory collapse.  Moderate mitral stenosis.  Mean transvalvular gradient is 7 mmHg at a heart rate of 64 BPM.  Structurally normal tricuspid valve without significant stenosis.  Normal  function of mechanical prosthetic valve.     EKG 8/18/2022 shows sinus bradycardia with PACs  Echocardiogram performed on 8/8/2022 reviewed, independently interpreted elevated gradients across the mechanical valve 4 m/s moderate mitral valve stenosis, moderate to severe tricuspid regurgitation     Impression and Plan:  71-year-old female patient with  1.  Shortness of breath, likely secondary to diastolic heart failure, NYHA class II stage C  2.  Atrial fibrillation post cardioversion, maintaining sinus rhythm on amiodarone  3.  Mechanical aortic valve replacement with increased gradients, three-vessel bypass surgery in 2007  4.  Hypertension  5.  Diabetes mellitus    Most recent echocardiogram unfortunately showed increased gradients across the aortic valve.  Recommend fluoroscopic valve cine, coronary angiogram.  If abnormal valve function will refer to CT surgery for consideration for redo aortic, mitral valve replacement.  Did not tolerate higher dose torsemide due to renal insufficiency, continue conservative measures, edema wear compression socks.  Blood pressure is elevated today, will have her check home pressures regularly.  Statin intolerance, continue Zetia.          Caleb PARRA  Interventional cardiologist  [facility], [facility]  [address] [address]  Phone: [phone]  Fax: [phone]

## 2022-08-18 NOTE — TELEPHONE ENCOUNTER
Patient is scheduled on 9-2-22 for a [facility] w/poss with .Patient was told to hold warfarin for 5 days prior and to notify the coumadin clinic she is stopping for procedure. Patient to check in at 6:30 for an 8:30 procedure. H&P was done on 8-18-22 by . Pre admit to call patient. Patient picked this date over a sooner date.

## 2022-08-19 ENCOUNTER — HOSPITAL ENCOUNTER (OUTPATIENT)
Facility: MEDICAL CENTER | Age: 72
End: 2022-08-19
Attending: INTERNAL MEDICINE
Payer: MEDICARE

## 2022-08-19 ENCOUNTER — PHARMACY VISIT (OUTPATIENT)
Dept: PHARMACY | Facility: MEDICAL CENTER | Age: 72
End: 2022-08-19
Payer: MEDICARE

## 2022-08-23 ENCOUNTER — HOSPITAL ENCOUNTER (OUTPATIENT)
Dept: LAB | Facility: MEDICAL CENTER | Age: 72
End: 2022-08-23
Attending: NURSE PRACTITIONER
Payer: MEDICARE

## 2022-08-23 DIAGNOSIS — E78.5 HYPERLIPIDEMIA, UNSPECIFIED HYPERLIPIDEMIA TYPE: ICD-10-CM

## 2022-08-23 DIAGNOSIS — I48.91 ATRIAL FIBRILLATION, UNSPECIFIED TYPE (HCC): ICD-10-CM

## 2022-08-23 LAB
ALBUMIN SERPL BCP-MCNC: 4.4 G/DL (ref 3.2–4.9)
ALBUMIN/GLOB SERPL: 1.8 G/DL
ALP SERPL-CCNC: 50 U/L (ref 30–99)
ALT SERPL-CCNC: 10 U/L (ref 2–50)
ANION GAP SERPL CALC-SCNC: 13 MMOL/L (ref 7–16)
AST SERPL-CCNC: 17 U/L (ref 12–45)
BILIRUB SERPL-MCNC: 0.5 MG/DL (ref 0.1–1.5)
BUN SERPL-MCNC: 29 MG/DL (ref 8–22)
CALCIUM SERPL-MCNC: 9.4 MG/DL (ref 8.5–10.5)
CHLORIDE SERPL-SCNC: 106 MMOL/L (ref 96–112)
CHOLEST SERPL-MCNC: 140 MG/DL (ref 100–199)
CO2 SERPL-SCNC: 23 MMOL/L (ref 20–33)
CREAT SERPL-MCNC: 1.45 MG/DL (ref 0.5–1.4)
FASTING STATUS PATIENT QL REPORTED: NORMAL
GFR SERPLBLD CREATININE-BSD FMLA CKD-EPI: 39 ML/MIN/1.73 M 2
GLOBULIN SER CALC-MCNC: 2.5 G/DL (ref 1.9–3.5)
GLUCOSE SERPL-MCNC: 90 MG/DL (ref 65–99)
HDLC SERPL-MCNC: 69 MG/DL
LDLC SERPL CALC-MCNC: 54 MG/DL
POTASSIUM SERPL-SCNC: 4.2 MMOL/L (ref 3.6–5.5)
PROT SERPL-MCNC: 6.9 G/DL (ref 6–8.2)
SODIUM SERPL-SCNC: 142 MMOL/L (ref 135–145)
TRIGL SERPL-MCNC: 87 MG/DL (ref 0–149)

## 2022-08-23 PROCEDURE — 80061 LIPID PANEL: CPT

## 2022-08-23 PROCEDURE — 36415 COLL VENOUS BLD VENIPUNCTURE: CPT

## 2022-08-23 PROCEDURE — 80053 COMPREHEN METABOLIC PANEL: CPT

## 2022-08-24 ENCOUNTER — ANTICOAGULATION MONITORING (OUTPATIENT)
Dept: VASCULAR LAB | Facility: MEDICAL CENTER | Age: 72
End: 2022-08-24
Payer: MEDICARE

## 2022-08-24 DIAGNOSIS — G45.9 TIA (TRANSIENT ISCHEMIC ATTACK): ICD-10-CM

## 2022-08-24 DIAGNOSIS — Z95.2 HX OF MECHANICAL AORTIC VALVE REPLACEMENT: ICD-10-CM

## 2022-08-24 DIAGNOSIS — I48.91 ATRIAL FIBRILLATION, UNSPECIFIED TYPE (HCC): ICD-10-CM

## 2022-08-24 LAB — INR PPP: 3 (ref 2–3.5)

## 2022-08-24 RX ORDER — ENOXAPARIN SODIUM 150 MG/ML
150 INJECTION SUBCUTANEOUS DAILY
Qty: 10 ML | Refills: 1 | Status: SHIPPED | OUTPATIENT
Start: 2022-08-24 | End: 2022-12-08

## 2022-08-24 NOTE — PROGRESS NOTES
Anticoagulation Summary  As of 8/24/2022      INR goal:  2.5-3.5   TTR:  63.0 % (5.5 y)   INR used for dosing:  3.00 (8/24/2022)   Warfarin maintenance plan:  4 mg (4 mg x 1) every day   Weekly warfarin total:  28 mg   Plan last modified:  Sybil Adkins, PharmD (7/26/2021)   Next INR check:     Target end date:  Indefinite    Indications    Chronic anticoagulation (Resolved) [Z79.01]  Hx of mechanical aortic valve replacement [V43.3] [Z95.2]  TIA (transient ischemic attack) [G45.9]  Atrial fibrillation (HCC) [I48.91] [I48.91]                 Anticoagulation Episode Summary       INR check location:      Preferred lab:      Send INR reminders to:      Comments:  [employer] home meter          Anticoagulation Care Providers       Provider Role Specialty Phone number    Michael J Bloch, M.D. Referring Internal Medicine [phone]    Yinka Church, PharmD Responsible Pharmacy             Refer to Anticoagulation Patient Findings for HPI      Spoke with patient to report a therapeutic INR.      Pt is NOT on antiplatelet therapy     Pt instructed to continue with current warfarin dosing regimen, confirms dosing.       Pt to have procedure on 09/02/2022. Due to pt history lovenox bridging is indicated.   Pt to follow instructions as below, after procedure to ask operating MD when safe to resume anticoagulation, if no instructions given, pt will follow as below.     CHADSvasc 2  Clot history; hx of mechanical aortic valve, and TIA    Current weight:95.3 kg  CrCl = 53  Lab Results   Component Value Date/Time    BUN 29 (H) 08/23/2022 03:37 PM    CREATININE 1.45 (H) 08/23/2022 03:37 PM    CREATININE 1.6 (H) 11/26/2007 12:28 AM          Used Lovenox before: no        Date  Warfarin dosing PM Lovenox   5 Days before procedure 08/28/22 0mg NONE   4 Days before procedure 08/29 0mg Lovenox injection   3 Days before procedure 08/30 0mg Lovenox injection   2 Days before procedure 08/31 0mg Lovenox injection   1 Days before procedure  09/01 0mg NONE   PROCEDURE 09/02 0mg NONE   1 Day after procedure 09/03 8mg Restart Lovenox injections      2 Days after procedure 09/04 8mg Lovenox injection   3 Days after procedure 09/05 8mg Lovenox injection   4 Days after procedure 09/06  Lovenox injection  Get INR Checked             Will follow up in 2 week(s).     Oziel Castro   Discussed with Sybil Pak

## 2022-08-25 ENCOUNTER — OFFICE VISIT (OUTPATIENT)
Dept: VASCULAR LAB | Facility: MEDICAL CENTER | Age: 72
End: 2022-08-25
Attending: INTERNAL MEDICINE
Payer: MEDICARE

## 2022-08-25 DIAGNOSIS — I48.91 ATRIAL FIBRILLATION, UNSPECIFIED TYPE (HCC): ICD-10-CM

## 2022-08-25 DIAGNOSIS — N18.32 STAGE 3B CHRONIC KIDNEY DISEASE: ICD-10-CM

## 2022-08-25 DIAGNOSIS — I10 ESSENTIAL HYPERTENSION: ICD-10-CM

## 2022-08-25 DIAGNOSIS — E78.5 HYPERLIPIDEMIA, UNSPECIFIED HYPERLIPIDEMIA TYPE: ICD-10-CM

## 2022-08-25 DIAGNOSIS — I34.2 NONRHEUMATIC MITRAL VALVE STENOSIS: ICD-10-CM

## 2022-08-25 DIAGNOSIS — Z95.2 S/P AORTIC VALVE REPLACEMENT: ICD-10-CM

## 2022-08-25 DIAGNOSIS — D68.9 COAGULOPATHY (HCC): ICD-10-CM

## 2022-08-25 PROCEDURE — 99214 OFFICE O/P EST MOD 30 MIN: CPT | Performed by: NURSE PRACTITIONER

## 2022-08-25 NOTE — PROGRESS NOTES
This visit was conducted via [employer] video call using secure and encrypted video conferencing technology to reduce spread of and/or potential exposures to COVID.  The patient was in a private location (home) in the state of [place].    The patient's identity was confirmed and verbal consent was obtained for this virtual visit.     [facility] Follow Up Visit    Date of Service: 08/25/2022    Yenifer Beasley is here for follow up of dyslipidemia, HTN, chronic anticoagulation, and hypothyroidism    HPI  Pertinent Interval History since last visit:   Remains on Zetia, vit d and otc omega 3s  No chest pain, SOB  Cardiology decreased doxazosin to 1mg qhs  On warfarin, no bleeding issues  Still with LE swelling, unchanged  No open sores/wounds  Is having sleep study this mo  Just est with new PCP  Had fasting labs    Current Prescription Lipid Lowering Medications - including dose: Has missed several doses   Statin: None  Non-Statin: None; off Praluent d/t cost  Continue Zetia  Current Lipid Lowering and Related Supplements:   Omega 3s, 1000u daily  Vit D  Any Current Side Effects Potentially Related to Lipid Lowering therapy?   No  Current Adherence to Lipid Lowering Therapies:  Complete  Any Previous History of Statin Intolerance?   Yes, Details: severe myalgias on multiple statins  Baseline Lipids Prior to Treatment:  Shown here:  LDL-C: 152      SOCIAL HISTORY  Social History     Tobacco Use   Smoking Status Never   Smokeless Tobacco Never      Change in weight: none  Exercise habits: mod exercise  Diet: common adult, consistent vit K    Physical Exam  Constitutional:       General: She is not in acute distress.     Appearance: She is well-developed. She is obese. She is not diaphoretic.   Pulmonary:      Effort: Pulmonary effort is normal. No respiratory distress.   Skin:     Coloration: Skin is not pale.      Findings: No erythema.   Neurological:      Mental Status: She is alert and oriented to person,  place, and time.      Motor: No weakness.   Psychiatric:         Mood and Affect: Mood normal.         Behavior: Behavior normal.         Thought Content: Thought content normal.     DATA REVIEW  Most Recent Lipid Panel:   Lab Results   Component Value Date    CHOLSTRLTOT 140 08/23/2022    TRIGLYCERIDE 87 08/23/2022    HDL 69 08/23/2022    LDL 54 08/23/2022     Other Pertinent Blood Work:   Lab Results   Component Value Date    SODIUM 142 08/23/2022    POTASSIUM 4.2 08/23/2022    CHLORIDE 106 08/23/2022    CO2 23 08/23/2022    ANION 13.0 08/23/2022    GLUCOSE 90 08/23/2022    BUN 29 (H) 08/23/2022    CREATININE 1.45 (H) 08/23/2022    CALCIUM 9.4 08/23/2022    ASTSGOT 17 08/23/2022    ALTSGPT 10 08/23/2022    ALKPHOSPHAT 50 08/23/2022    TBILIRUBIN 0.5 08/23/2022    ALBUMIN 4.4 08/23/2022    AGRATIO 1.8 08/23/2022    TSHULTRASEN 1.950 04/26/2022     Other:  Lp(a) - 57 (oct 2017)  TSH 1.5  (oct 2017)    Recent Imaging Studies:      Echo march 2017:  1. Left ventricular ejection fraction is visually estimated to be 55%.   Normal regional wall motion. Grade I diastolic dysfunction.  2. Known mechanical aortic valve that is functioning normally.   3. Mild mitral stenosis.  4. Estimated right ventricular systolic pressure is 40 mmHg.    Echo 4/4/19  CONCLUSIONS  Normal left ventricular systolic function.  Left ventricular ejection fraction is visually estimated to be 65-70%.  Mild concentric left ventricular hypertrophy.  Mild mitral stenosis.  Severe mitral annular calcification.  Mild mitral regurgitation.  Bioprosthetic aortic valve with increased velocities.  Right heart pressures are consistent with moderate pulmonary   hypertension.  Severely dilated left atrium.  Echo 3/3/21  Normal left ventricular chamber size.  Left ventricular ejection fraction is visually estimated to be 65%.  Mild concentric left ventricular hypertrophy.  Dilated inferior vena cava without inspiratory collapse.  Moderate mitral  stenosis.  Mean [id number] gradient is 7 mmHg at a heart rate of 64 BPM.  Structurally normal tricuspid valve without significant stenosis.  Normal function of mechanical prosthetic valve.  US aorta 4/14/21  Mild atherosclerotic change of the aorta. No evidence of aneurysm.  Carotid duplex 4/14/21  Mild plaque with no significant stenosis.  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):   Possible  ACC/AHA Indication for Statin Therapy, toby all that apply:   Established ASCVD: Indication for High intensity statin    Calculated Risk for ASCVD, if applicable:  N/A  Other Significant Risk Markers, if any, toby all that apply:  Family history of premature ASCVD in first degree relative   High lp(a)  National Lipid Association (NLA) Goal (if applicable):  LDL-C:   <70 mg/dL and nonHDL <100  Now at goal on Praluent     Lifestyle Recommendations From Today’s Visit:    Diet - low simple carb like [place]; limiting sugars  Exercise - daily home exercise for 15-20 min  Non-Statin Medications Recommendations from Today’s Visit:   - Continue Praluent 150mg   - Continue zetia 10 mg daily  - Consider addition of fibrate if nonHDL >130 in future    Indication for PCSK9 Inhibitor, if applicable:  ASCVD with suboptimal control of LDL-C despite maximally tolerated statin  Supplements Recommended at this visit:  - Continue omega 3's at 3,000u/day, Vit D   Recommendations for Other Cardiovascular Risk Factors, toby all that apply:   HTN: good readings at home; cont dox 1mg qhs, can inc as tolerated/needed.   Sleep study completed- pukm made adjustments- now waiting CPAP machine  IFG -relatively stable.  Continue TLC  Anticoagulation - continue warfarin and f/u with anticoag clinic  Other issues  - Hypothyroidism - seems under reasonable control per TSH; continue current thyroid replection; otherwise defer management to PCP    Studies Ordered at Todays Visit: angiogram as sched for Sept; echo due in 2023  Blood Work Ordered At Today’s visit: cmp, lipids, MA    Follow-Up:   1) Anticoagulation- continue monitoring with  and report to anticoag clinic  2) Vascular medicine in 6 months or sooner if needed  3) F/u with cardiology as discussed    RADHA Anderson.

## 2022-08-29 ENCOUNTER — TELEPHONE (OUTPATIENT)
Dept: CARDIOLOGY | Facility: MEDICAL CENTER | Age: 72
End: 2022-08-29
Payer: MEDICARE

## 2022-08-29 ENCOUNTER — PRE-ADMISSION TESTING (OUTPATIENT)
Dept: ADMISSIONS | Facility: MEDICAL CENTER | Age: 72
End: 2022-08-29
Attending: INTERNAL MEDICINE
Payer: MEDICARE

## 2022-08-29 VITALS — BODY MASS INDEX: 38.5 KG/M2 | WEIGHT: 203.93 LBS | HEIGHT: 61 IN

## 2022-08-29 DIAGNOSIS — Z01.812 PRE-OPERATIVE LABORATORY EXAMINATION: ICD-10-CM

## 2022-08-29 DIAGNOSIS — Z01.810 PRE-OPERATIVE CARDIOVASCULAR EXAMINATION: ICD-10-CM

## 2022-08-29 LAB
ALBUMIN SERPL BCP-MCNC: 4.3 G/DL (ref 3.2–4.9)
ALBUMIN/GLOB SERPL: 1.7 G/DL
ALP SERPL-CCNC: 52 U/L (ref 30–99)
ALT SERPL-CCNC: 7 U/L (ref 2–50)
ANION GAP SERPL CALC-SCNC: 12 MMOL/L (ref 7–16)
APTT PPP: 36.7 SEC (ref 24.7–36)
AST SERPL-CCNC: 9 U/L (ref 12–45)
BILIRUB SERPL-MCNC: 0.6 MG/DL (ref 0.1–1.5)
BUN SERPL-MCNC: 24 MG/DL (ref 8–22)
CALCIUM SERPL-MCNC: 9.3 MG/DL (ref 8.5–10.5)
CHLORIDE SERPL-SCNC: 104 MMOL/L (ref 96–112)
CO2 SERPL-SCNC: 24 MMOL/L (ref 20–33)
CREAT SERPL-MCNC: 1.52 MG/DL (ref 0.5–1.4)
EKG IMPRESSION: NORMAL
ERYTHROCYTE [DISTWIDTH] IN BLOOD BY AUTOMATED COUNT: 45.5 FL (ref 35.9–50)
GFR SERPLBLD CREATININE-BSD FMLA CKD-EPI: 36 ML/MIN/1.73 M 2
GLOBULIN SER CALC-MCNC: 2.5 G/DL (ref 1.9–3.5)
GLUCOSE SERPL-MCNC: 91 MG/DL (ref 65–99)
HCT VFR BLD AUTO: 39 % (ref 37–47)
HGB BLD-MCNC: 13.1 G/DL (ref 12–16)
INR PPP: 2.69 (ref 0.87–1.13)
MCH RBC QN AUTO: 30 PG (ref 27–33)
MCHC RBC AUTO-ENTMCNC: 33.6 G/DL (ref 33.6–35)
MCV RBC AUTO: 89.2 FL (ref 81.4–97.8)
PLATELET # BLD AUTO: 159 K/UL (ref 164–446)
PMV BLD AUTO: 11.2 FL (ref 9–12.9)
POTASSIUM SERPL-SCNC: 4.2 MMOL/L (ref 3.6–5.5)
PROT SERPL-MCNC: 6.8 G/DL (ref 6–8.2)
PROTHROMBIN TIME: 27.8 SEC (ref 12–14.6)
RBC # BLD AUTO: 4.37 M/UL (ref 4.2–5.4)
SODIUM SERPL-SCNC: 140 MMOL/L (ref 135–145)
WBC # BLD AUTO: 5.6 K/UL (ref 4.8–10.8)

## 2022-08-29 PROCEDURE — 80053 COMPREHEN METABOLIC PANEL: CPT

## 2022-08-29 PROCEDURE — 93010 ELECTROCARDIOGRAM REPORT: CPT | Performed by: INTERNAL MEDICINE

## 2022-08-29 PROCEDURE — 36415 COLL VENOUS BLD VENIPUNCTURE: CPT

## 2022-08-29 PROCEDURE — 93005 ELECTROCARDIOGRAM TRACING: CPT

## 2022-08-29 PROCEDURE — 85027 COMPLETE CBC AUTOMATED: CPT

## 2022-08-29 PROCEDURE — 85730 THROMBOPLASTIN TIME PARTIAL: CPT

## 2022-08-29 PROCEDURE — 85610 PROTHROMBIN TIME: CPT

## 2022-08-29 ASSESSMENT — FIBROSIS 4 INDEX: FIB4 SCORE: 2.1

## 2022-08-29 NOTE — TELEPHONE ENCOUNTER
FARZANA Luna: Yenifer Beasley     Topic/issue: Pt has pre-op today and wondered if she had to have a 2nd set of labs taken since she just did them last week with another provider. She did not want to do them if it was not necessary. Her appt is @ 2:45 today.Please reach out to advise.     PH: [phone]     Thank You  Bernadine JONES

## 2022-08-29 NOTE — TELEPHONE ENCOUNTER
Spoke to patient over phone. Clarification on pre-admit appointment and labs provided. Patient verbalizes understanding.

## 2022-09-02 ENCOUNTER — DOCUMENTATION (OUTPATIENT)
Dept: VASCULAR LAB | Facility: MEDICAL CENTER | Age: 72
End: 2022-09-02
Payer: MEDICARE

## 2022-09-02 ENCOUNTER — APPOINTMENT (OUTPATIENT)
Dept: CARDIOLOGY | Facility: MEDICAL CENTER | Age: 72
End: 2022-09-02
Attending: INTERNAL MEDICINE
Payer: MEDICARE

## 2022-09-02 NOTE — PROGRESS NOTES
[facility]      Patient called the clinic this morning and reports that she is currently bridging for a procedure, which was cancelled today as she mistakenly took her Lovenox injection last night.   She does not have a date rescheduled yet.   Patient instructed to proceed with post procedure instructions and take warfarin + Lovenox injections and retest again on Monday and further instructions will be given at that time.       Porter DiazD

## 2022-09-06 ENCOUNTER — ANTICOAGULATION MONITORING (OUTPATIENT)
Dept: VASCULAR LAB | Facility: MEDICAL CENTER | Age: 72
End: 2022-09-06
Payer: MEDICARE

## 2022-09-06 DIAGNOSIS — Z95.2 HX OF MECHANICAL AORTIC VALVE REPLACEMENT: ICD-10-CM

## 2022-09-06 DIAGNOSIS — I48.91 ATRIAL FIBRILLATION, UNSPECIFIED TYPE (HCC): ICD-10-CM

## 2022-09-06 DIAGNOSIS — G45.9 TIA (TRANSIENT ISCHEMIC ATTACK): ICD-10-CM

## 2022-09-06 LAB — INR PPP: 2.8 (ref 2–3.5)

## 2022-09-07 NOTE — PROGRESS NOTES
Anticoagulation Summary  As of 2022      INR goal:  2.5-3.5   TTR:  63.3 % (5.5 y)   INR used for dosin.80 (2022)   Warfarin maintenance plan:  4 mg (4 mg x 1) every day   Weekly warfarin total:  28 mg   Plan last modified:  Sybil Adkins, PharmD (2021)   Next INR check:  2022   Target end date:  Indefinite    Indications    Chronic anticoagulation (Resolved) [Z79.01]  Hx of mechanical aortic valve replacement [V43.3] [Z95.2]  TIA (transient ischemic attack) [G45.9]  Atrial fibrillation (HCC) [I48.91] [I48.91]                 Anticoagulation Episode Summary       INR check location:      Preferred lab:      Send INR reminders to:      Comments:  [employer] home meter          Anticoagulation Care Providers       Provider Role Specialty Phone number    Michael J Bloch, M.D. Referring Internal Medicine [phone]    Yinka Church, PharmD Responsible Pharmacy           Anticoagulation Patient Findings      Left voicemail message to report a therapeutic INR of 2.8     Pt to continue with current warfarin dosing regimen + STOP Lovenox. Per conversation on , procedure was cancelled because she took Lovenox the day prior.     Requested pt contact the clinic for any s/s of unusual bleeding, bruising, clotting or any changes to diet or medication.    FU INR in 1 week(s).    Sybil Adkins, PharmD

## 2022-09-27 DIAGNOSIS — Z95.2 H/O MECHANICAL AORTIC VALVE REPLACEMENT: ICD-10-CM

## 2022-09-27 RX ORDER — WARFARIN SODIUM 4 MG/1
TABLET ORAL
Qty: 100 TABLET | Refills: 1 | Status: SHIPPED | OUTPATIENT
Start: 2022-09-27 | End: 2023-03-02

## 2022-10-03 ENCOUNTER — PATIENT MESSAGE (OUTPATIENT)
Dept: MEDICAL GROUP | Facility: CLINIC | Age: 72
End: 2022-10-03
Payer: MEDICARE

## 2022-10-07 LAB — INR PPP: 3.4 (ref 2–3.5)

## 2022-10-10 ENCOUNTER — ANTICOAGULATION MONITORING (OUTPATIENT)
Dept: VASCULAR LAB | Facility: MEDICAL CENTER | Age: 72
End: 2022-10-10
Payer: MEDICARE

## 2022-10-10 DIAGNOSIS — I48.91 ATRIAL FIBRILLATION, UNSPECIFIED TYPE (HCC): ICD-10-CM

## 2022-10-10 DIAGNOSIS — G45.9 TIA (TRANSIENT ISCHEMIC ATTACK): ICD-10-CM

## 2022-10-10 DIAGNOSIS — Z95.2 HX OF MECHANICAL AORTIC VALVE REPLACEMENT: ICD-10-CM

## 2022-10-11 NOTE — PROGRESS NOTES
Anticoagulation Summary  As of 10/10/2022      INR goal:  2.5-3.5   TTR:  63.8 % (5.6 y)   INR used for dosing:  3.40 (10/7/2022)   Warfarin maintenance plan:  4 mg (4 mg x 1) every day   Weekly warfarin total:  28 mg   Plan last modified:  Emily MaradiagaD (7/26/2021)   Next INR check:  11/4/2022   Target end date:  Indefinite    Indications    Chronic anticoagulation (Resolved) [Z79.01]  Hx of mechanical aortic valve replacement [V43.3] [Z95.2]  TIA (transient ischemic attack) [G45.9]  Atrial fibrillation (HCC) [I48.91] [I48.91]                 Anticoagulation Episode Summary       INR check location:      Preferred lab:      Send INR reminders to:      Comments:  [employer] home meter          Anticoagulation Care Providers       Provider Role Specialty Phone number    Michael J Bloch, M.D. Referring Internal Medicine [phone]    Emily MartinezD Responsible Pharmacy             Refer to Anticoagulation Patient Findings for HPI  Patient Findings       Negatives:  Signs/symptoms of thrombosis, Signs/symptoms of bleeding, Laboratory test error suspected, Change in health, Change in alcohol use, Change in activity, Upcoming invasive procedure, Emergency department visit, Upcoming dental procedure, Missed doses, Extra doses, Change in medications, Change in diet/appetite, Hospital admission, Bruising, Other complaints            Spoke with patient to report a therapeutic INR.      Pt instructed to continue with current warfarin dosing regimen, confirms dosing.   Will follow up in 4 week(s).     Emily SouzaD, BCACP

## 2022-10-24 DIAGNOSIS — Z79.899 HIGH RISK MEDICATION USE: ICD-10-CM

## 2022-10-24 DIAGNOSIS — I48.91 ATRIAL FIBRILLATION, UNSPECIFIED TYPE (HCC): ICD-10-CM

## 2022-10-25 RX ORDER — AMIODARONE HYDROCHLORIDE 200 MG/1
TABLET ORAL
Qty: 100 TABLET | Refills: 3 | Status: SHIPPED | OUTPATIENT
Start: 2022-10-25 | End: 2024-01-05 | Stop reason: SDUPTHER

## 2022-10-25 NOTE — TELEPHONE ENCOUNTER
Is the patient due for a refill? Yes    Was the patient seen the past year? Yes    Date of last office visit: 8/18/22    Does the patient have an upcoming appointment?  No       Provider to refill: AK     Does the patients insurance require a 100 day supply?  Yes

## 2022-10-26 NOTE — TELEPHONE ENCOUNTER
Pateint due for chest xray for amiodarone monitoring. Order placed and patient notified via [employer].

## 2022-10-30 PROCEDURE — RXMED WILLOW AMBULATORY MEDICATION CHARGE: Performed by: NURSE PRACTITIONER

## 2022-10-31 ENCOUNTER — PHARMACY VISIT (OUTPATIENT)
Dept: PHARMACY | Facility: MEDICAL CENTER | Age: 72
End: 2022-10-31
Payer: MEDICARE

## 2022-11-02 ENCOUNTER — TELEPHONE (OUTPATIENT)
Dept: HEALTH INFORMATION MANAGEMENT | Facility: OTHER | Age: 72
End: 2022-11-02
Payer: MEDICARE

## 2022-11-04 ENCOUNTER — APPOINTMENT (OUTPATIENT)
Dept: RADIOLOGY | Facility: MEDICAL CENTER | Age: 72
End: 2022-11-04
Attending: INTERNAL MEDICINE
Payer: MEDICARE

## 2022-11-07 ENCOUNTER — HOSPITAL ENCOUNTER (EMERGENCY)
Facility: MEDICAL CENTER | Age: 72
End: 2022-11-21
Attending: INTERNAL MEDICINE
Payer: MEDICARE

## 2022-11-07 ENCOUNTER — APPOINTMENT (OUTPATIENT)
Dept: RADIOLOGY | Facility: MEDICAL CENTER | Age: 72
End: 2022-11-07
Attending: INTERNAL MEDICINE
Payer: MEDICARE

## 2022-11-07 DIAGNOSIS — I48.91 ATRIAL FIBRILLATION, UNSPECIFIED TYPE (HCC): ICD-10-CM

## 2022-11-07 DIAGNOSIS — Z79.899 HIGH RISK MEDICATION USE: ICD-10-CM

## 2022-11-07 LAB — INR PPP: 2.5 (ref 2–3.5)

## 2022-11-07 PROCEDURE — 71046 X-RAY EXAM CHEST 2 VIEWS: CPT

## 2022-11-08 ENCOUNTER — ANTICOAGULATION MONITORING (OUTPATIENT)
Dept: MEDICAL GROUP | Facility: PHYSICIAN GROUP | Age: 72
End: 2022-11-08
Payer: MEDICARE

## 2022-11-08 DIAGNOSIS — G45.9 TIA (TRANSIENT ISCHEMIC ATTACK): ICD-10-CM

## 2022-11-08 DIAGNOSIS — Z95.2 HX OF MECHANICAL AORTIC VALVE REPLACEMENT: ICD-10-CM

## 2022-11-08 DIAGNOSIS — I48.91 ATRIAL FIBRILLATION, UNSPECIFIED TYPE (HCC): ICD-10-CM

## 2022-11-08 NOTE — PROGRESS NOTES
Anticoagulation Summary  As of 2022      INR goal:  2.5-3.5   TTR:  64.4 % (5.7 y)   INR used for dosin.50 (2022)   Warfarin maintenance plan:  4 mg (4 mg x 1) every day; Starting 2022   Weekly warfarin total:  28 mg   Plan last modified:  Sybil Adkins, PharmD (2021)   Next INR check:  2022   Target end date:  Indefinite    Indications    Chronic anticoagulation (Resolved) [Z79.01]  Hx of mechanical aortic valve replacement [V43.3] [Z95.2]  TIA (transient ischemic attack) [G45.9]  Atrial fibrillation (HCC) [I48.91] [I48.91]                 Anticoagulation Episode Summary       INR check location:      Preferred lab:      Send INR reminders to:      Comments:  [employer] home meter          Anticoagulation Care Providers       Provider Role Specialty Phone number    Michael J Bloch, M.D. Referring Internal Medicine [phone]    Yinka Church, PharmD Responsible Pharmacy             Refer to Anticoagulation Patient Findings for HPI  Patient Findings       Negatives:  Signs/symptoms of thrombosis, Signs/symptoms of bleeding, Laboratory test error suspected, Change in health, Change in alcohol use, Change in activity, Upcoming invasive procedure, Emergency department visit, Upcoming dental procedure, Missed doses, Extra doses, Change in medications, Change in diet/appetite, Hospital admission, Bruising, Other complaints            Spoke with patient  to report a therapeutic INR.      Pt is NOT on antiplatelet therapy     Pt instructed to continue with current warfarin dosing regimen, confirms dosing.   Will follow up in 4 week(s).     Efren Frey PhT

## 2022-11-21 ENCOUNTER — TELEPHONE (OUTPATIENT)
Dept: HEALTH INFORMATION MANAGEMENT | Facility: OTHER | Age: 72
End: 2022-11-21
Payer: MEDICARE

## 2022-11-22 ENCOUNTER — OFFICE VISIT (OUTPATIENT)
Dept: MEDICAL GROUP | Facility: PHYSICIAN GROUP | Age: 72
End: 2022-11-22
Payer: MEDICARE

## 2022-11-22 VITALS
RESPIRATION RATE: 16 BRPM | HEIGHT: 61 IN | TEMPERATURE: 97.7 F | BODY MASS INDEX: 38.51 KG/M2 | HEART RATE: 62 BPM | SYSTOLIC BLOOD PRESSURE: 158 MMHG | DIASTOLIC BLOOD PRESSURE: 66 MMHG | WEIGHT: 204 LBS | OXYGEN SATURATION: 96 %

## 2022-11-22 DIAGNOSIS — Z02.89 ENCOUNTER FOR COMPLETION OF FORM WITH PATIENT: ICD-10-CM

## 2022-11-22 DIAGNOSIS — E11.42 DIABETIC POLYNEUROPATHY ASSOCIATED WITH TYPE 2 DIABETES MELLITUS (HCC): ICD-10-CM

## 2022-11-22 PROCEDURE — 99213 OFFICE O/P EST LOW 20 MIN: CPT | Performed by: FAMILY MEDICINE

## 2022-11-22 RX ORDER — TIMOLOL MALEATE 5 MG/ML
SOLUTION/ DROPS OPHTHALMIC
COMMUNITY
Start: 2022-10-26

## 2022-11-22 ASSESSMENT — FIBROSIS 4 INDEX: FIB4 SCORE: 1.52

## 2022-11-22 NOTE — PROGRESS NOTES
Subjective:     CC:   Chief Complaint   Patient presents with    Other     [facility] paperwork    New PCP appt with Maryellen Vela  12/8/22   Saw dr. Macdonald saw her this week        HPI:   Yenifer presents today with [facility] paperwork, states that she was at the [facility] a few weeks ago and asked if she was disabled and she said yes that she has been on disability due to her neuropathy and then she received a letter in the mail stating she had to have medical paperwork filled out within 10 days of receiving her letter.     No problem-specific Assessment & Plan notes found for this encounter.      Current Outpatient Medications Ordered in Epic   Medication Sig Dispense Refill    timolol (TIMOPTIC) 0.5 % Solution 1 DROP INTO BOTH EYES TWICE A DAY      amiodarone (CORDARONE) 200 MG Tab TAKE 1 TABLET BY MOUTH EVERY  Tablet 3    ezetimibe (ZETIA) 10 MG Tab TAKE 1 TABLET BY MOUTH EVERY DAY 90 Tablet 3    warfarin (COUMADIN) 4 MG Tab TAKE ONE TABLET BY MOUTH DAILY OR AS DIRECTED BY ANTICOAGULATION CLINIC 100 Tablet 1    enoxaparin (LOVENOX) 150 MG/ML Solution Prefilled Syringe Inject 150 mg under the skin every day. 10 mL 1    triamcinolone acetonide (KENALOG) 0.1 % Ointment APPLY TO AFFECTED AREA TWICE A DAY FOR 2-3 WEEKS, THEN CAN USE 2 WEEKS ON, 2 WEEKS OFF AS NEEDED      lisinopril (PRINIVIL) 20 MG Tab Take 1 Tablet by mouth 2 times a day. 200 Tablet 3    doxazosin (CARDURA) 2 MG Tab TAKE 1 TABLET BY MOUTH EVERY DAY. NEEDS APPOINTMENT FOR FUTURE REFILLS 90 Tablet 1    levothyroxine (SYNTHROID) 125 MCG Tab TAKE 1 TABLET BY MOUTH EVERY DAY IN THE MORNING ON AN EMPTY STOMACH 100 Tablet 2    Alirocumab (PRALUENT) 150 MG/ML Solution Auto-injector Inject 150 mg under the skin every 14 days. 6 mL 3    diclofenac sodium (VOLTAREN) 1 % Gel Apply  topically 4 times a day as needed.      torsemide (DEMADEX) 20 MG Tab Take 0.5 Tablets by mouth every day. 90 Tablet 3    vitamin D (CHOLECALCIFEROL) 1000 UNIT TABS Take 1,000 Units by mouth  "every day.        docosahexanoic acid (OMEGA 3 FA) 1000 MG CAPS Take 1,000 mg by mouth 2 Times a Day.      tramadol (ULTRAM) 50 MG TABS Take 2 Tabs by mouth 3 times a day. 180 Each 0     No current [facility]-ordered facility-administered medications on file.       Health Maintenance: Completed        Objective:     Exam:  BP (!) 158/66 (BP Location: Left arm, Patient Position: Sitting, BP Cuff Size: Large adult)   Pulse 62   Temp 36.5 °C (97.7 °F) (Temporal)   Resp 16   Ht 1.549 m (5' 1\")   Wt 92.5 kg (204 lb)   LMP  (LMP Unknown)   SpO2 96%   BMI 38.55 kg/m²  Body mass index is 38.55 kg/m².    Physical Exam  Vitals reviewed.   Constitutional:       Appearance: Normal appearance.   Eyes:      Conjunctiva/sclera: Conjunctivae normal.      Pupils: Pupils are equal, round, and reactive to light.   Cardiovascular:      Rate and Rhythm: Normal rate and regular rhythm.      Pulses: Normal pulses.      Comments: Mechanical click   Pulmonary:      Effort: Pulmonary effort is normal. No respiratory distress.      Breath sounds: Normal breath sounds. No stridor. No wheezing, rhonchi or rales.   Chest:      Chest wall: No tenderness.   Neurological:      Mental Status: She is alert and oriented to person, place, and time.   Psychiatric:         Mood and Affect: Mood normal.         Behavior: Behavior normal.        A chaperone was offered to the patient during today's exam. Chaperone name: johnny parsons student was present.        Assessment & Plan:     72 y.o. female with the following -     1. Diabetic polyneuropathy associated with type 2 diabetes mellitus (HCC)  Chronic, stable.   2. Encounter for completion of form with patient  DMV paperwork filled out and completed.  She has no palpitations that impair her driving, discussed taking the tramadol prior to driving however she states she has been on for many years and has no fatigue or somnolence with this medication.  Other orders  - timolol (TIMOPTIC) 0.5 % Solution; 1 " DROP INTO BOTH EYES TWICE A DAY     I spent a total of 23 minutes with record review, exam, communication with the patient, communication with other providers, and documentation of this encounter.      No follow-ups on file.    Please note that this dictation was created using voice recognition software. I have made every reasonable attempt to correct obvious errors, but I expect that there are errors of grammar and possibly content that I did not discover before finalizing the note.

## 2022-11-22 NOTE — LETTER
[facility]  STEPHNO Osorio  [address]  [place] [address]  Fax: [phone]   Authorization for Release/Disclosure of   Protected Health Information   Name: LIMA BEASLEY : 1950 SSN: [id number]   Address: [address] [place] [address] Rd  [place] [address] Phone:    [phone] (home)    I authorize the entity listed below to release/disclose the PHI below to:   [facility]/STEPHON Osorio and STEPHON Osorio   Provider or Entity Name:  Dr. Sathya Macdonald       Address   City, [facility], [facility]   Phone:      Fax:     Reason for request: continuity of care   Information to be released:    [  ] LAST COLONOSCOPY,  including any PATH REPORT and follow-up  [  ] LAST FIT/COLOGUARD RESULT [  ] LAST DEXA  [  ] LAST MAMMOGRAM  [  ] LAST PAP  [  ] LAST LABS [  ] RETINA EXAM REPORT  [  ] IMMUNIZATION RECORDS  [  ] Release all info      [  ] Check here and initial the line next to each item to release ALL health information INCLUDING  _____ Care and treatment for drug and / or alcohol abuse  _____ HIV testing, infection status, or AIDS  _____ Genetic Testing    DATES OF SERVICE OR TIME PERIOD TO BE DISCLOSED: _____________  I understand and acknowledge that:  * This Authorization may be revoked at any time by you in writing, except if your health information has already been used or disclosed.  * Your health information that will be used or disclosed as a result of you signing this authorization could be re-disclosed by the recipient. If this occurs, your re-disclosed health information may no longer be protected by State or Federal laws.  * You may refuse to sign this Authorization. Your refusal will not affect your ability to obtain treatment.  * This Authorization becomes effective upon signing and will  on (date) __________.      If no date is indicated, this Authorization will  one (1) year from the signature date.    Name: Lima Beasley    Signature:   Date:      11/22/2022       PLEASE FAX REQUESTED RECORDS BACK TO: [phone]

## 2022-12-01 ENCOUNTER — TELEPHONE (OUTPATIENT)
Dept: MEDICAL GROUP | Facility: LAB | Age: 72
End: 2022-12-01
Payer: MEDICARE

## 2022-12-01 NOTE — TELEPHONE ENCOUNTER
NEW PATIENT VISIT PRE-VISIT PLANNING    1.  [employer] Patient is checked in Patient Demographics?Yes    2.  Immunizations were updated in Epic using Reconcile Outside Information activity? Yes         3.  Is this appointment scheduled as a Hospital Follow-Up? No    4.  Patient is due for the following Health Maintenance Topics:   Health Maintenance Due   Topic Date Due    Annual Wellness Visit  Never done    IMM HEP B VACCINE (1 of 3 - 3-dose series) Never done    IMM ZOSTER VACCINES (2 of 3) 10/08/2012    COLORECTAL CANCER SCREENING  01/30/2015    RETINAL SCREENING  06/01/2022    A1C SCREENING  10/26/2022   5.  Reviewed/Updated the following with patient:          Preferred Pharmacy? Yes          Preferred Lab? Yes          Preferred Communication? Yes          Allergies? Yes          Medications? YES. Was Abstract Encounter opened and chart updated? YES  6.  Updated Care Team?          DME Company (gait device, O2, CPAP, etc.) YES          Other Specialists (eye doctor, derm, GYN, cardiology, endo, etc): YES    7.  AHA (Puls[employer]) form printed for Provider? N/A

## 2022-12-07 ENCOUNTER — ANTICOAGULATION MONITORING (OUTPATIENT)
Dept: VASCULAR LAB | Facility: MEDICAL CENTER | Age: 72
End: 2022-12-07
Payer: MEDICARE

## 2022-12-07 DIAGNOSIS — Z95.2 HX OF MECHANICAL AORTIC VALVE REPLACEMENT: ICD-10-CM

## 2022-12-07 DIAGNOSIS — I48.91 ATRIAL FIBRILLATION, UNSPECIFIED TYPE (HCC): ICD-10-CM

## 2022-12-07 DIAGNOSIS — G45.9 TIA (TRANSIENT ISCHEMIC ATTACK): ICD-10-CM

## 2022-12-07 LAB — INR PPP: 3.3 (ref 2–3.5)

## 2022-12-07 NOTE — PROGRESS NOTES
Anticoagulation Summary  As of 12/7/2022      INR goal:  2.5-3.5   TTR:  64.9 % (5.8 y)   INR used for dosing:  3.30 (12/7/2022)   Warfarin maintenance plan:  4 mg (4 mg x 1) every day; Starting 12/7/2022   Weekly warfarin total:  28 mg   Plan last modified:  Sybil Adkins, Pasha (7/26/2021)   Next INR check:  1/4/2023   Target end date:  Indefinite    Indications    Chronic anticoagulation (Resolved) [Z79.01]  Hx of mechanical aortic valve replacement [V43.3] [Z95.2]  TIA (transient ischemic attack) [G45.9]  Atrial fibrillation (HCC) [I48.91] [I48.91]                 Anticoagulation Episode Summary       INR check location:      Preferred lab:      Send INR reminders to:      Comments:  [employer] home meter          Anticoagulation Care Providers       Provider Role Specialty Phone number    Michael J Bloch, M.D. Referring Internal Medicine [phone]    Yinka Church, PharmD Responsible Pharmacy             Refer to Anticoagulation Patient Findings for HPI  Patient Findings       Negatives:  Signs/symptoms of thrombosis, Signs/symptoms of bleeding, Laboratory test error suspected, Change in health, Change in alcohol use, Change in activity, Upcoming invasive procedure, Emergency department visit, Upcoming dental procedure, Missed doses, Extra doses, Change in medications, Change in diet/appetite, Hospital admission, Bruising, Other complaints            Spoke with patient  to report a therapeutic INR.      Pt instructed to continue with current warfarin dosing regimen, confirms dosing.   Will follow up in 4 week(s).     Placed standing lab order for INR because even though pt has HM, she likes to get her INR checked if she has any concomitant lab orders to save her test strips.    Sybil Adkins, PharmD

## 2022-12-08 ENCOUNTER — OFFICE VISIT (OUTPATIENT)
Dept: MEDICAL GROUP | Facility: LAB | Age: 72
End: 2022-12-08
Payer: MEDICARE

## 2022-12-08 VITALS
DIASTOLIC BLOOD PRESSURE: 72 MMHG | BODY MASS INDEX: 38.71 KG/M2 | WEIGHT: 205 LBS | HEART RATE: 90 BPM | HEIGHT: 61 IN | SYSTOLIC BLOOD PRESSURE: 144 MMHG | RESPIRATION RATE: 15 BRPM | OXYGEN SATURATION: 97 % | TEMPERATURE: 97.1 F

## 2022-12-08 DIAGNOSIS — Z12.12 SCREENING FOR COLORECTAL CANCER: ICD-10-CM

## 2022-12-08 DIAGNOSIS — I25.810 CORONARY ARTERY DISEASE INVOLVING CORONARY BYPASS GRAFT OF NATIVE HEART WITHOUT ANGINA PECTORIS: ICD-10-CM

## 2022-12-08 DIAGNOSIS — E03.9 HYPOTHYROIDISM, UNSPECIFIED TYPE: ICD-10-CM

## 2022-12-08 DIAGNOSIS — I48.91 ATRIAL FIBRILLATION, UNSPECIFIED TYPE (HCC): ICD-10-CM

## 2022-12-08 DIAGNOSIS — G47.33 OSA (OBSTRUCTIVE SLEEP APNEA): ICD-10-CM

## 2022-12-08 DIAGNOSIS — E78.5 HYPERLIPIDEMIA, UNSPECIFIED HYPERLIPIDEMIA TYPE: ICD-10-CM

## 2022-12-08 DIAGNOSIS — Z95.2 HX OF MECHANICAL AORTIC VALVE REPLACEMENT: ICD-10-CM

## 2022-12-08 DIAGNOSIS — Z76.89 ENCOUNTER TO ESTABLISH CARE: ICD-10-CM

## 2022-12-08 DIAGNOSIS — Z12.11 SCREENING FOR COLORECTAL CANCER: ICD-10-CM

## 2022-12-08 DIAGNOSIS — G62.9 NEUROPATHY: ICD-10-CM

## 2022-12-08 DIAGNOSIS — Z79.01 LONG TERM CURRENT USE OF ANTICOAGULANT THERAPY: ICD-10-CM

## 2022-12-08 DIAGNOSIS — H40.89 OTHER GLAUCOMA OF BOTH EYES: ICD-10-CM

## 2022-12-08 PROCEDURE — 99215 OFFICE O/P EST HI 40 MIN: CPT | Performed by: FAMILY MEDICINE

## 2022-12-08 SDOH — ECONOMIC STABILITY: FOOD INSECURITY: WITHIN THE PAST 12 MONTHS, THE FOOD YOU BOUGHT JUST DIDN'T LAST AND YOU DIDN'T HAVE MONEY TO GET MORE.: NEVER TRUE

## 2022-12-08 SDOH — ECONOMIC STABILITY: HOUSING INSECURITY
IN THE LAST 12 MONTHS, WAS THERE A TIME WHEN YOU DID NOT HAVE A STEADY PLACE TO SLEEP OR SLEPT IN A SHELTER (INCLUDING NOW)?: NO

## 2022-12-08 SDOH — ECONOMIC STABILITY: HOUSING INSECURITY

## 2022-12-08 SDOH — ECONOMIC STABILITY: TRANSPORTATION INSECURITY
IN THE PAST 12 MONTHS, HAS LACK OF RELIABLE TRANSPORTATION KEPT YOU FROM MEDICAL APPOINTMENTS, MEETINGS, WORK OR FROM GETTING THINGS NEEDED FOR DAILY LIVING?: NO

## 2022-12-08 SDOH — ECONOMIC STABILITY: TRANSPORTATION INSECURITY
IN THE PAST 12 MONTHS, HAS THE LACK OF TRANSPORTATION KEPT YOU FROM MEDICAL APPOINTMENTS OR FROM GETTING MEDICATIONS?: NO

## 2022-12-08 SDOH — ECONOMIC STABILITY: FOOD INSECURITY: WITHIN THE PAST 12 MONTHS, YOU WORRIED THAT YOUR FOOD WOULD RUN OUT BEFORE YOU GOT MONEY TO BUY MORE.: NEVER TRUE

## 2022-12-08 SDOH — HEALTH STABILITY: MENTAL HEALTH
STRESS IS WHEN SOMEONE FEELS TENSE, NERVOUS, ANXIOUS, OR CAN'T SLEEP AT NIGHT BECAUSE THEIR MIND IS TROUBLED. HOW STRESSED ARE YOU?: ONLY A LITTLE

## 2022-12-08 SDOH — HEALTH STABILITY: PHYSICAL HEALTH: ON AVERAGE, HOW MANY MINUTES DO YOU ENGAGE IN EXERCISE AT THIS LEVEL?: PATIENT DECLINED

## 2022-12-08 SDOH — ECONOMIC STABILITY: TRANSPORTATION INSECURITY
IN THE PAST 12 MONTHS, HAS LACK OF TRANSPORTATION KEPT YOU FROM MEETINGS, WORK, OR FROM GETTING THINGS NEEDED FOR DAILY LIVING?: NO

## 2022-12-08 SDOH — ECONOMIC STABILITY: INCOME INSECURITY: IN THE LAST 12 MONTHS, WAS THERE A TIME WHEN YOU WERE NOT ABLE TO PAY THE MORTGAGE OR RENT ON TIME?: NO

## 2022-12-08 SDOH — ECONOMIC STABILITY: INCOME INSECURITY: HOW HARD IS IT FOR YOU TO PAY FOR THE VERY BASICS LIKE FOOD, HOUSING, MEDICAL CARE, AND HEATING?: SOMEWHAT HARD

## 2022-12-08 SDOH — HEALTH STABILITY: PHYSICAL HEALTH
ON AVERAGE, HOW MANY DAYS PER WEEK DO YOU ENGAGE IN MODERATE TO STRENUOUS EXERCISE (LIKE A BRISK WALK)?: PATIENT DECLINED

## 2022-12-08 ASSESSMENT — ENCOUNTER SYMPTOMS
ABDOMINAL PAIN: 0
DIARRHEA: 0
CHILLS: 0
FEVER: 0
PALPITATIONS: 0
VOMITING: 0
SHORTNESS OF BREATH: 0
DEPRESSION: 0
NERVOUS/ANXIOUS: 0
NAUSEA: 0
WHEEZING: 0

## 2022-12-08 ASSESSMENT — LIFESTYLE VARIABLES
HOW OFTEN DO YOU HAVE A DRINK CONTAINING ALCOHOL: NEVER
SKIP TO QUESTIONS 9-10: 1
HOW OFTEN DO YOU HAVE SIX OR MORE DRINKS ON ONE OCCASION: NEVER
AUDIT-C TOTAL SCORE: 0
HOW MANY STANDARD DRINKS CONTAINING ALCOHOL DO YOU HAVE ON A TYPICAL DAY: PATIENT DOES NOT DRINK

## 2022-12-08 ASSESSMENT — SOCIAL DETERMINANTS OF HEALTH (SDOH)
HOW OFTEN DO YOU HAVE A DRINK CONTAINING ALCOHOL: NEVER
HOW OFTEN DO YOU GET TOGETHER WITH FRIENDS OR RELATIVES?: PATIENT DECLINED
WITHIN THE PAST 12 MONTHS, YOU WORRIED THAT YOUR FOOD WOULD RUN OUT BEFORE YOU GOT THE MONEY TO BUY MORE: NEVER TRUE
IN A TYPICAL WEEK, HOW MANY TIMES DO YOU TALK ON THE PHONE WITH FAMILY, FRIENDS, OR NEIGHBORS?: TWICE A WEEK
HOW HARD IS IT FOR YOU TO PAY FOR THE VERY BASICS LIKE FOOD, HOUSING, MEDICAL CARE, AND HEATING?: SOMEWHAT HARD
DO YOU BELONG TO ANY CLUBS OR ORGANIZATIONS SUCH AS CHURCH GROUPS UNIONS, FRATERNAL OR ATHLETIC GROUPS, OR SCHOOL GROUPS?: NO
HOW OFTEN DO YOU ATTENT MEETINGS OF THE CLUB OR ORGANIZATION YOU BELONG TO?: 1 TO 4 TIMES PER YEAR
HOW MANY DRINKS CONTAINING ALCOHOL DO YOU HAVE ON A TYPICAL DAY WHEN YOU ARE DRINKING: PATIENT DOES NOT DRINK
IN A TYPICAL WEEK, HOW MANY TIMES DO YOU TALK ON THE PHONE WITH FAMILY, FRIENDS, OR NEIGHBORS?: TWICE A WEEK
DO YOU BELONG TO ANY CLUBS OR ORGANIZATIONS SUCH AS CHURCH GROUPS UNIONS, FRATERNAL OR ATHLETIC GROUPS, OR SCHOOL GROUPS?: NO
HOW OFTEN DO YOU ATTEND CHURCH OR RELIGIOUS SERVICES?: PATIENT DECLINED
HOW OFTEN DO YOU HAVE SIX OR MORE DRINKS ON ONE OCCASION: NEVER
HOW OFTEN DO YOU GET TOGETHER WITH FRIENDS OR RELATIVES?: PATIENT DECLINED
HOW OFTEN DO YOU ATTENT MEETINGS OF THE CLUB OR ORGANIZATION YOU BELONG TO?: 1 TO 4 TIMES PER YEAR
HOW OFTEN DO YOU ATTEND CHURCH OR RELIGIOUS SERVICES?: PATIENT DECLINED

## 2022-12-08 ASSESSMENT — FIBROSIS 4 INDEX: FIB4 SCORE: 1.52

## 2022-12-08 NOTE — PROGRESS NOTES
"Subjective:   Yenifer Beasley is a 72 y.o. female here today for   No chief complaint on file.    #Establish care   -Reviewed all past medical history, family history, social history.  -Reviewed all screening/vaccinations:   -Diet and Exercise: no exercise or diet regimen.   -Tobacco, alcohol, recreational drug use:   -Sexually active:   -Occupation:     #valvular A-fib     #HLD     #neuropthay I    ***    Allergies   Allergen Reactions    Asa [Aspirin]      GI BLEED    Atorvastatin Shortness of Breath    Cymbalta [Duloxetine Hcl] Unspecified     Feels like a \"zombie\"    Hmg-Coa-R Inhibitors     Latex Swelling    Nsaids      GI BLEED    Tricor Shortness of Breath     Breathing problems      Trilipix [Choline Fenofibrate] Shortness of Breath     SOB    Lyrica [Pregabalin]      SPACED OUT         Current medicines (including changes today)  Current Outpatient Medications   Medication Sig Dispense Refill    doxazosin (CARDURA) 2 MG Tab Take 1 Tablet by mouth every day. Needs appointment for future refills (Patient taking differently: Take 2 mg by mouth every day.) 90 Tablet 3    timolol (TIMOPTIC) 0.5 % Solution 1 DROP INTO BOTH EYES TWICE A DAY      amiodarone (CORDARONE) 200 MG Tab TAKE 1 TABLET BY MOUTH EVERY  Tablet 3    ezetimibe (ZETIA) 10 MG Tab TAKE 1 TABLET BY MOUTH EVERY DAY 90 Tablet 3    warfarin (COUMADIN) 4 MG Tab TAKE ONE TABLET BY MOUTH DAILY OR AS DIRECTED BY ANTICOAGULATION CLINIC 100 Tablet 1    enoxaparin (LOVENOX) 150 MG/ML Solution Prefilled Syringe Inject 150 mg under the skin every day. 10 mL 1    triamcinolone acetonide (KENALOG) 0.1 % Ointment APPLY TO AFFECTED AREA TWICE A DAY FOR 2-3 WEEKS, THEN CAN USE 2 WEEKS ON, 2 WEEKS OFF AS NEEDED      lisinopril (PRINIVIL) 20 MG Tab Take 1 Tablet by mouth 2 times a day. 200 Tablet 3    levothyroxine (SYNTHROID) 125 MCG Tab TAKE 1 TABLET BY MOUTH EVERY DAY IN THE MORNING ON AN EMPTY STOMACH 100 Tablet 2    Alirocumab (PRALUENT) 150 MG/ML " Solution Auto-injector Inject 150 mg under the skin every 14 days. 6 mL 3    diclofenac sodium (VOLTAREN) 1 % Gel Apply  topically 4 times a day as needed.      torsemide (DEMADEX) 20 MG Tab Take 0.5 Tablets by mouth every day. 90 Tablet 3    vitamin D (CHOLECALCIFEROL) 1000 UNIT TABS Take 1,000 Units by mouth every day.        docosahexanoic acid (OMEGA 3 FA) 1000 MG CAPS Take 1,000 mg by mouth 2 Times a Day.      tramadol (ULTRAM) 50 MG TABS Take 2 Tabs by mouth 3 times a day. 180 Each 0     No current facility-administered medications for this visit.     She  has a past medical history of Allergic rhinitis, Anticoagulation monitoring, special range, Arthritis, Asthma (10/17/2019), Atrial fibrillation ([facility]), Back pain, Blood transfusion without reported diagnosis, CAD (coronary artery disease) (2007), Chest pain at rest (11/30/2010), Chronic anticoagulation (04/23/2018), Constipation, Coronary heart disease, Delayed emergence from general anesthesia, Diabetes, Diabetic neuropathy ([facility]) (01/06/2011), Difficulty breathing, Fall, Gasping for breath, Gastritis (07/2010), GERD (gastroesophageal reflux disease), Spanish measles, GI bleed, Glaucoma, Hyperlipidemia, Hypertension, Hyponatremia (04/22/2014), Hypothyroidism, Impaired fasting glucose (10/08/2019), Infectious disease, Migraine, Need for influenza vaccination (11/30/2010), Neuropathy in diabetes ([facility]), HILDA (obstructive sleep apnea), Painful joint, Palpitations, Pneumonia, Pneumonia due to organism (04/22/2014), Pulmonary hypertension ([facility]), Rash, S/P aortic valve replacement (2007), Shortness of breath (08/29/2022), Skin infection (05/11/2018), Sleep apnea, Swelling of lower extremity, TIA (transient ischemic attack) (2005), Tonsillitis, Tricuspid regurgitation mild- mod (01/06/2011), Unspecified hemorrhagic conditions, Upper GI bleed ON JULY 2007 (01/06/2011), Upper GI bleed ON JULY 2007 (01/06/2011), URTI (acute upper respiratory infection) (11/30/2010), UTI  "(urinary tract infection) (04/24/2014), Valvular heart disease, Weakness, and Wears glasses.    ROS   ROS       Objective:     Physical Exam:  BP (!) 144/72   Pulse 90   Temp 36.2 °C (97.1 °F) (Temporal)   Resp 15   Ht 1.549 m (5' 0.98\")   Wt 93 kg (205 lb)   SpO2 97%  Body mass index is 38.75 kg/m². ***  Constitutional: Alert, no distress.  Skin: Warm, dry, good turgor, no rashes in visible areas.  Eye: Equal, round and reactive, conjunctiva clear, lids normal.  ENMT: TM's clear bilaterally, lips without lesions, good dentition, oropharynx clear.  Neck: Trachea midline, no masses, no thyromegaly. No cervical or supraclavicular lymphadenopathy.  Respiratory: Unlabored respiratory effort, lungs clear to auscultation, no wheezes, no rhonchi.  Cardiovascular: Normal S1, S2, no murmur, no edema.  Abdomen: Soft, non-tender, no masses, no hepatosplenomegaly.  Psych: Alert and oriented x3, normal affect and mood.    Assessment and Plan:     There are no diagnoses linked to this encounter.    Followup: No follow-ups on file.         PLEASE NOTE: This dictation was created using voice recognition software. I have made every reasonable attempt to correct obvious errors, but I expect that there are errors of grammar and possibly content that I did not discover before finalizing the note.  "

## 2022-12-09 DIAGNOSIS — I10 HYPERTENSION, UNSPECIFIED TYPE: ICD-10-CM

## 2022-12-09 NOTE — PROGRESS NOTES
"Subjective:   Yenifer Beasley is a 72 y.o. female here today for   No chief complaint on file.    #Establish care     #Afib   Patient has a longstanding complicated cardiac history involving mechanical aortic valve replacement with three-vessel bypass completed in 2007.  This is also caused atrial fibrillation.  Status post cardioversion is being rhythm controlled with amiodarone at this time as well as the anticoagulated.  He is compliant with medications.  Continues to have some dyspnea on exertion with lower extremity edema.    #Aortic valve replacement:  -Recently echocardiogram showed increased gradients across aortic valve.  She is undergoing further work-up with cardiology to determine if further consideration of redo aortic and mitral valve is needed.  This has caused some diastolic heart failure which we are treating with torsemide at this time.    #CAD:  -3 vessel bypass completed 2007.  Followed by vascular medicine.  Currently on Praluent, Zetia compliant with medication, denies any chest pain.    #Hypothyroid  Patient is being treated for hypothyroidism, currently taking meds, no symptoms of fast or slow heartbeat and energy level steady. No new hair loss or skin symptoms. Labs have been checked in past year and are stable on current dose.  controlled    #Glaucoma:  -New diagnosis, being followed by ophthalmology.  Recently started timolol drops.    #Neuropathy:  -Chronic longstanding history of neuropathy both hands and feet.  Being treated with Ultram.  Is being followed by pain management at [facility] neurosurgery    Allergies   Allergen Reactions    Asa [Aspirin]      GI BLEED    Atorvastatin Shortness of Breath    Cymbalta [Duloxetine Hcl] Unspecified     Feels like a \"zombie\"    Hmg-Coa-R Inhibitors     Latex Swelling    Nsaids      GI BLEED    Tricor Shortness of Breath     Breathing problems      Trilipix [Choline Fenofibrate] Shortness of Breath     SOB    Lyrica [Pregabalin]      SPACED OUT "         Current medicines (including changes today)  Current Outpatient Medications   Medication Sig Dispense Refill    doxazosin (CARDURA) 2 MG Tab Take 1 Tablet by mouth every day. Needs appointment for future refills (Patient taking differently: Take 2 mg by mouth every day.) 90 Tablet 3    timolol (TIMOPTIC) 0.5 % Solution 1 DROP INTO BOTH EYES TWICE A DAY      amiodarone (CORDARONE) 200 MG Tab TAKE 1 TABLET BY MOUTH EVERY  Tablet 3    ezetimibe (ZETIA) 10 MG Tab TAKE 1 TABLET BY MOUTH EVERY DAY 90 Tablet 3    warfarin (COUMADIN) 4 MG Tab TAKE ONE TABLET BY MOUTH DAILY OR AS DIRECTED BY ANTICOAGULATION CLINIC 100 Tablet 1    triamcinolone acetonide (KENALOG) 0.1 % Ointment APPLY TO AFFECTED AREA TWICE A DAY FOR 2-3 WEEKS, THEN CAN USE 2 WEEKS ON, 2 WEEKS OFF AS NEEDED      lisinopril (PRINIVIL) 20 MG Tab Take 1 Tablet by mouth 2 times a day. 200 Tablet 3    levothyroxine (SYNTHROID) 125 MCG Tab TAKE 1 TABLET BY MOUTH EVERY DAY IN THE MORNING ON AN EMPTY STOMACH 100 Tablet 2    Alirocumab (PRALUENT) 150 MG/ML Solution Auto-injector Inject 150 mg under the skin every 14 days. 6 mL 3    diclofenac sodium (VOLTAREN) 1 % Gel Apply  topically 4 times a day as needed.      torsemide (DEMADEX) 20 MG Tab Take 0.5 Tablets by mouth every day. 90 Tablet 3    vitamin D (CHOLECALCIFEROL) 1000 UNIT TABS Take 1,000 Units by mouth every day.        docosahexanoic acid (OMEGA 3 FA) 1000 MG CAPS Take 1,000 mg by mouth 2 Times a Day.      tramadol (ULTRAM) 50 MG TABS Take 2 Tabs by mouth 3 times a day. 180 Each 0     No current facility-administered medications for this visit.     She  has a past medical history of Allergic rhinitis, Anticoagulation monitoring, special range, Arthritis, Asthma (10/17/2019), Atrial fibrillation (HCC), Back pain, Blood transfusion without reported diagnosis, CAD (coronary artery disease) (2007), Chest pain at rest (11/30/2010), Chronic anticoagulation (04/23/2018), Constipation, Coronary heart  "disease, Delayed emergence from general anesthesia, Diabetes, Diabetic neuropathy (HCC) (01/06/2011), Difficulty breathing, Fall, Gasping for breath, Gastritis (07/2010), GERD (gastroesophageal reflux disease), Citizen of the [place] measles, GI bleed, Glaucoma, Hyperlipidemia, Hypertension, Hyponatremia (04/22/2014), Hypothyroidism, Impaired fasting glucose (10/08/2019), Infectious disease, Migraine, Need for influenza vaccination (11/30/2010), Neuropathy in diabetes ([facility]), HILDA (obstructive sleep apnea), Painful joint, Palpitations, Pneumonia, Pneumonia due to organism (04/22/2014), Pulmonary hypertension ([facility]), Rash, S/P aortic valve replacement (2007), Shortness of breath (08/29/2022), Skin infection (05/11/2018), Sleep apnea, Swelling of lower extremity, TIA (transient ischemic attack) (2005), Tonsillitis, Tricuspid regurgitation mild- mod (01/06/2011), Unspecified hemorrhagic conditions, Upper GI bleed ON JULY 2007 (01/06/2011), Upper GI bleed ON JULY 2007 (01/06/2011), URTI (acute upper respiratory infection) (11/30/2010), UTI (urinary tract infection) (04/24/2014), Valvular heart disease, Weakness, and Wears glasses.    ROS   Review of Systems   Constitutional:  Negative for chills and fever.   Respiratory:  Negative for shortness of breath and wheezing.    Cardiovascular:  Negative for chest pain and palpitations.   Gastrointestinal:  Negative for abdominal pain, diarrhea, nausea and vomiting.   Psychiatric/Behavioral:  Negative for depression. The patient is not nervous/anxious.       Objective:     Physical Exam:  BP (!) 144/72   Pulse 90   Temp 36.2 °C (97.1 °F) (Temporal)   Resp 15   Ht 1.549 m (5' 0.98\")   Wt 93 kg (205 lb)   SpO2 97%  Body mass index is 38.75 kg/m².   Constitutional: Alert, no distress.  Skin: Warm, dry, good turgor, no rashes in visible areas.  Eye: Equal, round and reactive, conjunctiva clear, lids normal.  Respiratory: Unlabored respiratory effort  Psych: Alert and oriented x3, normal affect " and mood.    Assessment and Plan:     1. Encounter to establish care      2. Atrial fibrillation, unspecified type (HCC)  -We will continue the amiodarone, anticoagulation.  Patient will follow-up with cardiology and continue on a regular basis.  Reviewed labs, INR therapeutic, no concerns.    3. Hx of mechanical aortic valve replacement [V43.3]  -Patient will continue to follow-up with cardiology to discuss further evaluation needed to determine if aortic valve redo is needed.    4. Coronary artery disease involving coronary bypass graft of native heart without angina pectoris  -Continue follow-up with vascular medicine.  Continue with Zetia, Praluent continue work on appropriate diet and exercise regiment.    5. Hypothyroidism, unspecified type  Stable.  Reviewed labs, TSH is at goal.  Continue with current dose of levothyroxine.    6. Other glaucoma of both eyes  -Continue atenolol.  Follow-up with ophthalmology.    7. Screening for colorectal cancer  - Referral to GI for Colonoscopy    8. HILDA (obstructive sleep apnea)  -Patient is currently not on CPAP.  Patient feels like she is sleeping better without it.  Denies any worsening fatigue.  Follow-up with sleep medicine.    9. Long term current use of anticoagulant therapy  Continue warfarin, continue follow-up with anticoagulation clinic.    10. Hyperlipidemia, unspecified hyperlipidemia type  -Continue with Praluent    11. Neuropathy  -Patient will continue long-term opiate therapy with tramadol.  Follow-up with pain management.    My total time spent caring for the patient on the day of the encounter was 40 minutes.   This does not include time spent on separately billable procedures/tests.      Followup: No follow-ups on file.         PLEASE NOTE: This dictation was created using voice recognition software. I have made every reasonable attempt to correct obvious errors, but I expect that there are errors of grammar and possibly content that I did not discover  before finalizing the note.

## 2022-12-12 RX ORDER — TORSEMIDE 20 MG/1
TABLET ORAL
Qty: 45 TABLET | Refills: 2 | Status: SHIPPED | OUTPATIENT
Start: 2022-12-12 | End: 2023-08-29

## 2023-01-06 LAB — INR PPP: 3.7 (ref 2–3.5)

## 2023-01-11 ENCOUNTER — ANTICOAGULATION MONITORING (OUTPATIENT)
Dept: VASCULAR LAB | Facility: MEDICAL CENTER | Age: 73
End: 2023-01-11
Payer: MEDICARE

## 2023-01-11 DIAGNOSIS — Z95.2 HX OF MECHANICAL AORTIC VALVE REPLACEMENT: ICD-10-CM

## 2023-01-11 DIAGNOSIS — I48.91 ATRIAL FIBRILLATION, UNSPECIFIED TYPE (HCC): ICD-10-CM

## 2023-01-11 NOTE — PROGRESS NOTES
Anticoagulation Summary  As of 1/11/2023      INR goal:  2.5-3.5   TTR:  64.6 % (5.9 y)   INR used for dosing:  3.70 (1/6/2023)   Warfarin maintenance plan:  4 mg (4 mg x 1) every day   Weekly warfarin total:  28 mg   Plan last modified:  Sybil Adkins, PharmD (7/26/2021)   Next INR check:  1/20/2023   Target end date:  Indefinite    Indications    Chronic anticoagulation (Resolved) [Z79.01]  Hx of mechanical aortic valve replacement [V43.3] [Z95.2]  TIA (transient ischemic attack) (Resolved) [G45.9]  Atrial fibrillation (HCC) [I48.91] [I48.91]                 Anticoagulation Episode Summary       INR check location:      Preferred lab:      Send INR reminders to:      Comments:  [employer] home meter          Anticoagulation Care Providers       Provider Role Specialty Phone number    Michael J Bloch, M.D. Referring Cardiovascular Disease (Cardiology) [phone]    Yinka Church, PharmD Responsible Pharmacy           Anticoagulation Patient Findings  Patient Findings       Positives:  Change in diet/appetite (unable to get to the store - less intake of leafy greens.)    Negatives:  Signs/symptoms of thrombosis, Signs/symptoms of bleeding, Laboratory test error suspected, Change in health, Change in alcohol use, Change in activity, Upcoming invasive procedure, Emergency department visit, Upcoming dental procedure, Missed doses, Extra doses, Change in medications, Hospital admission, Bruising, Other complaints          Spoke with patient today regarding supra therapeutic INR of 3.7.  Patient denies any signs/symptoms of bruising or bleeding or any changes in diet and medications.  Instructed patient to call clinic with any questions or concerns.    Pt dose adjusted the week prior by taking half a tablet (2 mg total).  Pt hadn't been able to run to the store to stock up on normal supply a greens for the week, which most likely contributed to the elevated INR.  Pt will get back on normal greens intake and Pt is to  continue with current warfarin dosing regimen.    Pt is not on antiplatelet therapy    Follow up:  Two weeks    Shekhar Shah, Pharmacy Intern

## 2023-01-25 ENCOUNTER — TELEPHONE (OUTPATIENT)
Dept: MEDICAL GROUP | Facility: LAB | Age: 73
End: 2023-01-25
Payer: MEDICARE

## 2023-01-25 NOTE — TELEPHONE ENCOUNTER
I spoke with Yenifer and she said she is not interested in colonoscopy at this time and does not want to go under. She said she may in the future.   She said she has regular eye exams with Dr. Macdonald.

## 2023-02-02 ENCOUNTER — HOSPITAL ENCOUNTER (OUTPATIENT)
Dept: LAB | Facility: MEDICAL CENTER | Age: 73
End: 2023-02-02
Attending: NURSE PRACTITIONER
Payer: MEDICARE

## 2023-02-02 DIAGNOSIS — G45.9 TIA (TRANSIENT ISCHEMIC ATTACK): ICD-10-CM

## 2023-02-02 DIAGNOSIS — Z95.2 HX OF MECHANICAL AORTIC VALVE REPLACEMENT: ICD-10-CM

## 2023-02-02 DIAGNOSIS — E78.5 HYPERLIPIDEMIA, UNSPECIFIED HYPERLIPIDEMIA TYPE: ICD-10-CM

## 2023-02-02 DIAGNOSIS — I10 ESSENTIAL HYPERTENSION: ICD-10-CM

## 2023-02-02 DIAGNOSIS — I48.91 ATRIAL FIBRILLATION, UNSPECIFIED TYPE (HCC): ICD-10-CM

## 2023-02-02 LAB
ALBUMIN SERPL BCP-MCNC: 4.1 G/DL (ref 3.2–4.9)
ALBUMIN/GLOB SERPL: 1.5 G/DL
ALP SERPL-CCNC: 57 U/L (ref 30–99)
ALT SERPL-CCNC: 9 U/L (ref 2–50)
ANION GAP SERPL CALC-SCNC: 12 MMOL/L (ref 7–16)
AST SERPL-CCNC: 16 U/L (ref 12–45)
BILIRUB SERPL-MCNC: 0.5 MG/DL (ref 0.1–1.5)
BUN SERPL-MCNC: 27 MG/DL (ref 8–22)
CALCIUM ALBUM COR SERPL-MCNC: 9.2 MG/DL (ref 8.5–10.5)
CALCIUM SERPL-MCNC: 9.3 MG/DL (ref 8.5–10.5)
CHLORIDE SERPL-SCNC: 104 MMOL/L (ref 96–112)
CHOLEST SERPL-MCNC: 121 MG/DL (ref 100–199)
CO2 SERPL-SCNC: 25 MMOL/L (ref 20–33)
CREAT SERPL-MCNC: 1.37 MG/DL (ref 0.5–1.4)
FASTING STATUS PATIENT QL REPORTED: NORMAL
GFR SERPLBLD CREATININE-BSD FMLA CKD-EPI: 41 ML/MIN/1.73 M 2
GLOBULIN SER CALC-MCNC: 2.7 G/DL (ref 1.9–3.5)
GLUCOSE SERPL-MCNC: 96 MG/DL (ref 65–99)
HDLC SERPL-MCNC: 53 MG/DL
INR PPP: 2.91 (ref 0.87–1.13)
LDLC SERPL CALC-MCNC: 52 MG/DL
POTASSIUM SERPL-SCNC: 4.5 MMOL/L (ref 3.6–5.5)
PROT SERPL-MCNC: 6.8 G/DL (ref 6–8.2)
PROTHROMBIN TIME: 29.4 SEC (ref 12–14.6)
SODIUM SERPL-SCNC: 141 MMOL/L (ref 135–145)
TRIGL SERPL-MCNC: 82 MG/DL (ref 0–149)

## 2023-02-02 PROCEDURE — 85610 PROTHROMBIN TIME: CPT

## 2023-02-02 PROCEDURE — 80061 LIPID PANEL: CPT

## 2023-02-02 PROCEDURE — 36415 COLL VENOUS BLD VENIPUNCTURE: CPT

## 2023-02-02 PROCEDURE — 80053 COMPREHEN METABOLIC PANEL: CPT

## 2023-02-03 ENCOUNTER — ANTICOAGULATION MONITORING (OUTPATIENT)
Dept: VASCULAR LAB | Facility: MEDICAL CENTER | Age: 73
End: 2023-02-03
Payer: MEDICARE

## 2023-02-03 DIAGNOSIS — I48.91 ATRIAL FIBRILLATION, UNSPECIFIED TYPE (HCC): ICD-10-CM

## 2023-02-03 DIAGNOSIS — Z95.2 HX OF MECHANICAL AORTIC VALVE REPLACEMENT: ICD-10-CM

## 2023-02-04 NOTE — PROGRESS NOTES
Anticoagulation Summary  As of 2/3/2023      INR goal:  2.5-3.5   TTR:  64.8 % (5.9 y)   INR used for dosin.91 (2023)   Warfarin maintenance plan:  4 mg (4 mg x 1) every day   Weekly warfarin total:  28 mg   Plan last modified:  Emily MaradiagaD (2021)   Next INR check:  3/17/2023   Target end date:  Indefinite    Indications    Chronic anticoagulation (Resolved) [Z79.01]  Hx of mechanical aortic valve replacement [V43.3] [Z95.2]  TIA (transient ischemic attack) (Resolved) [G45.9]  Atrial fibrillation (HCC) [I48.91] [I48.91]                 Anticoagulation Episode Summary       INR check location:      Preferred lab:      Send INR reminders to:      Comments:  [employer] home meter          Anticoagulation Care Providers       Provider Role Specialty Phone number    Michael J Bloch, M.D. Referring Cardiovascular Disease (Cardiology) [phone]    Yinka Church, PharmD Responsible Pharmacy           Anticoagulation Patient Findings  Patient Findings       Negatives:  Signs/symptoms of thrombosis, Signs/symptoms of bleeding, Laboratory test error suspected, Change in health, Change in alcohol use, Change in activity, Upcoming invasive procedure, Emergency department visit, Upcoming dental procedure, Missed doses, Extra doses, Change in medications, Change in diet/appetite, Hospital admission, Bruising, Other complaints          Spoke with patient today regarding therapeutic INR of 2.91.  Patient denies any signs/symptoms of bruising or bleeding or any changes in diet and medications.  Instructed patient to call clinic with any questions or concerns.    Pt is not on antiplatelet therapy    Pt is to continue with current warfarin dosing regimen.  Follow up in 5-6 weeks, to reduce risk of adverse events related to this high risk medication,  Warfarin.    Yinka Church, PharmD, BCACP

## 2023-02-08 ENCOUNTER — OFFICE VISIT (OUTPATIENT)
Dept: VASCULAR LAB | Facility: MEDICAL CENTER | Age: 73
End: 2023-02-08
Attending: INTERNAL MEDICINE
Payer: MEDICARE

## 2023-02-08 VITALS
BODY MASS INDEX: 37.57 KG/M2 | HEART RATE: 47 BPM | DIASTOLIC BLOOD PRESSURE: 73 MMHG | SYSTOLIC BLOOD PRESSURE: 145 MMHG | WEIGHT: 199 LBS | HEIGHT: 61 IN

## 2023-02-08 DIAGNOSIS — E78.5 HYPERLIPIDEMIA, UNSPECIFIED HYPERLIPIDEMIA TYPE: ICD-10-CM

## 2023-02-08 DIAGNOSIS — E66.01 MORBID (SEVERE) OBESITY DUE TO EXCESS CALORIES (HCC): ICD-10-CM

## 2023-02-08 DIAGNOSIS — I10 ESSENTIAL HYPERTENSION: ICD-10-CM

## 2023-02-08 DIAGNOSIS — D68.59 HYPERCOAGULABLE STATE (HCC): ICD-10-CM

## 2023-02-08 DIAGNOSIS — I48.91 ATRIAL FIBRILLATION, UNSPECIFIED TYPE (HCC): ICD-10-CM

## 2023-02-08 PROCEDURE — 99212 OFFICE O/P EST SF 10 MIN: CPT

## 2023-02-08 PROCEDURE — 99214 OFFICE O/P EST MOD 30 MIN: CPT | Performed by: NURSE PRACTITIONER

## 2023-02-08 RX ORDER — DOXAZOSIN 2 MG/1
2 TABLET ORAL DAILY
Qty: 90 TABLET | Refills: 3 | Status: SHIPPED | OUTPATIENT
Start: 2023-02-08 | End: 2023-08-09 | Stop reason: SDUPTHER

## 2023-02-08 ASSESSMENT — FIBROSIS 4 INDEX: FIB4 SCORE: 2.38

## 2023-02-08 NOTE — PROGRESS NOTES
[facility] Follow Up Visit    Date of Service: 02/08/2023    Yenifer Beasley is here for follow up of dyslipidemia, HTN, chronic anticoagulation, and hypothyroidism    HPI  Pertinent Interval History since last visit:   Remains on Zetia, vit d and otc omega 3s  No chest pain, SOB  Taking doxazosin 2mg now  On warfarin, no bleeding issues, managed with RCC  Still with LE swelling, unchanged- using compression socks with good relief  No open sores/wounds  Waiting for CPAP machine- apparently backordered  Had fasting labs    Current Prescription Lipid Lowering Medications - including dose: Has missed several doses   Statin: None  Non-Statin:   Continue Zetia  Current Lipid Lowering and Related Supplements:   Omega 3s, 1000u daily  Vit D  Any Current Side Effects Potentially Related to Lipid Lowering therapy?   No  Current Adherence to Lipid Lowering Therapies:  Complete  Any Previous History of Statin Intolerance?   Yes, Details: severe myalgias on multiple statins  Baseline Lipids Prior to Treatment:  Shown here:  LDL-C: 152      SOCIAL HISTORY  Social History     Tobacco Use   Smoking Status Never   Smokeless Tobacco Never      Change in weight: none  Exercise habits: mod exercise  Diet: common adult, consistent vit K    Physical Exam  Constitutional:       General: She is not in acute distress.     Appearance: She is well-developed. She is obese. She is not diaphoretic.   Cardiovascular:      Rate and Rhythm: Bradycardia present.      Pulses: Normal pulses.   Pulmonary:      Effort: Pulmonary effort is normal. No respiratory distress.   Musculoskeletal:         General: Swelling present. Normal range of motion.      Comments: Uses cane   Skin:     Coloration: Skin is not pale.      Findings: No erythema.   Neurological:      Mental Status: She is alert and oriented to person, place, and time.      Motor: No weakness.   Psychiatric:         Mood and Affect: Mood normal.         Behavior: Behavior  normal.         Thought Content: Thought content normal.     DATA REVIEW  Most Recent Lipid Panel:   Lab Results   Component Value Date    CHOLSTRLTOT 121 02/02/2023    TRIGLYCERIDE 82 02/02/2023    HDL 53 02/02/2023    LDL 52 02/02/2023     Other Pertinent Blood Work:   Lab Results   Component Value Date    SODIUM 141 02/02/2023    POTASSIUM 4.5 02/02/2023    CHLORIDE 104 02/02/2023    CO2 25 02/02/2023    ANION 12.0 02/02/2023    GLUCOSE 96 02/02/2023    BUN 27 (H) 02/02/2023    CREATININE 1.37 02/02/2023    CALCIUM 9.3 02/02/2023    ASTSGOT 16 02/02/2023    ALTSGPT 9 02/02/2023    ALKPHOSPHAT 57 02/02/2023    TBILIRUBIN 0.5 02/02/2023    ALBUMIN 4.1 02/02/2023    AGRATIO 1.5 02/02/2023    TSHULTRASEN 1.950 04/26/2022     Other:  Lp(a) - 57 (oct 2017)  TSH 1.5  (oct 2017)    Recent Imaging Studies:      Echo march 2017:  1. Left ventricular ejection fraction is visually estimated to be 55%.   Normal regional wall motion. Grade I diastolic dysfunction.  2. Known mechanical aortic valve that is functioning normally.   3. Mild mitral stenosis.  4. Estimated right ventricular systolic pressure is 40 mmHg.    Echo 4/4/19  CONCLUSIONS  Normal left ventricular systolic function.  Left ventricular ejection fraction is visually estimated to be 65-70%.  Mild concentric left ventricular hypertrophy.  Mild mitral stenosis.  Severe mitral annular calcification.  Mild mitral regurgitation.  Bioprosthetic aortic valve with increased velocities.  Right heart pressures are consistent with moderate pulmonary   hypertension.  Severely dilated left atrium.  Echo 3/3/21  Normal left ventricular chamber size.  Left ventricular ejection fraction is visually estimated to be 65%.  Mild concentric left ventricular hypertrophy.  Dilated inferior vena cava without inspiratory collapse.  Moderate mitral stenosis.  Mean [id number] gradient is 7 mmHg at a heart rate of 64 BPM.  Structurally normal tricuspid valve without significant  stenosis.  Normal function of mechanical prosthetic valve.  US aorta 4/14/21  Mild atherosclerotic change of the aorta. No evidence of aneurysm.  Carotid duplex 4/14/21  Mild plaque with no significant stenosis.  ASSESSMENT AND PLAN    Patient Type, check all that apply:   Secondary Prevention  Established Atherosclerotic Cardiovascular Disease (ASCVD)  Yes, Details: CAD s/p CABG  Other Established (non-atherosclerotic) Vascular/Heart Disease, if Present:    Valvular heart disease s/p mechanical AVR - continue anticoagulation;  Recently saw cardiology for worsening valvular heart disease- had angiogram scheduled but decided against it as she does not want to move forward with any surgery.    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):   Possible  ACC/AHA Indication for Statin Therapy, toby all that apply:   Established ASCVD: Indication for High intensity statin    Calculated Risk for ASCVD, if applicable:  N/A  Other Significant Risk Markers, if any, toby all that apply:  Family history of premature ASCVD in first degree relative   High lp(a)  National Lipid Association (NLA) Goal (if applicable):  LDL-C:   <70 mg/dL and nonHDL <100  Now at goal on Praluent     Lifestyle Recommendations From Today’s Visit:    Diet - low simple carb like [place]; limiting sugars  Exercise - daily home exercise for 15-20 min    Non-Statin Medications Recommendations from Today’s Visit:   - Continue Praluent 150mg   - Continue zetia 10 mg daily  - Consider addition of fibrate if nonHDL >130 in future    Indication for PCSK9 Inhibitor, if applicable:  ASCVD with suboptimal control of LDL-C despite maximally tolerated statin  Supplements Recommended at this visit:  - Continue omega 3's at 3,000u/day, Vit D     Recommendations for Other Cardiovascular Risk Factors, toby all that apply:   - HTN: not checking BP much at home; can inc as tolerated/needed.  Bradycardic on monitor today; encouraged her to write down her  BP and HR at home to bring to next appt.    - HILDA: Sleep study completed- pulm made adjustments- still waiting on CPAP machine- she will call to check on.   - IFG: relatively stable.  Continue TLC  - Anticoagulation : continue warfarin and f/u with anticoag clinic  - Hypothyroidism - seems under reasonable control per TSH; continue current thyroid repletion; otherwise defer management to PCP    Studies Ordered at Todays Visit: echo due in 2023  Blood Work Ordered At Today’s visit: cmp, lipids, MA    Follow-Up:   1) Anticoagulation- continue monitoring with HM and report to anticoag clinic  2) Vascular medicine in 6 months or sooner if needed  3) F/u with cardiology as discussed    RADHA Avalos.

## 2023-02-21 ENCOUNTER — DOCUMENTATION (OUTPATIENT)
Dept: PHARMACY | Facility: MEDICAL CENTER | Age: 73
End: 2023-02-21
Payer: MEDICARE

## 2023-02-21 NOTE — PROGRESS NOTES
02/20/23: Spoke with [LIMA]. She is doing well on the [Praluent 150mg/ml]. Shereports no side effects to the medication and no new allergies. She reports 0 missed doses and has about [2 DOSES] on hand. Due to injected 02/27 and then has 1 more left for 03/13; I advised I will send call to follow up on 03/13 and schedule refill. Nothing to send this month

## 2023-03-01 DIAGNOSIS — Z95.2 H/O MECHANICAL AORTIC VALVE REPLACEMENT: ICD-10-CM

## 2023-03-02 RX ORDER — WARFARIN SODIUM 4 MG/1
TABLET ORAL
Qty: 100 TABLET | Refills: 1 | Status: SHIPPED | OUTPATIENT
Start: 2023-03-02 | End: 2023-10-18

## 2023-03-14 DIAGNOSIS — E11.9 TYPE 2 DIABETES MELLITUS WITHOUT COMPLICATION, WITHOUT LONG-TERM CURRENT USE OF INSULIN (HCC): ICD-10-CM

## 2023-03-14 LAB — INR PPP: 2.8 (ref 2–3.5)

## 2023-03-14 PROCEDURE — RXMED WILLOW AMBULATORY MEDICATION CHARGE: Performed by: NURSE PRACTITIONER

## 2023-03-15 ENCOUNTER — DOCUMENTATION (OUTPATIENT)
Dept: PHARMACY | Facility: MEDICAL CENTER | Age: 73
End: 2023-03-15
Payer: MEDICARE

## 2023-03-15 ENCOUNTER — ANTICOAGULATION MONITORING (OUTPATIENT)
Dept: VASCULAR LAB | Facility: MEDICAL CENTER | Age: 73
End: 2023-03-15
Payer: MEDICARE

## 2023-03-15 DIAGNOSIS — I48.91 ATRIAL FIBRILLATION, UNSPECIFIED TYPE (HCC): ICD-10-CM

## 2023-03-15 DIAGNOSIS — Z95.2 HX OF MECHANICAL AORTIC VALVE REPLACEMENT: ICD-10-CM

## 2023-03-15 NOTE — PROGRESS NOTES
Anticoagulation Summary  As of 3/15/2023      INR goal:  2.5-3.5   TTR:  65.4 % (6 y)   INR used for dosin.80 (3/14/2023)   Warfarin maintenance plan:  4 mg (4 mg x 1) every day   Weekly warfarin total:  28 mg   Plan last modified:  Sybil Adkins, PharmD (2021)   Next INR check:  2023   Target end date:  Indefinite    Indications    Chronic anticoagulation (Resolved) [Z79.01]  Hx of mechanical aortic valve replacement [V43.3] [Z95.2]  TIA (transient ischemic attack) (Resolved) [G45.9]  Atrial fibrillation (HCC) [I48.91] [I48.91]                 Anticoagulation Episode Summary       INR check location:      Preferred lab:      Send INR reminders to:      Comments:  [employer] home meter          Anticoagulation Care Providers       Provider Role Specialty Phone number    Michael J Bloch, M.D. Referring Cardiovascular Disease (Cardiology) [phone]    Yinka Church, PharmD Responsible Pharmacy           Anticoagulation Patient Findings    Left voicemail message to report a therapeutic INR.      Pt to continue with current warfarin dosing regimen. Requested pt contact the clinic for any s/s of unusual bleeding, bruising, clotting or any changes to diet or medication.    FU INR in 6 week(s).    Faith Diehl, Pharmacy Intern

## 2023-03-15 NOTE — PROGRESS NOTES
03/14/23: Spoke with [LIMA]. She is doing well on the [Praluent 150mg/ml]. She reports no side effects to the medication and no new allergies. She reports 0 missed doses and has [1] on hand to inject today 03/14. Will need refill for 03/28. Sending delivery for [03/20] via . Needs Sharps container, bandaids and alcohol swabs. No questions for [facility]

## 2023-03-20 ENCOUNTER — PHARMACY VISIT (OUTPATIENT)
Dept: PHARMACY | Facility: MEDICAL CENTER | Age: 73
End: 2023-03-20
Payer: MEDICARE

## 2023-04-26 ENCOUNTER — OFFICE VISIT (OUTPATIENT)
Dept: SLEEP MEDICINE | Facility: MEDICAL CENTER | Age: 73
End: 2023-04-26
Attending: NURSE PRACTITIONER
Payer: MEDICARE

## 2023-04-26 ENCOUNTER — ANTICOAGULATION MONITORING (OUTPATIENT)
Dept: VASCULAR LAB | Facility: MEDICAL CENTER | Age: 73
End: 2023-04-26
Payer: MEDICARE

## 2023-04-26 VITALS
OXYGEN SATURATION: 96 % | HEIGHT: 61 IN | DIASTOLIC BLOOD PRESSURE: 80 MMHG | RESPIRATION RATE: 16 BRPM | HEART RATE: 60 BPM | BODY MASS INDEX: 37.76 KG/M2 | WEIGHT: 200 LBS | SYSTOLIC BLOOD PRESSURE: 122 MMHG

## 2023-04-26 DIAGNOSIS — I48.91 ATRIAL FIBRILLATION, UNSPECIFIED TYPE (HCC): ICD-10-CM

## 2023-04-26 DIAGNOSIS — Z23 NEED FOR VACCINATION: ICD-10-CM

## 2023-04-26 DIAGNOSIS — Z95.2 HX OF MECHANICAL AORTIC VALVE REPLACEMENT: ICD-10-CM

## 2023-04-26 DIAGNOSIS — Z78.9 NONSMOKER: ICD-10-CM

## 2023-04-26 DIAGNOSIS — I10 PRIMARY HYPERTENSION: ICD-10-CM

## 2023-04-26 DIAGNOSIS — G47.33 OSA (OBSTRUCTIVE SLEEP APNEA): ICD-10-CM

## 2023-04-26 LAB — INR PPP: 3.3 (ref 2–3.5)

## 2023-04-26 PROCEDURE — 99213 OFFICE O/P EST LOW 20 MIN: CPT | Performed by: NURSE PRACTITIONER

## 2023-04-26 PROCEDURE — 99212 OFFICE O/P EST SF 10 MIN: CPT | Mod: 25 | Performed by: NURSE PRACTITIONER

## 2023-04-26 PROCEDURE — 90471 IMMUNIZATION ADMIN: CPT

## 2023-04-26 ASSESSMENT — PATIENT HEALTH QUESTIONNAIRE - PHQ9: CLINICAL INTERPRETATION OF PHQ2 SCORE: 0

## 2023-04-26 ASSESSMENT — FIBROSIS 4 INDEX: FIB4 SCORE: 2.38

## 2023-04-26 NOTE — PROGRESS NOTES
Chief Complaint   Patient presents with    Apnea    Follow-Up       HPI:  Yenifer Beasley is a 72 y.o. year old female here today for follow-up on HILDA.  Last office visit 5/31/2022 with Dr. Kim    At last office visit patient was ordered a new auto CPAP but never received it from DME.  She would like to continue to pursue starting therapy.  We will place new orders today.    Titration study 5/21/2022 noted sleep efficiency of 58%.  Titrated on CPAP 5 to 15 cm.  Also tried on BiPAP 18/14 cm.  Technically adequate study with best response to CPAP 11 cm with a reduced AHI of 1.7/h.  Study showed improvement respiratory vents with CPAP and oxygen saturation level remained above 90% majority of the night of study.  Recommend starting auto CPAP 7-11 cm with N30 mask.  Reviewed with patient again.    She brought her old device with her today to have settings checked as well.  She is previously intolerant to it due to having sinus issues at that time and the pressure feeling too high with resistance.  She denies cardiac arrest or symptoms today.      Sleep history:  PSG 7/27/2013 showed AHI of 22.5, minimum O2 sat of 83 and time below 89% saturation of 28 minutes.  Following diagnosis she was placed on CPAP which she used for almost a year.  2/1/2021 HST showed mild obstructive sleep apnea with overall AHI of 12.3 events per hour.  Of significance it did note that there were 354 minutes where oxygen saturation was below 89%          ROS: As per HPI and otherwise negative if not stated.    Past Medical History:   Diagnosis Date    Allergic rhinitis     Anticoagulation monitoring, special range     Arthritis     OA OF HANDS, KNEESM, HIPS AND BACK    Asthma 10/17/2019    childhood asthma    Atrial fibrillation (HCC)     history of    Back pain     Blood transfusion without reported diagnosis     CAD (coronary artery disease) 2007    CABG    Chest pain at rest 11/30/2010    Chronic anticoagulation 04/23/2018     "Constipation     off and on, pt manages    Coronary heart disease     Delayed emergence from general anesthesia     Diabetes     DIAB FEET EXAM:  1/6/11- Pt reports no history of diabetes    Diabetic neuropathy (HCC) 01/06/2011    Difficulty breathing     Fall     \"a couple years ago because of my neuopathy\" multiple falls    Gasping for breath     history of    Gastritis 07/2010    H/O UGIB    Gastritis     GERD (gastroesophageal reflux disease)     Greek measles     GI bleed     history of    Glaucoma     Hyperlipidemia     Hypertension     Hyponatremia 04/22/2014    Hypothyroidism     Impaired fasting glucose 10/08/2019    Infectious disease     History of MRSA    Migraine     Need for influenza vaccination 11/30/2010    ICD-10 transition    Neuropathy in diabetes (HCC)     HILDA (obstructive sleep apnea)     no cpap at this time, on order    Painful joint     Palpitations     Pneumonia     Pneumonia due to organism 04/22/2014    Diagnois update 10/1/2016    Pulmonary hypertension (HCC)     Rash     S/P aortic valve replacement 2007    Shortness of breath 08/29/2022    no c/o at this time    Skin infection 05/11/2018    Sleep apnea     Swelling of lower extremity     TIA (transient ischemic attack) 2005    TIA (transient ischemic attack)     Tonsillitis     Tricuspid regurgitation mild- mod 01/06/2011    Unspecified hemorrhagic conditions     Bleeds easily-GI bleeds with NSAIDS and ASA    Upper GI bleed ON JULY 2007 01/06/2011    Upper GI bleed ON JULY 2007 01/06/2011    URTI (acute upper respiratory infection) 11/30/2010    UTI (urinary tract infection) 04/24/2014    Valvular heart disease     Weakness     Wears glasses        Past Surgical History:   Procedure Laterality Date    MITRAL VALVE REPLACE  2007    AORTIC VALVE    SHOULDER SURGERY  06/2001    NEER DECOMOPRESSION LEFT SHOULDER    CARPAL TUNNEL RELEASE  1998    B/L    TONSILLECTOMY  1995    PARTIAL PALATOPLASTY    ABDOMINAL HYSTERECTOMY TOTAL      " TAHBSOO    AORTIC VALVE REPLACEMENT      CHOLECYSTECTOMY      CORONARY ARTERY BYPASS, 1      Triple Bypass    HERNIA REPAIR      VENTRAL HERNIA REPAIR    HYSTERECTOMY LAPAROSCOPY      INTUBATION      PRIMARY C SECTION      SLEEVE,MEJIA VASO THIGH      TUBAL COAGULATION LAPAROSCOPIC BILATERAL      UVULOPHARYNGOPALATOPLASTY      VENTILATOR CONTINUOUS         Family History   Problem Relation Age of Onset    Heart Disease Mother         a fib    Cancer Mother         Cervical: palate:      Heart Disease Brother         Heart attack, open heart surgery,     Cancer Maternal Grandmother         Leukemia,  21 y.o. 193    Cancer Maternal Aunt         Lung CA (+3 dtrs  from [place])    Heart Disease Brother         Heart attack, open heart surgery        Social History     Socioeconomic History    Marital status:      Spouse name: Not on file    Number of children: Not on file    Years of education: Not on file    Highest education level: Bachelor's degree (e.g., BA, AB, BS)   Occupational History    Not on file   Tobacco Use    Smoking status: Never    Smokeless tobacco: Never   Vaping Use    Vaping Use: Never used   Substance and Sexual Activity    Alcohol use: No    Drug use: Never    Sexual activity: Not Currently   Other Topics Concern    Not on file   Social History Narrative    Not on file     Social Determinants of Health     Financial Resource Strain: Medium Risk    Difficulty of Paying Living Expenses: Somewhat hard   Food Insecurity: No Food Insecurity    Worried About Running Out of Food in the Last Year: Never true    Ran Out of Food in the Last Year: Never true   Transportation Needs: No Transportation Needs    Lack of Transportation (Medical): No    Lack of Transportation (Non-Medical): No   Physical Activity: Unknown    Days of Exercise per Week: Patient refused    Minutes of Exercise per Session: Patient refused   Stress: No Stress Concern Present    Feeling of  "Stress : Only a little   Social Connections: Unknown    Frequency of Communication with Friends and Family: Twice a week    Frequency of Social Gatherings with Friends and Family: Patient refused    Attends Church Services: Patient refused    Active Member of Clubs or Organizations: No    Attends Club or Organization Meetings: 1 to 4 times per year    Marital Status:    Intimate Partner Violence: Not on file   Housing Stability: Unknown    Unable to Pay for Housing in the Last Year: No    Number of Places Lived in the Last Year: Not on file    Unstable Housing in the Last Year: No       Allergies as of 04/26/2023 - Reviewed 04/26/2023   Allergen Reaction Noted    Asa [aspirin]  11/30/2010    Atorvastatin Shortness of Breath 12/30/2021    Cymbalta [duloxetine hcl] Unspecified 12/02/2011    Hmg-coa-r inhibitors  10/27/2014    Latex Swelling     Nsaids  11/30/2010    Tricor Shortness of Breath 04/22/2014    Trilipix [choline fenofibrate] Shortness of Breath 01/06/2011    Lyrica [pregabalin]  11/30/2010        Vitals:  /80   Pulse 60   Resp 16   Ht 1.549 m (5' 1\")   Wt 90.7 kg (200 lb)   SpO2 96%     Current medications as of today   Current Outpatient Medications   Medication Sig Dispense Refill    warfarin (COUMADIN) 4 MG Tab TAKE ONE TABLET BY MOUTH DAILY OR AS DIRECTED BY ANTICOAGULATION CLINIC 100 Tablet 1    doxazosin (CARDURA) 2 MG Tab Take 1 Tablet by mouth every day. 90 Tablet 3    torsemide (DEMADEX) 20 MG Tab TAKE 1/2 TABLET BY MOUTH EVERY DAY 45 Tablet 2    timolol (TIMOPTIC) 0.5 % Solution 1 DROP INTO BOTH EYES TWICE A DAY      amiodarone (CORDARONE) 200 MG Tab TAKE 1 TABLET BY MOUTH EVERY  Tablet 3    ezetimibe (ZETIA) 10 MG Tab TAKE 1 TABLET BY MOUTH EVERY DAY 90 Tablet 3    triamcinolone acetonide (KENALOG) 0.1 % Ointment APPLY TO AFFECTED AREA TWICE A DAY FOR 2-3 WEEKS, THEN CAN USE 2 WEEKS ON, 2 WEEKS OFF AS NEEDED      lisinopril (PRINIVIL) 20 MG Tab Take 1 Tablet by mouth " 2 times a day. 200 Tablet 3    levothyroxine (SYNTHROID) 125 MCG Tab TAKE 1 TABLET BY MOUTH EVERY DAY IN THE MORNING ON AN EMPTY STOMACH 100 Tablet 2    Alirocumab (PRALUENT) 150 MG/ML Solution Auto-injector Inject 150 mg under the skin every 14 days. 6 mL 3    diclofenac sodium (VOLTAREN) 1 % Gel Apply  topically 4 times a day as needed.      vitamin D (CHOLECALCIFEROL) 1000 UNIT TABS Take 1,000 Units by mouth every day.        docosahexanoic acid (OMEGA 3 FA) 1000 MG CAPS Take 1,000 mg by mouth 2 Times a Day.      tramadol (ULTRAM) 50 MG TABS Take 2 Tabs by mouth 3 times a day. 180 Each 0     No current facility-administered medications for this visit.         Physical Exam:   Gen:           Alert and oriented, No apparent distress. Mood and affect appropriate, normal interaction with examiner.  Eyes:          PERRL, EOM intact, sclere white, conjunctive moist. Glasses  Ears:          Not examined.   Hearing:     Grossly intact.  Nose:          Normal, no lesions or deformities.  Dentition:    Not examined.   Oropharynx:   Not examined.   Mallampati Classification: Not examined.   Neck:        Supple, trachea midline, no masses.  Respiratory Effort: No intercostal retractions or use of accessory muscles.   Lung Auscultation:      Clear to auscultation bilaterally; no rales, rhonchi or wheezing.  CV:            Regular rate and rhythm. No murmurs, rubs or gallops.  Abd:           Not examined.   Lymphadenopathy: Not examined.  Gait and Station: Normal.  Digits and Nails: No clubbing, cyanosis, petechiae, or nodes.   Cranial Nerves: II-XII grossly intact.  Skin:        No rashes, lesions or ulcers noted.               Ext:           BLE edema      Assessment:  1. HILDA (obstructive sleep apnea)        2. Primary hypertension        3. BMI 37.0-37.9, adult  HEIGHT AND WEIGHT      4. Nonsmoker            Immunizations:    Flu:10/12/22  Pneumovax 23:2014  Prevnar 13:2017  PCV 20: given today  COVID-19:  3/31/21    Plan:  HILDA is untreated this time.  We will pursue an updated device so she can resume therapy.  Her orders were previously sent to medical equipment company who failed to provide her with equipment.  She will start auto CPAP 7 to 11 cm.  She brought her older device in office which I also adjusted to the same setting.  She understands need for consistent nightly use for the duration of sleep and ideally more than 4 hours.   DME APAP 7-11cm; if denied by insurance we will pursue updated sleep study to again order device  Follow-up with cardiology for ongoing management hypertension  Encourage weight loss through healthy eating, regular walking and diuretic therapy.  Follow-up in 2 to 3 months for first compliance check, sooner if needed.    Please note that this dictation was created using voice recognition software. I have made every reasonable attempt to correct obvious errors, but it is possible there are errors of grammar and possibly content that I did not discover before finalizing the note.

## 2023-04-26 NOTE — PROGRESS NOTES
Anticoagulation Summary  As of 4/26/2023      INR goal:  2.5-3.5   TTR:  66.0 % (6.2 y)   INR used for dosing:  3.30 (4/26/2023)   Warfarin maintenance plan:  4 mg (4 mg x 1) every day   Weekly warfarin total:  28 mg   Plan last modified:  Sybil Adkins, PharmD (7/26/2021)   Next INR check:  6/14/2023   Target end date:  Indefinite    Indications    Chronic anticoagulation (Resolved) [Z79.01]  Hx of mechanical aortic valve replacement [V43.3] [Z95.2]  TIA (transient ischemic attack) (Resolved) [G45.9]  Atrial fibrillation (HCC) [I48.91] [I48.91]                 Anticoagulation Episode Summary       INR check location:      Preferred lab:      Send INR reminders to:      Comments:  [employer] home meter          Anticoagulation Care Providers       Provider Role Specialty Phone number    Michael J Bloch, M.D. Referring Cardiovascular Disease (Cardiology) [phone]    Yinka Church, PharmD Responsible Pharmacy           Anticoagulation Patient Findings      Left voicemail message to report a therapeutic INR.      Pt to continue with current warfarin dosing regimen. Requested pt contact the clinic for any s/s of unusual bleeding, bruising, clotting or any changes to diet or medication.    FU INR in 7 week(s).    [place], Pharmacy Intern

## 2023-05-02 ENCOUNTER — PATIENT MESSAGE (OUTPATIENT)
Dept: MEDICAL GROUP | Facility: LAB | Age: 73
End: 2023-05-02
Payer: MEDICARE

## 2023-05-03 RX ORDER — LEVOTHYROXINE SODIUM 0.12 MG/1
125 TABLET ORAL
Qty: 100 TABLET | Refills: 2 | Status: SHIPPED | OUTPATIENT
Start: 2023-05-03 | End: 2023-12-06

## 2023-05-03 NOTE — PATIENT COMMUNICATION
Received request via: Patient     Was the patient seen in the last year in this department? Yes    Does the patient have an active prescription (recently filled or refills available) for medication(s) requested? No    Does the patient have FPC Plus and need 100 day supply (blood pressure, diabetes and cholesterol meds only)? Medication is not for cholesterol, blood pressure or diabetes

## 2023-06-02 ENCOUNTER — TELEPHONE (OUTPATIENT)
Dept: HEALTH INFORMATION MANAGEMENT | Facility: OTHER | Age: 73
End: 2023-06-02

## 2023-06-08 DIAGNOSIS — E78.5 HYPERLIPIDEMIA, UNSPECIFIED HYPERLIPIDEMIA TYPE: ICD-10-CM

## 2023-06-09 RX ORDER — ALIROCUMAB 150 MG/ML
150 INJECTION, SOLUTION SUBCUTANEOUS
Qty: 6 ML | Refills: 3 | Status: SHIPPED | OUTPATIENT
Start: 2023-06-09

## 2023-06-20 LAB — INR PPP: 3.1 (ref 2–3.5)

## 2023-06-21 ENCOUNTER — ANTICOAGULATION MONITORING (OUTPATIENT)
Dept: VASCULAR LAB | Facility: MEDICAL CENTER | Age: 73
End: 2023-06-21
Payer: MEDICARE

## 2023-06-21 DIAGNOSIS — I48.91 ATRIAL FIBRILLATION, UNSPECIFIED TYPE (HCC): ICD-10-CM

## 2023-06-21 DIAGNOSIS — Z95.2 HX OF MECHANICAL AORTIC VALVE REPLACEMENT: ICD-10-CM

## 2023-06-21 NOTE — PROGRESS NOTES
Anticoagulation Summary  As of 6/21/2023      INR goal:  2.5-3.5   TTR:  66.9 % (6.3 y)   INR used for dosing:  3.10 (6/20/2023)   Warfarin maintenance plan:  4 mg (4 mg x 1) every day   Weekly warfarin total:  28 mg   Plan last modified:  Sybil Adkins, PharmD (7/26/2021)   Next INR check:  7/18/2023   Target end date:  Indefinite    Indications    Chronic anticoagulation (Resolved) [Z79.01]  Hx of mechanical aortic valve replacement [V43.3] [Z95.2]  TIA (transient ischemic attack) (Resolved) [G45.9]  Atrial fibrillation (HCC) [I48.91] [I48.91]                 Anticoagulation Episode Summary       INR check location:      Preferred lab:      Send INR reminders to:      Comments:  [employer] home meter          Anticoagulation Care Providers       Provider Role Specialty Phone number    Michael J Bloch, M.D. Referring Cardiovascular Disease (Cardiology) [phone]    Yinka Church, PharmD Responsible Pharmacy             Refer to Anticoagulation Patient Findings for HPI  Patient Findings       Negatives:  Signs/symptoms of thrombosis, Signs/symptoms of bleeding, Laboratory test error suspected, Change in health, Change in alcohol use, Change in activity, Upcoming invasive procedure, Emergency department visit, Upcoming dental procedure, Missed doses, Extra doses, Change in medications, Change in diet/appetite, Hospital admission, Bruising, Other complaints            Spoke with patient to report a therapeutic INR.      Pt is NOT on antiplatelet therapy.    Pt instructed to continue with current warfarin dosing regimen, confirms dosing.   Will follow up in 4 week(s).     Yamile Peoples, TaylerT

## 2023-07-06 ENCOUNTER — APPOINTMENT (OUTPATIENT)
Dept: RADIOLOGY | Facility: MEDICAL CENTER | Age: 73
End: 2023-07-06
Attending: FAMILY MEDICINE
Payer: MEDICARE

## 2023-07-06 DIAGNOSIS — Z12.31 VISIT FOR SCREENING MAMMOGRAM: ICD-10-CM

## 2023-07-12 ENCOUNTER — PATIENT MESSAGE (OUTPATIENT)
Dept: HEALTH INFORMATION MANAGEMENT | Facility: OTHER | Age: 73
End: 2023-07-12

## 2023-07-12 ENCOUNTER — DOCUMENTATION (OUTPATIENT)
Dept: HEALTH INFORMATION MANAGEMENT | Facility: OTHER | Age: 73
End: 2023-07-12
Payer: MEDICARE

## 2023-07-21 ENCOUNTER — HOSPITAL ENCOUNTER (OUTPATIENT)
Dept: LAB | Facility: MEDICAL CENTER | Age: 73
End: 2023-07-21
Attending: NURSE PRACTITIONER
Payer: MEDICARE

## 2023-07-21 DIAGNOSIS — I10 ESSENTIAL HYPERTENSION: ICD-10-CM

## 2023-07-21 DIAGNOSIS — G45.9 TIA (TRANSIENT ISCHEMIC ATTACK): ICD-10-CM

## 2023-07-21 DIAGNOSIS — E78.5 HYPERLIPIDEMIA, UNSPECIFIED HYPERLIPIDEMIA TYPE: ICD-10-CM

## 2023-07-21 DIAGNOSIS — Z95.2 HX OF MECHANICAL AORTIC VALVE REPLACEMENT: ICD-10-CM

## 2023-07-21 DIAGNOSIS — I48.91 ATRIAL FIBRILLATION, UNSPECIFIED TYPE (HCC): ICD-10-CM

## 2023-07-21 DIAGNOSIS — E11.9 TYPE 2 DIABETES MELLITUS WITHOUT COMPLICATION, WITHOUT LONG-TERM CURRENT USE OF INSULIN (HCC): ICD-10-CM

## 2023-07-21 LAB
ALBUMIN SERPL BCP-MCNC: 4.1 G/DL (ref 3.2–4.9)
ALBUMIN/GLOB SERPL: 1.6 G/DL
ALP SERPL-CCNC: 54 U/L (ref 30–99)
ALT SERPL-CCNC: 14 U/L (ref 2–50)
ANION GAP SERPL CALC-SCNC: 12 MMOL/L (ref 7–16)
AST SERPL-CCNC: 21 U/L (ref 12–45)
BILIRUB SERPL-MCNC: 0.4 MG/DL (ref 0.1–1.5)
BUN SERPL-MCNC: 49 MG/DL (ref 8–22)
CALCIUM ALBUM COR SERPL-MCNC: 9.2 MG/DL (ref 8.5–10.5)
CALCIUM SERPL-MCNC: 9.3 MG/DL (ref 8.5–10.5)
CHLORIDE SERPL-SCNC: 103 MMOL/L (ref 96–112)
CHOLEST SERPL-MCNC: 150 MG/DL (ref 100–199)
CO2 SERPL-SCNC: 24 MMOL/L (ref 20–33)
CREAT SERPL-MCNC: 1.65 MG/DL (ref 0.5–1.4)
EST. AVERAGE GLUCOSE BLD GHB EST-MCNC: 123 MG/DL
FASTING STATUS PATIENT QL REPORTED: NORMAL
GFR SERPLBLD CREATININE-BSD FMLA CKD-EPI: 33 ML/MIN/1.73 M 2
GLOBULIN SER CALC-MCNC: 2.6 G/DL (ref 1.9–3.5)
GLUCOSE SERPL-MCNC: 93 MG/DL (ref 65–99)
HBA1C MFR BLD: 5.9 % (ref 4–5.6)
HDLC SERPL-MCNC: 65 MG/DL
INR PPP: 2.52 (ref 0.87–1.13)
LDLC SERPL CALC-MCNC: 68 MG/DL
POTASSIUM SERPL-SCNC: 4.6 MMOL/L (ref 3.6–5.5)
PROT SERPL-MCNC: 6.7 G/DL (ref 6–8.2)
PROTHROMBIN TIME: 26.4 SEC (ref 12–14.6)
SODIUM SERPL-SCNC: 139 MMOL/L (ref 135–145)
TRIGL SERPL-MCNC: 84 MG/DL (ref 0–149)

## 2023-07-21 PROCEDURE — 83036 HEMOGLOBIN GLYCOSYLATED A1C: CPT

## 2023-07-21 PROCEDURE — 36415 COLL VENOUS BLD VENIPUNCTURE: CPT

## 2023-07-21 PROCEDURE — 85610 PROTHROMBIN TIME: CPT

## 2023-07-21 PROCEDURE — 80061 LIPID PANEL: CPT

## 2023-07-21 PROCEDURE — 80053 COMPREHEN METABOLIC PANEL: CPT

## 2023-07-24 ENCOUNTER — ANTICOAGULATION MONITORING (OUTPATIENT)
Dept: VASCULAR LAB | Facility: MEDICAL CENTER | Age: 73
End: 2023-07-24
Payer: MEDICARE

## 2023-07-24 DIAGNOSIS — I48.91 ATRIAL FIBRILLATION, UNSPECIFIED TYPE (HCC): ICD-10-CM

## 2023-07-24 DIAGNOSIS — Z95.2 HX OF MECHANICAL AORTIC VALVE REPLACEMENT: ICD-10-CM

## 2023-07-24 NOTE — PROGRESS NOTES
Anticoagulation Summary  As of 2023      INR goal:  2.5-3.5   TTR:  67.7 % (6.4 y)   INR used for dosin.52 (2023)   Warfarin maintenance plan:  4 mg (4 mg x 1) every day   Weekly warfarin total:  28 mg   Plan last modified:  Sybil Adkins, PharmD (2021)   Next INR check:  2023   Target end date:  Indefinite    Indications    Chronic anticoagulation (Resolved) [Z79.01]  Hx of mechanical aortic valve replacement [V43.3] [Z95.2]  TIA (transient ischemic attack) (Resolved) [G45.9]  Atrial fibrillation (HCC) [I48.91] [I48.91]                 Anticoagulation Episode Summary       INR check location:      Preferred lab:      Send INR reminders to:      Comments:  [employer] home meter          Anticoagulation Care Providers       Provider Role Specialty Phone number    Michael J Bloch, M.D. Referring Cardiovascular Disease (Cardiology) [phone]    Yinka Church, PharmD Responsible Pharmacy             Refer to Anticoagulation Patient Findings for HPI      Spoke with patient to report a therapeutic INR.      Pt is NOT on antiplatelet therapy     Pt instructed to continue with current warfarin dosing regimen, confirms dosing.   Will follow up in 4 week(s).     Adela Colmenares, Pharmacy Intern

## 2023-07-26 ENCOUNTER — APPOINTMENT (OUTPATIENT)
Dept: SLEEP MEDICINE | Facility: MEDICAL CENTER | Age: 73
End: 2023-07-26
Attending: NURSE PRACTITIONER
Payer: MEDICARE

## 2023-08-04 ENCOUNTER — TELEPHONE (OUTPATIENT)
Dept: VASCULAR LAB | Facility: MEDICAL CENTER | Age: 73
End: 2023-08-04
Payer: MEDICARE

## 2023-08-04 NOTE — TELEPHONE ENCOUNTER
Medication: Praluent  Type of Insurance: Government funded (Medicare/Medicare Advantage)  Type of Financial assistance requested Foundation  Source: [employer] [facility] Medicare Access  Source Phone #: [phone]   Outcome: Approved  Effective dates: 07/05/2023 - 07/04/2024  Details/Billing Information:   CARD NO.  [id number]   [facility]  [id number]  N  [id number]   GROUP  [id number]  Final Copay: $0.00

## 2023-08-09 ENCOUNTER — APPOINTMENT (OUTPATIENT)
Dept: RADIOLOGY | Facility: MEDICAL CENTER | Age: 73
End: 2023-08-09
Attending: FAMILY MEDICINE
Payer: MEDICARE

## 2023-08-09 ENCOUNTER — OFFICE VISIT (OUTPATIENT)
Dept: VASCULAR LAB | Facility: MEDICAL CENTER | Age: 73
End: 2023-08-09
Attending: NURSE PRACTITIONER
Payer: MEDICARE

## 2023-08-09 VITALS
BODY MASS INDEX: 39.27 KG/M2 | SYSTOLIC BLOOD PRESSURE: 131 MMHG | HEIGHT: 61 IN | WEIGHT: 208 LBS | DIASTOLIC BLOOD PRESSURE: 75 MMHG | HEART RATE: 48 BPM

## 2023-08-09 DIAGNOSIS — E78.5 HYPERLIPIDEMIA, UNSPECIFIED HYPERLIPIDEMIA TYPE: ICD-10-CM

## 2023-08-09 DIAGNOSIS — N18.32 STAGE 3B CHRONIC KIDNEY DISEASE: ICD-10-CM

## 2023-08-09 DIAGNOSIS — I10 ESSENTIAL HYPERTENSION: ICD-10-CM

## 2023-08-09 DIAGNOSIS — Z12.31 VISIT FOR SCREENING MAMMOGRAM: ICD-10-CM

## 2023-08-09 DIAGNOSIS — E11.42 DIABETIC POLYNEUROPATHY ASSOCIATED WITH TYPE 2 DIABETES MELLITUS (HCC): ICD-10-CM

## 2023-08-09 DIAGNOSIS — G47.33 OSA (OBSTRUCTIVE SLEEP APNEA): Chronic | ICD-10-CM

## 2023-08-09 PROCEDURE — 3078F DIAST BP <80 MM HG: CPT | Performed by: NURSE PRACTITIONER

## 2023-08-09 PROCEDURE — 77063 BREAST TOMOSYNTHESIS BI: CPT

## 2023-08-09 PROCEDURE — 99214 OFFICE O/P EST MOD 30 MIN: CPT | Performed by: NURSE PRACTITIONER

## 2023-08-09 PROCEDURE — 3075F SYST BP GE 130 - 139MM HG: CPT | Performed by: NURSE PRACTITIONER

## 2023-08-09 PROCEDURE — 99212 OFFICE O/P EST SF 10 MIN: CPT

## 2023-08-09 RX ORDER — DOXAZOSIN 2 MG/1
1 TABLET ORAL DAILY
Qty: 45 TABLET | Refills: 3
Start: 2023-08-09 | End: 2023-11-14

## 2023-08-09 ASSESSMENT — FIBROSIS 4 INDEX: FIB4 SCORE: 2.51

## 2023-08-09 NOTE — PROGRESS NOTES
"  [facility] Follow Up Visit    Date of Service: 08/09/2023    Yenifer Beasley is here for follow up of dyslipidemia, HTN, chronic anticoagulation, and hypothyroidism    HPI  Pertinent Interval History since last visit:   Remains on Zetia, vit d and otc omega 3s  No chest pain, SOB  Home BP \"up and down\" no log today  Taking doxazosin 2mg- but making her dizzy recently   Also reports could be drinking less water  On warfarin, no bleeding issues, managed with RCC  Still with LE swelling, unchanged- using compression socks with good relief  No open sores/wounds  Has CPAP machine but needing assistance with mask use- she will contact pulm  Had fasting labs    Current Prescription Lipid Lowering Medications - including dose: Has missed several doses   Statin: None  Non-Statin:   Continue Zetia  Current Lipid Lowering and Related Supplements:   Omega 3s, 1000u daily  Vit D  Any Current Side Effects Potentially Related to Lipid Lowering therapy?   No  Current Adherence to Lipid Lowering Therapies:  Complete  Any Previous History of Statin Intolerance?   Yes, Details: severe myalgias on multiple statins  Baseline Lipids Prior to Treatment:  Shown here:  LDL-C: 152      SOCIAL HISTORY  Social History     Tobacco Use   Smoking Status Never   Smokeless Tobacco Never      Change in weight: none  Exercise habits: mod exercise  Diet: common adult, consistent vit K    Physical Exam  Constitutional:       General: She is not in acute distress.     Appearance: She is well-developed. She is obese. She is not diaphoretic.   Cardiovascular:      Rate and Rhythm: Bradycardia present.      Pulses: Normal pulses.   Pulmonary:      Effort: Pulmonary effort is normal. No respiratory distress.   Musculoskeletal:         General: Swelling present. Normal range of motion.      Comments: Uses cane   Skin:     Coloration: Skin is not pale.      Findings: No erythema.   Neurological:      Mental Status: She is alert and oriented to " person, place, and time.      Motor: No weakness.   Psychiatric:         Mood and Affect: Mood normal.         Behavior: Behavior normal.         Thought Content: Thought content normal.       DATA REVIEW  Most Recent Lipid Panel:   Lab Results   Component Value Date    CHOLSTRLTOT 150 07/21/2023    TRIGLYCERIDE 84 07/21/2023    HDL 65 07/21/2023    LDL 68 07/21/2023     Other Pertinent Blood Work:   Lab Results   Component Value Date    SODIUM 139 07/21/2023    POTASSIUM 4.6 07/21/2023    CHLORIDE 103 07/21/2023    CO2 24 07/21/2023    ANION 12.0 07/21/2023    GLUCOSE 93 07/21/2023    BUN 49 (H) 07/21/2023    CREATININE 1.65 (H) 07/21/2023    CALCIUM 9.3 07/21/2023    ASTSGOT 21 07/21/2023    ALTSGPT 14 07/21/2023    ALKPHOSPHAT 54 07/21/2023    TBILIRUBIN 0.4 07/21/2023    ALBUMIN 4.1 07/21/2023    AGRATIO 1.6 07/21/2023    TSHULTRASEN 1.950 04/26/2022     Other:  Lp(a) - 57 (oct 2017)  TSH 1.5  (oct 2017)    Recent Imaging Studies:      Echo march 2017:  1. Left ventricular ejection fraction is visually estimated to be 55%.   Normal regional wall motion. Grade I diastolic dysfunction.  2. Known mechanical aortic valve that is functioning normally.   3. Mild mitral stenosis.  4. Estimated right ventricular systolic pressure is 40 mmHg.    Echo 4/4/19  CONCLUSIONS  Normal left ventricular systolic function.  Left ventricular ejection fraction is visually estimated to be 65-70%.  Mild concentric left ventricular hypertrophy.  Mild mitral stenosis.  Severe mitral annular calcification.  Mild mitral regurgitation.  Bioprosthetic aortic valve with increased velocities.  Right heart pressures are consistent with moderate pulmonary   hypertension.  Severely dilated left atrium.  Echo 3/3/21  Normal left ventricular chamber size.  Left ventricular ejection fraction is visually estimated to be 65%.  Mild concentric left ventricular hypertrophy.  Dilated inferior vena cava without inspiratory collapse.  Moderate mitral  stenosis.  Mean ixocuhhilva24.5ar gradient is 7 mmHg at a heart rate of 64 BPM.  Structurally normal tricuspid valve without significant stenosis.  Normal function of mechanical prosthetic valve.  US aorta 4/14/21  Mild atherosclerotic change of the aorta. No evidence of aneurysm.  Carotid duplex 4/14/21  Mild plaque with no significant stenosis.  ASSESSMENT AND PLAN    Patient Type, check all that apply:   Secondary Prevention  Established Atherosclerotic Cardiovascular Disease (ASCVD)  Yes, Details: CAD s/p CABG  Other Established (non-atherosclerotic) Vascular/Heart Disease, if Present:    Valvular heart disease s/p mechanical AVR - continue anticoagulation;  Recently saw cardiology for worsening valvular heart disease- had angiogram scheduled but decided against it as she does not want to move forward with any surgery.    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):   Possible  ACC/AHA Indication for Statin Therapy, toby all that apply:   Established ASCVD: Indication for High intensity statin    Calculated Risk for ASCVD, if applicable:  N/A  Other Significant Risk Markers, if any, toby all that apply:  Family history of premature ASCVD in first degree relative   High lp(a)  National Lipid Association (NLA) Goal (if applicable):  LDL-C:   <70 mg/dL and nonHDL <100  Now at goal on Praluent     Lifestyle Recommendations From Today’s Visit:    Diet - low simple carb like [place]; limiting sugars  Exercise - daily home exercise for 15-20 min    Non-Statin Medications Recommendations from Today’s Visit:   - Continue Praluent 150mg   - Continue zetia 10 mg daily  - Consider addition of fibrate if nonHDL >130 in future    Indication for PCSK9 Inhibitor, if applicable:  ASCVD with suboptimal control of LDL-C despite maximally tolerated statin  Supplements Recommended at this visit:  - Continue omega 3's at 3,000u/day, Vit D     Recommendations for Other Cardiovascular Risk Factors, toby all that  apply:   - HTN: check BP 3-4 times per week; bring machine to next appt; Bradycardic on monitor today; encouraged her to write down her BP and HR at home to bring to next appt.    - HILDA: Sleep study completed- pulm made adjustments- started CPAP machine but has some questions. She is in touch with pulm.   - IFG: relatively stable.  Continue TLC. A1C 5.9  - Anticoagulation: continue warfarin and f/u with anticoag clinic  - Hypothyroidism - seems under reasonable control per TSH; continue current thyroid repletion; otherwise defer management to PCP    Studies Ordered at Todays Visit: echo due in 2023  Blood Work Ordered At Today’s visit: cmp, lipids, MA    Follow-Up:   1) Anticoagulation- continue monitoring with  and report to anticoag clinic  2) Vascular medicine in 3 months to address BP log   3) F/u with cardiology as discussed    RADHA Avalos.

## 2023-08-24 PROCEDURE — RXMED WILLOW AMBULATORY MEDICATION CHARGE: Performed by: NURSE PRACTITIONER

## 2023-08-25 ENCOUNTER — PHARMACY VISIT (OUTPATIENT)
Dept: PHARMACY | Facility: MEDICAL CENTER | Age: 73
End: 2023-08-25
Payer: MEDICARE

## 2023-08-25 LAB — INR PPP: 2.6 (ref 2–3.5)

## 2023-08-27 DIAGNOSIS — I10 HYPERTENSION, UNSPECIFIED TYPE: ICD-10-CM

## 2023-08-28 ENCOUNTER — ANTICOAGULATION MONITORING (OUTPATIENT)
Dept: MEDICAL GROUP | Facility: PHYSICIAN GROUP | Age: 73
End: 2023-08-28
Payer: MEDICARE

## 2023-08-28 DIAGNOSIS — Z95.2 HX OF MECHANICAL AORTIC VALVE REPLACEMENT: ICD-10-CM

## 2023-08-28 DIAGNOSIS — I48.91 ATRIAL FIBRILLATION, UNSPECIFIED TYPE (HCC): ICD-10-CM

## 2023-08-28 NOTE — PROGRESS NOTES
Anticoagulation Summary  As of 2023      INR goal:  2.5-3.5   TTR:  68.2 % (6.5 y)   INR used for dosin.60 (2023)   Warfarin maintenance plan:  4 mg (4 mg x 1) every day   Weekly warfarin total:  28 mg   Plan last modified:  Emily MaradiagaD (2021)   Next INR check:  2023   Target end date:  Indefinite    Indications    Chronic anticoagulation (Resolved) [Z79.01]  Hx of mechanical aortic valve replacement [V43.3] [Z95.2]  TIA (transient ischemic attack) (Resolved) [G45.9]  Atrial fibrillation (HCC) [I48.91] [I48.91]                 Anticoagulation Episode Summary       INR check location:      Preferred lab:      Send INR reminders to:      Comments:  [employer] home meter          Anticoagulation Care Providers       Provider Role Specialty Phone number    Michael J Bloch, M.D. Referring Cardiovascular Disease (Cardiology) [phone]    Yinka Church, PharmD Responsible Pharmacy           Anticoagulation Patient Findings    Left voicemail message to report a therapeutic INR of 2.6.  Requested pt contact the clinic for any s/s of unusual bleeding, bruising, clotting or any changes to diet or medication.   Pt is to continue with current warfarin dosing regimen.    Pt is not on antiplatelet therapy      FU 4 weeks.  Samreen Dorman, Student Pharmacist

## 2023-08-29 RX ORDER — TORSEMIDE 20 MG/1
TABLET ORAL
Qty: 45 TABLET | Refills: 2 | Status: SHIPPED | OUTPATIENT
Start: 2023-08-29 | End: 2024-01-05 | Stop reason: SDUPTHER

## 2023-08-29 NOTE — TELEPHONE ENCOUNTER
Last OV 8/18/22 with AK. Recent labs dated 7/21/23. BUN and CR not within .2.    AK: Please review RX and renew if appropriate. Thank you.

## 2023-09-01 DIAGNOSIS — I10 ESSENTIAL HYPERTENSION: ICD-10-CM

## 2023-09-01 RX ORDER — LISINOPRIL 20 MG/1
20 TABLET ORAL 2 TIMES DAILY
Qty: 180 TABLET | Refills: 3 | Status: SHIPPED | OUTPATIENT
Start: 2023-09-01 | End: 2023-11-14

## 2023-09-05 DIAGNOSIS — I10 ESSENTIAL HYPERTENSION: ICD-10-CM

## 2023-09-05 RX ORDER — LISINOPRIL 20 MG/1
20 TABLET ORAL 2 TIMES DAILY
Qty: 180 TABLET | Refills: 3 | OUTPATIENT
Start: 2023-09-05

## 2023-09-21 ENCOUNTER — ANTICOAGULATION MONITORING (OUTPATIENT)
Dept: VASCULAR LAB | Facility: MEDICAL CENTER | Age: 73
End: 2023-09-21
Payer: MEDICARE

## 2023-09-21 DIAGNOSIS — Z95.2 HX OF MECHANICAL AORTIC VALVE REPLACEMENT: ICD-10-CM

## 2023-09-21 DIAGNOSIS — I48.91 ATRIAL FIBRILLATION, UNSPECIFIED TYPE (HCC): ICD-10-CM

## 2023-09-21 LAB — INR PPP: 2.6 (ref 2–3.5)

## 2023-09-21 NOTE — PROGRESS NOTES
Anticoagulation Summary  As of 9/21/2023      INR goal:  2.5-3.5   TTR:  68.5 % (6.6 y)   INR used for dosing:     Warfarin maintenance plan:  4 mg (4 mg x 1) every day   Weekly warfarin total:  28 mg   Plan last modified:  Sybil Adkins, PharmD (7/26/2021)   Next INR check:  10/19/2023   Target end date:  Indefinite    Indications    Chronic anticoagulation (Resolved) [Z79.01]  Hx of mechanical aortic valve replacement [V43.3] [Z95.2]  TIA (transient ischemic attack) (Resolved) [G45.9]  Atrial fibrillation (HCC) [I48.91] [I48.91]                 Anticoagulation Episode Summary       INR check location:      Preferred lab:      Send INR reminders to:      Comments:  [employer] home meter          Anticoagulation Care Providers       Provider Role Specialty Phone number    Michael J Bloch, M.D. Referring Cardiovascular Disease (Cardiology) [phone]    Emily MartinezD Responsible Pharmacy             Refer to Anticoagulation Patient Findings for HPI  Patient Findings       Negatives:  Signs/symptoms of thrombosis, Signs/symptoms of bleeding, Laboratory test error suspected, Change in health, Change in alcohol use, Change in activity, Upcoming invasive procedure, Emergency department visit, Upcoming dental procedure, Missed doses, Extra doses, Change in medications, Change in diet/appetite, Hospital admission, Bruising, Other complaints            Spoke with patient to report a therapeutic INR.      Pt is NOT on antiplatelet therapy   Pt instructed to continue with current warfarin dosing regimen, confirms dosing.   Will follow up in 4 week(s).     Emily AvitiaD

## 2023-10-09 ENCOUNTER — OFFICE VISIT (OUTPATIENT)
Dept: DERMATOLOGY | Facility: IMAGING CENTER | Age: 73
End: 2023-10-09
Payer: MEDICARE

## 2023-10-09 DIAGNOSIS — D48.9 NEOPLASM OF UNCERTAIN BEHAVIOR: ICD-10-CM

## 2023-10-09 DIAGNOSIS — L57.0 ACTINIC KERATOSIS: ICD-10-CM

## 2023-10-09 PROCEDURE — 11102 TANGNTL BX SKIN SINGLE LES: CPT | Performed by: NURSE PRACTITIONER

## 2023-10-09 PROCEDURE — 17000 DESTRUCT PREMALG LESION: CPT | Mod: 59 | Performed by: NURSE PRACTITIONER

## 2023-10-09 NOTE — PROGRESS NOTES
"DERMATOLOGY NOTE  FOLLOW UP VISIT       Chief complaint: Skin Lesion     HPI/location: skin lesion on rt elbow   Time present: 3 mths  Painful lesion: Yes  Itching lesion: No  Enlarging lesion: No  Anything make it better or worse?    History of skin cancer: No  History of precancers/actinic keratoses: Yes, Details: AK's cryo tx  History of biopsies: yes, dorsum of L hand, unsure of results, was excised  History of blistering/severe sunburns:Yes, Details: childhood  Family history of skin cancer:Yes, Details: Mother BCC  Family history of atypical moles:No    Allergies   Allergen Reactions    Asa [Aspirin]      GI BLEED    Atorvastatin Shortness of Breath    Cymbalta [Duloxetine Hcl] Unspecified     Feels like a \"zombie\"    Hmg-Coa-R Inhibitors     Latex Swelling    Nsaids      GI BLEED    Tricor Shortness of Breath     Breathing problems      Trilipix [Choline Fenofibrate] Shortness of Breath     SOB    Lyrica [Pregabalin]      SPACED OUT        MEDICATIONS:  Medications relevant to specialty reviewed.     REVIEW OF SYSTEMS:   Positive for skin (see HPI)  Negative for fevers and chills       EXAM:  LMP  (LMP Unknown)   Constitutional: Well-developed, well-nourished, and in no distress.     A focused skin exam was performed including the affected areas of the RUE. Notable findings on exam today listed below and/or in assessment/plan.     6 mm pink papule with central horn like protrusion to R olecranon process  Ill-defined erythematous gritty/scaly papule over the dorsum of R hand      IMPRESSION / PLAN:    1. Neoplasm of uncertain behavior  Procedure Note   Procedure: Biopsy by shave technique  Location: R elbow  Size: as noted in exam  Preoperative diagnosis:NMSC vs other  Risks, benefits and alternatives of procedure discussed, verbal consent obtained for photo (see chart) and written informed consent obtained for procedure. Time out completed. Area of biopsy prepped with alcohol. Anesthesia with 1% lidocaine " with epinephrine administered with 30 gauge needle. Shave biopsy of the site performed. Hemostasis achieved with pressure and aluminum chloride. Vaseline applied to wound with bandage. Patient tolerated procedure well and there were no complications. The specimen was sent to the pathology lab by the staff. Wound care was discussed.      2. Actinic keratosis  CRYOTHERAPY:  Risks (including, but not limited to: skin discoloration, redness, blister, blood blister, recurrence, need for further treatment, infection, scar) and benefits of cryotherapy discussed. Patient verbally agreed to proceed with treatment. 1 cryotherapy freeze thaw cycles of 10 seconds were applied to 1 lesion on R hand with cryac. Patient tolerated procedure well. Aftercare instructions given--no specific care needed unless irritated during healing process, can apply Vaseline with small band-aid if needed.      Discussed risks associated with shave bx,   Patient verbalized understanding and agrees with plan regarding the above       Please note that this dictation was created using voice recognition software. I have made every reasonable attempt to correct obvious errors, but I expect that there are errors of grammar and possibly content that I did not discover before finalizing the note.      Return to clinic in: Return for Pending Bx results. and as needed for any new or changing skin lesions.

## 2023-10-11 ENCOUNTER — OFFICE VISIT (OUTPATIENT)
Dept: SLEEP MEDICINE | Facility: MEDICAL CENTER | Age: 73
End: 2023-10-11
Attending: NURSE PRACTITIONER
Payer: MEDICARE

## 2023-10-11 VITALS
OXYGEN SATURATION: 94 % | SYSTOLIC BLOOD PRESSURE: 126 MMHG | BODY MASS INDEX: 39.46 KG/M2 | HEIGHT: 61 IN | HEART RATE: 69 BPM | WEIGHT: 209 LBS | RESPIRATION RATE: 16 BRPM | DIASTOLIC BLOOD PRESSURE: 74 MMHG

## 2023-10-11 DIAGNOSIS — G47.33 OSA (OBSTRUCTIVE SLEEP APNEA): Chronic | ICD-10-CM

## 2023-10-11 DIAGNOSIS — Z78.9 NONSMOKER: ICD-10-CM

## 2023-10-11 DIAGNOSIS — I10 PRIMARY HYPERTENSION: ICD-10-CM

## 2023-10-11 PROCEDURE — 99212 OFFICE O/P EST SF 10 MIN: CPT | Performed by: NURSE PRACTITIONER

## 2023-10-11 PROCEDURE — 3078F DIAST BP <80 MM HG: CPT | Performed by: NURSE PRACTITIONER

## 2023-10-11 PROCEDURE — 3074F SYST BP LT 130 MM HG: CPT | Performed by: NURSE PRACTITIONER

## 2023-10-11 PROCEDURE — 99214 OFFICE O/P EST MOD 30 MIN: CPT | Performed by: NURSE PRACTITIONER

## 2023-10-11 ASSESSMENT — FIBROSIS 4 INDEX: FIB4 SCORE: 2.51

## 2023-10-11 NOTE — PROGRESS NOTES
Chief Complaint   Patient presents with    Follow-Up     Apnea // Last Seen 4/26/2023    Other     Set up on new machine 7/11/2023        HPI:  Yenifer Beasley is a 72 y.o. year old female here today for follow-up on HILDA; compliance check.  Last OV 4/26/23     Currently using APAP @ 7-11cm H20 nightly; RESMED; device obtained 7/2023.  Compliance report 7/11/2023 through 10/8/2023 notes 28 nights of use, only 8 nights greater than 4 hours use, average nightly use 2 hours 57 minutes, pressure 8.5 cm, moderate to significant mask leak with an overall AHI of 1.1/h.  Reviewed with patient.  She notes using multiple masks initially using a nasal cup that pushed on her nose and cause pain which she did not tolerate.  She then switched to nasal pillows which she felt were improved but continued to leak.  She currently sleeps in her recliner notes when her head falls to the side the mask leak.  Wearing a sleep study she utilized a small AirFit N30 mask with chinstrap.  She notes when using the nasal cup mask with chinstrap she woke up feeling suffocating and could not get air and had panic from that event.  She was previously on CPAP several years ago and understands how to use the device but finds issues with the fit of the mask and how to tolerate it.  We reviewed this in depth and perform mask fitting in office.  She did not have the head strap adjusted appropriately on the nasal pillow mask.  She is motivated to continue with use.  She understands the need for consistent nightly use greater than 4 hours to meet compliance standards.  We will also make a pressure adjustment since she finds the air to be too strong at this time.  She denies new cardiac or respiratory symptoms today.    Sleep history:  PSG 7/27/2013 showed AHI of 22.5, minimum O2 sat of 83 and time below 89% saturation of 28 minutes.  Following diagnosis she was placed on CPAP which she used for almost a year.  2/1/2021 HST showed mild obstructive sleep  "apnea with overall AHI of 12.3 events per hour.  Of significance it did note that there were 354 minutes where oxygen saturation was below 89%.  Titration study 5/21/2022 noted sleep efficiency of 58%.  Titrated on CPAP 5 to 15 cm.  Also tried on BiPAP 18/14 cm.  Technically adequate study with best response to CPAP 11 cm with a reduced AHI of 1.7/h.  Study showed improvement respiratory vents with CPAP and oxygen saturation level remained above 90% majority of the night of study.  Recommend starting auto CPAP 7-11 cm with N30 mask.      ROS: As per HPI and otherwise negative if not stated.    Past Medical History:   Diagnosis Date    Allergic rhinitis     Anticoagulation monitoring, special range     Arthritis     OA OF HANDS, KNEESM, HIPS AND BACK    Asthma 10/17/2019    childhood asthma    Atrial fibrillation (HCC)     history of    Back pain     Blood transfusion without reported diagnosis     CAD (coronary artery disease) 2007    CABG    Chest pain at rest 11/30/2010    Chronic anticoagulation 04/23/2018    Constipation     off and on, pt manages    Coronary heart disease     Delayed emergence from general anesthesia     Diabetes     DIAB FEET EXAM:  1/6/11- Pt reports no history of diabetes    Diabetic neuropathy (HCC) 01/06/2011    Difficulty breathing     Fall     \"a couple years ago because of my neuopathy\" multiple falls    Gasping for breath     history of    Gastritis 07/2010    H/O UGIB    Gastritis     GERD (gastroesophageal reflux disease)     La[place] measles     GI bleed     history of    Glaucoma     Hyperlipidemia     Hypertension     Hyponatremia 04/22/2014    Hypothyroidism     Impaired fasting glucose 10/08/2019    Infectious disease     History of MRSA    Migraine     Need for influenza vaccination 11/30/2010    ICD-10 transition    Neuropathy in diabetes (HCC)     HILDA (obstructive sleep apnea)     no cpap at this time, on order    Painful joint     Palpitations     Pneumonia     Pneumonia due " to organism 2014    Diagnois update 10/1/2016    Pulmonary hypertension (HCC)     Rash     S/P aortic valve replacement     Shortness of breath 2022    no c/o at this time    Skin infection 2018    Sleep apnea     Swelling of lower extremity     TIA (transient ischemic attack)     TIA (transient ischemic attack)     Tonsillitis     Tricuspid regurgitation mild- mod 2011    Unspecified hemorrhagic conditions     Bleeds easily-GI bleeds with NSAIDS and ASA    Upper GI bleed ON 2011    Upper GI bleed ON 2011    URTI (acute upper respiratory infection) 2010    UTI (urinary tract infection) 2014    Valvular heart disease     Weakness     Wears glasses        Past Surgical History:   Procedure Laterality Date    MITRAL VALVE REPLACE      AORTIC VALVE    SHOULDER SURGERY  2001    NEER DECOMOPRESSION LEFT SHOULDER    CARPAL TUNNEL RELEASE      B/L    TONSILLECTOMY      PARTIAL PALATOPLASTY    ABDOMINAL HYSTERECTOMY TOTAL      TAHBSOO    AORTIC VALVE REPLACEMENT      CHOLECYSTECTOMY      CORONARY ARTERY BYPASS, 1      Triple Bypass    HERNIA REPAIR      VENTRAL HERNIA REPAIR    HYSTERECTOMY LAPAROSCOPY      INTUBATION      PRIMARY C SECTION      SLEEVE,MEJIA VASO THIGH      TUBAL COAGULATION LAPAROSCOPIC BILATERAL      UVULOPHARYNGOPALATOPLASTY      VENTILATOR CONTINUOUS         Family History   Problem Relation Age of Onset    Heart Disease Mother         a fib    Cancer Mother         Cervical: palate:      Cancer Maternal Grandmother         Leukemia,  21 y.o. 193    Cancer Maternal Aunt         Lung CA (+3 dtrs  from CA)    Heart Disease Brother         Heart attack, open heart surgery,     Heart Disease Brother         Heart attack, open heart surgery     Breast Cancer Cousin        Social History     Socioeconomic History    Marital status:      Spouse name: Not on file    Number of  children: Not on file    Years of education: Not on file    Highest education level: Bachelor's degree (e.g., BA, AB, BS)   Occupational History    Not on file   Tobacco Use    Smoking status: Never    Smokeless tobacco: Never   Vaping Use    Vaping Use: Never used   Substance and Sexual Activity    Alcohol use: No    Drug use: Never    Sexual activity: Not Currently   Other Topics Concern    Not on file   Social History Narrative    Not on file     Social Determinants of Health     Financial Resource Strain: Medium Risk (12/8/2022)    Overall Financial Resource Strain (CARDIA)     Difficulty of Paying Living Expenses: Somewhat hard   Food Insecurity: No Food Insecurity (12/8/2022)    Hunger Vital Sign     Worried About Running Out of Food in the Last Year: Never true     Ran Out of Food in the Last Year: Never true   Transportation Needs: No Transportation Needs (12/8/2022)    PRAPARE - Transportation     Lack of Transportation (Medical): No     Lack of Transportation (Non-Medical): No   Physical Activity: Unknown (12/8/2022)    Exercise Vital Sign     Days of Exercise per Week: Patient refused     Minutes of Exercise per Session: Patient refused   Stress: No Stress Concern Present (12/8/2022)    [employer] [place] [employer] - Occupational Stress Questionnaire     Feeling of Stress : Only a little   Social Connections: Unknown (12/8/2022)    Social Connection and Isolation Panel [NHANES]     Frequency of Communication with Friends and Family: Twice a week     Frequency of Social Gatherings with Friends and Family: Patient refused     Attends [employer] Services: Patient refused     Active Member of Clubs or Organizations: No     Attends Club or Organization Meetings: 1 to 4 times per year     Marital Status:    Intimate Partner Violence: Not on file   Housing Stability: Unknown (12/8/2022)    Housing Stability Vital Sign     Unable to Pay for Housing in the Last Year: No     Number of Places  "Lived in the Last Year: Not on file     Unstable Housing in the Last Year: No       Allergies as of 10/11/2023 - Reviewed 10/11/2023   Allergen Reaction Noted    Asa [aspirin]  11/30/2010    Atorvastatin Shortness of Breath 12/30/2021    Cymbalta [duloxetine hcl] Unspecified 12/02/2011    Hmg-coa-r inhibitors  10/27/2014    Latex Swelling     Nsaids  11/30/2010    Tricor Shortness of Breath 04/22/2014    Trilipix [choline fenofibrate] Shortness of Breath 01/06/2011    Lyrica [pregabalin]  11/30/2010        Vitals:  /74 (BP Location: Left arm, Patient Position: Sitting, BP Cuff Size: Adult)   Pulse 69   Resp 16   Ht 1.549 m (5' 1\")   Wt 94.8 kg (209 lb)   SpO2 94%     Current medications as of today   Current Outpatient Medications   Medication Sig Dispense Refill    lisinopril (PRINIVIL) 20 MG Tab Take 1 Tablet by mouth 2 times a day. 180 Tablet 3    torsemide (DEMADEX) 20 MG Tab TAKE 1/2 TABLET BY MOUTH EVERY DAY 45 Tablet 2    doxazosin (CARDURA) 2 MG Tab Take 0.5 Tablets by mouth every day. 45 Tablet 3    Alirocumab (PRALUENT) 150 MG/ML Solution Auto-injector Inject 150 mg under the skin every 14 days. 6 mL 3    levothyroxine (SYNTHROID) 125 MCG Tab Take 1 Tablet by mouth every morning on an empty stomach. 100 Tablet 2    warfarin (COUMADIN) 4 MG Tab TAKE ONE TABLET BY MOUTH DAILY OR AS DIRECTED BY ANTICOAGULATION CLINIC 100 Tablet 1    timolol (TIMOPTIC) 0.5 % Solution 1 DROP INTO BOTH EYES TWICE A DAY      amiodarone (CORDARONE) 200 MG Tab TAKE 1 TABLET BY MOUTH EVERY  Tablet 3    ezetimibe (ZETIA) 10 MG Tab TAKE 1 TABLET BY MOUTH EVERY DAY 90 Tablet 3    triamcinolone acetonide (KENALOG) 0.1 % Ointment APPLY TO AFFECTED AREA TWICE A DAY FOR 2-3 WEEKS, THEN CAN USE 2 WEEKS ON, 2 WEEKS OFF AS NEEDED      diclofenac sodium (VOLTAREN) 1 % Gel Apply  topically 4 times a day as needed.      vitamin D (CHOLECALCIFEROL) 1000 UNIT TABS Take 1,000 Units by mouth every day.        docosahexanoic acid " (OMEGA 3 FA) 1000 MG CAPS Take 1,000 mg by mouth 2 Times a Day.      tramadol (ULTRAM) 50 MG TABS Take 2 Tabs by mouth 3 times a day. 180 Each 0     No current facility-administered medications for this visit.         Physical Exam:   Gen:           Alert and oriented, No apparent distress. Mood and affect appropriate, normal interaction with examiner.  Eyes:          PERRL, EOM intact, sclere white, conjunctive moist. Glasses.  Ears:          Not examined.   Hearing:     Grossly intact.  Nose:          Normal, no lesions or deformities.  Dentition:    Not examined.  Oropharynx:   Not examined.  Mallampati Classification: Not examined.  Neck:        Supple, trachea midline, no masses.  Respiratory Effort: No intercostal retractions or use of accessory muscles.   Lung Auscultation:      Clear to auscultation bilaterally; no rales, rhonchi or wheezing.  CV:            Regular rate and rhythm. No murmurs, rubs or gallops.  Abd:           Not examined.   Lymphadenopathy: Not examined.  Gait and Station: Normal.  Digits and Nails: No clubbing, cyanosis, petechiae, or nodes.   Cranial Nerves: II-XII grossly intact.  Skin:        No rashes, lesions or ulcers noted.               Ext:           BLE enlarged legs, edema      Assessment:  1. HILDA (obstructive sleep apnea)  DME Mask and Supplies    DME Other      2. Primary hypertension        3. BMI 39.0-39.9,adult  HEIGHT AND WEIGHT      4. Nonsmoker            Immunizations:    Flu:9/11/23  Pneumovax 23:2014  Prevnar 13: 2017  PCV 20: 4/26/23  COVID-19: 3/31/221    Plan:  HILDA is not well controlled this time due to intolerance issues with the mask.  We perform mask fit in office and she is more comfortable using the small nasal pillow with medium headgear now that is properly adjusted.  She understands need for consistent nightly use greater than 4 hours is motivated continue with therapy.   DME mask/supplies   DME other; adjust to APAP 5-10cm   Recommend CNOX once  acclimated to therapy  Follow-up with cardiology for ongoing management hypertension.  Follow-up in 6 weeks for compliance check with any sleep provider, sooner if needed.  Patient understands ultimately she may need to repeat a sleep study to requalify for therapy.    Please note that this dictation was created using voice recognition software. I have made every reasonable attempt to correct obvious errors, but it is possible there are errors of grammar and possibly content that I did not discover before finalizing the note.

## 2023-10-17 DIAGNOSIS — Z95.2 H/O MECHANICAL AORTIC VALVE REPLACEMENT: ICD-10-CM

## 2023-10-18 RX ORDER — WARFARIN SODIUM 4 MG/1
TABLET ORAL
Qty: 100 TABLET | Refills: 1 | Status: SHIPPED | OUTPATIENT
Start: 2023-10-18 | End: 2024-01-05 | Stop reason: SDUPTHER

## 2023-11-04 LAB — INR PPP: 2.5 (ref 2–3.5)

## 2023-11-06 ENCOUNTER — ANTICOAGULATION MONITORING (OUTPATIENT)
Dept: VASCULAR LAB | Facility: MEDICAL CENTER | Age: 73
End: 2023-11-06
Payer: MEDICARE

## 2023-11-06 DIAGNOSIS — Z95.2 HX OF MECHANICAL AORTIC VALVE REPLACEMENT: ICD-10-CM

## 2023-11-06 DIAGNOSIS — I48.91 ATRIAL FIBRILLATION, UNSPECIFIED TYPE (HCC): ICD-10-CM

## 2023-11-06 DIAGNOSIS — Z79.01 CHRONIC ANTICOAGULATION: ICD-10-CM

## 2023-11-06 NOTE — PROGRESS NOTES
Anticoagulation Summary  As of 2023      INR goal:  2.5-3.5   TTR:  69.1 % (6.7 y)   INR used for dosin.50 (2023)   Warfarin maintenance plan:  4 mg (4 mg x 1) every day   Weekly warfarin total:  28 mg   Plan last modified:  Sybil Adkins, PharmD (2021)   Next INR check:  2023   Target end date:  Indefinite    Indications    Chronic anticoagulation (Resolved) [Z79.01]  Hx of mechanical aortic valve replacement [V43.3] [Z95.2]  TIA (transient ischemic attack) (Resolved) [G45.9]  Atrial fibrillation (HCC) [I48.91] [I48.91]                 Anticoagulation Episode Summary       INR check location:      Preferred lab:      Send INR reminders to:      Comments:  [employer] home meter          Anticoagulation Care Providers       Provider Role Specialty Phone number    Michael J Bloch, M.D. Referring Cardiovascular Disease (Cardiology) [phone]    Yinka Church, PharmD Responsible Pharmacy             Refer to Anticoagulation Patient Findings for HPI  Patient Findings       Negatives:  Signs/symptoms of thrombosis, Signs/symptoms of bleeding, Laboratory test error suspected, Change in health, Change in alcohol use, Change in activity, Upcoming invasive procedure, Emergency department visit, Upcoming dental procedure, Missed doses, Extra doses, Change in medications, Change in diet/appetite, Hospital admission, Bruising, Other complaints            Spoke with patient over the phone to report a therapeutic 2.5 INR.      Pt is NOT on antiplatelet therapy.    Pt instructed to continue with current warfarin dosing regimen, confirms dosing.   Will follow up in 6 week(s).     Zamzam Mendez, Pharmacy Intern

## 2023-11-09 ENCOUNTER — TELEPHONE (OUTPATIENT)
Dept: PHARMACY | Facility: MEDICAL CENTER | Age: 73
End: 2023-11-09
Payer: MEDICARE

## 2023-11-09 PROCEDURE — RXMED WILLOW AMBULATORY MEDICATION CHARGE: Performed by: NURSE PRACTITIONER

## 2023-11-09 NOTE — TELEPHONE ENCOUNTER
Contact: [phone]    Yenifer Beasley       Phone number: [phone]    Name of person spoken with and relationship to patient: Yenifer, self   Patient’s Adherence:            How patient is doing on medication:  well    How many missed doses and reason: 0    Any new medications: no    Any new conditions: no    Any new allergies: no    Any new side effects: no     Any new diagnoses: no     How many doses remainin    Did patient want to speak with pharmacist: no  Delivery:            Delivery date and method:  11/15     Needs by Date:      Signature required:  no    Any additional details for :  Please call [phone] when arriving so she can meet you at vehicle (due to the dogs). Do not leave at doorside. Please exchange hand to hand.   Teach Appointment Date:  2023  Shipping Address:  [address] [place] [id number]  Medication(name, strength and dose):  Praluent 150 MG/ML SubQ Inject every 14 days  Copay: $0  Payment Method: na  Supplies:  na  Additional info:  [address]. She stated doing well on the medication and has never experienced any side effects or issues since being on it. No further questions/concerns at this time

## 2023-11-14 ENCOUNTER — OFFICE VISIT (OUTPATIENT)
Dept: VASCULAR LAB | Facility: MEDICAL CENTER | Age: 73
End: 2023-11-14
Payer: MEDICARE

## 2023-11-14 VITALS
WEIGHT: 212 LBS | DIASTOLIC BLOOD PRESSURE: 75 MMHG | BODY MASS INDEX: 40.02 KG/M2 | SYSTOLIC BLOOD PRESSURE: 159 MMHG | HEIGHT: 61 IN | HEART RATE: 54 BPM

## 2023-11-14 DIAGNOSIS — E55.9 VITAMIN D DEFICIENCY: ICD-10-CM

## 2023-11-14 DIAGNOSIS — R73.03 PREDIABETES: ICD-10-CM

## 2023-11-14 DIAGNOSIS — R60.0 LOWER LEG EDEMA: ICD-10-CM

## 2023-11-14 DIAGNOSIS — R00.1 BRADYCARDIA: ICD-10-CM

## 2023-11-14 DIAGNOSIS — N18.32 STAGE 3B CHRONIC KIDNEY DISEASE: ICD-10-CM

## 2023-11-14 DIAGNOSIS — I10 ESSENTIAL HYPERTENSION: ICD-10-CM

## 2023-11-14 DIAGNOSIS — E78.5 HYPERLIPIDEMIA, UNSPECIFIED HYPERLIPIDEMIA TYPE: ICD-10-CM

## 2023-11-14 PROCEDURE — 99212 OFFICE O/P EST SF 10 MIN: CPT

## 2023-11-14 PROCEDURE — 3077F SYST BP >= 140 MM HG: CPT

## 2023-11-14 PROCEDURE — 99214 OFFICE O/P EST MOD 30 MIN: CPT

## 2023-11-14 PROCEDURE — 3078F DIAST BP <80 MM HG: CPT

## 2023-11-14 RX ORDER — TELMISARTAN 80 MG/1
80 TABLET ORAL DAILY
Qty: 100 TABLET | Refills: 3 | Status: SHIPPED | OUTPATIENT
Start: 2023-11-14 | End: 2024-01-05 | Stop reason: SDUPTHER

## 2023-11-14 RX ORDER — DOXAZOSIN 2 MG/1
2 TABLET ORAL DAILY
Qty: 100 TABLET | Refills: 3 | Status: SHIPPED | OUTPATIENT
Start: 2023-11-14 | End: 2024-01-05 | Stop reason: SDUPTHER

## 2023-11-14 RX ORDER — DOXAZOSIN 2 MG/1
2 TABLET ORAL DAILY
Qty: 45 TABLET | Refills: 3
Start: 2023-11-14 | End: 2023-11-14 | Stop reason: SDUPTHER

## 2023-11-14 ASSESSMENT — FIBROSIS 4 INDEX: FIB4 SCORE: 2.54

## 2023-11-14 NOTE — PROGRESS NOTES
[facility] Follow Up Visit    Date of Service: 11/14/2023    Yenifer Beasley is a 72 yo female who is here for follow up of dyslipidemia, HTN, chronic anticoagulation, and hypothyroidism    HPI  Pertinent Interval History since last visit:   Remains on Zetia, vit d and otc omega 3s  On praulent, no issues receiving or injecting  No chest pain, SOB  Home -160s/60-70s  Taking doxazosin 2mg, no dizziness   HR 42-50s, is having ongoing fatigue/lethargy  On warfarin, no bleeding issues, managed with RCC  Still with LE swelling, unchanged, maybe worse- cannot wear compression socks right now as legs are too swollen, was helping previously  Left leg is weeping.    Has CPAP machine, just received last month and has been wearing.  Noting improved daytime sleepiness, not needing a daily nap any longer  No current labs  Has not followed up with cardiology  Is noting increased forgetfulness, has not seen PCP recently    Current Prescription Lipid Lowering Medications - including dose: Has missed several doses   Statin: None  Non-Statin:   Continue Zetia  praulent  Current Lipid Lowering and Related Supplements:   Omega 3s, 1000u daily  Vit D  Any Current Side Effects Potentially Related to Lipid Lowering therapy?   No  Current Adherence to Lipid Lowering Therapies:  Complete  Any Previous History of Statin Intolerance?   Yes, Details: severe myalgias on multiple statins  Baseline Lipids Prior to Treatment:  Shown here:  LDL-C: 152      SOCIAL HISTORY  Social History     Tobacco Use   Smoking Status Never   Smokeless Tobacco Never      Change in weight: up 4 lbs  Exercise habits: no regular exercise  Diet: common adult, consistent vit K    Physical Exam  Vitals reviewed.   Constitutional:       General: She is not in acute distress.     Appearance: She is well-developed. She is obese. She is not diaphoretic.   HENT:      Head: Normocephalic and atraumatic.   Eyes:      General: No scleral  icterus.  Cardiovascular:      Rate and Rhythm: Bradycardia present.      Pulses: Normal pulses.      Heart sounds: Murmur heard.   Pulmonary:      Effort: Pulmonary effort is normal. No respiratory distress.      Breath sounds: No wheezing or rales.   Musculoskeletal:         General: Swelling present. Normal range of motion.      Right lower le+ Pitting Edema present.      Left lower le+ Pitting Edema present.      Comments: Uses cane   Skin:     General: Skin is warm and dry.      Coloration: Skin is not pale.   Neurological:      Mental Status: She is alert and oriented to person, place, and time.      Motor: No weakness.   Psychiatric:         Mood and Affect: Mood normal.         Behavior: Behavior normal.         Thought Content: Thought content normal.      Comments: forgetful       DATA REVIEW  Most Recent Lipid Panel:   Lab Results   Component Value Date    CHOLSTRLTOT 150 2023    TRIGLYCERIDE 84 2023    HDL 65 2023    LDL 68 2023     Other Pertinent Blood Work:   Lab Results   Component Value Date    SODIUM 139 2023    POTASSIUM 4.6 2023    CHLORIDE 103 2023    CO2 24 2023    ANION 12.0 2023    GLUCOSE 93 2023    BUN 49 (H) 2023    CREATININE 1.65 (H) 2023    CALCIUM 9.3 2023    ASTSGOT 21 2023    ALTSGPT 14 2023    ALKPHOSPHAT 54 2023    TBILIRUBIN 0.4 2023    ALBUMIN 4.1 2023    AGRATIO 1.6 2023    TSHULTRASEN 1.950 2022     Other:  Lp(a) - 57 (oct 2017)  TSH 1.5  (oct 2017)    Recent Imaging Studies:      Echo 2017:  1. Left ventricular ejection fraction is visually estimated to be 55%.   Normal regional wall motion. Grade I diastolic dysfunction.  2. Known mechanical aortic valve that is functioning normally.   3. Mild mitral stenosis.  4. Estimated right ventricular systolic pressure is 40 mmHg.    Echo 19  CONCLUSIONS  Normal left ventricular systolic  function.  Left ventricular ejection fraction is visually estimated to be 65-70%.  Mild concentric left ventricular hypertrophy.  Mild mitral stenosis.  Severe mitral annular calcification.  Mild mitral regurgitation.  Bioprosthetic aortic valve with increased velocities.  Right heart pressures are consistent with moderate pulmonary   hypertension.  Severely dilated left atrium.  Echo 3/3/21  Normal left ventricular chamber size.  Left ventricular ejection fraction is visually estimated to be 65%.  Mild concentric left ventricular hypertrophy.  Dilated inferior vena cava without inspiratory collapse.  Moderate mitral stenosis.  Mean [id number] gradient is 7 mmHg at a heart rate of 64 BPM.  Structurally normal tricuspid valve without significant stenosis.  Normal function of mechanical prosthetic valve.  US aorta 4/14/21  Mild atherosclerotic change of the aorta. No evidence of aneurysm.  Carotid duplex 4/14/21  Mild plaque with no significant stenosis.  Echo 8/2022  Hyperdynamic left ventricular systolic function.  The left ventricular ejection fraction is visually estimated to be 75%.  Moderate concentric left ventricular hypertrophy.  Known mechanical aortic valve with increased gradients; Transvalvular   gradients are - Peak: 62 mmHg, Mean: 33 mmHg. Vmax is  3.92 m/s.   Acceleration time is 84 ms.  Moderate mitral stenosis.  The right ventricle is dilated with normal right ventricular systolic   function.  Right heart pressures are consistent with moderate pulmonary   hypertension at 50 mmHg.  Compared to the images of the prior study 3/3/2021, there has been an   increase in velocity gradient across the aortic valve prosthesis.     1. Bradycardia  - RI ZIO PATCH MONITOR; Future    2. Essential hypertension  - telmisartan (MICARDIS) 80 MG Tab; Take 1 Tablet by mouth every day. Take At night  Dispense: 100 Tablet; Refill: 3  - Comp Metabolic Panel; Future  - MICROALBUMIN CREAT RATIO URINE; Future  -  doxazosin (CARDURA) 2 MG Tab; Take 1 Tablet by mouth every day.  Dispense: 100 Tablet; Refill: 3    3. Hyperlipidemia, unspecified hyperlipidemia type  - Lipid Profile; Future  - Comp Metabolic Panel; Future    4. Stage 3b chronic kidney disease (HCC)  - Comp Metabolic Panel; Future  - MICROALBUMIN CREAT RATIO URINE; Future    5. Prediabetes  - HEMOGLOBIN A1C (Glycohemoglobin GHB Total/A1C with MBG Estimate); Future    6. Vitamin D deficiency  - VITAMIN D,25 HYDROXY (DEFICIENCY); Future    7. Lower leg edema     ASSESSMENT AND PLAN    Patient Type, check all that apply:   Secondary Prevention  Established Atherosclerotic Cardiovascular Disease (ASCVD)  Yes, Details: CAD s/p CABG  Other Established (non-atherosclerotic) Vascular/Heart Disease, if Present:    Valvular heart disease s/p mechanical AVR - continue anticoagulation;  previously saw cardiology for worsening valvular heart disease- had angiogram scheduled but decided against it as she does not want to move forward with any surgery.    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):   Possible  ACC/AHA Indication for Statin Therapy, toby all that apply:   Established ASCVD: Indication for High intensity statin    Calculated Risk for ASCVD, if applicable:  N/A  Other Significant Risk Markers, if any, toby all that apply:  Family history of premature ASCVD in first degree relative   High lp(a)  National Lipid Association (NLA) Goal (if applicable):  LDL-C:   <70 mg/dL and nonHDL <100  Now at goal on Praluent previously, no current labs  -update labs prior to next appt    Lifestyle Recommendations From Today’s Visit:    Diet - low simple carb like [place]; limiting sugars  Exercise - daily home exercise for 15-20 min    Non-Statin Medications Recommendations from Today’s Visit:   - Continue Praluent 150mg   - Continue zetia 10 mg daily  - Consider addition of fibrate  if nonHDL >130 in future    Indication for PCSK9 Inhibitor, if applicable:  ASCVD with suboptimal control of LDL-C despite maximally tolerated statin  Supplements Recommended at this visit:  - Continue omega 3's at 3,000u/day, Vit D     Recommendations for Other Cardiovascular Risk Factors, toby all that apply:     BLOOD PRESSURE CONTROL   Office BP Goal ACC/AHA (2017) goal <130/80  Home BP at goal:  no  Office BP at goal:  no  24h ABPM:  not ordered to date   RDN candidate? UNDECIDED  Contributing factors: CAD, HILDA, obesity, DM, CKD, MV stenosis     Plan:   Monitoring:   - start/continue home BP monitoring, reviewed correct technique, provide BP log and instructions.  Use upper arm cuff.    Bring in log to next appt  - order 24h ABPM:  UNDECIDED  - monitor lytes/gfr routinely, check urine for albumin prior to next appt  - contact office if BP consistently >140/>90 for discussion of tx adjustments   Medications:  ACEi/ARB: STOP lisinopril, START telmisartan 80mg daily for improved 24 BP control.   Monitor GFR closely, anticipate possible initial decrease in GFR with initiation, will likely improve.     DHP-CCB: none, avoid due to baseline LE edema   Thiazide: caution/avoid with GFR  Calvin-receptor Antagonist: not indicated at this time   Other:   Peripheral Alpha Blocker: continue doxazosin 2mg nightly, increase as tolerated   Loop:  continue torsemide 10mg daily    BB: avoid due to bradycardia   non-DHP-CCB: not indicated   Centrally Acting Alpha Agonist: not indicated   Potassium-sparing diuretic: caution/avoid with GFR   DRI: not indicated    Direct Vasodilator: not indicated      - HILDA: Sleep study completed- pulm made adjustments- started CPAP machine last month, feeling improved sleep and less daytime sleepiness.  Continue to follow up with pulm as directed.     - IFG: relatively stable.  Continue TLC. A1C 5.9 7/2023  -update prior to next appt    - Anticoagulation: continue warfarin and f/u with anticoag  clinic    - Hypothyroidism - seems under reasonable control per TSH; continue current thyroid repletion; otherwise defer management to PCP    -bradycardia - ongoing. Pt with home HR reported 40-50s, HR 54 in office today, +fatigue and lethargy.  Will order zio patch to help assist with further decision making.  Not on BB, overdue for cardiology follow up.  Pt to call and schedule follow up.  -Consider referral to EP for possible need of pacemaker if candidate depending on results from zio patch.      -CKD stage 3b - ongoing, monitor closely, consider referral to nephrology in future    Studies Ordered at Todays Visit: Zio patch - ordered today  echo due in 2023 - not ordered to date - review at next appt if not ordered by cardiology sooner  Blood Work Ordered At Today’s visit: cmp, lipids, MA, A1c, vit d    Follow-Up:   1) Anticoagulation- continue monitoring with HM and report to anticoag clinic  2) Vascular medicine in 6-8 weeks to review labs and BP log with Dr. Drake  3) F/u with cardiology as discussed - provided scheduling number    STEPHON Cook

## 2023-11-15 ENCOUNTER — PHARMACY VISIT (OUTPATIENT)
Dept: PHARMACY | Facility: MEDICAL CENTER | Age: 73
End: 2023-11-15
Payer: COMMERCIAL

## 2023-11-16 NOTE — PROGRESS NOTES
CC: Clinical and compliance review for HILDA on APAP 5-10 CWP        HPI:  Yenifer Beasley is a 73 y.o.female  kindly referred by Jose Vela M.D. and last seen by Larissa Lopez on 10/11/2023 when the patient was experiencing CPAP intolerance secondary to mask issues.  A mask fit was performed in the office and she was satisfied with a small nasal pillow with medium headgear.  The APAP was adjusted to 5-10 CWP.  The patient is here for another compliance check.      Doing much better now. 30 day compliance is 83% for greater than 4 hours with an average of 5 ours and 10 minutes. ON apap 5-10 cwp her average residual ahi is 0.9.    The time of her last visit her 3-month compliance was only on average 2 hours and 57 minutes though the AHI was normalized at 1.1.      PSG 7/27/2013 showed AHI of 22.5, minimum O2 sat of 83 and time below 89% saturation of 28 minutes.  Following diagnosis she was placed on CPAP which she used for almost a year.    2/1/2021 HST showed mild obstructive sleep apnea with overall AHI of 12.3 events per hour.  Of significance it did note that there were 354 minutes where oxygen saturation was below 89%.          The patient reports improved symptoms using positive airway pressure.  Has experienced no significant problems with claustrophobia, nosebleeds, sore throat, dry eyes, mask fit, air leaks around the mask, pressure tolerance, excessive airway dryness, dry or runny nose, nasal congestion, facial irritation, aerophagia, or chest pain.     Past medical history significant for moderate pulmonary hypertension with an RVSP of 50 mmHg, obesity, atrial fibrillation, chronic anticoagulation, stage III chronic kidney disease, mitral valve stenosis, anemia, elevated LFTs, diabetes mellitus, status post aortic valve replacement, hypothyroidism, never smoker, neuropathy, hypertension, TIA, coronary artery disease, and migraines.    She remembers us meeting 16 years ago when she had open  "heart surgery.      Patient Active Problem List    Diagnosis Date Noted    Morbid (severe) obesity due to excess calories ([facility]) 02/08/2023    Body mass index (BMI) 37.0-37.9, adult 02/08/2023    Other specified glaucoma 12/08/2022    Nonrheumatic mitral valve stenosis 08/25/2022    Lower leg edema 05/17/2022    Hypercoagulable state (HCC) 05/02/2022    Stage 3b chronic kidney disease (HCC) 04/12/2022    Asthma 10/17/2019    Atrial fibrillation ([facility]) [I48.91] 07/18/2019    Transient cerebral ischemia 09/08/2015    Hx of mechanical aortic valve replacement [V43.3] 05/05/2015    DM (diabetes mellitus) ([facility]) 04/23/2014    Elevated LFTs 04/23/2014    Anemia 04/23/2014    HTN (hypertension) 02/17/2011    Tricuspid regurgitation mild- mod 01/06/2011    Vitamin d deficiency 01/06/2011    Diabetic neuropathy ([facility]) 01/06/2011    Arthritis     Migraine     Hypothyroidism     CAD (coronary artery disease)     Hyperlipidemia     HILDA (obstructive sleep apnea)     GERD (gastroesophageal reflux disease)        Past Medical History:   Diagnosis Date    Allergic rhinitis     Anticoagulation monitoring, special range     Arthritis     OA OF HANDS, KNEESM, HIPS AND BACK    Asthma 10/17/2019    childhood asthma    Atrial fibrillation (HCC)     history of    Back pain     Blood transfusion without reported diagnosis     CAD (coronary artery disease) 2007    CABG    Chest pain at rest 11/30/2010    Chronic anticoagulation 04/23/2018    Constipation     off and on, pt manages    Coronary heart disease     Delayed emergence from general anesthesia     Diabetes     DIAB FEET EXAM:  1/6/11- Pt reports no history of diabetes    Diabetic neuropathy (HCC) 01/06/2011    Difficulty breathing     Fall     \"a couple years ago because of my neuopathy\" multiple falls    Gasping for breath     history of    Gastritis 07/2010    H/O UGIB    Gastritis     GERD (gastroesophageal reflux disease)     Bhutanese measles     GI bleed     history of    Glaucoma  "    Hyperlipidemia     Hypertension     Hyponatremia 2014    Hypothyroidism     Impaired fasting glucose 10/08/2019    Infectious disease     History of MRSA    Migraine     Need for influenza vaccination 2010    ICD-10 transition    Neuropathy in diabetes (HCC)     HILDA (obstructive sleep apnea)     no cpap at this time, on order    Painful joint     Palpitations     Pneumonia     Pneumonia due to organism 2014    Diagnois update 10/1/2016    Pulmonary hypertension (HCC)     Rash     S/P aortic valve replacement     Shortness of breath 2022    no c/o at this time    Skin infection 2018    Sleep apnea     Swelling of lower extremity     TIA (transient ischemic attack)     TIA (transient ischemic attack)     Tonsillitis     Tricuspid regurgitation mild- mod 2011    Unspecified hemorrhagic conditions     Bleeds easily-GI bleeds with NSAIDS and ASA    Upper GI bleed ON 2011    Upper GI bleed ON 2011    URTI (acute upper respiratory infection) 2010    UTI (urinary tract infection) 2014    Valvular heart disease     Weakness     Wears glasses         Past Surgical History:   Procedure Laterality Date    MITRAL VALVE REPLACE      AORTIC VALVE    SHOULDER SURGERY  2001    NEER DECOMOPRESSION LEFT SHOULDER    CARPAL TUNNEL RELEASE      B/L    TONSILLECTOMY      PARTIAL PALATOPLASTY    ABDOMINAL HYSTERECTOMY TOTAL      TAHBSOO    AORTIC VALVE REPLACEMENT      CHOLECYSTECTOMY      CORONARY ARTERY BYPASS, 1      Triple Bypass    HERNIA REPAIR      VENTRAL HERNIA REPAIR    HYSTERECTOMY LAPAROSCOPY      INTUBATION      PRIMARY C SECTION      SLEEVE,MEJIA VASO THIGH      TUBAL COAGULATION LAPAROSCOPIC BILATERAL      UVULOPHARYNGOPALATOPLASTY      VENTILATOR CONTINUOUS         Family History   Problem Relation Age of Onset    Heart Disease Mother         a fib    Cancer Mother         Cervical: palate:      Cancer  Maternal Grandmother         Leukemia,  21 y.o. 193    Cancer Maternal Aunt         Lung CA (+3 dtrs  from CA)    Heart Disease Brother         Heart attack, open heart surgery,     Heart Disease Brother         Heart attack, open heart surgery     Breast Cancer Cousin        Social History     Socioeconomic History    Marital status:      Spouse name: Not on file    Number of children: Not on file    Years of education: Not on file    Highest education level: Bachelor's degree (e.g., BA, AB, BS)   Occupational History    Not on file   Tobacco Use    Smoking status: Never    Smokeless tobacco: Never   Vaping Use    Vaping Use: Never used   Substance and Sexual Activity    Alcohol use: No    Drug use: Never    Sexual activity: Not Currently   Other Topics Concern    Not on file   Social History Narrative    Not on file     Social Determinants of Health     Financial Resource Strain: Medium Risk (2022)    Overall Financial Resource Strain (CARDIA)     Difficulty of Paying Living Expenses: Somewhat hard   Food Insecurity: No Food Insecurity (2022)    Hunger Vital Sign     Worried About Running Out of Food in the Last Year: Never true     Ran Out of Food in the Last Year: Never true   Transportation Needs: No Transportation Needs (2022)    PRAPARE - Transportation     Lack of Transportation (Medical): No     Lack of Transportation (Non-Medical): No   Physical Activity: Unknown (2022)    Exercise Vital Sign     Days of Exercise per Week: Patient refused     Minutes of Exercise per Session: Patient refused   Stress: No Stress Concern Present (2022)    [employer] - Occupational Stress Questionnaire     Feeling of Stress : Only a little   Social Connections: Unknown (2022)    Social Connection and Isolation Panel [NHANES]     Frequency of Communication with Friends and Family: Twice a week     Frequency of Social Gatherings with  Friends and Family: Patient refused     Attends Nondenominational Services: Patient refused     Active Member of Clubs or Organizations: No     Attends Club or Organization Meetings: 1 to 4 times per year     Marital Status:    Intimate Partner Violence: Not on file   Housing Stability: Unknown (12/8/2022)    Housing Stability Vital Sign     Unable to Pay for Housing in the Last Year: No     Number of Places Lived in the Last Year: Not on file     Unstable Housing in the Last Year: No       Current Outpatient Medications   Medication Sig Dispense Refill    telmisartan (MICARDIS) 80 MG Tab Take 1 Tablet by mouth every day. Take At night 100 Tablet 3    doxazosin (CARDURA) 2 MG Tab Take 1 Tablet by mouth every day. 100 Tablet 3    warfarin (COUMADIN) 4 MG Tab TAKE ONE TABLET BY MOUTH DAILY OR AS DIRECTED BY ANTICOAGULATION CLINIC 100 Tablet 1    torsemide (DEMADEX) 20 MG Tab TAKE 1/2 TABLET BY MOUTH EVERY DAY 45 Tablet 2    Alirocumab (PRALUENT) 150 MG/ML Solution Auto-injector Inject 150 mg under the skin every 14 days. 6 mL 3    levothyroxine (SYNTHROID) 125 MCG Tab Take 1 Tablet by mouth every morning on an empty stomach. 100 Tablet 2    timolol (TIMOPTIC) 0.5 % Solution 1 DROP INTO BOTH EYES TWICE A DAY      amiodarone (CORDARONE) 200 MG Tab TAKE 1 TABLET BY MOUTH EVERY  Tablet 3    ezetimibe (ZETIA) 10 MG Tab TAKE 1 TABLET BY MOUTH EVERY DAY 90 Tablet 3    triamcinolone acetonide (KENALOG) 0.1 % Ointment APPLY TO AFFECTED AREA TWICE A DAY FOR 2-3 WEEKS, THEN CAN USE 2 WEEKS ON, 2 WEEKS OFF AS NEEDED      diclofenac sodium (VOLTAREN) 1 % Gel Apply  topically 4 times a day as needed.      vitamin D (CHOLECALCIFEROL) 1000 UNIT TABS Take 1,000 Units by mouth every day.        docosahexanoic acid (OMEGA 3 FA) 1000 MG CAPS Take 1,000 mg by mouth 2 Times a Day.      tramadol (ULTRAM) 50 MG TABS Take 2 Tabs by mouth 3 times a day. 180 Each 0     No current facility-administered medications for this visit.     "\"CURRENT RX\"    ALLERGIES: Asa [aspirin], Atorvastatin, Cymbalta [duloxetine hcl], Hmg-coa-r inhibitors, Latex, Nsaids, Tricor, Trilipix [choline fenofibrate], and Lyrica [pregabalin]    ROS    Constitutional: Denies fever, chills, sweats,  weight loss, fatigue  Cardiovascular: Denies chest pain, tightness, palpitations, swelling in legs/feet, fainting, difficulty breathing when lying down but gets better when sitting up.   Respiratory: Denies shortness of breath, cough, sputum, wheezing, painful breathing, coughing up blood.   Sleep: per HPI  Gastrointestinal: Denies  difficulty swallowing, nausea, abdominal pain, diarrhea, constipation, heartburn.  Musculoskeletal: Denies painful joints, sore muscles, back pain.        PHYSICAL EXAM      /70 (BP Location: Left arm, Patient Position: Sitting, BP Cuff Size: Adult)   Pulse (!) 56   Resp 16   Ht 1.549 m (5' 1\")   Wt 94.3 kg (208 lb)   LMP  (LMP Unknown)   SpO2 96%   BMI 39.30 kg/m²   Appearance: Well-nourished, well-developed, no acute distress  Eyes:  PERRLA, EOMI  ENMT: without change  Neck: Supple, trachea midline, no masses  Respiratory effort:  No intercostal retractions or use of accessory muscles  Lung auscultation:  No wheezes rhonchi rubs or rales  Cardiac: No murmurs, rubs, or gallops; regular rhythm, normal rate; no edema  Abdomen:  No tenderness, no organomegaly.  Musculoskeletal:  Grossly normal; gait and station normal; digits and nails normal  Skin:  No rashes, petechiae, cyanosis  Neurologic: without focal signs; oriented to person, time, place, and purpose; judgement intact  Psychiatric:  No depression, anxiety, agitation      Medical Decision Making       The medical record was reviewed in its entirety including the referral notes, records from primary care, consultants notes, hospital records, lab, imaging, microbiology, immunology, and immunizations.  Care gaps identified and reviewed with the patient.    Diagnostic and titration " nocturnal polysomnogram's, home sleep apnea tests, continuous nocturnal oximetry results, multiple sleep latency tests, and compliance reports reviewed.  1. HILDA (obstructive sleep apnea)  - DME Mask and Supplies      PLAN:   Has been advised to continue the current apap 5-10 cwp, clean equipment frequently, and get new mask and supplies as allowed by insurance and DME. Advised about potential problems including nasal obstruction/stuffiness, mask leak or discomfort , frequent awakenings, chill or dryness of the upper airway, noise, inconvenience, and lack of benefit. Recommend an earlier appointment, if significant treatment barriers develop.    The risks of untreated sleep apnea were discussed with the patient at length. Patients with HILDA are at increased risk of cardiovascular disease including systemic arterial hypertension, pulmonary arterial hypertension (transient or fixed), TIA, and an elevated risk of stroke, heart attack, sudden death, or arrhythmia between the hours of 11 PM and 6 AM. HILDA patients have an increased risk of motor vehicle accidents, type 2 diabetes, GERD, repetitive mechanical trauma to pharyngeal muscles with inflammation and denervation, frequent EEG arousals leading to nonrestorative sleep, excessive daytime sleepiness, fatigue, depression, poor pain control, irritability, and lower quality of life.  The patient was advised to avoid driving a motor vehicle when drowsy.    Positive airway pressure will favorably impact many of the adverse conditions and effects provoked by HILDA.    Have advised the patient to follow up with the appropriate healthcare practitioners for all other medical problems and issues.    Return in about 1 year (around 11/20/2024).    Total time 30 minutes

## 2023-11-20 ENCOUNTER — OFFICE VISIT (OUTPATIENT)
Dept: SLEEP MEDICINE | Facility: MEDICAL CENTER | Age: 73
End: 2023-11-20
Attending: INTERNAL MEDICINE
Payer: MEDICARE

## 2023-11-20 ENCOUNTER — NON-PROVIDER VISIT (OUTPATIENT)
Dept: CARDIOLOGY | Facility: MEDICAL CENTER | Age: 73
End: 2023-11-20
Payer: MEDICARE

## 2023-11-20 VITALS
SYSTOLIC BLOOD PRESSURE: 118 MMHG | HEART RATE: 56 BPM | HEIGHT: 61 IN | WEIGHT: 208 LBS | RESPIRATION RATE: 16 BRPM | OXYGEN SATURATION: 96 % | BODY MASS INDEX: 39.27 KG/M2 | DIASTOLIC BLOOD PRESSURE: 70 MMHG

## 2023-11-20 DIAGNOSIS — I49.1 APC (ATRIAL PREMATURE CONTRACTIONS): ICD-10-CM

## 2023-11-20 DIAGNOSIS — G47.33 OSA (OBSTRUCTIVE SLEEP APNEA): ICD-10-CM

## 2023-11-20 DIAGNOSIS — I49.3 PVC'S (PREMATURE VENTRICULAR CONTRACTIONS): ICD-10-CM

## 2023-11-20 DIAGNOSIS — R00.1 BRADYCARDIA: ICD-10-CM

## 2023-11-20 PROCEDURE — 3078F DIAST BP <80 MM HG: CPT | Performed by: INTERNAL MEDICINE

## 2023-11-20 PROCEDURE — 99213 OFFICE O/P EST LOW 20 MIN: CPT | Performed by: INTERNAL MEDICINE

## 2023-11-20 PROCEDURE — 99214 OFFICE O/P EST MOD 30 MIN: CPT | Performed by: INTERNAL MEDICINE

## 2023-11-20 PROCEDURE — 3074F SYST BP LT 130 MM HG: CPT | Performed by: INTERNAL MEDICINE

## 2023-11-20 ASSESSMENT — FIBROSIS 4 INDEX: FIB4 SCORE: 2.54

## 2023-11-20 NOTE — PROGRESS NOTES
Home enrollment completed in the 14 day Zio XT Holter monitor per Tonja Hernandez, APRN.  Monitor will be shipped to patient via [employer]. Pending EOS.

## 2023-12-02 LAB — INR PPP: 2.6 (ref 2–3.5)

## 2023-12-04 ENCOUNTER — ANTICOAGULATION MONITORING (OUTPATIENT)
Dept: VASCULAR LAB | Facility: MEDICAL CENTER | Age: 73
End: 2023-12-04
Payer: MEDICARE

## 2023-12-04 DIAGNOSIS — I48.91 ATRIAL FIBRILLATION, UNSPECIFIED TYPE (HCC): ICD-10-CM

## 2023-12-04 DIAGNOSIS — Z95.2 HX OF MECHANICAL AORTIC VALVE REPLACEMENT: ICD-10-CM

## 2023-12-04 NOTE — PROGRESS NOTES
OP Anticoagulation Service Note    Date: 2023    Anticoagulation Summary  As of 2023      INR goal:  2.5-3.5   TTR:  69.4 % (6.8 y)   INR used for dosin.60 (2023)   Warfarin maintenance plan:  4 mg (4 mg x 1) every day   Weekly warfarin total:  28 mg   Plan last modified:  Sybil Adkins, PharmD (2021)   Next INR check:  2024   Target end date:  Indefinite    Indications    Chronic anticoagulation (Resolved) [Z79.01]  Hx of mechanical aortic valve replacement [V43.3] [Z95.2]  TIA (transient ischemic attack) (Resolved) [G45.9]  Atrial fibrillation (HCC) [I48.91] [I48.91]                 Anticoagulation Episode Summary       INR check location:      Preferred lab:      Send INR reminders to:      Comments:  [employer] home meter          Anticoagulation Care Providers       Provider Role Specialty Phone number    Michael J Bloch, M.D. Referring Internal Medicine [phone]    Yinka Church, PharmD Responsible Pharmacy           Anticoagulation Patient Findings        Patient's preferred phone number:  [phone]        HPI:   The reason for today's call is to prevent morbidity and mortality from a blood clot and/or stroke and to reduce the risk of bleeding while on a anticoagulant.     PCP:  Jose Vela M.D.  [id number] [address]    Assessment:     INR  therapeutic.     Lab Results   Component Value Date/Time    BUN 49 (H) 2023 09:43 AM    CREATININE 1.65 (H) 2023 09:43 AM    CREATININE 1.6 (H) 2007 12:28 AM     Lab Results   Component Value Date/Time    HEMOGLOBIN 13.1 2022 03:13 PM    HEMATOCRIT 39.0 2022 03:13 PM    PLATELETCT 159 (L) 2022 03:13 PM    ALKPHOSPHAT 54 2023 09:43 AM    ASTSGOT 21 2023 09:43 AM    ALTSGPT 14 2023 09:43 AM          Current Outpatient Medications:     telmisartan, 80 mg, Oral, DAILY    doxazosin, 2 mg, Oral, DAILY    warfarin, TAKE ONE TABLET BY MOUTH DAILY OR AS  DIRECTED BY ANTICOAGULATION CLINIC    torsemide, TAKE 1/2 TABLET BY MOUTH EVERY DAY    Praluent, 150 mg, Subcutaneous, Q14 DAYS    levothyroxine, 125 mcg, Oral, AM ES    timolol, 1 DROP INTO BOTH EYES TWICE A DAY    amiodarone, TAKE 1 TABLET BY MOUTH EVERY DAY    ezetimibe, TAKE 1 TABLET BY MOUTH EVERY DAY    triamcinolone acetonide, APPLY TO AFFECTED AREA TWICE A DAY FOR 2-3 WEEKS, THEN CAN USE 2 WEEKS ON, 2 WEEKS OFF AS NEEDED    diclofenac sodium, Apply  topically 4 times a day as needed.    vitamin D, 1,000 Units, Oral, DAILY    docosahexanoic acid, 1,000 mg, Oral, BID    traMADol, 100 mg, Oral, TID      Plan:     Continue the same warfarin dose, as noted above.       Follow-up:     As seen above      Additional information discussed with patient:     Asked patient to please call the anticoagulation clinic if they have any signs/symptoms of bleeding and/or thrombosis or any changes to diet or medications.      National recommendations regarding anticoagulation therapy:     The CHEST guidelines recommends frequent INR monitoring at regular intervals (a few days up to a max of 12 weeks) to ensure patients are on the proper dose of warfarin, and patients are not having any complications from therapy.  INRs can dramatically change over a short time period due to diet, medications, and medical conditions.         [facility]  Phone: [phone]  Fax: [phone]  On call: [phone]  General scheduling/information [phone]  For emergencies please dial 911  Please do not use [employer] for urgent matters, call the phone numbers listed above.    This note was created using voice recognition software (Dragon). The accuracy of the dictation is limited by the abilities of the software. I have reviewed the note prior to signing, however some errors in grammar and context are still possible. If you have any questions related to this note please do not hesitate to contact our office.

## 2023-12-06 RX ORDER — LEVOTHYROXINE SODIUM 0.12 MG/1
125 TABLET ORAL
Qty: 100 TABLET | Refills: 2 | Status: SHIPPED | OUTPATIENT
Start: 2023-12-06 | End: 2024-01-08 | Stop reason: SDUPTHER

## 2023-12-06 NOTE — TELEPHONE ENCOUNTER
Received request via: Pharmacy    Was the patient seen in the last year in this department? Yes  12/8/22  Does the patient have an active prescription (recently filled or refills available) for medication(s) requested? No    Does the patient have detention Plus and need 100 day supply (blood pressure, diabetes and cholesterol meds only)? Medication is not for cholesterol, blood pressure or diabetes

## 2023-12-21 ENCOUNTER — TELEPHONE (OUTPATIENT)
Dept: CARDIOLOGY | Facility: MEDICAL CENTER | Age: 73
End: 2023-12-21
Payer: MEDICARE

## 2023-12-25 PROCEDURE — 93248 EXT ECG>7D<15D REV&INTERPJ: CPT | Performed by: INTERNAL MEDICINE

## 2023-12-30 DIAGNOSIS — E78.5 HYPERLIPIDEMIA, UNSPECIFIED HYPERLIPIDEMIA TYPE: ICD-10-CM

## 2024-01-02 ENCOUNTER — ANTICOAGULATION MONITORING (OUTPATIENT)
Dept: VASCULAR LAB | Facility: MEDICAL CENTER | Age: 74
End: 2024-01-02
Payer: MEDICARE

## 2024-01-02 ENCOUNTER — HOSPITAL ENCOUNTER (OUTPATIENT)
Dept: LAB | Facility: MEDICAL CENTER | Age: 74
End: 2024-01-02
Attending: NURSE PRACTITIONER
Payer: MEDICARE

## 2024-01-02 ENCOUNTER — TELEPHONE (OUTPATIENT)
Dept: VASCULAR LAB | Facility: MEDICAL CENTER | Age: 74
End: 2024-01-02
Payer: MEDICARE

## 2024-01-02 DIAGNOSIS — I48.91 ATRIAL FIBRILLATION, UNSPECIFIED TYPE (HCC): ICD-10-CM

## 2024-01-02 DIAGNOSIS — I10 ESSENTIAL HYPERTENSION: ICD-10-CM

## 2024-01-02 DIAGNOSIS — E78.5 HYPERLIPIDEMIA, UNSPECIFIED HYPERLIPIDEMIA TYPE: ICD-10-CM

## 2024-01-02 DIAGNOSIS — R73.03 PREDIABETES: ICD-10-CM

## 2024-01-02 DIAGNOSIS — N18.32 STAGE 3B CHRONIC KIDNEY DISEASE: ICD-10-CM

## 2024-01-02 DIAGNOSIS — Z95.2 HX OF MECHANICAL AORTIC VALVE REPLACEMENT: ICD-10-CM

## 2024-01-02 DIAGNOSIS — E55.9 VITAMIN D DEFICIENCY: ICD-10-CM

## 2024-01-02 LAB
25(OH)D3 SERPL-MCNC: 51 NG/ML (ref 30–100)
ALBUMIN SERPL BCP-MCNC: 4 G/DL (ref 3.2–4.9)
ALBUMIN/GLOB SERPL: 1.7 G/DL
ALP SERPL-CCNC: 55 U/L (ref 30–99)
ALT SERPL-CCNC: 9 U/L (ref 2–50)
ANION GAP SERPL CALC-SCNC: 10 MMOL/L (ref 7–16)
AST SERPL-CCNC: 15 U/L (ref 12–45)
BILIRUB SERPL-MCNC: 0.5 MG/DL (ref 0.1–1.5)
BUN SERPL-MCNC: 26 MG/DL (ref 8–22)
CALCIUM ALBUM COR SERPL-MCNC: 9.1 MG/DL (ref 8.5–10.5)
CALCIUM SERPL-MCNC: 9.1 MG/DL (ref 8.5–10.5)
CHLORIDE SERPL-SCNC: 106 MMOL/L (ref 96–112)
CHOLEST SERPL-MCNC: 136 MG/DL (ref 100–199)
CO2 SERPL-SCNC: 26 MMOL/L (ref 20–33)
CREAT SERPL-MCNC: 1.62 MG/DL (ref 0.5–1.4)
CREAT UR-MCNC: 102.63 MG/DL
EST. AVERAGE GLUCOSE BLD GHB EST-MCNC: 108 MG/DL
FASTING STATUS PATIENT QL REPORTED: NORMAL
GFR SERPLBLD CREATININE-BSD FMLA CKD-EPI: 34 ML/MIN/1.73 M 2
GLOBULIN SER CALC-MCNC: 2.4 G/DL (ref 1.9–3.5)
GLUCOSE SERPL-MCNC: 82 MG/DL (ref 65–99)
HBA1C MFR BLD: 5.4 % (ref 4–5.6)
HDLC SERPL-MCNC: 65 MG/DL
INR PPP: 1.89 (ref 0.87–1.13)
LDLC SERPL CALC-MCNC: 53 MG/DL
MICROALBUMIN UR-MCNC: <1.2 MG/DL
MICROALBUMIN/CREAT UR: NORMAL MG/G (ref 0–30)
POTASSIUM SERPL-SCNC: 4.5 MMOL/L (ref 3.6–5.5)
PROT SERPL-MCNC: 6.4 G/DL (ref 6–8.2)
PROTHROMBIN TIME: 22 SEC (ref 12–14.6)
SODIUM SERPL-SCNC: 142 MMOL/L (ref 135–145)
TRIGL SERPL-MCNC: 91 MG/DL (ref 0–149)

## 2024-01-02 PROCEDURE — 82570 ASSAY OF URINE CREATININE: CPT

## 2024-01-02 PROCEDURE — 80053 COMPREHEN METABOLIC PANEL: CPT

## 2024-01-02 PROCEDURE — 82306 VITAMIN D 25 HYDROXY: CPT

## 2024-01-02 PROCEDURE — 83036 HEMOGLOBIN GLYCOSYLATED A1C: CPT

## 2024-01-02 PROCEDURE — 36415 COLL VENOUS BLD VENIPUNCTURE: CPT

## 2024-01-02 PROCEDURE — 82043 UR ALBUMIN QUANTITATIVE: CPT

## 2024-01-02 PROCEDURE — 85610 PROTHROMBIN TIME: CPT

## 2024-01-02 PROCEDURE — 80061 LIPID PANEL: CPT

## 2024-01-02 RX ORDER — EZETIMIBE 10 MG/1
TABLET ORAL
Qty: 90 TABLET | Refills: 3 | Status: SHIPPED | OUTPATIENT
Start: 2024-01-02

## 2024-01-02 NOTE — TELEPHONE ENCOUNTER
Prior Authorization for Praluent 150MG/ML auto-injectors   (Quantity: 6mls, Days: 84D) has been submitted via Cover My Meds: Key ([id number])    Insurance: [facility]    Will follow up in 24-48 business hours.

## 2024-01-02 NOTE — TELEPHONE ENCOUNTER
Received Refill PA request via [employer]  for EZETIMIBE 10MG TABLETS. (Quantity:90, Day Supply:90)     Insurance: [employer]  Member ID:  [id number]  BIN: [id number]  PCN: [id number]  Group: [id number]    Ran Test claim via [facility] & medication Pays for a $0/90DS copay. Will outreach to patient to offer specialty pharmacy services and or release to preferred pharmacy    MARC Lanza, PhT  Pharmacy Liaison (Rx Coordinator)  P: [phone]  1/2/2024 11:55 AM

## 2024-01-03 NOTE — PROGRESS NOTES
Anticoagulation Summary  As of 2024      INR goal:  2.5-3.5   TTR:  68.7 % (6.9 y)   INR used for dosin.89 (2024)   Warfarin maintenance plan:  4 mg (4 mg x 1) every day   Weekly warfarin total:  28 mg   Plan last modified:  Sybil Adkins PharmD (2021)   Next INR check:  2024   Target end date:  Indefinite    Indications    Chronic anticoagulation (Resolved) [Z79.01]  Hx of mechanical aortic valve replacement [V43.3] [Z95.2]  TIA (transient ischemic attack) (Resolved) [G45.9]  Atrial fibrillation (HCC) [I48.91] [I48.91]                 Anticoagulation Episode Summary       INR check location:      Preferred lab:      Send INR reminders to:      Comments:  [employer] home meter          Anticoagulation Care Providers       Provider Role Specialty Phone number    Michael J Bloch, M.D. Referring Cardiovascular Disease (Cardiology) [phone]    Yinka Church, PharmD Responsible Pharmacy           Anticoagulation Patient Findings  Patient Findings       Positives:  Missed doses (missed 2 doses), Extra doses (took 8 mg last night)    Negatives:  Signs/symptoms of thrombosis, Signs/symptoms of bleeding, Laboratory test error suspected, Change in health, Change in alcohol use, Change in activity, Upcoming invasive procedure, Emergency department visit, Upcoming dental procedure, Change in medications, Change in diet/appetite, Hospital admission, Bruising, Other complaints            INR is subtherapeutic    Called and spoke to patient.    Warfarin Plan: Continue regimen as listed above since pt self-bolused with 8mg last night.    Next INR in 1 week(s).    Sybil Adkins, EmilyD

## 2024-01-03 NOTE — TELEPHONE ENCOUNTER
Prior Authorization for Praluent 150MG/ML auto-injectors   has been approved for a quantity of 6mls , day supply 84    Insurance-[employer]    Prior Authorization Reference#-[id number]    Dates in effect, from 01/02/24 through 07/02/24    Co-pay:Unknown RTS 01/11/24    Pharmacy and phone number   [facility]     Is patient eligible to fill with [facility]? Yes    FA-[facility] [employer] until 07/02/24    Next Steps:  Routing approval to liaisons

## 2024-01-03 NOTE — TELEPHONE ENCOUNTER
Attempted to contact patient at [phone] to discuss [facility] and services/benefits offered. No answer, left voicemail.    Socorro Brady  Rx Coordinator   [phone]

## 2024-01-04 PROCEDURE — RXMED WILLOW AMBULATORY MEDICATION CHARGE: Performed by: NURSE PRACTITIONER

## 2024-01-05 ENCOUNTER — TELEPHONE (OUTPATIENT)
Dept: PHARMACY | Facility: MEDICAL CENTER | Age: 74
End: 2024-01-05
Payer: MEDICARE

## 2024-01-05 ENCOUNTER — TELEPHONE (OUTPATIENT)
Dept: CARDIOLOGY | Facility: MEDICAL CENTER | Age: 74
End: 2024-01-05
Payer: MEDICARE

## 2024-01-05 ENCOUNTER — TELEPHONE (OUTPATIENT)
Dept: VASCULAR LAB | Facility: MEDICAL CENTER | Age: 74
End: 2024-01-05
Payer: MEDICARE

## 2024-01-05 DIAGNOSIS — I10 ESSENTIAL HYPERTENSION: ICD-10-CM

## 2024-01-05 DIAGNOSIS — I48.91 ATRIAL FIBRILLATION, UNSPECIFIED TYPE (HCC): ICD-10-CM

## 2024-01-05 DIAGNOSIS — Z95.2 H/O MECHANICAL AORTIC VALVE REPLACEMENT: ICD-10-CM

## 2024-01-05 DIAGNOSIS — E03.9 HYPOTHYROIDISM, UNSPECIFIED TYPE: ICD-10-CM

## 2024-01-05 DIAGNOSIS — I10 HYPERTENSION, UNSPECIFIED TYPE: ICD-10-CM

## 2024-01-05 RX ORDER — TELMISARTAN 80 MG/1
80 TABLET ORAL DAILY
Qty: 100 TABLET | Refills: 3 | Status: SHIPPED | OUTPATIENT
Start: 2024-01-05

## 2024-01-05 RX ORDER — WARFARIN SODIUM 4 MG/1
TABLET ORAL
Qty: 100 TABLET | Refills: 1 | Status: SHIPPED | OUTPATIENT
Start: 2024-01-05

## 2024-01-05 RX ORDER — AMIODARONE HYDROCHLORIDE 200 MG/1
200 TABLET ORAL
Qty: 100 TABLET | Refills: 0 | Status: SHIPPED | OUTPATIENT
Start: 2024-01-05 | End: 2024-01-30 | Stop reason: SDUPTHER

## 2024-01-05 RX ORDER — TORSEMIDE 20 MG/1
10 TABLET ORAL
Qty: 50 TABLET | Refills: 0 | Status: SHIPPED | OUTPATIENT
Start: 2024-01-05

## 2024-01-05 RX ORDER — DOXAZOSIN 2 MG/1
2 TABLET ORAL DAILY
Qty: 100 TABLET | Refills: 3 | Status: SHIPPED | OUTPATIENT
Start: 2024-01-05 | End: 2024-01-08

## 2024-01-05 NOTE — TELEPHONE ENCOUNTER
Greetings!!     I spoke with Yenifer and she'd like to [facility]  Please send new prescriptions for the following medications.  Unfortunately we've been unable to transfer the prescriptions from the current pharmacy.         -triamcinolone acetonide (KENALOG) 0.1 % Ointment      Thank you!    Socorro Brady  Rx Coordinator   [phone]

## 2024-01-05 NOTE — TELEPHONE ENCOUNTER
EDU Burch.  You8 minutes ago (9:20 AM)     Please send refills as appropriate. SC     Amiodarone and torsemide sent to [facility] at this time. 100 day fill provided, patient has upcoming appointment with [facility] 1/30/24.

## 2024-01-05 NOTE — TELEPHONE ENCOUNTER
Greetings!!     I spoke with Yenifer and she'd like to switch all medications to [facility].  Please send new prescriptions for the following medications.  Unfortunately we've been unable to transfer the prescriptions from the current pharmacy.        amiodarone (CORDARONE) 200 MG Tab  torsemide (DEMADEX) 20 MG Tab      Thank you!    Socorro Brady  Rx Coordinator   [phone]

## 2024-01-05 NOTE — TELEPHONE ENCOUNTER
Greetings!!     I spoke with Yenifer and she'd like to switch all medications to [facility].  Please send new prescriptions for the following medications.  Unfortunately we've been unable to transfer the prescriptions from the current pharmacy.        -telmisartan (MICARDIS) 80 MG Tab  -doxazosin (CARDURA) 2 MG Tab  -warfarin (COUMADIN) 4 MG Tab      Thank you!    Socorro Brady  Rx Coordinator   [phone]

## 2024-01-05 NOTE — TELEPHONE ENCOUNTER
Greetings!!     I spoke with Yenifer and she'd like to switch all medications to [facility].  Please send new prescriptions for the following medications.  Unfortunately we've been unable to transfer the prescriptions from the current pharmacy.        -levothyroxine (SYNTHROID) 125 MCG Tab      Thank you!     Socorro Brady  Rx Coordinator   [phone]

## 2024-01-08 ENCOUNTER — OFFICE VISIT (OUTPATIENT)
Dept: VASCULAR LAB | Facility: MEDICAL CENTER | Age: 74
End: 2024-01-08
Attending: FAMILY MEDICINE
Payer: MEDICARE

## 2024-01-08 VITALS
SYSTOLIC BLOOD PRESSURE: 164 MMHG | HEIGHT: 61 IN | DIASTOLIC BLOOD PRESSURE: 75 MMHG | HEART RATE: 85 BPM | BODY MASS INDEX: 40.78 KG/M2 | WEIGHT: 216 LBS

## 2024-01-08 DIAGNOSIS — I25.810 CORONARY ARTERY DISEASE INVOLVING CORONARY BYPASS GRAFT OF NATIVE HEART WITHOUT ANGINA PECTORIS: ICD-10-CM

## 2024-01-08 DIAGNOSIS — E78.5 HYPERLIPIDEMIA, UNSPECIFIED HYPERLIPIDEMIA TYPE: ICD-10-CM

## 2024-01-08 DIAGNOSIS — R73.03 PREDIABETES: ICD-10-CM

## 2024-01-08 DIAGNOSIS — I48.91 ATRIAL FIBRILLATION, UNSPECIFIED TYPE (HCC): ICD-10-CM

## 2024-01-08 DIAGNOSIS — E78.41 ELEVATED LIPOPROTEIN(A): ICD-10-CM

## 2024-01-08 DIAGNOSIS — Z95.1 HISTORY OF CORONARY ARTERY BYPASS GRAFT: ICD-10-CM

## 2024-01-08 DIAGNOSIS — N18.32 STAGE 3B CHRONIC KIDNEY DISEASE: ICD-10-CM

## 2024-01-08 DIAGNOSIS — I10 PRIMARY HYPERTENSION: ICD-10-CM

## 2024-01-08 DIAGNOSIS — D68.69 SECONDARY HYPERCOAGULABLE STATE (HCC): ICD-10-CM

## 2024-01-08 DIAGNOSIS — Z79.01 CHRONIC ANTICOAGULATION: ICD-10-CM

## 2024-01-08 DIAGNOSIS — R00.1 BRADYCARDIA: ICD-10-CM

## 2024-01-08 DIAGNOSIS — Z95.2 H/O MECHANICAL AORTIC VALVE REPLACEMENT: ICD-10-CM

## 2024-01-08 DIAGNOSIS — E11.42 DIABETIC POLYNEUROPATHY ASSOCIATED WITH TYPE 2 DIABETES MELLITUS (HCC): ICD-10-CM

## 2024-01-08 DIAGNOSIS — E66.01 MORBID (SEVERE) OBESITY DUE TO EXCESS CALORIES (HCC): ICD-10-CM

## 2024-01-08 PROCEDURE — 3077F SYST BP >= 140 MM HG: CPT | Performed by: FAMILY MEDICINE

## 2024-01-08 PROCEDURE — 3078F DIAST BP <80 MM HG: CPT | Performed by: FAMILY MEDICINE

## 2024-01-08 PROCEDURE — 99214 OFFICE O/P EST MOD 30 MIN: CPT | Performed by: FAMILY MEDICINE

## 2024-01-08 PROCEDURE — 99212 OFFICE O/P EST SF 10 MIN: CPT

## 2024-01-08 RX ORDER — LEVOTHYROXINE SODIUM 0.12 MG/1
125 TABLET ORAL
Qty: 100 TABLET | Refills: 3 | Status: SHIPPED | OUTPATIENT
Start: 2024-01-08

## 2024-01-08 RX ORDER — AMIODARONE HYDROCHLORIDE 200 MG/1
200 TABLET ORAL
Qty: 100 TABLET | Refills: 3 | OUTPATIENT
Start: 2024-01-08

## 2024-01-08 RX ORDER — DOXAZOSIN MESYLATE 4 MG/1
4 TABLET ORAL
Qty: 90 TABLET | Refills: 3 | Status: SHIPPED | OUTPATIENT
Start: 2024-01-08 | End: 2024-03-26

## 2024-01-08 ASSESSMENT — ENCOUNTER SYMPTOMS
SPUTUM PRODUCTION: 0
BRUISES/BLEEDS EASILY: 0
CHILLS: 0
COUGH: 0
CLAUDICATION: 0
PND: 0
BLOOD IN STOOL: 0
FEVER: 0
HEMOPTYSIS: 0
ORTHOPNEA: 0
PALPITATIONS: 0
WHEEZING: 0
SHORTNESS OF BREATH: 0

## 2024-01-08 ASSESSMENT — FIBROSIS 4 INDEX: FIB4 SCORE: 2.26

## 2024-01-08 NOTE — PROGRESS NOTES
[facility] Follow Up Visit  01/08/24     Yenifer Beasley is a female who is here for follow up of dyslipidemia, HTN, chronic anticoagulation, and hypothyroidism    Subjective    Interval hx/concerns: last 11/2023, no major changes.  Reports tolerating meds.  Denies worsening swelling.  Never completed prior ordered zio patch.        MED MGMT  Current Prescription Lipid Lowering Medications - including dose: Has missed several doses   Statin: None  Non-Statin: Zetia, praluent   Current Lipid Lowering and Related Supplements:   Omega 3s, 1000u daily  Vit D  Any Current Side Effects Potentially Related to Lipid Lowering therapy? No  Current Adherence to Lipid Lowering Therapies:Complete    PERTINENT HLD PMHX  Any Previous History of Statin Intolerance? Yes, Details: severe myalgias on multiple statins  Baseline Lipids Prior to Treatment:  Shown here:  LDL-C: 152      HTN: stable on meds.  Home -160s/60-70s  Taking doxazosin 2mg, no dizziness   HR 42-50s, is having ongoing fatigue/lethargy    [facility] valve replacement: stable, tolerating meds.  No prior CVA, seeing cardiology     Anticoag: On warfarin, no bleeding issues, managed with [facility]        Current Outpatient Medications:     doxazosin, 4 mg, Oral, QHS    amiodarone, 200 mg, Oral, QDAY, Taking    torsemide, 10 mg, Oral, QDAY, Taking    telmisartan, 80 mg, Oral, DAILY, Taking    warfarin, TAKE ONE TABLET BY MOUTH DAILY OR AS DIRECTED BY ANTICOAGULATION CLINIC, Taking    ezetimibe, TAKE 1 TABLET BY MOUTH EVERY DAY, Taking    levothyroxine, 125 mcg, Oral, AM ES, Taking    Praluent, 150 mg, Subcutaneous, Q14 DAYS, Taking    timolol, 1 DROP INTO BOTH EYES TWICE A DAY, Taking    triamcinolone acetonide, APPLY TO AFFECTED AREA TWICE A DAY FOR 2-3 WEEKS, THEN CAN USE 2 WEEKS ON, 2 WEEKS OFF AS NEEDED, Taking    diclofenac sodium, Apply  topically 4 times a day as needed., Taking    vitamin D, 1,000 Units, Oral, DAILY, Taking    docosahexanoic acid, 1,000  "mg, Oral, BID, Taking    traMADol, 100 mg, Oral, TID, Taking     Social History     Tobacco Use    Smoking status: Never    Smokeless tobacco: Never   Vaping Use    Vaping Use: Never used   Substance Use Topics    Alcohol use: No    Drug use: Never      Change in weight: increasing   Wt Readings from Last 3 Encounters:   24 98 kg (216 lb)   23 94.3 kg (208 lb)   23 96.2 kg (212 lb)      Exercise habits: no regular exercise  Diet: common adult, consistent vit K    Review of Systems   Constitutional:  Negative for chills, fever and malaise/fatigue.   HENT:  Negative for nosebleeds.    Respiratory:  Negative for cough, hemoptysis, sputum production, shortness of breath and wheezing.    Cardiovascular:  Positive for leg swelling. Negative for chest pain, palpitations, orthopnea, claudication and PND.   Gastrointestinal:  Negative for blood in stool.   Endo/Heme/Allergies:  Does not bruise/bleed easily.         Objective   Vitals:    24 1400 24 1403   BP: (!) 162/71 (!) 164/75   BP Location: Left arm Left arm   Patient Position: Sitting Sitting   BP Cuff Size: Adult Large adult   Pulse: 67 85   Weight: 98 kg (216 lb)    Height: 1.549 m (5' 1\")       BP Readings from Last 5 Encounters:   24 (!) 164/75   23 118/70   23 (!) 159/75   10/11/23 126/74   23 131/75      Physical Exam  Vitals reviewed.   Constitutional:       General: She is not in acute distress.     Appearance: She is well-developed. She is obese. She is not diaphoretic.   HENT:      Head: Normocephalic and atraumatic.   Eyes:      General: No scleral icterus.  Cardiovascular:      Rate and Rhythm: Bradycardia present.      Pulses: Normal pulses.      Heart sounds: Murmur heard.   Pulmonary:      Effort: Pulmonary effort is normal. No respiratory distress.      Breath sounds: No wheezing or rales.   Musculoskeletal:         General: Swelling present. Normal range of motion.      Right lower le+ Pitting " "Edema present.      Left lower le+ Pitting Edema present.      Comments: Uses cane   Skin:     General: Skin is warm and dry.      Coloration: Skin is not pale.   Neurological:      Mental Status: She is alert and oriented to person, place, and time.      Motor: No weakness.   Psychiatric:         Mood and Affect: Mood normal.         Behavior: Behavior normal.         Thought Content: Thought content normal.      Comments: forgetful       DATA REVIEW  Most Recent Lipid Panel:   Lab Results   Component Value Date    CHOLSTRLTOT 136 2024    TRIGLYCERIDE 91 2024    HDL 65 2024    LDL 53 2024     Lab Results   Component Value Date/Time    LDL 53 2024 01:16 PM    LDL 68 2023 09:43 AM    LDL 52 2023 10:51 AM    LDL 54 2022 03:37 PM     (H) 2022 10:43 AM       Lab Results   Component Value Date/Time    LIPOPROTA 57 (H) 10/12/2017 10:27 AM      No results found for: \"APOB\"    Other Pertinent Blood Work:   Lab Results   Component Value Date    SODIUM 142 2024    POTASSIUM 4.5 2024    CHLORIDE 106 2024    CO2 26 2024    ANION 10.0 2024    GLUCOSE 82 2024    BUN 26 (H) 2024    CREATININE 1.62 (H) 2024    CALCIUM 9.1 2024    ASTSGOT 15 2024    ALTSGPT 9 2024    ALKPHOSPHAT 55 2024    TBILIRUBIN 0.5 2024    ALBUMIN 4.0 2024    AGRATIO 1.7 2024    TSHULTRASEN 1.950 2022     Other:  Lp(a) - 57 (oct 2017)  TSH 1.5  (oct 2017)    Recent Imaging Studies:      Echo 2017:  1. Left ventricular ejection fraction is visually estimated to be 55%.   Normal regional wall motion. Grade I diastolic dysfunction.  2. Known mechanical aortic valve that is functioning normally.   3. Mild mitral stenosis.  4. Estimated right ventricular systolic pressure is 40 mmHg.    Echo 19  CONCLUSIONS  Normal left ventricular systolic function.  Left ventricular ejection fraction is visually " estimated to be 65-70%.  Mild concentric left ventricular hypertrophy.  Mild mitral stenosis.  Severe mitral annular calcification.  Mild mitral regurgitation.  Bioprosthetic aortic valve with increased velocities.  Right heart pressures are consistent with moderate pulmonary   hypertension.  Severely dilated left atrium.  Echo 3/3/21  Normal left ventricular chamber size.  Left ventricular ejection fraction is visually estimated to be 65%.  Mild concentric left ventricular hypertrophy.  Dilated inferior vena cava without inspiratory collapse.  Moderate mitral stenosis.  Mean [id number] gradient is 7 mmHg at a heart rate of 64 BPM.  Structurally normal tricuspid valve without significant stenosis.  Normal function of mechanical prosthetic valve.  US aorta 4/14/21  Mild atherosclerotic change of the aorta. No evidence of aneurysm.  Carotid duplex 4/14/21  Mild plaque with no significant stenosis.  Echo 8/2022  Hyperdynamic left ventricular systolic function.  The left ventricular ejection fraction is visually estimated to be 75%.  Moderate concentric left ventricular hypertrophy.  Known mechanical aortic valve with increased gradients; Transvalvular   gradients are - Peak: 62 mmHg, Mean: 33 mmHg. Vmax is  3.92 m/s.   Acceleration time is 84 ms.  Moderate mitral stenosis.  The right ventricle is dilated with normal right ventricular systolic   function.  Right heart pressures are consistent with moderate pulmonary   hypertension at 50 mmHg.  Compared to the images of the prior study 3/3/2021, there has been an   increase in velocity gradient across the aortic valve prosthesis.     Zio patch 12/2023         ASSESSMENT AND PLAN  1. Hyperlipidemia, unspecified hyperlipidemia type        2. Primary hypertension  doxazosin (CARDURA) 4 MG Tab    Referral to Vascular Medicine    US-RENAL ARTERY DUPLEX COMP      3. H/O mechanical aortic valve replacement        4. Atrial fibrillation (HCC) [I48.91]        5.  Bradycardia        6. Stage 3b chronic kidney disease (HCC)  US-RENAL ARTERY DUPLEX COMP      7. Chronic anticoagulation        8. Morbid (severe) obesity due to excess calories (HCC)        9. Diabetic polyneuropathy associated with type 2 diabetes mellitus (HCC)        10. Prediabetes        11. Secondary hypercoagulable state (HCC)        12. Coronary artery disease involving coronary bypass graft of native heart without angina pectoris        13. History of coronary artery bypass graft        14. Elevated lipoprotein(a)          Patient Type, check all that apply: Secondary Prevention    Established Atherosclerotic Cardiovascular Disease (ASCVD)  # CAD s/p CABG - stable, no current sx, seeing cardiology ongoing, continue med mgmt     Other Established (non-atherosclerotic) Vascular/Heart Disease, if Present:      # Valvular heart disease s/p mechanical AVR - continue anticoagulation;  - previously saw cardiology for worsening valvular heart disease- had angiogram scheduled but decided against it as she does not want to move forward with any surgery.    Plan:  - ongoing f/u with cardiology, now overdue, provided office number to re-establish at last visit     # bradycardia - zio patch with avg 48 bpm, low as 34 - symptomatic, intermittent   Plan:  - ref back to cardiology, pending later this month     # Afib - no prior cva/tia  - continue meds, ref back to cardiology     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): Possible    ACC/AHA Indication for Statin Therapy, toby all that apply: Established ASCVD: Indication for High intensity statin    Calculated Risk for ASCVD, if applicable:N/A    Other Significant Risk Markers, if any, toby all that apply:  Family history of premature ASCVD in first degree relative   High lp(a)    National Lipid Association (NLA) Goal (if applicable):  LDL-C:   <70 mg/dL and nonHDL <100  At goal?  Yes, 1/2024    Lifestyle Recommendations From Today’s Visit:    Medi  style diet   Exercise - daily home exercise as tolerated, try for 10-15min moderate intensity per day     Non-Statin Medications Recommendations from Today’s Visit:   - Continue Praluent 150mg   - Continue zetia 10 mg daily  - Consider addition of fibrate if nonHDL >130 in future    Indication for PCSK9 Inhibitor, if applicable:  PSCK9i indicated per 2018 ACC/AHA guidelines in this patient with established ASCVD whose LDL-C remains above threshold of 70 mg/dl despite maximally tolerated statin therapy.     Supplements Recommended at this visit:  - Continue omega 3's at 3,000u/day, Vit D     Recommendations for Other Cardiovascular Risk Factors, toby all that apply:     BLOOD PRESSURE CONTROL   Office BP Goal ACC/AHA (2017) goal <130/80  Home BP at goal:  no  Office BP at goal:  no  24h ABPM:  not ordered to date   RDN candidate? UNDECIDED  Contributing factors: CAD, HILDA, obesity, DM, CKD, MV stenosis   Plan:   Monitoring:   - start/continue home BP monitoring, reviewed correct technique, provide BP log and instructions.  Use upper arm cuff.   - order 24h ABPM:  UNDECIDED  - monitor lytes/gfr routinely, check urine for albumin prior to next appt  - contact office if BP consistently >140/>90 for discussion of tx adjustments   Medications:  - continue telmisartan 80mg daily    DHP-CCB: none, avoid due to baseline LE edema   Thiazide: caution/avoid with GFR and due to gout   Calvin-receptor Antagonist: not indicated at this time   -increase doxazosin to 4mg QHS   - continue torsemide 20mg 1/2 daily  - will increase to 20mg as next step - relative hypervolemia due to CKD G3b  BB: avoid due to bradycardia   non-DHP-CCB: avoid due to bradycardia     Anticoagulation: continue warfarin and f/u with anticoag clinic    OTHER:    # HILDA: Sleep study completed- pulm made adjustments- started CPAP machine last month, feeling improved sleep and less daytime sleepiness.  Continue to follow up with pulm as directed.     # preDM:  relatively stable.  Resolved   Lab Results   Component Value Date    HBA1C 5.4 01/02/2024    - monitor annually      # Hypothyroidism - stable, monitoring per PCP     # CKD stage 3b - ongoing, monitor closely, consider referral to nephrology in future    Studies Ordered at Todays Visit: none   Blood Work Ordered At Today’s visit: as noted in assessment     Follow-Up: 6 weeks with me for BP control     1) Anticoagulation- continue monitoring with  and report to anticoag clinic  2) cardiology     North Drake M.D.  Vascular [facility] Clinic   [facility]   [phone]     .  HCC Gap Form    Diagnosis: E66.01 - Morbid (severe) obesity due to excess calories (HCC)  Z68.41 - Body mass index (BMI) 40.0-44.9, adult ([facility])  The current BMI is 40.81 kg/m2 as of 01/08/24 17:25 PST  Assessment and plan: Chronic, stable. Encouraged healthy diet and physical activity changes with a goal of weight loss. Follow up at least annually.  Diagnosis to address: E11.42 - Diabetic polyneuropathy associated with type 2 diabetes mellitus (HCC)  Assessment and plan: Chronic, stable. Continue with current defined treatment plan: Follow-up at least annually.  Last edited 01/08/24 17:26 PST by North Drake M.D.

## 2024-01-09 RX ORDER — TRIAMCINOLONE ACETONIDE 1 MG/G
OINTMENT TOPICAL
Qty: 30 G | Refills: 3 | Status: SHIPPED | OUTPATIENT
Start: 2024-01-09

## 2024-01-10 ENCOUNTER — PHARMACY VISIT (OUTPATIENT)
Dept: PHARMACY | Facility: MEDICAL CENTER | Age: 74
End: 2024-01-10
Payer: COMMERCIAL

## 2024-01-22 PROCEDURE — RXMED WILLOW AMBULATORY MEDICATION CHARGE: Performed by: INTERNAL MEDICINE

## 2024-01-22 PROCEDURE — RXMED WILLOW AMBULATORY MEDICATION CHARGE: Performed by: FAMILY MEDICINE

## 2024-01-22 PROCEDURE — RXMED WILLOW AMBULATORY MEDICATION CHARGE

## 2024-01-23 LAB — INR PPP: 2.6 (ref 2–3.5)

## 2024-01-25 ENCOUNTER — TELEPHONE (OUTPATIENT)
Dept: VASCULAR LAB | Facility: MEDICAL CENTER | Age: 74
End: 2024-01-25

## 2024-01-25 ENCOUNTER — PHARMACY VISIT (OUTPATIENT)
Dept: PHARMACY | Facility: MEDICAL CENTER | Age: 74
End: 2024-01-25
Payer: COMMERCIAL

## 2024-01-25 ENCOUNTER — ANTICOAGULATION MONITORING (OUTPATIENT)
Dept: VASCULAR LAB | Facility: MEDICAL CENTER | Age: 74
End: 2024-01-25
Payer: MEDICARE

## 2024-01-25 DIAGNOSIS — Z95.2 HX OF MECHANICAL AORTIC VALVE REPLACEMENT: ICD-10-CM

## 2024-01-25 DIAGNOSIS — I48.91 ATRIAL FIBRILLATION, UNSPECIFIED TYPE (HCC): ICD-10-CM

## 2024-01-25 NOTE — TELEPHONE ENCOUNTER
Caller: Yenifer Beasley     Topic/issue: INR Reading    INR - 2.6 on 01.23.24    Callback Number: [phone]    Thank you,     Ely VALDES

## 2024-01-25 NOTE — PROGRESS NOTES
Anticoagulation Summary  As of 2024      INR goal:  2.5-3.5   TTR:  68.3 % (6.9 y)   INR used for dosin.60 (2024)   Warfarin maintenance plan:  4 mg (4 mg x 1) every day   Weekly warfarin total:  28 mg   Plan last modified:  Emily MaradiagaD (2021)   Next INR check:  2024   Target end date:  Indefinite    Indications    Chronic anticoagulation (Resolved) [Z79.01]  Hx of mechanical aortic valve replacement [V43.3] [Z95.2]  TIA (transient ischemic attack) (Resolved) [G45.9]  Atrial fibrillation (HCC) [I48.91] [I48.91]                 Anticoagulation Episode Summary       INR check location:      Preferred lab:      Send INR reminders to:      Comments:  [employer] home meter          Anticoagulation Care Providers       Provider Role Specialty Phone number    Michael J Bloch, M.D. Referring Cardiovascular Disease (Cardiology) [phone]    Emily MartinezD Responsible Pharmacy             Refer to Anticoagulation Patient Findings for HPI  Patient Findings       Positives:  Change in health (gout flare)    Negatives:  Signs/symptoms of thrombosis, Signs/symptoms of bleeding, Laboratory test error suspected, Change in alcohol use, Change in activity, Upcoming invasive procedure, Emergency department visit, Upcoming dental procedure, Missed doses, Extra doses, Change in medications, Change in diet/appetite, Hospital admission, Bruising, Other complaints            Spoke with patient to report a therapeutic INR.      Pt is NOT on antiplatelet therapy     Pt instructed to continue with current warfarin dosing regimen, confirms dosing.   Will follow up in 2 week(s).     Emily AvitiaD

## 2024-01-30 ENCOUNTER — TELEPHONE (OUTPATIENT)
Dept: CARDIOLOGY | Facility: MEDICAL CENTER | Age: 74
End: 2024-01-30

## 2024-01-30 ENCOUNTER — OFFICE VISIT (OUTPATIENT)
Dept: CARDIOLOGY | Facility: MEDICAL CENTER | Age: 74
End: 2024-01-30
Attending: INTERNAL MEDICINE
Payer: MEDICARE

## 2024-01-30 VITALS
DIASTOLIC BLOOD PRESSURE: 75 MMHG | RESPIRATION RATE: 16 BRPM | OXYGEN SATURATION: 93 % | BODY MASS INDEX: 38.89 KG/M2 | SYSTOLIC BLOOD PRESSURE: 128 MMHG | HEART RATE: 56 BPM | WEIGHT: 206 LBS | HEIGHT: 61 IN

## 2024-01-30 DIAGNOSIS — Z95.2 H/O AORTIC VALVE REPLACEMENT: ICD-10-CM

## 2024-01-30 DIAGNOSIS — I48.91 ATRIAL FIBRILLATION, UNSPECIFIED TYPE (HCC): ICD-10-CM

## 2024-01-30 LAB — EKG IMPRESSION: NORMAL

## 2024-01-30 PROCEDURE — 3078F DIAST BP <80 MM HG: CPT | Performed by: INTERNAL MEDICINE

## 2024-01-30 PROCEDURE — 99214 OFFICE O/P EST MOD 30 MIN: CPT | Performed by: INTERNAL MEDICINE

## 2024-01-30 PROCEDURE — 99213 OFFICE O/P EST LOW 20 MIN: CPT | Performed by: INTERNAL MEDICINE

## 2024-01-30 PROCEDURE — 3074F SYST BP LT 130 MM HG: CPT | Performed by: INTERNAL MEDICINE

## 2024-01-30 PROCEDURE — 93010 ELECTROCARDIOGRAM REPORT: CPT | Performed by: INTERNAL MEDICINE

## 2024-01-30 PROCEDURE — 93005 ELECTROCARDIOGRAM TRACING: CPT | Performed by: INTERNAL MEDICINE

## 2024-01-30 RX ORDER — AMIODARONE HYDROCHLORIDE 200 MG/1
100 TABLET ORAL
Qty: 100 TABLET | Refills: 3 | Status: SHIPPED | OUTPATIENT
Start: 2024-01-30

## 2024-01-30 RX ORDER — AMIODARONE HYDROCHLORIDE 200 MG/1
200 TABLET ORAL
Qty: 100 TABLET | Refills: 3 | Status: SHIPPED | OUTPATIENT
Start: 2024-01-30 | End: 2024-01-30 | Stop reason: SDUPTHER

## 2024-01-30 ASSESSMENT — FIBROSIS 4 INDEX: FIB4 SCORE: 2.26

## 2024-01-30 NOTE — TELEPHONE ENCOUNTER
Called Yenifer and left her a message that  ordered her an echocardiogram and for her to call back to get that scheduled prior to her next appt.

## 2024-01-30 NOTE — PROGRESS NOTES
"      Cardiology Follow-up Consultation Note        CC: Follow-up shortness of breath, mechanical aortic valve replacement, hypertension    Patient ID/HPI:   71-year-old female patient with hypertension, diabetes mellitus, obesity, history of aortic mechanical prosthesis here for follow-up.  No significant changes since last evaluated by me.  She complains of feeling cold at nights.      Past Medical History:   Diagnosis Date    Allergic rhinitis     Anticoagulation monitoring, special range     Arthritis     OA OF HANDS, KNEESM, HIPS AND BACK    Asthma 10/17/2019    childhood asthma    Atrial fibrillation (HCC)     Back pain     CAD (coronary artery disease) 2007    CABG    Chest pain at rest 11/30/2010    Chronic anticoagulation 4/23/2018    Constipation     Coronary heart disease     Diabetes     Diabetes     DIAB FEET EXAM:  1/6/11    Diabetic neuropathy (HCC) 1/6/2011    Difficulty breathing     Fall     \"a couple years ago because of my neuopathy\" multiple falls    Gasping for breath     Gastritis 7/10    H/O UGIB    GERD (gastroesophageal reflux disease)     Malay measles     GI bleed     Hyperlipidemia     Hypertension     Hyponatremia 4/22/2014    Hypothyroidism     Impaired fasting glucose 10/8/2019    Infectious disease     History of MRSA    Migraine     Need for influenza vaccination 11/30/2010    ICD-10 transition    Neuropathy in diabetes (HCC)     HILDA (obstructive sleep apnea)     Painful joint     Palpitations     Pneumonia     Pneumonia due to organism 4/22/2014    Diagnois update 10/1/2016    Pulmonary hypertension (HCC)     Rash     S/P aortic valve replacement 2007    Shortness of breath     Skin infection 5/11/2018    Sleep apnea     Swelling of lower extremity     TIA (transient ischemic attack) 2005    Tonsillitis     Tricuspid regurgitation mild- mod 1/6/2011    Unspecified hemorrhagic conditions     Bleeds easily-GI bleeds with NSAIDS and ASA    Upper GI bleed ON JULY 2007 1/6/2011    " Upper GI bleed ON JULY 2007 1/6/2011    URTI (acute upper respiratory infection) 11/30/2010    UTI (urinary tract infection) 4/24/2014    Valvular heart disease     Weakness     Wears glasses          Past Surgical History:   Procedure Laterality Date    MITRAL VALVE REPLACE  2007    AORTIC VALVE    SHOULDER SURGERY  6/2001    NEER DECOMOPRESSION LEFT SHOULDER    CARPAL TUNNEL RELEASE  1998    B/L    TONSILLECTOMY  1995    PARTIAL PALATOPLASTY    ABDOMINAL HYSTERECTOMY TOTAL      TAHBSOO    AORTIC VALVE REPLACEMENT      CHOLECYSTECTOMY      CORONARY ARTERY BYPASS, 1      Triple Bypass    HERNIA REPAIR      VENTRAL HERNIA REPAIR    HYSTERECTOMY LAPAROSCOPY      INTUBATION      PRIMARY C SECTION      SLEEVE,MEJIA VASO THIGH      UVULOPHARYNGOPALATOPLASTY      VENTILATOR CONTINUOUS           Current Outpatient Medications   Medication Sig Dispense Refill    LUMIGAN 0.01 % Solution       triamcinolone acetonide (KENALOG) 0.1 % Ointment APPLY TO AFFECTED AREA TWICE A DAY FOR 2-3 WEEKS, THEN CAN USE 2 WEEKS ON, 2 WEEKS OFF AS NEEDED      lisinopril (PRINIVIL) 20 MG Tab Take 1 Tablet by mouth 2 times a day. 200 Tablet 3    doxazosin (CARDURA) 2 MG Tab TAKE 1 TABLET BY MOUTH EVERY DAY. NEEDS APPOINTMENT FOR FUTURE REFILLS 90 Tablet 1    levothyroxine (SYNTHROID) 125 MCG Tab TAKE 1 TABLET BY MOUTH EVERY DAY IN THE MORNING ON AN EMPTY STOMACH 100 Tablet 2    Alirocumab (PRALUENT) 150 MG/ML Solution Auto-injector Inject 150 mg under the skin every 14 days. 6 mL 3    warfarin (COUMADIN) 4 MG Tab Take one tablet by mouth daily or as directed by anticoagulation clinic 100 Tablet 1    amiodarone (CORDARONE) 200 MG Tab TAKE 1 TABLET BY MOUTH EVERY  Tablet 2    ezetimibe (ZETIA) 10 MG Tab TAKE 1 TABLET BY MOUTH EVERY DAY 90 Tablet 3    diclofenac sodium (VOLTAREN) 1 % Gel Apply  topically 4 times a day as needed.      torsemide (DEMADEX) 20 MG Tab Take 0.5 Tablets by mouth every day. 90 Tablet 3    vitamin D (CHOLECALCIFEROL)  "1000 UNIT TABS Take 1,000 Units by mouth every day.        docosahexanoic acid (OMEGA 3 FA) 1000 MG CAPS Take 1,000 mg by mouth 2 Times a Day.      tramadol (ULTRAM) 50 MG TABS Take 2 Tabs by mouth 3 times a day. 180 Each 0    Eflornithine HCl 13.9 % Cream AAA twice day, leave on for at least 4 hour before washing off 45 g 1     No current facility-administered medications for this visit.         Allergies   Allergen Reactions    Asa [Aspirin]      GI BLEED    Atorvastatin Shortness of Breath    Cymbalta [Duloxetine Hcl] Unspecified     Feels like a \"zombie\"    Hmg-Coa-R Inhibitors     Latex Swelling    Nsaids      GI BLEED    Tricor Shortness of Breath     Breathing problems      Trilipix [Choline Fenofibrate] Shortness of Breath     SOB    Lyrica [Pregabalin]      SPACED OUT         Family History   Problem Relation Age of Onset    Heart Disease Mother         a fib    Cancer Mother         Cervical: palate:      Heart Disease Brother         a fib    Cancer Maternal Grandmother         Leukemia,  21 y.o. 193    Cancer Maternal Aunt         Lung CA (+3 dtrs  from [place])             Physical Exam:  Ambulatory Vitals  BP (!) 150/70 (BP Location: Left arm, Patient Position: Sitting, BP Cuff Size: Adult)   Pulse (!) 56   Resp 16   Ht 1.549 m (5' 1\")   Wt 95.3 kg (210 lb)   SpO2 97%    Oxygen Therapy:  Pulse Oximetry: 97 %  BP Readings from Last 4 Encounters:   22 (!) 150/70   22 (!) 152/74   22 130/70   22 133/70       Weight/BMI: Body mass index is 39.68 kg/m².  Wt Readings from Last 4 Encounters:   22 95.3 kg (210 lb)   22 99.3 kg (219 lb)   22 97.5 kg (215 lb)   22 98 kg (216 lb)       General: Well appearing and in no apparent distress  Head: atrumatic  Eyes: No conjunctival pallor   ENT: normal external appearance of nose and ears  Neck: JVD absent, carotid bruits absent  Lungs: respiratory sounds  normal, additional breath sounds " absent  Heart: Regular rhythm, 3 x 6 systolic murmur aortic area mechanical aortic valve click.  2+ lower extremity edema      Lab Data Review:  Lab Results   Component Value Date/Time    CHOLSTRLTOT 202 (H) 04/26/2022 10:43 AM     (H) 04/26/2022 10:43 AM    HDL 58 04/26/2022 10:43 AM    TRIGLYCERIDE 85 04/26/2022 10:43 AM       Lab Results   Component Value Date/Time    SODIUM 141 04/26/2022 10:43 AM    POTASSIUM 4.4 04/26/2022 10:43 AM    CHLORIDE 104 04/26/2022 10:43 AM    CO2 24 04/26/2022 10:43 AM    GLUCOSE 100 (H) 04/26/2022 10:43 AM    BUN 38 (H) 04/26/2022 10:43 AM    CREATININE 1.61 (H) 04/26/2022 10:43 AM    CREATININE 1.6 (H) 11/26/2007 12:28 AM     Lab Results   Component Value Date/Time    ALKPHOSPHAT 54 04/26/2022 10:43 AM    ASTSGOT 18 04/26/2022 10:43 AM    ALTSGPT 13 04/26/2022 10:43 AM    TBILIRUBIN 0.4 04/26/2022 10:43 AM      Lab Results   Component Value Date/Time    WBC 7.2 04/26/2022 10:44 AM     Echo 4/4:  Normal left ventricular systolic function.  Left ventricular ejection fraction is visually estimated to be 65-70%.  Mild concentric left ventricular hypertrophy.  Mild mitral stenosis.  Severe mitral annular calcification.  Mild mitral regurgitation.  Bioprosthetic aortic valve with increased velocities.  Right heart pressures are consistent with moderate pulmonary   hypertension.  Severely dilated left atrium.     Echo 3/3/21  CONCLUSIONS  Normal left ventricular chamber size.  Left ventricular ejection fraction is visually estimated to be 65%.  Mild concentric left ventricular hypertrophy.  Dilated inferior vena cava without inspiratory collapse.  Moderate mitral stenosis.  Mean transvalvular gradient is 7 mmHg at a heart rate of 64 BPM.  Structurally normal tricuspid valve without significant stenosis.  Normal function of mechanical prosthetic valve.     EKG 8/18/2022 shows sinus bradycardia with PACs  Echocardiogram performed on 8/8/2022 reviewed, independently interpreted  elevated gradients across the mechanical valve 4 m/s moderate mitral valve stenosis, moderate to severe tricuspid regurgitation     Impression and Plan:  73-year-old female patient with  1.  Shortness of breath, likely secondary to diastolic heart failure, NYHA class II stage C  2.  Atrial fibrillation post cardioversion, maintaining sinus rhythm on amiodarone  3.  Mechanical aortic valve replacement with increased gradients, three-vessel bypass surgery in 2007  4.  Hypertension  5.  Diabetes mellitus  6.  Moderate mitral stenosis    During last evaluation gradient across her aortic valve was high, she was scheduled to have valve fluoroscopy, coronary angiogram but she did not want to do it.  Will repeat echocardiogram she has been doing well clinically.  She had a heart monitor recently, reviewed, independently interpreted shows bradycardia predominantly during sleep, advised to use CPAP as much as possible.  Blood pressure well-controlled.  She is compliant with warfarin.  No A-fib, decrease amiodarone to 100 mg daily.  She is not on aspirin due to prior GI bleed.  Lower extremity edema is well-controlled, continue torsemide.      Caleb PARRA  Interventional cardiologist  [facility], [facility]  [address] [address]  Phone: [phone]  Fax: [phone]

## 2024-02-08 ENCOUNTER — TELEPHONE (OUTPATIENT)
Dept: PHARMACY | Facility: MEDICAL CENTER | Age: 74
End: 2024-02-08
Payer: MEDICARE

## 2024-02-08 PROCEDURE — RXMED WILLOW AMBULATORY MEDICATION CHARGE

## 2024-02-08 PROCEDURE — RXMED WILLOW AMBULATORY MEDICATION CHARGE: Performed by: NURSE PRACTITIONER

## 2024-02-08 NOTE — TELEPHONE ENCOUNTER
Contact:   [id number]  Yenifer Beasley        Phone number: [phone]    Name of person spoken with and relationship to patient: Yenifer. self   Patient’s Adherence:            How patient is doing on medication:  well    How many missed doses and reason: 0    Any new medications: no    Any new conditions: no    Any new allergies: no    Any new side effects: no     Any new diagnoses: no     How many doses remaining:  3 (one due this week)    Did patient want to speak with pharmacist: no  Delivery:            Delivery date and method:  2/15     Needs by Date:  3/20    Signature required:  YES per pt request    Any additional details for :  Customer requests signature required. Please call @ [phone] when soon to arrival  Teach Appointment Date:  08/24/2023  Shipping Address:  [address] [place] [id number]  Medication(name, strength and dose):  Praluent 150 MG/ML SubQ Inject every 14 days,  telmisartan 80 MG Tabs qd  Copay: #0  Payment Method:   Supplies:  na  Additional info:  [address]. She stated doing well on the medication. She inquired about a charge that went on card 1/25 that was supposed to be held off until 2/3. She wanted to sw Socorro. Socorro was unavailable but stated she will contact patient later. Patient okay with call later. No further questions/concerns at this time

## 2024-02-08 NOTE — TELEPHONE ENCOUNTER
Spoke to Yenifer.  When we set up delivery for her previous order on 01/23/2024 Yenifer requested her card was charged on 02/02/2024 since she was needing her meds but had limited funds.  I did make note in [place] and on the delivery slip for the pharmacy staff to postpone charging her card until then.  The pharmacy staff missed my notes and patient was charged.  I apologized to Yenifer for the error.      Socorro Brady  Rx Coordinator

## 2024-02-09 ENCOUNTER — APPOINTMENT (OUTPATIENT)
Dept: RADIOLOGY | Facility: MEDICAL CENTER | Age: 74
End: 2024-02-09
Attending: FAMILY MEDICINE
Payer: MEDICARE

## 2024-02-09 DIAGNOSIS — I10 PRIMARY HYPERTENSION: ICD-10-CM

## 2024-02-09 DIAGNOSIS — N18.32 STAGE 3B CHRONIC KIDNEY DISEASE: ICD-10-CM

## 2024-02-09 PROCEDURE — 93975 VASCULAR STUDY: CPT

## 2024-02-15 ENCOUNTER — PHARMACY VISIT (OUTPATIENT)
Dept: PHARMACY | Facility: MEDICAL CENTER | Age: 74
End: 2024-02-15
Payer: COMMERCIAL

## 2024-02-22 ENCOUNTER — APPOINTMENT (OUTPATIENT)
Dept: VASCULAR LAB | Facility: MEDICAL CENTER | Age: 74
End: 2024-02-22
Payer: MEDICARE

## 2024-03-08 LAB — INR PPP: 2.9 (ref 2–3.5)

## 2024-03-11 ENCOUNTER — ANTICOAGULATION MONITORING (OUTPATIENT)
Dept: VASCULAR LAB | Facility: MEDICAL CENTER | Age: 74
End: 2024-03-11
Payer: MEDICARE

## 2024-03-11 DIAGNOSIS — I48.91 ATRIAL FIBRILLATION, UNSPECIFIED TYPE (HCC): ICD-10-CM

## 2024-03-11 DIAGNOSIS — Z95.2 HX OF MECHANICAL AORTIC VALVE REPLACEMENT: ICD-10-CM

## 2024-03-11 NOTE — PROGRESS NOTES
OP Anticoagulation Service Note    Date: 3/11/2024    Anticoagulation Summary  As of 3/11/2024      INR goal:  2.5-3.5   TTR:  68.8% (7 y)   INR used for dosin.90 (3/8/2024)   Warfarin maintenance plan:  4 mg (4 mg x 1) every day   Weekly warfarin total:  28 mg   Plan last modified:  Sybil Adkins, PharmD (2021)   Next INR check:  2024   Target end date:  Indefinite    Indications    Chronic anticoagulation (Resolved) [Z79.01]  Hx of mechanical aortic valve replacement [V43.3] [Z95.2]  TIA (transient ischemic attack) (Resolved) [G45.9]  Atrial fibrillation (HCC) [I48.91] [I48.91]                 Anticoagulation Episode Summary       INR check location:      Preferred lab:      Send INR reminders to:      Comments:  [employer] home meter          Anticoagulation Care Providers       Provider Role Specialty Phone number    Michael J Bloch, M.D. Referring Cardiovascular Disease (Cardiology) [phone]    Yinka Church, PharmD Responsible Pharmacy           Anticoagulation Patient Findings        Patient's preferred phone number:  [phone]        HPI:   The reason for today's call is to prevent morbidity and mortality from a blood clot and/or stroke and to reduce the risk of bleeding while on a anticoagulant.     PCP:  Jose Vela M.D.  [address] [address]    Assessment:     INR  therapeutic.     Lab Results   Component Value Date/Time    BUN 26 (H) 2024 01:16 PM    CREATININE 1.62 (H) 2024 01:16 PM    CREATININE 1.6 (H) 2007 12:28 AM     Lab Results   Component Value Date/Time    HEMOGLOBIN 13.1 2022 03:13 PM    HEMATOCRIT 39.0 2022 03:13 PM    PLATELETCT 159 (L) 2022 03:13 PM    ALKPHOSPHAT 55 2024 01:16 PM    ASTSGOT 15 2024 01:16 PM    ALTSGPT 9 2024 01:16 PM          Current Outpatient Medications:     amiodarone, 100 mg, Oral, QDAY    colchicine, Take 2 tablets by mouth once, then 1 tablet 1 hours later  once. Max: 1.8 mg total dose per treatment course Info: do not repeat x3 days    diclofenac sodium, APPLY 2 GRAM TO THE AFFECTED AREA(S) BY TOPICAL ROUTE 4 TIMES PER DAY    traMADol, Take 2 tablets 3 times a day by oral route as needed for 30 days. DNF 2/17/24    triamcinolone acetonide, APPLY TO AFFECTED AREA TWICE A DAY FOR 2-3 WEEKS, THEN CAN USE 2 WEEKS ON, 2 WEEKS OFF AS NEEDED    doxazosin, 4 mg, Oral, QHS    levothyroxine, 125 mcg, Oral, AM ES    torsemide, 10 mg, Oral, QDAY    telmisartan, 80 mg, Oral, DAILY    warfarin, TAKE ONE TABLET BY MOUTH DAILY OR AS DIRECTED BY ANTICOAGULATION CLINIC    ezetimibe, TAKE 1 TABLET BY MOUTH EVERY DAY    Praluent, 150 mg, Subcutaneous, Q14 DAYS    timolol, 1 DROP INTO BOTH EYES TWICE A DAY    diclofenac sodium, Apply  topically 4 times a day as needed.    vitamin D, 1,000 Units, Oral, DAILY    docosahexanoic acid, 1,000 mg, Oral, BID    traMADol, 100 mg, Oral, TID      Plan:     Continue the same warfarin dose, as noted above.       Follow-up:     As seen above      Additional information discussed with patient:     Asked patient to please call the anticoagulation clinic if they have any signs/symptoms of bleeding and/or thrombosis or any changes to diet or medications.      National recommendations regarding anticoagulation therapy:     The CHEST guidelines recommends frequent INR monitoring at regular intervals (a few days up to a max of 12 weeks) to ensure patients are on the proper dose of warfarin, and patients are not having any complications from therapy.  INRs can dramatically change over a short time period due to diet, medications, and medical conditions.         [facility]  Phone: [phone]  Fax: [phone]  On call: [phone]  General scheduling/information [phone]  For emergencies please dial [phone]  Please do not use [employer] for urgent matters, call the phone numbers listed above.    This note was created using  voice recognition software (Dragon). The accuracy of the dictation is limited by the abilities of the software. I have reviewed the note prior to signing, however some errors in grammar and context are still possible. If you have any questions related to this note please do not hesitate to contact our office.

## 2024-03-15 PROCEDURE — RXMED WILLOW AMBULATORY MEDICATION CHARGE: Performed by: NURSE PRACTITIONER

## 2024-03-18 ENCOUNTER — PHARMACY VISIT (OUTPATIENT)
Dept: PHARMACY | Facility: MEDICAL CENTER | Age: 74
End: 2024-03-18
Payer: COMMERCIAL

## 2024-03-26 ENCOUNTER — OFFICE VISIT (OUTPATIENT)
Dept: VASCULAR LAB | Facility: MEDICAL CENTER | Age: 74
End: 2024-03-26
Attending: FAMILY MEDICINE
Payer: MEDICARE

## 2024-03-26 VITALS
DIASTOLIC BLOOD PRESSURE: 76 MMHG | BODY MASS INDEX: 40.4 KG/M2 | WEIGHT: 214 LBS | HEIGHT: 61 IN | SYSTOLIC BLOOD PRESSURE: 145 MMHG | HEART RATE: 48 BPM

## 2024-03-26 DIAGNOSIS — Z79.01 CHRONIC ANTICOAGULATION: ICD-10-CM

## 2024-03-26 DIAGNOSIS — Z95.2 HX OF MECHANICAL AORTIC VALVE REPLACEMENT: ICD-10-CM

## 2024-03-26 DIAGNOSIS — E78.41 ELEVATED LIPOPROTEIN(A): ICD-10-CM

## 2024-03-26 DIAGNOSIS — I10 PRIMARY HYPERTENSION: ICD-10-CM

## 2024-03-26 DIAGNOSIS — I48.91 ATRIAL FIBRILLATION, UNSPECIFIED TYPE (HCC): ICD-10-CM

## 2024-03-26 DIAGNOSIS — M1A.9XX0 CHRONIC GOUT WITHOUT TOPHUS, UNSPECIFIED CAUSE, UNSPECIFIED SITE: ICD-10-CM

## 2024-03-26 DIAGNOSIS — N18.32 CHRONIC KIDNEY DISEASE (CKD) STAGE G3B/A1, MODERATELY DECREASED GLOMERULAR FILTRATION RATE (GFR) BETWEEN 30-44 ML/MIN/1.73 SQUARE METER AND ALBUMINURIA CREATININE RATIO LESS THAN 30 MG/G: ICD-10-CM

## 2024-03-26 DIAGNOSIS — R73.03 PREDIABETES: ICD-10-CM

## 2024-03-26 DIAGNOSIS — Z95.1 HISTORY OF CORONARY ARTERY BYPASS GRAFT: ICD-10-CM

## 2024-03-26 DIAGNOSIS — E78.49 OTHER HYPERLIPIDEMIA: ICD-10-CM

## 2024-03-26 DIAGNOSIS — I25.810 CORONARY ARTERY DISEASE INVOLVING CORONARY BYPASS GRAFT OF NATIVE HEART WITHOUT ANGINA PECTORIS: ICD-10-CM

## 2024-03-26 DIAGNOSIS — D68.59 HYPERCOAGULABLE STATE (HCC): ICD-10-CM

## 2024-03-26 DIAGNOSIS — G47.33 OSA (OBSTRUCTIVE SLEEP APNEA): ICD-10-CM

## 2024-03-26 PROBLEM — N28.1 ACQUIRED RENAL CYST OF RIGHT KIDNEY: Status: ACTIVE | Noted: 2024-03-26

## 2024-03-26 PROBLEM — M10.9 GOUTY ARTHROPATHY: Status: ACTIVE | Noted: 2024-01-22

## 2024-03-26 PROCEDURE — 3075F SYST BP GE 130 - 139MM HG: CPT | Performed by: FAMILY MEDICINE

## 2024-03-26 PROCEDURE — 99212 OFFICE O/P EST SF 10 MIN: CPT

## 2024-03-26 PROCEDURE — 3078F DIAST BP <80 MM HG: CPT | Performed by: FAMILY MEDICINE

## 2024-03-26 PROCEDURE — 99214 OFFICE O/P EST MOD 30 MIN: CPT | Performed by: FAMILY MEDICINE

## 2024-03-26 RX ORDER — DOXAZOSIN MESYLATE 1 MG/1
TABLET ORAL
COMMUNITY
End: 2024-03-26

## 2024-03-26 RX ORDER — DOXAZOSIN 8 MG/1
8 TABLET ORAL
Qty: 100 TABLET | Refills: 3 | Status: SHIPPED | OUTPATIENT
Start: 2024-03-26

## 2024-03-26 ASSESSMENT — ENCOUNTER SYMPTOMS
FEVER: 0
BLOOD IN STOOL: 0
SPUTUM PRODUCTION: 0
HEMOPTYSIS: 0
BRUISES/BLEEDS EASILY: 0
SHORTNESS OF BREATH: 0
CHILLS: 0
PND: 0
ORTHOPNEA: 0
COUGH: 0
CLAUDICATION: 0
PALPITATIONS: 0
WHEEZING: 0

## 2024-03-26 ASSESSMENT — FIBROSIS 4 INDEX: FIB4 SCORE: 2.26

## 2024-03-26 NOTE — PROGRESS NOTES
[facility] Follow Up Visit  03/26/24      Yenifer Beasley is a female who is here for follow up of dyslipidemia, HTN, chronic anticoagulation, and hypothyroidism    Subjective   Interval hx/concerns: last seen 1/2024 by me, completed zio patch 12/2023, had antwan duplex normal.  Had prior gout flare and has colchicine on stand-by.  Has intermittent stopped torsemide.  Prior use of thiazides had caused recurrent gout.         LIFESTYLE MGMT  Change in weight: REDUCED  Wt Readings from Last 3 Encounters:   03/26/24 97.1 kg (214 lb)   01/30/24 93.4 kg (206 lb)   01/08/24 98 kg (216 lb)    Exercise habits: no regular exercise  Diet: common adult, consistent vit K    MED MGMT  Current Prescription Lipid Lowering Medications - including dose: Has missed several doses   Statin: None  Non-Statin: Zetia, praluent   Current Supplements: OTC O3FA's, vit D3  Any Current Side Effects? No  Current Adherence:Complete    PERTINENT HLD PMHX  Any Previous History of Statin Intolerance? Yes, Details: severe myalgias on multiple statins  Baseline Lipids Prior to Treatment:  Shown here:  LDL-C: 152      HTN: stable, tolerating meds with ok adherence.  Home BP not checking   S/p mechanical AVR: stable, tolerating meds.  No prior CVA, seeing cardiology   Anticoag: VKA, INR mgmt with AC clinic, good adherence, no bleeding.     Current Outpatient Medications on File Prior to Visit   Medication Sig Dispense Refill    amiodarone (CORDARONE) 200 MG Tab Take 0.5 Tablets by mouth every day. 100 Tablet 3    colchicine (COLCRYS) 0.6 MG Tab Take 2 tablets by mouth once, then 1 tablet 1 hours later once. Max: 1.8 mg total dose per treatment course  Info: do not repeat x3 days 3 Tablet 0    diclofenac sodium (VOLTAREN) 1 % Gel APPLY 2 GRAM TO THE AFFECTED AREA(S) BY TOPICAL ROUTE 4 TIMES PER  g 6    traMADol (ULTRAM) 50 MG Tab Take 2 tablets 3 times a day by oral route as needed for 30 days. DNF 2/17/24 180 Tablet 2    triamcinolone  acetonide (KENALOG) 0.1 % Ointment APPLY TO AFFECTED AREA TWICE A DAY FOR 2-3 WEEKS, THEN CAN USE 2 WEEKS ON, 2 WEEKS OFF AS NEEDED 30 g 3    levothyroxine (SYNTHROID) 125 MCG Tab Take 1 Tablet by mouth every morning on an empty stomach. 100 Tablet 3    torsemide (DEMADEX) 20 MG Tab Take 0.5 Tablets by mouth every day. 50 Tablet 0    telmisartan (MICARDIS) 80 MG Tab Take 1 Tablet by mouth every day. Take At night 100 Tablet 3    warfarin (COUMADIN) 4 MG Tab TAKE ONE TABLET BY MOUTH DAILY OR AS DIRECTED BY ANTICOAGULATION CLINIC 100 Tablet 1    ezetimibe (ZETIA) 10 MG Tab TAKE 1 TABLET BY MOUTH EVERY DAY 90 Tablet 3    Alirocumab (PRALUENT) 150 MG/ML Solution Auto-injector Inject 150 mg under the skin every 14 days. 6 mL 3    timolol (TIMOPTIC) 0.5 % Solution 1 DROP INTO BOTH EYES TWICE A DAY      diclofenac sodium (VOLTAREN) 1 % Gel Apply  topically 4 times a day as needed.      vitamin D (CHOLECALCIFEROL) 1000 UNIT TABS Take 1,000 Units by mouth every day.        docosahexanoic acid (OMEGA 3 FA) 1000 MG CAPS Take 1,000 mg by mouth 2 Times a Day.      tramadol (ULTRAM) 50 MG TABS Take 2 Tabs by mouth 3 times a day. 180 Each 0     No current facility-administered medications on file prior to visit.         Social History     Tobacco Use    Smoking status: Never    Smokeless tobacco: Never   Vaping Use    Vaping Use: Never used   Substance Use Topics    Alcohol use: No    Drug use: Never      Review of Systems   Constitutional:  Negative for chills, fever and malaise/fatigue.   HENT:  Negative for nosebleeds.    Respiratory:  Negative for cough, hemoptysis, sputum production, shortness of breath and wheezing.    Cardiovascular:  Positive for leg swelling (bilat, chronic). Negative for chest pain, palpitations, orthopnea, claudication and PND.   Gastrointestinal:  Negative for blood in stool.   Endo/Heme/Allergies:  Does not bruise/bleed easily.         Objective   Vitals:    03/26/24 1406 03/26/24 1412   BP: 137/74  "(!) 145/76   BP Location: Left arm Left arm   Patient Position: Sitting Sitting   BP Cuff Size: Thigh Thigh   Pulse: 65 (!) 48   Weight: 97.1 kg (214 lb)    Height: 1.549 m (5' 1\")      BP Readings from Last 5 Encounters:   24 (!) 145/76   24 128/75   24 (!) 164/75   23 118/70   23 (!) 159/75      BMI Readings from Last 1 Encounters:   24 40.43 kg/m²      Physical Exam  Vitals reviewed.   Constitutional:       General: She is not in acute distress.     Appearance: She is well-developed. She is obese. She is not diaphoretic.   HENT:      Head: Normocephalic and atraumatic.   Eyes:      General: No scleral icterus.  Cardiovascular:      Rate and Rhythm: Bradycardia present.      Pulses: Normal pulses.      Heart sounds: Murmur heard.   Pulmonary:      Effort: Pulmonary effort is normal. No respiratory distress.      Breath sounds: No wheezing or rales.   Musculoskeletal:         General: Swelling present. Normal range of motion.      Right lower le+ Pitting Edema present.      Left lower le+ Pitting Edema present.      Comments: Uses cane   Skin:     General: Skin is warm and dry.      Coloration: Skin is not pale.   Neurological:      Mental Status: She is alert and oriented to person, place, and time.      Motor: No weakness.   Psychiatric:         Mood and Affect: Mood normal.         Behavior: Behavior normal.         Thought Content: Thought content normal.      Comments: forgetful       DATA REVIEW  Most Recent Lipid Panel:   Lab Results   Component Value Date    CHOLSTRLTOT 136 2024    TRIGLYCERIDE 91 2024    HDL 65 2024    LDL 53 2024     Lab Results   Component Value Date/Time    LDL 53 2024 01:16 PM    LDL 68 2023 09:43 AM    LDL 52 2023 10:51 AM    LDL 54 2022 03:37 PM     (H) 2022 10:43 AM       Lab Results   Component Value Date/Time    LIPOPROTA 57 (H) 10/12/2017 10:27 AM      No results found for: " "\"APOB\"    Other Pertinent Blood Work:   Lab Results   Component Value Date    SODIUM 142 01/02/2024    POTASSIUM 4.5 01/02/2024    CHLORIDE 106 01/02/2024    CO2 26 01/02/2024    ANION 10.0 01/02/2024    GLUCOSE 82 01/02/2024    BUN 26 (H) 01/02/2024    CREATININE 1.62 (H) 01/02/2024    CALCIUM 9.1 01/02/2024    ASTSGOT 15 01/02/2024    ALTSGPT 9 01/02/2024    ALKPHOSPHAT 55 01/02/2024    TBILIRUBIN 0.5 01/02/2024    ALBUMIN 4.0 01/02/2024    AGRATIO 1.7 01/02/2024    TSHULTRASEN 1.950 04/26/2022     Imaging Studies:      Echo march 2017:  1. Left ventricular ejection fraction is visually estimated to be 55%.   Normal regional wall motion. Grade I diastolic dysfunction.  2. Known mechanical aortic valve that is functioning normally.   3. Mild mitral stenosis.  4. Estimated right ventricular systolic pressure is 40 mmHg.    Echo 4/4/19  CONCLUSIONS  Normal left ventricular systolic function.  Left ventricular ejection fraction is visually estimated to be 65-70%.  Mild concentric left ventricular hypertrophy.  Mild mitral stenosis.  Severe mitral annular calcification.  Mild mitral regurgitation.  Bioprosthetic aortic valve with increased velocities.  Right heart pressures are consistent with moderate pulmonary   hypertension.  Severely dilated left atrium.  Echo 3/3/21  Normal left ventricular chamber size.  Left ventricular ejection fraction is visually estimated to be 65%.  Mild concentric left ventricular hypertrophy.  Dilated inferior vena cava without inspiratory collapse.  Moderate mitral stenosis.  Mean [id number] gradient is 7 mmHg at a heart rate of 64 BPM.  Structurally normal tricuspid valve without significant stenosis.  Normal function of mechanical prosthetic valve.  US aorta 4/14/21  Mild atherosclerotic change of the aorta. No evidence of aneurysm.  Carotid duplex 4/14/21  Mild plaque with no significant stenosis.  Echo 8/2022  Hyperdynamic left ventricular systolic function.  The left " ventricular ejection fraction is visually estimated to be 75%.  Moderate concentric left ventricular hypertrophy.  Known mechanical aortic valve with increased gradients; Transvalvular   gradients are - Peak: 62 mmHg, Mean: 33 mmHg. Vmax is  3.92 m/s.   Acceleration time is 84 ms.  Moderate mitral stenosis.  The right ventricle is dilated with normal right ventricular systolic   function.  Right heart pressures are consistent with moderate pulmonary   hypertension at 50 mmHg.  Compared to the images of the prior study 3/3/2021, there has been an   increase in velocity gradient across the aortic valve prosthesis.     ZIO XT REPORT (11/25/23-12/09/23)   Zio study showing predominately sinus rhythm with maximum rate of 97 bpm, minimum rate of 34 bpm, average of 48 bpm.   Atrial fibrillation: None.   Supraventricular tachycardia: None.   Pauses: None.   Heart block: None.   Ventricular tachycardia: None.   Rare <1% burden of premature atrial contractions, rare couplets and triplets.   9.6 mm simple cyst right kidney. No follow-up required.  Nawaf duplex 2/2024  1.  No Doppler evidence of significant renal artery stenosis.   2.  Elevated resistive indices within both kidneys nonspecific. Consistent with medical renal disease.   3.  9.6 mm simple cyst right kidney. No follow-up required.     ASSESSMENT AND PLAN  1. Other hyperlipidemia        2. Chronic kidney disease (CKD) stage G3b/A1, moderately decreased glomerular filtration rate (GFR) between 30-44 mL/min/1.73 square meter and albuminuria creatinine ratio less than 30 mg/g (HCC)  Basic Metabolic Panel    MICROALBUMIN CREAT RATIO URINE      3. Elevated lipoprotein(a)        4. Atrial fibrillation (HCC) [I48.91]        5. Hypercoagulable state (HCC)        6. Coronary artery disease involving coronary bypass graft of native heart without angina pectoris        7. History of coronary artery bypass graft        8. Primary hypertension  doxazosin (CARDURA) 8 MG tablet       9. Chronic anticoagulation        10. HILDA (obstructive sleep apnea)        11. Hx of mechanical aortic valve replacement [V43.3]        12. Prediabetes        13. Chronic gout without tophus, unspecified cause, unspecified site  URIC ACID        Patient Type, check all that apply: Secondary Prevention and Type 2 Diabetes Mellitus    Established Atherosclerotic Cardiovascular Disease (ASCVD)  1) CAD s/p CABG - stable, no current sx, seeing cardiology ongoing, continue med mgmt     Other Established (non-atherosclerotic) Vascular/Heart Disease, if Present:      1) Valvular heart disease s/p mechanical AVR - continue anticoagulation;  - previously saw cardiology for worsening valvular heart disease- had angiogram scheduled but decided against it as she does not want to move forward with any surgery.    Plan:  - ongoing f/u with cardiology    2) bradycardia - zio patch with avg 48 bpm, low as 34 - symptomatic, intermittent   Had amiodarone reduced at last cardiology visit 1/2024  Plan:  - defer mgmt to cardiology     3) AF - stable, no sx, no prior cva/tia, recent normal zio 12/2023  Plan;  - continue meds, ref back to cardiology     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): Possible    ACC/AHA Indication for Statin Therapy, toby all that apply: Established ASCVD: Indication for High intensity statin    Calculated Risk for ASCVD, if applicable:N/A    Other Significant Risk Markers, if any, toby all that apply:  Family history of premature ASCVD in first degree relative   High lp(a)    National Lipid Association (NLA) Goal (if applicable):  LDL-C:   <70 mg/dL and nonHDL <100  At goal?  Yes, 1/2024    Lifestyle Recommendations From Today’s Visit:    Medi style diet   Exercise - daily home exercise as tolerated, try for 10-15min moderate intensity per day     Non-Statin Medications Recommendations from Today’s Visit:   - Continue Praluent 150mg   - Continue zetia 10 mg daily    Indication for PCSK9  Inhibitor, if applicable:  PSCK9i indicated per 2018 ACC/AHA guidelines in this patient with established ASCVD whose LDL-C remains above threshold of 70 mg/dl despite maximally tolerated statin therapy.     Supplements Recommended at this visit: Continue omega 3's at 3,000u/day, Vit D     Recommendations for Other Cardiovascular Risk Factors, toby all that apply:     BLOOD PRESSURE CONTROL   Office BP Goal ACC/AHA (2017) goal <130/80  Home BP at goal:  no  Office BP at goal:  no  24h ABPM:  not ordered to date   RDN candidate? No, gfr too low   Contributing factors: CKD - current main ,  CAD, HILDA, obesity, DM, CKD, MV stenosis  Plan:   Monitoring:   - home BP monitor with arm cuff, 3d/wk,  2 readings in AM and PM  by 3-5min  - order 24h ABPM:  UNDECIDED  Medications:  - continue telmisartan 80mg daily    DHP-CCB: none, avoid due to baseline LE edema   Thiazide: caution/avoid with GFR and due to gout   MRA: avoid due to low gfr   - increase doxazosin to 8mg QHS   - continue torsemide 20mg 1/2 daily - consider increase to full tab or BID, may need renal-dosed uricosuric agents daily to offset gout risks - update uric acid next visit   BB: avoid due to bradycardia   non-DHP-CCB: avoid due to bradycardia   - consider amiloride at low dose - would monitor lyets/gfr closely due to hyperK+ risk     Anticoagulation: continue warfarin and f/u with anticoag clinic    OTHER:    # HILDA on CPAP  Strongly encouraged CPAP adherence to reduce RV strain, improve overall fatigue symptoms, and improve BP in both the short- and long-term as per HIPARCO (2013) and HIPARCO-2 (2021) studies.   Plan:  - continue CPAP, defer mgmt to sleep MD     # preDM: relatively stable.  Resolved   Lab Results   Component Value Date    HBA1C 5.4 01/02/2024    - monitor annually      # Hypothyroidism - stable, monitoring per PCP     # CKD G3b/A1 - stable, non-progressive.  Higher risk for HUGO, SNS driven HTN and increase CVD risks   -   ongoing, monitor closely, consider referral to nephrology in future  - candidate for finerenone due to CKD, T2D    Studies Ordered at Todays Visit: none   Blood Work Ordered At Today’s visit: as noted in assessment     Follow-Up:  2mo,  ongoing with AC clinic and cardiology     [place] M.D.  [facility]   [facility]   [phone]

## 2024-03-27 ENCOUNTER — TELEPHONE (OUTPATIENT)
Dept: PHARMACY | Facility: MEDICAL CENTER | Age: 74
End: 2024-03-27
Payer: MEDICARE

## 2024-03-27 NOTE — TELEPHONE ENCOUNTER
Spoke to Yenifer regarding a refill of Ezetimibe. She declined a refill at this time as she has about half a bottle on hand, 45ds. She reports taking 1 tablet daily, with no missed doses. She has scheduled a follow up call in about 4 to 5 weeks, 05/06.

## 2024-04-04 PROCEDURE — RXMED WILLOW AMBULATORY MEDICATION CHARGE: Performed by: NURSE PRACTITIONER

## 2024-04-05 ENCOUNTER — PHARMACY VISIT (OUTPATIENT)
Dept: PHARMACY | Facility: MEDICAL CENTER | Age: 74
End: 2024-04-05
Payer: COMMERCIAL

## 2024-04-15 LAB — INR PPP: 3.1 (ref 2–3.5)

## 2024-04-16 ENCOUNTER — ANTICOAGULATION MONITORING (OUTPATIENT)
Dept: MEDICAL GROUP | Facility: PHYSICIAN GROUP | Age: 74
End: 2024-04-16
Payer: MEDICARE

## 2024-04-16 DIAGNOSIS — Z79.01 CHRONIC ANTICOAGULATION: ICD-10-CM

## 2024-04-16 DIAGNOSIS — Z95.2 HX OF MECHANICAL AORTIC VALVE REPLACEMENT: ICD-10-CM

## 2024-04-16 DIAGNOSIS — I48.91 ATRIAL FIBRILLATION, UNSPECIFIED TYPE (HCC): ICD-10-CM

## 2024-04-16 NOTE — PROGRESS NOTES
Anticoagulation Summary  As of 4/16/2024      INR goal:  2.5-3.5   TTR:  69.3% (7.1 y)   INR used for dosing:  3.10 (4/15/2024)   Warfarin maintenance plan:  4 mg (4 mg x 1) every day   Weekly warfarin total:  28 mg   Plan last modified:  Emily MaradiagaD (7/26/2021)   Next INR check:  5/14/2024   Target end date:  Indefinite    Indications    Chronic anticoagulation [Z79.01]  Hx of mechanical aortic valve replacement [V43.3] [Z95.2]  TIA (transient ischemic attack) (Resolved) [G45.9]  Atrial fibrillation (HCC) [I48.91] [I48.91]                 Anticoagulation Episode Summary       INR check location:      Preferred lab:      Send INR reminders to:      Comments:  [employer] home meter          Anticoagulation Care Providers       Provider Role Specialty Phone number    Michael J Bloch, M.D. Referring Cardiovascular Disease (Cardiology) [phone]    Yinka Church PharmD Responsible Pharmacy           Anticoagulation Patient Findings    Left voicemail message to report a therapeutic INR of 3.1.  Requested pt contact the clinic for any s/s of unusual bleeding, bruising, clotting or any changes to diet or medication.   Pt is to continue with current warfarin dosing regimen.    Pt is not on antiplatelet therapy      FU 3-4 weeks.  Yinka Church, Pasha, BCACP

## 2024-04-25 PROCEDURE — RXMED WILLOW AMBULATORY MEDICATION CHARGE: Performed by: INTERNAL MEDICINE

## 2024-04-29 ENCOUNTER — PHARMACY VISIT (OUTPATIENT)
Dept: PHARMACY | Facility: MEDICAL CENTER | Age: 74
End: 2024-04-29
Payer: COMMERCIAL

## 2024-05-06 ENCOUNTER — PHARMACY VISIT (OUTPATIENT)
Dept: PHARMACY | Facility: MEDICAL CENTER | Age: 74
End: 2024-05-06
Payer: COMMERCIAL

## 2024-05-06 PROCEDURE — RXMED WILLOW AMBULATORY MEDICATION CHARGE: Performed by: NURSE PRACTITIONER

## 2024-05-06 NOTE — TELEPHONE ENCOUNTER
Contact:  Phone number:[phone] (mobile)    Name of person spoken with and relationship to patient: SELF   Patient’s Adherence:  How patient is doing on medication: Very Well    How many missed doses and reason: 0 N/A    Any new medications: no    Any new conditions: no    Any new allergies: no    Any new side effects: no    Any new diagnoses: no    How many doses remainin (TRAMADOL)    Did patient want to speak with pharmacist: No   Delivery:  Delivery date and method: 2024 via     Needs by Date: 2024    Signature required: Yes    Any additional details for :  push door bell,obtained signature from pt, and hand it to pt    Teach Appointment Date:  N/A   Shipping Address:  [id number] [place] [address]   Medication(name,strength and dose):  TRAMADOL 50MG TABLETS   Copay:  $4.30   Payment Method:  Credit card on file   Supplies:  NA   Additional Information:  Called and spoke to Pt today to coordinate her medication delivery for Praluent and Ezetimibe. Per Pt, she states that she has atleast 5 more pens on hand and 50 tablets remaining for Ezetimibe. Pt and I agreed to set her medication refill on  instead. Pt states that she needs more of her Tramadol than anything else. Pt states she has atleast 19 tablets remaining on hand and took 2 earlier this morning. Scheduled delivery for  for  for the afternoon shipment. Pt verbalized understanding and she is aware that signature is needed once the medication is delivered.        MARC Lanza, PhT  Vascular Pharmacy Liaison (Rx Coordinator)  P: [phone]  2024 12:04 PM

## 2024-05-10 PROCEDURE — RXMED WILLOW AMBULATORY MEDICATION CHARGE: Performed by: FAMILY MEDICINE

## 2024-05-10 PROCEDURE — RXMED WILLOW AMBULATORY MEDICATION CHARGE

## 2024-05-18 LAB — INR PPP: 2.2 (ref 2–3.5)

## 2024-05-20 ENCOUNTER — TELEPHONE (OUTPATIENT)
Dept: VASCULAR LAB | Facility: MEDICAL CENTER | Age: 74
End: 2024-05-20
Payer: MEDICARE

## 2024-05-20 ENCOUNTER — ANTICOAGULATION MONITORING (OUTPATIENT)
Dept: VASCULAR LAB | Facility: MEDICAL CENTER | Age: 74
End: 2024-05-20
Payer: MEDICARE

## 2024-05-20 DIAGNOSIS — Z95.2 HX OF MECHANICAL AORTIC VALVE REPLACEMENT: ICD-10-CM

## 2024-05-20 DIAGNOSIS — I48.91 ATRIAL FIBRILLATION, UNSPECIFIED TYPE (HCC): ICD-10-CM

## 2024-05-20 DIAGNOSIS — Z79.01 CHRONIC ANTICOAGULATION: ICD-10-CM

## 2024-05-20 PROCEDURE — RXMED WILLOW AMBULATORY MEDICATION CHARGE: Performed by: NURSE PRACTITIONER

## 2024-05-20 NOTE — TELEPHONE ENCOUNTER
Contact:  Phone number:[phone] (mobile)    Name of person spoken with and relationship to patient: SELF   Patient’s Adherence:  How patient is doing on medication: Very Well    How many missed doses and reason: 0 N/A    Any new medications: no    Any new conditions: no    Any new allergies: no    Any new side effects: no    Any new diagnoses: no    How many doses remainin    Did patient want to speak with pharmacist: No   Delivery:  Delivery date and method: 2024 via     Needs by Date: 2024    Signature required: No     Any additional details for :  LEAVE AT FRONT DOOR    Teach Appointment Date:  2022   Shipping Address:  [address] [place] [id number]   Medication(name,strength and dose):  EZETIMIBE 10MG TABLETS ; PRALUENT 150MG/ML SOLUTION AUTO INJECTOR ; DOXAZOSIN 8MG TABLETS ; LEVOTHYROXINE 125MCG TABLETS ; TELMISARTAN 80MG TABLETS    Copay:  $54.03   Payment Method:  Credit card on file   Supplies:  [employer]    Additional Information:  Called and spoke to pt today in regards to her medication refills. Per pt, she states that she has atleast 42 tablets for Ezetimibe, 4 pens for Praluent, 3 tablets for Doxazosin, 4 tabletss for Telmisartan remaining on hand. When confirmed the total copays, pt states that she doesn't have enough money to afford the cost. Informed pt that there is a jaden available that the pt can be enrolled for due to the requirements met. Pt consented and would like me to give her a call back. Called [facility] and spoke with Carmela in regards to the eligibility for the pt. Per Carmela, pt has met all the qualifications for her to be approved for the jaden. Submitted all the necessary documents, and pharmacy confirmed the approval that the jaden issued. Called and spoke to pt in regards to the status approval. Pt thanked me for helping her out to cover the costs. Notified pt  for the updated next refill call dates for both Praluent and  Ezetimibe for 9/23. Pt verbalized understanding and has no further questions or concerns at this time.        MARC Lanza, PhT  Vascular Pharmacy Liaison (Rx Coordinator)  P: [phone]  5/20/2024 2:37 PM

## 2024-05-20 NOTE — PROGRESS NOTES
Anticoagulation Summary  As of 2024      INR goal:  2.5-3.5   TTR:  69.3% (7.2 y)   INR used for dosin.20 (2024)   Warfarin maintenance plan:  4 mg (4 mg x 1) every day   Weekly warfarin total:  28 mg   Plan last modified:  Sybil Adkins, PharmD (2021)   Next INR check:  2024   Target end date:  Indefinite    Indications    Chronic anticoagulation [Z79.01]  Hx of mechanical aortic valve replacement [V43.3] [Z95.2]  TIA (transient ischemic attack) (Resolved) [G45.9]  Atrial fibrillation (HCC) [I48.91] [I48.91]                 Anticoagulation Episode Summary       INR check location:      Preferred lab:      Send INR reminders to:      Comments:  [employer] home meter          Anticoagulation Care Providers       Provider Role Specialty Phone number    Michael J Bloch, M.D. Referring Cardiovascular Disease (Cardiology) [phone]    Yinka Church, PharmD Responsible Pharmacy           Anticoagulation Patient Findings  Patient Findings       Positives:  Extra doses (pt states her INR 2 weeks ago was 2.1 and she self-bolused with 6mg x1 day)    Negatives:  Signs/symptoms of thrombosis, Signs/symptoms of bleeding, Laboratory test error suspected, Change in health, Change in alcohol use, Change in activity, Upcoming invasive procedure, Emergency department visit, Upcoming dental procedure, Missed doses, Change in medications, Change in diet/appetite, Hospital admission, Bruising, Other complaints            INR is subtherapeutic    Called and spoke to patient.    Pt is not on antiplatelet therapy.    Warfarin Plan: Bolus with 8 mg x 1 day, then Continue regimen as listed above.    Next INR in 1 week.    Sybil Adkins, PharmD

## 2024-05-20 NOTE — TELEPHONE ENCOUNTER
INR READING     PT Name: Yenifer Beasley    PT Reports INR Value: 2.2    Date of INR Results: 5/19/24    Concerns/Questions Reported: or N/A: N/A    Callback Number: [phone]

## 2024-05-23 ENCOUNTER — PHARMACY VISIT (OUTPATIENT)
Dept: PHARMACY | Facility: MEDICAL CENTER | Age: 74
End: 2024-05-23
Payer: COMMERCIAL

## 2024-06-04 DIAGNOSIS — Z79.01 CHRONIC ANTICOAGULATION: ICD-10-CM

## 2024-06-04 LAB — INR PPP: 2.3 (ref 2–3.5)

## 2024-06-05 ENCOUNTER — ANTICOAGULATION MONITORING (OUTPATIENT)
Dept: MEDICAL GROUP | Facility: PHYSICIAN GROUP | Age: 74
End: 2024-06-05
Payer: MEDICARE

## 2024-06-05 ENCOUNTER — PHARMACY VISIT (OUTPATIENT)
Dept: PHARMACY | Facility: MEDICAL CENTER | Age: 74
End: 2024-06-05
Payer: COMMERCIAL

## 2024-06-05 DIAGNOSIS — Z79.01 CHRONIC ANTICOAGULATION: ICD-10-CM

## 2024-06-05 DIAGNOSIS — I48.91 ATRIAL FIBRILLATION, UNSPECIFIED TYPE (HCC): ICD-10-CM

## 2024-06-05 DIAGNOSIS — Z95.2 HX OF MECHANICAL AORTIC VALVE REPLACEMENT: ICD-10-CM

## 2024-06-05 PROCEDURE — RXMED WILLOW AMBULATORY MEDICATION CHARGE: Performed by: NURSE PRACTITIONER

## 2024-06-05 NOTE — PROGRESS NOTES
Anticoagulation Summary  As of 2024      INR goal:  2.5-3.5   TTR:  68.8% (7.3 y)   INR used for dosin.30 (2024)   Warfarin maintenance plan:  6 mg (4 mg x 1.5) every Wed; 4 mg (4 mg x 1) all other days   Weekly warfarin total:  30 mg   Plan last modified:  Chris Randolph PharmD (2024)   Next INR check:  2024   Target end date:  Indefinite    Indications    Chronic anticoagulation [Z79.01]  Hx of mechanical aortic valve replacement [V43.3] [Z95.2]  TIA (transient ischemic attack) (Resolved) [G45.9]  Atrial fibrillation (HCC) [I48.91] [I48.91]                 Anticoagulation Episode Summary       INR check location:      Preferred lab:      Send INR reminders to:      Comments:  [employer] home meter          Anticoagulation Care Providers       Provider Role Specialty Phone number    Michael J Bloch, M.D. Referring Cardiovascular Disease (Cardiology) [phone]    Emily MartinezD Responsible Pharmacy             Refer to Anticoagulation Patient Findings for HPI  Patient Findings       Negatives:  Signs/symptoms of thrombosis, Signs/symptoms of bleeding, Laboratory test error suspected, Change in health, Change in alcohol use, Change in activity, Upcoming invasive procedure, Emergency department visit, Upcoming dental procedure, Missed doses, Extra doses, Change in medications, Change in diet/appetite, Hospital admission, Bruising, Other complaints            Spoke with pt.  INR is SUB therapeutic.     Pt verifies warfarin weekly dosing.     Will have pt begin newly increased regimen of 6 mg Weds and 4 mg ROW.    Repeat INR in 1 week(s).     Chris Randolph, PharmD, BCACP

## 2024-06-13 ENCOUNTER — ANTICOAGULATION MONITORING (OUTPATIENT)
Dept: VASCULAR LAB | Facility: MEDICAL CENTER | Age: 74
End: 2024-06-13
Payer: MEDICARE

## 2024-06-13 ENCOUNTER — HOSPITAL ENCOUNTER (OUTPATIENT)
Dept: LAB | Facility: MEDICAL CENTER | Age: 74
End: 2024-06-13
Attending: FAMILY MEDICINE
Payer: MEDICARE

## 2024-06-13 ENCOUNTER — HOSPITAL ENCOUNTER (OUTPATIENT)
Dept: LAB | Facility: MEDICAL CENTER | Age: 74
End: 2024-06-13
Attending: NURSE PRACTITIONER
Payer: MEDICARE

## 2024-06-13 DIAGNOSIS — M1A.9XX0 CHRONIC GOUT WITHOUT TOPHUS, UNSPECIFIED CAUSE, UNSPECIFIED SITE: ICD-10-CM

## 2024-06-13 DIAGNOSIS — Z95.2 HX OF MECHANICAL AORTIC VALVE REPLACEMENT: ICD-10-CM

## 2024-06-13 DIAGNOSIS — N18.32 CHRONIC KIDNEY DISEASE (CKD) STAGE G3B/A1, MODERATELY DECREASED GLOMERULAR FILTRATION RATE (GFR) BETWEEN 30-44 ML/MIN/1.73 SQUARE METER AND ALBUMINURIA CREATININE RATIO LESS THAN 30 MG/G: ICD-10-CM

## 2024-06-13 DIAGNOSIS — I48.91 ATRIAL FIBRILLATION, UNSPECIFIED TYPE (HCC): ICD-10-CM

## 2024-06-13 DIAGNOSIS — Z79.01 CHRONIC ANTICOAGULATION: ICD-10-CM

## 2024-06-13 LAB
ANION GAP SERPL CALC-SCNC: 10 MMOL/L (ref 7–16)
BUN SERPL-MCNC: 34 MG/DL (ref 8–22)
CALCIUM SERPL-MCNC: 9.2 MG/DL (ref 8.5–10.5)
CHLORIDE SERPL-SCNC: 102 MMOL/L (ref 96–112)
CO2 SERPL-SCNC: 26 MMOL/L (ref 20–33)
CREAT SERPL-MCNC: 1.76 MG/DL (ref 0.5–1.4)
CREAT UR-MCNC: 102.75 MG/DL
GFR SERPLBLD CREATININE-BSD FMLA CKD-EPI: 30 ML/MIN/1.73 M 2
GLUCOSE SERPL-MCNC: 103 MG/DL (ref 65–99)
INR PPP: 2 (ref 0.87–1.13)
MICROALBUMIN UR-MCNC: 3.2 MG/DL
MICROALBUMIN/CREAT UR: 31 MG/G (ref 0–30)
POTASSIUM SERPL-SCNC: 4.1 MMOL/L (ref 3.6–5.5)
PROTHROMBIN TIME: 22.9 SEC (ref 12–14.6)
SODIUM SERPL-SCNC: 138 MMOL/L (ref 135–145)
URATE SERPL-MCNC: 9.7 MG/DL (ref 1.9–8.2)

## 2024-06-13 PROCEDURE — 82570 ASSAY OF URINE CREATININE: CPT

## 2024-06-13 PROCEDURE — 82043 UR ALBUMIN QUANTITATIVE: CPT

## 2024-06-13 PROCEDURE — 36415 COLL VENOUS BLD VENIPUNCTURE: CPT

## 2024-06-13 PROCEDURE — 80048 BASIC METABOLIC PNL TOTAL CA: CPT

## 2024-06-13 PROCEDURE — 84550 ASSAY OF BLOOD/URIC ACID: CPT

## 2024-06-13 PROCEDURE — 85610 PROTHROMBIN TIME: CPT

## 2024-06-13 NOTE — PROGRESS NOTES
Anticoagulation Summary  As of 2024      INR goal:  2.5-3.5   TTR:  68.6% (7.3 y)   INR used for dosin.00 (2024)   Warfarin maintenance plan:  6 mg (4 mg x 1.5) every Mon, Wed, Fri; 4 mg (4 mg x 1) all other days   Weekly warfarin total:  34 mg   Plan last modified:  Seamus Maier PharmD (2024)   Next INR check:  2024   Target end date:  Indefinite    Indications    Chronic anticoagulation [Z79.01]  Hx of mechanical aortic valve replacement [V43.3] [Z95.2]  TIA (transient ischemic attack) (Resolved) [G45.9]  Atrial fibrillation (HCC) [I48.91] [I48.91]                 Anticoagulation Episode Summary       INR check location:      Preferred lab:      Send INR reminders to:      Comments:  [employer] home meter          Anticoagulation Care Providers       Provider Role Specialty Phone number    Michael J Bloch, M.D. Referring Cardiovascular Disease (Cardiology) [phone]    Yinka Church, PharmD Responsible Pharmacy           Anticoagulation Patient Findings  Patient Findings       Negatives:  Signs/symptoms of thrombosis, Signs/symptoms of bleeding, Laboratory test error suspected, Change in health, Change in alcohol use, Change in activity, Upcoming invasive procedure, Emergency department visit, Upcoming dental procedure, Missed doses, Extra doses, Change in medications, Change in diet/appetite, Hospital admission, Bruising, Other complaints            Called and spoke to patient .    INR subtherapeutic despite regimen increase. Will increase further.    Warfarin Plan: Increase to 6 mg Mon/Wed/Fri, 4.5 mg all other days    Next INR in 1 week(s).    Seamus Maier, EmilyD, BCACP

## 2024-06-14 ENCOUNTER — APPOINTMENT (OUTPATIENT)
Dept: RADIOLOGY | Facility: IMAGING CENTER | Age: 74
End: 2024-06-14
Attending: NURSE PRACTITIONER
Payer: MEDICARE

## 2024-06-14 ENCOUNTER — OFFICE VISIT (OUTPATIENT)
Dept: URGENT CARE | Facility: CLINIC | Age: 74
End: 2024-06-14
Payer: MEDICARE

## 2024-06-14 VITALS
TEMPERATURE: 97.2 F | OXYGEN SATURATION: 97 % | BODY MASS INDEX: 41.16 KG/M2 | WEIGHT: 218 LBS | HEART RATE: 56 BPM | DIASTOLIC BLOOD PRESSURE: 84 MMHG | HEIGHT: 61 IN | SYSTOLIC BLOOD PRESSURE: 132 MMHG | RESPIRATION RATE: 16 BRPM

## 2024-06-14 DIAGNOSIS — S67.10XA CRUSHING INJURY OF FINGER, INITIAL ENCOUNTER: ICD-10-CM

## 2024-06-14 DIAGNOSIS — Z79.01 ANTICOAGULATED: ICD-10-CM

## 2024-06-14 DIAGNOSIS — S61.215A LACERATION OF LEFT RING FINGER WITHOUT FOREIGN BODY WITHOUT DAMAGE TO NAIL, INITIAL ENCOUNTER: ICD-10-CM

## 2024-06-14 PROCEDURE — 3075F SYST BP GE 130 - 139MM HG: CPT | Performed by: NURSE PRACTITIONER

## 2024-06-14 PROCEDURE — 3079F DIAST BP 80-89 MM HG: CPT | Performed by: NURSE PRACTITIONER

## 2024-06-14 PROCEDURE — 12001 RPR S/N/AX/GEN/TRNK 2.5CM/<: CPT | Performed by: NURSE PRACTITIONER

## 2024-06-14 PROCEDURE — 73140 X-RAY EXAM OF FINGER(S): CPT | Mod: TC,FY,LT | Performed by: NURSE PRACTITIONER

## 2024-06-14 PROCEDURE — 99213 OFFICE O/P EST LOW 20 MIN: CPT | Mod: 25 | Performed by: NURSE PRACTITIONER

## 2024-06-14 ASSESSMENT — FIBROSIS 4 INDEX: FIB4 SCORE: 2.26

## 2024-06-14 NOTE — PROGRESS NOTES
"Subjective     Yenifermargoth Beasley is a 73 y.o. female who presents with Laceration (About 30 minutes ago, smashed left hand 3rd finger, on blood thinner (Warfarin) )            Laceration   Incident onset: pt reports she slammed her left hand in her car door this morning. caught her left middle finger and ring finger in the door. she lacerated her left ring finger. The laceration is 2 cm in size. She reports no foreign bodies present.       Review of Systems   Musculoskeletal:         Left hand laceration   All other systems reviewed and are negative.         Past Medical History:   Diagnosis Date    Allergic rhinitis     Anticoagulation monitoring, special range     Arthritis     OA OF HANDS, KNEESM, HIPS AND BACK    Asthma 10/17/2019    childhood asthma    Atrial fibrillation (HCC)     history of    Back pain     Blood transfusion without reported diagnosis     CAD (coronary artery disease) 2007    CABG    Chest pain at rest 11/30/2010    Chronic anticoagulation 04/23/2018    Constipation     off and on, pt manages    Coronary heart disease     Delayed emergence from general anesthesia     Diabetes     DIAB FEET EXAM:  1/6/11- Pt reports no history of diabetes    Diabetic neuropathy (HCC) 01/06/2011    Difficulty breathing     Fall     \"a couple years ago because of my neuopathy\" multiple falls    Gasping for breath     history of    Gastritis 07/2010    H/O UGIB    Gastritis     GERD (gastroesophageal reflux disease)     [place] measles     GI bleed     history of    Glaucoma     Hyperlipidemia     Hypertension     Hyponatremia 04/22/2014    Hypothyroidism     Impaired fasting glucose 10/08/2019    Infectious disease     History of MRSA    Migraine     Need for influenza vaccination 11/30/2010    ICD-10 transition    Neuropathy in diabetes (HCC)     HILDA (obstructive sleep apnea)     no cpap at this time, on order    Painful joint     Palpitations     Pneumonia     Pneumonia due to organism 04/22/2014    Diagnois " update 10/1/2016    Pulmonary hypertension (HCC)     Rash     S/P aortic valve replacement 2007    Shortness of breath 08/29/2022    no c/o at this time    Skin infection 05/11/2018    Sleep apnea     Swelling of lower extremity     TIA (transient ischemic attack) 2005    TIA (transient ischemic attack)     Tonsillitis     Tricuspid regurgitation mild- mod 01/06/2011    Unspecified hemorrhagic conditions     Bleeds easily-GI bleeds with NSAIDS and ASA    Upper GI bleed ON JULY 2007 01/06/2011    Upper GI bleed ON JULY 2007 01/06/2011    URTI (acute upper respiratory infection) 11/30/2010    UTI (urinary tract infection) 04/24/2014    Valvular heart disease     Weakness     Wears glasses       Past Surgical History:   Procedure Laterality Date    MITRAL VALVE REPLACE  2007    AORTIC VALVE    SHOULDER SURGERY  06/2001    NEER DECOMOPRESSION LEFT SHOULDER    CARPAL TUNNEL RELEASE  1998    B/L    TONSILLECTOMY  1995    PARTIAL PALATOPLASTY    ABDOMINAL HYSTERECTOMY TOTAL      TAHBSOO    AORTIC VALVE REPLACEMENT      CHOLECYSTECTOMY      CORONARY ARTERY BYPASS, 1      Triple Bypass    HERNIA REPAIR      VENTRAL HERNIA REPAIR    HYSTERECTOMY LAPAROSCOPY      INTUBATION      PRIMARY C SECTION      SLEEVE,MEJIA VASO THIGH      TUBAL COAGULATION LAPAROSCOPIC BILATERAL      UVULOPHARYNGOPALATOPLASTY      VENTILATOR CONTINUOUS        Social History     Socioeconomic History    Marital status:      Spouse name: Not on file    Number of children: Not on file    Years of education: Not on file    Highest education level: Bachelor's degree (e.g., BA, AB, BS)   Occupational History    Not on file   Tobacco Use    Smoking status: Never    Smokeless tobacco: Never   Vaping Use    Vaping status: Never Used   Substance and Sexual Activity    Alcohol use: No    Drug use: Never    Sexual activity: Not Currently   Other Topics Concern    Not on file   Social History Narrative    Not on file     Social Determinants of Health  "    Financial Resource Strain: Medium Risk (12/8/2022)    Overall Financial Resource Strain (CARDIA)     Difficulty of Paying Living Expenses: Somewhat hard   Food Insecurity: No Food Insecurity (12/8/2022)    Hunger Vital Sign     Worried About Running Out of Food in the Last Year: Never true     Ran Out of Food in the Last Year: Never true   Transportation Needs: No Transportation Needs (12/8/2022)    PRAPARE - Transportation     Lack of Transportation (Medical): No     Lack of Transportation (Non-Medical): No   Physical Activity: Unknown (12/8/2022)    Exercise Vital Sign     Days of Exercise per Week: Patient declined     Minutes of Exercise per Session: Patient declined   Stress: No Stress Concern Present (12/8/2022)    [employer] - Occupational Stress Questionnaire     Feeling of Stress : Only a little   Social Connections: Unknown (12/8/2022)    Social Connection and Isolation Panel [NHANES]     Frequency of Communication with Friends and Family: Twice a week     Frequency of Social Gatherings with Friends and Family: Patient declined     Attends Shinto Services: Patient declined     Active Member of Clubs or Organizations: No     Attends Club or Organization Meetings: 1 to 4 times per year     Marital Status:    Intimate Partner Violence: Not on file   Housing Stability: Unknown (12/8/2022)    Housing Stability Vital Sign     Unable to Pay for Housing in the Last Year: No     Number of Places Lived in the Last Year: Not on file     Unstable Housing in the Last Year: No           Objective     /84   Pulse (!) 56   Temp 36.2 °C (97.2 °F) (Temporal)   Resp 16   Ht 1.549 m (5' 1\")   Wt 98.9 kg (218 lb)   LMP  (LMP Unknown)   SpO2 97%   BMI 41.19 kg/m²      Physical Exam  Vitals and nursing note reviewed.   Constitutional:       Appearance: Normal appearance. She is normal weight.   HENT:      Head: Normocephalic and atraumatic.      Nose: Nose normal.      " Mouth/Throat:      Mouth: Mucous membranes are moist.      Pharynx: Oropharynx is clear.   Eyes:      Extraocular Movements: Extraocular movements intact.      Pupils: Pupils are equal, round, and reactive to light.   Cardiovascular:      Rate and Rhythm: Normal rate and regular rhythm.   Pulmonary:      Effort: Pulmonary effort is normal.   Musculoskeletal:         General: Normal range of motion.        Hands:       Cervical back: Normal range of motion.   Skin:     General: Skin is warm and dry.      Capillary Refill: Capillary refill takes less than 2 seconds.   Neurological:      General: No focal deficit present.      Mental Status: She is alert and oriented to person, place, and time. Mental status is at baseline.   Psychiatric:         Mood and Affect: Mood normal.         Speech: Speech normal.         Thought Content: Thought content normal.         Judgment: Judgment normal.                Procedure: Laceration Repair  -Risks including bleeding, nerve damage, infection, and poor cosmetic outcome discussed. Benefits and alternatives discussed.   -Clean technique with sterile instruments and suture used  -Local anesthesia with 1% lidocaine  -Closed with #5  5-0 Nylon interrupted sutures with good wound approximation  -Polysporin and dressing placed  -Patient tolerated well        DX-FINGER(S) 2+ LEFT  Narrative: 6/14/2024 10:18 AM    HISTORY/REASON FOR EXAM:  Pain/Deformity Following Trauma; left 4th digit.    TECHNIQUE/EXAM DESCRIPTION AND NUMBER OF VIEWS:  3 views of the  LEFT 4th finger.    COMPARISON: None    FINDINGS:    MINERALIZATION: Mineralization is unremarkable for age.    INJURY: Questionable nondisplaced fracture of the left fourth finger distal phalangeal tuft.    JOINTS: Degenerative changes of the basal joints of thumb. Scattered degenerative changes of the interphalangeal joints.  Impression: 1.  Questionable nondisplaced fracture of the left fourth finger distal phalangeal tuft  2.   Degenerative changes of the basal joints of thumb.  3.  Scattered degenerative changes of the interphalangeal joints.             Assessment & Plan        1. Laceration of left ring finger without foreign body without damage to nail, initial encounter    2. Anticoagulated    3. Crushing injury of finger, initial encounter  - DX-FINGER(S) 2+ LEFT; Future    Keep sutures clean and dry  Signs of infection discussed   RTC in one week for suture removal  Ice compresses  Tylenol for pain  Supportive care, differential diagnoses, and indications for immediate follow-up discussed with patient.    Pathogenesis of diagnosis discussed including typical length and natural progression.    Instructed to return to  or nearest emergency department if symptoms fail to improve, for any change in condition, further concerns, or new concerning symptoms.  Patient states understanding of the plan of care and discharge instructions.

## 2024-06-17 ENCOUNTER — OFFICE VISIT (OUTPATIENT)
Dept: VASCULAR LAB | Facility: MEDICAL CENTER | Age: 74
End: 2024-06-17
Attending: FAMILY MEDICINE
Payer: MEDICARE

## 2024-06-17 VITALS
SYSTOLIC BLOOD PRESSURE: 183 MMHG | BODY MASS INDEX: 41.91 KG/M2 | HEIGHT: 61 IN | HEART RATE: 64 BPM | WEIGHT: 222 LBS | DIASTOLIC BLOOD PRESSURE: 78 MMHG

## 2024-06-17 DIAGNOSIS — D68.69 SECONDARY HYPERCOAGULABLE STATE (HCC): ICD-10-CM

## 2024-06-17 DIAGNOSIS — R73.03 PREDIABETES: ICD-10-CM

## 2024-06-17 DIAGNOSIS — N18.32 CHRONIC KIDNEY DISEASE (CKD) STAGE G3B/A1, MODERATELY DECREASED GLOMERULAR FILTRATION RATE (GFR) BETWEEN 30-44 ML/MIN/1.73 SQUARE METER AND ALBUMINURIA CREATININE RATIO LESS THAN 30 MG/G: ICD-10-CM

## 2024-06-17 DIAGNOSIS — I48.91 ATRIAL FIBRILLATION, UNSPECIFIED TYPE (HCC): ICD-10-CM

## 2024-06-17 DIAGNOSIS — Z95.1 HISTORY OF CORONARY ARTERY BYPASS GRAFT: ICD-10-CM

## 2024-06-17 DIAGNOSIS — E78.41 ELEVATED LIPOPROTEIN(A): ICD-10-CM

## 2024-06-17 DIAGNOSIS — I10 PRIMARY HYPERTENSION: ICD-10-CM

## 2024-06-17 DIAGNOSIS — E11.42 DIABETIC POLYNEUROPATHY ASSOCIATED WITH TYPE 2 DIABETES MELLITUS (HCC): ICD-10-CM

## 2024-06-17 DIAGNOSIS — M1A.9XX0 CHRONIC GOUT WITHOUT TOPHUS, UNSPECIFIED CAUSE, UNSPECIFIED SITE: ICD-10-CM

## 2024-06-17 DIAGNOSIS — I25.810 CORONARY ARTERY DISEASE INVOLVING CORONARY BYPASS GRAFT OF NATIVE HEART WITHOUT ANGINA PECTORIS: ICD-10-CM

## 2024-06-17 DIAGNOSIS — I10 HYPERTENSION, UNSPECIFIED TYPE: ICD-10-CM

## 2024-06-17 DIAGNOSIS — E78.49 OTHER HYPERLIPIDEMIA: ICD-10-CM

## 2024-06-17 PROCEDURE — G2211 COMPLEX E/M VISIT ADD ON: HCPCS | Performed by: FAMILY MEDICINE

## 2024-06-17 PROCEDURE — 99212 OFFICE O/P EST SF 10 MIN: CPT

## 2024-06-17 PROCEDURE — 3078F DIAST BP <80 MM HG: CPT | Performed by: FAMILY MEDICINE

## 2024-06-17 PROCEDURE — 3077F SYST BP >= 140 MM HG: CPT | Performed by: FAMILY MEDICINE

## 2024-06-17 PROCEDURE — 99214 OFFICE O/P EST MOD 30 MIN: CPT | Performed by: FAMILY MEDICINE

## 2024-06-17 RX ORDER — TORSEMIDE 20 MG/1
20 TABLET ORAL DAILY
Qty: 100 TABLET | Refills: 3 | Status: SHIPPED | OUTPATIENT
Start: 2024-06-17

## 2024-06-17 ASSESSMENT — ENCOUNTER SYMPTOMS
CHILLS: 0
ORTHOPNEA: 0
BRUISES/BLEEDS EASILY: 0
SHORTNESS OF BREATH: 0
PND: 0
WHEEZING: 0
SPUTUM PRODUCTION: 0
COUGH: 0
HEMOPTYSIS: 0
FEVER: 0
BLOOD IN STOOL: 0
PALPITATIONS: 0
CLAUDICATION: 0

## 2024-06-17 ASSESSMENT — FIBROSIS 4 INDEX: FIB4 SCORE: 2.26

## 2024-06-17 NOTE — PROGRESS NOTES
[facility] Follow Up Visit  06/17/24     Yenifer Beasley is a female who is here for follow up of dyslipidemia, HTN, chronic anticoagulation, and hypothyroidism    Subjective   Interval hx/concerns: last seen 3/2024, stressful months and increase CHO intake.  Denies overt gout sx.  Tolerating all meds.  No other new sx.  Reports increase stress and known white coat effect.        LIFESTYLE MGMT  Change in weight: increased 20lb   Wt Readings from Last 10 Encounters:   06/17/24 101 kg (222 lb)   06/14/24 98.9 kg (218 lb)   03/26/24 97.1 kg (214 lb)   01/30/24 93.4 kg (206 lb)   01/08/24 98 kg (216 lb)   11/20/23 94.3 kg (208 lb)   11/14/23 96.2 kg (212 lb)   10/11/23 94.8 kg (209 lb)   08/09/23 94.3 kg (208 lb)   04/26/23 90.7 kg (200 lb)   Exercise habits: no regular exercise  Diet: common adult, consistent vit K    MED MGMT  Current Prescription Lipid Lowering Medications - including dose: Has missed several doses   Statin: None  Non-Statin: Zetia, praluent   Current Supplements: OTC O3FA's, vit D3  Any Current Side Effects? No  Current Adherence:Complete    PERTINENT HLD PMHX  Any Previous History of Statin Intolerance? Yes, Details: severe myalgias on multiple statins  Baseline Lipids Prior to Treatment:  Shown here:  LDL-C: 152      HTN: stable, tolerating meds with ok adherence.  Home BP:  120-150s/70-80s, avg 130s/70s  S/p mechanical AVR: stable, tolerating meds.  No prior CVA, seeing cardiology   Anticoag: VKA, INR mgmt with [facility], good adherence, no bleeding.     Current Outpatient Medications on File Prior to Visit   Medication Sig Dispense Refill    traMADol (ULTRAM) 50 MG Tab Take 2 tablets 3 times a day by oral route as needed for 30 days, for severe pain. 180 Tablet 2    doxazosin (CARDURA) 8 MG tablet Take 1 tablet by mouth at bedtime. To lower blood pressure 100 Tablet 3    amiodarone (CORDARONE) 200 MG Tab Take 0.5 Tablets by mouth every day. 100 Tablet 3    colchicine (COLCRYS) 0.6  MG Tab Take 2 tablets by mouth once, then 1 tablet 1 hours later once. Max: 1.8 mg total dose per treatment course  Info: do not repeat x3 days 3 Tablet 0    diclofenac sodium (VOLTAREN) 1 % Gel APPLY 2 GRAM TO THE AFFECTED AREA(S) BY TOPICAL ROUTE 4 TIMES PER  g 6    traMADol (ULTRAM) 50 MG Tab Take 2 tablets 3 times a day by oral route as needed for 30 days. DNF 2/17/24 180 Tablet 2    triamcinolone acetonide (KENALOG) 0.1 % Ointment APPLY TO AFFECTED AREA TWICE A DAY FOR 2-3 WEEKS, THEN CAN USE 2 WEEKS ON, 2 WEEKS OFF AS NEEDED 30 g 3    levothyroxine (SYNTHROID) 125 MCG Tab Take 1 Tablet by mouth every morning on an empty stomach. 100 Tablet 3    telmisartan (MICARDIS) 80 MG Tab Take 1 Tablet by mouth every day. Take At night 100 Tablet 3    warfarin (COUMADIN) 4 MG Tab TAKE ONE TABLET BY MOUTH DAILY OR AS DIRECTED BY ANTICOAGULATION CLINIC 100 Tablet 1    ezetimibe (ZETIA) 10 MG Tab TAKE 1 TABLET BY MOUTH EVERY DAY 90 Tablet 3    Alirocumab (PRALUENT) 150 MG/ML Solution Auto-injector Inject 150 mg under the skin every 14 days. 6 mL 3    timolol (TIMOPTIC) 0.5 % Solution 1 DROP INTO BOTH EYES TWICE A DAY      diclofenac sodium (VOLTAREN) 1 % Gel Apply  topically 4 times a day as needed.      vitamin D (CHOLECALCIFEROL) 1000 UNIT TABS Take 1,000 Units by mouth every day.        docosahexanoic acid (OMEGA 3 FA) 1000 MG CAPS Take 1,000 mg by mouth 2 Times a Day.      tramadol (ULTRAM) 50 MG TABS Take 2 Tabs by mouth 3 times a day. 180 Each 0     No current facility-administered medications on file prior to visit.       Social History     Tobacco Use    Smoking status: Never    Smokeless tobacco: Never   Vaping Use    Vaping status: Never Used   Substance Use Topics    Alcohol use: No    Drug use: Never      Review of Systems   Constitutional:  Negative for chills, fever and malaise/fatigue.   HENT:  Negative for nosebleeds.    Respiratory:  Negative for cough, hemoptysis, sputum production, shortness of  "breath and wheezing.    Cardiovascular:  Positive for leg swelling (bilat, chronic). Negative for chest pain, palpitations, orthopnea, claudication and PND.   Gastrointestinal:  Negative for blood in stool.   Endo/Heme/Allergies:  Does not bruise/bleed easily.       Objective   Vitals:    24 1300 24 1305   BP: (!) 185/81 (!) 183/78   BP Location: Left arm Left arm   Patient Position: Sitting Sitting   BP Cuff Size: Large adult Large adult   Pulse: 64 64   Weight: 101 kg (222 lb)    Height: 1.549 m (5' 1\")      BP Readings from Last 5 Encounters:   24 (!) 183/78   24 132/84   24 (!) 145/76   24 128/75   24 (!) 164/75      BMI Readings from Last 1 Encounters:   24 41.95 kg/m²      Physical Exam  Vitals reviewed.   Constitutional:       General: She is not in acute distress.     Appearance: She is well-developed. She is obese. She is not diaphoretic.   HENT:      Head: Normocephalic and atraumatic.   Eyes:      General: No scleral icterus.  Cardiovascular:      Rate and Rhythm: Bradycardia present.      Pulses: Normal pulses.      Heart sounds: Murmur heard.   Pulmonary:      Effort: Pulmonary effort is normal. No respiratory distress.      Breath sounds: No wheezing or rales.   Musculoskeletal:         General: Swelling present. Normal range of motion.      Right lower le+ Pitting Edema present.      Left lower le+ Pitting Edema present.      Comments: Uses cane   Skin:     General: Skin is warm and dry.      Coloration: Skin is not pale.   Neurological:      Mental Status: She is alert and oriented to person, place, and time.      Motor: No weakness.   Psychiatric:         Mood and Affect: Mood normal.         Behavior: Behavior normal.         Thought Content: Thought content normal.      Comments: forgetful       DATA REVIEW  Most Recent Lipid Panel:   Lab Results   Component Value Date    CHOLSTRLTOT 136 2024    TRIGLYCERIDE 91 2024    HDL 65 " "01/02/2024    LDL 53 01/02/2024     Lab Results   Component Value Date/Time    LDL 53 01/02/2024 01:16 PM    LDL 68 07/21/2023 09:43 AM    LDL 52 02/02/2023 10:51 AM    LDL 54 08/23/2022 03:37 PM     (H) 04/26/2022 10:43 AM       Lab Results   Component Value Date/Time    LIPOPROTA 57 (H) 10/12/2017 10:27 AM      No results found for: \"APOB\"   Lab Results   Component Value Date/Time    CRPHIGHSEN 23.1 (H) 01/24/2012 11:20 AM       Other Pertinent Blood Work:   Lab Results   Component Value Date    SODIUM 138 06/13/2024    POTASSIUM 4.1 06/13/2024    CHLORIDE 102 06/13/2024    CO2 26 06/13/2024    ANION 10.0 06/13/2024    GLUCOSE 103 (H) 06/13/2024    BUN 34 (H) 06/13/2024    CREATININE 1.76 (H) 06/13/2024    CALCIUM 9.2 06/13/2024    ASTSGOT 15 01/02/2024    ALTSGPT 9 01/02/2024    ALKPHOSPHAT 55 01/02/2024    TBILIRUBIN 0.5 01/02/2024    ALBUMIN 4.0 01/02/2024    AGRATIO 1.7 01/02/2024      Latest Reference Range & Units 06/13/24 08:53   Uric Acid 1.9 - 8.2 mg/dL 9.7 (H)     Imaging      Echo march 2017  1. Left ventricular ejection fraction is visually estimated to be 55%.   Normal regional wall motion. Grade I diastolic dysfunction.  2. Known mechanical aortic valve that is functioning normally.   3. Mild mitral stenosis.  4. Estimated right ventricular systolic pressure is 40 mmHg.    Echo 4/4/19  Normal left ventricular systolic function.  Left ventricular ejection fraction is visually estimated to be 65-70%.  Mild concentric left ventricular hypertrophy.  Mild mitral stenosis.  Severe mitral annular calcification.  Mild mitral regurgitation.  Bioprosthetic aortic valve with increased velocities.  Right heart pressures are consistent with moderate pulmonary   hypertension.  Severely dilated left atrium.  Echo 3/3/21  Normal left ventricular chamber size.  Left ventricular ejection fraction is visually estimated to be 65%.  Mild concentric left ventricular hypertrophy.  Dilated inferior vena cava without " inspiratory collapse.  Moderate mitral stenosis.  Mean [id number] gradient is 7 mmHg at a heart rate of 64 BPM.  Structurally normal tricuspid valve without significant stenosis.  Normal function of mechanical prosthetic valve.    US aorta 4/14/21  Mild atherosclerotic change of the aorta. No evidence of aneurysm.  Carotid duplex 4/14/21  Mild plaque with no significant stenosis.  Echo 8/2022  Hyperdynamic left ventricular systolic function.  The left ventricular ejection fraction is visually estimated to be 75%.  Moderate concentric left ventricular hypertrophy.  Known mechanical aortic valve with increased gradients; Transvalvular   gradients are - Peak: 62 mmHg, Mean: 33 mmHg. Vmax is  3.92 m/s.   Acceleration time is 84 ms.  Moderate mitral stenosis.  The right ventricle is dilated with normal right ventricular systolic   function.  Right heart pressures are consistent with moderate pulmonary   hypertension at 50 mmHg.  Compared to the images of the prior study 3/3/2021, there has been an   increase in velocity gradient across the aortic valve prosthesis.     ZIO XT REPORT (11/25/23-12/09/23)   Zio study showing predominately sinus rhythm with maximum rate of 97 bpm, minimum rate of 34 bpm, average of 48 bpm.   Atrial fibrillation: None.   Supraventricular tachycardia: None.   Pauses: None.   Heart block: None.   Ventricular tachycardia: None.   Rare <1% burden of premature atrial contractions, rare couplets and triplets.   9.6 mm simple cyst right kidney. No follow-up required.  [place]awaf duplex 2/2024  1.  No Doppler evidence of significant renal artery stenosis.   2.  Elevated resistive indices within both kidneys nonspecific. Consistent with medical renal disease.   3.  9.6 mm simple cyst right kidney. No follow-up required.     ASSESSMENT AND PLAN  1. Other hyperlipidemia  CBC WITHOUT DIFFERENTIAL    Comp Metabolic Panel    MICROALBUMIN CREAT RATIO URINE    Lipid Profile    APOLIPOPROTEIN B      2.  Elevated lipoprotein(a)        3. Atrial fibrillation (HCC) [I48.91]        4. Secondary hypercoagulable state (HCC)        5. Chronic kidney disease (CKD) stage G3b/A1, moderately decreased glomerular filtration rate (GFR) between 30-44 mL/min/1.73 square meter and albuminuria creatinine ratio less than 30 mg/g  CBC WITHOUT DIFFERENTIAL    Comp Metabolic Panel    MICROALBUMIN CREAT RATIO URINE    Lipid Profile    APOLIPOPROTEIN B      6. Coronary artery disease involving coronary bypass graft of native heart without angina pectoris        7. History of coronary artery bypass graft        8. Primary hypertension        9. Chronic gout without tophus, unspecified cause, unspecified site        10. Diabetic polyneuropathy associated with type 2 diabetes mellitus (HCC)        11. Hypertension, unspecified type  torsemide (DEMADEX) 20 MG Tab      12. Prediabetes  HEMOGLOBIN A1C        Patient Type, check all that apply: Secondary Prevention    Established Atherosclerotic Cardiovascular Disease (ASCVD)    1) CAD s/p CABG - stable, no current sx, seeing cardiology ongoing, continue med mgmt     Other Established (non-atherosclerotic) Vascular/Heart Disease    1) Valvular heart disease s/p mechanical AVR - continue anticoagulation;  - previously saw cardiology for worsening valvular heart disease- had angiogram scheduled but decided against it as she does not want to move forward with any surgery.    Plan:  - ongoing f/u with cardiology    2) sinus bradycardia - improved after amiodareone adjusted, no current sx   - zio patch with avg 48 bpm, low as 34; had amiodarone reduced at last cardiology visit 1/2024  Plan:  - defer mgmt to cardiology     3) AF - stable, no sx, no prior cva/tia, recent normal zio 12/2023  Plan;  - continue meds, ref back to cardiology     Evidence of genetic dyslipidemia: Possible    ACC/AHA Indication for Statin Therapy, toby all that apply: Established ASCVD: Indication for High intensity statin     The 10-year ASCVD risk score (Brittany SANDY, et al., 2019) is: 45.4% (residual risk)    Other Significant Risk Markers  Family history of premature ASCVD in first degree relative   Lp(a) = 57mg/dl = risk-enhancing     Goal LDL-C   LDL-C:   <70 mg/dL   At goal?  Yes, 1/2024    LIFESTYLE INTERVENTIONS  TOBACCO:  continued complete avoidance of all tobacco products   PHYSICAL ACTIVITY: >150min/week of mod-intensity activity or as much as tolerated  NUTRITION: Mediterranean and reduce Na to 1500mg/day   ETOH: limit to 1 or less standard drinks/day  WT MGMT: - focus on 5-7% reduction over time , 1kg loss = reduced 1 mmHg    Statin medications:  COMPLETE INTOLERANCE , failed 2+ statins (rosuva, atorva, simva)    Non-Statin Medications  - Continue Praluent 150mg   - Continue zetia 10 mg daily    Indication for PCSK9 Inhibitor  PSCK9i indicated per 2018 ACC/AHA guidelines in this patient with established ASCVD whose LDL-C remains above threshold of 70 mg/dl despite maximally tolerated statin therapy.     Supplements: Continue omega 3's at 3,000u/day, Vit D     BLOOD PRESSURE CONTROL   Office BP Goal ACC/AHA (2017) goal <130/80  Home BP at goal:  improved, likely residual ROMA per averages   Office BP at goal:  no  24h ABPM:  not ordered to date   RDN candidate? No, gfr too low   Contributing factors: CKD - current main ,  CAD, HILDA, obesity, DM, CKD, MV stenosis  Plan:   - home BP monitor with arm cuff, 3d/wk,  2 readings in AM and PM  by 3-5min  - order 24h ABPM:  consider at next visit if still home v office discordance   Medications  - continue telmisartan 80mg daily    DHP-CCB: none, avoid due to baseline LE edema   Thiazide: caution/avoid with GFR and due to gout   MRA: avoid due to low gfr   - continue doxazosin 8mg QHS   - increase torsemide to 20mg full tab daily - consider increase to full tab or BID, may need renal-dosed uricosuric agents daily to offset gout risks -  BB: avoid due to bradycardia    non-DHP-CCB: avoid due to bradycardia   - consider amiloride at low dose - would monitor lyets/gfr closely due to hyperK+ risk     Anticoagulation: continue warfarin and f/u with anticoag clinic    OTHER:    # gout, hyperuricemia - no current sx  Plan:  - has limited our ability to use thiaizide  - may consider uricosuric agent such as renal-dosed allopurinol  - would not use colchicine daily due to low gfr     # HILDA on CPAP - stable, adherent use  - Strongly encouraged CPAP adherence to reduce RV strain, improve overall fatigue symptoms, and improve BP in both the short- and long-term as per HIPARCO (2013) and HIPARCO-2 (2021) studies.   Plan:  - continue CPAP, defer mgmt to sleep MD     # preDM: relatively stable.  Resolved   Lab Results   Component Value Date    HBA1C 5.4 01/02/2024    - monitor annually      # Hypothyroidism - stable, monitoring per PCP     # CKD G3b/A1 - stable, non-progressive.    Plan:  -  ongoing, monitor closely, consider referral to nephrology in future    Studies Ordered at Todays Visit: none   Blood Work Ordered At Today’s visit: as noted in assessment     Follow-Up:  ongoing with [facility] and cardiology,  6mo with [facility] med     [place] M.D.  [facility]   [facility]   [phone]

## 2024-06-18 PROBLEM — D68.69 SECONDARY HYPERCOAGULABLE STATE (HCC): Status: ACTIVE | Noted: 2022-05-02

## 2024-06-21 ENCOUNTER — OFFICE VISIT (OUTPATIENT)
Dept: URGENT CARE | Facility: CLINIC | Age: 74
End: 2024-06-21
Payer: MEDICARE

## 2024-06-21 VITALS
TEMPERATURE: 98 F | WEIGHT: 222 LBS | RESPIRATION RATE: 16 BRPM | HEIGHT: 61 IN | BODY MASS INDEX: 41.91 KG/M2 | HEART RATE: 61 BPM | OXYGEN SATURATION: 97 % | DIASTOLIC BLOOD PRESSURE: 84 MMHG | SYSTOLIC BLOOD PRESSURE: 130 MMHG

## 2024-06-21 DIAGNOSIS — S61.215D LACERATION OF LEFT RING FINGER WITHOUT FOREIGN BODY WITHOUT DAMAGE TO NAIL, SUBSEQUENT ENCOUNTER: ICD-10-CM

## 2024-06-21 DIAGNOSIS — Z48.02 VISIT FOR SUTURE REMOVAL: ICD-10-CM

## 2024-06-21 PROCEDURE — 3079F DIAST BP 80-89 MM HG: CPT | Performed by: PHYSICIAN ASSISTANT

## 2024-06-21 PROCEDURE — 3075F SYST BP GE 130 - 139MM HG: CPT | Performed by: PHYSICIAN ASSISTANT

## 2024-06-21 PROCEDURE — 99212 OFFICE O/P EST SF 10 MIN: CPT | Performed by: PHYSICIAN ASSISTANT

## 2024-06-21 ASSESSMENT — ENCOUNTER SYMPTOMS
TINGLING: 1
WEAKNESS: 0

## 2024-06-21 ASSESSMENT — FIBROSIS 4 INDEX: FIB4 SCORE: 2.26

## 2024-06-21 NOTE — PROGRESS NOTES
"  Subjective:     Yenifer Beasley  is a 73 y.o. female who presents for Suture / Staple Removal       She presents today for suture removal for laceration over the left ring finger that occurred on 6/14/2024 when her finger was crushed within a car door.  She did obtain an x-ray during that exam did show possible tuft fracture over the left distal on the ring finger.  5 simple interrupted did sutures were in place.  Notes some numbness over the area of laceration but overall feels well.  No pain, no drainage or discharge from the wound.       Review of Systems   Musculoskeletal:  Negative for joint pain.   Skin:         Well-healed laceration over the left ring finger   Neurological:  Positive for tingling. Negative for weakness.      Allergies   Allergen Reactions    Asa [Aspirin]      GI BLEED    Atorvastatin Shortness of Breath    Cymbalta [Duloxetine Hcl] Unspecified     Feels like a \"zombie\"    Hmg-Coa-R Inhibitors     Latex Swelling    Nsaids      GI BLEED    Tricor Shortness of Breath     Breathing problems      Trilipix [Choline Fenofibrate] Shortness of Breath     SOB    Lyrica [Pregabalin]      SPACED OUT     Past Medical History:   Diagnosis Date    Allergic rhinitis     Anticoagulation monitoring, special range     Arthritis     OA OF HANDS, KNEESM, HIPS AND BACK    Asthma 10/17/2019    childhood asthma    Atrial fibrillation (HCC)     history of    Back pain     Blood transfusion without reported diagnosis     CAD (coronary artery disease) 2007    CABG    Chest pain at rest 11/30/2010    Chronic anticoagulation 04/23/2018    Constipation     off and on, pt manages    Coronary heart disease     Delayed emergence from general anesthesia     Diabetes     DIAB FEET EXAM:  1/6/11- Pt reports no history of diabetes    Diabetic neuropathy (HCC) 01/06/2011    Difficulty breathing     Fall     \"a couple years ago because of my neuopathy\" multiple falls    Gasping for breath     history of    Gastritis " 07/2010    H/O UGIB    Gastritis     GERD (gastroesophageal reflux disease)     [place] measles     GI bleed     history of    Glaucoma     Hyperlipidemia     Hypertension     Hyponatremia 04/22/2014    Hypothyroidism     Impaired fasting glucose 10/08/2019    Infectious disease     History of MRSA    Migraine     Need for influenza vaccination 11/30/2010    ICD-10 transition    Neuropathy in diabetes (HCC)     HILDA (obstructive sleep apnea)     no cpap at this time, on order    Painful joint     Palpitations     Pneumonia     Pneumonia due to organism 04/22/2014    Diagnois update 10/1/2016    Pulmonary hypertension (HCC)     Rash     S/P aortic valve replacement 2007    Shortness of breath 08/29/2022    no c/o at this time    Skin infection 05/11/2018    Sleep apnea     Swelling of lower extremity     TIA (transient ischemic attack) 2005    TIA (transient ischemic attack)     Tonsillitis     Tricuspid regurgitation mild- mod 01/06/2011    Unspecified hemorrhagic conditions     Bleeds easily-GI bleeds with NSAIDS and ASA    Upper GI bleed ON JULY 2007 01/06/2011    Upper GI bleed ON JULY 2007 01/06/2011    URTI (acute upper respiratory infection) 11/30/2010    UTI (urinary tract infection) 04/24/2014    Valvular heart disease     Weakness     Wears glasses         Objective:   LMP  (LMP Unknown)   Physical Exam  Vitals and nursing note reviewed.   Constitutional:       General: She is not in acute distress.     Appearance: She is not ill-appearing or toxic-appearing.   HENT:      Head: Normocephalic.      Nose: No rhinorrhea.   Eyes:      General: No scleral icterus.     Conjunctiva/sclera: Conjunctivae normal.   Pulmonary:      Effort: Pulmonary effort is normal. No respiratory distress.      Breath sounds: No stridor.   Musculoskeletal:      Cervical back: Neck supple.      Comments: Left ring finger range of motion and strength within normal limits and comparable bilaterally.  Distal capillary refill less than 2  seconds.  No tenderness over the distal phalanx   Skin:     Comments: Well-healed laceration over the palmar aspect of the left ring finger.  No surrounding erythema, no drainage or discharge.   Neurological:      Mental Status: She is alert and oriented to person, place, and time.   Psychiatric:         Mood and Affect: Mood normal.         Behavior: Behavior normal.         Thought Content: Thought content normal.         Judgment: Judgment normal.             Diagnostic testing: None    Assessment/Plan:     Encounter Diagnoses   Name Primary?    Laceration of left ring finger without foreign body without damage to nail, subsequent encounter     Visit for suture removal           Plan for care for today's complaint includes removing 5 simple interrupted sutures in office today.  Wound and dressing care discussed.  All previous x-ray did show possible distal phalanx fracture she is not having any pain on exam today, do not feel splinting or additional imaging is needed at this time as she does appear to have appropriate healing.  Continue to monitor symptoms and return to urgent care or follow-up with primary care provider if symptoms remain ongoing.  Follow-up in the emergency department if symptoms become severe, ER precautions discussed in office today..    See AVS Instructions below for written guidance provided to patient on after-visit management and care in addition to our verbal discussion during the visit.    Please note that this dictation was created using voice recognition software. I have attempted to correct all errors, but there may be sound-alike, spelling, grammar and possibly content errors that I did not discover before finalizing the note.    Terry Worthnigton PA-C

## 2024-06-22 ASSESSMENT — PATIENT HEALTH QUESTIONNAIRE - PHQ9
2. FEELING DOWN, DEPRESSED, IRRITABLE, OR HOPELESS: NOT AT ALL
1. LITTLE INTEREST OR PLEASURE IN DOING THINGS: NOT AT ALL

## 2024-06-22 ASSESSMENT — ENCOUNTER SYMPTOMS: GENERAL WELL-BEING: GOOD

## 2024-06-22 ASSESSMENT — ACTIVITIES OF DAILY LIVING (ADL): BATHING_REQUIRES_ASSISTANCE: 1

## 2024-06-24 PROBLEM — R73.03 PREDIABETES: Status: RESOLVED | Noted: 2024-03-26 | Resolved: 2024-06-24

## 2024-06-25 ENCOUNTER — OFFICE VISIT (OUTPATIENT)
Dept: FAMILY PLANNING/WOMEN'S HEALTH CLINIC | Facility: PHYSICIAN GROUP | Age: 74
End: 2024-06-25
Payer: MEDICARE

## 2024-06-25 VITALS
HEIGHT: 61 IN | BODY MASS INDEX: 41.16 KG/M2 | DIASTOLIC BLOOD PRESSURE: 60 MMHG | SYSTOLIC BLOOD PRESSURE: 118 MMHG | WEIGHT: 218 LBS

## 2024-06-25 DIAGNOSIS — E03.9 HYPOTHYROIDISM, UNSPECIFIED TYPE: ICD-10-CM

## 2024-06-25 DIAGNOSIS — I25.810 CORONARY ARTERY DISEASE INVOLVING CORONARY BYPASS GRAFT OF NATIVE HEART WITHOUT ANGINA PECTORIS: ICD-10-CM

## 2024-06-25 DIAGNOSIS — E78.5 HYPERLIPIDEMIA, UNSPECIFIED HYPERLIPIDEMIA TYPE: ICD-10-CM

## 2024-06-25 DIAGNOSIS — N18.32 TYPE 2 DIABETES MELLITUS WITH STAGE 3B CHRONIC KIDNEY DISEASE, WITHOUT LONG-TERM CURRENT USE OF INSULIN (HCC): ICD-10-CM

## 2024-06-25 DIAGNOSIS — E11.42 DIABETIC POLYNEUROPATHY ASSOCIATED WITH TYPE 2 DIABETES MELLITUS (HCC): ICD-10-CM

## 2024-06-25 DIAGNOSIS — E11.22 TYPE 2 DIABETES MELLITUS WITH STAGE 3B CHRONIC KIDNEY DISEASE, WITHOUT LONG-TERM CURRENT USE OF INSULIN (HCC): ICD-10-CM

## 2024-06-25 DIAGNOSIS — G47.33 OSA (OBSTRUCTIVE SLEEP APNEA): Chronic | ICD-10-CM

## 2024-06-25 DIAGNOSIS — I77.810 ECTATIC THORACIC AORTA (HCC): ICD-10-CM

## 2024-06-25 DIAGNOSIS — Z78.0 POST-MENOPAUSE: ICD-10-CM

## 2024-06-25 DIAGNOSIS — Z12.11 COLON CANCER SCREENING: ICD-10-CM

## 2024-06-25 DIAGNOSIS — H40.89 OTHER GLAUCOMA OF BOTH EYES: ICD-10-CM

## 2024-06-25 DIAGNOSIS — N18.32 CHRONIC KIDNEY DISEASE (CKD) STAGE G3B/A1, MODERATELY DECREASED GLOMERULAR FILTRATION RATE (GFR) BETWEEN 30-44 ML/MIN/1.73 SQUARE METER AND ALBUMINURIA CREATININE RATIO LESS THAN 30 MG/G: ICD-10-CM

## 2024-06-25 DIAGNOSIS — I10 PRIMARY HYPERTENSION: ICD-10-CM

## 2024-06-25 DIAGNOSIS — K21.9 GASTROESOPHAGEAL REFLUX DISEASE, UNSPECIFIED WHETHER ESOPHAGITIS PRESENT: ICD-10-CM

## 2024-06-25 DIAGNOSIS — I48.91 ATRIAL FIBRILLATION, UNSPECIFIED TYPE (HCC): ICD-10-CM

## 2024-06-25 DIAGNOSIS — I27.20 PULMONARY HYPERTENSION (HCC): ICD-10-CM

## 2024-06-25 DIAGNOSIS — D68.69 SECONDARY HYPERCOAGULABLE STATE (HCC): ICD-10-CM

## 2024-06-25 DIAGNOSIS — E66.01 CLASS 3 SEVERE OBESITY DUE TO EXCESS CALORIES WITH SERIOUS COMORBIDITY AND BODY MASS INDEX (BMI) OF 40.0 TO 44.9 IN ADULT (HCC): ICD-10-CM

## 2024-06-25 PROBLEM — E66.813 CLASS 3 SEVERE OBESITY DUE TO EXCESS CALORIES WITH SERIOUS COMORBIDITY AND BODY MASS INDEX (BMI) OF 40.0 TO 44.9 IN ADULT (HCC): Status: ACTIVE | Noted: 2023-02-08

## 2024-06-25 PROCEDURE — G0439 PPPS, SUBSEQ VISIT: HCPCS | Performed by: PHYSICIAN ASSISTANT

## 2024-06-25 PROCEDURE — 1126F AMNT PAIN NOTED NONE PRSNT: CPT | Performed by: PHYSICIAN ASSISTANT

## 2024-06-25 SDOH — ECONOMIC STABILITY: FOOD INSECURITY: WITHIN THE PAST 12 MONTHS, YOU WORRIED THAT YOUR FOOD WOULD RUN OUT BEFORE YOU GOT MONEY TO BUY MORE.: NEVER TRUE

## 2024-06-25 SDOH — ECONOMIC STABILITY: FOOD INSECURITY: WITHIN THE PAST 12 MONTHS, THE FOOD YOU BOUGHT JUST DIDN'T LAST AND YOU DIDN'T HAVE MONEY TO GET MORE.: NEVER TRUE

## 2024-06-25 SDOH — ECONOMIC STABILITY: INCOME INSECURITY: HOW HARD IS IT FOR YOU TO PAY FOR THE VERY BASICS LIKE FOOD, HOUSING, MEDICAL CARE, AND HEATING?: NOT HARD AT ALL

## 2024-06-25 ASSESSMENT — FIBROSIS 4 INDEX: FIB4 SCORE: 2.26

## 2024-06-25 ASSESSMENT — PATIENT HEALTH QUESTIONNAIRE - PHQ9: CLINICAL INTERPRETATION OF PHQ2 SCORE: 0

## 2024-06-25 ASSESSMENT — PAIN SCALES - GENERAL: PAINLEVEL: NO PAIN

## 2024-06-25 NOTE — ASSESSMENT & PLAN NOTE
Chronic, stable. Pt maintains on levothyroxine 125mg daily with TSH wnl as of 4/2022. Continue current treatment regime.Follow-up at least annually.     Latest Reference Range & Units 04/26/22 10:44   TSH 0.380 - 5.330 uIU/mL 1.950

## 2024-06-25 NOTE — ASSESSMENT & PLAN NOTE
Chronic, ongoing. GFR last measured 6/2024 at 30. Continue to encourage adequate hydration and avoidance of nephrotoxins. She does not see a nephrologist. Follow up with PCP per routine.    Latest Reference Range & Units 07/21/23 09:43 01/02/24 13:16 06/13/24 08:53   Bun 8 - 22 mg/dL 49 (H) 26 (H) 34 (H)   Creatinine 0.50 - 1.40 mg/dL 1.65 (H) 1.62 (H) 1.76 (H)   GFR (CKD-EPI) >60 mL/min/1.73 m 2 33 ! 34 ! 30 !   (H): Data is abnormally high  !: Data is abnormal

## 2024-06-25 NOTE — ASSESSMENT & PLAN NOTE
Chronic, stable. Pt wears CPAP nightly. She does not require supplemental oxygen. Follow up with sleep medicine per routine.

## 2024-06-25 NOTE — LETTER
[facility]  Jose Vela M.D.  [address] [facility] [address]  [place] [address]  Fax: [phone]   Authorization for Release/Disclosure of   Protected Health Information   Name: LIMA BEASLEY : 1950 SSN: [id number]   Address: [address] [place] [address]  [place] [address] Phone:    There are no phone numbers on file.   I authorize the entity listed below to release/disclose the PHI below to:   [facility]/Jose Vela M.D. and Kim Ball P.A.-C.   Provider or Entity Name:     Address   City, State, Zip   Phone:      Fax:     Reason for request: continuity of care   Information to be released:    [  ] LAST COLONOSCOPY,  including any PATH REPORT and follow-up  [  ] LAST FIT/COLOGUARD RESULT [  ] LAST DEXA  [  ] LAST MAMMOGRAM  [  ] LAST PAP  [  ] LAST LABS [  ] RETINA EXAM REPORT  [  ] IMMUNIZATION RECORDS  [  ] Release all info      [  ] Check here and initial the line next to each item to release ALL health information INCLUDING  _____ Care and treatment for drug and / or alcohol abuse  _____ HIV testing, infection status, or AIDS  _____ Genetic Testing    DATES OF SERVICE OR TIME PERIOD TO BE DISCLOSED: _____________  I understand and acknowledge that:  * This Authorization may be revoked at any time by you in writing, except if your health information has already been used or disclosed.  * Your health information that will be used or disclosed as a result of you signing this authorization could be re-disclosed by the recipient. If this occurs, your re-disclosed health information may no longer be protected by State or Federal laws.  * You may refuse to sign this Authorization. Your refusal will not affect your ability to obtain treatment.  * This Authorization becomes effective upon signing and will  on (date) __________.      If no date is indicated, this Authorization will  one (1) year from the signature date.    Name: Lima Beasley  Signature: Date:   2024      PLEASE FAX REQUESTED RECORDS BACK TO: [phone]

## 2024-06-25 NOTE — ASSESSMENT & PLAN NOTE
Chronic, ongoing. BMI today 41.19. Assoiated with T2DM, HTN, HLD, HILDA, Gerd. Continue to encourage healthy calorie conscious diet with regular physical activity as tolerated with goal of weight loss. Follow up with PCP at least annually for continued monitoring and management.

## 2024-06-25 NOTE — ASSESSMENT & PLAN NOTE
Chronic, ongoing. Noted on 8/2022 echocardiogram with ascending aorta measuring 3.6cm. BP remains controlled. She is scheduling an updated echocardiogram later this summer. Follow up with cardiology per routine.

## 2024-06-25 NOTE — ASSESSMENT & PLAN NOTE
Chronic, stable. Pt maintains on amiodarone 100mg daily with warfarin 4mg daily for stroke prophylaxis. Follow up with cardiology per routine as well as coumadin clinic.

## 2024-06-25 NOTE — ASSESSMENT & PLAN NOTE
Chronic, stable, lifestyle controlled. Associated with CKD. Last A1c 5.4 as of 2024. Last monofilament foot exam: 2022, last urine microalb/creat: 2024, last retinal screenin2022, sees ophtho tomorrow. Continue to advise low carbohydrate diet with regular physical activity. Continue current treatment regime. Follow up with PCP as scheduled for continued monitoring and management.

## 2024-06-25 NOTE — ASSESSMENT & PLAN NOTE
Chronic, ongoing. Last echocardiogram from 2022 noted to have RSVP of 50mmHg. Pt does continue to complain of dyspnea on exertion. She reports she has been diagnosed with congestive heart failure though I do not see this in her cardiology note.She is pursing a repeat echo in July. Follow up with cardiology per routine.

## 2024-06-25 NOTE — ASSESSMENT & PLAN NOTE
Chronic, ongoing. Pt with hx of CABG in 2007. Pt maintains on Praulent 150mg daily and ezetimibe 10mg daily with doxazosin 8mg nightly, torsemide 20mg daily, telmisartan 80mg daily for HTN. Pt denies CP. Follow up with cardiology.

## 2024-06-25 NOTE — ASSESSMENT & PLAN NOTE
Chronic, ongoing. Pt maintains on tramadol 100mg TID to address this which has been helpful. Continue to encourage blood glucose control and regular foot checks. She did follow with a podiatrist years ago. Follow up with PCP at least annually for continued monitoring and management.

## 2024-06-25 NOTE — ASSESSMENT & PLAN NOTE
Chronic, ongoing. Pt maintains on timolol eye drops daily. Continue current treatment regime. Denies acute vision changes. Follow up with ophtho at least annually for continued monitoring and management.

## 2024-06-25 NOTE — PROGRESS NOTES
Comprehensive Health Assessment Program     Yenifer Beasley is a 73 y.o. here for her comprehensive health assessment.    Patient Active Problem List    Diagnosis Date Noted    Pulmonary hypertension (HCC) 06/25/2024    Acquired renal cyst of right kidney 03/26/2024    Chronic gout without tophus, unspecified cause, unspecified site 03/26/2024    Gouty arthropathy 01/22/2024    History of coronary artery bypass graft 01/08/2024    Class 3 severe obesity due to excess calories with serious comorbidity and body mass index (BMI) of 40.0 to 44.9 in adult ([facility]) 02/08/2023    Other specified glaucoma 12/08/2022    Nonrheumatic mitral valve stenosis 08/25/2022    Lower leg edema 05/17/2022    Secondary hypercoagulable state ([facility]) 05/02/2022    Chronic kidney disease (CKD) stage G3b/A1, moderately decreased glomerular filtration rate (GFR) between 30-44 mL/min/1.73 square meter and albuminuria creatinine ratio less than 30 mg/g 04/12/2022    Asthma 10/17/2019    Atrial fibrillation ([facility]) [I48.91] 07/18/2019    Chronic anticoagulation 04/23/2018    Transient cerebral ischemia 09/08/2015    Hx of mechanical aortic valve replacement [V43.3] 05/05/2015    Type 2 diabetes mellitus with kidney complication, without long-term current use of insulin ([facility]) 04/23/2014    Elevated LFTs 04/23/2014    HTN (hypertension) 02/17/2011    Tricuspid regurgitation mild- mod 01/06/2011    Vitamin d deficiency 01/06/2011    Diabetic neuropathy ([facility]) 01/06/2011    Arthritis     Migraine     Hypothyroidism     CAD (coronary artery disease)     Hyperlipidemia     HILDA (obstructive sleep apnea)     GERD (gastroesophageal reflux disease)        Current Outpatient Medications   Medication Sig Dispense Refill    torsemide (DEMADEX) 20 MG Tab Take 1 Tablet by mouth every day. To lower blood pressure and swelling 100 Tablet 3    traMADol (ULTRAM) 50 MG Tab Take 2 tablets 3 times a day by oral route as needed for 30 days, for severe pain. 180  Tablet 2    doxazosin (CARDURA) 8 MG tablet Take 1 tablet by mouth at bedtime. To lower blood pressure 100 Tablet 3    amiodarone (CORDARONE) 200 MG Tab Take 0.5 Tablets by mouth every day. 100 Tablet 3    colchicine (COLCRYS) 0.6 MG Tab Take 2 tablets by mouth once, then 1 tablet 1 hours later once. Max: 1.8 mg total dose per treatment course  Info: do not repeat x3 days 3 Tablet 0    diclofenac sodium (VOLTAREN) 1 % Gel APPLY 2 GRAM TO THE AFFECTED AREA(S) BY TOPICAL ROUTE 4 TIMES PER  g 6    triamcinolone acetonide (KENALOG) 0.1 % Ointment APPLY TO AFFECTED AREA TWICE A DAY FOR 2-3 WEEKS, THEN CAN USE 2 WEEKS ON, 2 WEEKS OFF AS NEEDED 30 g 3    levothyroxine (SYNTHROID) 125 MCG Tab Take 1 Tablet by mouth every morning on an empty stomach. 100 Tablet 3    telmisartan (MICARDIS) 80 MG Tab Take 1 Tablet by mouth every day. Take At night 100 Tablet 3    warfarin (COUMADIN) 4 MG Tab TAKE ONE TABLET BY MOUTH DAILY OR AS DIRECTED BY ANTICOAGULATION CLINIC 100 Tablet 1    ezetimibe (ZETIA) 10 MG Tab TAKE 1 TABLET BY MOUTH EVERY DAY 90 Tablet 3    Alirocumab (PRALUENT) 150 MG/ML Solution Auto-injector Inject 150 mg under the skin every 14 days. 6 mL 3    timolol (TIMOPTIC) 0.5 % Solution 1 DROP INTO BOTH EYES TWICE A DAY      vitamin D (CHOLECALCIFEROL) 1000 UNIT TABS Take 1,000 Units by mouth every day.        docosahexanoic acid (OMEGA 3 FA) 1000 MG CAPS Take 1,000 mg by mouth 2 Times a Day.      traMADol (ULTRAM) 50 MG Tab Take 2 tablets 3 times a day by oral route as needed for 30 days. DNF 2/17/24 180 Tablet 2     No current facility-administered medications for this visit.          Current supplements as per medication list.     Allergies:   Asa [aspirin], Atorvastatin, Cymbalta [duloxetine hcl], Hmg-coa-r inhibitors, Latex, Nsaids, Tricor, Trilipix [choline fenofibrate], and Lyrica [pregabalin]  Social History     Tobacco Use    Smoking status: Never    Smokeless tobacco: Never   Vaping Use    Vaping status:  Never Used   Substance Use Topics    Alcohol use: No    Drug use: Never     Family History   Problem Relation Age of Onset    Heart Disease Mother         a fib    Cancer Mother         Cervical: palate:      Cancer Maternal Grandmother         Leukemia,  21 y.o. 193    Cancer Maternal Aunt         Lung CA (+3 dtrs  from CA)    Heart Disease Brother         Heart attack, open heart surgery,     Heart Disease Brother         Heart attack, open heart surgery     Breast Cancer Cousin      Yenifer  has a past medical history of Allergic rhinitis, Anticoagulation monitoring, special range, Arthritis, Asthma (10/17/2019), Atrial fibrillation ([facility]), Back pain, Blood transfusion without reported diagnosis, CAD (coronary artery disease) (), Chest pain at rest (2010), Chronic anticoagulation (2018), Constipation, Coronary heart disease, Delayed emergence from general anesthesia, Diabetes, Diabetic neuropathy ([facility]) (2011), Difficulty breathing, Fall, Gasping for breath, Gastritis (2010), Gastritis, GERD (gastroesophageal reflux disease), [place] measles, GI bleed, Glaucoma, Hyperlipidemia, Hypertension, Hyponatremia (2014), Hypothyroidism, Impaired fasting glucose (10/08/2019), Infectious disease, Migraine, Need for influenza vaccination (2010), Neuropathy in diabetes ([facility]), HILDA (obstructive sleep apnea), Painful joint, Palpitations, Pneumonia, Pneumonia due to organism (2014), Pulmonary hypertension ([facility]), Rash, S/P aortic valve replacement (), Shortness of breath (2022), Skin infection (2018), Sleep apnea, Swelling of lower extremity, TIA (transient ischemic attack) (), TIA (transient ischemic attack), Tonsillitis, Tricuspid regurgitation mild- mod (2011), Unspecified hemorrhagic conditions, Upper GI bleed ON 2007 (2011), Upper GI bleed ON 2007 (2011), URTI (acute upper respiratory infection)  (11/30/2010), UTI (urinary tract infection) (04/24/2014), Valvular heart disease, Weakness, and Wears glasses.   Past Surgical History:   Procedure Laterality Date    MITRAL VALVE REPLACE  2007    AORTIC VALVE    SHOULDER SURGERY  06/2001    NEER DECOMOPRESSION LEFT SHOULDER    CARPAL TUNNEL RELEASE  1998    B/L    TONSILLECTOMY  1995    PARTIAL PALATOPLASTY    ABDOMINAL HYSTERECTOMY TOTAL      TAHBSOO    AORTIC VALVE REPLACEMENT      CHOLECYSTECTOMY      CORONARY ARTERY BYPASS, 1      Triple Bypass    HERNIA REPAIR      VENTRAL HERNIA REPAIR    HYSTERECTOMY LAPAROSCOPY      INTUBATION      PRIMARY C SECTION      SLEEVE,MEJIA VASO THIGH      TUBAL COAGULATION LAPAROSCOPIC BILATERAL      UVULOPHARYNGOPALATOPLASTY      VENTILATOR CONTINUOUS         Screening:  In the last six months have you experienced any leakage of urine? No    Depression Screening  Little interest or pleasure in doing things?  0 - not at all  Feeling down, depressed , or hopeless? 0 - not at all  Patient Health Questionnaire Score: 0     If depressive symptoms identified deferred to follow up visit unless specifically addressed in assessment and plan.    Interpretation of PHQ-9 Total Score   Score Severity   1-4 No Depression   5-9 Mild Depression   10-14 Moderate Depression   15-19 Moderately Severe Depression   20-27 Severe Depression    Screening for Cognitive Impairment  Do you or any of your friends or family members have any concern about your memory? No  Three Minute Recall (Leader, Season, Table) 2/3    Colt clock face with all 12 numbers and set the hands to show 10 minutes after 11.  Yes 5  Cognitive concerns identified deferred for follow up unless specifically addressed in assessment and plan.    Fall Risk Assessment  Has the patient had two or more falls in the last year or any fall with injury in the last year?  No    Safety Assessment  Do you always wear your seatbelt?  Yes  Any changes to home needed to function safely?  Yes  Difficulty hearing.  No  Patient counseled about all safety risks that were identified.    Functional Assessment ADLs  Are there any barriers preventing you from cooking for yourself or meeting nutritional needs?  No.    Are there any barriers preventing you from driving safely or obtaining transportation?  No.    Are there any barriers preventing you from using a telephone or calling for help?  No    Are there any barriers preventing you from shopping?  No.    Are there any barriers preventing you from taking care of your own finances?  No    Are there any barriers preventing you from managing your medications?  No    Are there any barriers preventing you from showering, bathing or dressing yourself? Yes Getting into the shower due to there not being a steel bar drilled into her shower  Are there any barriers preventing you from doing housework or laundry? No  Are there any barriers preventing you from using the toilet?No  Are you currently engaging in any exercise or physical activity?  Yes. Walking around the house    Self-Assessment of Health  What is your perception of your health? Good  Do you sleep more than six hours a night? No  In the past 7 days, how much did pain keep you from doing your normal work? Some  Do you spend quality time with family or friends (virtually or in person)? Yes  Do you usually eat a heart healthy diet that constists of a variety of fruits, vegetables, whole grains and fiber? Yes  Do you eat foods high in fat and/or Fast Food more than three times per week? No    Advance Care Planning  Do you have an Advance Directive, Living Will, Durable Power of , or POLST? No  Provided patient with educational brochure regarding Advance Care Planning.                    Health Maintenance Summary            Overdue - Zoster (Shingles) Vaccines (2 of 3) Overdue since 10/8/2012      08/13/2012  Imm Admin: Zoster Vaccine Live (ZVL) (Zostavax) - HISTORICAL DATA              Ordered -  Colorectal Cancer Screening (Colon Cancer Screening Annual FIT - Yearly) Ordered on 6/25/2024 01/30/2014  OCCULT BLOOD FECES IMMUNOASSAY    02/08/2011  OCCULT BLOOD X3 (STOOL)    11/04/2007  Occult Blood Feces component of OCCULT BLOOD STOOL              Ordered - Bone Density Scan (Every 5 Years) Ordered on 6/25/2024 06/09/2017  DS-BONE DENSITY STUDY (DEXA)              Overdue - Diabetes: Monofilament / LE Exam (Yearly) Overdue since 4/4/2023 04/04/2022  Diabetic Monofilament Lower Extremity Exam    04/04/2022  Smart[employer]: WORKFLOW - DIABETES - DIABETIC FOOT EXAM PERFORMED              Ordered - A1c Screening (Every 6 Months) Ordered on 6/17/2024 01/02/2024  HEMOGLOBIN A1C (Glycohemoglobin GHB Total/A1C with MBG Estimate)    07/21/2023  HEMOGLOBIN A1C    04/26/2022  HEMOGLOBIN A1C    12/30/2020  HEMOGLOBIN A1C    10/14/2019  HEMOGLOBIN A1C    Only the first 5 history entries have been loaded, but more history exists.              Postponed - Diabetes: Retinopathy Screening (Yearly) Postponed until 6/26/2024 06/02/2022  RETINAL SCREENING RESULTS    12/01/2021  Referral for Retinal Screening Exam              Postponed - COVID-19 Vaccine (3 - 2023-24 season) Postponed until 6/25/2025 03/31/2021  Imm Admin: MODERNA SARS-COV-2 VACCINE (12+)    03/03/2021  Imm Admin: MODERNA SARS-COV-2 VACCINE (12+)              Ordered - Fasting Lipid Profile (Yearly) Ordered on 6/17/2024 01/02/2024  Lipid Profile    07/21/2023  Lipid Profile    02/02/2023  Lipid Profile    08/23/2022  Lipid Profile    04/26/2022  Lipid Profile    Only the first 5 history entries have been loaded, but more history exists.              Ordered - Diabetes: Urine Protein Screening (Yearly) Ordered on 6/17/2024 06/13/2024  MICROALBUMIN CREAT RATIO URINE    01/02/2024  MICROALBUMIN CREAT RATIO URINE    04/26/2022  MICROALBUMIN CREAT RATIO URINE    12/30/2020  MICROALBUMIN CREAT RATIO URINE    10/14/2019  MICROALBUMIN  CREAT RATIO URINE    Only the first 5 history entries have been loaded, but more history exists.              Ordered - SERUM CREATININE (Yearly) Ordered on 6/17/2024 06/13/2024  Basic Metabolic Panel    01/02/2024  Comp Metabolic Panel    07/21/2023  Comp Metabolic Panel    02/02/2023  Comp Metabolic Panel    08/29/2022  Comp Metabolic Panel    Only the first 5 history entries have been loaded, but more history exists.              Annual Wellness Visit (Yearly) Next due on 6/25/2025 06/25/2024  Level of Service: [place] ANNUAL WELLNESS VISIT-INCLUDES PPPS SUBSEQUE*              Mammogram (Every 2 Years) Next due on 8/9/2025 08/09/2023  MA-SCREENING MAMMO BILAT W/TOMOSYNTHESIS W/CAD    05/23/2022  MA-SCREENING MAMMO BILAT W/TOMOSYNTHESIS W/CAD    03/15/2021  MA-SCREENING MAMMO BILAT W/TOMOSYNTHESIS W/CAD    04/25/2019  MA-SCREENING MAMMO BILAT W/TOMOSYNTHESIS W/CAD    04/24/2018  MA-MAMMO SCREENING BILAT W/JENNIFER W/CAD    Only the first 5 history entries have been loaded, but more history exists.              IMM DTaP/Tdap/Td Vaccine (3 - Td or Tdap) Next due on 9/18/2028 09/18/2018  Imm Admin: TD Vaccine    04/20/2011  Imm Admin: Tdap Vaccine              Hepatitis C Screening  Tentatively Complete      04/26/2022  Hepatitis C Antibody component of HEP C VIRUS ANTIBODY              Pneumococcal Vaccine: 65+ Years (Series Information) Completed      04/26/2023  Imm Admin: Pneumococcal Conjugate Vaccine (PCV20)    02/04/2017  Imm Admin: Pneumococcal Conjugate Vaccine (Prevnar/PCV-13)    01/13/2014  Imm Admin: Pneumococcal polysaccharide vaccine (PPSV-23)              Influenza Vaccine (Series Information) Completed      09/11/2023  Imm Admin: Influenza (IM) Preservative Free - HISTORICAL DATA    10/12/2022  Imm Admin: Influenza Vaccine Adult HD    11/15/2021  Imm Admin: Influenza Vaccine Adult HD    10/10/2020  Imm Admin: Influenza Vaccine Adult HD    10/10/2019  Imm Admin: Influenza Vaccine Adult HD     "Only the first 5 history entries have been loaded, but more history exists.              Hepatitis A Vaccine (Hep A) (Series Information) Aged Out      No completion history exists for this topic.              Hepatitis B Vaccine (Hep B) (Series Information) Aged Out      No completion history exists for this topic.              HPV Vaccines (Series Information) Aged Out      No completion history exists for this topic.              Polio Vaccine (Inactivated Polio) (Series Information) Aged Out      No completion history exists for this topic.              Meningococcal Immunization (Series Information) Aged Out      No completion history exists for this topic.                    Patient Care Team:  Jose Vela M.D. as PCP - General (Family Medicine)  [facility]  Samantha Rubio (DME Supplier)  Caleb Hawkins M.D. (Interventional Cardiology)  Michael J Bloch, M.D. (Internal Medicine)  STEPHON Muller (Dermatology)  Yevgeniy Kim M.D. (Family Medicine Sleep Medicine)  Sathya Macdonald M.D. (Ophthalmology)      Financial Resource Strain: Low Risk  (6/25/2024)    Overall Financial Resource Strain (CARDIA)     Difficulty of Paying Living Expenses: Not hard at all      Transportation Needs: No Transportation Needs (6/25/2024)    PRAPARE - Transportation     Lack of Transportation (Medical): No     Lack of Transportation (Non-Medical): No      Food Insecurity: No Food Insecurity (6/25/2024)    Hunger Vital Sign     Worried About Running Out of Food in the Last Year: Never true     Ran Out of Food in the Last Year: Never true        Encounter Vitals  Blood Pressure : 118/60  Weight: 98.9 kg (218 lb)  Height: 154.9 cm (5' 1\")  BMI (Calculated): 41.19  Pain Score: No pain     Alert, oriented in no acute distress.  Eye contact is good, speech goal directed, affect calm.    Assessment and Plan. The following treatment and monitoring plan is recommended:  Atrial fibrillation " ([facility]) [I48.91]  Chronic, stable. Pt maintains on amiodarone 100mg daily with warfarin 4mg daily for stroke prophylaxis. Follow up with cardiology per routine as well as coumadin clinic.    Chronic kidney disease (CKD) stage G3b/A1, moderately decreased glomerular filtration rate (GFR) between 30-44 mL/min/1.73 square meter and albuminuria creatinine ratio less than 30 mg/g (HCC)  Chronic, ongoing. GFR last measured 2024 at 30. Continue to encourage adequate hydration and avoidance of nephrotoxins. She does not see a nephrologist. Follow up with PCP per routine.    Latest Reference Range & Units 23 09:43 24 13:16 24 08:53   Bun 8 - 22 mg/dL 49 (H) 26 (H) 34 (H)   Creatinine 0.50 - 1.40 mg/dL 1.65 (H) 1.62 (H) 1.76 (H)   GFR (CKD-EPI) >60 mL/min/1.73 m 2 33 ! 34 ! 30 !   (H): Data is abnormally high  !: Data is abnormal    Diabetic neuropathy (HCC)  Chronic, ongoing. Pt maintains on tramadol 100mg TID to address this which has been helpful. Continue to encourage blood glucose control and regular foot checks. She did follow with a podiatrist years ago. Follow up with PCP at least annually for continued monitoring and management.    Type 2 diabetes mellitus with kidney complication, without long-term current use of insulin ([facility])  Chronic, stable, lifestyle controlled. Associated with CKD. Last A1c 5.4 as of 2024. Last monofilament foot exam: 2022, last urine microalb/creat: 2024, last retinal screenin2022, sees ophtho tomorrow. Continue to advise low carbohydrate diet with regular physical activity. Continue current treatment regime. Follow up with PCP as scheduled for continued monitoring and management.    CAD (coronary artery disease)  Chronic, ongoing. Pt with hx of CABG in . Pt maintains on Praulent 150mg daily and ezetimibe 10mg daily with doxazosin 8mg nightly, torsemide 20mg daily, telmisartan 80mg daily for HTN. Pt denies CP. Follow up with cardiology.    GERD  (GASTROESOPHAGEAL REFLUX DISEASE)  Chronic, stable. Controlled with diet/lifestyle. Follow-up at least annually.    HTN (hypertension)  Chronic, stable. BP today 118/60 . Pt monitors BP at home and reports she generally runs ~140 systolic on her home cuff. Denies dizziness/lightheadedness, chest pain. Continue current treatment regime: doxazosin 8mg nightly, torsemide 20mg daily, telmisartan 80mg. Follow up with cardiology for continued monitoring and management.     Hyperlipidemia  Chronic, stable. Most recent lipid panel from 1/2024 WNL. Continue Praulent 150mg every two weeks and ezetimibe 10mg daily. Recommend Mediterranean diet and regular physical activity. Follow up with PCP at least annually for continued monitoring and management.   Lab Results   Component Value Date/Time    CHOLSTRLTOT 136 01/02/2024 01:16 PM    LDL 53 01/02/2024 01:16 PM    HDL 65 01/02/2024 01:16 PM    TRIGLYCERIDE 91 01/02/2024 01:16 PM       Hypothyroidism  Chronic, stable. Pt maintains on levothyroxine 125mg daily with TSH wnl as of 4/2022. Continue current treatment regime.Follow-up at least annually.     Latest Reference Range & Units 04/26/22 10:44   TSH 0.380 - 5.330 uIU/mL 1.950       Class 3 severe obesity due to excess calories with serious comorbidity and body mass index (BMI) of 40.0 to 44.9 in adult (HCC)  Chronic, ongoing. BMI today 41.19. Assoiated with T2DM, HTN, HLD, HILDA, Gerd. Continue to encourage healthy calorie conscious diet with regular physical activity as tolerated with goal of weight loss. Follow up with PCP at least annually for continued monitoring and management.    Other specified glaucoma  Chronic, ongoing. Pt maintains on timolol eye drops daily. Continue current treatment regime. Denies acute vision changes. Follow up with ophtho at least annually for continued monitoring and management.    Secondary hypercoagulable state (HCC)  Chronic, ongoing. Secondary to hx of atrial fibrillation. Pt maintains on  Warfarin 4mg daily. Follow up with anticoagulation clinic per routine    HILDA (obstructive sleep apnea)  Chronic, stable. Pt wears CPAP nightly. She does not require supplemental oxygen. Follow up with sleep medicine per routine.    Pulmonary hypertension (HCC)  Chronic, ongoing. Last echocardiogram from 2022 noted to have RSVP of 50mmHg. Pt does continue to complain of dyspnea on exertion. She reports she has been diagnosed with congestive heart failure though I do not see this in her cardiology note.She is pursing a repeat echo in July. Follow up with cardiology per routine.    Services suggested: Referral to [facility] Services  Health Care Screening: Age-appropriate preventive services recommended by USPTF and ACIP covered by Medicare were discussed today. Services ordered if indicated and agreed upon by the patient.  Referrals offered: Community-based lifestyle interventions to reduce health risks and promote self-management and wellness, fall prevention, nutrition, physical activity, tobacco-use cessation, weight loss, and mental health services as per orders if indicated.    Discussion today about general wellness and lifestyle habits:    Prevent falls and reduce trip hazards; Cautioned about securing or removing rugs.  Have a working fire alarm and carbon monoxide detector.  Engage in regular physical activity and social activities.    Follow-up: Return for follow up visit with PCP as previously scheduled.

## 2024-06-25 NOTE — ASSESSMENT & PLAN NOTE
Chronic, stable. Most recent lipid panel from 1/2024 WNL. Continue Praulent 150mg every two weeks and ezetimibe 10mg daily. Recommend Mediterranean diet and regular physical activity. Follow up with PCP at least annually for continued monitoring and management.   Lab Results   Component Value Date/Time    CHOLSTRLTOT 136 01/02/2024 01:16 PM    LDL 53 01/02/2024 01:16 PM    HDL 65 01/02/2024 01:16 PM    TRIGLYCERIDE 91 01/02/2024 01:16 PM

## 2024-06-25 NOTE — ASSESSMENT & PLAN NOTE
Chronic, ongoing. Secondary to hx of atrial fibrillation. Pt maintains on Warfarin 4mg daily. Follow up with anticoagulation clinic per routine

## 2024-06-25 NOTE — ASSESSMENT & PLAN NOTE
Chronic, stable. BP today 118/60 . Pt monitors BP at home and reports she generally runs ~140 systolic on her home cuff. Denies dizziness/lightheadedness, chest pain. Continue current treatment regime: doxazosin 8mg nightly, torsemide 20mg daily, telmisartan 80mg. Follow up with cardiology for continued monitoring and management.

## 2024-06-27 ENCOUNTER — TELEPHONE (OUTPATIENT)
Dept: VASCULAR LAB | Facility: MEDICAL CENTER | Age: 74
End: 2024-06-27
Payer: MEDICARE

## 2024-06-27 ENCOUNTER — ANTICOAGULATION MONITORING (OUTPATIENT)
Dept: VASCULAR LAB | Facility: MEDICAL CENTER | Age: 74
End: 2024-06-27
Payer: MEDICARE

## 2024-06-27 DIAGNOSIS — Z95.2 HX OF MECHANICAL AORTIC VALVE REPLACEMENT: ICD-10-CM

## 2024-06-27 DIAGNOSIS — Z79.01 CHRONIC ANTICOAGULATION: ICD-10-CM

## 2024-06-27 DIAGNOSIS — I48.91 ATRIAL FIBRILLATION, UNSPECIFIED TYPE (HCC): ICD-10-CM

## 2024-06-27 LAB — INR PPP: 2.7 (ref 2–3.5)

## 2024-06-27 NOTE — PROGRESS NOTES
Anticoagulation Summary  As of 2024      INR goal:  2.5-3.5   TTR:  68.4% (7.3 y)   INR used for dosin.70 (2024)   Warfarin maintenance plan:  6 mg (4 mg x 1.5) every Mon, Wed, Fri; 4 mg (4 mg x 1) all other days   Weekly warfarin total:  34 mg   Plan last modified:  Seamus Maier PharmD (2024)   Next INR check:  2024   Target end date:  Indefinite    Indications    Chronic anticoagulation [Z79.01]  Hx of mechanical aortic valve replacement [V43.3] [Z95.2]  TIA (transient ischemic attack) (Resolved) [G45.9]  Atrial fibrillation (HCC) [I48.91] [I48.91]                 Anticoagulation Episode Summary       INR check location:      Preferred lab:      Send INR reminders to:      Comments:  [facility] home meter          Anticoagulation Care Providers       Provider Role Specialty Phone number    Michael J Bloch, M.D. Referring Cardiovascular Disease (Cardiology) [phone]    Yinka Church, PharmD Responsible Pharmacy             Called and left  for patient . Requested patient to contact the clinic for any s/sx of unusual bleeding, bruising, clotting or any changes to diet or medications.    INR therapeutic following regimen increase    Unless patient reports any changes that would warrant an adjustment to the plan:  Warfarin Plan: Continue regimen as listed above.    Next INR in 2 week(s).    Seamus Maier PharmD, BCACP

## 2024-06-27 NOTE — TELEPHONE ENCOUNTER
PT Name: Yenifer Andersonbaker    PT Reports INR Value: 2.7    Date of INR Results: 06/27/24    Concerns/Questions Reported: or N/A: N/A    Callback Number: [phone]    Thank you,   Marcia JONES

## 2024-07-01 PROCEDURE — RXMED WILLOW AMBULATORY MEDICATION CHARGE: Performed by: FAMILY MEDICINE

## 2024-07-03 ENCOUNTER — PATIENT OUTREACH (OUTPATIENT)
Dept: HEALTH INFORMATION MANAGEMENT | Facility: OTHER | Age: 74
End: 2024-07-03
Payer: MEDICARE

## 2024-07-03 PROCEDURE — RXMED WILLOW AMBULATORY MEDICATION CHARGE: Performed by: NURSE PRACTITIONER

## 2024-07-05 ENCOUNTER — PHARMACY VISIT (OUTPATIENT)
Dept: PHARMACY | Facility: MEDICAL CENTER | Age: 74
End: 2024-07-05
Payer: COMMERCIAL

## 2024-07-10 SDOH — ECONOMIC STABILITY: FOOD INSECURITY: WITHIN THE PAST 12 MONTHS, YOU WORRIED THAT YOUR FOOD WOULD RUN OUT BEFORE YOU GOT MONEY TO BUY MORE.: NEVER TRUE

## 2024-07-10 SDOH — ECONOMIC STABILITY: HOUSING INSECURITY
IN THE LAST 12 MONTHS, WAS THERE A TIME WHEN YOU DID NOT HAVE A STEADY PLACE TO SLEEP OR SLEPT IN A SHELTER (INCLUDING NOW)?: PATIENT DECLINED

## 2024-07-10 SDOH — HEALTH STABILITY: PHYSICAL HEALTH: ON AVERAGE, HOW MANY MINUTES DO YOU ENGAGE IN EXERCISE AT THIS LEVEL?: PATIENT DECLINED

## 2024-07-10 SDOH — ECONOMIC STABILITY: INCOME INSECURITY: HOW HARD IS IT FOR YOU TO PAY FOR THE VERY BASICS LIKE FOOD, HOUSING, MEDICAL CARE, AND HEATING?: SOMEWHAT HARD

## 2024-07-10 SDOH — ECONOMIC STABILITY: FOOD INSECURITY: WITHIN THE PAST 12 MONTHS, THE FOOD YOU BOUGHT JUST DIDN'T LAST AND YOU DIDN'T HAVE MONEY TO GET MORE.: NEVER TRUE

## 2024-07-10 SDOH — HEALTH STABILITY: MENTAL HEALTH
STRESS IS WHEN SOMEONE FEELS TENSE, NERVOUS, ANXIOUS, OR CAN'T SLEEP AT NIGHT BECAUSE THEIR MIND IS TROUBLED. HOW STRESSED ARE YOU?: NOT AT ALL

## 2024-07-10 SDOH — ECONOMIC STABILITY: HOUSING INSECURITY: IN THE LAST 12 MONTHS, HOW MANY PLACES HAVE YOU LIVED?: 1

## 2024-07-10 SDOH — ECONOMIC STABILITY: INCOME INSECURITY: IN THE LAST 12 MONTHS, WAS THERE A TIME WHEN YOU WERE NOT ABLE TO PAY THE MORTGAGE OR RENT ON TIME?: PATIENT DECLINED

## 2024-07-10 ASSESSMENT — SOCIAL DETERMINANTS OF HEALTH (SDOH)
IN A TYPICAL WEEK, HOW MANY TIMES DO YOU TALK ON THE PHONE WITH FAMILY, FRIENDS, OR NEIGHBORS?: THREE TIMES A WEEK
HOW MANY DRINKS CONTAINING ALCOHOL DO YOU HAVE ON A TYPICAL DAY WHEN YOU ARE DRINKING: PATIENT DOES NOT DRINK
HOW OFTEN DO YOU HAVE SIX OR MORE DRINKS ON ONE OCCASION: NEVER
HOW OFTEN DO YOU ATTEND CHURCH OR RELIGIOUS SERVICES?: PATIENT DECLINED
HOW OFTEN DO YOU ATTENT MEETINGS OF THE CLUB OR ORGANIZATION YOU BELONG TO?: PATIENT DECLINED
HOW OFTEN DO YOU GET TOGETHER WITH FRIENDS OR RELATIVES?: PATIENT DECLINED
HOW OFTEN DO YOU HAVE A DRINK CONTAINING ALCOHOL: NEVER
IN A TYPICAL WEEK, HOW MANY TIMES DO YOU TALK ON THE PHONE WITH FAMILY, FRIENDS, OR NEIGHBORS?: THREE TIMES A WEEK
HOW HARD IS IT FOR YOU TO PAY FOR THE VERY BASICS LIKE FOOD, HOUSING, MEDICAL CARE, AND HEATING?: SOMEWHAT HARD
DO YOU BELONG TO ANY CLUBS OR ORGANIZATIONS SUCH AS CHURCH GROUPS UNIONS, FRATERNAL OR ATHLETIC GROUPS, OR SCHOOL GROUPS?: PATIENT DECLINED
DO YOU BELONG TO ANY CLUBS OR ORGANIZATIONS SUCH AS CHURCH GROUPS UNIONS, FRATERNAL OR ATHLETIC GROUPS, OR SCHOOL GROUPS?: PATIENT DECLINED
IN THE PAST 12 MONTHS, HAS THE ELECTRIC, GAS, OIL, OR WATER COMPANY THREATENED TO SHUT OFF SERVICE IN YOUR HOME?: NO
WITHIN THE PAST 12 MONTHS, YOU WORRIED THAT YOUR FOOD WOULD RUN OUT BEFORE YOU GOT THE MONEY TO BUY MORE: NEVER TRUE
HOW OFTEN DO YOU GET TOGETHER WITH FRIENDS OR RELATIVES?: PATIENT DECLINED
HOW OFTEN DO YOU ATTEND CHURCH OR RELIGIOUS SERVICES?: PATIENT DECLINED
HOW OFTEN DO YOU ATTENT MEETINGS OF THE CLUB OR ORGANIZATION YOU BELONG TO?: PATIENT DECLINED

## 2024-07-10 ASSESSMENT — LIFESTYLE VARIABLES
HOW OFTEN DO YOU HAVE A DRINK CONTAINING ALCOHOL: NEVER
HOW MANY STANDARD DRINKS CONTAINING ALCOHOL DO YOU HAVE ON A TYPICAL DAY: PATIENT DOES NOT DRINK
HOW OFTEN DO YOU HAVE SIX OR MORE DRINKS ON ONE OCCASION: NEVER
AUDIT-C TOTAL SCORE: 0
SKIP TO QUESTIONS 9-10: 1

## 2024-07-11 ENCOUNTER — APPOINTMENT (OUTPATIENT)
Dept: MEDICAL GROUP | Facility: LAB | Age: 74
End: 2024-07-11
Payer: MEDICARE

## 2024-07-11 VITALS
WEIGHT: 210 LBS | DIASTOLIC BLOOD PRESSURE: 64 MMHG | HEART RATE: 76 BPM | OXYGEN SATURATION: 95 % | HEIGHT: 61 IN | BODY MASS INDEX: 39.65 KG/M2 | SYSTOLIC BLOOD PRESSURE: 106 MMHG | TEMPERATURE: 97.2 F | RESPIRATION RATE: 16 BRPM

## 2024-07-11 DIAGNOSIS — E78.5 HYPERLIPIDEMIA, UNSPECIFIED HYPERLIPIDEMIA TYPE: ICD-10-CM

## 2024-07-11 DIAGNOSIS — Z00.00 ANNUAL PHYSICAL EXAM: ICD-10-CM

## 2024-07-11 DIAGNOSIS — E11.42 DIABETIC POLYNEUROPATHY ASSOCIATED WITH TYPE 2 DIABETES MELLITUS (HCC): ICD-10-CM

## 2024-07-11 DIAGNOSIS — E03.9 HYPOTHYROIDISM, UNSPECIFIED TYPE: ICD-10-CM

## 2024-07-11 DIAGNOSIS — R60.0 LOWER LEG EDEMA: ICD-10-CM

## 2024-07-11 PROCEDURE — 3074F SYST BP LT 130 MM HG: CPT | Performed by: FAMILY MEDICINE

## 2024-07-11 PROCEDURE — 3078F DIAST BP <80 MM HG: CPT | Performed by: FAMILY MEDICINE

## 2024-07-11 PROCEDURE — 99397 PER PM REEVAL EST PAT 65+ YR: CPT | Performed by: FAMILY MEDICINE

## 2024-07-11 ASSESSMENT — FIBROSIS 4 INDEX: FIB4 SCORE: 2.26

## 2024-07-15 ENCOUNTER — TELEPHONE (OUTPATIENT)
Dept: CARDIOLOGY | Facility: MEDICAL CENTER | Age: 74
End: 2024-07-15
Payer: MEDICARE

## 2024-07-18 ENCOUNTER — PATIENT OUTREACH (OUTPATIENT)
Dept: HEALTH INFORMATION MANAGEMENT | Facility: OTHER | Age: 74
End: 2024-07-18
Payer: MEDICARE

## 2024-07-18 DIAGNOSIS — E11.22 TYPE 2 DIABETES MELLITUS WITH STAGE 3B CHRONIC KIDNEY DISEASE, WITHOUT LONG-TERM CURRENT USE OF INSULIN (HCC): ICD-10-CM

## 2024-07-18 DIAGNOSIS — N18.32 CHRONIC KIDNEY DISEASE (CKD) STAGE G3B/A1, MODERATELY DECREASED GLOMERULAR FILTRATION RATE (GFR) BETWEEN 30-44 ML/MIN/1.73 SQUARE METER AND ALBUMINURIA CREATININE RATIO LESS THAN 30 MG/G: ICD-10-CM

## 2024-07-18 DIAGNOSIS — E78.5 HYPERLIPIDEMIA, UNSPECIFIED HYPERLIPIDEMIA TYPE: ICD-10-CM

## 2024-07-18 DIAGNOSIS — I10 PRIMARY HYPERTENSION: ICD-10-CM

## 2024-07-18 DIAGNOSIS — N18.32 TYPE 2 DIABETES MELLITUS WITH STAGE 3B CHRONIC KIDNEY DISEASE, WITHOUT LONG-TERM CURRENT USE OF INSULIN (HCC): ICD-10-CM

## 2024-07-18 DIAGNOSIS — I25.810 CORONARY ARTERY DISEASE INVOLVING CORONARY BYPASS GRAFT OF NATIVE HEART WITHOUT ANGINA PECTORIS: ICD-10-CM

## 2024-07-18 PROCEDURE — 99490 CHRNC CARE MGMT STAFF 1ST 20: CPT | Performed by: FAMILY MEDICINE

## 2024-07-18 NOTE — PROGRESS NOTES
Called pt for initial outreach and interest as CHW has talked with her about the CCM program and had showed interest. Pt is interested and made an appointment for Aug 1 for enrollment

## 2024-07-19 PROCEDURE — RXMED WILLOW AMBULATORY MEDICATION CHARGE: Performed by: DENTIST

## 2024-07-22 ENCOUNTER — PHARMACY VISIT (OUTPATIENT)
Dept: PHARMACY | Facility: MEDICAL CENTER | Age: 74
End: 2024-07-22
Payer: COMMERCIAL

## 2024-07-23 DIAGNOSIS — Z79.01 CHRONIC ANTICOAGULATION: ICD-10-CM

## 2024-07-24 LAB — INR PPP: 3.1 (ref 2–3.5)

## 2024-07-25 ENCOUNTER — TELEPHONE (OUTPATIENT)
Dept: VASCULAR LAB | Facility: MEDICAL CENTER | Age: 74
End: 2024-07-25
Payer: MEDICARE

## 2024-07-25 ENCOUNTER — ANTICOAGULATION MONITORING (OUTPATIENT)
Dept: VASCULAR LAB | Facility: MEDICAL CENTER | Age: 74
End: 2024-07-25
Payer: MEDICARE

## 2024-07-25 ENCOUNTER — PATIENT MESSAGE (OUTPATIENT)
Dept: CARDIOLOGY | Facility: MEDICAL CENTER | Age: 74
End: 2024-07-25
Payer: MEDICARE

## 2024-07-25 DIAGNOSIS — I48.91 ATRIAL FIBRILLATION, UNSPECIFIED TYPE (HCC): ICD-10-CM

## 2024-07-25 DIAGNOSIS — Z95.2 HX OF MECHANICAL AORTIC VALVE REPLACEMENT: ICD-10-CM

## 2024-07-25 DIAGNOSIS — Z79.01 CHRONIC ANTICOAGULATION: ICD-10-CM

## 2024-08-01 SDOH — HEALTH STABILITY: MENTAL HEALTH: HOW OFTEN DO YOU HAVE 6 OR MORE DRINKS ON ONE OCCASION?: NEVER

## 2024-08-01 SDOH — SOCIAL STABILITY: SOCIAL INSECURITY: WITHIN THE LAST YEAR, HAVE YOU BEEN AFRAID OF YOUR PARTNER OR EX-PARTNER?: NO

## 2024-08-01 SDOH — HEALTH STABILITY: PHYSICAL HEALTH: ON AVERAGE, HOW MANY DAYS PER WEEK DO YOU ENGAGE IN MODERATE TO STRENUOUS EXERCISE (LIKE A BRISK WALK)?: 0 DAYS

## 2024-08-01 SDOH — SOCIAL STABILITY: SOCIAL INSECURITY
WITHIN THE LAST YEAR, HAVE YOU BEEN KICKED, HIT, SLAPPED, OR OTHERWISE PHYSICALLY HURT BY YOUR PARTNER OR EX-PARTNER?: NO

## 2024-08-01 SDOH — ECONOMIC STABILITY: HOUSING INSECURITY: IN THE LAST 12 MONTHS, HOW MANY PLACES HAVE YOU LIVED?: 1

## 2024-08-01 SDOH — SOCIAL STABILITY: SOCIAL NETWORK
DO YOU BELONG TO ANY CLUBS OR ORGANIZATIONS SUCH AS CHURCH GROUPS UNIONS, FRATERNAL OR ATHLETIC GROUPS, OR SCHOOL GROUPS?: NO

## 2024-08-01 SDOH — SOCIAL STABILITY: SOCIAL NETWORK: HOW OFTEN DO YOU ATTENT MEETINGS OF THE CLUB OR ORGANIZATION YOU BELONG TO?: NEVER

## 2024-08-01 SDOH — HEALTH STABILITY: PHYSICAL HEALTH: ON AVERAGE, HOW MANY MINUTES DO YOU ENGAGE IN EXERCISE AT THIS LEVEL?: 0 MIN

## 2024-08-01 SDOH — ECONOMIC STABILITY: INCOME INSECURITY: IN THE PAST 12 MONTHS, HAS THE ELECTRIC, GAS, OIL, OR WATER COMPANY THREATENED TO SHUT OFF SERVICE IN YOUR HOME?: NO

## 2024-08-01 SDOH — SOCIAL STABILITY: SOCIAL INSECURITY: WITHIN THE LAST YEAR, HAVE YOU BEEN HUMILIATED OR EMOTIONALLY ABUSED IN OTHER WAYS BY YOUR PARTNER OR EX-PARTNER?: NO

## 2024-08-01 SDOH — HEALTH STABILITY: MENTAL HEALTH: HOW OFTEN DO YOU HAVE A DRINK CONTAINING ALCOHOL?: NEVER

## 2024-08-01 SDOH — SOCIAL STABILITY: SOCIAL NETWORK: HOW OFTEN DO YOU ATTEND CHURCH OR RELIGIOUS SERVICES?: NEVER

## 2024-08-01 SDOH — ECONOMIC STABILITY: INCOME INSECURITY: IN THE LAST 12 MONTHS, WAS THERE A TIME WHEN YOU WERE NOT ABLE TO PAY THE MORTGAGE OR RENT ON TIME?: NO

## 2024-08-01 SDOH — SOCIAL STABILITY: SOCIAL NETWORK: IN A TYPICAL WEEK, HOW MANY TIMES DO YOU TALK ON THE PHONE WITH FAMILY, FRIENDS, OR NEIGHBORS?: THREE TIMES A WEEK

## 2024-08-01 SDOH — ECONOMIC STABILITY: FOOD INSECURITY: WITHIN THE PAST 12 MONTHS, YOU WORRIED THAT YOUR FOOD WOULD RUN OUT BEFORE YOU GOT MONEY TO BUY MORE.: NEVER TRUE

## 2024-08-01 SDOH — HEALTH STABILITY: MENTAL HEALTH
HOW OFTEN DO YOU NEED TO HAVE SOMEONE HELP YOU WHEN YOU READ INSTRUCTIONS, PAMPHLETS, OR OTHER WRITTEN MATERIAL FROM YOUR DOCTOR OR PHARMACY?: SOMETIMES

## 2024-08-01 SDOH — SOCIAL STABILITY: SOCIAL INSECURITY
WITHIN THE LAST YEAR, HAVE TO BEEN RAPED OR FORCED TO HAVE ANY KIND OF SEXUAL ACTIVITY BY YOUR PARTNER OR EX-PARTNER?: NO

## 2024-08-01 SDOH — ECONOMIC STABILITY: INCOME INSECURITY: HOW HARD IS IT FOR YOU TO PAY FOR THE VERY BASICS LIKE FOOD, HOUSING, MEDICAL CARE, AND HEATING?: NOT HARD AT ALL

## 2024-08-01 SDOH — ECONOMIC STABILITY: FOOD INSECURITY: WITHIN THE PAST 12 MONTHS, THE FOOD YOU BOUGHT JUST DIDN'T LAST AND YOU DIDN'T HAVE MONEY TO GET MORE.: NEVER TRUE

## 2024-08-01 SDOH — HEALTH STABILITY: MENTAL HEALTH: HOW MANY STANDARD DRINKS CONTAINING ALCOHOL DO YOU HAVE ON A TYPICAL DAY?: PATIENT DOES NOT DRINK

## 2024-08-01 SDOH — SOCIAL STABILITY: SOCIAL NETWORK: ARE YOU MARRIED, WIDOWED, DIVORCED, SEPARATED, NEVER MARRIED, OR LIVING WITH A PARTNER?: DIVORCED

## 2024-08-01 SDOH — SOCIAL STABILITY: SOCIAL NETWORK: HOW OFTEN DO YOU GET TOGETHER WITH FRIENDS OR RELATIVES?: NEVER

## 2024-08-01 ASSESSMENT — PATIENT HEALTH QUESTIONNAIRE - PHQ9
5. POOR APPETITE OR OVEREATING: 0 - NOT AT ALL
CLINICAL INTERPRETATION OF PHQ2 SCORE: 1
SUM OF ALL RESPONSES TO PHQ QUESTIONS 1-9: 5

## 2024-08-01 ASSESSMENT — LIFESTYLE VARIABLES
AUDIT-C TOTAL SCORE: 0
SKIP TO QUESTIONS 9-10: 1

## 2024-08-01 NOTE — PROGRESS NOTES
INITIAL CARE MANAGEMENT CARE PLAN/ASSESSMENT     Reached out to pt to enroll her into the CCM program as per her request. Pt is a 72 yo single female that has multiple chronic conditions which include but are not limited to CAD, hypolipidemia, HTN, DM2, CKD 3, and multiple heart conditions. Pt lives with her daughter Fatimah, where she has lived for the past 2 years, and there are 2 stairs into the home that the pt has some difficulty navigating without the use of grab bars, and has recently contacted [facility], filled out the application for an assessment of the home for her safety and ability to navigate. Pt has a very small [place] of support in her daughter. Pt is  and does not have a Buddhism affiliation but states that she is a devout [employer]regation. Pt states that she is somewhat confident in managing her health at this time. Pt uses [employer] for her DME. Pt uses a mobile telephone as well as email and internet. She is an active  with a car of her own. Pt does have some visual impairment but only with her near vision. Pt states that she follows a sodium restricted diet and follows it quite well. She has no difficulty in following her medication profile and is not currently experiencing any adverse side effects of her medications. Pt is mostly independent in her ADL's and IADL's but needs assistance with climbing stairs and bathing. Pt has had increasing mobility issues with her left knee and is now afraid to shower by herself as she feels it is not safe, therefore the assessment of her home by [facility]. Pt has chronic pain and has a pain contract since 2008. Pt sleeps in a chair at night and has done for the past 5 years. Pt is concerned with her memory lately and finding it difficult to recall words. Pt scored 9 on her fall risk and is worried about falling with her continued knee problems as well as her neuropathy. Talked with pt about falls and to become aware of potential risks for falls. This  will also be assessed with [facility]. Pt states that she is doing ok financially as her daughter does not make her pay rent at this time. Pt is not very mobile where she currently stays as she feels that she is an imposition on her daughter. There is also gravel outside which makes pt nervous to go outside safely, so she stays inside, in her room for most of the time. Pt scores a 1 on her PHQ2 and a 5 on the PHQ9. Pt states that she could be depressed and hopeless all of the time with what is going on in her life and the way that it has turned out, but claims that she talks herself down from this way of thinking all of the time. Pt medication reconciliation was done during this visit and pt is taking all of her medication appropriately. We did edit one of her medication for her INR as it was changed to taking 1.5 pill MWF according to her INR changes. Pt was willing to make a few goals to begin, one of which is a goal that she made. She wants to organize all of her pictures. This goal she feels will take her about 6 months. We talked about what that looks like from week to week and decided that she will spend 2 hrs each week, on Fridays, working on cleaning up all of the pictures that she has online and eventually she wants to be able to give each of her children a thumb drive with all of their pictures on them. A plan that we made for part of her disease processes, is stress reduction, a daily mental health practice. The beginning of this will look like journaling, hopefully daily, but at the least 3 X week. Pt already has a brand new journal and pen with which to begin. This was something that resonated with her and a place to start. We will add more goals in time as we go along.      Medication Self-Management Goals:     Reviewed medications listed below with patient.      Current Outpatient Medications:     amoxicillin (AMOXIL) 500 MG Cap, Take 4 Capsules by mouth 1 hour before dental appointment., Disp: 16  Capsule, Rfl: 1    torsemide (DEMADEX) 20 MG Tab, Take 1 Tablet by mouth every day. To lower blood pressure and swelling, Disp: 100 Tablet, Rfl: 3    traMADol (ULTRAM) 50 MG Tab, Take 2 tablets 3 times a day by oral route as needed for 30 days, for severe pain., Disp: 180 Tablet, Rfl: 2    doxazosin (CARDURA) 8 MG tablet, Take 1 tablet by mouth at bedtime. To lower blood pressure, Disp: 100 Tablet, Rfl: 3    amiodarone (CORDARONE) 200 MG Tab, Take 0.5 Tablets by mouth every day., Disp: 100 Tablet, Rfl: 3    colchicine (COLCRYS) 0.6 MG Tab, Take 2 tablets by mouth once, then 1 tablet 1 hours later once. Max: 1.8 mg total dose per treatment course Info: do not repeat x3 days, Disp: 3 Tablet, Rfl: 0    diclofenac sodium (VOLTAREN) 1 % Gel, APPLY 2 GRAM TO THE AFFECTED AREA(S) BY TOPICAL ROUTE 4 TIMES PER DAY, Disp: 300 g, Rfl: 6    traMADol (ULTRAM) 50 MG Tab, Take 2 tablets 3 times a day by oral route as needed for 30 days. DNF 2/17/24, Disp: 180 Tablet, Rfl: 2    triamcinolone acetonide (KENALOG) 0.1 % Ointment, APPLY TO AFFECTED AREA TWICE A DAY FOR 2-3 WEEKS, THEN CAN USE 2 WEEKS ON, 2 WEEKS OFF AS NEEDED, Disp: 30 g, Rfl: 3    levothyroxine (SYNTHROID) 125 MCG Tab, Take 1 Tablet by mouth every morning on an empty stomach., Disp: 100 Tablet, Rfl: 3    telmisartan (MICARDIS) 80 MG Tab, Take 1 Tablet by mouth every day. Take At night, Disp: 100 Tablet, Rfl: 3    warfarin (COUMADIN) 4 MG Tab, TAKE ONE TABLET BY MOUTH DAILY OR AS DIRECTED BY ANTICOAGULATION CLINIC (Patient taking differently: TAKE 1 1/2 TABLET BY MOUTH MWF AS DIRECTED BY ANTICOAGULATION CLINIC), Disp: 100 Tablet, Rfl: 1    ezetimibe (ZETIA) 10 MG Tab, TAKE 1 TABLET BY MOUTH EVERY DAY, Disp: 90 Tablet, Rfl: 3    Alirocumab (PRALUENT) 150 MG/ML Solution Auto-injector, Inject 150 mg under the skin every 14 days., Disp: 6 mL, Rfl: 3    timolol (TIMOPTIC) 0.5 % Solution, 1 DROP INTO BOTH EYES TWICE A DAY, Disp: , Rfl:     vitamin D (CHOLECALCIFEROL) 1000  UNIT TABS, Take 1,000 Units by mouth every day.  , Disp: , Rfl:     docosahexanoic acid (OMEGA 3 FA) 1000 MG CAPS, Take 1,000 mg by mouth 2 Times a Day., Disp: , Rfl:          Goal:  Continue with medication profile being aware of changes in any s/sx and report       Physical/Functional/Environmental Status:     Activities of Daily Living:  Bathing: needs assistance   Dressing: independent  Grooming: independent  Mouth Care: independent  Toileting: independent  Climbing a Flight of Stairs: needs assistance    Independent Activities of Daily Living:  Shopping: independent  Cooking: independent  Managing Medications: independent  Using the phone and looking up numbers: independent  Driving or using public transportation: independent  Managing Finances: independent        8/1/2024    10:03 AM 8/1/2024    10:31 AM   STEADI Fall Risk   STEADI Risk for Falling Score  9   One or more falls in the last year No No   Advised to use a cane or walker to get around safely  Yes   Feels unsteady when walking  No   Steadies self on furniture while walking at home  Yes   Worried about falling  Yes   Needs to push with hands when rising from a chair  Yes   Has trouble stepping up onto a curb / using stairs  Yes   Often has to rush to the toilet  No   Has lost some feeling in feet  Yes   Takes medicine that makes him/her feel lightheaded or more tired than usual  Yes   Takes medicine to sleep or improve mood  No   Often feels sad or depressed  Yes         Goal:  Be aware of things in and around home that may be potential risks and try to minimize    Social Determinants of Health       Financial Status:      Financial Resource Strain: Low Risk  (8/1/2024)    Overall Financial Resource Strain (CARDIA)     Difficulty of Paying Living Expenses: Not hard at all   Recent Concern: Financial Resource Strain - Medium Risk (7/10/2024)    Overall Financial Resource Strain (CARDIA)     Difficulty of Paying Living Expenses: Somewhat hard          Referred to CHW/SW:  NA       Transportation Status:      Transportation Needs: No Transportation Needs (8/1/2024)    PRAPARE - Transportation     Lack of Transportation (Medical): No     Lack of Transportation (Non-Medical): No        Referred to CHW/SW:  NA      Food Insecurity:      Food Insecurity: No Food Insecurity (8/1/2024)    Hunger Vital Sign     Worried About Running Out of Food in the Last Year: Never true     Ran Out of Food in the Last Year: Never true        Referred to CHW/SW:  NA       Housing Status:     Housing Stability: Low Risk  (8/1/2024)    Housing Stability Vital Sign     Unable to Pay for Housing in the Last Year: No     Number of Places Lived in the Last Year: 1     Unstable Housing in the Last Year: No        Referred to CHW/SW:  NA      Social Connections:     Social Connections: Socially Isolated (8/1/2024)    Social Connection and Isolation Panel [NHANES]     Frequency of Communication with Friends and Family: Three times a week     Frequency of Social Gatherings with Friends and Family: Never     Attends Gnosticism Services: Never     Active Member of Clubs or Organizations: No     Attends Club or Organization Meetings: Never     Marital Status:         Referred to CHW/SW:  Yes      Mental/Behavioral/Psychosocial Status:        4/26/2023    12:40 PM 6/25/2024     7:40 AM 7/18/2024    10:20 AM   Depression Screen (PHQ-2/PHQ-9)   PHQ-2 Total Score 0 0 1   PHQ-9 Total Score   5       Interpretation of PHQ-9 Total Score   Score Severity   1-4 No Depression   5-9 Mild Depression   10-14 Moderate Depression   15-19 Moderately Severe Depression   20-27 Severe Depression       Goal:  Pt is at risk for depression, pay attention to thought and emotions which come into body and mind, recognize the s/sx of each and seek help when appropriate    Review of Benefits    Do you understand your benefits?  Yes    Phone Number and Website provided for questions:    [facility] [employer] [facility]:   [phone]   [email or web address]      Chronic Care Management Care Plan         Care Plans                   Discussion of Self Management of Care:   Discussed what goals pt wanted to work on as well as common goals for her chronic conditions. Pt wanted to set a couple of goals that she could work on and stated that this is what she needed right now. Pt was willing to make a few goals to begin, one of which is a goal that she made. She wants to organize all of her pictures. This goal she feels will take her about 6 months. We talked about what that looks like from week to week and decided that she will spend 2 hrs each week, on Fridays, working on cleaning up all of the pictures that she has online and eventually she wants to be able to give each of her children a thumb drive with all of their pictures on them. A plan that we made for part of her disease processes, is stress reduction, a daily mental health practice. The beginning of this will look like journaling, hopefully daily, but at the least 3 X week. Pt already has a brand new journal and pen with which to begin. This was something that resonated with her and a place to start. We will add more goals in time as we go along.    Goals: Goal:  Pt organization for family pictures  Barriers: health, finances, motivation  Interventions: This goal she feels will take her about 6 months. We talked about what that looks like from week to week and decided that she will spend 2 hrs each week, on Fridays, working on cleaning up all of the pictures that she has online and eventually she wants to be able to give each of her children a thumb drive with all of their pictures on them    Start Date: 08/01/24  End Date:      Goal:  Stress reduction  Barriers: stress, family/dtr, motivation, depression  Interventions: A plan that we made for part of her disease processes, is stress reduction, a daily mental health practice. The beginning of this will look like  journaling, hopefully daily, but at the least 3 X week. Pt already has a brand new journal and pen with which to begin. This was something that resonated with her and a place to start.    Start Date: 08/01/24  End Date:       Next Scheduled patient outreach: one month

## 2024-08-05 PROCEDURE — RXMED WILLOW AMBULATORY MEDICATION CHARGE: Performed by: NURSE PRACTITIONER

## 2024-08-05 PROCEDURE — RXMED WILLOW AMBULATORY MEDICATION CHARGE: Performed by: FAMILY MEDICINE

## 2024-08-05 PROCEDURE — RXMED WILLOW AMBULATORY MEDICATION CHARGE

## 2024-08-07 ENCOUNTER — PHARMACY VISIT (OUTPATIENT)
Dept: PHARMACY | Facility: MEDICAL CENTER | Age: 74
End: 2024-08-07
Payer: COMMERCIAL

## 2024-08-09 LAB — INR PPP: 2.9 (ref 2–3.5)

## 2024-08-12 ENCOUNTER — ANTICOAGULATION MONITORING (OUTPATIENT)
Dept: VASCULAR LAB | Facility: MEDICAL CENTER | Age: 74
End: 2024-08-12
Payer: MEDICARE

## 2024-08-12 DIAGNOSIS — Z95.2 HX OF MECHANICAL AORTIC VALVE REPLACEMENT: ICD-10-CM

## 2024-08-12 DIAGNOSIS — Z79.01 CHRONIC ANTICOAGULATION: ICD-10-CM

## 2024-08-12 DIAGNOSIS — I48.91 ATRIAL FIBRILLATION, UNSPECIFIED TYPE (HCC): ICD-10-CM

## 2024-08-12 NOTE — PROGRESS NOTES
Anticoagulation Summary  As of 2024      INR goal:  2.5-3.5   TTR:  68.9% (7.5 y)   INR used for dosin.90 (2024)   Warfarin maintenance plan:  6 mg (4 mg x 1.5) every Mon, Wed, Fri; 4 mg (4 mg x 1) all other days   Weekly warfarin total:  34 mg   Plan last modified:  Seamus Maier PharmD (2024)   Next INR check:  2024   Target end date:  Indefinite    Indications    Chronic anticoagulation [Z79.01]  Hx of mechanical aortic valve replacement [V43.3] [Z95.2]  TIA (transient ischemic attack) (Resolved) [G45.9]  Atrial fibrillation (HCC) [I48.91] [I48.91]                 Anticoagulation Episode Summary       INR check location:      Preferred lab:      Send INR reminders to:      Comments:  [facility] home meter          Anticoagulation Care Providers       Provider Role Specialty Phone number    Michael J Bloch, M.D. Referring Cardiovascular Disease (Cardiology) [phone]    Yinka Church, PharmD Responsible Pharmacy           HPI:  Yenifer Beasley, on anticoagulation therapy with warfarin for hx of mechanical avr  Anticoagulation Patient Findings  Patient Findings       Negatives:  Signs/symptoms of thrombosis, Signs/symptoms of bleeding, Laboratory test error suspected, Change in health, Change in alcohol use, Change in activity, Upcoming invasive procedure, Emergency department visit, Upcoming dental procedure, Missed doses, Extra doses, Change in medications, Change in diet/appetite, Hospital admission, Bruising, Other complaints             A/P   INR is therapeutic.   Warfarin Plan: Continue current warfarin regimen of 6 mg M,W,F and 4 mg ROW.    Next INR in 2 week(s).    Planned discussed with Seamus Maier, Pasha Reeves, EmilyD

## 2024-09-02 LAB — INR PPP: 2.5 (ref 2–3.5)

## 2024-09-02 PROCEDURE — RXMED WILLOW AMBULATORY MEDICATION CHARGE: Performed by: NURSE PRACTITIONER

## 2024-09-03 ENCOUNTER — ANTICOAGULATION MONITORING (OUTPATIENT)
Dept: CARDIOLOGY | Facility: MEDICAL CENTER | Age: 74
End: 2024-09-03
Payer: MEDICARE

## 2024-09-03 DIAGNOSIS — Z79.01 CHRONIC ANTICOAGULATION: ICD-10-CM

## 2024-09-03 DIAGNOSIS — Z95.2 HX OF MECHANICAL AORTIC VALVE REPLACEMENT: ICD-10-CM

## 2024-09-03 DIAGNOSIS — I48.91 ATRIAL FIBRILLATION, UNSPECIFIED TYPE (HCC): ICD-10-CM

## 2024-09-03 PROCEDURE — RXMED WILLOW AMBULATORY MEDICATION CHARGE: Performed by: FAMILY MEDICINE

## 2024-09-03 NOTE — PROGRESS NOTES
Anticoagulation Summary  As of 9/3/2024      INR goal:  2.5-3.5   TTR:  69.1% (7.5 y)   INR used for dosin.50 (2024)   Warfarin maintenance plan:  6 mg (4 mg x 1.5) every Mon, Wed, Fri; 4 mg (4 mg x 1) all other days   Weekly warfarin total:  34 mg   Plan last modified:  Seamus Maier PharmD (2024)   Next INR check:  2024   Target end date:  Indefinite    Indications    Chronic anticoagulation [Z79.01]  Hx of mechanical aortic valve replacement [V43.3] [Z95.2]  TIA (transient ischemic attack) (Resolved) [G45.9]  Atrial fibrillation (HCC) [I48.91] [I48.91]                 Anticoagulation Episode Summary       INR check location:  --    Preferred lab:  --    Send INR reminders to:  --    Comments:  [facility] home meter          Anticoagulation Care Providers       Provider Role Specialty Phone number    Michael J Bloch, M.D. Referring Cardiovascular Disease (Cardiology) [phone]    Yinka Church, PharmD Responsible Pharmacy           Anticoagulation Patient Findings      INR is therapeutic  Reason(s) for out of range INR today: N/A      Called and left VM for patient.    Pt is not on antiplatelet therapy.    Requested patient to contact the clinic for any s/sx of unusual bleeding, bruising, clotting or any changes to diet or medications. Unless patient reports any changes that would warrant an adjustment to the plan:  Warfarin Plan: Continue regimen as listed above.    Next INR in 2 week(s).    Gentry Brooks, PharmD

## 2024-09-04 ENCOUNTER — PHARMACY VISIT (OUTPATIENT)
Dept: PHARMACY | Facility: MEDICAL CENTER | Age: 74
End: 2024-09-04
Payer: COMMERCIAL

## 2024-09-05 PROCEDURE — RXMED WILLOW AMBULATORY MEDICATION CHARGE: Performed by: DENTIST

## 2024-09-06 PROCEDURE — RXMED WILLOW AMBULATORY MEDICATION CHARGE: Performed by: NURSE PRACTITIONER

## 2024-09-09 ENCOUNTER — PATIENT OUTREACH (OUTPATIENT)
Dept: HEALTH INFORMATION MANAGEMENT | Facility: OTHER | Age: 74
End: 2024-09-09

## 2024-09-09 ENCOUNTER — HOSPITAL ENCOUNTER (OUTPATIENT)
Dept: CARDIOLOGY | Facility: MEDICAL CENTER | Age: 74
End: 2024-09-09
Attending: INTERNAL MEDICINE
Payer: MEDICARE

## 2024-09-09 DIAGNOSIS — E11.22 TYPE 2 DIABETES MELLITUS WITH STAGE 3B CHRONIC KIDNEY DISEASE, WITHOUT LONG-TERM CURRENT USE OF INSULIN (HCC): ICD-10-CM

## 2024-09-09 DIAGNOSIS — N18.32 TYPE 2 DIABETES MELLITUS WITH STAGE 3B CHRONIC KIDNEY DISEASE, WITHOUT LONG-TERM CURRENT USE OF INSULIN (HCC): ICD-10-CM

## 2024-09-09 DIAGNOSIS — E78.5 HYPERLIPIDEMIA, UNSPECIFIED HYPERLIPIDEMIA TYPE: ICD-10-CM

## 2024-09-09 DIAGNOSIS — I25.810 CORONARY ARTERY DISEASE INVOLVING CORONARY BYPASS GRAFT OF NATIVE HEART WITHOUT ANGINA PECTORIS: ICD-10-CM

## 2024-09-09 DIAGNOSIS — N18.32 CHRONIC KIDNEY DISEASE (CKD) STAGE G3B/A1, MODERATELY DECREASED GLOMERULAR FILTRATION RATE (GFR) BETWEEN 30-44 ML/MIN/1.73 SQUARE METER AND ALBUMINURIA CREATININE RATIO LESS THAN 30 MG/G: ICD-10-CM

## 2024-09-09 DIAGNOSIS — Z95.2 H/O AORTIC VALVE REPLACEMENT: ICD-10-CM

## 2024-09-09 DIAGNOSIS — I10 PRIMARY HYPERTENSION: ICD-10-CM

## 2024-09-09 LAB
LV EJECT FRACT  99904: 65
LV EJECT FRACT MOD 2C 99903: 63.21
LV EJECT FRACT MOD 4C 99902: 70.05
LV EJECT FRACT MOD BP 99901: 67.55

## 2024-09-09 PROCEDURE — 93306 TTE W/DOPPLER COMPLETE: CPT

## 2024-09-09 PROCEDURE — 93306 TTE W/DOPPLER COMPLETE: CPT | Mod: 26 | Performed by: INTERNAL MEDICINE

## 2024-09-09 NOTE — PROGRESS NOTES
Assessment    Called pt today for monthly outreach. Pt had an ECHO today for her yearly imaging for Cardiology. Pt has known heart conditions, especially concerning her heart valves. She already has a mechanical aortic valve, mitral valve regurgitation as well as issues concerning her tricuspid valve as well. Pt was worried that things had escalated and that she was going to be told that she had only so long before her valve gave out and she is not a candidate for surgery. Pt ECHO stated that were no significant changes from her imaging in 2022. Talked with pt about the home assessment that she was supposed to have done from [facility]. She stated that the bradly came out and he wanted to put a ramp which would require a lot of money as well as take up a lot of space in the front of her property. Pt also stated that she already had a ramp for her RV and had talked to the bradly about the possibility of using this. The contractor is supposed to come out and look at things and assess. Pt has not worked on the organization of pictures for 2 hrs/week but stated that her and her MALKA had gone on to [employer] and looked at family genealogy. Pt stated that she would try to get to this in the upcoming month, along with the journal ing that she said she had not started on. Pt asked about vaccinations and I referred her to her pharmacy for these, suggesting that she call them and see if they have them in as we do not have them here at this time. Will call pt next month for outreach. Reminded her to call with questions or concerns. Pt voiced understanding.    Education and Self Management of Care    Educated pt on the findings of her ECHO take was done today as she had a lot of stress over the results. Just Explained to her what the words meant and the anatomy and physiology of the heart and function. Helped ot alleviate the overwhelming stress she was feeling    Plan of Care and Goals    Pt understands POC and  goals    Barriers:    Health concerns take over and all though goes to management of the stress of outcomes    Progress:    Pt making some progress with the [facility]. Little progress with her organization of pictures and no progress as of yet with journaling    Next outreach:    One month

## 2024-09-12 ENCOUNTER — PATIENT OUTREACH (OUTPATIENT)
Dept: HEALTH INFORMATION MANAGEMENT | Facility: OTHER | Age: 74
End: 2024-09-12
Payer: MEDICARE

## 2024-09-12 DIAGNOSIS — N18.32 TYPE 2 DIABETES MELLITUS WITH STAGE 3B CHRONIC KIDNEY DISEASE, WITHOUT LONG-TERM CURRENT USE OF INSULIN (HCC): ICD-10-CM

## 2024-09-12 DIAGNOSIS — E78.5 HYPERLIPIDEMIA, UNSPECIFIED HYPERLIPIDEMIA TYPE: ICD-10-CM

## 2024-09-12 DIAGNOSIS — I10 PRIMARY HYPERTENSION: ICD-10-CM

## 2024-09-12 DIAGNOSIS — I25.810 CORONARY ARTERY DISEASE INVOLVING CORONARY BYPASS GRAFT OF NATIVE HEART WITHOUT ANGINA PECTORIS: ICD-10-CM

## 2024-09-12 DIAGNOSIS — N18.32 CHRONIC KIDNEY DISEASE (CKD) STAGE G3B/A1, MODERATELY DECREASED GLOMERULAR FILTRATION RATE (GFR) BETWEEN 30-44 ML/MIN/1.73 SQUARE METER AND ALBUMINURIA CREATININE RATIO LESS THAN 30 MG/G: ICD-10-CM

## 2024-09-12 DIAGNOSIS — E11.22 TYPE 2 DIABETES MELLITUS WITH STAGE 3B CHRONIC KIDNEY DISEASE, WITHOUT LONG-TERM CURRENT USE OF INSULIN (HCC): ICD-10-CM

## 2024-09-16 PROCEDURE — RXMED WILLOW AMBULATORY MEDICATION CHARGE: Performed by: FAMILY MEDICINE

## 2024-09-18 ENCOUNTER — HOSPITAL ENCOUNTER (OUTPATIENT)
Dept: LAB | Facility: MEDICAL CENTER | Age: 74
End: 2024-09-18
Attending: FAMILY MEDICINE
Payer: MEDICARE

## 2024-09-18 ENCOUNTER — HOSPITAL ENCOUNTER (OUTPATIENT)
Dept: LAB | Facility: MEDICAL CENTER | Age: 74
End: 2024-09-18
Attending: NURSE PRACTITIONER
Payer: MEDICARE

## 2024-09-18 DIAGNOSIS — E03.9 HYPOTHYROIDISM, UNSPECIFIED TYPE: ICD-10-CM

## 2024-09-18 DIAGNOSIS — E78.49 OTHER HYPERLIPIDEMIA: ICD-10-CM

## 2024-09-18 DIAGNOSIS — N18.32 CHRONIC KIDNEY DISEASE (CKD) STAGE G3B/A1, MODERATELY DECREASED GLOMERULAR FILTRATION RATE (GFR) BETWEEN 30-44 ML/MIN/1.73 SQUARE METER AND ALBUMINURIA CREATININE RATIO LESS THAN 30 MG/G: ICD-10-CM

## 2024-09-18 DIAGNOSIS — I48.91 ATRIAL FIBRILLATION, UNSPECIFIED TYPE (HCC): ICD-10-CM

## 2024-09-18 DIAGNOSIS — R73.03 PREDIABETES: ICD-10-CM

## 2024-09-18 LAB
ALBUMIN SERPL BCP-MCNC: 4 G/DL (ref 3.2–4.9)
ALBUMIN/GLOB SERPL: 1.7 G/DL
ALP SERPL-CCNC: 57 U/L (ref 30–99)
ALT SERPL-CCNC: 12 U/L (ref 2–50)
ANION GAP SERPL CALC-SCNC: 13 MMOL/L (ref 7–16)
AST SERPL-CCNC: 21 U/L (ref 12–45)
BILIRUB SERPL-MCNC: 0.5 MG/DL (ref 0.1–1.5)
BUN SERPL-MCNC: 38 MG/DL (ref 8–22)
CALCIUM ALBUM COR SERPL-MCNC: 9.3 MG/DL (ref 8.5–10.5)
CALCIUM SERPL-MCNC: 9.3 MG/DL (ref 8.5–10.5)
CHLORIDE SERPL-SCNC: 103 MMOL/L (ref 96–112)
CHOLEST SERPL-MCNC: 127 MG/DL (ref 100–199)
CO2 SERPL-SCNC: 23 MMOL/L (ref 20–33)
CREAT SERPL-MCNC: 1.8 MG/DL (ref 0.5–1.4)
ERYTHROCYTE [DISTWIDTH] IN BLOOD BY AUTOMATED COUNT: 46.5 FL (ref 35.9–50)
EST. AVERAGE GLUCOSE BLD GHB EST-MCNC: 114 MG/DL
GFR SERPLBLD CREATININE-BSD FMLA CKD-EPI: 29 ML/MIN/1.73 M 2
GLOBULIN SER CALC-MCNC: 2.3 G/DL (ref 1.9–3.5)
GLUCOSE SERPL-MCNC: 98 MG/DL (ref 65–99)
HBA1C MFR BLD: 5.6 % (ref 4–5.6)
HCT VFR BLD AUTO: 37.3 % (ref 37–47)
HDLC SERPL-MCNC: 62 MG/DL
HGB BLD-MCNC: 12.2 G/DL (ref 12–16)
INR PPP: 2.62 (ref 0.87–1.13)
LDLC SERPL CALC-MCNC: 45 MG/DL
MCH RBC QN AUTO: 30.5 PG (ref 27–33)
MCHC RBC AUTO-ENTMCNC: 32.7 G/DL (ref 32.2–35.5)
MCV RBC AUTO: 93.3 FL (ref 81.4–97.8)
PLATELET # BLD AUTO: 179 K/UL (ref 164–446)
PMV BLD AUTO: 10.9 FL (ref 9–12.9)
POTASSIUM SERPL-SCNC: 4.4 MMOL/L (ref 3.6–5.5)
PROT SERPL-MCNC: 6.3 G/DL (ref 6–8.2)
PROTHROMBIN TIME: 28.2 SEC (ref 12–14.6)
RBC # BLD AUTO: 4 M/UL (ref 4.2–5.4)
SODIUM SERPL-SCNC: 139 MMOL/L (ref 135–145)
TRIGL SERPL-MCNC: 99 MG/DL (ref 0–149)
WBC # BLD AUTO: 5.9 K/UL (ref 4.8–10.8)

## 2024-09-18 PROCEDURE — 82043 UR ALBUMIN QUANTITATIVE: CPT

## 2024-09-18 PROCEDURE — 80061 LIPID PANEL: CPT

## 2024-09-18 PROCEDURE — 82570 ASSAY OF URINE CREATININE: CPT

## 2024-09-18 PROCEDURE — 36415 COLL VENOUS BLD VENIPUNCTURE: CPT

## 2024-09-18 PROCEDURE — 84443 ASSAY THYROID STIM HORMONE: CPT

## 2024-09-18 PROCEDURE — 83036 HEMOGLOBIN GLYCOSYLATED A1C: CPT

## 2024-09-18 PROCEDURE — 85027 COMPLETE CBC AUTOMATED: CPT

## 2024-09-18 PROCEDURE — 80053 COMPREHEN METABOLIC PANEL: CPT

## 2024-09-18 PROCEDURE — 85610 PROTHROMBIN TIME: CPT

## 2024-09-18 PROCEDURE — 82172 ASSAY OF APOLIPOPROTEIN: CPT

## 2024-09-19 ENCOUNTER — ANTICOAGULATION MONITORING (OUTPATIENT)
Dept: VASCULAR LAB | Facility: MEDICAL CENTER | Age: 74
End: 2024-09-19
Payer: MEDICARE

## 2024-09-19 DIAGNOSIS — I48.91 ATRIAL FIBRILLATION, UNSPECIFIED TYPE (HCC): ICD-10-CM

## 2024-09-19 DIAGNOSIS — Z79.01 CHRONIC ANTICOAGULATION: ICD-10-CM

## 2024-09-19 DIAGNOSIS — Z95.2 HX OF MECHANICAL AORTIC VALVE REPLACEMENT: ICD-10-CM

## 2024-09-19 LAB
CREAT UR-MCNC: 89.4 MG/DL
MICROALBUMIN UR-MCNC: <1.2 MG/DL
MICROALBUMIN/CREAT UR: NORMAL MG/G (ref 0–30)
TSH SERPL DL<=0.005 MIU/L-ACNC: 3.95 UIU/ML (ref 0.38–5.33)

## 2024-09-19 NOTE — PROGRESS NOTES
Anticoagulation Summary  As of 2024      INR goal:  2.5-3.5   TTR:  69.3% (7.6 y)   INR used for dosin.62 (2024)   Warfarin maintenance plan:  6 mg (4 mg x 1.5) every Mon, Wed, Fri; 4 mg (4 mg x 1) all other days   Weekly warfarin total:  34 mg   Plan last modified:  Seamus Maier PharmD (2024)   Next INR check:  10/10/2024   Target end date:  Indefinite    Indications    Chronic anticoagulation [Z79.01]  Hx of mechanical aortic valve replacement [V43.3] [Z95.2]  TIA (transient ischemic attack) (Resolved) [G45.9]  Atrial fibrillation (HCC) [I48.91] [I48.91]                 Anticoagulation Episode Summary       INR check location:  --    Preferred lab:  --    Send INR reminders to:  --    Comments:  [facility] home meter          Anticoagulation Care Providers       Provider Role Specialty Phone number    Michael J Bloch, M.D. Referring Cardiovascular Disease (Cardiology) [phone]    Yinka Church, PharmD Responsible Pharmacy           Anticoagulation Patient Findings      Left voicemail message to report a therapeutic INR of 2.62.    Will have pt continue on with current warfarin dosing regimen.   Requested pt contact the clinic for any s/s of unusual bleeding, bruising, clotting or any changes to diet or medication.    FU INR in 3 week(s).    Chris Randolph, PharmD, BCACP

## 2024-09-20 ENCOUNTER — PHARMACY VISIT (OUTPATIENT)
Dept: PHARMACY | Facility: MEDICAL CENTER | Age: 74
End: 2024-09-20
Payer: COMMERCIAL

## 2024-09-20 LAB — APO B100 SERPL-MCNC: 57 MG/DL (ref 60–117)

## 2024-09-23 DIAGNOSIS — E78.5 HYPERLIPIDEMIA, UNSPECIFIED HYPERLIPIDEMIA TYPE: ICD-10-CM

## 2024-09-23 RX ORDER — EZETIMIBE 10 MG/1
TABLET ORAL
Qty: 90 TABLET | Refills: 3 | Status: SHIPPED | OUTPATIENT
Start: 2024-09-23

## 2024-09-23 RX ORDER — ALIROCUMAB 150 MG/ML
150 INJECTION, SOLUTION SUBCUTANEOUS
Qty: 6 ML | Refills: 3 | Status: SHIPPED | OUTPATIENT
Start: 2024-09-23

## 2024-09-24 ENCOUNTER — OFFICE VISIT (OUTPATIENT)
Dept: CARDIOLOGY | Facility: MEDICAL CENTER | Age: 74
End: 2024-09-24
Attending: NURSE PRACTITIONER
Payer: MEDICARE

## 2024-09-24 ENCOUNTER — HOSPITAL ENCOUNTER (OUTPATIENT)
Dept: RADIOLOGY | Facility: MEDICAL CENTER | Age: 74
End: 2024-09-24
Attending: FAMILY MEDICINE
Payer: MEDICARE

## 2024-09-24 ENCOUNTER — TELEPHONE (OUTPATIENT)
Dept: VASCULAR LAB | Facility: MEDICAL CENTER | Age: 74
End: 2024-09-24

## 2024-09-24 VITALS
WEIGHT: 223 LBS | BODY MASS INDEX: 43.78 KG/M2 | HEART RATE: 65 BPM | OXYGEN SATURATION: 94 % | RESPIRATION RATE: 18 BRPM | SYSTOLIC BLOOD PRESSURE: 142 MMHG | DIASTOLIC BLOOD PRESSURE: 62 MMHG | HEIGHT: 60 IN

## 2024-09-24 DIAGNOSIS — I07.1 TRICUSPID VALVE INSUFFICIENCY, UNSPECIFIED ETIOLOGY: ICD-10-CM

## 2024-09-24 DIAGNOSIS — Z95.1 HISTORY OF CORONARY ARTERY BYPASS GRAFT: ICD-10-CM

## 2024-09-24 DIAGNOSIS — G47.33 OSA (OBSTRUCTIVE SLEEP APNEA): Chronic | ICD-10-CM

## 2024-09-24 DIAGNOSIS — I48.91 ATRIAL FIBRILLATION, UNSPECIFIED TYPE (HCC): ICD-10-CM

## 2024-09-24 DIAGNOSIS — I25.810 CORONARY ARTERY DISEASE INVOLVING CORONARY BYPASS GRAFT OF NATIVE HEART WITHOUT ANGINA PECTORIS: ICD-10-CM

## 2024-09-24 DIAGNOSIS — Z12.31 VISIT FOR SCREENING MAMMOGRAM: ICD-10-CM

## 2024-09-24 DIAGNOSIS — I77.810 ECTATIC THORACIC AORTA (HCC): ICD-10-CM

## 2024-09-24 DIAGNOSIS — I27.20 PULMONARY HYPERTENSION (HCC): ICD-10-CM

## 2024-09-24 DIAGNOSIS — D68.69 SECONDARY HYPERCOAGULABLE STATE (HCC): ICD-10-CM

## 2024-09-24 DIAGNOSIS — R60.0 LOWER LEG EDEMA: ICD-10-CM

## 2024-09-24 DIAGNOSIS — I10 PRIMARY HYPERTENSION: ICD-10-CM

## 2024-09-24 DIAGNOSIS — E11.22 TYPE 2 DIABETES MELLITUS WITH STAGE 3B CHRONIC KIDNEY DISEASE, WITHOUT LONG-TERM CURRENT USE OF INSULIN (HCC): ICD-10-CM

## 2024-09-24 DIAGNOSIS — N18.32 CHRONIC KIDNEY DISEASE (CKD) STAGE G3B/A1, MODERATELY DECREASED GLOMERULAR FILTRATION RATE (GFR) BETWEEN 30-44 ML/MIN/1.73 SQUARE METER AND ALBUMINURIA CREATININE RATIO LESS THAN 30 MG/G: ICD-10-CM

## 2024-09-24 DIAGNOSIS — I34.2 NONRHEUMATIC MITRAL VALVE STENOSIS: ICD-10-CM

## 2024-09-24 DIAGNOSIS — G45.0 VERTEBROBASILAR ARTERY SYNDROME: ICD-10-CM

## 2024-09-24 DIAGNOSIS — S81.802D LEG WOUND, LEFT, SUBSEQUENT ENCOUNTER: ICD-10-CM

## 2024-09-24 DIAGNOSIS — E78.5 HYPERLIPIDEMIA, UNSPECIFIED HYPERLIPIDEMIA TYPE: ICD-10-CM

## 2024-09-24 DIAGNOSIS — L03.116 LEFT LEG CELLULITIS: ICD-10-CM

## 2024-09-24 DIAGNOSIS — E66.01 CLASS 3 SEVERE OBESITY DUE TO EXCESS CALORIES WITH SERIOUS COMORBIDITY AND BODY MASS INDEX (BMI) OF 40.0 TO 44.9 IN ADULT (HCC): ICD-10-CM

## 2024-09-24 DIAGNOSIS — Z95.2 HX OF MECHANICAL AORTIC VALVE REPLACEMENT: ICD-10-CM

## 2024-09-24 DIAGNOSIS — N18.32 TYPE 2 DIABETES MELLITUS WITH STAGE 3B CHRONIC KIDNEY DISEASE, WITHOUT LONG-TERM CURRENT USE OF INSULIN (HCC): ICD-10-CM

## 2024-09-24 PROBLEM — Z79.01 CHRONIC ANTICOAGULATION: Status: RESOLVED | Noted: 2018-04-23 | Resolved: 2024-09-24

## 2024-09-24 PROCEDURE — 99213 OFFICE O/P EST LOW 20 MIN: CPT | Performed by: NURSE PRACTITIONER

## 2024-09-24 PROCEDURE — 99214 OFFICE O/P EST MOD 30 MIN: CPT | Performed by: NURSE PRACTITIONER

## 2024-09-24 PROCEDURE — 3078F DIAST BP <80 MM HG: CPT | Performed by: NURSE PRACTITIONER

## 2024-09-24 PROCEDURE — 77067 SCR MAMMO BI INCL CAD: CPT

## 2024-09-24 PROCEDURE — 3077F SYST BP >= 140 MM HG: CPT | Performed by: NURSE PRACTITIONER

## 2024-09-24 ASSESSMENT — ENCOUNTER SYMPTOMS
COUGH: 0
ORTHOPNEA: 0
PALPITATIONS: 0
CLAUDICATION: 0
SHORTNESS OF BREATH: 1
DIZZINESS: 0
PND: 0
ABDOMINAL PAIN: 0
FEVER: 0
MYALGIAS: 0

## 2024-09-24 ASSESSMENT — FIBROSIS 4 INDEX: FIB4 SCORE: 2.44

## 2024-09-24 NOTE — PATIENT INSTRUCTIONS
Take weight daily and try to get back to dry weight.    Labs non-fasting before December. Get sooner if symptoms progress with worsening weight gain and leg swelling and let us know.    OK to take torsemide 20 mg twice a day (AM and lunchtime). If you are doing this daily to keep water off, this is fine, but we may need to order a potassium supplement for long term use.    BP check daily and reach out to vascular team if BP remains elevated >130/80 consistently.    I have put in a wound consultation.    Echocardiogram and follow up in 1 year.

## 2024-09-24 NOTE — TELEPHONE ENCOUNTER
Prior Authorization for PRALUENT 150MG/ML SOLUTION AUTO INJECTOR  (Quantity: 6 mls, Days: 84) has been submitted via Cover My Meds: Key ([id number])    Insurance: [facility] PLUS     Will follow up in 24-48 business hours.     MARC Lanza, PhT  Vascular Pharmacy Liaison (Rx Coordinator)  P: [phone]  9/24/2024 10:56 AM

## 2024-09-24 NOTE — PROGRESS NOTES
"Chief Complaint   Patient presents with    Atrial Fibrillation    Hyperlipidemia    Hypertension     Subjective     Yenifer Beasley is a 73 y.o. female who presents today for echocardiogram follow up.    She is a patient of Dr. Hawkins in our office. Hx of obesity with HILDA on CPAP, mechanical AVR with moderate MS and mod-sev TR, PAF on chronic anticoagulation, HLD with CAD with CABG, CKD stage 3b, TIA, and DM type II.    She presents today alone. She expresses some concerns over more weight gain and leg swelling. She tried taking torsemide BID but is worried about a gout flare up.    She is not able to wear tight compression stockings due to trouble putting them on.    She also has noticed some slightly higher BP readings lately.    Chronic shortness of breath with exertion.    She has no chest pain, dizziness/lightheadedness, or palpitations.    Past Medical History:   Diagnosis Date    Allergic rhinitis     Anticoagulation monitoring, special range     Arthritis     OA OF HANDS, KNEESM, HIPS AND BACK    Asthma 10/17/2019    childhood asthma    Atrial fibrillation (HCC)     history of    Back pain     Blood transfusion without reported diagnosis     CAD (coronary artery disease) 2007    CABG    Cataract     Chest pain at rest 11/30/2010    CHF (congestive heart failure) (HCC)     Chronic anticoagulation 04/23/2018    Constipation     off and on, pt manages    Coronary heart disease     Delayed emergence from general anesthesia     Diabetes     DIAB FEET EXAM:  1/6/11- Pt reports no history of diabetes    Diabetic neuropathy (HCC) 01/06/2011    Difficulty breathing     Fall     \"a couple years ago because of my neuopathy\" multiple falls    Gasping for breath     history of    Gastritis 07/2010    H/O UGIB    Gastritis     GERD (gastroesophageal reflux disease)     Serbian measles     GI bleed     history of    Glaucoma     Heart murmur     Hyperlipidemia     Hypertension     Hyponatremia 04/22/2014    " Hypothyroidism     Impaired fasting glucose 10/08/2019    Infectious disease     History of MRSA    Kidney disease     Migraine     Need for influenza vaccination 2010    ICD-10 transition    Neuropathy in diabetes (HCC)     HILDA (obstructive sleep apnea)     no cpap at this time, on order    Painful joint     Palpitations     Pneumonia     Pneumonia due to organism 2014    Diagnois update 10/1/2016    Pulmonary hypertension (HCC)     Rash     S/P aortic valve replacement     Shortness of breath 2022    no c/o at this time    Skin infection 2018    Sleep apnea     Swelling of lower extremity     TIA (transient ischemic attack)     TIA (transient ischemic attack)     Tonsillitis     Tricuspid regurgitation mild- mod 2011    Unspecified hemorrhagic conditions     Bleeds easily-GI bleeds with NSAIDS and ASA    Upper GI bleed ON 2011    Upper GI bleed ON 2011    URTI (acute upper respiratory infection) 2010    UTI (urinary tract infection) 2014    Valvular heart disease     Weakness     Wears glasses      Past Surgical History:   Procedure Laterality Date    MITRAL VALVE REPLACE      AORTIC VALVE    SHOULDER SURGERY  2001    NEER DECOMOPRESSION LEFT SHOULDER    CARPAL TUNNEL RELEASE      B/L    TONSILLECTOMY      PARTIAL PALATOPLASTY    ABDOMINAL HYSTERECTOMY TOTAL      TAHBSOO    AORTIC VALVE REPLACEMENT      CHOLECYSTECTOMY      CORONARY ARTERY BYPASS, 1      Triple Bypass    HERNIA REPAIR      VENTRAL HERNIA REPAIR    HYSTERECTOMY LAPAROSCOPY      INTUBATION      PRIMARY C SECTION      SLEEVE,MEJIA VASO THIGH      TUBAL COAGULATION LAPAROSCOPIC BILATERAL      UVULOPHARYNGOPALATOPLASTY      VENTILATOR CONTINUOUS       Family History   Problem Relation Age of Onset    Heart Disease Mother         a fib    Cancer Mother         Cervical: palate:      Cancer Maternal Grandmother         Leukemia,  21 y.o.  1932    Cancer Maternal Aunt         Lung CA (+3 dtrs  from CA)    Heart Disease Brother         Heart attack, open heart surgery,     Diabetes Brother         Insulin dependent    Heart Disease Brother         Heart attack, open heart surgery     Breast Cancer Cousin      Social History     Socioeconomic History    Marital status:      Spouse name: Not on file    Number of children: Not on file    Years of education: Not on file    Highest education level: Bachelor's degree (e.g., BA, AB, BS)   Occupational History    Not on file   Tobacco Use    Smoking status: Never    Smokeless tobacco: Never   Vaping Use    Vaping status: Never Used   Substance and Sexual Activity    Alcohol use: No    Drug use: Never    Sexual activity: Not Currently   Other Topics Concern    Not on file   Social History Narrative    Not on file     Social Determinants of Health     Financial Resource Strain: Low Risk  (2024)    Overall Financial Resource Strain (CARDIA)     Difficulty of Paying Living Expenses: Not hard at all   Recent Concern: Financial Resource Strain - Medium Risk (7/10/2024)    Overall Financial Resource Strain (CARDIA)     Difficulty of Paying Living Expenses: Somewhat hard   Food Insecurity: No Food Insecurity (2024)    Hunger Vital Sign     Worried About Running Out of Food in the Last Year: Never true     Ran Out of Food in the Last Year: Never true   Transportation Needs: No Transportation Needs (2024)    PRAPARE - Transportation     Lack of Transportation (Medical): No     Lack of Transportation (Non-Medical): No   Physical Activity: Inactive (2024)    Exercise Vital Sign     Days of Exercise per Week: 0 days     Minutes of Exercise per Session: 0 min   Stress: No Stress Concern Present (2024)    [employer] - Occupational Stress Questionnaire     Feeling of Stress : Not at all   Social Connections: Socially Isolated (2024)    Social  "Connection and Isolation Panel [NHANES]     Frequency of Communication with Friends and Family: Three times a week     Frequency of Social Gatherings with Friends and Family: Never     Attends Jainism Services: Never     Active Member of Clubs or Organizations: No     Attends Club or Organization Meetings: Never     Marital Status:    Intimate Partner Violence: Not At Risk (8/1/2024)    Humiliation, Afraid, Rape, and Kick questionnaire     Fear of Current or Ex-Partner: No     Emotionally Abused: No     Physically Abused: No     Sexually Abused: No   Housing Stability: Low Risk  (8/1/2024)    Housing Stability Vital Sign     Unable to Pay for Housing in the Last Year: No     Number of Places Lived in the Last Year: 1     Unstable Housing in the Last Year: No     Allergies   Allergen Reactions    Asa [Aspirin]      GI BLEED    Atorvastatin Shortness of Breath    Cymbalta [Duloxetine Hcl] Unspecified     Feels like a \"zombie\"    Hmg-Coa-R Inhibitors     Latex Swelling    Nsaids      GI BLEED    Tricor Shortness of Breath     Breathing problems      Trilipix [Choline Fenofibrate] Shortness of Breath     SOB    Lyrica [Pregabalin]      SPACED OUT     Outpatient Encounter Medications as of 9/24/2024   Medication Sig Dispense Refill    Alirocumab (PRALUENT) 150 MG/ML Solution Auto-injector Inject 150 mg under the skin every 14 days. 6 mL 3    ezetimibe (ZETIA) 10 MG Tab TAKE 1 TABLET BY MOUTH EVERY DAY 90 Tablet 3    traMADol (ULTRAM) 50 MG Tab Take 2 tablets 3 times a day by oral route as needed for 30 days, for severe pain. 180 Tablet 2    amoxicillin (AMOXIL) 500 MG Cap Take 4 Capsules by mouth 1 hour before dental appointment. 16 Capsule 1    torsemide (DEMADEX) 20 MG Tab Take 1 Tablet by mouth every day. To lower blood pressure and swelling 100 Tablet 3    doxazosin (CARDURA) 8 MG tablet Take 1 tablet by mouth at bedtime. To lower blood pressure 100 Tablet 3    amiodarone (CORDARONE) 200 MG Tab Take 0.5 " Tablets by mouth every day. 100 Tablet 3    colchicine (COLCRYS) 0.6 MG Tab Take 2 tablets by mouth once, then 1 tablet 1 hours later once. Max: 1.8 mg total dose per treatment course  Info: do not repeat x3 days 3 Tablet 0    diclofenac sodium (VOLTAREN) 1 % Gel APPLY 2 GRAM TO THE AFFECTED AREA(S) BY TOPICAL ROUTE 4 TIMES PER  g 6    traMADol (ULTRAM) 50 MG Tab Take 2 tablets 3 times a day by oral route as needed for 30 days. DNF 2/17/24 180 Tablet 2    triamcinolone acetonide (KENALOG) 0.1 % Ointment APPLY TO AFFECTED AREA TWICE A DAY FOR 2-3 WEEKS, THEN CAN USE 2 WEEKS ON, 2 WEEKS OFF AS NEEDED 30 g 3    levothyroxine (SYNTHROID) 125 MCG Tab Take 1 Tablet by mouth every morning on an empty stomach. 100 Tablet 3    telmisartan (MICARDIS) 80 MG Tab Take 1 Tablet by mouth every day. Take At night 100 Tablet 3    warfarin (COUMADIN) 4 MG Tab TAKE ONE TABLET BY MOUTH DAILY OR AS DIRECTED BY ANTICOAGULATION CLINIC (Patient taking differently: TAKE 1 1/2 TABLET BY MOUTH MWF AS DIRECTED BY ANTICOAGULATION CLINIC) 100 Tablet 1    timolol (TIMOPTIC) 0.5 % Solution 1 DROP INTO BOTH EYES TWICE A DAY      vitamin D (CHOLECALCIFEROL) 1000 UNIT TABS Take 1,000 Units by mouth every day.        docosahexanoic acid (OMEGA 3 FA) 1000 MG CAPS Take 1,000 mg by mouth 2 Times a Day.      [DISCONTINUED] ezetimibe (ZETIA) 10 MG Tab TAKE 1 TABLET BY MOUTH EVERY DAY 90 Tablet 3    [DISCONTINUED] Alirocumab (PRALUENT) 150 MG/ML Solution Auto-injector Inject 150 mg under the skin every 14 days. 6 mL 3     No facility-administered encounter medications on file as of 9/24/2024.     Review of Systems   Constitutional:  Positive for malaise/fatigue. Negative for fever.   Respiratory:  Positive for shortness of breath. Negative for cough.    Cardiovascular:  Positive for leg swelling. Negative for chest pain, palpitations, orthopnea, claudication and PND.   Gastrointestinal:  Negative for abdominal pain.   Musculoskeletal:  Negative for  "myalgias.   Neurological:  Negative for dizziness.              Objective     BP (!) 142/62 (BP Location: Left arm, Patient Position: Sitting, BP Cuff Size: Adult)   Pulse 65   Resp 18   Ht 1.527 m (5' 0.1\")   Wt 101 kg (223 lb)   LMP  (LMP Unknown)   SpO2 94%   BMI 43.41 kg/m²     Physical Exam  Vitals and nursing note reviewed.   Constitutional:       Appearance: Normal appearance. She is well-developed. She is obese.   HENT:      Head: Normocephalic and atraumatic.   Neck:      Vascular: No JVD.   Cardiovascular:      Rate and Rhythm: Normal rate and regular rhythm.      Pulses: Normal pulses.      Heart sounds: Normal heart sounds.   Pulmonary:      Effort: Pulmonary effort is normal.      Breath sounds: Normal breath sounds.   Musculoskeletal:         General: Normal range of motion.      Right lower leg: Edema present.      Left lower leg: Edema present.   Skin:     General: Skin is warm and dry.      Capillary Refill: Capillary refill takes less than 2 seconds.   Neurological:      General: No focal deficit present.      Mental Status: She is alert and oriented to person, place, and time. Mental status is at baseline.   Psychiatric:         Mood and Affect: Mood normal.         Behavior: Behavior normal.         Thought Content: Thought content normal.         Judgment: Judgment normal.                Assessment & Plan     1. Coronary artery disease involving coronary bypass graft of native heart without angina pectoris        2. Primary hypertension        3. Type 2 diabetes mellitus with stage 3b chronic kidney disease, without long-term current use of insulin ([facility])        4. Chronic anticoagulation        5. Atrial fibrillation, unspecified type ([facility])        6. Chronic kidney disease (CKD) stage G3b/A1, moderately decreased glomerular filtration rate (GFR) between 30-44 mL/min/1.73 square meter and albuminuria creatinine ratio less than 30 mg/g        7. Class 3 severe obesity due to excess calories " with serious comorbidity and body mass index (BMI) of 40.0 to 44.9 in adult (HCC)        8. Ectatic thoracic aorta (HCC)        9. History of coronary artery bypass graft        10. Tricuspid valve insufficiency, unspecified etiology  EC-ECHOCARDIOGRAM COMPLETE W/O CONT      11. Vertebrobasilar artery syndrome        12. Secondary hypercoagulable state (HCC)        13. Pulmonary hypertension (HCC)        14. HIDLA (obstructive sleep apnea)        15. Nonrheumatic mitral valve stenosis        16. Lower leg edema  proBrain Natriuretic Peptide, NT    Basic Metabolic Panel      17. Hyperlipidemia, unspecified hyperlipidemia type        18. Hx of mechanical aortic valve replacement [V43.3]  EC-ECHOCARDIOGRAM COMPLETE W/O CONT    proBrain Natriuretic Peptide, NT    Basic Metabolic Panel      19. Left leg cellulitis        20. Leg wound, left, subsequent encounter  Referral to Wound Clinic        Medical Decision Making: Today's Assessment/Status/Plan:      1. Mechanical AVR with mod MR, mod-sev TR  -lifelong coumadin per anticoagulation clinic  -chronic shortness of breath and edema, slightly worsening  -recommend increase torsemide to 20 mg BID  -order bnp, bmp  -consider add potassium supplement v. Aldactone   -follow CKD and BP with this change  -daily weight log recommended  -echo in 1 year or sooner if symptoms worsen    2. Obesity with HILDA on CPAP and PAH  -cont CPAP use  -work on weight loss with diet  -follow    3. HLD with CAD with CABG  -no angina, PLATT chronic  -cont praluent 150 mg Q14 days per vascular team  -LDL goal <70 and zetia 10 mg QD  -no aspirin on coumadin    4. HTN with CKD  -follow with vascular team  -cont toresmide (as above), telmisartan, cardura  -BP goal <130/80, call vascular team for med changes if BP elevates    5. Afib  -no events  -cont amiodarone 100 mg QD and coumadin   -follow    Patient is to follow up with Delia PHILIP in 1 year with echo, labs soon.

## 2024-09-24 NOTE — TELEPHONE ENCOUNTER
Received Renewal PA request via  [facility]   for PRALUENT 150MG/ML SOLUTION AUTO INJECTOR. (Quantity:6 mls, Day Supply:84)     Insurance: [employer]   Member ID:  [id number]  BIN: [id number]  PCN: [id number]  Group: [facility] [place]     Ran Test claim via [place] & medication Rejects stating prior authorization is required.    MARC Lanza, PhT  Vascular Pharmacy Liaison (Rx Coordinator)  P: [phone]  9/24/2024 10:53 AM

## 2024-10-03 ENCOUNTER — HOSPITAL ENCOUNTER (OUTPATIENT)
Dept: RADIOLOGY | Facility: MEDICAL CENTER | Age: 74
End: 2024-10-03
Attending: PHYSICIAN ASSISTANT
Payer: MEDICARE

## 2024-10-03 DIAGNOSIS — Z78.0 POST-MENOPAUSE: ICD-10-CM

## 2024-10-03 PROCEDURE — 77080 DXA BONE DENSITY AXIAL: CPT

## 2024-10-04 ENCOUNTER — PHARMACY VISIT (OUTPATIENT)
Dept: PHARMACY | Facility: MEDICAL CENTER | Age: 74
End: 2024-10-04
Payer: COMMERCIAL

## 2024-10-04 PROCEDURE — RXMED WILLOW AMBULATORY MEDICATION CHARGE: Performed by: NURSE PRACTITIONER

## 2024-10-04 PROCEDURE — RXMED WILLOW AMBULATORY MEDICATION CHARGE: Performed by: OPHTHALMOLOGY

## 2024-10-07 PROCEDURE — RXMED WILLOW AMBULATORY MEDICATION CHARGE: Performed by: NURSE PRACTITIONER

## 2024-10-10 ENCOUNTER — NON-PROVIDER VISIT (OUTPATIENT)
Dept: WOUND CARE | Facility: MEDICAL CENTER | Age: 74
End: 2024-10-10
Attending: NURSE PRACTITIONER
Payer: MEDICARE

## 2024-10-10 PROCEDURE — 99211 OFF/OP EST MAY X REQ PHY/QHP: CPT

## 2024-10-14 ENCOUNTER — PHARMACY VISIT (OUTPATIENT)
Dept: PHARMACY | Facility: MEDICAL CENTER | Age: 74
End: 2024-10-14
Payer: COMMERCIAL

## 2024-10-16 ENCOUNTER — PATIENT OUTREACH (OUTPATIENT)
Dept: HEALTH INFORMATION MANAGEMENT | Facility: OTHER | Age: 74
End: 2024-10-16
Payer: MEDICARE

## 2024-10-16 DIAGNOSIS — E78.5 HYPERLIPIDEMIA, UNSPECIFIED HYPERLIPIDEMIA TYPE: ICD-10-CM

## 2024-10-16 DIAGNOSIS — E11.22 TYPE 2 DIABETES MELLITUS WITH STAGE 3B CHRONIC KIDNEY DISEASE, WITHOUT LONG-TERM CURRENT USE OF INSULIN (HCC): ICD-10-CM

## 2024-10-16 DIAGNOSIS — I25.810 CORONARY ARTERY DISEASE INVOLVING CORONARY BYPASS GRAFT OF NATIVE HEART WITHOUT ANGINA PECTORIS: ICD-10-CM

## 2024-10-16 DIAGNOSIS — N18.32 TYPE 2 DIABETES MELLITUS WITH STAGE 3B CHRONIC KIDNEY DISEASE, WITHOUT LONG-TERM CURRENT USE OF INSULIN (HCC): ICD-10-CM

## 2024-10-16 DIAGNOSIS — N18.32 CHRONIC KIDNEY DISEASE (CKD) STAGE G3B/A1, MODERATELY DECREASED GLOMERULAR FILTRATION RATE (GFR) BETWEEN 30-44 ML/MIN/1.73 SQUARE METER AND ALBUMINURIA CREATININE RATIO LESS THAN 30 MG/G: ICD-10-CM

## 2024-10-16 DIAGNOSIS — I10 PRIMARY HYPERTENSION: ICD-10-CM

## 2024-10-18 LAB — INR PPP: 2.9 (ref 2–3.5)

## 2024-10-21 ENCOUNTER — ANTICOAGULATION MONITORING (OUTPATIENT)
Dept: MEDICAL GROUP | Facility: PHYSICIAN GROUP | Age: 74
End: 2024-10-21
Payer: MEDICARE

## 2024-10-21 DIAGNOSIS — I48.91 ATRIAL FIBRILLATION, UNSPECIFIED TYPE (HCC): ICD-10-CM

## 2024-10-21 DIAGNOSIS — Z95.2 HX OF MECHANICAL AORTIC VALVE REPLACEMENT: ICD-10-CM

## 2024-10-29 DIAGNOSIS — Z95.2 H/O MECHANICAL AORTIC VALVE REPLACEMENT: ICD-10-CM

## 2024-10-30 PROCEDURE — RXMED WILLOW AMBULATORY MEDICATION CHARGE

## 2024-10-30 RX ORDER — WARFARIN SODIUM 4 MG/1
TABLET ORAL
Qty: 135 TABLET | Refills: 1 | Status: SHIPPED | OUTPATIENT
Start: 2024-10-30

## 2024-10-31 ENCOUNTER — PHARMACY VISIT (OUTPATIENT)
Dept: PHARMACY | Facility: MEDICAL CENTER | Age: 74
End: 2024-10-31
Payer: COMMERCIAL

## 2024-11-06 ENCOUNTER — ANTICOAGULATION MONITORING (OUTPATIENT)
Dept: VASCULAR LAB | Facility: MEDICAL CENTER | Age: 74
End: 2024-11-06
Payer: MEDICARE

## 2024-11-06 DIAGNOSIS — I48.91 ATRIAL FIBRILLATION, UNSPECIFIED TYPE (HCC): ICD-10-CM

## 2024-11-06 DIAGNOSIS — Z95.2 HX OF MECHANICAL AORTIC VALVE REPLACEMENT: ICD-10-CM

## 2024-11-06 LAB — INR PPP: 2.6 (ref 2–3.5)

## 2024-11-06 NOTE — PROGRESS NOTES
Anticoagulation Summary  As of 2024      INR goal:  2.5-3.5   TTR:  69.9% (7.7 y)   INR used for dosin.60 (2024)   Warfarin maintenance plan:  6 mg (4 mg x 1.5) every Mon, Wed, Fri; 4 mg (4 mg x 1) all other days   Weekly warfarin total:  34 mg   Plan last modified:  Seamus Maier PharmD (2024)   Next INR check:  2024   Target end date:  Indefinite    Indications    Chronic anticoagulation (Resolved) [Z79.01]  Hx of mechanical aortic valve replacement [V43.3] [Z95.2]  TIA (transient ischemic attack) (Resolved) [G45.9]  Atrial fibrillation (HCC) [I48.91] [I48.91]                 Anticoagulation Episode Summary       INR check location:  --    Preferred lab:  --    Send INR reminders to:  --    Comments:  [employer] home meter          Anticoagulation Care Providers       Provider Role Specialty Phone number    Michael J Bloch, M.D. Referring Cardiovascular Disease (Cardiology) [phone]    Yinka Church, PharmD Responsible Pharmacy           Anticoagulation Patient Findings    Spoke with patient today regarding therapeutic INR of 2.6.  Patient denies any signs/symptoms of bruising or bleeding or any changes in diet and medications.  Instructed patient to call clinic with any questions or concerns.    Pt is not on antiplatelet therapy    Pt is to continue with current warfarin dosing regimen.  Follow up in 2-3 weeks, to reduce risk of adverse events related to this high risk medication,  Warfarin.    Yinka Church, Pasha, BCACP

## 2024-11-07 ENCOUNTER — APPOINTMENT (OUTPATIENT)
Dept: RADIOLOGY | Facility: MEDICAL CENTER | Age: 74
DRG: 481 | End: 2024-11-07
Attending: EMERGENCY MEDICINE
Payer: MEDICARE

## 2024-11-07 ENCOUNTER — HOSPITAL ENCOUNTER (INPATIENT)
Facility: MEDICAL CENTER | Age: 74
LOS: 8 days | DRG: 481 | End: 2024-11-15
Attending: EMERGENCY MEDICINE | Admitting: INTERNAL MEDICINE
Payer: MEDICARE

## 2024-11-07 DIAGNOSIS — S72.421A CLOSED DISPLACED FRACTURE OF LATERAL CONDYLE OF RIGHT FEMUR, INITIAL ENCOUNTER (HCC): ICD-10-CM

## 2024-11-07 PROBLEM — S72.414A: Status: ACTIVE | Noted: 2024-11-07

## 2024-11-07 PROBLEM — S72.411G: Status: ACTIVE | Noted: 2024-11-07

## 2024-11-07 PROBLEM — I48.0 PAROXYSMAL ATRIAL FIBRILLATION (HCC): Status: ACTIVE | Noted: 2019-07-18

## 2024-11-07 PROBLEM — R73.9 HYPERGLYCEMIA: Status: ACTIVE | Noted: 2024-11-07

## 2024-11-07 LAB
ABO GROUP BLD: NORMAL
ALBUMIN SERPL BCP-MCNC: 3.8 G/DL (ref 3.2–4.9)
ALBUMIN/GLOB SERPL: 1.3 G/DL
ALP SERPL-CCNC: 66 U/L (ref 30–99)
ALT SERPL-CCNC: 14 U/L (ref 2–50)
ANION GAP SERPL CALC-SCNC: 15 MMOL/L (ref 7–16)
APTT PPP: 33.7 SEC (ref 24.7–36)
AST SERPL-CCNC: 29 U/L (ref 12–45)
BASOPHILS # BLD AUTO: 0.4 % (ref 0–1.8)
BASOPHILS # BLD: 0.03 K/UL (ref 0–0.12)
BILIRUB SERPL-MCNC: 0.6 MG/DL (ref 0.1–1.5)
BLD GP AB SCN SERPL QL: NORMAL
BUN SERPL-MCNC: 44 MG/DL (ref 8–22)
CALCIUM ALBUM COR SERPL-MCNC: 9.8 MG/DL (ref 8.5–10.5)
CALCIUM SERPL-MCNC: 9.6 MG/DL (ref 8.5–10.5)
CHLORIDE SERPL-SCNC: 103 MMOL/L (ref 96–112)
CO2 SERPL-SCNC: 22 MMOL/L (ref 20–33)
CREAT SERPL-MCNC: 2.08 MG/DL (ref 0.5–1.4)
EKG IMPRESSION: NORMAL
EOSINOPHIL # BLD AUTO: 0.09 K/UL (ref 0–0.51)
EOSINOPHIL NFR BLD: 1.2 % (ref 0–6.9)
ERYTHROCYTE [DISTWIDTH] IN BLOOD BY AUTOMATED COUNT: 43.4 FL (ref 35.9–50)
GFR SERPLBLD CREATININE-BSD FMLA CKD-EPI: 25 ML/MIN/1.73 M 2
GLOBULIN SER CALC-MCNC: 2.9 G/DL (ref 1.9–3.5)
GLUCOSE SERPL-MCNC: 132 MG/DL (ref 65–99)
HCT VFR BLD AUTO: 35.5 % (ref 37–47)
HGB BLD-MCNC: 12 G/DL (ref 12–16)
IMM GRANULOCYTES # BLD AUTO: 0.04 K/UL (ref 0–0.11)
IMM GRANULOCYTES NFR BLD AUTO: 0.5 % (ref 0–0.9)
INR PPP: 2.57 (ref 0.87–1.13)
LYMPHOCYTES # BLD AUTO: 0.63 K/UL (ref 1–4.8)
LYMPHOCYTES NFR BLD: 8.4 % (ref 22–41)
MCH RBC QN AUTO: 30.5 PG (ref 27–33)
MCHC RBC AUTO-ENTMCNC: 33.8 G/DL (ref 32.2–35.5)
MCV RBC AUTO: 90.3 FL (ref 81.4–97.8)
MONOCYTES # BLD AUTO: 0.32 K/UL (ref 0–0.85)
MONOCYTES NFR BLD AUTO: 4.2 % (ref 0–13.4)
NEUTROPHILS # BLD AUTO: 6.42 K/UL (ref 1.82–7.42)
NEUTROPHILS NFR BLD: 85.3 % (ref 44–72)
NRBC # BLD AUTO: 0 K/UL
NRBC BLD-RTO: 0 /100 WBC (ref 0–0.2)
PLATELET # BLD AUTO: 157 K/UL (ref 164–446)
PMV BLD AUTO: 10 FL (ref 9–12.9)
POTASSIUM SERPL-SCNC: 4.3 MMOL/L (ref 3.6–5.5)
PROT SERPL-MCNC: 6.7 G/DL (ref 6–8.2)
PROTHROMBIN TIME: 27.7 SEC (ref 12–14.6)
RBC # BLD AUTO: 3.93 M/UL (ref 4.2–5.4)
RH BLD: NORMAL
SODIUM SERPL-SCNC: 140 MMOL/L (ref 135–145)
WBC # BLD AUTO: 7.5 K/UL (ref 4.8–10.8)

## 2024-11-07 PROCEDURE — 36415 COLL VENOUS BLD VENIPUNCTURE: CPT

## 2024-11-07 PROCEDURE — 86850 RBC ANTIBODY SCREEN: CPT

## 2024-11-07 PROCEDURE — 700105 HCHG RX REV CODE 258: Performed by: INTERNAL MEDICINE

## 2024-11-07 PROCEDURE — 96374 THER/PROPH/DIAG INJ IV PUSH: CPT

## 2024-11-07 PROCEDURE — 96375 TX/PRO/DX INJ NEW DRUG ADDON: CPT

## 2024-11-07 PROCEDURE — 94760 N-INVAS EAR/PLS OXIMETRY 1: CPT

## 2024-11-07 PROCEDURE — 700111 HCHG RX REV CODE 636 W/ 250 OVERRIDE (IP): Mod: JZ | Performed by: EMERGENCY MEDICINE

## 2024-11-07 PROCEDURE — 85025 COMPLETE CBC W/AUTO DIFF WBC: CPT

## 2024-11-07 PROCEDURE — 71045 X-RAY EXAM CHEST 1 VIEW: CPT

## 2024-11-07 PROCEDURE — 73700 CT LOWER EXTREMITY W/O DYE: CPT | Mod: RT

## 2024-11-07 PROCEDURE — 770001 HCHG ROOM/CARE - MED/SURG/GYN PRIV*

## 2024-11-07 PROCEDURE — 700101 HCHG RX REV CODE 250: Performed by: INTERNAL MEDICINE

## 2024-11-07 PROCEDURE — 99285 EMERGENCY DEPT VISIT HI MDM: CPT

## 2024-11-07 PROCEDURE — 80053 COMPREHEN METABOLIC PANEL: CPT

## 2024-11-07 PROCEDURE — 700111 HCHG RX REV CODE 636 W/ 250 OVERRIDE (IP): Mod: JZ | Performed by: INTERNAL MEDICINE

## 2024-11-07 PROCEDURE — 85610 PROTHROMBIN TIME: CPT

## 2024-11-07 PROCEDURE — 99223 1ST HOSP IP/OBS HIGH 75: CPT | Mod: AI | Performed by: INTERNAL MEDICINE

## 2024-11-07 PROCEDURE — 93005 ELECTROCARDIOGRAM TRACING: CPT | Performed by: EMERGENCY MEDICINE

## 2024-11-07 PROCEDURE — 73560 X-RAY EXAM OF KNEE 1 OR 2: CPT | Mod: RT

## 2024-11-07 PROCEDURE — 86900 BLOOD TYPING SEROLOGIC ABO: CPT

## 2024-11-07 PROCEDURE — 86901 BLOOD TYPING SEROLOGIC RH(D): CPT

## 2024-11-07 PROCEDURE — 85730 THROMBOPLASTIN TIME PARTIAL: CPT

## 2024-11-07 PROCEDURE — 96376 TX/PRO/DX INJ SAME DRUG ADON: CPT

## 2024-11-07 PROCEDURE — 73552 X-RAY EXAM OF FEMUR 2/>: CPT | Mod: RT

## 2024-11-07 PROCEDURE — 700102 HCHG RX REV CODE 250 W/ 637 OVERRIDE(OP): Performed by: INTERNAL MEDICINE

## 2024-11-07 PROCEDURE — A9270 NON-COVERED ITEM OR SERVICE: HCPCS | Performed by: INTERNAL MEDICINE

## 2024-11-07 RX ORDER — ONDANSETRON 2 MG/ML
4 INJECTION INTRAMUSCULAR; INTRAVENOUS EVERY 4 HOURS PRN
Status: DISCONTINUED | OUTPATIENT
Start: 2024-11-07 | End: 2024-11-15 | Stop reason: HOSPADM

## 2024-11-07 RX ORDER — ACETAMINOPHEN 325 MG/1
650 TABLET ORAL EVERY 6 HOURS PRN
Status: DISCONTINUED | OUTPATIENT
Start: 2024-11-07 | End: 2024-11-09

## 2024-11-07 RX ORDER — LABETALOL HYDROCHLORIDE 5 MG/ML
10 INJECTION, SOLUTION INTRAVENOUS EVERY 4 HOURS PRN
Status: DISCONTINUED | OUTPATIENT
Start: 2024-11-07 | End: 2024-11-15 | Stop reason: HOSPADM

## 2024-11-07 RX ORDER — ONDANSETRON 4 MG/1
4 TABLET, ORALLY DISINTEGRATING ORAL EVERY 4 HOURS PRN
Status: DISCONTINUED | OUTPATIENT
Start: 2024-11-07 | End: 2024-11-15 | Stop reason: HOSPADM

## 2024-11-07 RX ORDER — ONDANSETRON 2 MG/ML
4 INJECTION INTRAMUSCULAR; INTRAVENOUS ONCE
Status: COMPLETED | OUTPATIENT
Start: 2024-11-07 | End: 2024-11-07

## 2024-11-07 RX ORDER — TELMISARTAN 80 MG/1
80 TABLET ORAL DAILY
Status: DISCONTINUED | OUTPATIENT
Start: 2024-11-07 | End: 2024-11-14

## 2024-11-07 RX ORDER — MORPHINE SULFATE 4 MG/ML
2 INJECTION INTRAVENOUS ONCE
Status: COMPLETED | OUTPATIENT
Start: 2024-11-07 | End: 2024-11-07

## 2024-11-07 RX ORDER — MORPHINE SULFATE 4 MG/ML
4 INJECTION INTRAVENOUS
Status: DISCONTINUED | OUTPATIENT
Start: 2024-11-07 | End: 2024-11-09

## 2024-11-07 RX ORDER — DOXAZOSIN 2 MG/1
8 TABLET ORAL
Status: DISCONTINUED | OUTPATIENT
Start: 2024-11-07 | End: 2024-11-15 | Stop reason: HOSPADM

## 2024-11-07 RX ORDER — SODIUM CHLORIDE 9 MG/ML
INJECTION, SOLUTION INTRAVENOUS CONTINUOUS
Status: DISCONTINUED | OUTPATIENT
Start: 2024-11-08 | End: 2024-11-08

## 2024-11-07 RX ORDER — MORPHINE SULFATE 4 MG/ML
4 INJECTION INTRAVENOUS ONCE
Status: COMPLETED | OUTPATIENT
Start: 2024-11-07 | End: 2024-11-07

## 2024-11-07 RX ORDER — OXYCODONE HYDROCHLORIDE 5 MG/1
5 TABLET ORAL
Status: DISCONTINUED | OUTPATIENT
Start: 2024-11-07 | End: 2024-11-09

## 2024-11-07 RX ORDER — TIMOLOL MALEATE 5 MG/ML
1 SOLUTION/ DROPS OPHTHALMIC 2 TIMES DAILY
Status: DISCONTINUED | OUTPATIENT
Start: 2024-11-07 | End: 2024-11-15 | Stop reason: HOSPADM

## 2024-11-07 RX ORDER — LEVOTHYROXINE SODIUM 125 UG/1
125 TABLET ORAL
Status: DISCONTINUED | OUTPATIENT
Start: 2024-11-08 | End: 2024-11-15 | Stop reason: HOSPADM

## 2024-11-07 RX ORDER — LINEZOLID 2 MG/ML
600 INJECTION, SOLUTION INTRAVENOUS EVERY 12 HOURS
Status: DISCONTINUED | OUTPATIENT
Start: 2024-11-07 | End: 2024-11-07

## 2024-11-07 RX ADMIN — TIMOLOL MALEATE 1 DROP: 5 SOLUTION OPHTHALMIC at 18:12

## 2024-11-07 RX ADMIN — MORPHINE SULFATE 4 MG: 4 INJECTION, SOLUTION INTRAMUSCULAR; INTRAVENOUS at 17:34

## 2024-11-07 RX ADMIN — ONDANSETRON 4 MG: 2 INJECTION INTRAMUSCULAR; INTRAVENOUS at 14:16

## 2024-11-07 RX ADMIN — DOXAZOSIN 8 MG: 2 TABLET ORAL at 19:41

## 2024-11-07 RX ADMIN — OXYCODONE 5 MG: 5 TABLET ORAL at 19:41

## 2024-11-07 RX ADMIN — MORPHINE SULFATE 2 MG: 4 INJECTION INTRAVENOUS at 15:50

## 2024-11-07 RX ADMIN — MORPHINE SULFATE 4 MG: 4 INJECTION INTRAVENOUS at 14:16

## 2024-11-07 RX ADMIN — PHYTONADIONE 10 MG: 10 INJECTION, EMULSION INTRAMUSCULAR; INTRAVENOUS; SUBCUTANEOUS at 18:36

## 2024-11-07 RX ADMIN — TELMISARTAN 80 MG: 80 TABLET ORAL at 18:13

## 2024-11-07 ASSESSMENT — ENCOUNTER SYMPTOMS
FALLS: 1
CHILLS: 0
FEVER: 0
TINGLING: 0
CONSTIPATION: 0
VOMITING: 0
WEAKNESS: 0
SHORTNESS OF BREATH: 0
MYALGIAS: 0
LOSS OF CONSCIOUSNESS: 0
PALPITATIONS: 0
NAUSEA: 0
ABDOMINAL PAIN: 0
DEPRESSION: 0
COUGH: 0
DIZZINESS: 0
STRIDOR: 0
SPUTUM PRODUCTION: 0
HEADACHES: 0
DIARRHEA: 0

## 2024-11-07 ASSESSMENT — PAIN DESCRIPTION - PAIN TYPE
TYPE: ACUTE PAIN

## 2024-11-07 ASSESSMENT — FIBROSIS 4 INDEX: FIB4 SCORE: 2.47

## 2024-11-07 ASSESSMENT — CHA2DS2 SCORE
CHF OR LEFT VENTRICULAR DYSFUNCTION: YES
AGE 65 TO 74: YES
CHA2DS2 VASC SCORE: 7
VASCULAR DISEASE: NO
PRIOR STROKE OR TIA OR THROMBOEMBOLISM: YES
AGE 75 OR GREATER: NO
DIABETES: YES
HYPERTENSION: YES
SEX: FEMALE

## 2024-11-07 NOTE — ED TRIAGE NOTES
Chief Complaint   Patient presents with    Knee Injury     Pt had slip at [facility].  Denies trauma to knee but c/o R knee pain and swelling

## 2024-11-07 NOTE — ED NOTES
Medication history reviewed with patient at bedside.   Med rec is complete  Allergies reviewed.     Patient has not had any outpatient antibiotics in the last 30 days.   Anticoagulants: Warfarin 4mg- last dose 1&1/2 tablets (6mg) 11/6/24 pm.    Bharathi Thakur PhT

## 2024-11-07 NOTE — ED PROVIDER NOTES
ED Provider Note    CHIEF COMPLAINT  Chief Complaint   Patient presents with    Knee Injury     Pt had slip at [facility].  Denies trauma to knee but c/o R knee pain and swelling       EXTERNAL RECORDS REVIEWED  Reviewed based on labs and previous workup for comparison.    HPI/ROS  LIMITATION TO HISTORY   Select: : None  OUTSIDE HISTORIAN(S):  None.    Yenifer Beasley is a 74 y.o. female who presents to the emergency department complaining of right knee pain.  The patient slipped while walking in [facility] and fell.  She states she believes she landed on the left side.  Did not hit her head she did not injure her neck, chest, abdomen or upper extremities.  She complains of severe right knee pain and swelling.  She states she did not land on her knee.  No numbness or tingling distally.  No blood thinners no other acute concerns or complaints.  Pain is severe.  Worsened with movement.    PAST MEDICAL HISTORY   has a past medical history of Allergic rhinitis, Anticoagulation monitoring, special range, Arthritis, Asthma (10/17/2019), Atrial fibrillation ([facility]), Back pain, Blood transfusion without reported diagnosis, CAD (coronary artery disease) (2007), Cataract, Chest pain at rest (11/30/2010), CHF (congestive heart failure) ([facility]), Chronic anticoagulation (04/23/2018), Constipation, Coronary heart disease, Delayed emergence from general anesthesia, Diabetes, Diabetic neuropathy ([facility]) (01/06/2011), Difficulty breathing, Fall, Gasping for breath, Gastritis (07/2010), Gastritis, GERD (gastroesophageal reflux disease), [place] measles, GI bleed, Glaucoma, Heart murmur, Hyperlipidemia, Hypertension, Hyponatremia (04/22/2014), Hypothyroidism, Impaired fasting glucose (10/08/2019), Infectious disease, Kidney disease, Migraine, Need for influenza vaccination (11/30/2010), Neuropathy in diabetes ([facility]), HILDA (obstructive sleep apnea), Painful joint, Palpitations, Pneumonia, Pneumonia due to organism (04/22/2014), Pulmonary  hypertension ([facility]), Rash, S/P aortic valve replacement (), Shortness of breath (2022), Skin infection (2018), Sleep apnea, Swelling of lower extremity, TIA (transient ischemic attack) (), TIA (transient ischemic attack), Tonsillitis, Tricuspid regurgitation mild- mod (2011), Unspecified hemorrhagic conditions, Upper GI bleed ON 2007 (2011), Upper GI bleed ON 2007 (2011), URTI (acute upper respiratory infection) (2010), UTI (urinary tract infection) (2014), Valvular heart disease, Weakness, and Wears glasses.    SURGICAL HISTORY   has a past surgical history that includes primary c section; hernia repair; mitral valve replace (); cholecystectomy; abdominal hysterectomy total; carpal tunnel release (); tonsillectomy (); shoulder surgery (2001); coronary artery bypass, 1; Sleeve,Gama Vaso Thigh; hysterectomy laparoscopy; Intubation; uvulopharyngopalatoplasty; Ventilator - Continuous; aortic valve replacement; and tubal coagulation laparoscopic bilateral.    FAMILY HISTORY  Family History   Problem Relation Age of Onset    Heart Disease Mother         a fib    Cancer Mother         Cervical: palate:      Cancer Maternal Grandmother         Leukemia,  21 y.o. 193    Cancer Maternal Aunt         Lung CA (+3 dtrs  from CA)    Heart Disease Brother         Heart attack, open heart surgery,     Diabetes Brother         Insulin dependent    Heart Disease Brother         Heart attack, open heart surgery     Breast Cancer Cousin        SOCIAL HISTORY  Social History     Tobacco Use    Smoking status: Never    Smokeless tobacco: Never   Vaping Use    Vaping status: Never Used   Substance and Sexual Activity    Alcohol use: No    Drug use: Never    Sexual activity: Not Currently       CURRENT MEDICATIONS  Home Medications       Reviewed by Oziel Louise (Pharmacy Tech) on 24 at 1536  Med List Status:  "Complete     Medication Last Dose Status   Alirocumab (PRALUENT) 150 MG/ML Solution Auto-injector 11/5/2024 Active   doxazosin (CARDURA) 8 MG tablet 11/6/2024 Active   ezetimibe (ZETIA) 10 MG Tab 11/7/2024 Active   levothyroxine (SYNTHROID) 125 MCG Tab 11/7/2024 Active   telmisartan (MICARDIS) 80 MG Tab 11/6/2024 Active   timolol (TIMOPTIC) 0.5 % Solution 11/7/2024 Active   torsemide (DEMADEX) 20 MG Tab Not Taking Active   traMADol (ULTRAM) 50 MG Tab 11/7/2024 Active   vitamin D (CHOLECALCIFEROL) 1000 UNIT TABS 11/7/2024 Active   warfarin (COUMADIN) 4 MG Tab  Active                  Audit from Redirected Encounters    **Home medications have not yet been reviewed for this encounter**         ALLERGIES  Allergies   Allergen Reactions    Asa [Aspirin]      GI BLEED    Atorvastatin Shortness of Breath    Cymbalta [Duloxetine Hcl] Unspecified     Feels like a \"zombie\"    Hmg-Coa-R Inhibitors     Latex Swelling    Nsaids      GI BLEED    Tricor Shortness of Breath     Breathing problems      Trilipix [Choline Fenofibrate] Shortness of Breath     SOB    Lyrica [Pregabalin]      SPACED OUT       PHYSICAL EXAM  VITAL SIGNS: BP (!) 221/91   Pulse 61   Temp 36.1 °C (97 °F) (Temporal)   Resp 18   Wt 103 kg (227 lb)   LMP  (LMP Unknown)   SpO2 94%   BMI 44.19 kg/m²    Constitutional: awake alert nontoxic moderate distress  HENT: Normocephalic, Atraumatic, Bilateral external ears normal,  Eyes: PERRL, EOMI, Conjunctiva normal, No discharge.   Neck: Normal range of motion, No tenderness, Supple, No stridor.   Cardiovascular: Systolic ejection murmur.  Regular rate and rhythm.  Thorax & Lungs: Normal breath sounds, No respiratory distress, No wheezing,  Abdomen:soft, No tenderness  Skin: Warm, Dry, No erythema, No rash.   Back: No tenderness, No CVA tenderness.   Musculoskeletal: Good range of motion in all major joints.  Right knee is tender and swollen around the knee.  Seems to be a subtle lateral deformity of the tibia. "  No tenderness above or below the knee.  Good pulses, normal neurovascular exam  Neurologic: Alert,No focal deficits noted.   Psychiatric: Affect normal      EKG/LABS  Results for orders placed or performed during the hospital encounter of 11/07/24   CBC WITH DIFFERENTIAL    Collection Time: 11/07/24  2:49 PM   Result Value Ref Range    WBC 7.5 4.8 - 10.8 K/uL    RBC 3.93 (L) 4.20 - 5.40 M/uL    Hemoglobin 12.0 12.0 - 16.0 g/dL    Hematocrit 35.5 (L) 37.0 - 47.0 %    MCV 90.3 81.4 - 97.8 fL    MCH 30.5 27.0 - 33.0 pg    MCHC 33.8 32.2 - 35.5 g/dL    RDW 43.4 35.9 - 50.0 fL    Platelet Count 157 (L) 164 - 446 K/uL    MPV 10.0 9.0 - 12.9 fL    Neutrophils-Polys 85.30 (H) 44.00 - 72.00 %    Lymphocytes 8.40 (L) 22.00 - 41.00 %    Monocytes 4.20 0.00 - 13.40 %    Eosinophils 1.20 0.00 - 6.90 %    Basophils 0.40 0.00 - 1.80 %    Immature Granulocytes 0.50 0.00 - 0.90 %    Nucleated RBC 0.00 0.00 - 0.20 /100 WBC    Neutrophils (Absolute) 6.42 1.82 - 7.42 K/uL    Lymphs (Absolute) 0.63 (L) 1.00 - 4.80 K/uL    Monos (Absolute) 0.32 0.00 - 0.85 K/uL    Eos (Absolute) 0.09 0.00 - 0.51 K/uL    Baso (Absolute) 0.03 0.00 - 0.12 K/uL    Immature Granulocytes (abs) 0.04 0.00 - 0.11 K/uL    NRBC (Absolute) 0.00 K/uL   COMP METABOLIC PANEL    Collection Time: 11/07/24  2:49 PM   Result Value Ref Range    Sodium 140 135 - 145 mmol/L    Potassium 4.3 3.6 - 5.5 mmol/L    Chloride 103 96 - 112 mmol/L    Co2 22 20 - 33 mmol/L    Anion Gap 15.0 7.0 - 16.0    Glucose 132 (H) 65 - 99 mg/dL    Bun 44 (H) 8 - 22 mg/dL    Creatinine 2.08 (H) 0.50 - 1.40 mg/dL    Calcium 9.6 8.5 - 10.5 mg/dL    Correct Calcium 9.8 8.5 - 10.5 mg/dL    AST(SGOT) 29 12 - 45 U/L    ALT(SGPT) 14 2 - 50 U/L    Alkaline Phosphatase 66 30 - 99 U/L    Total Bilirubin 0.6 0.1 - 1.5 mg/dL    Albumin 3.8 3.2 - 4.9 g/dL    Total Protein 6.7 6.0 - 8.2 g/dL    Globulin 2.9 1.9 - 3.5 g/dL    A-G Ratio 1.3 g/dL   ESTIMATED GFR    Collection Time: 11/07/24  2:49 PM   Result  Value Ref Range    GFR (CKD-EPI) 25 (A) >60 mL/min/1.73 m 2   APTT    Collection Time: 24  2:49 PM   Result Value Ref Range    APTT 33.7 24.7 - 36.0 sec   PROTHROMBIN TIME (INR)    Collection Time: 24  2:49 PM   Result Value Ref Range    PT 27.7 (H) 12.0 - 14.6 sec    INR 2.57 (H) 0.87 - 1.13   EKG (NOW)    Collection Time: 24  4:14 PM   Result Value Ref Range    Report       [facility] Emergency Dept.    Test Date:  2024  Pt Name:    LIMA FIGUEREDO               Department: ER  MRN:        [id number]                      Room:       [facility]  Gender:     Female                       Technician: [id number]  :        1950                   Requested By:MICKEY HURST  Order #:    [id number]                    Reading MD: MICKEY HURST. AMD    Measurements  Intervals                                Axis  Rate:       60                           P:          0  RI:         70                           QRS:        27  QRSD:       173                          T:          25  QT:         482  QTc:        482    Interpretive Statements  Sinus rhythm  Short RI interval  IVCD, consider atypical RBBB  Probable left ventricular hypertrophy  Compared to ECG 2024 13:47:25  Short RI interval now present  Sinus bradycardia no longer present  First degree AV block no longer present  Electronically Signed On 2024 18:13:19 PST by MICKEY KNIGHT. AMD       *Note: Due to a large number of results and/or encounters for the requested time period, some results have not been displayed. A complete set of results can be found in Results Review.      I have independently interpreted this EKG    RADIOLOGY/PROCEDURES   I have independently interpreted the diagnostic imaging associated with this visit and am waiting the final reading from the radiologist.   My preliminary interpretation is as follows: Reviewed images agree with radiologist results.    Radiologist interpretation:  CT-KNEE W/O  PLUS RECONS RIGHT   Final Result      1.  Lateral femoral condyle intra-articular fracture      2.  Associated joint effusion      3.  No other fracture      4.  osteoarthritis the medial femorotibial and patellofemoral compartments      5.  Small vessel atherosclerotic plaque      DX-CHEST-LIMITED (1 VIEW)   Final Result      1.  Persistently enlarged cardiac silhouette   2.  Atherosclerosis   3.  Increased BILATERAL pulmonary opacities, favor pulmonary edema      DX-FEMUR-2+ RIGHT   Final Result      1.  Lateral femoral condylar fracture redemonstrated with a large knee joint effusion   2.  No additional fracture   3.  Osteoarthritis      DX-KNEE 2- RIGHT   Final Result      Acute mildly displaced intra-articular fracture of the lateral femoral condyle.          COURSE & MEDICAL DECISION MAKING    ASSESSMENT, COURSE AND PLAN  Care Narrative:     74-year-old female presents to the emergency department for evaluation of right knee pain after a fall and [employer].  She found her left side.  Did not hit her head injure her chest abdomen pelvis or other extremities.  She complains of significant right knee pain.    She seen and examined.  She has no signs of contusion or abrasion or marks on her head or neck or torso.  Significant swelling and tenderness around the knee.  Two-view portable knee x-ray was obtained and shows significant fracture of the lateral condyle.  She has a normal neurovascular examination.    The patient also had a femur x-ray which shows the same finding.  At this point this looks surgical.  I have ordered preoperative labs and imaging.  The patient is chronically anticoagulated and I spoke with Dr. Camargo from orthopedic surgery.  He will come see the patient recommends a CT knee immobilizer and hospitalization.    The patient is on Coumadin with mechanical heart valve.  INR historically around 2-1/2.  She is carefully examined she is adamant she did not hit her head.  I do not see any marks on  her on my initial exam and she is observed to ensure she is not developing any ecchymosis or bruising and reexamined and she still does not have any bumps or bruises.  We can forego a head CT.  She has no signs of trauma to her chest or abdomen and no pain in her chest or abdomen no flank pain.  She can be admitted and observed.    I spoke with Dr. Booth will see the patient for hospitalization.  The patient has been seen by the orthopedic surgeon please see the consult note.  Seen by the hospitalist please see their consult note.    Questions were answered she is agreeable with the plan.  Repeat neurovascular beata normal.  She is a repeat neurovasc exam in the immobilizer which has a good pulse and normal sensation.    Immobilizer is applied here in the ED.        ADDITIONAL PROBLEMS MANAGED  Chronic anticoagulation.    DISPOSITION AND DISCUSSIONS  I have discussed management of the patient with the following physicians and HANY's: [facility] surgery, and the hospitalist.        FINAL DIAGNOSIS  1. Closed displaced fracture of lateral condyle of right femur, initial encounter ([facility])    2.  Chronic anticoagulation use.     Electronically signed by: Obi Zuniga M.D., 11/7/2024 2:37 PM

## 2024-11-08 ENCOUNTER — APPOINTMENT (OUTPATIENT)
Dept: RADIOLOGY | Facility: MEDICAL CENTER | Age: 74
DRG: 481 | End: 2024-11-08
Attending: ORTHOPAEDIC SURGERY
Payer: MEDICARE

## 2024-11-08 ENCOUNTER — ANESTHESIA EVENT (OUTPATIENT)
Dept: SURGERY | Facility: MEDICAL CENTER | Age: 74
DRG: 481 | End: 2024-11-08
Payer: MEDICARE

## 2024-11-08 ENCOUNTER — ANESTHESIA (OUTPATIENT)
Dept: SURGERY | Facility: MEDICAL CENTER | Age: 74
DRG: 481 | End: 2024-11-08
Payer: MEDICARE

## 2024-11-08 LAB
ANION GAP SERPL CALC-SCNC: 10 MMOL/L (ref 7–16)
BUN SERPL-MCNC: 38 MG/DL (ref 8–22)
CALCIUM SERPL-MCNC: 9.3 MG/DL (ref 8.5–10.5)
CHLORIDE SERPL-SCNC: 105 MMOL/L (ref 96–112)
CO2 SERPL-SCNC: 26 MMOL/L (ref 20–33)
CREAT SERPL-MCNC: 1.77 MG/DL (ref 0.5–1.4)
ERYTHROCYTE [DISTWIDTH] IN BLOOD BY AUTOMATED COUNT: 44.4 FL (ref 35.9–50)
GFR SERPLBLD CREATININE-BSD FMLA CKD-EPI: 30 ML/MIN/1.73 M 2
GLUCOSE BLD STRIP.AUTO-MCNC: 115 MG/DL (ref 65–99)
GLUCOSE BLD STRIP.AUTO-MCNC: 138 MG/DL (ref 65–99)
GLUCOSE SERPL-MCNC: 141 MG/DL (ref 65–99)
HCT VFR BLD AUTO: 33.9 % (ref 37–47)
HGB BLD-MCNC: 11.3 G/DL (ref 12–16)
INR PPP: 1.91 (ref 0.87–1.13)
MCH RBC QN AUTO: 30.7 PG (ref 27–33)
MCHC RBC AUTO-ENTMCNC: 33.3 G/DL (ref 32.2–35.5)
MCV RBC AUTO: 92.1 FL (ref 81.4–97.8)
PLATELET # BLD AUTO: 166 K/UL (ref 164–446)
PMV BLD AUTO: 10.5 FL (ref 9–12.9)
POTASSIUM SERPL-SCNC: 4.6 MMOL/L (ref 3.6–5.5)
PROTHROMBIN TIME: 22 SEC (ref 12–14.6)
RBC # BLD AUTO: 3.68 M/UL (ref 4.2–5.4)
SCCMEC + MECA PNL NOSE NAA+PROBE: NEGATIVE
SODIUM SERPL-SCNC: 141 MMOL/L (ref 135–145)
WBC # BLD AUTO: 9.1 K/UL (ref 4.8–10.8)

## 2024-11-08 PROCEDURE — 160009 HCHG ANES TIME/MIN: Performed by: ORTHOPAEDIC SURGERY

## 2024-11-08 PROCEDURE — 80048 BASIC METABOLIC PNL TOTAL CA: CPT

## 2024-11-08 PROCEDURE — 700102 HCHG RX REV CODE 250 W/ 637 OVERRIDE(OP): Performed by: ANESTHESIOLOGY

## 2024-11-08 PROCEDURE — 700105 HCHG RX REV CODE 258: Performed by: ANESTHESIOLOGY

## 2024-11-08 PROCEDURE — 73552 X-RAY EXAM OF FEMUR 2/>: CPT | Mod: RT

## 2024-11-08 PROCEDURE — 160035 HCHG PACU - 1ST 60 MINS PHASE I: Performed by: ORTHOPAEDIC SURGERY

## 2024-11-08 PROCEDURE — 502000 HCHG MISC OR IMPLANTS RC 0278: Performed by: ORTHOPAEDIC SURGERY

## 2024-11-08 PROCEDURE — C1713 ANCHOR/SCREW BN/BN,TIS/BN: HCPCS | Performed by: ORTHOPAEDIC SURGERY

## 2024-11-08 PROCEDURE — 700102 HCHG RX REV CODE 250 W/ 637 OVERRIDE(OP): Performed by: INTERNAL MEDICINE

## 2024-11-08 PROCEDURE — 82962 GLUCOSE BLOOD TEST: CPT

## 2024-11-08 PROCEDURE — 160036 HCHG PACU - EA ADDL 30 MINS PHASE I: Performed by: ORTHOPAEDIC SURGERY

## 2024-11-08 PROCEDURE — 160028 HCHG SURGERY MINUTES - 1ST 30 MINS LEVEL 3: Performed by: ORTHOPAEDIC SURGERY

## 2024-11-08 PROCEDURE — 160048 HCHG OR STATISTICAL LEVEL 1-5: Performed by: ORTHOPAEDIC SURGERY

## 2024-11-08 PROCEDURE — 99233 SBSQ HOSP IP/OBS HIGH 50: CPT | Performed by: HOSPITALIST

## 2024-11-08 PROCEDURE — 160039 HCHG SURGERY MINUTES - EA ADDL 1 MIN LEVEL 3: Performed by: ORTHOPAEDIC SURGERY

## 2024-11-08 PROCEDURE — 700101 HCHG RX REV CODE 250: Performed by: ANESTHESIOLOGY

## 2024-11-08 PROCEDURE — 770001 HCHG ROOM/CARE - MED/SURG/GYN PRIV*

## 2024-11-08 PROCEDURE — 160002 HCHG RECOVERY MINUTES (STAT): Performed by: ORTHOPAEDIC SURGERY

## 2024-11-08 PROCEDURE — 110371 HCHG SHELL REV 272: Performed by: ORTHOPAEDIC SURGERY

## 2024-11-08 PROCEDURE — A9270 NON-COVERED ITEM OR SERVICE: HCPCS | Performed by: ANESTHESIOLOGY

## 2024-11-08 PROCEDURE — 700111 HCHG RX REV CODE 636 W/ 250 OVERRIDE (IP): Performed by: ANESTHESIOLOGY

## 2024-11-08 PROCEDURE — A9270 NON-COVERED ITEM OR SERVICE: HCPCS | Performed by: INTERNAL MEDICINE

## 2024-11-08 PROCEDURE — 0QSB04Z REPOSITION RIGHT LOWER FEMUR WITH INTERNAL FIXATION DEVICE, OPEN APPROACH: ICD-10-PCS | Performed by: ORTHOPAEDIC SURGERY

## 2024-11-08 PROCEDURE — 700111 HCHG RX REV CODE 636 W/ 250 OVERRIDE (IP): Mod: JZ | Performed by: ANESTHESIOLOGY

## 2024-11-08 PROCEDURE — 85610 PROTHROMBIN TIME: CPT

## 2024-11-08 PROCEDURE — 87641 MR-STAPH DNA AMP PROBE: CPT

## 2024-11-08 PROCEDURE — 700111 HCHG RX REV CODE 636 W/ 250 OVERRIDE (IP): Mod: JZ | Performed by: INTERNAL MEDICINE

## 2024-11-08 PROCEDURE — 36415 COLL VENOUS BLD VENIPUNCTURE: CPT

## 2024-11-08 PROCEDURE — 85027 COMPLETE CBC AUTOMATED: CPT

## 2024-11-08 DEVICE — 4.5MM CORTEX TI SCREW 4.5MM L38MM: Type: IMPLANTABLE DEVICE | Site: LEG | Status: FUNCTIONAL

## 2024-11-08 DEVICE — SCREW BONE L36MM DIA4.5MM CORTICAL TITANIUM SELF TAPPING STANDARD LOCKING LOW PROFILE AXSOS: Type: IMPLANTABLE DEVICE | Site: LEG | Status: FUNCTIONAL

## 2024-11-08 DEVICE — IMPLANTABLE DEVICE: Type: IMPLANTABLE DEVICE | Site: LEG | Status: FUNCTIONAL

## 2024-11-08 DEVICE — WIRE FIXATION KIRSCHNER TROCAR POINT: Type: IMPLANTABLE DEVICE | Site: LEG | Status: FUNCTIONAL

## 2024-11-08 RX ORDER — HYDROMORPHONE HYDROCHLORIDE 2 MG/ML
INJECTION, SOLUTION INTRAMUSCULAR; INTRAVENOUS; SUBCUTANEOUS PRN
Status: DISCONTINUED | OUTPATIENT
Start: 2024-11-08 | End: 2024-11-08 | Stop reason: SURG

## 2024-11-08 RX ORDER — HYDROMORPHONE HYDROCHLORIDE 1 MG/ML
0.4 INJECTION, SOLUTION INTRAMUSCULAR; INTRAVENOUS; SUBCUTANEOUS
Status: DISCONTINUED | OUTPATIENT
Start: 2024-11-08 | End: 2024-11-08 | Stop reason: HOSPADM

## 2024-11-08 RX ORDER — GLYCOPYRROLATE 0.2 MG/ML
INJECTION INTRAMUSCULAR; INTRAVENOUS PRN
Status: DISCONTINUED | OUTPATIENT
Start: 2024-11-08 | End: 2024-11-08 | Stop reason: SURG

## 2024-11-08 RX ORDER — DEXAMETHASONE SODIUM PHOSPHATE 4 MG/ML
INJECTION, SOLUTION INTRA-ARTICULAR; INTRALESIONAL; INTRAMUSCULAR; INTRAVENOUS; SOFT TISSUE PRN
Status: DISCONTINUED | OUTPATIENT
Start: 2024-11-08 | End: 2024-11-08 | Stop reason: SURG

## 2024-11-08 RX ORDER — LIDOCAINE HYDROCHLORIDE 40 MG/ML
SOLUTION TOPICAL PRN
Status: DISCONTINUED | OUTPATIENT
Start: 2024-11-08 | End: 2024-11-08 | Stop reason: SURG

## 2024-11-08 RX ORDER — ROCURONIUM BROMIDE 10 MG/ML
INJECTION, SOLUTION INTRAVENOUS PRN
Status: DISCONTINUED | OUTPATIENT
Start: 2024-11-08 | End: 2024-11-08 | Stop reason: SURG

## 2024-11-08 RX ORDER — IPRATROPIUM BROMIDE AND ALBUTEROL SULFATE 2.5; .5 MG/3ML; MG/3ML
3 SOLUTION RESPIRATORY (INHALATION)
Status: DISCONTINUED | OUTPATIENT
Start: 2024-11-08 | End: 2024-11-08 | Stop reason: HOSPADM

## 2024-11-08 RX ORDER — INSULIN LISPRO 100 [IU]/ML
2-9 INJECTION, SOLUTION INTRAVENOUS; SUBCUTANEOUS EVERY 6 HOURS
Status: DISCONTINUED | OUTPATIENT
Start: 2024-11-08 | End: 2024-11-08 | Stop reason: HOSPADM

## 2024-11-08 RX ORDER — HALOPERIDOL 5 MG/ML
1 INJECTION INTRAMUSCULAR
Status: DISCONTINUED | OUTPATIENT
Start: 2024-11-08 | End: 2024-11-08 | Stop reason: HOSPADM

## 2024-11-08 RX ORDER — TORSEMIDE 20 MG/1
20 TABLET ORAL
Status: DISCONTINUED | OUTPATIENT
Start: 2024-11-09 | End: 2024-11-12

## 2024-11-08 RX ORDER — SODIUM CHLORIDE 9 MG/ML
INJECTION, SOLUTION INTRAVENOUS
Status: DISCONTINUED | OUTPATIENT
Start: 2024-11-08 | End: 2024-11-08 | Stop reason: SURG

## 2024-11-08 RX ORDER — LIDOCAINE HYDROCHLORIDE 20 MG/ML
INJECTION, SOLUTION EPIDURAL; INFILTRATION; INTRACAUDAL; PERINEURAL PRN
Status: DISCONTINUED | OUTPATIENT
Start: 2024-11-08 | End: 2024-11-08 | Stop reason: SURG

## 2024-11-08 RX ORDER — HYDROMORPHONE HYDROCHLORIDE 1 MG/ML
0.2 INJECTION, SOLUTION INTRAMUSCULAR; INTRAVENOUS; SUBCUTANEOUS
Status: DISCONTINUED | OUTPATIENT
Start: 2024-11-08 | End: 2024-11-08 | Stop reason: HOSPADM

## 2024-11-08 RX ORDER — CEFAZOLIN SODIUM 1 G/3ML
INJECTION, POWDER, FOR SOLUTION INTRAMUSCULAR; INTRAVENOUS PRN
Status: DISCONTINUED | OUTPATIENT
Start: 2024-11-08 | End: 2024-11-08 | Stop reason: SURG

## 2024-11-08 RX ORDER — OXYCODONE HCL 5 MG/5 ML
5 SOLUTION, ORAL ORAL
Status: COMPLETED | OUTPATIENT
Start: 2024-11-08 | End: 2024-11-08

## 2024-11-08 RX ORDER — ALBUTEROL SULFATE 5 MG/ML
2.5 SOLUTION RESPIRATORY (INHALATION)
Status: DISCONTINUED | OUTPATIENT
Start: 2024-11-08 | End: 2024-11-08 | Stop reason: HOSPADM

## 2024-11-08 RX ORDER — SODIUM CHLORIDE 9 MG/ML
INJECTION, SOLUTION INTRAVENOUS CONTINUOUS
Status: DISCONTINUED | OUTPATIENT
Start: 2024-11-08 | End: 2024-11-08 | Stop reason: HOSPADM

## 2024-11-08 RX ORDER — ONDANSETRON 2 MG/ML
4 INJECTION INTRAMUSCULAR; INTRAVENOUS
Status: DISCONTINUED | OUTPATIENT
Start: 2024-11-08 | End: 2024-11-08 | Stop reason: HOSPADM

## 2024-11-08 RX ORDER — DIPHENHYDRAMINE HYDROCHLORIDE 50 MG/ML
12.5 INJECTION INTRAMUSCULAR; INTRAVENOUS
Status: DISCONTINUED | OUTPATIENT
Start: 2024-11-08 | End: 2024-11-08 | Stop reason: HOSPADM

## 2024-11-08 RX ORDER — OXYCODONE HCL 5 MG/5 ML
10 SOLUTION, ORAL ORAL
Status: COMPLETED | OUTPATIENT
Start: 2024-11-08 | End: 2024-11-08

## 2024-11-08 RX ORDER — HYDROMORPHONE HYDROCHLORIDE 1 MG/ML
0.1 INJECTION, SOLUTION INTRAMUSCULAR; INTRAVENOUS; SUBCUTANEOUS
Status: DISCONTINUED | OUTPATIENT
Start: 2024-11-08 | End: 2024-11-08 | Stop reason: HOSPADM

## 2024-11-08 RX ORDER — ONDANSETRON 2 MG/ML
INJECTION INTRAMUSCULAR; INTRAVENOUS PRN
Status: DISCONTINUED | OUTPATIENT
Start: 2024-11-08 | End: 2024-11-08 | Stop reason: SURG

## 2024-11-08 RX ORDER — HYDRALAZINE HYDROCHLORIDE 20 MG/ML
5 INJECTION INTRAMUSCULAR; INTRAVENOUS
Status: DISCONTINUED | OUTPATIENT
Start: 2024-11-08 | End: 2024-11-08 | Stop reason: HOSPADM

## 2024-11-08 RX ORDER — DEXTROSE MONOHYDRATE 25 G/50ML
12.5 INJECTION, SOLUTION INTRAVENOUS
Status: DISCONTINUED | OUTPATIENT
Start: 2024-11-08 | End: 2024-11-08 | Stop reason: HOSPADM

## 2024-11-08 RX ORDER — METOPROLOL TARTRATE 1 MG/ML
1 INJECTION, SOLUTION INTRAVENOUS
Status: DISCONTINUED | OUTPATIENT
Start: 2024-11-08 | End: 2024-11-08 | Stop reason: HOSPADM

## 2024-11-08 RX ADMIN — PROPOFOL 50 MG: 10 INJECTION, EMULSION INTRAVENOUS at 16:49

## 2024-11-08 RX ADMIN — OXYCODONE 5 MG: 5 TABLET ORAL at 04:02

## 2024-11-08 RX ADMIN — LEVOTHYROXINE SODIUM 125 MCG: 0.12 TABLET ORAL at 05:26

## 2024-11-08 RX ADMIN — IPRATROPIUM BROMIDE AND ALBUTEROL SULFATE 3 ML: 2.5; .5 SOLUTION RESPIRATORY (INHALATION) at 19:00

## 2024-11-08 RX ADMIN — ROCURONIUM BROMIDE 50 MG: 50 INJECTION, SOLUTION INTRAVENOUS at 15:20

## 2024-11-08 RX ADMIN — TIMOLOL MALEATE 1 DROP: 5 SOLUTION OPHTHALMIC at 05:26

## 2024-11-08 RX ADMIN — MORPHINE SULFATE 4 MG: 4 INJECTION, SOLUTION INTRAMUSCULAR; INTRAVENOUS at 23:01

## 2024-11-08 RX ADMIN — FENTANYL CITRATE 100 MCG: 50 INJECTION, SOLUTION INTRAMUSCULAR; INTRAVENOUS at 16:49

## 2024-11-08 RX ADMIN — OXYCODONE HYDROCHLORIDE 5 MG: 5 SOLUTION ORAL at 19:08

## 2024-11-08 RX ADMIN — ONDANSETRON 4 MG: 2 INJECTION INTRAMUSCULAR; INTRAVENOUS at 16:15

## 2024-11-08 RX ADMIN — MORPHINE SULFATE 4 MG: 4 INJECTION, SOLUTION INTRAMUSCULAR; INTRAVENOUS at 21:09

## 2024-11-08 RX ADMIN — CEFAZOLIN 2 G: 1 INJECTION, POWDER, FOR SOLUTION INTRAMUSCULAR; INTRAVENOUS at 15:20

## 2024-11-08 RX ADMIN — GLYCOPYRROLATE 0.2 MG: 0.2 INJECTION INTRAMUSCULAR; INTRAVENOUS at 15:38

## 2024-11-08 RX ADMIN — OXYCODONE 5 MG: 5 TABLET ORAL at 00:02

## 2024-11-08 RX ADMIN — HYDROMORPHONE HYDROCHLORIDE 1 MG: 2 INJECTION INTRAMUSCULAR; INTRAVENOUS; SUBCUTANEOUS at 16:17

## 2024-11-08 RX ADMIN — DEXAMETHASONE SODIUM PHOSPHATE 4 MG: 4 INJECTION INTRA-ARTICULAR; INTRALESIONAL; INTRAMUSCULAR; INTRAVENOUS; SOFT TISSUE at 15:34

## 2024-11-08 RX ADMIN — OXYCODONE 5 MG: 5 TABLET ORAL at 08:52

## 2024-11-08 RX ADMIN — LIDOCAINE HYDROCHLORIDE 4 ML: 40 SOLUTION TOPICAL at 15:21

## 2024-11-08 RX ADMIN — ACETAMINOPHEN 650 MG: 325 TABLET ORAL at 18:41

## 2024-11-08 RX ADMIN — SUGAMMADEX 200 MG: 100 INJECTION, SOLUTION INTRAVENOUS at 16:15

## 2024-11-08 RX ADMIN — LIDOCAINE HYDROCHLORIDE 100 MG: 20 INJECTION, SOLUTION EPIDURAL; INFILTRATION; INTRACAUDAL; PERINEURAL at 15:20

## 2024-11-08 RX ADMIN — NOREPINEPHRINE BITARTRATE 0.1 MCG/KG/MIN: 1 INJECTION, SOLUTION, CONCENTRATE INTRAVENOUS at 15:20

## 2024-11-08 RX ADMIN — PROPOFOL 150 MG: 10 INJECTION, EMULSION INTRAVENOUS at 15:20

## 2024-11-08 RX ADMIN — SODIUM CHLORIDE: 9 INJECTION, SOLUTION INTRAVENOUS at 15:11

## 2024-11-08 RX ADMIN — HYDROMORPHONE HYDROCHLORIDE 1 MG: 2 INJECTION INTRAMUSCULAR; INTRAVENOUS; SUBCUTANEOUS at 15:16

## 2024-11-08 RX ADMIN — HYDRALAZINE HYDROCHLORIDE 5 MG: 20 INJECTION INTRAMUSCULAR; INTRAVENOUS at 17:50

## 2024-11-08 RX ADMIN — DOXAZOSIN 8 MG: 2 TABLET ORAL at 21:09

## 2024-11-08 ASSESSMENT — LIFESTYLE VARIABLES
EVER FELT BAD OR GUILTY ABOUT YOUR DRINKING: NO
ON A TYPICAL DAY WHEN YOU DRINK ALCOHOL HOW MANY DRINKS DO YOU HAVE: 0
EVER HAD A DRINK FIRST THING IN THE MORNING TO STEADY YOUR NERVES TO GET RID OF A HANGOVER: NO
TOTAL SCORE: 0
HAVE YOU EVER FELT YOU SHOULD CUT DOWN ON YOUR DRINKING: NO
HAVE PEOPLE ANNOYED YOU BY CRITICIZING YOUR DRINKING: NO
DOES PATIENT WANT TO STOP DRINKING: NO
CONSUMPTION TOTAL: NEGATIVE
ALCOHOL_USE: NO
HOW MANY TIMES IN THE PAST YEAR HAVE YOU HAD 5 OR MORE DRINKS IN A DAY: 0
TOTAL SCORE: 0
AVERAGE NUMBER OF DAYS PER WEEK YOU HAVE A DRINK CONTAINING ALCOHOL: 0
TOTAL SCORE: 0

## 2024-11-08 ASSESSMENT — PAIN DESCRIPTION - PAIN TYPE
TYPE: ACUTE PAIN
TYPE: CHRONIC PAIN
TYPE: ACUTE PAIN
TYPE: SURGICAL PAIN
TYPE: ACUTE PAIN
TYPE: SURGICAL PAIN
TYPE: ACUTE PAIN
TYPE: ACUTE PAIN;CHRONIC PAIN
TYPE: ACUTE PAIN
TYPE: SURGICAL PAIN

## 2024-11-08 ASSESSMENT — SOCIAL DETERMINANTS OF HEALTH (SDOH)
WITHIN THE LAST YEAR, HAVE YOU BEEN AFRAID OF YOUR PARTNER OR EX-PARTNER?: NO
WITHIN THE LAST YEAR, HAVE YOU BEEN HUMILIATED OR EMOTIONALLY ABUSED IN OTHER WAYS BY YOUR PARTNER OR EX-PARTNER?: NO
WITHIN THE LAST YEAR, HAVE TO BEEN RAPED OR FORCED TO HAVE ANY KIND OF SEXUAL ACTIVITY BY YOUR PARTNER OR EX-PARTNER?: NO
WITHIN THE LAST YEAR, HAVE YOU BEEN KICKED, HIT, SLAPPED, OR OTHERWISE PHYSICALLY HURT BY YOUR PARTNER OR EX-PARTNER?: NO
WITHIN THE PAST 12 MONTHS, YOU WORRIED THAT YOUR FOOD WOULD RUN OUT BEFORE YOU GOT THE MONEY TO BUY MORE: NEVER TRUE
WITHIN THE PAST 12 MONTHS, THE FOOD YOU BOUGHT JUST DIDN'T LAST AND YOU DIDN'T HAVE MONEY TO GET MORE: NEVER TRUE
IN THE PAST 12 MONTHS, HAS THE ELECTRIC, GAS, OIL, OR WATER COMPANY THREATENED TO SHUT OFF SERVICE IN YOUR HOME?: NO

## 2024-11-08 ASSESSMENT — ENCOUNTER SYMPTOMS
SHORTNESS OF BREATH: 0
FEVER: 0

## 2024-11-08 ASSESSMENT — PATIENT HEALTH QUESTIONNAIRE - PHQ9
2. FEELING DOWN, DEPRESSED, IRRITABLE, OR HOPELESS: NOT AT ALL
1. LITTLE INTEREST OR PLEASURE IN DOING THINGS: NOT AT ALL
SUM OF ALL RESPONSES TO PHQ9 QUESTIONS 1 AND 2: 0

## 2024-11-08 ASSESSMENT — PAIN SCALES - GENERAL: PAIN_LEVEL: 5

## 2024-11-08 NOTE — ASSESSMENT & PLAN NOTE
CT imaging revealed a right femoral lateral condyle fracture.  Orthopedic surgery consulted, once INR was less than 1.5, patient underwent internal fixation of the right lateral condyle distal femoral fracture on 11/8.  Recommendations for toe-touch weightbearing in right lower extremity, can perform knee range of motion as tolerated and will not require any bracing.  Patient restarted on Coumadin 24 hours postoperatively.  She is to follow-up with orthopedic surgery in about 2 to 3 weeks.

## 2024-11-08 NOTE — ASSESSMENT & PLAN NOTE
Reviewed echocardiogram patient with moderate to severe tricuspid regurgitation moderate mitral stenosis and mechanical aortic valve replacement with increased gradients    Given DIAMOND will hold torsemide and Micardis today and monitor volume status  Repeat chemistry panel ordered

## 2024-11-08 NOTE — ASSESSMENT & PLAN NOTE
Monitor blood pressure and adjust accordingly   as needed labetalol  Hold diuretics and ARB today and monitor renal function

## 2024-11-08 NOTE — PROGRESS NOTES
74yoF with right displaced lateral condyle distal femur fracture with preexisting osteoarthritis.    S: NPO awaiting surgery    O:  Vitals:    11/08/24 1336   BP: (!) 182/76   Pulse: 63   Resp: 17   Temp: 36.7 °C (98 °F)   SpO2: 92%     Exam:  General-NAD, alert and following commands  RLE-knee immobilizer in place, NVI distally    A: 74yoF with right displaced lateral condyle distal femur fracture with preexisting osteoarthritis.    Recs:  --planning surgical fixation of right displaced lateral condyle distal femur fracture today  --continue NWB RLE in knee immobilizer and NPO while awaiting surgery

## 2024-11-08 NOTE — PROGRESS NOTES
Inpatient Anticoagulation Service Note for 11/7/2024      Reason for Anticoagulation: Aortic Mechanical Valve Replacement  , Atrial Fibrillation, Transient Ischemic Attack   OHF7PT1 VASc Score: 7  HAS-BLED Score: 5    Hemoglobin Value: 12  Hematocrit Value: (!) 35.5  Lab Platelet Value: (!) 157  Target INR: 2.5 to 3.5 (Pre-procedure goal 1.5)    INR from last 7 days        Date/Time INR Value    11/07/24 1449 2.57         Significant Interactions: Not Applicable     Reversal Agent Administered: Vitamin K  Intravenous    Comments: Patient presented to ED for GLF resulting in femur fracture. Patient followed by [facility]. Patient on Warfarin 6 mg MWF and 4 mg on all other days. TTR 69.9%. Patient current INR is therapeutic at 2.57 with INR goal of 2.5 -3.5. Patient requires surgical procedure and Ortho states INR goal preop will be 1.5. Phytonadione 10 mg IV administered. Continue to follow for appropriate re-initation of Warfarin post procedure need for bridge therapy.     Plan:  Hold Warfarin pre-procedure.      Pharmacist suggested discharge dosing: Resume home dosing of Warfarin 6 mg MWF and 4 mg on all other days.      Emily DietrichD

## 2024-11-08 NOTE — ASSESSMENT & PLAN NOTE
Continue home Synthroid at 125 mcg daily  Most recent TSH was approximately 1 month ago was normal at 3.95

## 2024-11-08 NOTE — DISCHARGE PLANNING
RN BOBBI met with patient at bedside to complete assessment.   Pleasantly A&Ox4 and able to verify information on face sheet.   Lives with her functional/employed daughter, Fatimah, in single story home in [place].   Independent with ADLs and IADLs at baseline.   Owns and uses [employer] at baseline.   Also reports owning: FWW, 4WW, Shower Chair and CPAP (supplied by [employer]) which she uses nocturnally for HILDA.   Owns vehicle and drives at baseline.   Her other adult daughter, Lurdes, lives in [place] and is an RN.   Patient is retired and denies financial concerns.   Denies mental health or substance abuse concerns.   PCP is Jose Vela MD with [facility].   Insured by [employer] (notified patient that [employer] TCN will likely round during her admission).   Pending OR today and post-op PT/OT recommendations.   Patient aware she will likely require placement.     [place] PASRR: [id number]    Case Management Discharge Planning    Admission Date: 11/7/2024  GMLOS: 2.9  ALOS: 1    6-Clicks ADL Score:    6-Clicks Mobility Score:        Anticipated Discharge Dispo: Discharge Disposition: Disch to IP rehab facility or distinct part unit (62)    DME Needed: No    Action(s) Taken: Updated Provider/Nurse on Discharge Plan, Patient Conference, and DC Assessment Complete (See below)    Escalations Completed: None    Medically Clear: No    Next Steps: Send referrals as appropriate once recommendations are in.     Barriers to Discharge: Medical clearance, Pending PT Evaluation, and Pending Procedures    Is the patient up for discharge tomorrow: No    Care Transition Team Assessment    Information Source  Orientation Level: Oriented X4  Information Given By: Patient  Informant's Name: Yenifer  Who is responsible for making decisions for patient? : Patient    Readmission Evaluation  Is this a readmission?: No    Elopement Risk  Legal Hold: No  Ambulatory or Self Mobile in Wheelchair: No-Not an Elopement Risk  Elopement Risk: Not at Risk for  Elopement    Interdisciplinary Discharge Planning  Does Admitting Nurse Feel This Could be a Complex Discharge?: No  Primary Care Physician: Jose Vela MD with [facility]  Lives with - Patient's Self Care Capacity: Adult Children  Patient or legal guardian wants to designate a caregiver: No  Support Systems: Children  Housing / Facility: [address]    Discharge Preparedness  What is your plan after discharge?: Skilled nursing facility  What are your discharge supports?: Child  Prior Functional Level: Ambulatory, Drives Self, Independent with Activities of Daily Living, Independent with Medication Management, Uses Cane  Difficulity with ADLs: Walking  Difficulty with ADLs Comment: Uses [employer] at baseline  Difficulity with IADLs: None    Functional Assesment  Prior Functional Level: Ambulatory, Drives Self, Independent with Activities of Daily Living, Independent with Medication Management, Uses Cane    Finances  Financial Barriers to Discharge: No  Prescription Coverage: Yes    Vision / Hearing Impairment  Right Eye Vision: Impaired, Wears Glasses  Left Eye Vision: Impaired, Wears Glasses    Values / Beliefs / Concerns  Values / Beliefs Concerns : No    Advance Directive  Advance Directive?: Living Will    Domestic Abuse  Have you ever been the victim of abuse or violence?: No  Possible Abuse/Neglect Reported to:: Not Applicable    Psychological Assessment  History of Substance Abuse: None  History of Psychiatric Problems: No  Non-compliant with Treatment: No  Newly Diagnosed Illness: Yes    Discharge Risks or Barriers  Discharge risks or barriers?: Complex medical needs  Patient risk factors: Complex medical needs, Vulnerable adult    Anticipated Discharge Information  Discharge Disposition: Disch to  rehab facility or distinct part unit (62)

## 2024-11-08 NOTE — PROGRESS NOTES
4 Eyes Skin Assessment Completed by PREETHI Tomlin and PREETHI Rodriguez.    Head WDL  Ears WDL  Nose WDL  Mouth WDL  Neck WDL  Breast/Chest WDL  Shoulder Blades WDL  Spine WDL  (R) Arm/Elbow/Hand Redness and Blanching  (L) Arm/Elbow/Hand Redness and Blanching  Abdomen Redness  Groin Redness and Blanching  Scrotum/Coccyx/Buttocks Redness and Blanching  (R) Leg Immobilizer in place  (L) Leg Redness and Blanching  (R) Heel/Foot/Toe Redness and Blanching  (L) Heel/Foot/Toe Redness and Blanching          Devices In Places Leg Immobilizer and Nasal Cannula      Interventions In Place Heel Mepilex    Possible Skin Injury No    Pictures Uploaded Into Epic N/A  Wound Consult Placed N/A  RN Wound Prevention Protocol Ordered Yes

## 2024-11-08 NOTE — ASSESSMENT & PLAN NOTE
Patient received vitamin K on admission  Reviewed outpatient records she has a history of mechanical aortic valve  Patient started on anticoagulation postoperatively after discussion with orthopedics   goal INR 2.5-3.5 INR today 2.5.  Heparin gtt discontinued. Continue with Coumadin.

## 2024-11-08 NOTE — H&P
Hospital Medicine History & Physical Note    Date of Service  11/7/2024    Primary Care Physician  Jose Vela M.D.    Consultants  orthopedics    Specialist Names: Dr Camargo    Code Status  Full Code    Chief Complaint  Chief Complaint   Patient presents with    Knee Injury     Pt had slip at [facility].  Denies trauma to knee but c/o R knee pain and swelling       History of Presenting Illness  Yenifer Beasley is a 74 y.o. female who presented 11/7/2024 with right knee pain post fall.  Patient states she was at [facility], thinks something was spilled on the floor causing her to slip and fall.  Patient had instant right knee pain which is sharp, 8/10 at its worst.  Patient was unable to get up.  Because of this, patient was brought to the emergency department.  Patient did have a CT scan which showed a right femur lateral condyle fracture.  Because of this, ERP discussed the case with orthopedic surgery who agreed for surgical intervention.  Patient does have a mechanical heart valve, is on Coumadin, most recent INR was yesterday and was therapeutic at 2.6.  I did discuss this case with orthopedic surgery as well as ERP including labs and imaging.    I discussed the plan of care with patient.    Review of Systems  Review of Systems   Constitutional:  Negative for chills, fever and malaise/fatigue.   HENT:  Negative for congestion.    Respiratory:  Negative for cough, sputum production, shortness of breath and stridor.    Cardiovascular:  Negative for chest pain, palpitations and leg swelling.   Gastrointestinal:  Negative for abdominal pain, constipation, diarrhea, nausea and vomiting.   Genitourinary:  Negative for dysuria and urgency.   Musculoskeletal:  Positive for falls and joint pain (right knee). Negative for myalgias.   Neurological:  Negative for dizziness, tingling, loss of consciousness, weakness and headaches.   Psychiatric/Behavioral:  Negative for depression and suicidal ideas.    All other  systems reviewed and are negative.      Past Medical History   has a past medical history of Allergic rhinitis, Anticoagulation monitoring, special range, Arthritis, Asthma (10/17/2019), Atrial fibrillation ([facility]), Back pain, Blood transfusion without reported diagnosis, CAD (coronary artery disease) (2007), Cataract, Chest pain at rest (11/30/2010), CHF (congestive heart failure) ([facility]), Chronic anticoagulation (04/23/2018), Constipation, Coronary heart disease, Delayed emergence from general anesthesia, Diabetes, Diabetic neuropathy ([facility]) (01/06/2011), Difficulty breathing, Fall, Gasping for breath, Gastritis (07/2010), Gastritis, GERD (gastroesophageal reflux disease), [place] measles, GI bleed, Glaucoma, Heart murmur, Hyperlipidemia, Hypertension, Hyponatremia (04/22/2014), Hypothyroidism, Impaired fasting glucose (10/08/2019), Infectious disease, Kidney disease, Migraine, Need for influenza vaccination (11/30/2010), Neuropathy in diabetes ([facility]), HILDA (obstructive sleep apnea), Painful joint, Palpitations, Pneumonia, Pneumonia due to organism (04/22/2014), Pulmonary hypertension ([facility]), Rash, S/P aortic valve replacement (2007), Shortness of breath (08/29/2022), Skin infection (05/11/2018), Sleep apnea, Swelling of lower extremity, TIA (transient ischemic attack) (2005), TIA (transient ischemic attack), Tonsillitis, Tricuspid regurgitation mild- mod (01/06/2011), Unspecified hemorrhagic conditions, Upper GI bleed ON JULY 2007 (01/06/2011), Upper GI bleed ON JULY 2007 (01/06/2011), URTI (acute upper respiratory infection) (11/30/2010), UTI (urinary tract infection) (04/24/2014), Valvular heart disease, Weakness, and Wears glasses.    Surgical History   has a past surgical history that includes primary c section; hernia repair; mitral valve replace (2007); cholecystectomy; abdominal hysterectomy total; carpal tunnel release (1998); tonsillectomy (1995); shoulder surgery (06/2001); coronary artery bypass, 1; Sleeve,Gama  "Vaso Thigh; hysterectomy laparoscopy; Intubation; uvulopharyngopalatoplasty; Ventilator - Continuous; aortic valve replacement; and tubal coagulation laparoscopic bilateral.     Family History  family history includes Breast Cancer in her cousin; Cancer in her maternal aunt, maternal grandmother, and mother; Diabetes in her brother; Heart Disease in her brother, brother, and mother.   Family history reviewed with patient. There is no family history that is pertinent to the chief complaint.     Social History   reports that she has never smoked. She has never used smokeless tobacco. She reports that she does not drink alcohol and does not use drugs.    Allergies  Allergies   Allergen Reactions    Asa [Aspirin]      GI BLEED    Atorvastatin Shortness of Breath    Cymbalta [Duloxetine Hcl] Unspecified     Feels like a \"zombie\"    Hmg-Coa-R Inhibitors     Latex Swelling    Nsaids      GI BLEED    Tricor Shortness of Breath     Breathing problems      Trilipix [Choline Fenofibrate] Shortness of Breath     SOB    Lyrica [Pregabalin]      SPACED OUT       Medications  Prior to Admission Medications   Prescriptions Last Dose Informant Patient Reported? Taking?   Alirocumab (PRALUENT) 150 MG/ML Solution Auto-injector 11/5/2024 Patient No Yes   Sig: Inject 150 mg under the skin every 14 days.   doxazosin (CARDURA) 8 MG tablet 11/6/2024 Bedtime Patient No Yes   Sig: Take 1 tablet by mouth at bedtime. To lower blood pressure   ezetimibe (ZETIA) 10 MG Tab 11/7/2024 Morning Patient No Yes   Sig: TAKE 1 TABLET BY MOUTH EVERY DAY   Patient taking differently: Take 10 mg by mouth every day.   levothyroxine (SYNTHROID) 125 MCG Tab 11/7/2024 Morning Patient No Yes   Sig: Take 1 Tablet by mouth every morning on an empty stomach.   telmisartan (MICARDIS) 80 MG Tab 11/6/2024 Bedtime Patient No Yes   Sig: Take 1 Tablet by mouth every day. Take At night   timolol (TIMOPTIC) 0.5 % Solution 11/7/2024 Morning Patient No Yes   Sig: Drop 1 Drop " in both eyes 2 times a day.   Patient taking differently: Administer 1 Drop into both eyes 2 times a day.   torsemide (DEMADEX) 20 MG Tab Not Taking Patient No No   Sig: Take 1 Tablet by mouth every day. To lower blood pressure and swelling   Patient not taking: Reported on 11/7/2024   traMADol (ULTRAM) 50 MG Tab 11/7/2024 Morning Patient No Yes   Sig: Take 2 tablets 3 times a day by oral route as needed for 30 days, for severe pain.   Patient taking differently: Take 100 mg by mouth 3 times a day. 2 tablets= 100mg   vitamin D (CHOLECALCIFEROL) 1000 UNIT TABS 11/7/2024 Morning Patient Yes Yes   Sig: Take 1,000 Units by mouth every day.     warfarin (COUMADIN) 4 MG Tab 11/6/2024 Evening Patient No Yes   Sig: TAKE 1 to 1 and 1/2 TABLET(s) BY mouth daily OR as directed BY ANTICOAGULATION CLINIC   Patient taking differently: Take 4-6 mg by mouth every day. Take 1&1/2  tablets (6mg) on M/W/F... take 1 tablet (4mg) on all other days.      Facility-Administered Medications: None       Physical Exam  Temp:  [36.1 °C (97 °F)] 36.1 °C (97 °F)  Pulse:  [57-61] 61  Resp:  [18] 18  BP: (182-235)/() 194/80  SpO2:  [93 %-97 %] 97 %  Blood Pressure : (!) 194/80   Temperature: 36.1 °C (97 °F)   Pulse: 61   Respiration: 18   Pulse Oximetry: 97 %       Physical Exam  Vitals and nursing note reviewed.   Constitutional:       General: She is not in acute distress.     Appearance: She is well-developed. She is obese. She is not diaphoretic.   HENT:      Head: Normocephalic and atraumatic.      Right Ear: External ear normal.      Left Ear: External ear normal.      Nose: Nose normal. No congestion or rhinorrhea.      Mouth/Throat:      Mouth: Mucous membranes are moist.      Pharynx: No oropharyngeal exudate.   Eyes:      General:         Right eye: No discharge.         Left eye: No discharge.   Neck:      Trachea: No tracheal deviation.   Cardiovascular:      Rate and Rhythm: Normal rate and regular rhythm.      Heart sounds:  "Murmur heard.      No friction rub. No gallop.   Pulmonary:      Effort: Pulmonary effort is normal. No respiratory distress.      Breath sounds: Normal breath sounds. No stridor. No wheezing or rales.   Chest:      Chest wall: No tenderness.   Abdominal:      General: Bowel sounds are normal. There is no distension.      Palpations: Abdomen is soft.      Tenderness: There is no abdominal tenderness.   Musculoskeletal:      Cervical back: Neck supple.      Right knee: Decreased range of motion. Tenderness present.      Right lower leg: No edema.      Left lower leg: No edema.      Comments: Right knee immobilizer    Lymphadenopathy:      Cervical: No cervical adenopathy.   Skin:     General: Skin is warm and dry.      Findings: No erythema or rash.   Neurological:      Mental Status: She is alert and oriented to person, place, and time.      Cranial Nerves: No cranial nerve deficit.   Psychiatric:         Mood and Affect: Mood normal.         Behavior: Behavior normal.         Thought Content: Thought content normal.         Judgment: Judgment normal.         Laboratory:  Recent Labs     11/07/24  1449   WBC 7.5   RBC 3.93*   HEMOGLOBIN 12.0   HEMATOCRIT 35.5*   MCV 90.3   MCH 30.5   MCHC 33.8   RDW 43.4   PLATELETCT 157*   MPV 10.0     Recent Labs     11/07/24  1449   SODIUM 140   POTASSIUM 4.3   CHLORIDE 103   CO2 22   GLUCOSE 132*   BUN 44*   CREATININE 2.08*   CALCIUM 9.6     Recent Labs     11/07/24  1449   ALTSGPT 14   ASTSGOT 29   ALKPHOSPHAT 66   TBILIRUBIN 0.6   GLUCOSE 132*     Recent Labs     11/06/24  0000   INR 2.60     No results for input(s): \"NTPROBNP\" in the last 72 hours.      No results for input(s): \"TROPONINT\" in the last 72 hours.    Imaging:  DX-CHEST-LIMITED (1 VIEW)   Final Result      1.  Persistently enlarged cardiac silhouette   2.  Atherosclerosis   3.  Increased BILATERAL pulmonary opacities, favor pulmonary edema      DX-FEMUR-2+ RIGHT   Final Result      1.  Lateral femoral condylar " fracture redemonstrated with a large knee joint effusion   2.  No additional fracture   3.  Osteoarthritis      DX-KNEE 2- RIGHT   Final Result      Acute mildly displaced intra-articular fracture of the lateral femoral condyle.      CT-KNEE W/O PLUS RECONS LEFT    (Results Pending)       X-Ray:  I have personally reviewed the images and compared with prior images.    Assessment/Plan:  Justification for Admission Status  I anticipate this patient will require at least two midnights for appropriate medical management, necessitating inpatient admission because right lateral femoral condylar fracture    Patient will need a Med/Surg bed on ORTHOPEDICS service .  The need is secondary to right lateral femoral condylar fracture.    * Nondisplaced unspecified condyle fracture of lower end of right femur, initial encounter for closed fracture (HCC)- (present on admission)  Assessment & Plan  Noted on x-ray  Orthopedic surgery did recommend CT scan which is pending  Causing significant pain, currently has a immobilizer in place  Start as needed narcotics  Plan is for surgical intervention tomorrow, n.p.o. after midnight    Hyperglycemia- (present on admission)  Assessment & Plan  Mild, no need for coverage at this time    Class 3 severe obesity due to excess calories with serious comorbidity and body mass index (BMI) of 40.0 to 44.9 in adult ([facility])- (present on admission)  Assessment & Plan  Patient would benefit from diet adjustment at discharge, exercise adjustment when able    Chronic kidney disease (CKD) stage G3b/A1, moderately decreased glomerular filtration rate (GFR) between 30-44 mL/min/1.73 square meter and albuminuria creatinine ratio less than 30 mg/g- (present on admission)  Assessment & Plan  Slightly worse than her baseline however she does not appear significantly dehydrated  Patient will be able to eat and drink until midnight, after midnight we will start IV fluids  Repeat BMP in the morning    Paroxysmal  atrial fibrillation (HCC)- (present on admission)  Assessment & Plan  Currently in sinus  Not on home rate or rhythm control, hold home Coumadin for surgery    Hx of mechanical aortic valve replacement [V43.3]- (present on admission)  Assessment & Plan  Patient does take Coumadin, yesterday INR was 2.6  I discussed the case with Dr. Camargo, he would prefer the INR be less than 1.5 but at least less than 2  I am going to give a one-time dose of vitamin K and repeat INR in the morning  Plan is for surgical intervention around 1 PM, if INR is still significantly elevated can give another dose of vitamin K or plasma if needed    Essential hypertension- (present on admission)  Assessment & Plan  Continue home doxazosin and telmisartan  If kidney function worsens further, will stop telmisartan  Start as needed labetalol  Adjust as needed    Hypothyroidism- (present on admission)  Assessment & Plan  Continue home Synthroid at 125 mcg daily  Most recent TSH was approximately 1 month ago was normal at 3.95        VTE prophylaxis: SCDs/TEDs

## 2024-11-08 NOTE — ANESTHESIA PREPROCEDURE EVALUATION
" Case: [id number] Date/Time: 11/08/24 1315    Procedure: ORIF, FRACTURE, FEMUR (Right)    Pre-op diagnosis: Right distal femur fracture    Location: [facility] OR 12 / SURGERY [facility]    Surgeons: Jason Camargo M.D.            Past Medical History:   Diagnosis Date    Allergic rhinitis     Anticoagulation monitoring, special range     Arthritis     OA OF HANDS, KNEESM, HIPS AND BACK    Asthma 10/17/2019    childhood asthma    Atrial fibrillation (HCC)     history of    Back pain     Blood transfusion without reported diagnosis     CAD (coronary artery disease) 2007    CABG    Cataract     Chest pain at rest 11/30/2010    CHF (congestive heart failure) ([facility])     Chronic anticoagulation 04/23/2018    Constipation     off and on, pt manages    Coronary heart disease     Delayed emergence from general anesthesia     Diabetes     DIAB FEET EXAM:  1/6/11- Pt reports no history of diabetes    Diabetic neuropathy ([facility]) 01/06/2011    Difficulty breathing     Fall     \"a couple years ago because of my neuopathy\" multiple falls    Gasping for breath     history of    Gastritis 07/2010    H/O UGIB    Gastritis     GERD (gastroesophageal reflux disease)     [place] measles     GI bleed     history of    Glaucoma     Heart murmur     Hyperlipidemia     Hypertension     Hyponatremia 04/22/2014    Hypothyroidism     Impaired fasting glucose 10/08/2019    Infectious disease     History of MRSA    Kidney disease     Migraine     Need for influenza vaccination 11/30/2010    ICD-10 transition    Neuropathy in diabetes (HCC)     HILDA (obstructive sleep apnea)     no cpap at this time, on order    Painful joint     Palpitations     Pneumonia     Pneumonia due to organism 04/22/2014    Diagnois update 10/1/2016    Pulmonary hypertension (HCC)     Rash     S/P aortic valve replacement 2007    Shortness of breath 08/29/2022    no c/o at this time    Skin infection 05/11/2018    Sleep apnea     Swelling of lower extremity     TIA (transient " ischemic attack) 2005    TIA (transient ischemic attack)     Tonsillitis     Tricuspid regurgitation mild- mod 01/06/2011    Unspecified hemorrhagic conditions     Bleeds easily-GI bleeds with NSAIDS and ASA    Upper GI bleed ON JULY 2007 01/06/2011    Upper GI bleed ON JULY 2007 01/06/2011    URTI (acute upper respiratory infection) 11/30/2010    UTI (urinary tract infection) 04/24/2014    Valvular heart disease     Weakness     Wears glasses        Past Surgical History:   Procedure Laterality Date    MITRAL VALVE REPLACE  2007    AORTIC VALVE    SHOULDER SURGERY  06/2001    NEER DECOMOPRESSION LEFT SHOULDER    CARPAL TUNNEL RELEASE  1998    B/L    TONSILLECTOMY  1995    PARTIAL PALATOPLASTY    ABDOMINAL HYSTERECTOMY TOTAL      TAHBSOO    AORTIC VALVE REPLACEMENT      CHOLECYSTECTOMY      CORONARY ARTERY BYPASS, 1      Triple Bypass    HERNIA REPAIR      VENTRAL HERNIA REPAIR    HYSTERECTOMY LAPAROSCOPY      INTUBATION      PRIMARY C SECTION      SLEEVE,MEJIA VASO THIGH      TUBAL COAGULATION LAPAROSCOPIC BILATERAL      UVULOPHARYNGOPALATOPLASTY      VENTILATOR CONTINUOUS         Current Outpatient Medications   Medication Instructions    doxazosin (CARDURA) 8 MG tablet Take 1 tablet by mouth at bedtime. To lower blood pressure    ezetimibe (ZETIA) 10 MG Tab TAKE 1 TABLET BY MOUTH EVERY DAY    levothyroxine (SYNTHROID) 125 mcg, Oral, EACH MORNING ON EMPTY STOMACH    Praluent 150 mg, Subcutaneous, EVERY 14 DAYS    telmisartan (MICARDIS) 80 mg, Oral, DAILY, Take At night    timolol (TIMOPTIC) 0.5 % Solution Drop 1 Drop in both eyes 2 times a day.    torsemide (DEMADEX) 20 mg, Oral, DAILY, To lower blood pressure and swelling    traMADol (ULTRAM) 50 MG Tab Take 2 tablets 3 times a day by oral route as needed for 30 days, for severe pain.    vitamin D (CHOLECALCIFEROL) 1,000 Units, DAILY    warfarin (COUMADIN) 4 MG Tab TAKE 1 to 1 and 1/2 TABLET(s) BY mouth daily OR as directed BY ANTICOAGULATION CLINIC       Social  "History     Tobacco Use    Smoking status: Never    Smokeless tobacco: Never   Vaping Use    Vaping status: Never Used   Substance Use Topics    Alcohol use: No    Drug use: Never       BP (!) 182/76   Pulse 63   Temp 36.7 °C (98 °F) (Temporal)   Resp 17   Wt 103 kg (227 lb)   SpO2 92%     Allergies   Allergen Reactions    Asa [Aspirin]      GI BLEED    Atorvastatin Shortness of Breath    Cymbalta [Duloxetine Hcl] Unspecified     Feels like a \"zombie\"    Hmg-Coa-R Inhibitors     Latex Swelling    Nsaids      GI BLEED    Tricor Shortness of Breath     Breathing problems      Trilipix [Choline Fenofibrate] Shortness of Breath     SOB    Lyrica [Pregabalin]      SPACED OUT       Lab Results   Component Value Date/Time    SODIUM 141 11/08/2024 12:43 AM    POTASSIUM 4.6 11/08/2024 12:43 AM    CHLORIDE 105 11/08/2024 12:43 AM    GLUCOSE 141 (H) 11/08/2024 12:43 AM    BUN 38 (H) 11/08/2024 12:43 AM    CREATININE 1.77 (H) 11/08/2024 12:43 AM    CREATININE 1.6 (H) 11/26/2007 12:28 AM        Lab Results   Component Value Date/Time    WBC 9.1 11/08/2024 12:43 AM    RBC 3.68 (L) 11/08/2024 12:43 AM    HEMOGLOBIN 11.3 (L) 11/08/2024 12:43 AM    HEMATOCRIT 33.9 (L) 11/08/2024 12:43 AM    PLATELETCT 166 11/08/2024 12:43 AM      TTE 9/2024:  CONCLUSIONS  Compared to the images of the prior study 08/08/2022, no significant changes.  The ejection fraction is measured to be 68 % by Leal's biplane.  Moderate mitral stenosis.   Known mechanical aortic valve with elevated transvalvular gradients.   Vmax is 3.7 m/s.   Moderate to severe tricuspid regurgitation. Right ventricular systolic pressure is estimated to be 57 mmHg    Relevant Problems   ANESTHESIA   (positive) HILDA (obstructive sleep apnea)      PULMONARY   (positive) Asthma      NEURO   (positive) Migraine      CARDIAC   (positive) CAD (coronary artery disease)   (positive) Ectatic thoracic aorta (HCC)   (positive) Essential hypertension   (positive) History of coronary " artery bypass graft   (positive) Migraine   (positive) Paroxysmal atrial fibrillation (HCC)   (positive) Pulmonary hypertension (HCC)   (positive) Transient cerebral ischemia      GI   (positive) GERD (gastroesophageal reflux disease)         (positive) Acquired renal cyst of right kidney   (positive) Chronic kidney disease (CKD) stage G3b/A1, moderately decreased glomerular filtration rate (GFR) between 30-44 mL/min/1.73 square meter and albuminuria creatinine ratio less than 30 mg/g   (positive) Type 2 diabetes mellitus with kidney complication, without long-term current use of insulin (HCC)      ENDO   (positive) Hypothyroidism   (positive) Type 2 diabetes mellitus with kidney complication, without long-term current use of insulin (HCC)      Other   (positive) Arthritis   (positive) Gouty arthropathy       Physical Exam    Airway   Mallampati: II  TM distance: >3 FB  Neck ROM: full       Cardiovascular - normal exam  Rhythm: regular  Rate: normal  (-) murmur     Dental - normal exam           Pulmonary - normal exam  Breath sounds clear to auscultation     Abdominal   (+) obese     Neurological - normal exam                   Anesthesia Plan    ASA 4 (severe cardiac valvular pathology)       Plan - general       Airway plan will be ETT          Induction: intravenous    Postoperative Plan: Postoperative administration of opioids is intended.    Pertinent diagnostic labs and testing reviewed    Informed Consent:    Anesthetic plan and risks discussed with patient.    Use of blood products discussed with: patient whom consented to blood products.       Anesthetic procedure and risks discussed with patient in detail.  Risks include but are not limited to PONV, pain, sore throat, damage to teeth/lips/gums, aspiration, positioning injury, allergic reaction, vocal cord injury, prolonged intubation, stroke, and/or cardiopulmonary problems up to and including death.  Patient indicates complete understanding. All  questions fully answered and they agree to proceed as planned above.

## 2024-11-08 NOTE — CONSULTS
DATE OF SERVICE:  2024     ORTHOPEDIC CONSULTATION     REQUESTING PHYSICIAN:  Obi Zuniga MD, emergency department.     REASON FOR CONSULTATION:  Right distal femur fracture.     CHIEF COMPLAINT:  Right knee pain.     HISTORY OF PRESENT ILLNESS:  The patient is 74 years old.  She reportedly   slipped and fell at [employer] earlier today.  She is complaining of right knee   pain.  She does have a history of arthritis and some preexisting knee pain on   that side.  I was consulted to provide treatment recommendations after x-rays   suggested a right distal femur fracture.     PAST MEDICAL HISTORY:  ALLERGIES:  ASPIRIN, ATORVASTATIN, CYMBALTA, LATEX, NSAIDS, TRICOR, TRILIPIX   AND LYRICA.     OUTPATIENT MEDICATIONS:  Include Coumadin, timolol, Praluent, Zetia, tramadol,   torsemide, doxazosin, levothyroxine, Micardis, vitamin D.     PAST MEDICAL DIAGNOSES:  Include atrial fibrillation, coronary artery disease,   status post CABG, cataracts, congestive heart failure, diabetes with   neuropathy, gastroesophageal reflux disease, obesity, gastritis, hyponatremia,   hypertension, hypothyroidism, kidney disease, obstructive sleep apnea,   pulmonary hypertension, status post aortic valve replacement, history of TIA,   mitral regurgitation.     PAST SURGICAL HISTORY:  Mitral valve replacement, aortic valve replacement,   , herniorrhaphy, tonsillectomy, carpal tunnel release bilateral,   total abdominal hysterectomy, coronary artery bypass grafting, gastric sleeve   procedure, tstos-taipdjao-cgwokigsrpsu, bilateral tubal ligation.     SOCIAL HISTORY:  The patient is a nonsmoker.  Denies alcohol.  Denies illicit   drug use.     PHYSICAL EXAMINATION:    VITAL SIGNS:  Temperature 97, heart rate 58, respiratory rate 18, blood   pressure 194/80, pulse oximetry 96% on room air.  GENERAL APPEARANCE:  The patient is alert, pleasant, cooperative, in no acute   distress.  MUSCULOSKELETAL:  Right lower extremity; there is  swelling and mild ecchymosis   at the distal thigh and a large knee effusion present.  She is able to dorsi   and plantarflex the foot, and flex and extend toes.  She has a palpable   posterior tibial pulse.  Sensation is intact to light touch in the foot.     DIAGNOSTIC IMAGING:  Plain x-rays of the right knee and the right femur   suggest a right displaced lateral condyle distal femur fracture with   tricompartmental degenerative changes to the knee joint.     ASSESSMENT:  A 74-year-old female with multiple underlying medical   comorbidities including obesity, with a right displaced lateral condyle distal   femur fracture with preexisting tricompartmental osteoarthritis.     RECOMMENDATIONS:    1.  I have discussed these findings with the patient and I do feel that based   on her x-rays alone that she would benefit from surgical fixation of her   displaced lateral condyle fracture, but that CT imaging will be helpful to   guide definitive treatment recommendations.  2.  She is being evaluated for admission to the hospitalist service and   recommend she should be nonweightbearing to the right lower extremity in a   knee immobilizer and n.p.o. after midnight in preparation for likely   proceeding with surgical reduction and fixation tomorrow.        ______________________________  MD GUILHERME Leslie/DELBERT    DD:  11/07/2024 15:45  DT:  11/07/2024 17:19    Job#:  [id number]

## 2024-11-08 NOTE — PROGRESS NOTES
[facility] Medicine Daily Progress Note    Date of Service  11/8/2024    Chief Complaint  Yenifer Beasley is a 74 y.o. female admitted 11/7/2024 with right knee pain after fall    Hospital Course  Yenifer Beasley is a 74 y.o. female who presented 11/7/2024 with right knee pain post fall.  Patient states she was at [facility], thinks something was spilled on the floor causing her to slip and fall.  Patient had instant right knee pain which is sharp, 8/10 at its worst.  Patient was unable to get up.  Because of this, patient was brought to the emergency department.  Patient did have a CT scan which showed a right femur lateral condyle fracture.  Because of this, ERP discussed the case with orthopedic surgery who agreed for surgical intervention.  Patient does have a mechanical heart valve, is on Coumadin, most recent INR was yesterday and was therapeutic at 2.6.           Interval Problem Update    Patient is afebrile 1 L nasal cannula  She received vitamin K yesterday INR is 1.9  I discussed with orthopedics will need to resume anticoagulation postoperatively  I reviewed chemistry panel electrolytes stable BUN 38 creatinine 1.77  I reviewed CBC WBC 9.1 hemoglobin 11.3 platelets 166  I reviewed outpatient cardiology notes and most recent echocardiogram      Total time spent reviewing lab and imaging results medical records examining patient discussing with consultant nursing staff and updating patient on plan of care 52 minutes      I have discussed this patient's plan of care and discharge plan at IDT rounds today with Case Management, Nursing, Nursing leadership, and other members of the IDT team.    Consultants/Specialty  orthopedics    Code Status  Full Code    Disposition  <MEDICALLYCLEARED>  I have placed the appropriate orders for post-discharge needs.    Review of Systems  Review of Systems   Constitutional:  Negative for fever.   Respiratory:  Negative for shortness of breath.    Cardiovascular:  Negative  for chest pain.   Musculoskeletal:  Positive for joint pain.   All other systems reviewed and are negative.       Physical Exam  Temp:  [36.1 °C (97 °F)-36.9 °C (98.4 °F)] 36.7 °C (98 °F)  Pulse:  [57-67] 63  Resp:  [15-59] 17  BP: (130-235)/() 182/76  SpO2:  [92 %-98 %] 92 %    Physical Exam  Vitals and nursing note reviewed.   Cardiovascular:      Rate and Rhythm: Normal rate and regular rhythm.      Heart sounds: Murmur heard.   Pulmonary:      Effort: Pulmonary effort is normal.      Breath sounds: No rales.   Chest:      Chest wall: No tenderness.   Abdominal:      Palpations: Abdomen is soft.      Tenderness: There is no abdominal tenderness. There is no guarding.   Musculoskeletal:         General: Swelling present.      Comments: Right knee immobilizer in place   Skin:     General: Skin is warm and dry.   Neurological:      General: No focal deficit present.      Mental Status: She is alert.      Cranial Nerves: No cranial nerve deficit.   Psychiatric:         Mood and Affect: Mood normal.         Fluids    Intake/Output Summary (Last 24 hours) at 11/8/2024 1341  Last data filed at 11/8/2024 0352  Gross per 24 hour   Intake --   Output 550 ml   Net -550 ml        Laboratory  Recent Labs     11/07/24  1449 11/08/24 0043   WBC 7.5 9.1   RBC 3.93* 3.68*   HEMOGLOBIN 12.0 11.3*   HEMATOCRIT 35.5* 33.9*   MCV 90.3 92.1   MCH 30.5 30.7   MCHC 33.8 33.3   RDW 43.4 44.4   PLATELETCT 157* 166   MPV 10.0 10.5     Recent Labs     11/07/24  1449 11/08/24  0043   SODIUM 140 141   POTASSIUM 4.3 4.6   CHLORIDE 103 105   CO2 22 26   GLUCOSE 132* 141*   BUN 44* 38*   CREATININE 2.08* 1.77*   CALCIUM 9.6 9.3     Recent Labs     11/06/24  0000 11/07/24  1449 11/08/24 0043   APTT  --  33.7  --    INR 2.60 2.57* 1.91*               Imaging  CT-KNEE W/O PLUS RECONS RIGHT   Final Result      1.  Lateral femoral condyle intra-articular fracture      2.  Associated joint effusion      3.  No other fracture      4.   osteoarthritis the medial femorotibial and patellofemoral compartments      5.  Small vessel atherosclerotic plaque      DX-CHEST-LIMITED (1 VIEW)   Final Result      1.  Persistently enlarged cardiac silhouette   2.  Atherosclerosis   3.  Increased BILATERAL pulmonary opacities, favor pulmonary edema      DX-FEMUR-2+ RIGHT   Final Result      1.  Lateral femoral condylar fracture redemonstrated with a large knee joint effusion   2.  No additional fracture   3.  Osteoarthritis      DX-KNEE 2- RIGHT   Final Result      Acute mildly displaced intra-articular fracture of the lateral femoral condyle.      DX-PORTABLE FLUORO > 1 HOUR    (Results Pending)   DX-FEMUR-2+ RIGHT    (Results Pending)        Assessment/Plan  * Nondisplaced unspecified condyle fracture of lower end of right femur, initial encounter for closed fracture (HCC)- (present on admission)  Assessment & Plan  Reviewed CT with lateral femoral condyle intraventricular fracture and joint effusion  Patient scheduled for surgical repair later today  Pain management  PT OT POST OP    Hyperglycemia- (present on admission)  Assessment & Plan  Most recent hemoglobin A1c last month 5.6    Class 3 severe obesity due to excess calories with serious comorbidity and body mass index (BMI) of 40.0 to 44.9 in adult (HCC)- (present on admission)  Assessment & Plan  Body mass index is 44.19 kg/m².     Chronic kidney disease (CKD) stage G3b/A1, moderately decreased glomerular filtration rate (GFR) between 30-44 mL/min/1.73 square meter and albuminuria creatinine ratio less than 30 mg/g- (present on admission)  Assessment & Plan  Monitor renal function electrolytes  Avoid sedating agents    Paroxysmal atrial fibrillation (HCC)- (present on admission)  Assessment & Plan  Currently in sinus  Resume anticoagulation when bleeding risk acceptable per Ortho    Hx of mechanical aortic valve replacement [V43.3]- (present on admission)  Assessment & Plan  Patient received vitamin K  on admission  Reviewed outpatient records she has a history of mechanical aortic valve  Discussed with orthopedics patient will need to resume anticoagulation postoperatively we will plan on starting her on heparin with bleeding risk felt to be acceptable by orthopedics    Essential hypertension- (present on admission)  Assessment & Plan  Continue home doxazosin and telmisartan  Resume Demadex postop  Monitor blood pressure and adjust accordingly   as needed labetalol    Tricuspid regurgitation- (present on admission)  Assessment & Plan  Reviewed echocardiogram patient with moderate to severe tricuspid regurgitation moderate mitral stenosis and mechanical aortic valve replacement with increased gradients  Resume diuretics tomorrow  DC IV fluids  Monitor volume status  Resume anticoagulation postoperatively when bleeding risk felt to be acceptable by orthopedics    Hypothyroidism- (present on admission)  Assessment & Plan  Continue home Synthroid at 125 mcg daily  Most recent TSH was approximately 1 month ago was normal at 3.95         VTE prophylaxis:   SCDs/TEDs      I have performed a physical exam and reviewed and updated ROS and Plan today (11/8/2024). In review of yesterday's note (11/7/2024), there are no changes except as documented above.

## 2024-11-08 NOTE — CARE PLAN
Problem: Pain - Standard  Goal: Alleviation of pain or a reduction in pain to the patient’s comfort goal  Outcome: Progressing     Problem: Knowledge Deficit - Standard  Goal: Patient and family/care givers will demonstrate understanding of plan of care, disease process/condition, diagnostic tests and medications  Outcome: Progressing     Problem: Skin Integrity  Goal: Skin integrity is maintained or improved  Outcome: Progressing     Problem: Fall Risk  Goal: Patient will remain free from falls  Outcome: Progressing   The patient is Stable - Low risk of patient condition declining or worsening    Shift Goals  Clinical Goals: Pain Management, Surgery  Patient Goals: Comfort    Progress made toward(s) clinical / shift goals:  Patient has remained NPO since midnight. Patient is resting in bed with all needs met at this time. Purewick in place, call light and personal belongings within reach. Patient educated on use of call light and bed is locked in the lowest position with three side rails raised.     Patient is not progressing towards the following goals:

## 2024-11-08 NOTE — DIETARY
NUTRITION SERVICES: BMI - Pt with BMI >40 (=Body mass index is 44.19 kg/m².), Class III obesity. Weight loss counseling not appropriate in acute care setting. RECOMMEND - If appropriate at DC please refer to outpatient nutrition services for weight management.

## 2024-11-08 NOTE — ASSESSMENT & PLAN NOTE
Monitor renal function electrolytes  Avoid sedating agents  Hold diuretics today and evaluate volume status daily

## 2024-11-08 NOTE — HOSPITAL COURSE
This is a 74-year-old female with past medical history of paroxysmal atrial fibrillation, mechanical aortic valve replacement on Coumadin,  hypertension, CKD stage IIIb hypothyroidism, glaucoma who was admitted on 11/7/2024 after sustaining a ground-level fall.    CT imaging revealed a right femoral lateral condyle fracture.  Orthopedic surgery consulted, once INR was less than 1.5, patient underwent internal fixation of the right lateral condyle distal femoral fracture on 11/8.  Recommendations for toe-touch weightbearing in right lower extremity, can perform knee range of motion as tolerated and will not require any bracing.  Patient restarted on Coumadin 24 hours postoperatively.  She is to follow-up with orthopedic surgery in about 2 to 3 weeks.    Patient noted to have significant bleeding postoperatively, initially on heparin gtt. and Coumadin until INR was therapeutic.  Patient required 1 unit PRBCs.  Hemoglobin remained stabilized and no further bleeding from surgical site.

## 2024-11-08 NOTE — ANESTHESIA PROCEDURE NOTES
Airway    Date/Time: 11/8/2024 3:21 PM    Performed by: Jason Tavares M.D.  Authorized by: Jason Tavares M.D.    Location:  OR  Urgency:  Elective  Indications for Airway Management:  Anesthesia      Spontaneous Ventilation: absent    Sedation Level:  Deep  Preoxygenated: Yes    Patient Position:  Sniffing  Final Airway Type:  Endotracheal airway  Final Endotracheal Airway:  ETT  Cuffed: Yes    Technique Used for Successful ETT Placement:  Direct laryngoscopy  Devices/Methods Used in Placement:  Cricoid pressure    Insertion Site:  Oral  Blade Type:  Padma  Laryngoscope Blade/Videolaryngoscope Blade Size:  3  ETT Size (mm):  7.0  Measured from:  Teeth  ETT to Teeth (cm):  21  Placement Verified by: auscultation and capnometry    Cormack-Lehane Classification:  Grade IIb - view of arytenoids or posterior of glottis only  Number of Attempts at Approach:  2  Ventilation Between Attempts:  BVM  Number of Other Approaches Attempted:  1  Unsuccessful Airway(s) Attempted:  Oropharyngeal  Unsuccessful Approach(es) for ETT:  Direct laryngoscopy   DL x1. Esophageal intubation identified with first breath. ETT removed, easy BMV. ETT placed easily on 2nd attempt.

## 2024-11-09 ENCOUNTER — APPOINTMENT (OUTPATIENT)
Dept: RADIOLOGY | Facility: MEDICAL CENTER | Age: 74
DRG: 481 | End: 2024-11-09
Attending: EMERGENCY MEDICAL TECHNICIAN, INTERMEDIATE
Payer: MEDICARE

## 2024-11-09 LAB
ANION GAP SERPL CALC-SCNC: 9 MMOL/L (ref 7–16)
APTT PPP: 30 SEC (ref 24.7–36)
BUN SERPL-MCNC: 33 MG/DL (ref 8–22)
CALCIUM SERPL-MCNC: 8.6 MG/DL (ref 8.5–10.5)
CHLORIDE SERPL-SCNC: 109 MMOL/L (ref 96–112)
CO2 SERPL-SCNC: 21 MMOL/L (ref 20–33)
CREAT SERPL-MCNC: 1.48 MG/DL (ref 0.5–1.4)
ERYTHROCYTE [DISTWIDTH] IN BLOOD BY AUTOMATED COUNT: 45.1 FL (ref 35.9–50)
GFR SERPLBLD CREATININE-BSD FMLA CKD-EPI: 37 ML/MIN/1.73 M 2
GLUCOSE SERPL-MCNC: 161 MG/DL (ref 65–99)
HCT VFR BLD AUTO: 29.9 % (ref 37–47)
HGB BLD-MCNC: 10.1 G/DL (ref 12–16)
HGB BLD-MCNC: 9.6 G/DL (ref 12–16)
INR PPP: 1.42 (ref 0.87–1.13)
MCH RBC QN AUTO: 30.1 PG (ref 27–33)
MCHC RBC AUTO-ENTMCNC: 32.1 G/DL (ref 32.2–35.5)
MCV RBC AUTO: 93.7 FL (ref 81.4–97.8)
PLATELET # BLD AUTO: 130 K/UL (ref 164–446)
PMV BLD AUTO: 10.3 FL (ref 9–12.9)
POTASSIUM SERPL-SCNC: 4.4 MMOL/L (ref 3.6–5.5)
PROTHROMBIN TIME: 17.4 SEC (ref 12–14.6)
RBC # BLD AUTO: 3.19 M/UL (ref 4.2–5.4)
SODIUM SERPL-SCNC: 139 MMOL/L (ref 135–145)
UFH PPP CHRO-ACNC: <0.1 IU/ML
UFH PPP CHRO-ACNC: <0.1 IU/ML
WBC # BLD AUTO: 13.4 K/UL (ref 4.8–10.8)

## 2024-11-09 PROCEDURE — 700111 HCHG RX REV CODE 636 W/ 250 OVERRIDE (IP): Performed by: ORTHOPAEDIC SURGERY

## 2024-11-09 PROCEDURE — 97167 OT EVAL HIGH COMPLEX 60 MIN: CPT

## 2024-11-09 PROCEDURE — 99233 SBSQ HOSP IP/OBS HIGH 50: CPT | Performed by: HOSPITALIST

## 2024-11-09 PROCEDURE — 700102 HCHG RX REV CODE 250 W/ 637 OVERRIDE(OP): Performed by: HOSPITALIST

## 2024-11-09 PROCEDURE — A9270 NON-COVERED ITEM OR SERVICE: HCPCS | Performed by: HOSPITALIST

## 2024-11-09 PROCEDURE — 700102 HCHG RX REV CODE 250 W/ 637 OVERRIDE(OP): Performed by: INTERNAL MEDICINE

## 2024-11-09 PROCEDURE — 85027 COMPLETE CBC AUTOMATED: CPT

## 2024-11-09 PROCEDURE — 85520 HEPARIN ASSAY: CPT | Mod: 91

## 2024-11-09 PROCEDURE — 85610 PROTHROMBIN TIME: CPT

## 2024-11-09 PROCEDURE — 700111 HCHG RX REV CODE 636 W/ 250 OVERRIDE (IP): Performed by: INTERNAL MEDICINE

## 2024-11-09 PROCEDURE — 700111 HCHG RX REV CODE 636 W/ 250 OVERRIDE (IP): Mod: JZ | Performed by: HOSPITALIST

## 2024-11-09 PROCEDURE — 36415 COLL VENOUS BLD VENIPUNCTURE: CPT

## 2024-11-09 PROCEDURE — 85730 THROMBOPLASTIN TIME PARTIAL: CPT

## 2024-11-09 PROCEDURE — 80048 BASIC METABOLIC PNL TOTAL CA: CPT

## 2024-11-09 PROCEDURE — 73562 X-RAY EXAM OF KNEE 3: CPT | Mod: LT

## 2024-11-09 PROCEDURE — 97163 PT EVAL HIGH COMPLEX 45 MIN: CPT

## 2024-11-09 PROCEDURE — 85018 HEMOGLOBIN: CPT

## 2024-11-09 PROCEDURE — 770001 HCHG ROOM/CARE - MED/SURG/GYN PRIV*

## 2024-11-09 PROCEDURE — A9270 NON-COVERED ITEM OR SERVICE: HCPCS | Performed by: INTERNAL MEDICINE

## 2024-11-09 PROCEDURE — 700105 HCHG RX REV CODE 258: Performed by: ORTHOPAEDIC SURGERY

## 2024-11-09 RX ORDER — HYDROMORPHONE HYDROCHLORIDE 1 MG/ML
1 INJECTION, SOLUTION INTRAMUSCULAR; INTRAVENOUS; SUBCUTANEOUS
Status: DISCONTINUED | OUTPATIENT
Start: 2024-11-09 | End: 2024-11-09

## 2024-11-09 RX ORDER — HYDROMORPHONE HYDROCHLORIDE 1 MG/ML
0.5 INJECTION, SOLUTION INTRAMUSCULAR; INTRAVENOUS; SUBCUTANEOUS
Status: DISCONTINUED | OUTPATIENT
Start: 2024-11-09 | End: 2024-11-13

## 2024-11-09 RX ORDER — OXYCODONE HYDROCHLORIDE 10 MG/1
10 TABLET ORAL
Status: DISCONTINUED | OUTPATIENT
Start: 2024-11-09 | End: 2024-11-13

## 2024-11-09 RX ORDER — ACETAMINOPHEN 500 MG
1000 TABLET ORAL EVERY 6 HOURS PRN
Status: DISCONTINUED | OUTPATIENT
Start: 2024-11-14 | End: 2024-11-15 | Stop reason: HOSPADM

## 2024-11-09 RX ORDER — WARFARIN SODIUM 4 MG/1
4 TABLET ORAL
Status: DISCONTINUED | OUTPATIENT
Start: 2024-11-10 | End: 2024-11-10

## 2024-11-09 RX ORDER — HEPARIN SODIUM 5000 [USP'U]/100ML
0-30 INJECTION, SOLUTION INTRAVENOUS CONTINUOUS
Status: DISCONTINUED | OUTPATIENT
Start: 2024-11-09 | End: 2024-11-12

## 2024-11-09 RX ORDER — OXYCODONE HYDROCHLORIDE 10 MG/1
10 TABLET ORAL
Status: DISCONTINUED | OUTPATIENT
Start: 2024-11-09 | End: 2024-11-09

## 2024-11-09 RX ORDER — HEPARIN SODIUM 1000 [USP'U]/ML
2000 INJECTION, SOLUTION INTRAVENOUS; SUBCUTANEOUS PRN
Status: DISCONTINUED | OUTPATIENT
Start: 2024-11-09 | End: 2024-11-12

## 2024-11-09 RX ORDER — WARFARIN SODIUM 6 MG/1
6 TABLET ORAL
Status: COMPLETED | OUTPATIENT
Start: 2024-11-09 | End: 2024-11-09

## 2024-11-09 RX ORDER — WARFARIN SODIUM 6 MG/1
6 TABLET ORAL
Status: DISCONTINUED | OUTPATIENT
Start: 2024-11-11 | End: 2024-11-10

## 2024-11-09 RX ORDER — ACETAMINOPHEN 500 MG
1000 TABLET ORAL EVERY 6 HOURS
Status: DISPENSED | OUTPATIENT
Start: 2024-11-09 | End: 2024-11-14

## 2024-11-09 RX ORDER — OXYCODONE HYDROCHLORIDE 5 MG/1
5 TABLET ORAL
Status: DISCONTINUED | OUTPATIENT
Start: 2024-11-09 | End: 2024-11-13

## 2024-11-09 RX ORDER — OXYCODONE HYDROCHLORIDE 10 MG/1
20 TABLET ORAL
Status: DISCONTINUED | OUTPATIENT
Start: 2024-11-09 | End: 2024-11-09

## 2024-11-09 RX ADMIN — TIMOLOL MALEATE 1 DROP: 5 SOLUTION OPHTHALMIC at 17:34

## 2024-11-09 RX ADMIN — TORSEMIDE 20 MG: 20 TABLET ORAL at 05:32

## 2024-11-09 RX ADMIN — TELMISARTAN 80 MG: 80 TABLET ORAL at 17:32

## 2024-11-09 RX ADMIN — ACETAMINOPHEN 1000 MG: 500 TABLET ORAL at 17:33

## 2024-11-09 RX ADMIN — LEVOTHYROXINE SODIUM 125 MCG: 0.12 TABLET ORAL at 04:36

## 2024-11-09 RX ADMIN — HEPARIN SODIUM 16 UNITS/KG/HR: 5000 INJECTION, SOLUTION INTRAVENOUS at 20:41

## 2024-11-09 RX ADMIN — CEFAZOLIN 2 G: 10 INJECTION, POWDER, FOR SOLUTION INTRAVENOUS at 10:50

## 2024-11-09 RX ADMIN — OXYCODONE HYDROCHLORIDE 10 MG: 10 TABLET ORAL at 20:48

## 2024-11-09 RX ADMIN — HEPARIN SODIUM 2000 UNITS: 1000 INJECTION, SOLUTION INTRAVENOUS; SUBCUTANEOUS at 20:37

## 2024-11-09 RX ADMIN — CEFAZOLIN 2 G: 10 INJECTION, POWDER, FOR SOLUTION INTRAVENOUS at 04:36

## 2024-11-09 RX ADMIN — OXYCODONE HYDROCHLORIDE 10 MG: 10 TABLET ORAL at 10:50

## 2024-11-09 RX ADMIN — MORPHINE SULFATE 4 MG: 4 INJECTION, SOLUTION INTRAMUSCULAR; INTRAVENOUS at 04:36

## 2024-11-09 RX ADMIN — HEPARIN SODIUM 12 UNITS/KG/HR: 5000 INJECTION, SOLUTION INTRAVENOUS at 11:48

## 2024-11-09 RX ADMIN — TIMOLOL MALEATE 1 DROP: 5 SOLUTION OPHTHALMIC at 04:36

## 2024-11-09 RX ADMIN — DOXAZOSIN 8 MG: 2 TABLET ORAL at 20:46

## 2024-11-09 RX ADMIN — ACETAMINOPHEN 1000 MG: 500 TABLET ORAL at 11:41

## 2024-11-09 RX ADMIN — MORPHINE SULFATE 4 MG: 4 INJECTION, SOLUTION INTRAMUSCULAR; INTRAVENOUS at 08:00

## 2024-11-09 RX ADMIN — WARFARIN SODIUM 6 MG: 6 TABLET ORAL at 17:33

## 2024-11-09 ASSESSMENT — COGNITIVE AND FUNCTIONAL STATUS - GENERAL
STANDING UP FROM CHAIR USING ARMS: TOTAL
EATING MEALS: A LITTLE
HELP NEEDED FOR BATHING: A LOT
MOBILITY SCORE: 8
MOVING FROM LYING ON BACK TO SITTING ON SIDE OF FLAT BED: A LOT
CLIMB 3 TO 5 STEPS WITH RAILING: TOTAL
SUGGESTED CMS G CODE MODIFIER DAILY ACTIVITY: CK
PERSONAL GROOMING: A LITTLE
SUGGESTED CMS G CODE MODIFIER MOBILITY: CM
DAILY ACTIVITIY SCORE: 15
WALKING IN HOSPITAL ROOM: TOTAL
DRESSING REGULAR LOWER BODY CLOTHING: A LOT
TOILETING: A LOT
TURNING FROM BACK TO SIDE WHILE IN FLAT BAD: A LOT
DRESSING REGULAR UPPER BODY CLOTHING: A LITTLE
MOVING TO AND FROM BED TO CHAIR: TOTAL

## 2024-11-09 ASSESSMENT — PAIN DESCRIPTION - PAIN TYPE
TYPE: ACUTE PAIN
TYPE: ACUTE PAIN
TYPE: SURGICAL PAIN
TYPE: ACUTE PAIN

## 2024-11-09 ASSESSMENT — ENCOUNTER SYMPTOMS
SHORTNESS OF BREATH: 0
FEVER: 0

## 2024-11-09 ASSESSMENT — ACTIVITIES OF DAILY LIVING (ADL): TOILETING: INDEPENDENT

## 2024-11-09 ASSESSMENT — GAIT ASSESSMENTS: GAIT LEVEL OF ASSIST: UNABLE TO PARTICIPATE

## 2024-11-09 ASSESSMENT — FIBROSIS 4 INDEX: FIB4 SCORE: 4.35

## 2024-11-09 NOTE — ANESTHESIA TIME REPORT
Anesthesia Start and Stop Event Times       Date Time Event    11/8/2024 1505 Ready for Procedure     1511 Anesthesia Start     1706 Anesthesia Stop          Responsible Staff  11/08/24      Name Role Begin End    Jason Tavares M.D. Anesth 1511 1706          Overtime Reason:  no overtime (within assigned shift)    Comments:

## 2024-11-09 NOTE — THERAPY
"Occupational Therapy   Initial Evaluation     Patient Name: Yenifer Beasley  Age:  74 y.o., Sex:  female  Medical Record #: [id number]  Today's Date: 11/9/2024     Precautions  Precautions: Fall Risk, Toe Touch Weight Bearing Right Lower Extremity  Comments: R knee ROMAT    Assessment  Patient is 74 y.o. female admitted after falling at [facility], sustaining a R distal femur fx s/p ORIF. She has a hx of OHS, CAD, HILDA, neuropathy, type 2 DM, pulmonary hypertension, afib.     Pt reports at baseline her LLE is usually her \"bad leg\" and has poor bone density per her recent DEXA scan, so now she is worried about it being her \"good\" leg. She was unable to stand w/o putting weight through her RLE. She is requiring maxA LB cares.    Pt is currently limited by weakness, fatigue, impaired balance, impaired safety awareness, and pain, which impacts ability to complete ADLs, ADL txfs, and functional mobility.      Plan    Occupational Therapy Initial Treatment Plan   Treatment Interventions: Self Care / Activities of Daily Living, Adaptive Equipment, Neuro Re-Education / Balance, Therapeutic Exercises, Therapeutic Activity  Treatment Frequency: 4 Times per Week  Duration: Until Therapy Goals Met    DC Equipment Recommendations: Unable to determine at this time  Discharge Recommendations: Recommend post-acute placement for additional occupational therapy services prior to discharge home      Objective       11/09/24 0859   Prior Living Situation   Prior Services Home-Independent   Housing / Facility 1 Story House   Steps Into Home 1   Rail None   Bathroom Set up Bathtub / Shower Combination;Grab Bars   Equipment Owned Front-Wheel Walker;4-Wheel Walker;Single Point Cane;Grab Bar(s) In Tub / Shower   Lives with - Patient's Self Care Capacity Adult Children   Comments pt lives with daughter but states daughter travels for work alot and pt is home alone most of the time; pt states in 2 weeks ramp is being built at her home; pt is " working on getting a shower chair and grab bars   Prior Level of ADL Function   Self Feeding Independent   Grooming / Hygiene Independent   Bathing Independent   Dressing Independent   Toileting Independent   Prior Level of IADL Function   Medication Management Independent   Laundry Independent   Kitchen Mobility Independent   Finances Independent   Home Management Independent   Shopping Independent   Prior Level Of Mobility Independent Without Device in Community;Independent Without Device in Home   Driving / Transportation Driving Independent   Occupation (Pre-Hospital Vocational) Retired Due To Disability   Precautions   Precautions Fall Risk;Toe Touch Weight Bearing Right Lower Extremity   Comments R knee ROMAT   Pain 0 - 10 Group   Therapist Pain Assessment Nurse Notified;During Activity  (premedicated w/ morphine, mod c/o B knee pain)   Cognition    Cognition / Consciousness WDL   Level of Consciousness Alert   Comments pleasant and cooperative, a bit fearful of activity today   Active ROM Upper Body   Active ROM Upper Body  WDL   Strength Upper Body   Upper Body Strength  WDL   Balance Assessment   Sitting Balance (Static) Fair   Sitting Balance (Dynamic) Fair -   Standing Balance (Static) Trace   Standing Balance (Dynamic) Dependent   Weight Shift Sitting Fair   Weight Shift Standing Poor   Comments w/ FWW; unable to achieve full upright standing, poor compliance with TTWB RLE due to difficulty   Bed Mobility    Supine to Sit Minimal Assist   Sit to Supine Moderate Assist   Scooting Contact Guard Assist   Rolling Contact Guard Assist   ADL Assessment   Grooming Supervision;Seated  (oral care, brush hair)   Lower Body Dressing Maximal Assist   Toileting Maximal Assist   How much help from another person does the patient currently need...   Putting on and taking off regular lower body clothing? 2   Bathing (including washing, rinsing, and drying)? 2   Toileting, which includes using a toilet, bedpan, or  urinal? 2   Putting on and taking off regular upper body clothing? 3   Taking care of personal grooming such as brushing teeth? 3   Eating meals? 3   6 Clicks Daily Activity Score 15   Functional Mobility   Sit to Stand Maximal Assist   Bed, Chair, Wheelchair Transfer Unable to Participate   Mobility EOB>attempted STS x2, unable to come fully upright>BTB   Comments w/ FWW   Short Term Goals   Short Term Goal # 1 pt will demo ADL txfs w/ Leticia   Short Term Goal # 2 pt will dress LB w/ Leticia and AE prn   Short Term Goal # 3 pt will demo toileting w/ Leticia     Patient seen for team evaluation with Physical Therapist for the following reason(s):  Patient required 2 person assistance for safety and to provide effective interventions. Each discipline assisted patient with appropriate and separate goals. Due to the medical complexity, the skill of both practitioners is needed to monitor vitals, patient status, and adjust the intervention to fit the patient's needs and goals.

## 2024-11-09 NOTE — PROGRESS NOTES
Inpatient Anticoagulation Service Note for 11/9/2024    Reason for Anticoagulation: Aortic Mechanical Valve Replacement  , Atrial Fibrillation, Transient Ischemic Attack   [id number] VASc Score: 7  HAS-BLED Score: 5    Hemoglobin Value: (!) 10.1  Hematocrit Value: (!) 29.9  Lab Platelet Value: (!) 130  Target INR: 2.5 to 3.5    INR from last 7 days        Date/Time INR Value    11/09/24 0903 1.42     11/08/24 0043 1.91     11/07/24 1449 2.57                   Dose from last 7 days        Date/Time Dose (mg)    11/09/24 1503 6                   Average Dose (mg): Home dosing: warfarin 6 mg Monday/Wednesday/Friday, 4 mg all other days  Significant Interactions: N/A  Bridge Therapy: Yes  Date of Last VTE Event: N/A  Bridge Therapy Start Date: 11/09/24  Days of Overlap Therapy: 0   INR Value Greater than 2 Prior to Discontinuation of Parenteral Anticoagulation: N/A  Reversal Agent Administered: Vitamin K  Intravenous (11/7)    Comments: Patient is on warfarin for history of mechanical AVR, as well as afib and TIA. She follows with the [facility] with a noted TTR of 69.9% (7.7y). She was admitted 11/7 with a femur fracture requiring surgical intervention. Her INR was 2.57 on admission and she was given IV vitamin K in preparation for surgery. Surgery was completed 11/8 and patient was cleared to restart warfarin today.   INR is subtherapeutic today at 1.42, as anticipated after reversal agent given and doses held. H/H and PLTs downtrended but no over bleeding noted. No significant DDIs noted. Patient did receive a few doses of cefazolin surrounding surgical intervention, which could have effect on INR but not of significant concern. Regular diet ordered. Will give booster dose of warfarin 6 mg today with anticipation of restarting home regimen tomorrow. Heparin drip initiated today to bridge with warfarin until INR is therapeutic. Repeat INR tomorrow. Pharmacy will continue to follow and make adjustments as  appropriate.     Plan: warfarin 6 mg   Education Material Provided?: No (Established warfarin patient)    Pharmacist suggested discharge dosing: Likely can resume home dosing regimen of warfarin 6 mg Monday/Wednesday/Friday, 4 mg all other days, pending INR trend. Continue heparin drip or therapeutic Lovenox bridging until INR is therapeutic. Recommend repeat INR within 48-72 hours of discharge.      Esha Styles, PharmD

## 2024-11-09 NOTE — ANESTHESIA POSTPROCEDURE EVALUATION
Patient: Yenifer Beasley    Procedure Summary       Date: 11/08/24 Room / Location: [place] [address] / SURGERY [facility]    Anesthesia Start: 1511 Anesthesia Stop: 1706    Procedure: ORIF, FRACTURE, FEMUR (Right: Leg Upper) Diagnosis: (Right distal femur fracture)    Surgeons: Jason Camargo M.D. Responsible Provider: Jason Tavares M.D.    Anesthesia Type: general ASA Status: 4            Final Anesthesia Type: general  Last vitals  BP   Blood Pressure : (!) 146/63    Temp   36.6 °C (97.8 °F)    Pulse   (!) 58   Resp   (!) 21    SpO2   95 %      Anesthesia Post Evaluation    Patient location during evaluation: PACU  Patient participation: complete - patient participated  Level of consciousness: sleepy but conscious  Pain score: 5    Airway patency: patent  Anesthetic complications: no  Cardiovascular status: hypertensive and hemodynamically stable  Respiratory status: acceptable  Hydration status: balanced  Comments: Subjective difficulty taking deep breaths in PACU and poor oxygenation requiring mask at first  Treated with DuoNeb and able to wean oxygen down    PONV: none          There were no known notable events for this encounter.     Nurse Pain Score: 5 (NPRS)

## 2024-11-09 NOTE — THERAPY
Physical Therapy   Initial Evaluation     Patient Name: Yenifer Beasley  Age:  74 y.o., Sex:  female  Medical Record #: [id number]  Today's Date: 11/9/2024     Precautions  Precautions: Fall Risk;Toe Touch Weight Bearing Right Lower Extremity  Comments: R knee ROM AT    Assessment  Patient is 74 y.o. female with a diagnosis of R distal femur fracture after experiencing a fall on 11/07. Pt presents to PT POD-1 R femur ORIF. Per chart pt does have a mechanical heart valve and is on Coumadin. PMH includes atrial fibrillation, CAD, GERD, HTN, Hyperlipidemia, Diabetes,  PNA, Sleep apnea, TIA, acute upper respiratory infection, and UTI. Pt presented alert and receptive to therapy session. PT provided pt education w/ TTWB RLE status, R knee flx/ext ROM, and importance of icing/elevating RLE intermittently throughout the day with pt in compliance. During session pt was able to transfer from supine to EOB with increased time and required min assist for RLE support as she is unable to lift RLE against gravity. Pt did attempt STS x2 with 2p assist but was unable to achieve full standing position. During STS, pt did state her L knee was going to give out and stated doctors were more concerned with her L knee after her most recent DEXA scan. Pt was unable to perform SPT to recliner chair and ended session in supine in bed requiring mod assistance for bed mobility as she stated increase RLE pain. Pt continues to present below baseline level and will continue to benefit from acute therapy services.    Plan    Physical Therapy Initial Treatment Plan   Treatment Plan : Bed Mobility, Family / Caregiver Training, Gait Training, Neuro Re-Education / Balance, Therapeutic Activities, Therapeutic Exercise, Stair Training, Equipment  Treatment Frequency: 5 Times per Week  Duration: Until Therapy Goals Met    DC Equipment Recommendations: Unable to determine at this time  Discharge Recommendations: Recommend post-acute placement for  additional physical therapy services prior to discharge home          11/09/24 0853    Services   Is patient using  services for this encounter? No   Initial Contact Note    Initial Contact Note Order Received and Verified, Physical Therapy Evaluation in Progress with Full Report to Follow.   Precautions   Precautions Fall Risk;Toe Touch Weight Bearing Right Lower Extremity   Comments R knee ROM AT   Vitals   Blood Pressure  (!) 143/54   O2 (LPM) 2   O2 Delivery Device Nasal Cannula   Pain 0 - 10 Group   Therapist Pain Assessment Prior to Activity;During Activity;Post Activity;Nurse Notified  (R and L knee)   Prior Living Situation   Prior Services Home-Independent   Housing / Facility 1 Story House   Steps Into Home 1   Steps In Home 0   Rail None   Equipment Owned Front-Wheel Walker;4-Wheel Walker;Single Point Cane   Lives with - Patient's Self Care Capacity Adult Children   Comments pt lives with daughter but statesdaughter travels for work alot and pt is home alone most of the time; pt states in 2 weeks ramp is being built at her home; pt is working on getting a shower chair and grab bars   Prior Level of Functional Mobility   Bed Mobility Independent   Transfer Status Independent   Ambulation Independent   Ambulation Distance short community distances   Assistive Devices Used Single Point Cane   Stairs Independent   Cognition    Level of Consciousness Alert   Strength Upper Body   Upper Body Strength  WDL   Active ROM Lower Body    Active ROM Lower Body  X   Comments R knee flexion significantly limited s/p but able to intiate movement into ROM; L knee flexion limited due to pain   Strength Lower Body   Lower Body Strength  X   Comments significant bilateral LE weakness; pt able to maintain LLE SLR against gravity 1-2 sec   Sensation Lower Body   Lower Extremity Sensation   X   Comments neuropathy at baseline   Coordination Lower Body    Coordination Lower Body  Not Tested   Other  Treatments   Other Treatments Provided pt education on TTWB RLE; pt education on R knee flx/ext ROM in supine and sitting EOB; pt education on icing and elevation intemittenly throughout the day to reduce swelling   Balance Assessment   Sitting Balance (Static) Fair   Sitting Balance (Dynamic) Fair -   Standing Balance (Static) Dependent   Weight Shift Sitting Fair   Comments pt unable to achieve full standing position; pt TTWB RLE   Bed Mobility    Supine to Sit Minimal Assist   Sit to Supine Moderate Assist   Scooting Contact Guard Assist   Rolling Contact Guard Assist   Comments pt required min assist to support RLE against gravity   Gait Analysis   Gait Level Of Assist Unable to Participate   Functional Mobility   Sit to Stand Unable to Participate   Bed, Chair, Wheelchair Transfer Unable to Participate   Mobility EOB> attempt STS x2   Comments pt required 2p assist for STS; pt unable to achieve full standing position after 2 attempts; pt stated L knee was going to give out when standing and stated doctors were more concerned were her L knee after her most recent DEXA scan   6 Clicks Assessment - How much HELP from from another person do you currently need... (If the patient hasn't done an activity recently, how much help from another person do you think he/she would need if he/she tried?)   Turning from your back to your side while in a flat bed without using bedrails? 2   Moving from lying on your back to sitting on the side of a flat bed without using bedrails? 2   Moving to and from a bed to a chair (including a wheelchair)? 1   Standing up from a chair using your arms (e.g., wheelchair, or bedside chair)? 1   Walking in hospital room? 1   Climbing 3-5 steps with a railing? 1   6 clicks Mobility Score 8   Activity Tolerance   Comments poor activity tolerance due to increase R and L knee pain   Short Term Goals    Short Term Goal # 1 pt will be able to transfer supine <> sit w/SPV in 6 tx sessions in order to  increase level of independence   Short Term Goal # 2 pt will be able to perform SPT w/ FWW and min assist to BSC or recliner chair in 6 tx sessions in order to increase level of independence   Short Term Goal # 3 pt will be able to ambulate 20 ft in 6 tx sessions in order to increase level of independence   Education Group   Education Provided Role of Physical Therapist;Gait Training;Use of Assistive Device;Weight Bearing Status   Role of Physical Therapist Patient Response Patient;Acceptance;Explanation;Verbal Demonstration   Gait Training Patient Response Patient;Acceptance;Explanation;Verbal Demonstration   Use of Assistive Device Patient Response Patient;Acceptance;Explanation;Verbal Demonstration   Weight Bearing Status Patient Response Patient;Acceptance;Explanation;Verbal Demonstration   Physical Therapy Initial Treatment Plan    Treatment Plan  Bed Mobility;Family / Caregiver Training;Gait Training;Neuro Re-Education / Balance;Therapeutic Activities;Therapeutic Exercise;Stair Training;Equipment   Treatment Frequency 5 Times per Week   Duration Until Therapy Goals Met   Problem List    Problems Pain;Impaired Bed Mobility;Impaired Transfers;Impaired Ambulation;Impaired Coordination;Impaired Balance;Safety Awareness Deficits / Cognition;Decreased Activity Tolerance;Limited Knowledge of Post-Op Precautions;Motor Planning / Sequencing   Anticipated Discharge Equipment and Recommendations   DC Equipment Recommendations Unable to determine at this time   Discharge Recommendations Recommend post-acute placement for additional physical therapy services prior to discharge home   Interdisciplinary Plan of Care Collaboration   IDT Collaboration with  Nursing;Occupational Therapist   Patient Position at End of Therapy In Bed;Call Light within Reach;Tray Table within Reach;Bed Alarm On   Collaboration Comments Rn updated   Session Information   Date / Session Number  11/09-1(1/5,11/15)

## 2024-11-09 NOTE — OR NURSING
1734 contacted Dr Tavares. Pt reports feeling having a hard time taking  deep breaths.  pt desated 60's. Placed on NRB mask. Sating %. Dr Tavares orders breathing treatment.  Encourage CDB/inspirometer.    1800 pt denies pain. Right leg warm. Able to move toes. Denies nausea. Taking sips water.    1833  pt dozing. Easily aroused by normal voice. weaning oxygen down. Pt reports feeling she can breath better. Able to wean NRB  to oxymask 6L.    1905 gave nebulizer. Oxycodone for surgical pain. Encourage CDB/inspirometer. Weak cough. Pulls 200-300 on  inspirometer    1934 report to [facility]  Surgery w/ Dr Camargo. Dressing CDI. Right foot warm. Gave tylenol/ oxycodone for pain.  Pt had problems w/ low O2 sat immediate post-op. Weaned off NRB to oxymask 4 L, gave nebulizer. Enc CDB and inspirometer. AXOX4. VSS. Daughter in  law updated. All questions answered

## 2024-11-09 NOTE — OP REPORT
DATE OF SERVICE:  11/08/2024     PREOPERATIVE DIAGNOSIS:  Right displaced comminuted lateral condyle distal   femur fracture.     POSTOPERATIVE DIAGNOSIS:  Right displaced comminuted lateral condyle distal   femur fracture.     PROCEDURE PERFORMED:  Open treatment with internal fixation of right lateral   condyle distal femur fracture.     SURGEON:  Jason Camargo MD     ANESTHESIOLOGIST:  Jason Tavares MD     ANESTHESIA:  General.     ESTIMATED BLOOD LOSS:  300 mL.     IMPLANTS:    1.  Timur 3.5 mm VariAx cortical screws.  2.  Moro Pangea distal femoral locking plate.     INDICATIONS FOR PROCEDURE:  The patient is 74 years old and had a fall   yesterday.  She does take Coumadin for history of pulmonary embolism as well   as a mechanical valve replacement, and I was consulted to provide treatment   recommendations for right displaced lateral condyle distal femur fracture.    She does have preexisting tricompartmental osteoarthritis in that knee.  I   recommended surgical reduction and fixation for displaced fracture.  Coumadin   was held and reversed with vitamin K, and her INR became subtherapeutic, less   than 2, and so I felt we could proceed with surgical management as outlined   above, and she was felt to be optimized from the standpoint of the hospitalist   service.     DESCRIPTION OF PROCEDURE:  The patient was met in the preoperative holding   area and her surgical site was signed.  Her consent was confirmed to be   accurate.  She was taken back to the operating room and general anesthesia was   induced.  Ancef was administered.  The right lower extremity was   provisionally cleansed with isopropyl alcohol and then prepped and draped in   the usual sterile fashion.  A formal timeout was performed to confirm the   patient's correct name, correct surgical site, correct procedure and correct   laterality.  A lateral incision was made over the distal femur with a scalpel   down through skin.  Dissection  was carried with Bovie cautery through   subcutaneous tissue down to the iliotibial band which was split   longitudinally.  I elevated the vastus lateralis muscle and exposed the   lateral distal femur.  I performed a parapatellar arthrotomy laterally   identifying the fracture displacement in the coronal plane and used reduction   forceps to reduce it in near-anatomic alignment despite grade III diffuse   chondromalacia at the trochlea and the lateral condyle.  I stabilized the   reduction with several 1.6 mm K-wires and used a reduction clamp across the   condyle to compress the lateral condyle to the intact medial condyle, and then   placed several 3.5 mm cortical screws across the fractures from lateral to   medial across the intercondylar split and anterior to posterior across the   coronal plane lateral condyle fracture component.  I then removed the   reduction K-wires and positioned a laterally based distal femur locking plate   in the appropriate position, fixed it proximally with 3 bicortical nonlocking   screws to achieve antiglide fixation of the lateral condyle fracture and   placed several locking screws distally.  Final fluoroscopic imaging then   confirmed overall acceptable alignment of the fracture and acceptable position   of the implants.  Wounds were thoroughly irrigated with normal saline.  I   repaired the IT band with a Quill suture, subcutaneous layers with 0 Vicryl,   2-0 Vicryl and skin edges with staples.  Wounds were thoroughly cleansed and   dried.  A sterile silver-impregnated Aquacel dressing was applied.  She was   placed in a well-padded compressive dressing from foot to thigh.  She was then   awoken from anesthesia and transferred on the [facility] and taken to   postanesthesia care unit in stable condition.     PLAN:    1.  The patient will be readmitted postoperatively to the hospitalist service.  2.  She should be toe touch weightbearing on the right lower extremity, but   can  perform knee range of motion as tolerated and will not require knee   bracing.  3.  She will need Ancef for 2 doses postop for infection prophylaxis.  4.  She should work with physical and occupational therapy as soon as possible   for mobilization.  5.  She can be restarted on her Coumadin tomorrow evening if otherwise   clinically appropriate from my standpoint and should continue SCDs for DVT   prophylaxis in the meantime.        ______________________________  MD GUILHERME Leslie/DELBERT    DD:  11/08/2024 17:01  DT:  11/08/2024 18:07    Job#:  [id number]

## 2024-11-09 NOTE — PROGRESS NOTES
"    Orthopedic PA Progress Note    Interval changes:  Patient doing well postop  Ok to restart coumadin and heparin  RLE dressings are CDI  TTWB RLE  Left knee XR shows severe tricompartmental osteoarthritis   No pending ortho procedures  Follow up with Dr. Camargo 2 weeks postop  Cleared for DC from orthopedic standpoint pending therapy recs and medical optimization    ROS - Patient denies any new issues.  Denies any numbness or tingling. Pain controlled.    /51   Pulse 64   Temp 36.4 °C (97.5 °F) (Temporal)   Resp 16   Ht 1.527 m (5' 0.1\")   Wt 104 kg (230 lb)   SpO2 100%     Patient seen and examined  No acute distress  Breathing non labored  RRR  RLE: Surgical dressing is clean, dry, and intact. Patient clearly fires tibialis anterior, EHL, and gastrocnemius/soleus. Sensation is intact to light touch throughout superficial peroneal, deep peroneal, tibial, saphenous, and sural nerve distributions. Strong and palpable 2+ dorsalis pedis and posterior tibial pulses with capillary refill less than 2 seconds.     Recent Labs     11/07/24  1449 11/08/24  0043 11/09/24  0030 11/09/24  0903   WBC 7.5 9.1 13.4*  --    RBC 3.93* 3.68* 3.19*  --    HEMOGLOBIN 12.0 11.3* 9.6* 10.1*   HEMATOCRIT 35.5* 33.9* 29.9*  --    MCV 90.3 92.1 93.7  --    MCH 30.5 30.7 30.1  --    MCHC 33.8 33.3 32.1*  --    RDW 43.4 44.4 45.1  --    PLATELETCT 157* 166 130*  --    MPV 10.0 10.5 10.3  --        Active Hospital Problems    Diagnosis     Class 3 severe obesity due to excess calories with serious comorbidity and body mass index (BMI) of 40.0 to 44.9 in adult ([facility]) [E66.813, E66.01, Z68.41]      Priority: Medium    Chronic kidney disease (CKD) stage G3b/A1, moderately decreased glomerular filtration rate (GFR) between 30-44 mL/min/1.73 square meter and albuminuria creatinine ratio less than 30 mg/g [N18.32]      Priority: Medium     Chronic, discussed increasing her hydration and avoiding nephrotoxic medications and NSAIDs.  BP " is controlled today in clinic 130/70, she has had a longstanding history of prediabetes however most recent A1c was at 5.3.  GFR (CKD-EPI) >60 mL/min/1.73 m 2 34 Abnormal      Bun 8 - 22 mg/dL 38 High     Creatinine 0.50 - 1.40 mg/dL 1.61 High            Paroxysmal atrial fibrillation (HCC) [I48.0]      Priority: Medium     Chronic, continues on Amiodarone 200 mg daily, Torsemide 10 mg daily, Warfarin, Doxazosin 2 mg daily.  She notes that she had a cardioversion done 2 years ago.  Her Coumadin is followed by anticoagulation clinic.      Hx of mechanical aortic valve replacement [V43.3] [Z95.2]      Priority: Medium    Essential hypertension [I10]      Priority: Medium    Tricuspid regurgitation [I07.1]      Priority: Medium    Hypothyroidism [E03.9]      Priority: Low     Chronic, continues on Synthroid 125 mcg each morning on an empty stomach.  Denies any symptoms of hyper or hypothyroidism.  We will recheck TSH prior to her next visit.      Nondisplaced unspecified condyle fracture of lower end of right femur, initial encounter for closed fracture (HCC) [S72.414A]     Hyperglycemia [R73.9]        DX-KNEE 3 VIEWS LEFT   Final Result   Impression:      1. Left knee joint effusion. No fracture evident.      2. Prominent tricompartmental degenerative changes.      3. Surgical clips in the medial soft tissues.                     CT-KNEE W/O PLUS RECONS RIGHT   Final Result      1.  Lateral femoral condyle intra-articular fracture      2.  Associated joint effusion      3.  No other fracture      4.  osteoarthritis the medial femorotibial and patellofemoral compartments      5.  Small vessel atherosclerotic plaque      DX-CHEST-LIMITED (1 VIEW)   Final Result      1.  Persistently enlarged cardiac silhouette   2.  Atherosclerosis   3.  Increased BILATERAL pulmonary opacities, favor pulmonary edema      DX-FEMUR-2+ RIGHT   Final Result      1.  Lateral femoral condylar fracture redemonstrated with a large knee joint  effusion   2.  No additional fracture   3.  Osteoarthritis      DX-KNEE 2- RIGHT   Final Result      Acute mildly displaced intra-articular fracture of the lateral femoral condyle.      DX-PORTABLE FLUORO > 1 HOUR    (Results Pending)   DX-FEMUR-2+ RIGHT    (Results Pending)       Assessment/Plan:  Patient doing well postop  Ok to restart coumadin and heparin  RLE dressings are CDI  TTWB RLE  Left knee XR shows severe tricompartmental osteoarthritis   No pending ortho procedures  Follow up with Dr. Camargo 2 weeks postop  Cleared for DC from orthopedic standpoint pending therapy recs and medical optimization    POD#1 S/p  Open treatment with internal fixation of right lateral   condyle distal femur fracture.  Wt bearing status - TTWB RLE  Future Procedures - None planned   Wound care/Drains - Dressings to be changed every other day by nursing. Or PRN for saturation starting POD#2  Sutures/Staples out- 14-21 days post operatively. Removal will completed by ortho HANY's unless transferred.  Inpatient DVT prophylaxis: heparin  DVT Prophylaxis outpatient: coumadin  PT/OT-initiated  Antibiotics:  Perioperative completed  DVT Prophylaxis- TEDS/SCDs/Foot pumps  Case Coordination for Discharge Planning - Disposition per therapy recs.

## 2024-11-09 NOTE — CARE PLAN
The patient is Stable - Low risk of patient condition declining or worsening    Shift Goals  Clinical Goals: Pain Management, PT/OT Eval  Patient Goals: Pain Management, Rest    Progress made toward(s) clinical / shift goals:      Problem: Knowledge Deficit - Standard  Goal: Patient and family/care givers will demonstrate understanding of plan of care, disease process/condition, diagnostic tests and medications  Outcome: Progressing  Education provided on plan of care this shift. Questions answered. Patient verbalizes understanding.      Problem: Pain - Standard  Goal: Alleviation of pain or a reduction in pain to the patient’s comfort goal  Outcome: Progressing  Patient educated on pain scale. Encouraged patient to verbalize pain. Pain medicated per MAR. Patient nonpharmacological measures in place such as reposition and pillows for comfort.

## 2024-11-09 NOTE — DISCHARGE PLANNING
HTH/SCP TCN chart review completed. Collaborated with BOBBI Lackey. The most current review of medical record, knowledge of pt's PLOF and social support, LACE+ score of 72, 6 clicks scores unavailable.    Pt seen at bedside. Introduced TCN program. Provided education regarding post acute levels of care. Education provided regarding case management policy for blanket SNF referrals. Discussed HTH/SCP plan benefits. Pt verbalizes understanding.     Note that pt is currently awaiting OR after fall with subsequent right displaced lateral condyle distal femur fracture with preexisting osteoarthritis. Pt is anticipating that she will require some degree of post acute placement as she reports she lives alone and will likely need rehabilitation prior to return to home. Discussed dc planning process with re: post acute placement (IRF v SNF). At this time, pt reports she would prefer SNF for more prolonged/less intensive recovery and financial concerns with co-pays. TCN did discuss dc planning process and that IRF may consider pt for acceptance post op as well. However pt current declines choice for IRF. TCN discussed dc planning process with re: SNF placement and pt reports she will select/provide from accepting SNF facilities as indicated per  blanket SNF policy.     No provider requests at this time and anticipating PT and OT consults post op. Given aforementioned, no choice obtained, though pt aware of dc planning process with re: choice from accepting SNF facilities if indicated by providers.  In collaboration with CM, current discharge considerations are noted to be for post acute placement and this TCN relayed above information to CM as well. TCN will continue to follow and collaborate with discharge planning team as additional post acute needs arise. Thank you.     Completed today:  Choice obtained: none; see above; pt anticipating SNF placement post acute stay  Pt aware of [facility]'s blanket referral policy and reports  will select from accepting SNF facilities if indicated    *noted on 11/9 that PT and OT recommending post acute placement and pt has multiple accepting SNF facilities at this time; TCN will monitor for choice per  SNF blanket referral protocol and assist with SCP auth as indicated

## 2024-11-09 NOTE — PROGRESS NOTES
Size 2XL economy kewl-mesh neoprene hinged knee brace fit to patient's LLE and left at bedside. Patient instructed on proper donning, doffing, and adjustments as needed.    Contact traction with any questions or concerns regarding the use of this brace.

## 2024-11-09 NOTE — PROGRESS NOTES
[facility] Medicine Daily Progress Note    Date of Service  11/9/2024    Chief Complaint  Yenifer Beasley is a 74 y.o. female admitted 11/7/2024 with right knee pain after fall    Hospital Course  Yenifer Beasley is a 74 y.o. female who presented 11/7/2024 with right knee pain post fall.  Patient states she was at [facility], thinks something was spilled on the floor causing her to slip and fall.  Patient had instant right knee pain which is sharp, 8/10 at its worst.  Patient was unable to get up.  Because of this, patient was brought to the emergency department.  Patient did have a CT scan which showed a right femur lateral condyle fracture.  Because of this, ERP discussed the case with orthopedic surgery who agreed for surgical intervention.  Patient does have a mechanical heart valve, is on Coumadin, most recent INR was yesterday and was therapeutic at 2.6.           Interval Problem Update    Patient is afebrile on 2 L nasal cannula  She reports that her pain is not well-controlled I increase oxycodone dose  I reviewed CBC hemoglobin 9.6 repeat hemoglobin ordered 10.1 INR 1.42  Reviewed with patient need to resume anticoagulation was started on heparin drip which I ordered and I had discussed with Dr. Camargo  Reviewed chemistry panel BUN 33 creatinine 1.48 will resume torsemide  Patient with limited mobility with PT OT she will need SNF discussed with case management  I reviewed x-ray of left knee with severe DJD    Total time spent reviewing imaging and lab results examining patient and updating her on plan of care and discussing with nursing staff and case management 55 minutes      I have discussed this patient's plan of care and discharge plan at IDT rounds today with Case Management, Nursing, Nursing leadership, and other members of the IDT team.    Consultants/Specialty  orthopedics    Code Status  Full Code    Disposition  Medically Cleared  I have placed the appropriate orders for post-discharge  needs.    Review of Systems  Review of Systems   Constitutional:  Negative for fever.   Respiratory:  Negative for shortness of breath.    Cardiovascular:  Negative for chest pain.   Musculoskeletal:  Positive for joint pain.        Physical Exam  Temp:  [36.1 °C (97 °F)-37.3 °C (99.1 °F)] 36.4 °C (97.5 °F)  Pulse:  [58-73] 64  Resp:  [16-27] 16  BP: (123-195)/(44-83) 123/51  SpO2:  [92 %-100 %] 100 %    Physical Exam  HENT:      Head: Atraumatic.   Eyes:      General:         Right eye: No discharge.         Left eye: No discharge.   Cardiovascular:      Heart sounds: Murmur heard.   Pulmonary:      Breath sounds: Rhonchi present.   Abdominal:      General: There is no distension.      Palpations: Abdomen is soft.      Tenderness: There is no abdominal tenderness.   Musculoskeletal:         General: Swelling present.      Comments: Right lower extremity surgical wound dressed no surrounding erythema   Neurological:      Mental Status: She is alert.         Fluids    Intake/Output Summary (Last 24 hours) at 11/9/2024 1541  Last data filed at 11/8/2024 1651  Gross per 24 hour   Intake 150 ml   Output 300 ml   Net -150 ml        Laboratory  Recent Labs     11/07/24  1449 11/08/24  0043 11/09/24  0030 11/09/24  0903   WBC 7.5 9.1 13.4*  --    RBC 3.93* 3.68* 3.19*  --    HEMOGLOBIN 12.0 11.3* 9.6* 10.1*   HEMATOCRIT 35.5* 33.9* 29.9*  --    MCV 90.3 92.1 93.7  --    MCH 30.5 30.7 30.1  --    MCHC 33.8 33.3 32.1*  --    RDW 43.4 44.4 45.1  --    PLATELETCT 157* 166 130*  --    MPV 10.0 10.5 10.3  --      Recent Labs     11/07/24  1449 11/08/24  0043 11/09/24  0030   SODIUM 140 141 139   POTASSIUM 4.3 4.6 4.4   CHLORIDE 103 105 109   CO2 22 26 21   GLUCOSE 132* 141* 161*   BUN 44* 38* 33*   CREATININE 2.08* 1.77* 1.48*   CALCIUM 9.6 9.3 8.6     Recent Labs     11/07/24  1449 11/08/24  0043 11/09/24  0903   APTT 33.7  --  30.0   INR 2.57* 1.91* 1.42*               Imaging  DX-KNEE 3 VIEWS LEFT   Final Result   Impression:       1. Left knee joint effusion. No fracture evident.      2. Prominent tricompartmental degenerative changes.      3. Surgical clips in the medial soft tissues.                     CT-KNEE W/O PLUS RECONS RIGHT   Final Result      1.  Lateral femoral condyle intra-articular fracture      2.  Associated joint effusion      3.  No other fracture      4.  osteoarthritis the medial femorotibial and patellofemoral compartments      5.  Small vessel atherosclerotic plaque      DX-CHEST-LIMITED (1 VIEW)   Final Result      1.  Persistently enlarged cardiac silhouette   2.  Atherosclerosis   3.  Increased BILATERAL pulmonary opacities, favor pulmonary edema      DX-FEMUR-2+ RIGHT   Final Result      1.  Lateral femoral condylar fracture redemonstrated with a large knee joint effusion   2.  No additional fracture   3.  Osteoarthritis      DX-KNEE 2- RIGHT   Final Result      Acute mildly displaced intra-articular fracture of the lateral femoral condyle.      DX-PORTABLE FLUORO > 1 HOUR    (Results Pending)   DX-FEMUR-2+ RIGHT    (Results Pending)        Assessment/Plan  * Nondisplaced unspecified condyle fracture of lower end of right femur, initial encounter for closed fracture (HCC)- (present on admission)  Assessment & Plan  Reviewed CT with lateral femoral condyle intraventricular fracture and joint effusion  Status post surgical repair on 11/8/2024  Pain management  PT OT     Hyperglycemia- (present on admission)  Assessment & Plan  Most recent hemoglobin A1c last month 5.6    Class 3 severe obesity due to excess calories with serious comorbidity and body mass index (BMI) of 40.0 to 44.9 in adult (HCC)- (present on admission)  Assessment & Plan  Body mass index is 44.19 kg/m².     Chronic kidney disease (CKD) stage G3b/A1, moderately decreased glomerular filtration rate (GFR) between 30-44 mL/min/1.73 square meter and albuminuria creatinine ratio less than 30 mg/g- (present on admission)  Assessment & Plan  Monitor  renal function electrolytes  Avoid sedating agents    Paroxysmal atrial fibrillation (HCC)- (present on admission)  Assessment & Plan  Currently in sinus  Resume anticoagulation     Hx of mechanical aortic valve replacement [V43.3]- (present on admission)  Assessment & Plan  Patient received vitamin K on admission  Reviewed outpatient records she has a history of mechanical aortic valve  Discussed with orthopedics and will resume anticoagulation with heparin drip and warfarin and monitor INR and hemoglobin    Essential hypertension- (present on admission)  Assessment & Plan  Continue home doxazosin and telmisartan  Resume Demadex   Monitor blood pressure and adjust accordingly   as needed labetalol    Tricuspid regurgitation- (present on admission)  Assessment & Plan  Reviewed echocardiogram patient with moderate to severe tricuspid regurgitation moderate mitral stenosis and mechanical aortic valve replacement with increased gradients  Resume torsemide    Monitor volume status      Hypothyroidism- (present on admission)  Assessment & Plan  Continue home Synthroid at 125 mcg daily  Most recent TSH was approximately 1 month ago was normal at 3.95         VTE prophylaxis:    therapeutic anticoagulation with weight-based heparin gtt, pharmacy to adjust PRN      I have performed a physical exam and reviewed and updated ROS and Plan today (11/9/2024). In review of yesterday's note (11/8/2024), there are no changes except as documented above.

## 2024-11-09 NOTE — CARE PLAN
The patient is Stable - Low risk of patient condition declining or worsening    Shift Goals  Clinical Goals: Pain Management, PT/OT Eval  Patient Goals: Pain Management, Rest    Progress made toward(s) clinical / shift goals:    Problem: Fall Risk  Goal: Patient will remain free from falls  Outcome: Progressing  Note: Bed alarm is on     Problem: Mobility  Goal: Patient's capacity to carry out activities will improve  Outcome: Progressing  Flowsheets (Taken 11/9/2024 0749)  Mobility:   Monitored for signs of activity intolerance   Provided assistive devices   Provided rest periods between activities   Administered pain management to allow progressive mobilization   Collaborated with PT/OT     Problem: Infection - Standard  Goal: Patient will remain free from infection  Outcome: Progressing  Flowsheets (Taken 11/9/2024 0749)  Standard Infection Interventions:   Assessed for signs and symptoms of infection   Instructed patient/family on signs and symptoms of infection   Provided education on proper hand hygiene and infection prevention measures   Assessed for removal IV, central lines, intra-arterial or urinary catheters     Problem: Wound/ / Incision Healing  Goal: Patient's wound/surgical incision will decrease in size and heals properly  Outcome: Progressing  Note: Dressing is clean, dry and intact       Patient is not progressing towards the following goals:

## 2024-11-09 NOTE — OR SURGEON
Immediate Post OP Note    PreOp Diagnosis: Right lateral condyle distal femur fracture      PostOp Diagnosis: same      Procedure(s):  ORIF, FRACTURE, FEMUR - Wound Class: Clean    Surgeon(s):  Jason Camargo M.D.    Anesthesiologist/Type of Anesthesia:  Anesthesiologist: Jason Tavares M.D./General    Surgical Staff:  Circulator: Sylwia Chan R.N.  Scrub Person: Duran France  Radiology Technologist: Bushra Calderón    Specimens removed if any:  * No specimens in log *    Estimated Blood Loss: 300cc    Findings: see dictation    Complications: none known    PLAN:  --readmit postop  --TTWB RLE  --okay for right knee ROM as tolerated, no knee brace needed  --ancef x 2 doses postop  --PT/OT for mobilization ASAP  --okay to restart coumadin tomorrow, continue SCDs  --fu 2 weeks postop          11/8/2024 4:55 PM Jason Camargo M.D.

## 2024-11-10 LAB
ANION GAP SERPL CALC-SCNC: 8 MMOL/L (ref 7–16)
BUN SERPL-MCNC: 42 MG/DL (ref 8–22)
CALCIUM SERPL-MCNC: 8.4 MG/DL (ref 8.5–10.5)
CHLORIDE SERPL-SCNC: 105 MMOL/L (ref 96–112)
CO2 SERPL-SCNC: 24 MMOL/L (ref 20–33)
CREAT SERPL-MCNC: 1.86 MG/DL (ref 0.5–1.4)
ERYTHROCYTE [DISTWIDTH] IN BLOOD BY AUTOMATED COUNT: 44.9 FL (ref 35.9–50)
GFR SERPLBLD CREATININE-BSD FMLA CKD-EPI: 28 ML/MIN/1.73 M 2
GLUCOSE SERPL-MCNC: 129 MG/DL (ref 65–99)
HCT VFR BLD AUTO: 27.9 % (ref 37–47)
HGB BLD-MCNC: 8.8 G/DL (ref 12–16)
INR PPP: 1.43 (ref 0.87–1.13)
MCH RBC QN AUTO: 29.5 PG (ref 27–33)
MCHC RBC AUTO-ENTMCNC: 31.5 G/DL (ref 32.2–35.5)
MCV RBC AUTO: 93.6 FL (ref 81.4–97.8)
PLATELET # BLD AUTO: 124 K/UL (ref 164–446)
PMV BLD AUTO: 11.1 FL (ref 9–12.9)
POTASSIUM SERPL-SCNC: 4.3 MMOL/L (ref 3.6–5.5)
PROTHROMBIN TIME: 17.5 SEC (ref 12–14.6)
RBC # BLD AUTO: 2.98 M/UL (ref 4.2–5.4)
SODIUM SERPL-SCNC: 137 MMOL/L (ref 135–145)
UFH PPP CHRO-ACNC: 0.13 IU/ML
UFH PPP CHRO-ACNC: 0.28 IU/ML
UFH PPP CHRO-ACNC: <0.1 IU/ML
WBC # BLD AUTO: 7.6 K/UL (ref 4.8–10.8)

## 2024-11-10 PROCEDURE — 80048 BASIC METABOLIC PNL TOTAL CA: CPT

## 2024-11-10 PROCEDURE — A9270 NON-COVERED ITEM OR SERVICE: HCPCS

## 2024-11-10 PROCEDURE — 85027 COMPLETE CBC AUTOMATED: CPT

## 2024-11-10 PROCEDURE — A9270 NON-COVERED ITEM OR SERVICE: HCPCS | Performed by: INTERNAL MEDICINE

## 2024-11-10 PROCEDURE — 36415 COLL VENOUS BLD VENIPUNCTURE: CPT

## 2024-11-10 PROCEDURE — A9270 NON-COVERED ITEM OR SERVICE: HCPCS | Performed by: HOSPITALIST

## 2024-11-10 PROCEDURE — 700102 HCHG RX REV CODE 250 W/ 637 OVERRIDE(OP): Performed by: HOSPITALIST

## 2024-11-10 PROCEDURE — 770001 HCHG ROOM/CARE - MED/SURG/GYN PRIV*

## 2024-11-10 PROCEDURE — 700102 HCHG RX REV CODE 250 W/ 637 OVERRIDE(OP)

## 2024-11-10 PROCEDURE — 85520 HEPARIN ASSAY: CPT | Mod: 91

## 2024-11-10 PROCEDURE — 85610 PROTHROMBIN TIME: CPT

## 2024-11-10 PROCEDURE — 700102 HCHG RX REV CODE 250 W/ 637 OVERRIDE(OP): Performed by: INTERNAL MEDICINE

## 2024-11-10 PROCEDURE — 99233 SBSQ HOSP IP/OBS HIGH 50: CPT | Performed by: HOSPITALIST

## 2024-11-10 PROCEDURE — 700111 HCHG RX REV CODE 636 W/ 250 OVERRIDE (IP): Mod: JZ | Performed by: HOSPITALIST

## 2024-11-10 RX ORDER — WARFARIN SODIUM 6 MG/1
6 TABLET ORAL
Status: COMPLETED | OUTPATIENT
Start: 2024-11-10 | End: 2024-11-10

## 2024-11-10 RX ORDER — AMOXICILLIN 250 MG
2 CAPSULE ORAL EVERY EVENING
Status: DISCONTINUED | OUTPATIENT
Start: 2024-11-10 | End: 2024-11-12

## 2024-11-10 RX ORDER — POLYETHYLENE GLYCOL 3350 17 G/17G
1 POWDER, FOR SOLUTION ORAL
Status: DISCONTINUED | OUTPATIENT
Start: 2024-11-10 | End: 2024-11-12

## 2024-11-10 RX ORDER — WARFARIN SODIUM 6 MG/1
6 TABLET ORAL
Status: DISCONTINUED | OUTPATIENT
Start: 2024-11-11 | End: 2024-11-12

## 2024-11-10 RX ORDER — WARFARIN SODIUM 4 MG/1
4 TABLET ORAL
Status: DISCONTINUED | OUTPATIENT
Start: 2024-11-12 | End: 2024-11-12

## 2024-11-10 RX ADMIN — WARFARIN SODIUM 6 MG: 6 TABLET ORAL at 17:10

## 2024-11-10 RX ADMIN — TIMOLOL MALEATE 1 DROP: 5 SOLUTION OPHTHALMIC at 16:41

## 2024-11-10 RX ADMIN — OXYCODONE HYDROCHLORIDE 10 MG: 10 TABLET ORAL at 20:16

## 2024-11-10 RX ADMIN — HEPARIN SODIUM 2000 UNITS: 1000 INJECTION, SOLUTION INTRAVENOUS; SUBCUTANEOUS at 18:59

## 2024-11-10 RX ADMIN — HEPARIN SODIUM 20 UNITS/KG/HR: 5000 INJECTION, SOLUTION INTRAVENOUS at 11:53

## 2024-11-10 RX ADMIN — ACETAMINOPHEN 1000 MG: 500 TABLET ORAL at 11:04

## 2024-11-10 RX ADMIN — POLYETHYLENE GLYCOL 3350 1 PACKET: 17 POWDER, FOR SOLUTION ORAL at 17:09

## 2024-11-10 RX ADMIN — HEPARIN SODIUM 2000 UNITS: 1000 INJECTION, SOLUTION INTRAVENOUS; SUBCUTANEOUS at 04:19

## 2024-11-10 RX ADMIN — HEPARIN SODIUM 2000 UNITS: 1000 INJECTION, SOLUTION INTRAVENOUS; SUBCUTANEOUS at 11:55

## 2024-11-10 RX ADMIN — ACETAMINOPHEN 1000 MG: 500 TABLET ORAL at 00:00

## 2024-11-10 RX ADMIN — OXYCODONE HYDROCHLORIDE 10 MG: 10 TABLET ORAL at 11:04

## 2024-11-10 RX ADMIN — SENNOSIDES AND DOCUSATE SODIUM 2 TABLET: 50; 8.6 TABLET ORAL at 17:09

## 2024-11-10 RX ADMIN — ACETAMINOPHEN 1000 MG: 500 TABLET ORAL at 23:42

## 2024-11-10 RX ADMIN — ACETAMINOPHEN 1000 MG: 500 TABLET ORAL at 04:27

## 2024-11-10 RX ADMIN — LEVOTHYROXINE SODIUM 125 MCG: 0.12 TABLET ORAL at 04:26

## 2024-11-10 RX ADMIN — OXYCODONE HYDROCHLORIDE 10 MG: 10 TABLET ORAL at 14:48

## 2024-11-10 RX ADMIN — TIMOLOL MALEATE 1 DROP: 5 SOLUTION OPHTHALMIC at 04:27

## 2024-11-10 RX ADMIN — OXYCODONE HYDROCHLORIDE 10 MG: 10 TABLET ORAL at 00:24

## 2024-11-10 RX ADMIN — HYDROMORPHONE HYDROCHLORIDE 0.5 MG: 1 INJECTION, SOLUTION INTRAMUSCULAR; INTRAVENOUS; SUBCUTANEOUS at 02:45

## 2024-11-10 RX ADMIN — POLYETHYLENE GLYCOL 3350 1 PACKET: 17 POWDER, FOR SOLUTION ORAL at 06:31

## 2024-11-10 RX ADMIN — TORSEMIDE 20 MG: 20 TABLET ORAL at 04:27

## 2024-11-10 RX ADMIN — ACETAMINOPHEN 1000 MG: 500 TABLET ORAL at 16:41

## 2024-11-10 ASSESSMENT — ENCOUNTER SYMPTOMS
ABDOMINAL PAIN: 0
FEVER: 0
NAUSEA: 0
SHORTNESS OF BREATH: 0
VOMITING: 0

## 2024-11-10 ASSESSMENT — PAIN DESCRIPTION - PAIN TYPE
TYPE: ACUTE PAIN;SURGICAL PAIN
TYPE: ACUTE PAIN
TYPE: ACUTE PAIN
TYPE: ACUTE PAIN;SURGICAL PAIN

## 2024-11-10 NOTE — CARE PLAN
The patient is Stable - Low risk of patient condition declining or worsening    Shift Goals  Clinical Goals: Pain control, safety, bowel elimination, Heparin, comfort  Patient Goals: comfort, pain control  Family Goals: not present    Progress made toward(s) clinical / shift goals:  yes      Problem: Pain - Standard  Goal: Alleviation of pain or a reduction in pain to the patient’s comfort goal  Description: Target End Date:  Prior to discharge or change in level of care    Document on Vitals flowsheet    1.  Document pain using the appropriate pain scale per order or unit policy  2.  Educate and implement non-pharmacologic comfort measures (i.e. relaxation, distraction, massage, cold/heat therapy, etc.)  3.  Pain management medications as ordered  4.  Reassess pain after pain med administration per policy  5.  If opiods administered assess patient's response to pain medication is appropriate per POSS sedation scale  6.  Follow pain management plan developed in collaboration with patient and interdisciplinary team (including palliative care or pain specialists if applicable)  Outcome: Progressing     Problem: Knowledge Deficit - Standard  Goal: Patient and family/care givers will demonstrate understanding of plan of care, disease process/condition, diagnostic tests and medications  Description: Target End Date:  1-3 days or as soon as patient condition allows    Document in Patient Education    1.  Patient and family/caregiver oriented to unit, equipment, visitation policy and means for communicating concern  2.  Complete/review Learning Assessment  3.  Assess knowledge level of disease process/condition, treatment plan, diagnostic tests and medications  4.  Explain disease process/condition, treatment plan, diagnostic tests and medications  Outcome: Progressing     Problem: Skin Integrity  Goal: Skin integrity is maintained or improved  Description: Target End Date:  Prior to discharge or change in level of  care    Document interventions on Skin Risk/Kamar flowsheet groups and corresponding LDA    1.  Assess and monitor skin integrity, appearance and/or temperature  2.  Assess risk factors for impaired skin integrity and/or pressures ulcers  3.  Implement precautions to protect skin integrity in collaboration with interdisciplinary team  4.  Implement pressure ulcer prevention protocol if at risk for skin breakdown  5.  Confirm wound care consult if at risk for skin breakdown  6.  Ensure patient use of pressure relieving devices  (Low air loss bed, waffle overlay, heel protectors, ROHO cushion, etc)  Outcome: Progressing     Problem: Mobility  Goal: Patient's capacity to carry out activities will improve  Description: Target End Date:  Prior to discharge or change in level of care    1.  Assess for barriers to mobility/activity  2.  Implement activity per interdisciplinary team recommendations  3.  Target activity level identified and patient/family/caregiver aware of goal  4.  Provide assistive devices  5.  Instruct patient/caregiver on proper use of assistive/adaptive devices  6.  Schedule activities and rest periods to decrease effects of fatigue  7.  Encourage mobilization to extent of ability  8.  Maintain proper body alignment  9.  Provide adequate pain management to allow progressive mobilization  10. Implement pace maker precautions as needed  Outcome: Progressing     Problem: Self Care  Goal: Patient will have the ability to perform ADLs independently or with assistance (bathe, groom, dress, toilet and feed)  Description: Target End Date:  Prior to discharge or change in level of care    Document on ADL flowsheet    1.  Assess the capability and level of deficiency to perform ADLs  2.  Encourage family/care giver involvement  3.  Provide assistive devices  4.  Consider PT/OT evaluations  5.  Maintain support, give positive feedback, encourage self-care allowing extra time and verbal cuing as needed  6.  Avoid  doing something for patients they can do themselves, but provide assistance as needed  7.  Assist in anticipating/planning individual needs  8.  Collaborate with Case Management and  to meet discharge needs  Outcome: Progressing     Problem: Infection - Standard  Goal: Patient will remain free from infection  Description: Target End Date:  Prior to discharge or change in level of care    1.  Utilize Standard Precautions at all times to reduce the risk of transmission of microorganisms from both recognized and  unrecognized sources of infection  2.  Infection prevention handouts provided (general/device/diagnosis specific) and documented in Patient Education  3.  Educate patient and family/caregiver on isolation precautions if applicable  Outcome: Progressing     Problem: Wound/ / Incision Healing  Goal: Patient's wound/surgical incision will decrease in size and heals properly  Description: Target End Date:  Prior to discharge or change in level of care    Document on LDA    1.  Assess and document surgical incision/wound  2.  Provide incision/wound care per policy and/or provider orders  3.  Manage surgical drains per policy if applicable  4.  Encourage adequate nutrition to promote wound healing  5.  Collaborate with Clinical Dietician  Outcome: Progressing

## 2024-11-10 NOTE — CARE PLAN
The patient is Stable - Low risk of patient condition declining or worsening    Shift Goals  Clinical Goals: pain control, monitor heparin/labs, mobility  Patient Goals: pain control, comfort  Family Goals: not present    Progress made toward(s) clinical / shift goals:    Problem: Pain - Standard  Goal: Alleviation of pain or a reduction in pain to the patient’s comfort goal  Outcome: Progressing     Problem: Knowledge Deficit - Standard  Goal: Patient and family/care givers will demonstrate understanding of plan of care, disease process/condition, diagnostic tests and medications  Outcome: Progressing     Problem: Skin Integrity  Goal: Skin integrity is maintained or improved  Outcome: Progressing     Problem: Mobility  Goal: Patient's capacity to carry out activities will improve  Outcome: Progressing     Problem: Self Care  Goal: Patient will have the ability to perform ADLs independently or with assistance (bathe, groom, dress, toilet and feed)  Outcome: Progressing     Problem: Infection - Standard  Goal: Patient will remain free from infection  Outcome: Progressing     Problem: Wound/ / Incision Healing  Goal: Patient's wound/surgical incision will decrease in size and heals properly  Outcome: Progressing       Patient is not progressing towards the following goals:

## 2024-11-10 NOTE — PROGRESS NOTES
1011: Notified phlebotomy regarding 1019 Anti-Xa blood draw. Phlebotomist verbalized confirmation.

## 2024-11-10 NOTE — PROGRESS NOTES
Inpatient Anticoagulation Service Note for 11/10/2024      Reason for Anticoagulation: Aortic Mechanical Valve Replacement  , Atrial Fibrillation, Transient Ischemic Attack   [id number] VASc Score: 7  HAS-BLED Score: 5    Hemoglobin Value: (!) 8.8  Hematocrit Value: (!) 27.9  Lab Platelet Value: (!) 124  Target INR: 2.5 to 3.5    INR from last 7 days        Date/Time INR Value    11/10/24 0233 1.43     11/09/24 0903 1.42     11/08/24 0043 1.91     11/07/24 1449 2.57                   Dose from last 7 days        Date/Time Dose (mg)    11/10/24 1340 6     11/09/24 1503 6                   Assessment:   74-year-old female with PMH significant for mechanical AVR, Afib, and TIA on chronic warfarin managed by [facility] anticoagulation clinic.   Patient's home dose is warfarin 6 mg M/W/F and 4 mg AOD. TTR of 69.9%.   She was admitted 11/7 with a femur fracture requiring surgical intervention. Her INR was 2.57 on admission and she was given IV vitamin K on 11/7 in preparation for surgery. Surgery was completed 11/8 and patient was cleared to restart warfarin on 11/9.   H/H and PLTs downtrended but no overt bleeding charted. No significant DDIs noted.   Regular diet ordered.   Heparin gtt for bridging. 1 day of overlap therapy thus far. Continue bridging.  Warfarin was resumed at slightly higher dose last night to help mobilize INR. INR remains sub-therapeutic this morning as expected with held doses and reversal on 11/7. Plan to give another boost in dose tonight and likely resume home dosing tomorrow if INR starts to mobilize.     Plan:  6 mg   Education Material Provided?: No (established warfarin)    Pharmacist suggested discharge dosing: Warfarin 6 mg x1 on 11/10 then resume usual home dosing starting 11/11 and follow up with coumadin clinic within 48 hours of discharge for re-assessment.      Whit Schulte, PharmD  [id number]

## 2024-11-11 ENCOUNTER — APPOINTMENT (OUTPATIENT)
Dept: RADIOLOGY | Facility: MEDICAL CENTER | Age: 74
DRG: 481 | End: 2024-11-11
Attending: HOSPITALIST
Payer: MEDICARE

## 2024-11-11 LAB
ANION GAP SERPL CALC-SCNC: 9 MMOL/L (ref 7–16)
BUN SERPL-MCNC: 49 MG/DL (ref 8–22)
CALCIUM SERPL-MCNC: 8 MG/DL (ref 8.5–10.5)
CHLORIDE SERPL-SCNC: 103 MMOL/L (ref 96–112)
CO2 SERPL-SCNC: 24 MMOL/L (ref 20–33)
CREAT SERPL-MCNC: 2.13 MG/DL (ref 0.5–1.4)
ERYTHROCYTE [DISTWIDTH] IN BLOOD BY AUTOMATED COUNT: 44 FL (ref 35.9–50)
GFR SERPLBLD CREATININE-BSD FMLA CKD-EPI: 24 ML/MIN/1.73 M 2
GLUCOSE SERPL-MCNC: 116 MG/DL (ref 65–99)
HCT VFR BLD AUTO: 25.3 % (ref 37–47)
HGB BLD-MCNC: 8.3 G/DL (ref 12–16)
INR PPP: 1.79 (ref 0.87–1.13)
MCH RBC QN AUTO: 30 PG (ref 27–33)
MCHC RBC AUTO-ENTMCNC: 32.8 G/DL (ref 32.2–35.5)
MCV RBC AUTO: 91.3 FL (ref 81.4–97.8)
PLATELET # BLD AUTO: 121 K/UL (ref 164–446)
PMV BLD AUTO: 10.6 FL (ref 9–12.9)
POTASSIUM SERPL-SCNC: 4.3 MMOL/L (ref 3.6–5.5)
PROTHROMBIN TIME: 20.9 SEC (ref 12–14.6)
RBC # BLD AUTO: 2.77 M/UL (ref 4.2–5.4)
SODIUM SERPL-SCNC: 136 MMOL/L (ref 135–145)
UFH PPP CHRO-ACNC: 0.69 IU/ML
UFH PPP CHRO-ACNC: 1.05 IU/ML
UFH PPP CHRO-ACNC: >1.1 IU/ML
WBC # BLD AUTO: 5.4 K/UL (ref 4.8–10.8)

## 2024-11-11 PROCEDURE — A9270 NON-COVERED ITEM OR SERVICE: HCPCS | Performed by: INTERNAL MEDICINE

## 2024-11-11 PROCEDURE — 85027 COMPLETE CBC AUTOMATED: CPT

## 2024-11-11 PROCEDURE — 97530 THERAPEUTIC ACTIVITIES: CPT

## 2024-11-11 PROCEDURE — A9270 NON-COVERED ITEM OR SERVICE: HCPCS | Performed by: NURSE PRACTITIONER

## 2024-11-11 PROCEDURE — 770001 HCHG ROOM/CARE - MED/SURG/GYN PRIV*

## 2024-11-11 PROCEDURE — 700102 HCHG RX REV CODE 250 W/ 637 OVERRIDE(OP): Performed by: NURSE PRACTITIONER

## 2024-11-11 PROCEDURE — A9270 NON-COVERED ITEM OR SERVICE: HCPCS

## 2024-11-11 PROCEDURE — 80048 BASIC METABOLIC PNL TOTAL CA: CPT

## 2024-11-11 PROCEDURE — 700111 HCHG RX REV CODE 636 W/ 250 OVERRIDE (IP): Mod: JZ | Performed by: HOSPITALIST

## 2024-11-11 PROCEDURE — A9270 NON-COVERED ITEM OR SERVICE: HCPCS | Performed by: HOSPITALIST

## 2024-11-11 PROCEDURE — 700102 HCHG RX REV CODE 250 W/ 637 OVERRIDE(OP): Performed by: HOSPITALIST

## 2024-11-11 PROCEDURE — 85520 HEPARIN ASSAY: CPT

## 2024-11-11 PROCEDURE — 99233 SBSQ HOSP IP/OBS HIGH 50: CPT | Performed by: HOSPITALIST

## 2024-11-11 PROCEDURE — 700102 HCHG RX REV CODE 250 W/ 637 OVERRIDE(OP): Performed by: INTERNAL MEDICINE

## 2024-11-11 PROCEDURE — 97535 SELF CARE MNGMENT TRAINING: CPT

## 2024-11-11 PROCEDURE — 700102 HCHG RX REV CODE 250 W/ 637 OVERRIDE(OP)

## 2024-11-11 PROCEDURE — 36415 COLL VENOUS BLD VENIPUNCTURE: CPT

## 2024-11-11 PROCEDURE — 85610 PROTHROMBIN TIME: CPT

## 2024-11-11 RX ORDER — BISACODYL 10 MG
10 SUPPOSITORY, RECTAL RECTAL
Status: DISCONTINUED | OUTPATIENT
Start: 2024-11-11 | End: 2024-11-15 | Stop reason: HOSPADM

## 2024-11-11 RX ORDER — OXYCODONE HCL 10 MG/1
10 TABLET, FILM COATED, EXTENDED RELEASE ORAL EVERY 12 HOURS
Status: DISCONTINUED | OUTPATIENT
Start: 2024-11-11 | End: 2024-11-13

## 2024-11-11 RX ORDER — BISACODYL 10 MG
10 SUPPOSITORY, RECTAL RECTAL ONCE
Status: DISCONTINUED | OUTPATIENT
Start: 2024-11-11 | End: 2024-11-12

## 2024-11-11 RX ORDER — LACTULOSE 10 G/15ML
30 SOLUTION ORAL ONCE
Status: COMPLETED | OUTPATIENT
Start: 2024-11-11 | End: 2024-11-11

## 2024-11-11 RX ADMIN — POLYETHYLENE GLYCOL 3350 1 PACKET: 17 POWDER, FOR SOLUTION ORAL at 04:35

## 2024-11-11 RX ADMIN — OXYCODONE HYDROCHLORIDE 10 MG: 10 TABLET ORAL at 20:23

## 2024-11-11 RX ADMIN — OXYCODONE HYDROCHLORIDE 10 MG: 10 TABLET, FILM COATED, EXTENDED RELEASE ORAL at 17:29

## 2024-11-11 RX ADMIN — ACETAMINOPHEN 1000 MG: 500 TABLET ORAL at 23:34

## 2024-11-11 RX ADMIN — HYDROMORPHONE HYDROCHLORIDE 0.5 MG: 1 INJECTION, SOLUTION INTRAMUSCULAR; INTRAVENOUS; SUBCUTANEOUS at 11:17

## 2024-11-11 RX ADMIN — OXYCODONE HYDROCHLORIDE 10 MG: 10 TABLET ORAL at 16:43

## 2024-11-11 RX ADMIN — LACTULOSE 30 ML: 10 SOLUTION ORAL at 16:43

## 2024-11-11 RX ADMIN — SENNOSIDES AND DOCUSATE SODIUM 2 TABLET: 50; 8.6 TABLET ORAL at 17:29

## 2024-11-11 RX ADMIN — OXYCODONE HYDROCHLORIDE 10 MG: 10 TABLET ORAL at 13:03

## 2024-11-11 RX ADMIN — WARFARIN SODIUM 6 MG: 6 TABLET ORAL at 17:29

## 2024-11-11 RX ADMIN — TIMOLOL MALEATE 1 DROP: 5 SOLUTION OPHTHALMIC at 04:40

## 2024-11-11 RX ADMIN — OXYCODONE HYDROCHLORIDE 10 MG: 10 TABLET ORAL at 09:47

## 2024-11-11 RX ADMIN — OXYCODONE HYDROCHLORIDE 10 MG: 10 TABLET ORAL at 04:37

## 2024-11-11 RX ADMIN — HEPARIN SODIUM 26 UNITS/KG/HR: 5000 INJECTION, SOLUTION INTRAVENOUS at 06:09

## 2024-11-11 RX ADMIN — ACETAMINOPHEN 1000 MG: 500 TABLET ORAL at 17:29

## 2024-11-11 RX ADMIN — TIMOLOL MALEATE 1 DROP: 5 SOLUTION OPHTHALMIC at 17:29

## 2024-11-11 RX ADMIN — HEPARIN SODIUM 20 UNITS/KG/HR: 5000 INJECTION, SOLUTION INTRAVENOUS at 22:12

## 2024-11-11 RX ADMIN — LEVOTHYROXINE SODIUM 125 MCG: 0.12 TABLET ORAL at 04:34

## 2024-11-11 RX ADMIN — DOXAZOSIN 8 MG: 2 TABLET ORAL at 19:56

## 2024-11-11 RX ADMIN — ACETAMINOPHEN 1000 MG: 500 TABLET ORAL at 04:33

## 2024-11-11 RX ADMIN — TORSEMIDE 20 MG: 20 TABLET ORAL at 04:35

## 2024-11-11 ASSESSMENT — COGNITIVE AND FUNCTIONAL STATUS - GENERAL
SUGGESTED CMS G CODE MODIFIER MOBILITY: CM
CLIMB 3 TO 5 STEPS WITH RAILING: TOTAL
SUGGESTED CMS G CODE MODIFIER DAILY ACTIVITY: CK
EATING MEALS: A LITTLE
TOILETING: A LOT
DRESSING REGULAR LOWER BODY CLOTHING: A LOT
STANDING UP FROM CHAIR USING ARMS: TOTAL
MOVING FROM LYING ON BACK TO SITTING ON SIDE OF FLAT BED: A LOT
DRESSING REGULAR UPPER BODY CLOTHING: A LITTLE
MOVING TO AND FROM BED TO CHAIR: TOTAL
PERSONAL GROOMING: A LITTLE
HELP NEEDED FOR BATHING: A LOT
WALKING IN HOSPITAL ROOM: TOTAL
MOBILITY SCORE: 8
TURNING FROM BACK TO SIDE WHILE IN FLAT BAD: A LOT
DAILY ACTIVITIY SCORE: 15

## 2024-11-11 ASSESSMENT — PAIN DESCRIPTION - PAIN TYPE
TYPE: ACUTE PAIN;SURGICAL PAIN
TYPE: ACUTE PAIN
TYPE: ACUTE PAIN;SURGICAL PAIN
TYPE: ACUTE PAIN
TYPE: SURGICAL PAIN;ACUTE PAIN
TYPE: ACUTE PAIN;SURGICAL PAIN
TYPE: ACUTE PAIN;SURGICAL PAIN

## 2024-11-11 ASSESSMENT — ENCOUNTER SYMPTOMS
SHORTNESS OF BREATH: 0
CONSTIPATION: 1
VOMITING: 0
FEVER: 0
NAUSEA: 0

## 2024-11-11 ASSESSMENT — FIBROSIS 4 INDEX: FIB4 SCORE: 4.68

## 2024-11-11 ASSESSMENT — GAIT ASSESSMENTS: GAIT LEVEL OF ASSIST: UNABLE TO PARTICIPATE

## 2024-11-11 NOTE — THERAPY
Occupational Therapy  Daily Treatment     Patient Name: Yenifer Beasley  Age:  74 y.o., Sex:  female  Medical Record #: [id number]  Today's Date: 11/11/2024     Precautions: (P) Fall Risk, Toe Touch Weight Bearing Right Lower Extremity  Comments: R knee ROMAT    Assessment  Pt agreeable to OT tx session. Pt demo'd SPV for unsupported seated g/h, and max A for LB dressing including donning knee brace on LLE (for support 2/2 pain in LLE as well). Pt upright seated EOB for 10-15 minutes, then re approached for STS for linen change and return to supine. Overall she is limited by poor activity tolerance, pain, weakness, and poor balance. Will continue to follow for skilled OT.    Co-treated with physical therapy 2/2 said deficits and high complexity requiring two skilled therapists. OT facilitated self cares while physical therapy simultaneously treated pt according to POC.      Plan    Treatment Plan Status: (P) Continue Current Treatment Plan  Type of Treatment: Self Care / Activities of Daily Living, Adaptive Equipment, Neuro Re-Education / Balance, Therapeutic Exercises, Therapeutic Activity  Treatment Frequency: 4 Times per Week  Treatment Duration: Until Therapy Goals Met    DC Equipment Recommendations: (P) Unable to determine at this time  Discharge Recommendations: (P) Recommend post-acute placement for additional occupational therapy services prior to discharge home       11/11/24 1138   Precautions   Precautions Fall Risk;Toe Touch Weight Bearing Right Lower Extremity   Balance   Sitting Balance (Static) Fair   Sitting Balance (Dynamic) Fair -   Standing Balance (Static) Poor -   Weight Shift Sitting Poor   Skilled Intervention Tactile Cuing;Verbal Cuing   Bed Mobility    Supine to Sit Maximal Assist   Sit to Supine Maximal Assist  (pt re approached to mobilize BTB)   Skilled Intervention Verbal Cuing;Tactile Cuing   Activities of Daily Living   Grooming Supervision;Seated  (hair care)   Lower Body Dressing    (donning knee brace and socks)   Skilled Intervention Verbal Cuing   How much help from another person does the patient currently need...   6 Clicks Daily Activity Score 15   Functional Mobility   Sit to Stand Maximal Assist   Mobility STS x1   Comments pt re approached for STS for linen change   Short Term Goals   Short Term Goal # 1 pt will demo ADL txfs w/ Leticia   Goal Outcome # 1 Progressing slower than expected   Short Term Goal # 2 pt will dress LB w/ Leticia and AE prn   Goal Outcome # 2 Progressing slower than expected   Short Term Goal # 3 pt will demo toileting w/ Leticia   Goal Outcome # 3 Progressing slower than expected   Education Group   Role of Occupational Therapist Patient Response Patient;Acceptance;Explanation   Occupational Therapy Treatment Plan    O.T. Treatment Plan Continue Current Treatment Plan   Anticipated Discharge Equipment and Recommendations   DC Equipment Recommendations Unable to determine at this time   Discharge Recommendations Recommend post-acute placement for additional occupational therapy services prior to discharge home   Interdisciplinary Plan of Care Collaboration   IDT Collaboration with  Nursing;Physical Therapist   Patient Position at End of Therapy In Bed;Bed Alarm On;Tray Table within Reach;Call Light within Reach;Phone within Reach   Collaboration Comments RN aware   Session Information   Date / Session Number  11/11 2 (2/4, 11/15)

## 2024-11-11 NOTE — PROGRESS NOTES
"    Orthopedic PA Progress Note    Interval changes:  Patient doing well   Will need postacute placement prior to dc home  RLE dressings are CDI  TTWB RLE  Follow up with Dr. Camargo 2 weeks postop  Cleared for DC from orthopedic standpoint     ROS - Patient denies any new issues.  Denies any numbness or tingling. Pain controlled.    /56   Pulse 62   Temp 36.8 °C (98.2 °F) (Temporal)   Resp 16   Ht 1.527 m (5' 0.1\")   Wt 104 kg (230 lb)   SpO2 95%     Patient seen and examined  No acute distress  Breathing non labored  RRR  RLE: Surgical dressing is clean, dry, and intact. Patient clearly fires tibialis anterior, EHL, and gastrocnemius/soleus. Sensation is intact to light touch throughout superficial peroneal, deep peroneal, tibial, saphenous, and sural nerve distributions. Strong and palpable 2+ dorsalis pedis and posterior tibial pulses with capillary refill less than 2 seconds.     Recent Labs     11/08/24  0043 11/09/24  0030 11/09/24  0903 11/10/24  0638   WBC 9.1 13.4*  --  7.6   RBC 3.68* 3.19*  --  2.98*   HEMOGLOBIN 11.3* 9.6* 10.1* 8.8*   HEMATOCRIT 33.9* 29.9*  --  27.9*   MCV 92.1 93.7  --  93.6   MCH 30.7 30.1  --  29.5   MCHC 33.3 32.1*  --  31.5*   RDW 44.4 45.1  --  44.9   PLATELETCT 166 130*  --  124*   MPV 10.5 10.3  --  11.1       Active Hospital Problems    Diagnosis     Class 3 severe obesity due to excess calories with serious comorbidity and body mass index (BMI) of 40.0 to 44.9 in adult ([facility]) [E66.813, E66.01, Z68.41]      Priority: Medium    Chronic kidney disease (CKD) stage G3b/A1, moderately decreased glomerular filtration rate (GFR) between 30-44 mL/min/1.73 square meter and albuminuria creatinine ratio less than 30 mg/g [N18.32]      Priority: Medium     Chronic, discussed increasing her hydration and avoiding nephrotoxic medications and NSAIDs.  BP is controlled today in clinic 130/70, she has had a longstanding history of prediabetes however most recent A1c was at " 5.3.  GFR (CKD-EPI) >60 mL/min/1.73 m 2 34 Abnormal      Bun 8 - 22 mg/dL 38 High     Creatinine 0.50 - 1.40 mg/dL 1.61 High            Paroxysmal atrial fibrillation (HCC) [I48.0]      Priority: Medium     Chronic, continues on Amiodarone 200 mg daily, Torsemide 10 mg daily, Warfarin, Doxazosin 2 mg daily.  She notes that she had a cardioversion done 2 years ago.  Her Coumadin is followed by anticoagulation clinic.      Hx of mechanical aortic valve replacement [V43.3] [Z95.2]      Priority: Medium    Essential hypertension [I10]      Priority: Medium    Tricuspid regurgitation [I07.1]      Priority: Medium    Hypothyroidism [E03.9]      Priority: Low     Chronic, continues on Synthroid 125 mcg each morning on an empty stomach.  Denies any symptoms of hyper or hypothyroidism.  We will recheck TSH prior to her next visit.      Nondisplaced unspecified condyle fracture of lower end of right femur, initial encounter for closed fracture (HCC) [S72.414A]     Hyperglycemia [R73.9]        DX-KNEE 3 VIEWS LEFT   Final Result   Impression:      1. Left knee joint effusion. No fracture evident.      2. Prominent tricompartmental degenerative changes.      3. Surgical clips in the medial soft tissues.                     CT-KNEE W/O PLUS RECONS RIGHT   Final Result      1.  Lateral femoral condyle intra-articular fracture      2.  Associated joint effusion      3.  No other fracture      4.  osteoarthritis the medial femorotibial and patellofemoral compartments      5.  Small vessel atherosclerotic plaque      DX-CHEST-LIMITED (1 VIEW)   Final Result      1.  Persistently enlarged cardiac silhouette   2.  Atherosclerosis   3.  Increased BILATERAL pulmonary opacities, favor pulmonary edema      DX-FEMUR-2+ RIGHT   Final Result      1.  Lateral femoral condylar fracture redemonstrated with a large knee joint effusion   2.  No additional fracture   3.  Osteoarthritis      DX-KNEE 2- RIGHT   Final Result      Acute mildly  displaced intra-articular fracture of the lateral femoral condyle.      DX-PORTABLE FLUORO > 1 HOUR    (Results Pending)   DX-FEMUR-2+ RIGHT    (Results Pending)       Assessment/Plan:  Patient doing well   Will need postacute placement prior to dc home  RLE dressings are CDI  TTWB RLE  Follow up with Dr. Camargo 2 weeks postop  Cleared for DC from orthopedic standpoint     POD#2 S/p  Open treatment with internal fixation of right lateral   condyle distal femur fracture.  Wt bearing status - TTWB RLE  Future Procedures - None planned   Wound care/Drains - Dressings to be changed every other day by nursing. Or PRN for saturation starting POD#3  Sutures/Staples out- 14-21 days post operatively. Removal will completed by ortho HANY's unless transferred.  Inpatient DVT prophylaxis: heparin  DVT Prophylaxis outpatient: coumadin  PT/OT-initiated  Antibiotics:  Perioperative completed  DVT Prophylaxis- TEDS/SCDs/Foot pumps  Case Coordination for Discharge Planning - Disposition per therapy recs.

## 2024-11-11 NOTE — DISCHARGE PLANNING
[facility] TCN chart review completed. Collaborated with BOBBI Lackey.  Current discharge considerations are for post-acute placement when medically cleared.  Patient seen at bedside and discussed post-acute placement.  Patient gave choice for SNF 1) [facility]- accepted.  Per collaboration with BOBBI, plan for possible discharge to [facility] tomorrow.  Faxed to [facility] and given to CM.  [facility] authorization for [facility] obtained and CM notified.   TCN will continue to follow and collaborate with discharge planning team as additional post acute needs arise. Thank you.    Completed:  PT/OT recommends post-acute placement on 11/9/24.   Choice obtained: none; SNF 1) [facility]- accepted.     Pt aware of [facility]'s blanket referral policy and reports will select from accepting SNF facilities if indicated.

## 2024-11-11 NOTE — THERAPY
"Physical Therapy   Daily Treatment     Patient Name: Yenifer Beasley  Age:  74 y.o., Sex:  female  Medical Record #: [id number]  Today's Date: 11/11/2024     Precautions  Precautions: Fall Risk;Toe Touch Weight Bearing Right Lower Extremity  Comments: (P) R knee ROMAT, L knee brace    Assessment    Patient received in bed and agreeable to PT session. Pt required maxA for bed mobility, Once seated EOB, pt able to remain seated for 10 minutes with fair seated balance. Pt performed STS x2 person assist with partial clearance of the bed; unable to stand fully upright and unable to maintain TTWB. Pt is primarily limited by pain and impaired functional strength, balance, ROM, and activity tolerance. Recommend post acute placement. Will follow for acute care PT needs.     Plan    Treatment Plan Status: (P) Continue Current Treatment Plan  Type of Treatment: Bed Mobility, Family / Caregiver Training, Gait Training, Neuro Re-Education / Balance, Therapeutic Activities, Therapeutic Exercise, Stair Training, Equipment  Treatment Frequency: 5 Times per Week  Treatment Duration: Until Therapy Goals Met    DC Equipment Recommendations: (P) Unable to determine at this time  Discharge Recommendations: (P) Recommend post-acute placement for additional physical therapy services prior to discharge home      Subjective    \"It feels good on my back to sit up\".      Objective       11/11/24 1137   Precautions   Precautions Fall Risk;Toe Touch Weight Bearing Right Lower Extremity   Comments R knee ROMAT, L knee brace   Vitals   O2 (LPM) 1   O2 Delivery Device Nasal Cannula   Vitals Comments VSS, declines dizziness   Pain 0 - 10 Group   Location Hip   Location Orientation Right   Therapist Pain Assessment Post Activity Pain Same as Prior to Activity;Nurse Notified  (pain not rated)   Cognition    Cognition / Consciousness WDL   Level of Consciousness Alert   Comments pleasant and participatory   Active ROM Lower Body    Active ROM Lower " Body  X   Comments RLE knee flexion more limited than LLE, BLE's limited 2/2 pain and body habitus   Strength Lower Body   Lower Body Strength  X   Comments generalized weakness in BLE's, limited 2/2 pain   Balance   Sitting Balance (Static) Fair   Sitting Balance (Dynamic) Fair -   Standing Balance (Static) Poor -   Standing Balance (Dynamic) Trace +   Weight Shift Sitting Poor   Weight Shift Standing Absent   Skilled Intervention Verbal Cuing;Sequencing;Compensatory Strategies   Bed Mobility    Supine to Sit Maximal Assist   Sit to Supine Maximal Assist   Scooting Moderate Assist   Skilled Intervention Verbal Cuing;Compensatory Strategies   Comments HOB elevated   Gait Analysis   Gait Level Of Assist Unable to Participate   Functional Mobility   Sit to Stand Maximal Assist   Bed, Chair, Wheelchair Transfer Unable to Participate   Mobility supine > EOB > STS > supine   Skilled Intervention Verbal Cuing;Compensatory Strategies   6 Clicks Assessment - How much HELP from from another person do you currently need... (If the patient hasn't done an activity recently, how much help from another person do you think he/she would need if he/she tried?)   Turning from your back to your side while in a flat bed without using bedrails? 2   Moving from lying on your back to sitting on the side of a flat bed without using bedrails? 2   Moving to and from a bed to a chair (including a wheelchair)? 1   Standing up from a chair using your arms (e.g., wheelchair, or bedside chair)? 1   Walking in hospital room? 1   Climbing 3-5 steps with a railing? 1   6 clicks Mobility Score 8   Short Term Goals    Short Term Goal # 1 pt will be able to transfer supine <> sit w/SPV in 6 tx sessions in order to increase level of independence   Goal Outcome # 1 goal not met   Short Term Goal # 2 pt will be able to perform SPT w/ FWW and min assist to BSC or recliner chair in 6 tx sessions in order to increase level of independence   Goal Outcome # 2  Goal not met   Short Term Goal # 3 pt will be able to ambulate 20 ft in 6 tx sessions in order to increase level of independence   Goal Outcome # 3 Goal not met   Education Group   Education Provided Role of Physical Therapist   Role of Physical Therapist Patient Response Patient;Acceptance;Explanation;Verbal Demonstration   Weight Bearing Status Patient Response Patient;Acceptance;Explanation;Verbal Demonstration   Additional Comments Education on importance of attempting to mobilize to EOB with nursing staff daily   Physical Therapy Treatment Plan   Physical Therapy Treatment Plan Continue Current Treatment Plan   Anticipated Discharge Equipment and Recommendations   DC Equipment Recommendations Unable to determine at this time   Discharge Recommendations Recommend post-acute placement for additional physical therapy services prior to discharge home   Interdisciplinary Plan of Care Collaboration   IDT Collaboration with  Nursing;Occupational Therapist   Patient Position at End of Therapy In Bed;Bed Alarm On;Call Light within Reach;Tray Table within Reach;Phone within Reach   Collaboration Comments RN updated   Session Information   Date / Session Number  11/11 - 2 (2/5, 11/15)     Patient seen for team treatment with Occupational Therapist for the following reason(s):  Patient required 2 person assistance for safety and to provide effective interventions. Each discipline assisted patient with appropriate and separate goals. Due to the medical complexity, the skill of both practitioners is needed to monitor vitals, patient status, and adjust the intervention to fit the patient's needs and goals. Therapy sessions needed to be done by a certain time period for both disciplines, and did not impede patient's progress.

## 2024-11-11 NOTE — PROGRESS NOTES
Inpatient Anticoagulation Service Note for 11/11/2024      Reason for Anticoagulation: Aortic Mechanical Valve Replacement  , Atrial Fibrillation, Transient Ischemic Attack   [id number] VASc Score: 7  HAS-BLED Score: 5    Hemoglobin Value: (!) 8.3  Hematocrit Value: (!) 25.3  Lab Platelet Value: (!) 121 (Results confirmed by repeat testing.)  Target INR: 2.5 to 3.5    INR from last 7 days        Date/Time INR Value    11/11/24 0132 1.79     11/10/24 0233 1.43     11/09/24 0903 1.42     11/08/24 0043 1.91     11/07/24 1449 2.57                   Dose from last 7 days        Date/Time Dose (mg)    11/11/24 1332 6     11/10/24 1340 6     11/09/24 1503 6                     Assessment:   74-year-old female with PMH significant for mechanical AVR, Afib, and TIA on chronic warfarin managed by [facility] anticoagulation clinic.   Patient's home dose is warfarin 6 mg M/W/F and 4 mg AOD. TTR of 69.9%.   She was admitted 11/7 with a femur fracture requiring surgical intervention. Her INR was 2.57 on admission and she was given IV vitamin K on 11/7 in preparation for surgery. Surgery was completed 11/8 and patient was cleared to restart warfarin on 11/9.   H/H and PLTs downtrended but no overt bleeding charted. No significant DDIs noted.   Regular diet ordered.   Bump in Scr today from 1.8 > 2.1. Baseline Scr appears ~1.6-1.8  Heparin gtt for bridging. 2 day of overlap therapy thus far. Continue bridging.  Warfarin was resumed at slightly higher dose for the first few days (patient typically gets 4 mg on Sat & Sun but dose was increased to 6 mg) to help mobilize INR. INR up-trend noted today as expected from 1.4 > 1.8, anticipating another bump tomorrow from the increased dose last night. Plan to resume usual home dosing tonight.      Plan:  6 mg   Education Material Provided?: No (established warfarin)  Pharmacist suggested discharge dosing: Warfarin 6 mg PO every M/W/F and 4 mg AOD.      Whit Schulte, PharmD

## 2024-11-11 NOTE — CARE PLAN
The patient is Stable - Low risk of patient condition declining or worsening    Shift Goals  Clinical Goals: pain control, monitor heparin/labs, mobility  Patient Goals: pain control, sleep  Family Goals: not present    Progress made toward(s) clinical / shift goals:    Problem: Pain - Standard  Goal: Alleviation of pain or a reduction in pain to the patient’s comfort goal  Outcome: Progressing     Problem: Knowledge Deficit - Standard  Goal: Patient and family/care givers will demonstrate understanding of plan of care, disease process/condition, diagnostic tests and medications  Outcome: Progressing     Problem: Mobility  Goal: Patient's capacity to carry out activities will improve  Outcome: Progressing       Patient is not progressing towards the following goals:

## 2024-11-11 NOTE — PROGRESS NOTES
[facility] Medicine Daily Progress Note    Date of Service  11/10/2024    Chief Complaint  Yenifer Beasley is a 74 y.o. female admitted 11/7/2024 with right knee pain after fall    Hospital Course  Yenifer Beasley is a 74 y.o. female who presented 11/7/2024 with right knee pain post fall.  Patient states she was at [facility], thinks something was spilled on the floor causing her to slip and fall.  Patient had instant right knee pain which is sharp, 8/10 at its worst.  Patient was unable to get up.  Because of this, patient was brought to the emergency department.  Patient did have a CT scan which showed a right femur lateral condyle fracture.  Because of this, ERP discussed the case with orthopedic surgery who agreed for surgical intervention.  Patient does have a mechanical heart valve, is on Coumadin, most recent INR was yesterday and was therapeutic at 2.6.           Interval Problem Update    Pain is better controlled this morning  She is afebrile on 3 L nasal cannula  Patient on heparin drip warfarin restarted  I reviewed INR 1.4  Reviewed chemistry panel BUN 42 creatinine 1.8  Reviewed CBC hemoglobin 8.8    Total time spent reviewing lab results examining patient and updating her on plan of care and discussing with nursing staff and case management 52 minutes    I have discussed this patient's plan of care and discharge plan at IDT rounds today with Case Management, Nursing, Nursing leadership, and other members of the IDT team.    Consultants/Specialty  orthopedics    Code Status  Full Code    Disposition  Medically Cleared  I have placed the appropriate orders for post-discharge needs.    Review of Systems  Review of Systems   Constitutional:  Negative for fever.   Respiratory:  Negative for shortness of breath.    Cardiovascular:  Negative for chest pain.   Gastrointestinal:  Negative for abdominal pain, nausea and vomiting.   Musculoskeletal:  Positive for joint pain.        Physical Exam  Temp:  [36.4  °C (97.6 °F)-37 °C (98.6 °F)] 36.8 °C (98.2 °F)  Pulse:  [56-62] 62  Resp:  [16-19] 16  BP: ()/(36-56) 115/56  SpO2:  [95 %-98 %] 95 %    Physical Exam  Constitutional:       Appearance: She is obese.   Eyes:      General:         Right eye: No discharge.         Left eye: No discharge.   Cardiovascular:      Rate and Rhythm: Normal rate and regular rhythm.      Heart sounds: Murmur heard.   Pulmonary:      Breath sounds: No rales.   Abdominal:      Palpations: Abdomen is soft.      Tenderness: There is no abdominal tenderness. There is no guarding.   Musculoskeletal:         General: Swelling present.      Comments: Right knee surgical wound dressed with intact dressing   Neurological:      General: No focal deficit present.      Mental Status: She is alert.      Cranial Nerves: No cranial nerve deficit.   Psychiatric:         Mood and Affect: Mood normal.         Behavior: Behavior normal.         Fluids    Intake/Output Summary (Last 24 hours) at 11/10/2024 1618  Last data filed at 11/10/2024 0504  Gross per 24 hour   Intake --   Output 250 ml   Net -250 ml        Laboratory  Recent Labs     11/08/24  0043 11/09/24  0030 11/09/24  0903 11/10/24  0638   WBC 9.1 13.4*  --  7.6   RBC 3.68* 3.19*  --  2.98*   HEMOGLOBIN 11.3* 9.6* 10.1* 8.8*   HEMATOCRIT 33.9* 29.9*  --  27.9*   MCV 92.1 93.7  --  93.6   MCH 30.7 30.1  --  29.5   MCHC 33.3 32.1*  --  31.5*   RDW 44.4 45.1  --  44.9   PLATELETCT 166 130*  --  124*   MPV 10.5 10.3  --  11.1     Recent Labs     11/08/24  0043 11/09/24  0030 11/10/24  0638   SODIUM 141 139 137   POTASSIUM 4.6 4.4 4.3   CHLORIDE 105 109 105   CO2 26 21 24   GLUCOSE 141* 161* 129*   BUN 38* 33* 42*   CREATININE 1.77* 1.48* 1.86*   CALCIUM 9.3 8.6 8.4*     Recent Labs     11/08/24  0043 11/09/24  0903 11/10/24  0233   APTT  --  30.0  --    INR 1.91* 1.42* 1.43*               Imaging  DX-KNEE 3 VIEWS LEFT   Final Result   Impression:      1. Left knee joint effusion. No fracture  evident.      2. Prominent tricompartmental degenerative changes.      3. Surgical clips in the medial soft tissues.                     CT-KNEE W/O PLUS RECONS RIGHT   Final Result      1.  Lateral femoral condyle intra-articular fracture      2.  Associated joint effusion      3.  No other fracture      4.  osteoarthritis the medial femorotibial and patellofemoral compartments      5.  Small vessel atherosclerotic plaque      DX-CHEST-LIMITED (1 VIEW)   Final Result      1.  Persistently enlarged cardiac silhouette   2.  Atherosclerosis   3.  Increased BILATERAL pulmonary opacities, favor pulmonary edema      DX-FEMUR-2+ RIGHT   Final Result      1.  Lateral femoral condylar fracture redemonstrated with a large knee joint effusion   2.  No additional fracture   3.  Osteoarthritis      DX-KNEE 2- RIGHT   Final Result      Acute mildly displaced intra-articular fracture of the lateral femoral condyle.      DX-PORTABLE FLUORO > 1 HOUR    (Results Pending)   DX-FEMUR-2+ RIGHT    (Results Pending)        Assessment/Plan  * Nondisplaced unspecified condyle fracture of lower end of right femur, initial encounter for closed fracture (HCC)- (present on admission)  Assessment & Plan  Reviewed CT with lateral femoral condyle intraventricular fracture and joint effusion  Status post surgical repair on 11/8/2024  Pain management  PT OT     Hyperglycemia- (present on admission)  Assessment & Plan  Most recent hemoglobin A1c last month 5.6    Class 3 severe obesity due to excess calories with serious comorbidity and body mass index (BMI) of 40.0 to 44.9 in adult (HCC)- (present on admission)  Assessment & Plan  Body mass index is 44.19 kg/m².     Chronic kidney disease (CKD) stage G3b/A1, moderately decreased glomerular filtration rate (GFR) between 30-44 mL/min/1.73 square meter and albuminuria creatinine ratio less than 30 mg/g- (present on admission)  Assessment & Plan  Monitor renal function electrolytes  Avoid sedating  agents    Paroxysmal atrial fibrillation (HCC)- (present on admission)  Assessment & Plan  Currently in sinus  Resume anticoagulation     Hx of mechanical aortic valve replacement [V43.3]- (present on admission)  Assessment & Plan  Patient received vitamin K on admission  Reviewed outpatient records she has a history of mechanical aortic valve  Patient started on anticoagulation postoperatively after discussion with orthopedics  Continue heparin drip  MonitorXa levels and CBC  Warfarin therapy resumed INR remains subtherapeutic continue to monitor    Essential hypertension- (present on admission)  Assessment & Plan  Continue home doxazosin and telmisartan  Resume Demadex   Monitor blood pressure and adjust accordingly   as needed labetalol    Tricuspid regurgitation- (present on admission)  Assessment & Plan  Reviewed echocardiogram patient with moderate to severe tricuspid regurgitation moderate mitral stenosis and mechanical aortic valve replacement with increased gradients  Resume torsemide    Monitor volume status      Hypothyroidism- (present on admission)  Assessment & Plan  Continue home Synthroid at 125 mcg daily  Most recent TSH was approximately 1 month ago was normal at 3.95         VTE prophylaxis:    therapeutic anticoagulation with weight-based heparin gtt, pharmacy to adjust PRN      I have performed a physical exam and reviewed and updated ROS and Plan today (11/10/2024). In review of yesterday's note (11/9/2024), there are no changes except as documented above.

## 2024-11-11 NOTE — DISCHARGE PLANNING
Not medically cleared for post-acute.   Patient is being transitioned from heparin drip to Coumadin. Possibly one to two more days, per attending physician.   [facility] obtained for and accepted with [facility].   Once off heparin drip will coordinate transportation with [facility].     Case Management Discharge Planning    Admission Date: 11/7/2024  GMLOS: 2.9  ALOS: 4    6-Clicks ADL Score: 15  6-Clicks Mobility Score: 8  PT and/or OT Eval ordered: Yes  Post-acute Referrals Ordered: Yes  Post-acute Choice Obtained: Yes  Has referral(s) been sent to post-acute provider:  Yes      Anticipated Discharge Dispo: Discharge Disposition: Disch to IP rehab facility or distinct part unit (62)    DME Needed: No    Action(s) Taken: Updated Provider/Nurse on Discharge Plan, Patient Conference, Choice obtained, and Acceptance Received    Escalations Completed: Provider    Medically Clear: No    Next Steps: Awaiting transition off Heparin drip.     Barriers to Discharge: Medical clearance    Is the patient up for discharge tomorrow: Potentially.

## 2024-11-12 ENCOUNTER — APPOINTMENT (OUTPATIENT)
Dept: RADIOLOGY | Facility: MEDICAL CENTER | Age: 74
DRG: 481 | End: 2024-11-12
Attending: GENERAL PRACTICE
Payer: MEDICARE

## 2024-11-12 PROBLEM — D62 ACUTE BLOOD LOSS ANEMIA: Status: ACTIVE | Noted: 2024-11-12

## 2024-11-12 LAB
ABO GROUP BLD: NORMAL
ANION GAP SERPL CALC-SCNC: 7 MMOL/L (ref 7–16)
BARCODED ABORH UBTYP: 6200
BARCODED PRD CODE UBPRD: NORMAL
BARCODED UNIT NUM UBUNT: NORMAL
BLD GP AB SCN SERPL QL: NORMAL
BUN SERPL-MCNC: 49 MG/DL (ref 8–22)
CALCIUM SERPL-MCNC: 8.4 MG/DL (ref 8.5–10.5)
CHLORIDE SERPL-SCNC: 104 MMOL/L (ref 96–112)
CO2 SERPL-SCNC: 26 MMOL/L (ref 20–33)
COMPONENT R 8504R: NORMAL
CREAT SERPL-MCNC: 2.13 MG/DL (ref 0.5–1.4)
ERYTHROCYTE [DISTWIDTH] IN BLOOD BY AUTOMATED COUNT: 43.8 FL (ref 35.9–50)
GFR SERPLBLD CREATININE-BSD FMLA CKD-EPI: 24 ML/MIN/1.73 M 2
GLUCOSE SERPL-MCNC: 139 MG/DL (ref 65–99)
HCT VFR BLD AUTO: 21.6 % (ref 37–47)
HCT VFR BLD AUTO: 23.2 % (ref 37–47)
HCT VFR BLD AUTO: 23.9 % (ref 37–47)
HCT VFR BLD AUTO: 25.6 % (ref 37–47)
HGB BLD-MCNC: 7 G/DL (ref 12–16)
HGB BLD-MCNC: 7.6 G/DL (ref 12–16)
HGB BLD-MCNC: 8.2 G/DL (ref 12–16)
HGB BLD-MCNC: 8.3 G/DL (ref 12–16)
INR PPP: 2.07 (ref 0.87–1.13)
INR PPP: 2.5 (ref 0.87–1.13)
MCH RBC QN AUTO: 29.6 PG (ref 27–33)
MCHC RBC AUTO-ENTMCNC: 32 G/DL (ref 32.2–35.5)
MCV RBC AUTO: 92.4 FL (ref 81.4–97.8)
PLATELET # BLD AUTO: 168 K/UL (ref 164–446)
PMV BLD AUTO: 10.1 FL (ref 9–12.9)
POTASSIUM SERPL-SCNC: 4.3 MMOL/L (ref 3.6–5.5)
PRODUCT TYPE UPROD: NORMAL
PROTHROMBIN TIME: 23.4 SEC (ref 12–14.6)
PROTHROMBIN TIME: 27.2 SEC (ref 12–14.6)
RBC # BLD AUTO: 2.77 M/UL (ref 4.2–5.4)
RH BLD: NORMAL
SODIUM SERPL-SCNC: 137 MMOL/L (ref 135–145)
UFH PPP CHRO-ACNC: 0.61 IU/ML
UFH PPP CHRO-ACNC: 0.75 IU/ML
UFH PPP CHRO-ACNC: 0.91 IU/ML
UNIT STATUS USTAT: NORMAL
WBC # BLD AUTO: 6.7 K/UL (ref 4.8–10.8)

## 2024-11-12 PROCEDURE — A9270 NON-COVERED ITEM OR SERVICE: HCPCS | Performed by: INTERNAL MEDICINE

## 2024-11-12 PROCEDURE — 700111 HCHG RX REV CODE 636 W/ 250 OVERRIDE (IP): Mod: JZ | Performed by: HOSPITALIST

## 2024-11-12 PROCEDURE — 99233 SBSQ HOSP IP/OBS HIGH 50: CPT | Performed by: GENERAL PRACTICE

## 2024-11-12 PROCEDURE — 700102 HCHG RX REV CODE 250 W/ 637 OVERRIDE(OP): Performed by: HOSPITALIST

## 2024-11-12 PROCEDURE — 700102 HCHG RX REV CODE 250 W/ 637 OVERRIDE(OP): Performed by: INTERNAL MEDICINE

## 2024-11-12 PROCEDURE — 51798 US URINE CAPACITY MEASURE: CPT

## 2024-11-12 PROCEDURE — 85610 PROTHROMBIN TIME: CPT

## 2024-11-12 PROCEDURE — 700102 HCHG RX REV CODE 250 W/ 637 OVERRIDE(OP)

## 2024-11-12 PROCEDURE — A9270 NON-COVERED ITEM OR SERVICE: HCPCS | Performed by: HOSPITALIST

## 2024-11-12 PROCEDURE — 85014 HEMATOCRIT: CPT

## 2024-11-12 PROCEDURE — A9270 NON-COVERED ITEM OR SERVICE: HCPCS | Performed by: GENERAL PRACTICE

## 2024-11-12 PROCEDURE — 86901 BLOOD TYPING SEROLOGIC RH(D): CPT

## 2024-11-12 PROCEDURE — 36415 COLL VENOUS BLD VENIPUNCTURE: CPT

## 2024-11-12 PROCEDURE — P9016 RBC LEUKOCYTES REDUCED: HCPCS

## 2024-11-12 PROCEDURE — 700102 HCHG RX REV CODE 250 W/ 637 OVERRIDE(OP): Performed by: GENERAL PRACTICE

## 2024-11-12 PROCEDURE — 86900 BLOOD TYPING SEROLOGIC ABO: CPT

## 2024-11-12 PROCEDURE — 86923 COMPATIBILITY TEST ELECTRIC: CPT

## 2024-11-12 PROCEDURE — 86850 RBC ANTIBODY SCREEN: CPT

## 2024-11-12 PROCEDURE — A9270 NON-COVERED ITEM OR SERVICE: HCPCS

## 2024-11-12 PROCEDURE — 85520 HEPARIN ASSAY: CPT

## 2024-11-12 PROCEDURE — 770001 HCHG ROOM/CARE - MED/SURG/GYN PRIV*

## 2024-11-12 PROCEDURE — 36430 TRANSFUSION BLD/BLD COMPNT: CPT

## 2024-11-12 PROCEDURE — 85018 HEMOGLOBIN: CPT

## 2024-11-12 RX ORDER — POLYETHYLENE GLYCOL 3350 17 G/17G
1 POWDER, FOR SOLUTION ORAL DAILY
Status: DISCONTINUED | OUTPATIENT
Start: 2024-11-12 | End: 2024-11-13

## 2024-11-12 RX ORDER — OXYCODONE HCL 10 MG/1
10 TABLET, FILM COATED, EXTENDED RELEASE ORAL EVERY 12 HOURS
Qty: 12 TABLET | Refills: 0 | Status: SHIPPED | OUTPATIENT
Start: 2024-11-13 | End: 2024-11-14

## 2024-11-12 RX ORDER — WARFARIN SODIUM 2 MG/1
2 TABLET ORAL DAILY
Status: DISCONTINUED | OUTPATIENT
Start: 2024-11-12 | End: 2024-11-15 | Stop reason: HOSPADM

## 2024-11-12 RX ORDER — OXYCODONE HYDROCHLORIDE 10 MG/1
10 TABLET ORAL EVERY 6 HOURS PRN
Qty: 24 TABLET | Refills: 0 | Status: SHIPPED | OUTPATIENT
Start: 2024-11-13 | End: 2024-11-19

## 2024-11-12 RX ORDER — LACTULOSE 10 G/15ML
45 SOLUTION ORAL EVERY 4 HOURS
Status: DISCONTINUED | OUTPATIENT
Start: 2024-11-12 | End: 2024-11-12

## 2024-11-12 RX ORDER — POLYETHYLENE GLYCOL 3350 17 G/17G
1 POWDER, FOR SOLUTION ORAL
Status: DISCONTINUED | OUTPATIENT
Start: 2024-11-12 | End: 2024-11-15 | Stop reason: HOSPADM

## 2024-11-12 RX ORDER — AMOXICILLIN 250 MG
2 CAPSULE ORAL 2 TIMES DAILY
Status: ON HOLD
Start: 2024-11-12

## 2024-11-12 RX ORDER — POLYETHYLENE GLYCOL 3350 17 G/17G
17 POWDER, FOR SOLUTION ORAL DAILY
Status: ON HOLD
Start: 2024-11-13

## 2024-11-12 RX ORDER — AMOXICILLIN 250 MG
2 CAPSULE ORAL 2 TIMES DAILY
Status: DISCONTINUED | OUTPATIENT
Start: 2024-11-12 | End: 2024-11-15 | Stop reason: HOSPADM

## 2024-11-12 RX ADMIN — ACETAMINOPHEN 1000 MG: 500 TABLET ORAL at 23:35

## 2024-11-12 RX ADMIN — HYDROMORPHONE HYDROCHLORIDE 0.5 MG: 1 INJECTION, SOLUTION INTRAMUSCULAR; INTRAVENOUS; SUBCUTANEOUS at 20:56

## 2024-11-12 RX ADMIN — BISACODYL 10 MG: 10 SUPPOSITORY RECTAL at 05:31

## 2024-11-12 RX ADMIN — ACETAMINOPHEN 1000 MG: 500 TABLET ORAL at 05:28

## 2024-11-12 RX ADMIN — HYDROMORPHONE HYDROCHLORIDE 0.5 MG: 1 INJECTION, SOLUTION INTRAMUSCULAR; INTRAVENOUS; SUBCUTANEOUS at 13:15

## 2024-11-12 RX ADMIN — SENNOSIDES AND DOCUSATE SODIUM 2 TABLET: 50; 8.6 TABLET ORAL at 16:21

## 2024-11-12 RX ADMIN — LEVOTHYROXINE SODIUM 125 MCG: 0.12 TABLET ORAL at 05:28

## 2024-11-12 RX ADMIN — OXYCODONE HYDROCHLORIDE 10 MG: 10 TABLET ORAL at 00:17

## 2024-11-12 RX ADMIN — OXYCODONE HYDROCHLORIDE 10 MG: 10 TABLET ORAL at 19:03

## 2024-11-12 RX ADMIN — WARFARIN SODIUM 2 MG: 2 TABLET ORAL at 17:11

## 2024-11-12 RX ADMIN — TIMOLOL MALEATE 1 DROP: 5 SOLUTION OPHTHALMIC at 20:54

## 2024-11-12 RX ADMIN — OXYCODONE HYDROCHLORIDE 10 MG: 10 TABLET ORAL at 04:29

## 2024-11-12 RX ADMIN — ACETAMINOPHEN 1000 MG: 500 TABLET ORAL at 10:43

## 2024-11-12 RX ADMIN — OXYCODONE HYDROCHLORIDE 10 MG: 10 TABLET, FILM COATED, EXTENDED RELEASE ORAL at 16:20

## 2024-11-12 RX ADMIN — SENNOSIDES AND DOCUSATE SODIUM 2 TABLET: 50; 8.6 TABLET ORAL at 09:30

## 2024-11-12 RX ADMIN — OXYCODONE HYDROCHLORIDE 10 MG: 10 TABLET, FILM COATED, EXTENDED RELEASE ORAL at 05:27

## 2024-11-12 RX ADMIN — TIMOLOL MALEATE 1 DROP: 5 SOLUTION OPHTHALMIC at 05:28

## 2024-11-12 RX ADMIN — LACTULOSE 45 ML: 10 SOLUTION ORAL at 12:10

## 2024-11-12 RX ADMIN — POLYETHYLENE GLYCOL 3350 1 PACKET: 17 POWDER, FOR SOLUTION ORAL at 09:30

## 2024-11-12 RX ADMIN — OXYCODONE HYDROCHLORIDE 10 MG: 10 TABLET ORAL at 10:43

## 2024-11-12 ASSESSMENT — PAIN DESCRIPTION - PAIN TYPE
TYPE: ACUTE PAIN
TYPE: ACUTE PAIN;SURGICAL PAIN
TYPE: ACUTE PAIN
TYPE: ACUTE PAIN
TYPE: ACUTE PAIN;SURGICAL PAIN
TYPE: ACUTE PAIN

## 2024-11-12 ASSESSMENT — ENCOUNTER SYMPTOMS: BRUISES/BLEEDS EASILY: 1

## 2024-11-12 NOTE — DISCHARGE PLANNING
Authorized, choice obtained for and accepted with [facility].   -Provider stated that patient's INR is still not at therapeutic levels and is on Heparin drip. Hoping for therapeutic INR levels tomorrow, or Thursday, after which she will be medically cleared to discharge to SNF.     Case Management Discharge Planning    Admission Date: 11/7/2024  GMLOS: 4.4  ALOS: 5    6-Clicks ADL Score: 15  6-Clicks Mobility Score: 8  PT and/or OT Eval ordered: Yes  Post-acute Referrals Ordered: Yes  Post-acute Choice Obtained: Yes  Has referral(s) been sent to post-acute provider:  Yes      Anticipated Discharge Dispo: Discharge Disposition: Disch to IP rehab facility or distinct part unit (62)    DME Needed: No    Action(s) Taken: Updated Provider/Nurse on Discharge Plan    Escalations Completed: Provider    Medically Clear: No    Next Steps: Waiting for medical clearance    Barriers to Discharge: Pending therapeutic INR levels (Currently on Heparin Drip).     Is the patient up for discharge tomorrow: Possibly.

## 2024-11-12 NOTE — PROGRESS NOTES
[facility] Daily Progress Note    Date of Service  11/11/2024    Chief Complaint  Yenifer Beasley is a 74 y.o. female admitted 11/7/2024 with right knee pain after fall    Hospital Course  Yenifer Beasley is a 74 y.o. female who presented 11/7/2024 with right knee pain post fall.  Patient states she was at [facility], thinks something was spilled on the floor causing her to slip and fall.  Patient had instant right knee pain which is sharp, 8/10 at its worst.  Patient was unable to get up.  Because of this, patient was brought to the emergency department.  Patient did have a CT scan which showed a right femur lateral condyle fracture.  Because of this, ERP discussed the case with orthopedic surgery who agreed for surgical intervention.  Patient does have a mechanical heart valve, is on Coumadin, most recent INR was yesterday and was therapeutic at 2.6.           Interval Problem Update    Patient complains of severe pain with mobilization  I added OxyContin and scheduled Tylenol  She remains on heparin drip INR reviewed 1.79 CBC reviewed hemoglobin 8.3  I reviewed chemistry panel BUN 49 creatinine 2.1 given uptrending we will hold Demadex and telmisartan  Patient with constipation passing gas no nausea vomiting she declined Dulcolax supp I ordered lactulose    Total time spent reviewing lab results examining patient and updating her on plan of care and discussing with nursing staff and case management 53 minutes    I have discussed this patient's plan of care and discharge plan at IDT rounds today with Case Management, Nursing, Nursing leadership, and other members of the IDT team.    Consultants/Specialty  orthopedics    Code Status  Full Code    Disposition  The patient is not medically cleared for discharge to home or a post-acute facility.      I have placed the appropriate orders for post-discharge needs.    Review of Systems  Review of Systems   Constitutional:  Negative for fever.   Respiratory:   Negative for shortness of breath.    Cardiovascular:  Negative for chest pain.   Gastrointestinal:  Positive for constipation. Negative for nausea and vomiting.   Musculoskeletal:  Positive for joint pain.        Physical Exam  Temp:  [36.1 °C (97 °F)-36.7 °C (98 °F)] 36.7 °C (98 °F)  Pulse:  [53-60] 53  Resp:  [16-17] 16  BP: (105-132)/(41-55) 132/48  SpO2:  [96 %-100 %] 96 %    Physical Exam  Cardiovascular:      Rate and Rhythm: Regular rhythm. Bradycardia present.      Heart sounds: Murmur heard.   Pulmonary:      Effort: Pulmonary effort is normal.      Breath sounds: Decreased breath sounds and rales present.   Abdominal:      General: There is distension.      Palpations: Abdomen is soft.      Tenderness: There is no abdominal tenderness. There is no guarding or rebound.   Musculoskeletal:         General: Swelling and tenderness present.      Comments: Right knee surgical wound dressed no drainage noted   Neurological:      General: No focal deficit present.      Mental Status: She is alert.      Cranial Nerves: No cranial nerve deficit.   Psychiatric:         Mood and Affect: Mood normal.         Behavior: Behavior normal.         Fluids    Intake/Output Summary (Last 24 hours) at 11/11/2024 1618  Last data filed at 11/10/2024 2018  Gross per 24 hour   Intake --   Output 250 ml   Net -250 ml        Laboratory  Recent Labs     11/09/24  0030 11/09/24  0903 11/10/24  0638 11/11/24  0132   WBC 13.4*  --  7.6 5.4   RBC 3.19*  --  2.98* 2.77*   HEMOGLOBIN 9.6* 10.1* 8.8* 8.3*   HEMATOCRIT 29.9*  --  27.9* 25.3*   MCV 93.7  --  93.6 91.3   MCH 30.1  --  29.5 30.0   MCHC 32.1*  --  31.5* 32.8   RDW 45.1  --  44.9 44.0   PLATELETCT 130*  --  124* 121*   MPV 10.3  --  11.1 10.6     Recent Labs     11/09/24  0030 11/10/24  0638 11/11/24  0132   SODIUM 139 137 136   POTASSIUM 4.4 4.3 4.3   CHLORIDE 109 105 103   CO2 21 24 24   GLUCOSE 161* 129* 116*   BUN 33* 42* 49*   CREATININE 1.48* 1.86* 2.13*   CALCIUM 8.6 8.4*  8.0*     Recent Labs     11/09/24  0903 11/10/24  0233 11/11/24  0132   APTT 30.0  --   --    INR 1.42* 1.43* 1.79*               Imaging  DX-KNEE 3 VIEWS LEFT   Final Result   Impression:      1. Left knee joint effusion. No fracture evident.      2. Prominent tricompartmental degenerative changes.      3. Surgical clips in the medial soft tissues.                     DX-PORTABLE FLUORO > 1 HOUR   Final Result      Portable fluoroscopy utilized for 1 minute 6 seconds.         INTERPRETING LOCATION: [address] , [place], [address]      DX-FEMUR-2+ RIGHT   Final Result      Digitized intraoperative radiograph is submitted for review. This examination is not for diagnostic purpose but for guidance during a surgical procedure. Please see the patient's chart for full procedural details.         INTERPRETING LOCATION: [address] , [place], [address]      CT-KNEE W/O PLUS RECONS RIGHT   Final Result      1.  Lateral femoral condyle intra-articular fracture      2.  Associated joint effusion      3.  No other fracture      4.  osteoarthritis the medial femorotibial and patellofemoral compartments      5.  Small vessel atherosclerotic plaque      DX-CHEST-LIMITED (1 VIEW)   Final Result      1.  Persistently enlarged cardiac silhouette   2.  Atherosclerosis   3.  Increased BILATERAL pulmonary opacities, favor pulmonary edema      DX-FEMUR-2+ RIGHT   Final Result      1.  Lateral femoral condylar fracture redemonstrated with a large knee joint effusion   2.  No additional fracture   3.  Osteoarthritis      DX-KNEE 2- RIGHT   Final Result      Acute mildly displaced intra-articular fracture of the lateral femoral condyle.           Assessment/Plan  * Nondisplaced unspecified condyle fracture of lower end of right femur, initial encounter for closed fracture (HCC)- (present on admission)  Assessment & Plan  Reviewed CT with lateral femoral condyle intraventricular fracture and joint effusion  Status post surgical repair on  11/8/2024  Pain management  PT OT     Hyperglycemia- (present on admission)  Assessment & Plan  Most recent hemoglobin A1c last month 5.6    Class 3 severe obesity due to excess calories with serious comorbidity and body mass index (BMI) of 40.0 to 44.9 in adult (HCC)- (present on admission)  Assessment & Plan  Body mass index is 44.19 kg/m².     Chronic kidney disease (CKD) stage G3b/A1, moderately decreased glomerular filtration rate (GFR) between 30-44 mL/min/1.73 square meter and albuminuria creatinine ratio less than 30 mg/g- (present on admission)  Assessment & Plan  Monitor renal function electrolytes  Avoid sedating agents  Hold diuretics today and evaluate volume status daily    Paroxysmal atrial fibrillation (HCC)- (present on admission)  Assessment & Plan  Currently in sinus  Resume anticoagulation     Hx of mechanical aortic valve replacement [V43.3]- (present on admission)  Assessment & Plan  Patient received vitamin K on admission  Reviewed outpatient records she has a history of mechanical aortic valve  Patient started on anticoagulation postoperatively after discussion with orthopedics  Continue heparin drip  MonitorXa levels and CBC  Warfarin therapy resumed INR remains subtherapeutic but trending up continue to monitor    Essential hypertension- (present on admission)  Assessment & Plan    Monitor blood pressure and adjust accordingly   as needed labetalol  Hold diuretics and ARB today and monitor renal function    Tricuspid regurgitation- (present on admission)  Assessment & Plan  Reviewed echocardiogram patient with moderate to severe tricuspid regurgitation moderate mitral stenosis and mechanical aortic valve replacement with increased gradients    Given DIAMOND will hold torsemide and Micardis today and monitor volume status  Repeat chemistry panel ordered      Hypothyroidism- (present on admission)  Assessment & Plan  Continue home Synthroid at 125 mcg daily  Most recent TSH was approximately 1  month ago was normal at 3.95         VTE prophylaxis:    therapeutic anticoagulation with weight-based heparin gtt, pharmacy to adjust PRN      I have performed a physical exam and reviewed and updated ROS and Plan today (11/11/2024). In review of yesterday's note (11/10/2024), there are no changes except as documented above.

## 2024-11-12 NOTE — PROGRESS NOTES
Hgb decreased to 7.0 asymptomatic in bed, notified Dr. Cain.  Surgical dressing is clean dry and intact was recently redone by Orthopedic MICHELLE Beaulieu with ACE wrap.   INR now 2.5 notified Dr. Cain to see if heparin gtt can be discontinued.  Will hold off on second PIV placement if heparin gtt is discontinued.

## 2024-11-12 NOTE — ASSESSMENT & PLAN NOTE
Hemoglobin of 7 on 11/2  1 unit of PRBCs transfused  INR therapeutic since 11/12  No further bleeding at the surgical site.  Hemoglobin 7.1 this morning.  Continue with serial H&H, may require transfusion.

## 2024-11-12 NOTE — PROGRESS NOTES
Hospital Medicine Daily Progress Note    Date of Service  11/12/2024    Chief Complaint  Yenifer Beasley is a 74 y.o. female admitted 11/7/2024 with GLF    Hospital Course  This is a 74-year-old female with past medical history of paroxysmal atrial fibrillation, mechanical aortic valve replacement on Coumadin,  hypertension, CKD stage IIIb hypothyroidism, glaucoma who was admitted on 11/7/2024 after sustaining a ground-level fall.    CT imaging revealed a right femoral lateral condyle fracture.  Orthopedic surgery consulted, once INR was less than 1.5, patient underwent internal fixation of the right lateral condyle distal femoral fracture on 11/8.  Recommendations for toe-touch weightbearing in right lower extremity, can perform knee range of motion as tolerated and will not require any bracing.  Patient restarted on Coumadin 24 hours postoperatively.  She is to follow-up with orthopedic surgery in about 2 to 3 weeks.    Interval Problem Update  Right femoral neck fracture status post repair.    Atrial fibrillation, aortic valve replacement - goal INR 2.5-3.5 INR today 2.5.  Heparin gtt discontinued. Continue with Coumadin.    Patient with significant bleeding from surgical site.  This was repacked by orthopedic surgery at bedside.  Repeat hemoglobin at 7.  Transfuse 1 unit PRBCs.  Serial CBC.      Patient has not had a bowel movement yet.  Continue with bowel regimen.    Pending PT/OT evaluation.    Patient's daughter was called no answer, will call at bedside tomorrow when in patient's room.    I have discussed this patient's plan of care and discharge plan at IDT rounds today with Case Management, Nursing, Nursing leadership, and other members of the IDT team.    Consultants/Specialty  orthopedics    Code Status  Full Code    Disposition  Patient is not medically clear.    I have placed the appropriate orders for post-discharge needs.    Review of Systems  Review of Systems   Endo/Heme/Allergies:   Bruises/bleeds easily.   All other systems reviewed and are negative.       Physical Exam  Temp:  [36.7 °C (98.1 °F)-36.9 °C (98.4 °F)] 36.8 °C (98.2 °F)  Pulse:  [60-68] 60  Resp:  [15-16] 16  BP: (104-142)/(46-58) 122/50  SpO2:  [93 %-98 %] 96 %    Physical Exam  Vitals and nursing note reviewed.   Constitutional:       General: She is not in acute distress.     Appearance: Normal appearance.   HENT:      Head: Normocephalic and atraumatic.      Mouth/Throat:      Mouth: Mucous membranes are moist.      Pharynx: No oropharyngeal exudate.   Eyes:      Extraocular Movements: Extraocular movements intact.      Pupils: Pupils are equal, round, and reactive to light.   Cardiovascular:      Rate and Rhythm: Normal rate and regular rhythm.      Pulses: Normal pulses.      Heart sounds: No murmur heard.     No friction rub. No gallop.   Pulmonary:      Effort: Pulmonary effort is normal. No respiratory distress.      Breath sounds: No wheezing, rhonchi or rales.   Abdominal:      General: Bowel sounds are normal. There is no distension.      Palpations: Abdomen is soft. There is no mass.      Tenderness: There is no abdominal tenderness.   Musculoskeletal:         General: No swelling or tenderness. Normal range of motion.      Cervical back: Normal range of motion. No rigidity. No muscular tenderness.      Right lower leg: No edema.      Left lower leg: No edema.      Comments: Surgical site intact, with copious amounts of bleeding, packing and Ace bandage in place.   Skin:     General: Skin is warm and dry.      Capillary Refill: Capillary refill takes less than 2 seconds.      Findings: No erythema or rash.   Neurological:      General: No focal deficit present.      Mental Status: She is alert and oriented to person, place, and time.      Motor: No weakness.      Gait: Gait normal.         Fluids    Intake/Output Summary (Last 24 hours) at 11/12/2024 1416  Last data filed at 11/12/2024 1000  Gross per 24 hour   Intake  240 ml   Output 800 ml   Net -560 ml        Laboratory  Recent Labs     11/10/24  0638 11/11/24  0132 11/11/24  2355 11/12/24  0838 11/12/24  1254   WBC 7.6 5.4 6.7  --   --    RBC 2.98* 2.77* 2.77*  --   --    HEMOGLOBIN 8.8* 8.3* 8.2* 7.6* 7.0*   HEMATOCRIT 27.9* 25.3* 25.6* 23.2* 21.6*   MCV 93.6 91.3 92.4  --   --    MCH 29.5 30.0 29.6  --   --    MCHC 31.5* 32.8 32.0*  --   --    RDW 44.9 44.0 43.8  --   --    PLATELETCT 124* 121* 168  --   --    MPV 11.1 10.6 10.1  --   --      Recent Labs     11/10/24  0638 11/11/24 0132 11/11/24  2355   SODIUM 137 136 137   POTASSIUM 4.3 4.3 4.3   CHLORIDE 105 103 104   CO2 24 24 26   GLUCOSE 129* 116* 139*   BUN 42* 49* 49*   CREATININE 1.86* 2.13* 2.13*   CALCIUM 8.4* 8.0* 8.4*     Recent Labs     11/11/24 0132 11/11/24 2355 11/12/24  1254   INR 1.79* 2.07* 2.50*               Imaging  DX-KNEE 3 VIEWS LEFT   Final Result   Impression:      1. Left knee joint effusion. No fracture evident.      2. Prominent tricompartmental degenerative changes.      3. Surgical clips in the medial soft tissues.                     DX-PORTABLE FLUORO > 1 HOUR   Final Result      Portable fluoroscopy utilized for 1 minute 6 seconds.         INTERPRETING LOCATION: [address] [facility], [address]      DX-FEMUR-2+ RIGHT   Final Result      Digitized intraoperative radiograph is submitted for review. This examination is not for diagnostic purpose but for guidance during a surgical procedure. Please see the patient's chart for full procedural details.         INTERPRETING LOCATION: [address] [facility], [address]      CT-KNEE W/O PLUS RECONS RIGHT   Final Result      1.  Lateral femoral condyle intra-articular fracture      2.  Associated joint effusion      3.  No other fracture      4.  osteoarthritis the medial femorotibial and patellofemoral compartments      5.  Small vessel atherosclerotic plaque      DX-CHEST-LIMITED (1 VIEW)   Final Result      1.  Persistently enlarged cardiac silhouette    2.  Atherosclerosis   3.  Increased BILATERAL pulmonary opacities, favor pulmonary edema      DX-FEMUR-2+ RIGHT   Final Result      1.  Lateral femoral condylar fracture redemonstrated with a large knee joint effusion   2.  No additional fracture   3.  Osteoarthritis      DX-KNEE 2- RIGHT   Final Result      Acute mildly displaced intra-articular fracture of the lateral femoral condyle.           Assessment/Plan  * Nondisplaced unspecified condyle fracture of lower end of right femur, initial encounter for closed fracture (HCC)- (present on admission)  Assessment & Plan  CT imaging revealed a right femoral lateral condyle fracture.  Orthopedic surgery consulted, once INR was less than 1.5, patient underwent internal fixation of the right lateral condyle distal femoral fracture on 11/8.  Recommendations for toe-touch weightbearing in right lower extremity, can perform knee range of motion as tolerated and will not require any bracing.  Patient restarted on Coumadin 24 hours postoperatively.  She is to follow-up with orthopedic surgery in about 2 to 3 weeks.    Acute blood loss anemia  Assessment & Plan  Hemoglobin of 7 on 11/2  1 unit of PRBCs transfused  INR therapeutic 11/12  Serial CBC      Paroxysmal atrial fibrillation (HCC)- (present on admission)  Assessment & Plan  Currently in sinus  Resume anticoagulation     Hx of mechanical aortic valve replacement [V43.3]- (present on admission)  Assessment & Plan  Patient received vitamin K on admission  Reviewed outpatient records she has a history of mechanical aortic valve  Patient started on anticoagulation postoperatively after discussion with orthopedics   goal INR 2.5-3.5 INR today 2.5.  Heparin gtt discontinued. Continue with Coumadin.    Chronic kidney disease (CKD) stage G3b/A1, moderately decreased glomerular filtration rate (GFR) between 30-44 mL/min/1.73 square meter and albuminuria creatinine ratio less than 30 mg/g- (present on admission)  Assessment &  Plan  Monitor renal function electrolytes  Avoid sedating agents  Hold diuretics today and evaluate volume status daily    Hyperglycemia- (present on admission)  Assessment & Plan  Most recent hemoglobin A1c last month 5.6    Class 3 severe obesity due to excess calories with serious comorbidity and body mass index (BMI) of 40.0 to 44.9 in adult (HCC)- (present on admission)  Assessment & Plan  Body mass index is 44.19 kg/m².     Essential hypertension- (present on admission)  Assessment & Plan    Monitor blood pressure and adjust accordingly   as needed labetalol  Hold diuretics and ARB today and monitor renal function    Tricuspid regurgitation- (present on admission)  Assessment & Plan  Reviewed echocardiogram patient with moderate to severe tricuspid regurgitation moderate mitral stenosis and mechanical aortic valve replacement with increased gradients    Given DIAMOND will hold torsemide and Micardis today and monitor volume status  Repeat chemistry panel ordered      Hypothyroidism- (present on admission)  Assessment & Plan  Continue home Synthroid at 125 mcg daily  Most recent TSH was approximately 1 month ago was normal at 3.95         VTE prophylaxis: Coumadin    I have performed a physical exam and reviewed and updated ROS and Plan today (11/12/2024). In review of yesterday's note (11/11/2024), there are no changes except as documented above.    Greater than 51 minutes spent prepping to see patient (e.g. reviewing EMR) obtaining and/or reviewing separately obtained history. Performing a medically appropriate examination and evaluation. Independently interpreting results and communicating results to patient/family/caregiver. Counseling and educating the patient/family/caregiver. Ordering medications, tests, and/or procedures. Referring and communicating with other health care professionals.  Documenting clinical information in EPIC. Care coordination.

## 2024-11-12 NOTE — CARE PLAN
The patient is Stable - Low risk of patient condition declining or worsening    Shift Goals  Clinical Goals: Pain control, heprin/labs  Patient Goals: Pain control, comfort  Family Goals: not present    Progress made toward(s) clinical / shift goals:    Problem: Pain - Standard  Goal: Alleviation of pain or a reduction in pain to the patient’s comfort goal  Outcome: Progressing     Problem: Skin Integrity  Goal: Skin integrity is maintained or improved  Outcome: Progressing     Problem: Mobility  Goal: Patient's capacity to carry out activities will improve  Outcome: Progressing  Flowsheets (Taken 11/11/2024 1621)  Mobility:   Encouraged mobilization per interdisciplinary team recommendations   Monitored for signs of activity intolerance   Provided assistive devices   Provided rest periods between activities   Administered pain management to allow progressive mobilization   Collaborated with PT/OT     Problem: Infection - Standard  Goal: Patient will remain free from infection  Outcome: Progressing  Flowsheets (Taken 11/11/2024 5463)  Standard Infection Interventions:   Assessed for signs and symptoms of infection   Instructed patient/family on signs and symptoms of infection   Provided education on proper hand hygiene and infection prevention measures       Patient is not progressing towards the following goals:

## 2024-11-12 NOTE — PROGRESS NOTES
1 hour since delivery of consent form patient reluctant to sign the form, still pending COD which was already drawn.  Spoke with patient and her brother Matthew about, have escalated to Dr. Cain as they have concerns about necessity of the blood and risk stratification of receiving it, I also escalated to charge PREETHI Schaffer.   D/w Dr. Cain again via voalte patient has capacity to decide transfusion or not she may refuse, I did ask for the provider to again revisit this with the patient and the family.

## 2024-11-12 NOTE — PROGRESS NOTES
"    Orthopedic PA Progress Note    Interval changes:  Patient doing well . Anticipate dc to advanced SNF when medically cleared  RLE dressings with minimal serosanguineous drainage- leave in place  TTWB RLE  Follow up with Dr. Camargo 2 weeks postop  Cleared for DC from orthopedic standpoint     ROS - Patient denies any new issues.  Denies any numbness or tingling. Pain controlled.    /54   Pulse 62   Temp 36.9 °C (98.4 °F) (Temporal)   Resp 16   Ht 1.527 m (5' 0.1\")   Wt 109 kg (241 lb 2.9 oz)   SpO2 93%     Patient seen and examined  No acute distress  Breathing non labored  RRR  RLE: Surgical dressing is clean, dry, and intact. Patient clearly fires tibialis anterior, EHL, and gastrocnemius/soleus. Sensation is intact to light touch throughout superficial peroneal, deep peroneal, tibial, saphenous, and sural nerve distributions. Strong and palpable 2+ dorsalis pedis and posterior tibial pulses with capillary refill less than 2 seconds.     Recent Labs     11/09/24  0030 11/09/24  0903 11/10/24  0638 11/11/24  0132   WBC 13.4*  --  7.6 5.4   RBC 3.19*  --  2.98* 2.77*   HEMOGLOBIN 9.6* 10.1* 8.8* 8.3*   HEMATOCRIT 29.9*  --  27.9* 25.3*   MCV 93.7  --  93.6 91.3   MCH 30.1  --  29.5 30.0   MCHC 32.1*  --  31.5* 32.8   RDW 45.1  --  44.9 44.0   PLATELETCT 130*  --  124* 121*   MPV 10.3  --  11.1 10.6       Active Hospital Problems    Diagnosis     Class 3 severe obesity due to excess calories with serious comorbidity and body mass index (BMI) of 40.0 to 44.9 in adult ([facility]) [E66.813, E66.01, Z68.41]      Priority: Medium    Chronic kidney disease (CKD) stage G3b/A1, moderately decreased glomerular filtration rate (GFR) between 30-44 mL/min/1.73 square meter and albuminuria creatinine ratio less than 30 mg/g [N18.32]      Priority: Medium     Chronic, discussed increasing her hydration and avoiding nephrotoxic medications and NSAIDs.  BP is controlled today in clinic 130/70, she has had a longstanding " history of prediabetes however most recent A1c was at 5.3.  GFR (CKD-EPI) >60 mL/min/1.73 m 2 34 Abnormal      Bun 8 - 22 mg/dL 38 High     Creatinine 0.50 - 1.40 mg/dL 1.61 High            Paroxysmal atrial fibrillation (HCC) [I48.0]      Priority: Medium     Chronic, continues on Amiodarone 200 mg daily, Torsemide 10 mg daily, Warfarin, Doxazosin 2 mg daily.  She notes that she had a cardioversion done 2 years ago.  Her Coumadin is followed by anticoagulation clinic.      Hx of mechanical aortic valve replacement [V43.3] [Z95.2]      Priority: Medium    Essential hypertension [I10]      Priority: Medium    Tricuspid regurgitation [I07.1]      Priority: Medium    Hypothyroidism [E03.9]      Priority: Low     Chronic, continues on Synthroid 125 mcg each morning on an empty stomach.  Denies any symptoms of hyper or hypothyroidism.  We will recheck TSH prior to her next visit.      Nondisplaced unspecified condyle fracture of lower end of right femur, initial encounter for closed fracture (HCC) [S72.414A]     Hyperglycemia [R73.9]        DX-KNEE 3 VIEWS LEFT   Final Result   Impression:      1. Left knee joint effusion. No fracture evident.      2. Prominent tricompartmental degenerative changes.      3. Surgical clips in the medial soft tissues.                     DX-PORTABLE FLUORO > 1 HOUR   Final Result      Portable fluoroscopy utilized for 1 minute 6 seconds.         INTERPRETING LOCATION: [facility], [address]      DX-FEMUR-2+ RIGHT   Final Result      Digitized intraoperative radiograph is submitted for review. This examination is not for diagnostic purpose but for guidance during a surgical procedure. Please see the patient's chart for full procedural details.         INTERPRETING LOCATION: [address] [facility], [address]      CT-KNEE W/O PLUS RECONS RIGHT   Final Result      1.  Lateral femoral condyle intra-articular fracture      2.  Associated joint effusion      3.  No other fracture      4.   osteoarthritis the medial femorotibial and patellofemoral compartments      5.  Small vessel atherosclerotic plaque      DX-CHEST-LIMITED (1 VIEW)   Final Result      1.  Persistently enlarged cardiac silhouette   2.  Atherosclerosis   3.  Increased BILATERAL pulmonary opacities, favor pulmonary edema      DX-FEMUR-2+ RIGHT   Final Result      1.  Lateral femoral condylar fracture redemonstrated with a large knee joint effusion   2.  No additional fracture   3.  Osteoarthritis      DX-KNEE 2- RIGHT   Final Result      Acute mildly displaced intra-articular fracture of the lateral femoral condyle.          Assessment/Plan:  Patient doing well . Anticipate dc to advanced SNF when medically cleared  RLE dressings with minimal serosanguineous drainage- leave in place  TTWB RLE  Follow up with Dr. Camargo 2 weeks postop  Cleared for DC from orthopedic standpoint    POD#3 S/p  Open treatment with internal fixation of right lateral   condyle distal femur fracture.  Wt bearing status - TTWB RLE  Future Procedures - None planned   Wound care/Drains - Dressings to be changed PRN  Sutures/Staples out- 14-21 days post operatively. Removal will completed by ortho HANY's unless transferred.  Inpatient DVT prophylaxis: heparin  DVT Prophylaxis outpatient: coumadin  PT/OT-initiated  Antibiotics:  Perioperative completed  DVT Prophylaxis- TEDS/SCDs/Foot pumps  Case Coordination for Discharge Planning - Disposition per therapy recs.

## 2024-11-12 NOTE — PROGRESS NOTES
Virtual Nurse rounding complete.    Round Needs: Other: IV beeping  and Notified of Patient Needs: Primary RN.

## 2024-11-12 NOTE — CARE PLAN
The patient is Stable - Low risk of patient condition declining or worsening    Shift Goals  Clinical Goals: pain mgt, WOF for bleeding in sx site, XA monitoring  Patient Goals: rest and comfort  Family Goals: not present    Progress made toward(s) clinical / shift goals:      Patient is aox4, denies any SOB/Chest pain/N and V. Able to follow commands, dressing has been change by the Morning RN, ice pack were place, Hospitalist made aware about the sx site bleeding advised to reinforce as needed. Still on heparin drip(anti-xa)      Problem: Pain - Standard  Goal: Alleviation of pain or a reduction in pain to the patient’s comfort goal  Description: Target End Date:  Prior to discharge or change in level of care    Document on Vitals flowsheet    1.  Document pain using the appropriate pain scale per order or unit policy  2.  Educate and implement non-pharmacologic comfort measures (i.e. relaxation, distraction, massage, cold/heat therapy, etc.)  3.  Pain management medications as ordered  4.  Reassess pain after pain med administration per policy  5.  If opiods administered assess patient's response to pain medication is appropriate per POSS sedation scale  6.  Follow pain management plan developed in collaboration with patient and interdisciplinary team (including palliative care or pain specialists if applicable)  Outcome: Progressing   Denies any new numbness or tingling sensation, non-pharmacological and pharmacological intervention in place. Instructed to call RN if pain is gradually increasing.  Bed alarm on, call light and belongings within reach, kept bed in lowest position. Instructed to call RN if getting OOB. SCDs not placed low risk base in orders and hourly rounds in place.    Patient is not progressing towards the following goals:

## 2024-11-12 NOTE — PROGRESS NOTES
Patient had concerns about blood being tested for vaccines, per blood bank they do not test blood for vaccines.  Patient would like time to review consent for blood she is agreeable to repeat COD entered order for testing which blood bank requires prior to transfusion.

## 2024-11-12 NOTE — PROGRESS NOTES
Inpatient Anticoagulation Service Note for 11/12/2024      Reason for Anticoagulation: Aortic Mechanical Valve Replacement  , Atrial Fibrillation, Transient Ischemic Attack   [id number] VASc Score: 7  HAS-BLED Score: 5    Hemoglobin Value: (!) 7  Hematocrit Value: (!) 21.6  Lab Platelet Value: 168  Target INR: 2.5 to 3.5    INR from last 7 days        Date/Time INR Value    11/12/24 1254 2.5     11/11/24 2355 2.07     11/11/24 0132 1.79     11/10/24 0233 1.43     11/09/24 0903 1.42     11/08/24 0043 1.91     11/07/24 1449 2.57                   Dose from last 7 days        Date/Time Dose (mg)    11/12/24 1507 2     11/11/24 1332 6     11/10/24 1340 6     11/09/24 1503 6                   Average Dose (mg):  (Home dosing: warfarin 6 mg Monday/Wednesday/Friday, 4 mg all other days)  Bridge Therapy: Stopped 11/12  Reversal Agent Administered: Vitamin K  Intravenous (11/7)    Assessment:  74-year-old female with PMH significant for mechanical AVR, Afib, and TIA on chronic warfarin managed by [facility] antico[facility].   Patient's home dose is warfarin 6 mg M/W/F and 4 mg AOD. TTR of 69.9%.   She was admitted 11/7 with a femur fracture requiring surgical intervention. Her INR was 2.57 on admission and she was given IV vitamin K on 11/7 in preparation for surgery. Surgery was completed 11/8 and patient was cleared to restart warfarin on 11/9.   No significant DDIs noted.   Regular diet ordered.   DIMAOND with scr 2.13, unchanged from yesterday. Baseline Scr appears ~1.6-1.8  Warfarin was resumed at slightly higher dose for the first few days (patient typically gets 4 mg on Sat & Sun but dose was increased to 6 mg) to help mobilize INR. INR was trending up appropriately for the last few days. The INR ordered for this morning was drawn very early ~ 2300 last night with appropriately up-trend noted. However, pt's Hgb dropped significantly today with noted bleeding at the surgical site requiring 1 unit pRBCs. Repeat INR ordered  this afternoon with noted large jump within 12 hours from 2> 2.5.   Plan discussed with attending. Bleeding controlled with packing and patient is otherwise asymptomatic. Plan to start heparin drip given acute bleed & therapeutic INR with anticipation of another up-trend tomorrow from the 6 mg last night. Will still dose warfarin tonight to prevent large drops from held doses but reduce the dose by 50% of the usual home dose. Will continue to monitor labs & s/sx of bleeding closely.     Plan:  2 mg   Education Material Provided?: No    Pharmacist suggested discharge dosing: TBD pending INR trend & H/H over the next 24-48 hours.      Whit Schulte, PharmD  [phone]

## 2024-11-12 NOTE — PROGRESS NOTES
Per NOC RN patient saturated 3 ABD pads overnight is on heparin to coumadin bridge last INR is 2.07 notified Dr. Cain above.  Provided ice packs and will re enforce dressing PRN.

## 2024-11-12 NOTE — CARE PLAN
The patient is Stable - Low risk of patient condition declining or worsening    Shift Goals  Clinical Goals: pain control, bleeding, INR to increase, hemoglobin to remain stable, mobility, void, have a BM  Patient Goals: have a BM, pain control, plan of care  Family Goals: not present    Progress made toward(s) clinical / shift goals: Pain controlled with oral medication, notified bleeding at surgical site with orthopedic PA Siddharth Beaulieu placed new dressing with wrap, there was not a large increase throughout this shift thus far, INR daily, repeat hemoglobin every 6 hours. Able to void without a catheter, no BM d/w provider in rounds started lactulose as well. Plan of care reviewed with patient and family at bedside.     Patient is not progressing towards the following goals:

## 2024-11-12 NOTE — PROGRESS NOTES
Pain controlled.   Heparin gtt remains as ordered next Xa 1305.  Patient with scan bleeding around dressing but not saturated hgb decreased from 8.2 to 7.6 on Q6 checks.  Patient with bruising on non op leg RLE especially under the SCD notified Dr. Cain and Siddharth MARTINEZ.   Trial on room air, currently 96% IS pulls a max of 750mls.   Not tolerating up for meals did d/w patient at bedside.   Skin concerns noted on physical assessment will request wound care, photos taken will update LDAs and media.   No BM despite bowel medications including suppository this am.   Bladder scanned for 1,040 mls, then however voided huge amount with purewick canister full as well as incontinent care for large void volume linen change done.  Will repeat bladder scan and update. Repeat bladder scan post void was 93 mls max.

## 2024-11-12 NOTE — PROGRESS NOTES
"      Orthopaedic Progress Note    Interval changes:  Patient doing well   RLE dressings saturated, dressings changed no issues with incision noted  Cleared for DC to SNF by ortho pending medicine clearance    ROS - Patient denies any new issues.  Pain well controlled.    /50   Pulse 60   Temp 36.8 °C (98.2 °F) (Temporal)   Resp 16   Ht 1.527 m (5' 0.1\")   Wt 109 kg (241 lb 2.9 oz)   SpO2 96%     Patient seen and examined  No acute distress  Breathing non labored  RRR  RLE dressings saturated, dressings changed no issues with incision noted. Patient clearly fires tibialis anterior, EHL, and gastrocnemius/soleus. Sensation is intact to light touch throughout superficial peroneal, deep peroneal, tibial, saphenous, and sural nerve distributions. Strong and palpable 2+ dorsalis pedis and posterior tibial pulses with capillary refill less than 2 seconds. No lower leg tenderness or discomfort.     Recent Labs     11/10/24  0638 11/11/24  0132 11/11/24  2355 11/12/24  0838 11/12/24  1254   WBC 7.6 5.4 6.7  --   --    RBC 2.98* 2.77* 2.77*  --   --    HEMOGLOBIN 8.8* 8.3* 8.2* 7.6* 7.0*   HEMATOCRIT 27.9* 25.3* 25.6* 23.2* 21.6*   MCV 93.6 91.3 92.4  --   --    MCH 29.5 30.0 29.6  --   --    MCHC 31.5* 32.8 32.0*  --   --    RDW 44.9 44.0 43.8  --   --    PLATELETCT 124* 121* 168  --   --    MPV 11.1 10.6 10.1  --   --        Active Hospital Problems    Diagnosis     Class 3 severe obesity due to excess calories with serious comorbidity and body mass index (BMI) of 40.0 to 44.9 in adult ([facility]) [E66.813, E66.01, Z68.41]      Priority: Medium    Chronic kidney disease (CKD) stage G3b/A1, moderately decreased glomerular filtration rate (GFR) between 30-44 mL/min/1.73 square meter and albuminuria creatinine ratio less than 30 mg/g [N18.32]      Priority: Medium     Chronic, discussed increasing her hydration and avoiding nephrotoxic medications and NSAIDs.  BP is controlled today in clinic 130/70, she has had a " longstanding history of prediabetes however most recent A1c was at 5.3.  GFR (CKD-EPI) >60 mL/min/1.73 m 2 34 Abnormal      Bun 8 - 22 mg/dL 38 High     Creatinine 0.50 - 1.40 mg/dL 1.61 High            Paroxysmal atrial fibrillation (HCC) [I48.0]      Priority: Medium     Chronic, continues on Amiodarone 200 mg daily, Torsemide 10 mg daily, Warfarin, Doxazosin 2 mg daily.  She notes that she had a cardioversion done 2 years ago.  Her Coumadin is followed by anticoagulation clinic.      Hx of mechanical aortic valve replacement [V43.3] [Z95.2]      Priority: Medium    Essential hypertension [I10]      Priority: Medium    Tricuspid regurgitation [I07.1]      Priority: Medium    Hypothyroidism [E03.9]      Priority: Low     Chronic, continues on Synthroid 125 mcg each morning on an empty stomach.  Denies any symptoms of hyper or hypothyroidism.  We will recheck TSH prior to her next visit.      Acute blood loss anemia [D62]     Nondisplaced unspecified condyle fracture of lower end of right femur, initial encounter for closed fracture (HCC) [S72.414A]     Hyperglycemia [R73.9]        Assessment/Plan:  Patient doing well   RLE dressings saturated, dressings changed no issues with incision noted  Cleared for DC to SNF by ortho pending medicine clearance  POD#4 S/P Open treatment with internal fixation of right lateral condyle distal femur fracture.  Wt bearing status - TTWB RLE  Wound care/Drains - Dressings to be changed every other day by nursing. Or PRN for saturation starting POD#2  Future Procedures - none planned   Sutures/Staples out- 14-21 days post operatively. Removal will completed by ortho mid levels only.  PT/OT-initiated  Antibiotics: Perioperative completed  DVT Prophylaxis- TEDS/SCDs/Foot pumps  Bull-not needed per ortho  Case Coordination for Discharge Planning - Disposition per therapy recs.

## 2024-11-13 PROBLEM — R52 INTRACTABLE PAIN: Status: ACTIVE | Noted: 2024-11-13

## 2024-11-13 LAB
ANION GAP SERPL CALC-SCNC: 9 MMOL/L (ref 7–16)
BUN SERPL-MCNC: 57 MG/DL (ref 8–22)
CALCIUM SERPL-MCNC: 8.5 MG/DL (ref 8.5–10.5)
CHLORIDE SERPL-SCNC: 102 MMOL/L (ref 96–112)
CO2 SERPL-SCNC: 22 MMOL/L (ref 20–33)
CREAT SERPL-MCNC: 1.88 MG/DL (ref 0.5–1.4)
ERYTHROCYTE [DISTWIDTH] IN BLOOD BY AUTOMATED COUNT: 43 FL (ref 35.9–50)
GFR SERPLBLD CREATININE-BSD FMLA CKD-EPI: 28 ML/MIN/1.73 M 2
GLUCOSE SERPL-MCNC: 132 MG/DL (ref 65–99)
HCT VFR BLD AUTO: 23.7 % (ref 37–47)
HGB BLD-MCNC: 8.1 G/DL (ref 12–16)
INR PPP: 2.56 (ref 0.87–1.13)
MAGNESIUM SERPL-MCNC: 2.2 MG/DL (ref 1.5–2.5)
MCH RBC QN AUTO: 30.8 PG (ref 27–33)
MCHC RBC AUTO-ENTMCNC: 34.2 G/DL (ref 32.2–35.5)
MCV RBC AUTO: 90.1 FL (ref 81.4–97.8)
PHOSPHATE SERPL-MCNC: 4.5 MG/DL (ref 2.5–4.5)
PLATELET # BLD AUTO: 166 K/UL (ref 164–446)
PMV BLD AUTO: 9.8 FL (ref 9–12.9)
POTASSIUM SERPL-SCNC: 4.3 MMOL/L (ref 3.6–5.5)
PROTHROMBIN TIME: 27.6 SEC (ref 12–14.6)
RBC # BLD AUTO: 2.63 M/UL (ref 4.2–5.4)
SODIUM SERPL-SCNC: 133 MMOL/L (ref 135–145)
WBC # BLD AUTO: 10.7 K/UL (ref 4.8–10.8)

## 2024-11-13 PROCEDURE — 770001 HCHG ROOM/CARE - MED/SURG/GYN PRIV*

## 2024-11-13 PROCEDURE — 84100 ASSAY OF PHOSPHORUS: CPT

## 2024-11-13 PROCEDURE — 97530 THERAPEUTIC ACTIVITIES: CPT

## 2024-11-13 PROCEDURE — A9270 NON-COVERED ITEM OR SERVICE: HCPCS | Performed by: INTERNAL MEDICINE

## 2024-11-13 PROCEDURE — 700102 HCHG RX REV CODE 250 W/ 637 OVERRIDE(OP): Performed by: INTERNAL MEDICINE

## 2024-11-13 PROCEDURE — A9270 NON-COVERED ITEM OR SERVICE: HCPCS | Performed by: GENERAL PRACTICE

## 2024-11-13 PROCEDURE — 97602 WOUND(S) CARE NON-SELECTIVE: CPT

## 2024-11-13 PROCEDURE — 97535 SELF CARE MNGMENT TRAINING: CPT

## 2024-11-13 PROCEDURE — 36415 COLL VENOUS BLD VENIPUNCTURE: CPT

## 2024-11-13 PROCEDURE — 83735 ASSAY OF MAGNESIUM: CPT

## 2024-11-13 PROCEDURE — 700102 HCHG RX REV CODE 250 W/ 637 OVERRIDE(OP): Performed by: HOSPITALIST

## 2024-11-13 PROCEDURE — 85610 PROTHROMBIN TIME: CPT

## 2024-11-13 PROCEDURE — 700102 HCHG RX REV CODE 250 W/ 637 OVERRIDE(OP): Performed by: GENERAL PRACTICE

## 2024-11-13 PROCEDURE — A9270 NON-COVERED ITEM OR SERVICE: HCPCS | Performed by: HOSPITALIST

## 2024-11-13 PROCEDURE — 51798 US URINE CAPACITY MEASURE: CPT

## 2024-11-13 PROCEDURE — 80048 BASIC METABOLIC PNL TOTAL CA: CPT

## 2024-11-13 PROCEDURE — 99232 SBSQ HOSP IP/OBS MODERATE 35: CPT | Performed by: GENERAL PRACTICE

## 2024-11-13 PROCEDURE — 85027 COMPLETE CBC AUTOMATED: CPT

## 2024-11-13 PROCEDURE — 700101 HCHG RX REV CODE 250: Performed by: GENERAL PRACTICE

## 2024-11-13 RX ORDER — HYDROMORPHONE HYDROCHLORIDE 1 MG/ML
1 INJECTION, SOLUTION INTRAMUSCULAR; INTRAVENOUS; SUBCUTANEOUS EVERY 4 HOURS PRN
Status: DISCONTINUED | OUTPATIENT
Start: 2024-11-13 | End: 2024-11-15 | Stop reason: HOSPADM

## 2024-11-13 RX ORDER — LIDOCAINE 4 G/G
2 PATCH TOPICAL EVERY 24 HOURS
Status: DISCONTINUED | OUTPATIENT
Start: 2024-11-13 | End: 2024-11-15 | Stop reason: HOSPADM

## 2024-11-13 RX ORDER — OXYCODONE HYDROCHLORIDE 15 MG/1
15 TABLET ORAL EVERY 6 HOURS PRN
Status: DISCONTINUED | OUTPATIENT
Start: 2024-11-13 | End: 2024-11-15 | Stop reason: HOSPADM

## 2024-11-13 RX ORDER — OXYCODONE HYDROCHLORIDE 10 MG/1
10 TABLET ORAL EVERY 4 HOURS PRN
Status: DISCONTINUED | OUTPATIENT
Start: 2024-11-13 | End: 2024-11-15 | Stop reason: HOSPADM

## 2024-11-13 RX ORDER — METHOCARBAMOL 500 MG/1
500 TABLET, FILM COATED ORAL 4 TIMES DAILY
Status: DISCONTINUED | OUTPATIENT
Start: 2024-11-13 | End: 2024-11-14

## 2024-11-13 RX ORDER — METHOCARBAMOL 500 MG/1
500 TABLET, FILM COATED ORAL 4 TIMES DAILY
Status: DISCONTINUED | OUTPATIENT
Start: 2024-11-13 | End: 2024-11-13

## 2024-11-13 RX ADMIN — METHOCARBAMOL 500 MG: 500 TABLET ORAL at 11:21

## 2024-11-13 RX ADMIN — LIDOCAINE 2 PATCH: 4 PATCH TOPICAL at 11:36

## 2024-11-13 RX ADMIN — WARFARIN SODIUM 2 MG: 2 TABLET ORAL at 16:55

## 2024-11-13 RX ADMIN — ACETAMINOPHEN 1000 MG: 500 TABLET ORAL at 23:23

## 2024-11-13 RX ADMIN — SENNOSIDES AND DOCUSATE SODIUM 2 TABLET: 50; 8.6 TABLET ORAL at 16:55

## 2024-11-13 RX ADMIN — METHOCARBAMOL 500 MG: 500 TABLET ORAL at 16:55

## 2024-11-13 RX ADMIN — OXYCODONE HYDROCHLORIDE 10 MG: 10 TABLET ORAL at 03:41

## 2024-11-13 RX ADMIN — METHOCARBAMOL 500 MG: 500 TABLET ORAL at 22:15

## 2024-11-13 RX ADMIN — OXYCODONE HYDROCHLORIDE 10 MG: 10 TABLET ORAL at 21:46

## 2024-11-13 RX ADMIN — ACETAMINOPHEN 1000 MG: 500 TABLET ORAL at 05:38

## 2024-11-13 RX ADMIN — TIMOLOL MALEATE 1 DROP: 5 SOLUTION OPHTHALMIC at 09:37

## 2024-11-13 RX ADMIN — LEVOTHYROXINE SODIUM 125 MCG: 0.12 TABLET ORAL at 05:39

## 2024-11-13 RX ADMIN — OXYCODONE HYDROCHLORIDE 15 MG: 15 TABLET ORAL at 12:36

## 2024-11-13 RX ADMIN — OXYCODONE HYDROCHLORIDE 10 MG: 10 TABLET, FILM COATED, EXTENDED RELEASE ORAL at 05:39

## 2024-11-13 RX ADMIN — ACETAMINOPHEN 1000 MG: 500 TABLET ORAL at 17:02

## 2024-11-13 RX ADMIN — ACETAMINOPHEN 1000 MG: 500 TABLET ORAL at 11:21

## 2024-11-13 RX ADMIN — OXYCODONE HYDROCHLORIDE 10 MG: 10 TABLET ORAL at 09:36

## 2024-11-13 RX ADMIN — TIMOLOL MALEATE 1 DROP: 5 SOLUTION OPHTHALMIC at 16:55

## 2024-11-13 ASSESSMENT — COGNITIVE AND FUNCTIONAL STATUS - GENERAL
MOVING FROM LYING ON BACK TO SITTING ON SIDE OF FLAT BED: A LOT
PERSONAL GROOMING: A LITTLE
SUGGESTED CMS G CODE MODIFIER DAILY ACTIVITY: CK
TOILETING: A LOT
TURNING FROM BACK TO SIDE WHILE IN FLAT BAD: A LITTLE
STANDING UP FROM CHAIR USING ARMS: TOTAL
CLIMB 3 TO 5 STEPS WITH RAILING: TOTAL
DAILY ACTIVITIY SCORE: 16
MOBILITY SCORE: 9
HELP NEEDED FOR BATHING: A LOT
MOVING TO AND FROM BED TO CHAIR: TOTAL
DRESSING REGULAR LOWER BODY CLOTHING: A LOT
SUGGESTED CMS G CODE MODIFIER MOBILITY: CM
WALKING IN HOSPITAL ROOM: TOTAL
DRESSING REGULAR UPPER BODY CLOTHING: A LITTLE

## 2024-11-13 ASSESSMENT — PAIN DESCRIPTION - PAIN TYPE
TYPE: ACUTE PAIN;SURGICAL PAIN
TYPE: ACUTE PAIN
TYPE: ACUTE PAIN
TYPE: SURGICAL PAIN
TYPE: ACUTE PAIN;SURGICAL PAIN
TYPE: ACUTE PAIN;SURGICAL PAIN
TYPE: ACUTE PAIN

## 2024-11-13 ASSESSMENT — FIBROSIS 4 INDEX: FIB4 SCORE: 3.41

## 2024-11-13 ASSESSMENT — GAIT ASSESSMENTS: GAIT LEVEL OF ASSIST: UNABLE TO PARTICIPATE

## 2024-11-13 ASSESSMENT — ENCOUNTER SYMPTOMS: BRUISES/BLEEDS EASILY: 1

## 2024-11-13 NOTE — PROGRESS NOTES
"Patient tolerating transfusion well thus far will update note if there are changes.  1753 /44   Pulse 77   Temp 36.7 °C (98.1 °F) (Temporal)   Resp 16   Ht 1.527 m (5' 0.1\")   Wt 109 kg (241 lb 2.9 oz)   SpO2 96%   Vitals remain stable 15 minutes into transfusion, no new symptoms, and piv site functioning as expected.     "

## 2024-11-13 NOTE — ASSESSMENT & PLAN NOTE
Patient reports her pain is not controlled, discontinued long-acting pain medication as patient noted to have encephalopathy.  Started patient on muscle relaxers, scheduled Tylenol, patient does not tolerate gabapentin did not start this.  Continue with as needed oral opioids.

## 2024-11-13 NOTE — THERAPY
Occupational Therapy  Daily Treatment     Patient Name: Yenifer Beasley  Age:  74 y.o., Sex:  female  Medical Record #: [id number]  Today's Date: 11/13/2024     Precautions: Fall Risk, Toe Touch Weight Bearing Right Lower Extremity  Comments: R knee ROMAT, L knee brace    Assessment    Pt pleasant/agreeable, session overlapped with physical therapy, pt with significant mobility deficits impacting ADLs/mobility requiring two skilled therapists. Sessions focused on seated ADLs and emphasis on UE strengthening exercises due to new reliance on UE with TTWB precautions. Pt limited by impaired balance, generalized UE and LB weakness, poor adherence to WB, poor activity tolerance. Pt dizzy EOB, BP initially low but returned to normotensive (see below). Continue to recommend placement. Will continue to follow for skilled OT services.    Plan    Treatment Plan Status: (P) Continue Current Treatment Plan  Type of Treatment: Self Care / Activities of Daily Living, Adaptive Equipment, Neuro Re-Education / Balance, Therapeutic Exercises, Therapeutic Activity  Treatment Frequency: 4 Times per Week  Treatment Duration: Until Therapy Goals Met    DC Equipment Recommendations: (P) Unable to determine at this time  Discharge Recommendations: (P) Recommend post-acute placement for additional occupational therapy services prior to discharge home       11/13/24 1217   Vitals   Vitals Comments BP seated EOB 88/54, endorsed dizziness, after 5 min seated BP up to 129/60s   Pain 0 - 10 Group   Therapist Pain Assessment   (pain in R hip and low back, not rated, RN aware)   Cognition    Cognition / Consciousness WDL   Comments pleasant/agreeable, limited by pain   Supine Upper Body Exercises   Supine Upper Body Exercises Yes   Comments provided BUE supine exercises, pt agreeable and stating she feels weak with completing basic ADLs EOB   Other Treatments   Other Treatments Provided Focused on seated ADL tolerance EOB   Balance   Sitting  Balance (Static) Fair   Sitting Balance (Dynamic) Fair -   Standing Balance (Static) Poor -   Standing Balance (Dynamic) Trace +   Weight Shift Sitting Poor   Weight Shift Standing Absent   Skilled Intervention Verbal Cuing;Tactile Cuing   Bed Mobility    Sit to Supine Maximal Assist  (2pa)   Scooting Minimal Assist   Skilled Intervention Verbal Cuing;Tactile Cuing   Comments HOB flat   Activities of Daily Living   Grooming Moderate Assist;Seated  (hair care, face care, oral care with set up and assist 2/2 fatigue)   Lower Body Dressing Maximal Assist   Skilled Intervention Verbal Cuing;Compensatory Strategies   How much help from another person does the patient currently need...   6 Clicks Daily Activity Score 16   Functional Mobility   Sit to Stand Maximal Assist  (2pa unable to fully stand or adhere to WB)   Skilled Intervention Verbal Cuing   Short Term Goals   Short Term Goal # 1 pt will demo ADL txfs w/ Leticia   Goal Outcome # 1 Progressing slower than expected   Short Term Goal # 2 pt will dress LB w/ Leticia and AE prn   Goal Outcome # 2 Progressing slower than expected   Short Term Goal # 3 pt will demo toileting w/ Leticia   Goal Outcome # 3 Progressing slower than expected   Education Group   Education Provided Weight Bearing Precautions   Role of Occupational Therapist Patient Response Patient;Acceptance;Explanation   Weight Bearing Precautions Patient Response Patient;Acceptance;Explanation   Occupational Therapy Treatment Plan    O.T. Treatment Plan Continue Current Treatment Plan   Anticipated Discharge Equipment and Recommendations   DC Equipment Recommendations Unable to determine at this time   Discharge Recommendations Recommend post-acute placement for additional occupational therapy services prior to discharge home   Interdisciplinary Plan of Care Collaboration   IDT Collaboration with  Nursing;Physical Therapist   Patient Position at End of Therapy In Bed;Bed Alarm On;Tray Table within Reach;Phone  within Reach;Call Light within Reach   Collaboration Comments RN aware   Session Information   Date / Session Number  11/13 3 (3/4, 11/15)

## 2024-11-13 NOTE — PROGRESS NOTES
"      Orthopaedic Progress Note    Interval changes:  Patient doing well   RLE dressings with min sanguinous, dressings changed   Cleared for DC to SNF by ortho pending medicine clearance    ROS - Patient denies any new issues.  Pain well controlled.    /52   Pulse 64   Temp 36 °C (96.8 °F) (Temporal)   Resp 17   Ht 1.527 m (5' 0.1\")   Wt 109 kg (241 lb)   SpO2 99%     Patient seen and examined  No acute distress  Breathing non labored  RRR  RLE dressings with min sanguinous, dressings changed. Patient clearly fires tibialis anterior, EHL, and gastrocnemius/soleus. Sensation is intact to light touch throughout superficial peroneal, deep peroneal, tibial, saphenous, and sural nerve distributions. Strong and palpable 2+ dorsalis pedis and posterior tibial pulses with capillary refill less than 2 seconds. No lower leg tenderness or discomfort.     Recent Labs     11/11/24  0132 11/11/24  2355 11/12/24  0838 11/12/24  1254 11/12/24  2114 11/13/24  0303   WBC 5.4 6.7  --   --   --  10.7   RBC 2.77* 2.77*  --   --   --  2.63*   HEMOGLOBIN 8.3* 8.2*   < > 7.0* 8.3* 8.1*   HEMATOCRIT 25.3* 25.6*   < > 21.6* 23.9* 23.7*   MCV 91.3 92.4  --   --   --  90.1   MCH 30.0 29.6  --   --   --  30.8   MCHC 32.8 32.0*  --   --   --  34.2   RDW 44.0 43.8  --   --   --  43.0   PLATELETCT 121* 168  --   --   --  166   MPV 10.6 10.1  --   --   --  9.8    < > = values in this interval not displayed.       Active Hospital Problems    Diagnosis     Class 3 severe obesity due to excess calories with serious comorbidity and body mass index (BMI) of 40.0 to 44.9 in adult ([facility]) [E66.813, E66.01, Z68.41]      Priority: Medium    Chronic kidney disease (CKD) stage G3b/A1, moderately decreased glomerular filtration rate (GFR) between 30-44 mL/min/1.73 square meter and albuminuria creatinine ratio less than 30 mg/g [N18.32]      Priority: Medium     Chronic, discussed increasing her hydration and avoiding nephrotoxic medications and " NSAIDs.  BP is controlled today in clinic 130/70, she has had a longstanding history of prediabetes however most recent A1c was at 5.3.  GFR (CKD-EPI) >60 mL/min/1.73 m 2 34 Abnormal      Bun 8 - 22 mg/dL 38 High     Creatinine 0.50 - 1.40 mg/dL 1.61 High            Paroxysmal atrial fibrillation (HCC) [I48.0]      Priority: Medium     Chronic, continues on Amiodarone 200 mg daily, Torsemide 10 mg daily, Warfarin, Doxazosin 2 mg daily.  She notes that she had a cardioversion done 2 years ago.  Her Coumadin is followed by anticoagulation clinic.      Hx of mechanical aortic valve replacement [V43.3] [Z95.2]      Priority: Medium    Essential hypertension [I10]      Priority: Medium    Tricuspid regurgitation [I07.1]      Priority: Medium    Hypothyroidism [E03.9]      Priority: Low     Chronic, continues on Synthroid 125 mcg each morning on an empty stomach.  Denies any symptoms of hyper or hypothyroidism.  We will recheck TSH prior to her next visit.      Intractable pain [R52]     Acute blood loss anemia [D62]     Nondisplaced unspecified condyle fracture of lower end of right femur, initial encounter for closed fracture (HCC) [S72.414A]     Hyperglycemia [R73.9]        Assessment/Plan:  Patient doing well   RLE dressings with min sanguinous, dressings changed   Cleared for DC to SNF by ortho pending medicine clearance  POD#5 S/P Open treatment with internal fixation of right lateral condyle distal femur fracture.  Wt bearing status - TTWB RLE  Wound care/Drains - Dressings to be changed every other day by nursing. Or PRN for saturation starting POD#2  Future Procedures - none planned   Sutures/Staples out- 14-21 days post operatively. Removal will completed by ortho mid levels only.  PT/OT-initiated  Antibiotics: Perioperative completed  DVT Prophylaxis- TEDS/SCDs/Foot pumps  Bull-not needed per ortho  Case Coordination for Discharge Planning - Disposition per therapy recs.

## 2024-11-13 NOTE — THERAPY
"Physical Therapy   Daily Treatment     Patient Name: Yenifer Beasley  Age:  74 y.o., Sex:  female  Medical Record #: [id number]  Today's Date: 11/13/2024     Precautions  Precautions: Fall Risk;Toe Touch Weight Bearing Right Lower Extremity  Comments: R knee ROMAT, L knee brace    Assessment    Pt progressing as expected. Required Nieves to get to EOB with extra time and rails with HOB raised. Attempted stand with MaxA x2 person but pt unable to adhere to WB status and fearful of falling. Provided \"total hip replacement exercises\" handout, educated on each and that pt did not have total hip replacement. Further details below. Recommend placement. Pt would benefit from continued acute IP PT services to address said deficits.     Plan    Treatment Plan Status: Continue Current Treatment Plan  Type of Treatment: Bed Mobility, Family / Caregiver Training, Gait Training, Neuro Re-Education / Balance, Therapeutic Activities, Therapeutic Exercise, Stair Training, Equipment  Treatment Frequency: 5 Times per Week  Treatment Duration: Until Therapy Goals Met    DC Equipment Recommendations: Unable to determine at this time  Discharge Recommendations: Recommend post-acute placement for additional physical therapy services prior to discharge home      Subjective    Pt endorsing dizziness and weakness at EOB, improved dizziness with LAQ/ankle pumps      Objective     11/13/24 1201   Vitals   Vitals Comments BP seated EOB 88/54, endorsed dizziness, after 5 min seated BP up to 129/60s   Pain 0 - 10 Group   Therapist Pain Assessment Post Activity Pain Same as Prior to Activity;Nurse Notified  (c/o back pain not rated, agreeable to mobility)   Cognition    Cognition / Consciousness X   Level of Consciousness Alert   Safety Awareness Impaired   Comments Pleasant and cooperative, reminders for TTWB   Active ROM Lower Body    Active ROM Lower Body  X   Comments RLE knee flexion more limited than LLE, BLE's limited 2/2 pain and body " habitus   Strength Lower Body   Comments generalized weakness in BLE's, limited 2/2 pain ; LLE 3+/5, RLE 3-/5   Sitting Lower Body Exercises   Comments brief demo of seated LAQ, heel slide, ankle pumps, reviewed handout fully   Balance   Sitting Balance (Static) Fair   Sitting Balance (Dynamic) Fair -   Standing Balance (Static) Poor -   Standing Balance (Dynamic) Trace +   Weight Shift Sitting Poor   Weight Shift Standing Absent   Skilled Intervention Verbal Cuing;Tactile Cuing;Sequencing;Compensatory Strategies;Postural Facilitation   Comments w/ FWW   Bed Mobility    Supine to Sit Moderate Assist   Scooting Minimal Assist   Comments HOB raised from supine to EOB, use of rails, increased time and effort. up at EOB with OT post   Gait Analysis   Gait Level Of Assist Unable to Participate   Functional Mobility   Sit to Stand Maximal Assist  (2pa unable to fully stand or adhere to WB)   Mobility STS trial   Skilled Intervention Verbal Cuing;Sequencing;Tactile Cuing;Compensatory Strategies;Facilitation;Postural Facilitation   6 Clicks Assessment - How much HELP from from another person do you currently need... (If the patient hasn't done an activity recently, how much help from another person do you think he/she would need if he/she tried?)   Turning from your back to your side while in a flat bed without using bedrails? 3   Moving from lying on your back to sitting on the side of a flat bed without using bedrails? 2   Moving to and from a bed to a chair (including a wheelchair)? 1   Standing up from a chair using your arms (e.g., wheelchair, or bedside chair)? 1   Walking in hospital room? 1   Climbing 3-5 steps with a railing? 1   6 clicks Mobility Score 9   Activity Tolerance   Comments weakness, fatigue, dizziness   Short Term Goals    Short Term Goal # 1 pt will be able to transfer supine <> sit w/SPV in 6 tx sessions in order to increase level of independence   Goal Outcome # 1 goal not met   Short Term Goal # 2  pt will be able to perform SPT w/ FWW and min assist to BSC or recliner chair in 6 tx sessions in order to increase level of independence   Goal Outcome # 2 Goal not met   Short Term Goal # 3 pt will be able to ambulate 20 ft in 6 tx sessions in order to increase level of independence   Goal Outcome # 3 Goal not met   Education Group   Education Provided Role of Physical Therapist;Weight Bearing Status   Role of Physical Therapist Patient Response Patient;Acceptance;Explanation;Verbal Demonstration   Weight Bearing Status Patient Response Patient;Acceptance;Explanation;Verbal Demonstration;Reinforcement Needed   Physical Therapy Treatment Plan   Physical Therapy Treatment Plan Continue Current Treatment Plan   Anticipated Discharge Equipment and Recommendations   DC Equipment Recommendations Unable to determine at this time   Discharge Recommendations Recommend post-acute placement for additional physical therapy services prior to discharge home   Interdisciplinary Plan of Care Collaboration   IDT Collaboration with  Nursing;Occupational Therapist  (overlap for supine to EOB and stand attempt, left with OT at end of session)   Patient Position at End of Therapy   (EOB with OT)   Collaboration Comments RN updated   Session Information   Date / Session Number  11/13- 3 (3/5, 11/15)

## 2024-11-13 NOTE — PROGRESS NOTES
Patient declined to consent to blood for now, blood will remain on hold.  Agreeable to re checking hemoglobin and d/w provider based on blood work.

## 2024-11-13 NOTE — DISCHARGE PLANNING
Patient not medically cleared per attending physician due to pain control as well as post-op bleeding. Anticipating discharge by Friday, 11/15/2024, with possibility of discharge tomorrow, 11/14/2024.   [facility] her [facility] choice. Bed is available tomorrow if patient is medically cleared.     Case Management Discharge Planning    Admission Date: 11/7/2024  GMLOS: 4.4  ALOS: 6    6-Clicks ADL Score: 15  6-Clicks Mobility Score: 8  PT and/or OT Eval ordered: Yes  Post-acute Referrals Ordered: Yes  Post-acute Choice Obtained: Yes  Has referral(s) been sent to post-acute provider:  Yes      Anticipated Discharge Dispo: Discharge Disposition: Disch to  rehab facility or distinct part unit (62)    DME Needed: No    Action(s) Taken: Updated Provider/Nurse on Discharge Plan    Escalations Completed: Provider and Bedside RN    Medically Clear: No    Next Steps: Pending medical clearance.     Barriers to Discharge: Medical clearance    Is the patient up for discharge tomorrow: Likely not.

## 2024-11-13 NOTE — PROGRESS NOTES
Patient is agreeable to transfusion pre transfusion vital signs completed, blood is en route from blood bank, has signed consent, working piv access, and set up in place.

## 2024-11-13 NOTE — PROGRESS NOTES
Virtual Nurse rounding complete.    Round Needs: No needs at this time. Patient with questions regarding discharge plan. Updated on plan of care.

## 2024-11-13 NOTE — DISCHARGE PLANNING
[facility] TCN chart review completed. Collaborated with BOBBI Lackey.  Current discharge considerations are for post-acute placement when medically cleared.  Per chart review, patient choice for SNF is 1) [facility]- accepted.  This TCN obtained [facility] authorization for [facility] and CM notified.  Per chart review, patient cleared by ortho but is not medically cleared.  Per CM, patient is not medically cleared d/t INR on heparin drip.  Plan for possible discharge tomorrow.     TCN will continue to follow and collaborate with discharge planning team as additional post acute needs arise. Thank you.     Completed:  PT/OT recommends post-acute placement on 11/11/24.   Choice obtained: none; SNF 1) [facility]- accepted.     Pt aware of [facility] blanket referral policy and reports will select from accepting SNF facilities if indicated.

## 2024-11-13 NOTE — DISCHARGE PLANNING
[facility] TCN chart review completed. No new TCN needs identified at this time. Current discharge considerations are for post-acute placement when medically cleared.  Per chart review, patient choice for SNF is 1) [facility]- accepted.  Prior TCN obtained [facility] authorization for [facility] and CM was notified. Noted per prior TCN, plan for possible discharge today (11/12). TCN will monitor.      TCN will continue to follow and collaborate with discharge planning team as additional post acute needs arise. Thank you.      Completed:  PT/OT recommends post-acute placement on 11/11/24.   Choice obtained: none; SNF 1) [facility]- accepted and has SCP auth in place.     Pt aware of [facility]'s blanket referral policy; note that pt has been accepted to all [facility] contracted [facility] SNFs

## 2024-11-13 NOTE — PROGRESS NOTES
0530 Patient no urine out put. Bladder scan done and revealed 628 ml. Bull catheter inserted aseptically as ordered without any difficulty. With clear yellowish urine out put noted connected to urine bag.

## 2024-11-13 NOTE — CARE PLAN
The patient is Stable - Low risk of patient condition declining or worsening    Shift Goals  Clinical Goals: safety, pain control  Patient Goals: rest, comfort  Family Goals: no family present    Progress made toward(s) clinical / shift goals:    Problem: Mobility  Goal: Patient's capacity to carry out activities will improve  Outcome: Progressing  Flowsheets (Taken 11/11/2024 4876)  Mobility:   Encouraged mobilization per interdisciplinary team recommendations   Monitored for signs of activity intolerance   Provided assistive devices   Provided rest periods between activities   Administered pain management to allow progressive mobilization   Collaborated with PT/OT     Problem: Wound/ / Incision Healing  Goal: Patient's wound/surgical incision will decrease in size and heals properly  Outcome: Progressing  Note: Dressing is CDI       Patient is not progressing towards the following goals:

## 2024-11-13 NOTE — WOUND TEAM
[facility]  Inpatient Services  Wound and Skin Care Brief Evaluation    Admission Date: 11/7/2024     Last order of IP CONSULT TO WOUND CARE was found on 11/12/2024 from Hospital Encounter on 11/7/2024     HPI, PMH, SH: Reviewed    Chief Complaint   Patient presents with    Knee Injury     Pt had slip at [facility].  Denies trauma to knee but c/o R knee pain and swelling     Diagnosis: Closed disp fracture of condyle of right femur with delayed healing [S72.411G]    Unit where seen by Wound Team: T328/02     Wound consult placed regarding BLE & buttocks. Chart and images reviewed. This discussed with bedside RN. This clinician in to assess patient. Patient pleasant and agreeable. Sacrum with moisture fissure, barrier paste in place.  Posterior-lateral LLE with edema, intact bullae, and ecchymosis. Patient reports edema normally and wears compression. Tubigrip size F applied to LLE for edema management.   Visible portion of posterior RLE intact with edema present. Compression not recommended at this time  for this extremity due to recent surgical repair of femur. Plantar R heel with callus, no fluctuation in skin noted upon palpation of area.    No pressure injuries or advanced wound care needs identified. Wound consult completed. Wound team signing off, re-consult if patient has further advanced wound care needs.    Moisture Associated Skin Damage 11/13/24 Sacrum (Active)   First Observed Date/First Observed Time: 11/13/24 1144   Wound Location : Sacrum      Assessments 11/13/2024 11:00 AM   Wound Image     NEXT Weekly Photo (Inpatient Only) 11/20/24   Drainage Amount Scant   Drainage Description Serosanguineous   Periwound Assessment Pink;Red   IAD Cleansing Foam Cleanser/Washcloth   Periwound Protectant Barrier Paste   IAD Containment Device None   WOUND NURSE ONLY - Time Spent with Patient (mins) 30       PREVENTATIVE INTERVENTIONS:    Q shift Kamar - performed per nursing policy  Q shift pressure  point assessments - performed per nursing policy    Surface/Positioning  Low Airloss - Currently in Place  Reposition q 2 hours - Currently in Place    Offloading/Redistribution  Heel offloading dressing (Silicone dressing) - Currently in Place

## 2024-11-13 NOTE — CARE PLAN
The patient is Watcher - Medium risk of patient condition declining or worsening    Shift Goals  Clinical Goals: safety, pain control  Patient Goals: rest, comfort  Family Goals: no family present    Progress made toward(s) clinical / shift goals:    Problem: Pain - Standard  Goal: Alleviation of pain or a reduction in pain to the patient’s comfort goal  Outcome: Progressing    Administer pain medications per MAR.     Problem: Knowledge Deficit - Standard  Goal: Patient and family/care givers will demonstrate understanding of plan of care, disease process/condition, diagnostic tests and medications  Outcome: Progressing    Educated the patient regarding her plan of care.       Patient is not progressing towards the following goals:

## 2024-11-14 LAB
ANION GAP SERPL CALC-SCNC: 8 MMOL/L (ref 7–16)
BUN SERPL-MCNC: 65 MG/DL (ref 8–22)
CALCIUM SERPL-MCNC: 8.3 MG/DL (ref 8.5–10.5)
CHLORIDE SERPL-SCNC: 98 MMOL/L (ref 96–112)
CO2 SERPL-SCNC: 23 MMOL/L (ref 20–33)
CREAT SERPL-MCNC: 2.42 MG/DL (ref 0.5–1.4)
ERYTHROCYTE [DISTWIDTH] IN BLOOD BY AUTOMATED COUNT: 43.8 FL (ref 35.9–50)
GFR SERPLBLD CREATININE-BSD FMLA CKD-EPI: 21 ML/MIN/1.73 M 2
GLUCOSE SERPL-MCNC: 119 MG/DL (ref 65–99)
HCT VFR BLD AUTO: 20.6 % (ref 37–47)
HCT VFR BLD AUTO: 24.4 % (ref 37–47)
HGB BLD-MCNC: 7.1 G/DL (ref 12–16)
HGB BLD-MCNC: 8.1 G/DL (ref 12–16)
INR PPP: 3.2 (ref 0.87–1.13)
MCH RBC QN AUTO: 30.9 PG (ref 27–33)
MCHC RBC AUTO-ENTMCNC: 34.5 G/DL (ref 32.2–35.5)
MCV RBC AUTO: 89.6 FL (ref 81.4–97.8)
PLATELET # BLD AUTO: 167 K/UL (ref 164–446)
PMV BLD AUTO: 9.5 FL (ref 9–12.9)
POTASSIUM SERPL-SCNC: 4.5 MMOL/L (ref 3.6–5.5)
PROTHROMBIN TIME: 33 SEC (ref 12–14.6)
RBC # BLD AUTO: 2.3 M/UL (ref 4.2–5.4)
SODIUM SERPL-SCNC: 129 MMOL/L (ref 135–145)
WBC # BLD AUTO: 8.4 K/UL (ref 4.8–10.8)

## 2024-11-14 PROCEDURE — 85610 PROTHROMBIN TIME: CPT

## 2024-11-14 PROCEDURE — 700102 HCHG RX REV CODE 250 W/ 637 OVERRIDE(OP): Performed by: HOSPITALIST

## 2024-11-14 PROCEDURE — A9270 NON-COVERED ITEM OR SERVICE: HCPCS | Performed by: INTERNAL MEDICINE

## 2024-11-14 PROCEDURE — 85027 COMPLETE CBC AUTOMATED: CPT

## 2024-11-14 PROCEDURE — 700111 HCHG RX REV CODE 636 W/ 250 OVERRIDE (IP): Mod: JZ | Performed by: GENERAL PRACTICE

## 2024-11-14 PROCEDURE — 97530 THERAPEUTIC ACTIVITIES: CPT | Mod: CQ

## 2024-11-14 PROCEDURE — 51798 US URINE CAPACITY MEASURE: CPT

## 2024-11-14 PROCEDURE — 80048 BASIC METABOLIC PNL TOTAL CA: CPT

## 2024-11-14 PROCEDURE — A9270 NON-COVERED ITEM OR SERVICE: HCPCS | Performed by: GENERAL PRACTICE

## 2024-11-14 PROCEDURE — A9270 NON-COVERED ITEM OR SERVICE: HCPCS | Performed by: NURSE PRACTITIONER

## 2024-11-14 PROCEDURE — 99232 SBSQ HOSP IP/OBS MODERATE 35: CPT | Performed by: GENERAL PRACTICE

## 2024-11-14 PROCEDURE — 700102 HCHG RX REV CODE 250 W/ 637 OVERRIDE(OP): Performed by: NURSE PRACTITIONER

## 2024-11-14 PROCEDURE — 85014 HEMATOCRIT: CPT

## 2024-11-14 PROCEDURE — 700101 HCHG RX REV CODE 250: Performed by: GENERAL PRACTICE

## 2024-11-14 PROCEDURE — 36415 COLL VENOUS BLD VENIPUNCTURE: CPT

## 2024-11-14 PROCEDURE — 85018 HEMOGLOBIN: CPT

## 2024-11-14 PROCEDURE — 700102 HCHG RX REV CODE 250 W/ 637 OVERRIDE(OP): Performed by: GENERAL PRACTICE

## 2024-11-14 PROCEDURE — 770001 HCHG ROOM/CARE - MED/SURG/GYN PRIV*

## 2024-11-14 PROCEDURE — 700102 HCHG RX REV CODE 250 W/ 637 OVERRIDE(OP): Performed by: INTERNAL MEDICINE

## 2024-11-14 PROCEDURE — A9270 NON-COVERED ITEM OR SERVICE: HCPCS | Performed by: HOSPITALIST

## 2024-11-14 RX ORDER — METHOCARBAMOL 500 MG/1
500 TABLET, FILM COATED ORAL 4 TIMES DAILY
Status: SHIPPED
Start: 2024-11-14 | End: 2024-11-14

## 2024-11-14 RX ORDER — LIDOCAINE 4 G/G
2 PATCH TOPICAL EVERY 24 HOURS
Status: ON HOLD
Start: 2024-11-14

## 2024-11-14 RX ORDER — METHOCARBAMOL 750 MG/1
750 TABLET, FILM COATED ORAL 4 TIMES DAILY
Status: ON HOLD
Start: 2024-11-14

## 2024-11-14 RX ORDER — METHOCARBAMOL 750 MG/1
750 TABLET, FILM COATED ORAL 4 TIMES DAILY
Status: DISCONTINUED | OUTPATIENT
Start: 2024-11-14 | End: 2024-11-15 | Stop reason: HOSPADM

## 2024-11-14 RX ORDER — WARFARIN SODIUM 2 MG/1
2 TABLET ORAL DAILY
Status: ON HOLD | DISCHARGE
Start: 2024-11-14

## 2024-11-14 RX ADMIN — HYDROMORPHONE HYDROCHLORIDE 1 MG: 1 INJECTION, SOLUTION INTRAMUSCULAR; INTRAVENOUS; SUBCUTANEOUS at 10:59

## 2024-11-14 RX ADMIN — WARFARIN SODIUM 2 MG: 2 TABLET ORAL at 18:26

## 2024-11-14 RX ADMIN — METHOCARBAMOL TABLETS 750 MG: 750 TABLET, COATED ORAL at 21:36

## 2024-11-14 RX ADMIN — OXYCODONE HYDROCHLORIDE 15 MG: 15 TABLET ORAL at 02:09

## 2024-11-14 RX ADMIN — OXYCODONE HYDROCHLORIDE 10 MG: 10 TABLET ORAL at 08:14

## 2024-11-14 RX ADMIN — METHOCARBAMOL TABLETS 750 MG: 750 TABLET, COATED ORAL at 18:25

## 2024-11-14 RX ADMIN — ACETAMINOPHEN 1000 MG: 500 TABLET ORAL at 05:59

## 2024-11-14 RX ADMIN — TIMOLOL MALEATE 1 DROP: 5 SOLUTION OPHTHALMIC at 05:59

## 2024-11-14 RX ADMIN — TIMOLOL MALEATE 1 DROP: 5 SOLUTION OPHTHALMIC at 18:27

## 2024-11-14 RX ADMIN — SENNOSIDES AND DOCUSATE SODIUM 2 TABLET: 50; 8.6 TABLET ORAL at 18:25

## 2024-11-14 RX ADMIN — SENNOSIDES AND DOCUSATE SODIUM 2 TABLET: 50; 8.6 TABLET ORAL at 05:59

## 2024-11-14 RX ADMIN — DOXAZOSIN 8 MG: 2 TABLET ORAL at 21:36

## 2024-11-14 RX ADMIN — LEVOTHYROXINE SODIUM 125 MCG: 0.12 TABLET ORAL at 05:59

## 2024-11-14 RX ADMIN — OXYCODONE HYDROCHLORIDE 10 MG: 10 TABLET ORAL at 18:26

## 2024-11-14 RX ADMIN — METHOCARBAMOL 500 MG: 500 TABLET ORAL at 08:15

## 2024-11-14 RX ADMIN — LIDOCAINE 2 PATCH: 4 PATCH TOPICAL at 10:58

## 2024-11-14 RX ADMIN — METHOCARBAMOL TABLETS 750 MG: 750 TABLET, COATED ORAL at 14:08

## 2024-11-14 ASSESSMENT — ENCOUNTER SYMPTOMS: BRUISES/BLEEDS EASILY: 1

## 2024-11-14 ASSESSMENT — PAIN DESCRIPTION - PAIN TYPE
TYPE: ACUTE PAIN;SURGICAL PAIN
TYPE: ACUTE PAIN;SURGICAL PAIN
TYPE: ACUTE PAIN
TYPE: ACUTE PAIN
TYPE: ACUTE PAIN;SURGICAL PAIN

## 2024-11-14 ASSESSMENT — COGNITIVE AND FUNCTIONAL STATUS - GENERAL
MOVING FROM LYING ON BACK TO SITTING ON SIDE OF FLAT BED: A LOT
MOBILITY SCORE: 10
STANDING UP FROM CHAIR USING ARMS: TOTAL
WALKING IN HOSPITAL ROOM: TOTAL
TURNING FROM BACK TO SIDE WHILE IN FLAT BAD: A LITTLE
SUGGESTED CMS G CODE MODIFIER MOBILITY: CL
MOVING TO AND FROM BED TO CHAIR: A LOT
CLIMB 3 TO 5 STEPS WITH RAILING: TOTAL

## 2024-11-14 ASSESSMENT — GAIT ASSESSMENTS: GAIT LEVEL OF ASSIST: UNABLE TO PARTICIPATE

## 2024-11-14 NOTE — PROGRESS NOTES
4 Eyes Skin Assessment Completed by Iris BOYKIN RN and Jamila ROD RN.    Head WDL  Ears WDL  Nose redness  Mouth WDL  Neck WDL  Breast/Chest Redness  Shoulder Blades WDL  Spine WDL  (R) Arm/Elbow/Hand Redness and Bruising  (L) Arm/Elbow/Hand Redness and Bruising  Abdomen WDL  Groin WDL  Scrotum/Coccyx/Buttocks Excoriation, Redness, Tear  (R) Leg Redness, Bruising, and Swelling  (L) Leg Redness, Bruising, and Swelling  (R) Heel/Foot/Toe Redness and Blanching  (L) Heel/Foot/Toe Redness and Blanching    Devices In Places Pulse Ox and Bull    Interventions In Place Gray Ear Foams, Heel Mepilex, TAP System, Pillows, and Low Air Loss Mattress    Possible Skin Injury Yes    Pictures Uploaded Into Epic Yes  Wound Consult Placed Already placed  RN Wound Prevention Protocol Ordered Yes

## 2024-11-14 NOTE — CARE PLAN
The patient is Stable - Low risk of patient condition declining or worsening    Shift Goals  Clinical Goals: pain management, skin integrity, safety  Patient Goals: pain control, comfort  Family Goals: not present    Progress made toward(s) clinical / shift goals:    Problem: Mobility  Goal: Patient's capacity to carry out activities will improve  Outcome: Progressing  Flowsheets (Taken 11/11/2024 1714)  Mobility:   Encouraged mobilization per interdisciplinary team recommendations   Monitored for signs of activity intolerance   Provided assistive devices   Provided rest periods between activities   Administered pain management to allow progressive mobilization   Collaborated with PT/OT     Problem: Infection - Standard  Goal: Patient will remain free from infection  Outcome: Progressing  Flowsheets (Taken 11/11/2024 1714)  Standard Infection Interventions:   Assessed for signs and symptoms of infection   Instructed patient/family on signs and symptoms of infection   Provided education on proper hand hygiene and infection prevention measures     Problem: Wound/ / Incision Healing  Goal: Patient's wound/surgical incision will decrease in size and heals properly  Outcome: Progressing  Note: Dressing is clean, dry and intact         Patient is not progressing towards the following goals:

## 2024-11-14 NOTE — PROGRESS NOTES
Virtual Nurse rounding complete.    Round Needs: Pain Rating 7/10 and Notified of Patient Needs: RN

## 2024-11-14 NOTE — PROGRESS NOTES
Inpatient Anticoagulation Service Note for 11/13/2024      Reason for Anticoagulation: Aortic Mechanical Valve Replacement  , Atrial Fibrillation, Transient Ischemic Attack   [id number] VASc Score: 7  HAS-BLED Score: 5    Hemoglobin Value: (!) 8.1  Hematocrit Value: (!) 23.7  Lab Platelet Value: 166  Target INR: 2.5 to 3.5    INR from last 7 days        Date/Time INR Value    11/13/24 0303 2.56     11/12/24 1254 2.5     11/11/24 2355 2.07     11/11/24 0132 1.79     11/10/24 0233 1.43     11/09/24 0903 1.42     11/08/24 0043 1.91     11/07/24 1449 2.57                   Dose from last 7 days        Date/Time Dose (mg)    11/13/24 1730 2     11/12/24 1507 2     11/11/24 1332 6     11/10/24 1340 6     11/09/24 1503 6                   Average Dose (mg):  (Home dosing: warfarin 6 mg Monday/Wednesday/Friday, 4 mg all other days)  Significant Interactions: Not Applicable  Bridge Therapy: No    Reversal Agent Administered: Vitamin K  Intravenous  Comments: INR therapeutic today at 2.56. H/H stable. Renal fx improved compared to yesterday. No significant DDI noted. Will continue to dose Warfarin 2 mg tomorrow and re-evaluate INR.    Plan:  Warfarin 2 mg   Education Material Provided?: No    Pharmacist suggested discharge dosing: Pending INR, will re-evaluate tomorrow with repeat H/H.      Ana Sauceda, EmilyD

## 2024-11-14 NOTE — CARE PLAN
The patient is Stable - Low risk of patient condition declining or worsening    Shift Goals  Clinical Goals: pain management, skin integrity, safety  Patient Goals: pain control, comfort  Family Goals: not present    Problem: Pain - Standard  Goal: Alleviation of pain or a reduction in pain to the patient’s comfort goal  Description: Target End Date:  Prior to discharge or change in level of care  1.  Document pain using the appropriate pain scale per order or unit policy  2.  Educate and implement non-pharmacologic comfort measures (i.e. relaxation, distraction, massage, cold/heat therapy, etc.)  3.  Pain management medications as ordered  4.  Reassess pain after pain med administration per policy  5.  If opiods administered assess patient's response to pain medication is appropriate per POSS sedation scale  Outcome: Progressing     Problem: Knowledge Deficit - Standard  Goal: Patient and family/care givers will demonstrate understanding of plan of care, disease process/condition, diagnostic tests and medications  Description: Target End Date:  1-3 days or as soon as patient condition allows  1.  Patient and family/caregiver oriented to unit, equipment, visitation policy and means for communicating concern  2.  Complete/review Learning Assessment  3.  Assess knowledge level of disease process/condition, treatment plan, diagnostic tests and medications  4.  Explain disease process/condition, treatment plan, diagnostic tests and medications  Outcome: Progressing     Problem: Skin Integrity  Goal: Skin integrity is maintained or improved  Description: Target End Date:  Prior to discharge or change in level of care  1.  Assess and monitor skin integrity, appearance and/or temperature  2.  Assess risk factors for impaired skin integrity and/or pressures ulcers  3.  Implement precautions to protect skin integrity in collaboration with interdisciplinary team  4.  Implement pressure ulcer prevention protocol if at risk for  skin breakdown  5.  Confirm wound care consult if at risk for skin breakdown  6.  Ensure patient use of pressure relieving devices  (Low air loss bed, waffle overlay, heel protectors, ROHO cushion, etc)  Outcome: Progressing

## 2024-11-14 NOTE — DISCHARGE PLANNING
"[facility] TCN chart review completed. As prior, no new TCN needs identified at this time. Current discharge considerations are for post-acute placement when medically cleared.  Per chart review, patient choice for SNF is 1) [facility]- accepted*.  Prior TCN obtained S[facility] authorization for [facility] and CM was notified. Note per CM note from 11/13, \"anticipating discharge by Friday, 11/15/2024, with possibility of discharge tomorrow, 11/14/2024\".     TCN will continue to follow and collaborate with discharge planning team as additional post acute needs arise. Thank you.      Completed:  PT/OT continue to recommend post acute placement on 11/13  Choice obtained: none; SNF 1) [facility]- accepted and has SCP auth in place.     Pt aware of [facility] blanket referral policy; note that pt has been accepted to all [facility] contracted [facility] SNF    *update: noted in PM, per CM note from today,  \"Leigh with [facility] states patient will have bed available at their facility tomorrow if medically cleared and hopes for patient at 1200\"  "

## 2024-11-14 NOTE — PROGRESS NOTES
Hospital Medicine Daily Progress Note    Date of Service  11/14/2024    Chief Complaint  Yenifer Beasley is a 74 y.o. female admitted 11/7/2024 with GLF    Hospital Course  This is a 74-year-old female with past medical history of paroxysmal atrial fibrillation, mechanical aortic valve replacement on Coumadin,  hypertension, CKD stage IIIb hypothyroidism, glaucoma who was admitted on 11/7/2024 after sustaining a ground-level fall.    CT imaging revealed a right femoral lateral condyle fracture.  Orthopedic surgery consulted, once INR was less than 1.5, patient underwent internal fixation of the right lateral condyle distal femoral fracture on 11/8.  Recommendations for toe-touch weightbearing in right lower extremity, can perform knee range of motion as tolerated and will not require any bracing.  Patient restarted on Coumadin 24 hours postoperatively.  She is to follow-up with orthopedic surgery in about 2 to 3 weeks.    Patient noted to have significant bleeding postoperatively, initially on heparin gtt. and Coumadin until INR is therapeutic.  Patient required 1 unit PRBCs.    Interval Problem Update  Right femoral neck fracture status post repair.    Atrial fibrillation, aortic valve replacement - goal INR 2.5-3.5 - now therapeutic.     No further bleeding at the surgical site.  Hemoglobin 7.1 this morning.  Continue with serial H&H, may require transfusion.    Patient had bowel movement 11/13.    Pain better controlled with muscle relaxers, increased dose, continue with as needed pain medication.  Patient is intolerant to gabapentin.    Patient's sisters at bedside, updated on plan of care, all questions answered.    I have discussed this patient's plan of care and discharge plan at IDT rounds today with Case Management, Nursing, Nursing leadership, and other members of the IDT team.    Consultants/Specialty  orthopedics    Code Status  Full Code    Disposition  Patient is not medically clear.    I have  placed the appropriate orders for post-discharge needs.    Review of Systems  Review of Systems   Endo/Heme/Allergies:  Bruises/bleeds easily.   All other systems reviewed and are negative.       Physical Exam  Temp:  [36 °C (96.8 °F)-37 °C (98.6 °F)] 36.5 °C (97.7 °F)  Pulse:  [57-64] 60  Resp:  [16-17] 16  BP: ()/(39-65) 127/45  SpO2:  [90 %-100 %] 100 %    Physical Exam  Vitals and nursing note reviewed.   Constitutional:       General: She is not in acute distress.     Appearance: Normal appearance.   HENT:      Head: Normocephalic and atraumatic.      Mouth/Throat:      Mouth: Mucous membranes are moist.      Pharynx: No oropharyngeal exudate.   Eyes:      Extraocular Movements: Extraocular movements intact.      Pupils: Pupils are equal, round, and reactive to light.   Cardiovascular:      Rate and Rhythm: Normal rate and regular rhythm.      Pulses: Normal pulses.      Heart sounds: No murmur heard.     No friction rub. No gallop.   Pulmonary:      Effort: Pulmonary effort is normal. No respiratory distress.      Breath sounds: No wheezing, rhonchi or rales.   Abdominal:      General: Bowel sounds are normal. There is no distension.      Palpations: Abdomen is soft. There is no mass.      Tenderness: There is no abdominal tenderness.   Musculoskeletal:         General: No swelling or tenderness. Normal range of motion.      Cervical back: Normal range of motion. No rigidity. No muscular tenderness.      Right lower leg: No edema.      Left lower leg: No edema.      Comments: Surgical site intact, with copious amounts of bleeding, packing and Ace bandage in place.   Skin:     General: Skin is warm and dry.      Capillary Refill: Capillary refill takes less than 2 seconds.      Findings: No erythema or rash.   Neurological:      General: No focal deficit present.      Mental Status: She is alert and oriented to person, place, and time.      Motor: No weakness.      Gait: Gait normal.          Fluids    Intake/Output Summary (Last 24 hours) at 11/14/2024 1304  Last data filed at 11/14/2024 0354  Gross per 24 hour   Intake --   Output 550 ml   Net -550 ml        Laboratory  Recent Labs     11/11/24  2355 11/12/24  0838 11/12/24  2114 11/13/24  0303 11/14/24  0523   WBC 6.7  --   --  10.7 8.4   RBC 2.77*  --   --  2.63* 2.30*   HEMOGLOBIN 8.2*   < > 8.3* 8.1* 7.1*   HEMATOCRIT 25.6*   < > 23.9* 23.7* 20.6*   MCV 92.4  --   --  90.1 89.6   MCH 29.6  --   --  30.8 30.9   MCHC 32.0*  --   --  34.2 34.5   RDW 43.8  --   --  43.0 43.8   PLATELETCT 168  --   --  166 167   MPV 10.1  --   --  9.8 9.5    < > = values in this interval not displayed.     Recent Labs     11/11/24 2355 11/13/24  0303 11/14/24  0523   SODIUM 137 133* 129*   POTASSIUM 4.3 4.3 4.5   CHLORIDE 104 102 98   CO2 26 22 23   GLUCOSE 139* 132* 119*   BUN 49* 57* 65*   CREATININE 2.13* 1.88* 2.42*   CALCIUM 8.4* 8.5 8.3*     Recent Labs     11/12/24  1254 11/13/24  0303 11/14/24  0523   INR 2.50* 2.56* 3.20*               Imaging  DX-KNEE 3 VIEWS LEFT   Final Result   Impression:      1. Left knee joint effusion. No fracture evident.      2. Prominent tricompartmental degenerative changes.      3. Surgical clips in the medial soft tissues.                     DX-PORTABLE FLUORO > 1 HOUR   Final Result      Portable fluoroscopy utilized for 1 minute 6 seconds.         INTERPRETING LOCATION: [address] [facility], [address]      DX-FEMUR-2+ RIGHT   Final Result      Digitized intraoperative radiograph is submitted for review. This examination is not for diagnostic purpose but for guidance during a surgical procedure. Please see the patient's chart for full procedural details.         INTERPRETING LOCATION: [address] [facility], [address]      CT-KNEE W/O PLUS RECONS RIGHT   Final Result      1.  Lateral femoral condyle intra-articular fracture      2.  Associated joint effusion      3.  No other fracture      4.  osteoarthritis the medial  femorotibial and patellofemoral compartments      5.  Small vessel atherosclerotic plaque      DX-CHEST-LIMITED (1 VIEW)   Final Result      1.  Persistently enlarged cardiac silhouette   2.  Atherosclerosis   3.  Increased BILATERAL pulmonary opacities, favor pulmonary edema      DX-FEMUR-2+ RIGHT   Final Result      1.  Lateral femoral condylar fracture redemonstrated with a large knee joint effusion   2.  No additional fracture   3.  Osteoarthritis      DX-KNEE 2- RIGHT   Final Result      Acute mildly displaced intra-articular fracture of the lateral femoral condyle.           Assessment/Plan  * Nondisplaced unspecified condyle fracture of lower end of right femur, initial encounter for closed fracture (HCC)- (present on admission)  Assessment & Plan  CT imaging revealed a right femoral lateral condyle fracture.  Orthopedic surgery consulted, once INR was less than 1.5, patient underwent internal fixation of the right lateral condyle distal femoral fracture on 11/8.  Recommendations for toe-touch weightbearing in right lower extremity, can perform knee range of motion as tolerated and will not require any bracing.  Patient restarted on Coumadin 24 hours postoperatively.  She is to follow-up with orthopedic surgery in about 2 to 3 weeks.    Acute blood loss anemia  Assessment & Plan  Hemoglobin of 7 on 11/2  1 unit of PRBCs transfused  INR therapeutic since 11/12  No further bleeding at the surgical site.  Hemoglobin 7.1 this morning.  Continue with serial H&H, may require transfusion.      Paroxysmal atrial fibrillation (HCC)- (present on admission)  Assessment & Plan  Currently in sinus  Resume anticoagulation     Hx of mechanical aortic valve replacement [V43.3]- (present on admission)  Assessment & Plan  Patient received vitamin K on admission  Reviewed outpatient records she has a history of mechanical aortic valve  Patient started on anticoagulation postoperatively after discussion with orthopedics   goal  INR 2.5-3.5 INR today 2.5.  Heparin gtt discontinued. Continue with Coumadin.    Chronic kidney disease (CKD) stage G3b/A1, moderately decreased glomerular filtration rate (GFR) between 30-44 mL/min/1.73 square meter and albuminuria creatinine ratio less than 30 mg/g- (present on admission)  Assessment & Plan  Monitor renal function electrolytes  Avoid sedating agents  Hold diuretics today and evaluate volume status daily    Intractable pain  Assessment & Plan  Patient reports her pain is not controlled, discontinued long-acting pain medication as patient noted to have encephalopathy.  Started patient on muscle relaxers, scheduled Tylenol, patient does not tolerate gabapentin did not start this.  Continue with as needed oral opioids.    Hyperglycemia- (present on admission)  Assessment & Plan  Most recent hemoglobin A1c last month 5.6    Class 3 severe obesity due to excess calories with serious comorbidity and body mass index (BMI) of 40.0 to 44.9 in adult (HCC)- (present on admission)  Assessment & Plan  Body mass index is 44.19 kg/m².     Essential hypertension- (present on admission)  Assessment & Plan    Monitor blood pressure and adjust accordingly   as needed labetalol  Hold diuretics and ARB today and monitor renal function    Tricuspid regurgitation- (present on admission)  Assessment & Plan  Reviewed echocardiogram patient with moderate to severe tricuspid regurgitation moderate mitral stenosis and mechanical aortic valve replacement with increased gradients    Given DIAMOND will hold torsemide and Micardis today and monitor volume status  Repeat chemistry panel ordered      Hypothyroidism- (present on admission)  Assessment & Plan  Continue home Synthroid at 125 mcg daily  Most recent TSH was approximately 1 month ago was normal at 3.95         VTE prophylaxis: Coumadin    I have performed a physical exam and reviewed and updated ROS and Plan today (11/14/2024). In review of yesterday's note (11/13/2024),  there are no changes except as documented above.

## 2024-11-14 NOTE — THERAPY
"Physical Therapy   Daily Treatment     Patient Name: Yenifer Beasley  Age:  74 y.o., Sex:  female  Medical Record #: [id number]  Today's Date: 11/14/2024     Precautions  Precautions: Fall Risk;Toe Touch Weight Bearing Right Lower Extremity  Comments: R knee ROMAT, L knee brace    Assessment    Pt greeted and seen for PT treatment. Pt req'd MaxA x2 person for STS x3 w/ physical assist, she was unable to maintain her WB prx. She was able to do a lateral scoot transfer EOB>chair w/ a drop arm w/ Nieves/ModA. Pt currently limited by impaired balance 2/2 pain and WB status, decreased strength and decreased activity tolerance which negatively impacts functional mobility. Pt will continue to benefit from skilled PT to address deficits.       Plan    Treatment Plan Status: Continue Current Treatment Plan  Type of Treatment: Bed Mobility, Family / Caregiver Training, Gait Training, Neuro Re-Education / Balance, Therapeutic Activities, Therapeutic Exercise, Stair Training, Equipment  Treatment Frequency: 5 Times per Week  Treatment Duration: Until Therapy Goals Met    DC Equipment Recommendations: Unable to determine at this time  Discharge Recommendations: Recommend post-acute placement for additional physical therapy services prior to discharge home      Subjective  \"It feels good to sit up!\"       11/14/24 1121   Vitals   Pulse Oximetry 94 %   O2 (LPM) 0   O2 Delivery Device Room air w/o2 available   Pain 0 - 10 Group   Therapist Pain Assessment Post Activity Pain Same as Prior to Activity;Nurse Notified  (pt c/o high pain in R hip and L knee (L knee baseline))   Cognition    Level of Consciousness Alert   Comments pleasant/agreeable, limited by pain   Balance   Sitting Balance (Static) Fair   Sitting Balance (Dynamic) Fair -   Standing Balance (Static) Trace   Weight Shift Sitting Fair   Weight Shift Standing Absent   Skilled Intervention Verbal Cuing;Tactile Cuing;Sequencing;Compensatory Strategies;Facilitation "   Comments w/ physical assist   Bed Mobility    Supine to Sit Minimal Assist  (HOB max raised)   Scooting Minimal Assist   Skilled Intervention Verbal Cuing;Sequencing;Compensatory Strategies;Facilitation   Comments Pt needed assist with R LE to bring it off bed.   Gait Analysis   Gait Level Of Assist Unable to Participate   Functional Mobility   Sit to Stand Maximal Assist  (x2 person)   Bed, Chair, Wheelchair Transfer Moderate Assist   Transfer Method Sit Pivot   Mobility STSx3, bed>chair   Skilled Intervention Verbal Cuing;Tactile Cuing;Sequencing;Compensatory Strategies;Facilitation   Comments Pt is unable to maintain her TTWB prx during standing. Therapist james lateral scooting bed>chair w/ drop arm. Pt able to do w/ Min/ModA, good return of VC for ant weight shift and using B UE and L LE for offloading during scooting.   Short Term Goals    Short Term Goal # 1 pt will be able to transfer supine <> sit w/SPV in 6 tx sessions in order to increase level of independence   Goal Outcome # 1 goal not met   Short Term Goal # 2 pt will be able to perform SPT w/ FWW and min assist to BSC or recliner chair in 6 tx sessions in order to increase level of independence   Goal Outcome # 2 Goal not met   Short Term Goal # 3 pt will be able to ambulate 20 ft in 6 tx sessions in order to increase level of independence   Goal Outcome # 3 Goal not met   Supervising Physical Therapist (PTA Treatments Only)   Supervising Physical Therapist Wen Martin

## 2024-11-14 NOTE — DISCHARGE PLANNING
DC Transport Scheduled    Transport Company Scheduled:  [employer]  Spoke with Sarah at [facility] to schedule transport.    Scheduled Date: WILL CALL  Scheduled Time: WILL CALL    Transport Type: [facility]  Destination: [facility] at [address] [place] [address]    Notified care team of scheduled transport via [employer].     If there are any changes needed to the DC transportation scheduled, please contact [facility] at ext. [phone] between the hours of 5087-4273 Mon-Fri. If outside those hours, contact the ED Case Manager at ext. [phone].

## 2024-11-14 NOTE — DISCHARGE PLANNING
Pending Hgb stabilization and continued monitoring of surgical wound for hemorrhage. Attending anticipates medical clearance tomorrow, 11/15/2024.   Accepted with and choice obtained for [facility].   PCS form faxed to [facility] which has placed patient on will call.   Leigh with [facility] states patient will have bed available at their facility tomorrow if medically cleared and hopes for patient at 1200.     Case Management Discharge Planning    Admission Date: 11/7/2024  GMLOS: 4.4  ALOS: 7    6-Clicks ADL Score: 16  6-Clicks Mobility Score: 9  PT and/or OT Eval ordered: Yes  Post-acute Referrals Ordered: Yes  Post-acute Choice Obtained: Yes  Has referral(s) been sent to post-acute provider:  Yes      Anticipated Discharge Dispo: Discharge Disposition: Disch to IP rehab facility or distinct part unit (62)    DME Needed: No    Action(s) Taken: Updated Provider/Nurse on Discharge Plan    Escalations Completed: None    Medically Clear: No    Next Steps: Follow up with provider for medical clearance tomorrow morning, 11/15/2024.     Barriers to Discharge: Medical clearance    Is the patient up for discharge tomorrow: Yes, via [facility] at 1200 if medically cleared (placed on [facility] will call).

## 2024-11-15 ENCOUNTER — PATIENT OUTREACH (OUTPATIENT)
Dept: HEALTH INFORMATION MANAGEMENT | Facility: OTHER | Age: 74
End: 2024-11-15
Payer: MEDICARE

## 2024-11-15 VITALS
TEMPERATURE: 97.4 F | BODY MASS INDEX: 47.32 KG/M2 | RESPIRATION RATE: 15 BRPM | DIASTOLIC BLOOD PRESSURE: 48 MMHG | OXYGEN SATURATION: 99 % | WEIGHT: 241 LBS | HEIGHT: 60 IN | SYSTOLIC BLOOD PRESSURE: 118 MMHG | HEART RATE: 54 BPM

## 2024-11-15 DIAGNOSIS — N18.32 CHRONIC KIDNEY DISEASE (CKD) STAGE G3B/A1, MODERATELY DECREASED GLOMERULAR FILTRATION RATE (GFR) BETWEEN 30-44 ML/MIN/1.73 SQUARE METER AND ALBUMINURIA CREATININE RATIO LESS THAN 30 MG/G: ICD-10-CM

## 2024-11-15 DIAGNOSIS — E78.5 HYPERLIPIDEMIA, UNSPECIFIED HYPERLIPIDEMIA TYPE: ICD-10-CM

## 2024-11-15 DIAGNOSIS — N18.32 TYPE 2 DIABETES MELLITUS WITH STAGE 3B CHRONIC KIDNEY DISEASE, WITHOUT LONG-TERM CURRENT USE OF INSULIN (HCC): ICD-10-CM

## 2024-11-15 DIAGNOSIS — I10 PRIMARY HYPERTENSION: ICD-10-CM

## 2024-11-15 DIAGNOSIS — E11.22 TYPE 2 DIABETES MELLITUS WITH STAGE 3B CHRONIC KIDNEY DISEASE, WITHOUT LONG-TERM CURRENT USE OF INSULIN (HCC): ICD-10-CM

## 2024-11-15 DIAGNOSIS — I25.810 CORONARY ARTERY DISEASE INVOLVING CORONARY BYPASS GRAFT OF NATIVE HEART WITHOUT ANGINA PECTORIS: ICD-10-CM

## 2024-11-15 LAB
ANION GAP SERPL CALC-SCNC: 8 MMOL/L (ref 7–16)
BUN SERPL-MCNC: 74 MG/DL (ref 8–22)
CALCIUM SERPL-MCNC: 8.4 MG/DL (ref 8.5–10.5)
CHLORIDE SERPL-SCNC: 99 MMOL/L (ref 96–112)
CO2 SERPL-SCNC: 22 MMOL/L (ref 20–33)
CREAT SERPL-MCNC: 2.56 MG/DL (ref 0.5–1.4)
ERYTHROCYTE [DISTWIDTH] IN BLOOD BY AUTOMATED COUNT: 43.9 FL (ref 35.9–50)
GFR SERPLBLD CREATININE-BSD FMLA CKD-EPI: 19 ML/MIN/1.73 M 2
GLUCOSE SERPL-MCNC: 125 MG/DL (ref 65–99)
HCT VFR BLD AUTO: 22.6 % (ref 37–47)
HCT VFR BLD AUTO: 22.6 % (ref 37–47)
HGB BLD-MCNC: 7.4 G/DL (ref 12–16)
HGB BLD-MCNC: 7.6 G/DL (ref 12–16)
INR PPP: 2.9 (ref 0.87–1.13)
MAGNESIUM SERPL-MCNC: 2.3 MG/DL (ref 1.5–2.5)
MCH RBC QN AUTO: 30.1 PG (ref 27–33)
MCHC RBC AUTO-ENTMCNC: 32.7 G/DL (ref 32.2–35.5)
MCV RBC AUTO: 91.9 FL (ref 81.4–97.8)
PHOSPHATE SERPL-MCNC: 4.2 MG/DL (ref 2.5–4.5)
PLATELET # BLD AUTO: 186 K/UL (ref 164–446)
PMV BLD AUTO: 9.5 FL (ref 9–12.9)
POTASSIUM SERPL-SCNC: 4.6 MMOL/L (ref 3.6–5.5)
PROTHROMBIN TIME: 30.5 SEC (ref 12–14.6)
RBC # BLD AUTO: 2.46 M/UL (ref 4.2–5.4)
SODIUM SERPL-SCNC: 129 MMOL/L (ref 135–145)
WBC # BLD AUTO: 6.8 K/UL (ref 4.8–10.8)

## 2024-11-15 PROCEDURE — 80048 BASIC METABOLIC PNL TOTAL CA: CPT

## 2024-11-15 PROCEDURE — 36415 COLL VENOUS BLD VENIPUNCTURE: CPT

## 2024-11-15 PROCEDURE — 85610 PROTHROMBIN TIME: CPT

## 2024-11-15 PROCEDURE — 85014 HEMATOCRIT: CPT

## 2024-11-15 PROCEDURE — 700102 HCHG RX REV CODE 250 W/ 637 OVERRIDE(OP): Performed by: GENERAL PRACTICE

## 2024-11-15 PROCEDURE — A9270 NON-COVERED ITEM OR SERVICE: HCPCS | Performed by: INTERNAL MEDICINE

## 2024-11-15 PROCEDURE — 99239 HOSP IP/OBS DSCHRG MGMT >30: CPT | Performed by: GENERAL PRACTICE

## 2024-11-15 PROCEDURE — 85018 HEMOGLOBIN: CPT

## 2024-11-15 PROCEDURE — A9270 NON-COVERED ITEM OR SERVICE: HCPCS | Performed by: GENERAL PRACTICE

## 2024-11-15 PROCEDURE — 700101 HCHG RX REV CODE 250: Performed by: GENERAL PRACTICE

## 2024-11-15 PROCEDURE — 84100 ASSAY OF PHOSPHORUS: CPT

## 2024-11-15 PROCEDURE — 83735 ASSAY OF MAGNESIUM: CPT

## 2024-11-15 PROCEDURE — 85027 COMPLETE CBC AUTOMATED: CPT

## 2024-11-15 PROCEDURE — 700102 HCHG RX REV CODE 250 W/ 637 OVERRIDE(OP): Performed by: INTERNAL MEDICINE

## 2024-11-15 PROCEDURE — 51798 US URINE CAPACITY MEASURE: CPT

## 2024-11-15 RX ADMIN — METHOCARBAMOL TABLETS 750 MG: 750 TABLET, COATED ORAL at 11:52

## 2024-11-15 RX ADMIN — LEVOTHYROXINE SODIUM 125 MCG: 0.12 TABLET ORAL at 04:06

## 2024-11-15 RX ADMIN — TIMOLOL MALEATE 1 DROP: 5 SOLUTION OPHTHALMIC at 04:06

## 2024-11-15 RX ADMIN — OXYCODONE HYDROCHLORIDE 10 MG: 10 TABLET ORAL at 11:07

## 2024-11-15 RX ADMIN — METHOCARBAMOL TABLETS 750 MG: 750 TABLET, COATED ORAL at 08:27

## 2024-11-15 RX ADMIN — OXYCODONE HYDROCHLORIDE 10 MG: 10 TABLET ORAL at 06:04

## 2024-11-15 RX ADMIN — LIDOCAINE 2 PATCH: 4 PATCH TOPICAL at 11:52

## 2024-11-15 RX ADMIN — SENNOSIDES AND DOCUSATE SODIUM 2 TABLET: 50; 8.6 TABLET ORAL at 04:06

## 2024-11-15 ASSESSMENT — PAIN DESCRIPTION - PAIN TYPE
TYPE: ACUTE PAIN
TYPE: ACUTE PAIN

## 2024-11-15 NOTE — DISCHARGE PLANNING
Medically cleared for post-acute placement.   Choice obtained for and accepted/authorized with [facility].   2nd IMM delivered to patient today, 11/15/2024, at [phone] via [employer].   COBRA signed by patient and physician.   Patient agreeable to discharge and will notify family with her cell phone.   DC packet complete with face sheet x2 , discharge summary, 72 hours of chart notes and hard script for narcotics.   Completed DC packet given to bedside RN along with number for RN report to [facility].   Anticipating no further case management needs prior to discharge.     Case Management Discharge Planning    Admission Date: 11/7/2024  GMLOS: 4.4  ALOS: 8    6-Clicks ADL Score: 16  6-Clicks Mobility Score: 10  PT and/or OT Eval ordered: Yes  Post-acute Referrals Ordered: Yes  Post-acute Choice Obtained: Yes  Has referral(s) been sent to post-acute provider:  Yes      Anticipated Discharge Dispo: Discharge Disposition: Disch to IP rehab facility or distinct part unit (62)    DME Needed: No    Action(s) Taken: Updated Provider/Nurse on Discharge Plan, Patient Conference, Acceptance Received, and Transport Arranged     Escalations Completed: None    Medically Clear: Yes    Next Steps: Anticipating no further needs from case management prior to discharge.     Barriers to Discharge: None    Is the patient up for discharge tomorrow: Today, 11/15/2024, at [phone] via [employer].

## 2024-11-15 NOTE — DISCHARGE PLANNING
DC Transport Scheduled    Transport Company Scheduled:  [facility]  Spoke with Daniela at [facility] to schedule transport.      Scheduled Date: 11/15/2024  Scheduled Time: 0409-2538    Transport Type: [facility]  Destination:   [facility]   Destination address: [facility] [place] [address]      Notified care team of scheduled transport via [employer].     If there are any changes needed to the DC transportation scheduled, please contact [facility] at ext. [phone] between the hours of 3869-7800 Mon-Fri. If outside those hours, contact the ED Case Manager at ext. [phone].

## 2024-11-15 NOTE — CARE PLAN
The patient is Stable - Low risk of patient condition declining or worsening    Shift Goals  Clinical Goals: Pt will maintain hemodynamic stability throughout the end of my shift  Patient Goals: Rest  Family Goals: HEATHER    Progress made toward(s) clinical / shift goals:    Problem: Self Care  Goal: Patient will have the ability to perform ADLs independently or with assistance (bathe, groom, dress, toilet and feed)  Outcome: Progressing     Problem: Infection - Standard  Goal: Patient will remain free from infection  11/15/2024 0704 by Lauar Pinon, R.N.  Outcome: Progressing  Flowsheets (Taken 11/11/2024 1714)  Standard Infection Interventions:   Assessed for signs and symptoms of infection   Instructed patient/family on signs and symptoms of infection   Provided education on proper hand hygiene and infection prevention measures  11/15/2024 0703 by Laura Pinon, R.N.  Outcome: Progressing  Flowsheets (Taken 11/11/2024 1714)  Standard Infection Interventions:   Assessed for signs and symptoms of infection   Instructed patient/family on signs and symptoms of infection   Provided education on proper hand hygiene and infection prevention measures     Problem: Wound/ / Incision Healing  Goal: Patient's wound/surgical incision will decrease in size and heals properly  11/15/2024 0704 by Laura Pinon, R.N.  Outcome: Progressing  11/15/2024 0703 by Laura Pinon, R.N.  Outcome: Progressing  Note: Dressing is clean, dry and intact.

## 2024-11-15 NOTE — CARE PLAN
The patient is Stable - Low risk of patient condition declining or worsening    Shift Goals  Clinical Goals: Pt will maintain hemodynamic stability throughout the end of my shift  Patient Goals: Rest  Family Goals: HEATHER    Progress made toward(s) clinical / shift goals:    Problem: Pain - Standard  Goal: Alleviation of pain or a reduction in pain to the patient’s comfort goal  Outcome: Progressing  Note: Pt calls for pain management appropriately      Problem: Knowledge Deficit - Standard  Goal: Patient and family/care givers will demonstrate understanding of plan of care, disease process/condition, diagnostic tests and medications  Outcome: Progressing  Note: Pt verbalizes understanding of POC      Problem: Skin Integrity  Goal: Skin integrity is maintained or improved  Outcome: Progressing  Note: Heel float boots in use, and sacral mepilex in use        Patient is not progressing towards the following goals:

## 2024-11-15 NOTE — DISCHARGE SUMMARY
Discharge Summary    CHIEF COMPLAINT ON ADMISSION  Chief Complaint   Patient presents with    Knee Injury     Pt had slip at [facility].  Denies trauma to knee but c/o R knee pain and swelling       Reason for Admission  EMS    Admission Date  11/7/2024     CODE STATUS  Full Code    HPI & HOSPITAL COURSE  This is a 74-year-old female with past medical history of paroxysmal atrial fibrillation, mechanical aortic valve replacement on Coumadin,  hypertension, CKD stage IIIb hypothyroidism, glaucoma who was admitted on 11/7/2024 after sustaining a ground-level fall.    CT imaging revealed a right femoral lateral condyle fracture.  Orthopedic surgery consulted, once INR was less than 1.5, patient underwent internal fixation of the right lateral condyle distal femoral fracture on 11/8.  Recommendations for toe-touch weightbearing in right lower extremity, can perform knee range of motion as tolerated and will not require any bracing.  Patient restarted on Coumadin 24 hours postoperatively.  She is to follow-up with orthopedic surgery in about 2 to 3 weeks.    Patient noted to have significant bleeding postoperatively, initially on heparin gtt. and Coumadin until INR was therapeutic.  Patient required 1 unit PRBCs.  Hemoglobin remained stabilized and no further bleeding from surgical site.    Therefore, she is discharged in good and stable condition to skilled nursing facility.    The patient met 2-midnight criteria for an inpatient stay at the time of discharge.      FOLLOW UP ITEMS POST DISCHARGE  Primary care physician  Orthopedic surgery    DISCHARGE DIAGNOSES  Principal Problem:    Nondisplaced unspecified condyle fracture of lower end of right femur, initial encounter for closed fracture (HCC) (POA: Yes)  Active Problems:    Hx of mechanical aortic valve replacement [V43.3] (POA: Yes)    Paroxysmal atrial fibrillation (HCC) (POA: Yes)      Overview: Chronic, continues on Amiodarone 200 mg daily, Torsemide 10 mg daily,        Warfarin, Doxazosin 2 mg daily.  She notes that she had a cardioversion       done 2 years ago.  Her Coumadin is followed by anticoagulation clinic.    Acute blood loss anemia (POA: Unknown)    Chronic kidney disease (CKD) stage G3b/A1, moderately decreased glomerular filtration rate (GFR) between 30-44 mL/min/1.73 square meter and albuminuria creatinine ratio less than 30 mg/g (POA: Yes)      Overview: Chronic, discussed increasing her hydration and avoiding nephrotoxic       medications and NSAIDs.  BP is controlled today in clinic 130/70, she has       had a longstanding history of prediabetes however most recent A1c was at       5.3.      GFR (CKD-EPI) >60 mL/min/1.73 m 2 34 Abnormal              Bun 8 - 22 mg/dL 38 High         Creatinine 0.50 - 1.40 mg/dL 1.61 High                  Hypothyroidism (POA: Yes)      Overview: Chronic, continues on Synthroid 125 mcg each morning on an empty stomach.        Denies any symptoms of hyper or hypothyroidism.  We will recheck TSH prior       to her next visit.    Tricuspid regurgitation (POA: Yes)    Essential hypertension (POA: Yes)    Class 3 severe obesity due to excess calories with serious comorbidity and body mass index (BMI) of 40.0 to 44.9 in adult (HCC) (POA: Yes)    Hyperglycemia (POA: Yes)    Intractable pain (POA: Unknown)  Resolved Problems:    * No resolved hospital problems. *      FOLLOW UP  Future Appointments   Date Time Provider Department Center   12/17/2024  1:00 PM STEPH Avalos [facility] None   8/25/2025 10:15 AM [facility] EXAM 10 ECHO [facility]   9/18/2025 11:15 AM STEPHON Burch CARCB None     Jose Vela M.D.  [address] [facility]  [address]  [place] [id number] [phone]    Follow up      Jason Camargo M.D.  [address]  [address]  [place] [id number] [phone]    Follow up        MEDICATIONS ON DISCHARGE     Medication List        START taking these medications        Instructions    lidocaine 4 % Ptch  Commonly known as: Asperflex   Place 2 Patches on the skin every 24 hours.  Dose: 2 Patch     methocarbamol 750 MG Tabs  Commonly known as: Robaxin   Take 1 Tablet by mouth 4 times a day.  Dose: 750 mg     oxyCODONE immediate release 10 MG immediate release tablet  Commonly known as: Roxicodone   Take 1 Tablet by mouth every 6 hours as needed for Severe Pain for up to 6 days.  Dose: 10 mg     polyethylene glycol/lytes Pack  Commonly known as: Miralax   Take 1 Packet by mouth every day.  Dose: 17 g     senna-docusate 8.6-50 MG Tabs  Commonly known as: Pericolace Or Senokot S   Take 2 Tablets by mouth 2 times a day.  Dose: 2 Tablet            CHANGE how you take these medications        Instructions   ezetimibe 10 MG Tabs  What changed:   how much to take  how to take this  when to take this  Commonly known as: Zetia   TAKE 1 TABLET BY MOUTH EVERY DAY     timolol 0.5 % Soln  What changed: how to take this  Commonly known as: Timoptic   Drop 1 Drop in both eyes 2 times a day.  Dose: 1 Drop     warfarin 2 MG Tabs  What changed:   medication strength  how much to take  how to take this  when to take this  additional instructions  Commonly known as: Coumadin   Take 1 Tablet by mouth every day at 6 PM.  Dose: 2 mg            CONTINUE taking these medications        Instructions   doxazosin 8 MG tablet  Commonly known as: Cardura   Take 1 tablet by mouth at bedtime. To lower blood pressure  Dose: 8 mg     levothyroxine 125 MCG Tabs  Commonly known as: Synthroid   Doctor's comments: Please advise to patient over due for her Annual yearly exam with  to continue refilling medications on a regular bases, thank you.  Take 1 Tablet by mouth every morning on an empty stomach.  Dose: 125 mcg     Praluent 150 MG/ML Soaj  Generic drug: Alirocumab   Inject 150 mg under the skin every 14 days.  Dose: 150 mg     vitamin D 1000 Unit (25 mcg) Tabs  Commonly known as: cholecalciferol   Take 1,000 Units  "by mouth every day.  Dose: 1,000 Units            STOP taking these medications      telmisartan 80 MG Tabs  Commonly known as: Micardis     torsemide 20 MG Tabs  Commonly known as: Demadex     traMADol 50 MG Tabs  Commonly known as: Ultram              Allergies  Allergies   Allergen Reactions    Asa [Aspirin]      GI BLEED    Atorvastatin Shortness of Breath    Cymbalta [Duloxetine Hcl] Unspecified     Feels like a \"zombie\"    Hmg-Coa-R Inhibitors     Latex Swelling    Nsaids      GI BLEED    Tricor Shortness of Breath     Breathing problems      Trilipix [Choline Fenofibrate] Shortness of Breath     SOB    Lyrica [Pregabalin]      SPACED OUT       DIET  Orders Placed This Encounter   Procedures    Diet Order Diet: Regular     Standing Status:   Standing     Number of Occurrences:   1     Order Specific Question:   Diet:     Answer:   Regular [1]       ACTIVITY  As tolerated and directed by skilled nursing.  Touch-down weight bearing RIGHT leg    LINES, DRAINS, AND WOUNDS  This is an automated list. Peripheral IVs will be removed prior to discharge.  Peripheral IV 11/10/24 20 G Left;Posterior Wrist (Active)   Site Assessment Clean;Dry;Intact 11/14/24 2000   Dressing Type Transparent 11/14/24 2000   Line Status Scrubbed the hub prior to access;Flushed;Saline locked 11/14/24 2000   Dressing Status Clean;Dry;Intact 11/14/24 2000   Dressing Intervention N/A 11/14/24 2000   Dressing Change Due 11/16/24 11/12/24 0800   Infiltration Grading ([facility], [facility]) 0 11/14/24 2000   Phlebitis Scale ([facility] Only) 0 11/14/24 2000       Moisture Associated Skin Damage 11/13/24 Sacrum (Active)   Wound Image   11/13/24 1100   NEXT Weekly Photo (Inpatient Only) 11/20/24 11/13/24 1100   Drainage Amount None 11/14/24 2000   Drainage Description Serosanguineous 11/13/24 1100   Periwound Assessment Clean;Dry;Pink 11/14/24 2000   IAD Cleansing Foam Cleanser/Washcloth 11/13/24 1100   Periwound Protectant Barrier Paste 11/13/24 1100   IAD " Containment Device None 11/13/24 1100   WOUND NURSE ONLY - Time Spent with Patient (mins) 30 11/13/24 1100       Wound 11/08/24 Incision Leg Right aquacell. soft roll. ace wrap (Active)   Site Assessment HEATHER 11/14/24 2000   Periwound Assessment HEATHER 11/14/24 2000   Margins HEATHER 11/14/24 0742   Closure Staples 11/14/24 0742   Drainage Amount None 11/13/24 0800   Drainage Description HEATHER 11/12/24 2055   Treatments Offloading 11/13/24 2146   Offloading/DME Offloading Boot;Other (comment) 11/13/24 2146   Dressing Status Clean;Dry;Intact 11/14/24 2000   Dressing Changed Observed 11/14/24 2000   Dressing Options Absorbent Abdominal Pad;Hypafix Tape 11/13/24 2146       Peripheral IV 11/10/24 20 G Left;Posterior Wrist (Active)   Site Assessment Clean;Dry;Intact 11/14/24 2000   Dressing Type Transparent 11/14/24 2000   Line Status Scrubbed the hub prior to access;Flushed;Saline locked 11/14/24 2000   Dressing Status Clean;Dry;Intact 11/14/24 2000   Dressing Intervention N/A 11/14/24 2000   Dressing Change Due 11/16/24 11/12/24 0800   Infiltration Grading ([facility], [facility]) 0 11/14/24 2000   Phlebitis Scale ([facility] Only) 0 11/14/24 2000               MENTAL STATUS ON TRANSFER             CONSULTATIONS  Ortho    PROCEDURES  internal fixation of the right lateral condyle distal femoral fracture on 11/8     LABORATORY  Lab Results   Component Value Date    SODIUM 129 (L) 11/14/2024    POTASSIUM 4.5 11/14/2024    CHLORIDE 98 11/14/2024    CO2 23 11/14/2024    GLUCOSE 119 (H) 11/14/2024    BUN 65 (H) 11/14/2024    CREATININE 2.42 (H) 11/14/2024    CREATININE 1.6 (H) 11/26/2007        Lab Results   Component Value Date    WBC 8.4 11/14/2024    HEMOGLOBIN 8.1 (L) 11/14/2024    HEMATOCRIT 24.4 (L) 11/14/2024    PLATELETCT 167 11/14/2024      DX-KNEE 3 VIEWS LEFT   Final Result   Impression:      1. Left knee joint effusion. No fracture evident.      2. Prominent tricompartmental degenerative changes.      3. Surgical clips in the medial  soft tissues.                     DX-PORTABLE FLUORO > 1 HOUR   Final Result      Portable fluoroscopy utilized for 1 minute 6 seconds.         INTERPRETING LOCATION: [address], [place], [address]      DX-FEMUR-2+ RIGHT   Final Result      Digitized intraoperative radiograph is submitted for review. This examination is not for diagnostic purpose but for guidance during a surgical procedure. Please see the patient's chart for full procedural details.         INTERPRETING LOCATION: [address], [place], [address]      CT-KNEE W/O PLUS RECONS RIGHT   Final Result      1.  Lateral femoral condyle intra-articular fracture      2.  Associated joint effusion      3.  No other fracture      4.  osteoarthritis the medial femorotibial and patellofemoral compartments      5.  Small vessel atherosclerotic plaque      DX-CHEST-LIMITED (1 VIEW)   Final Result      1.  Persistently enlarged cardiac silhouette   2.  Atherosclerosis   3.  Increased BILATERAL pulmonary opacities, favor pulmonary edema      DX-FEMUR-2+ RIGHT   Final Result      1.  Lateral femoral condylar fracture redemonstrated with a large knee joint effusion   2.  No additional fracture   3.  Osteoarthritis      DX-KNEE 2- RIGHT   Final Result      Acute mildly displaced intra-articular fracture of the lateral femoral condyle.         Total time of the discharge process exceeds 45 minutes.

## 2024-11-15 NOTE — PROGRESS NOTES
Inpatient Anticoagulation Service Note for 11/14/2024      Reason for Anticoagulation: Aortic Mechanical Valve Replacement  , Atrial Fibrillation, Transient Ischemic Attack   [id number] VASc Score: 7  HAS-BLED Score: 5    Hemoglobin Value: (!) 8.1  Hematocrit Value: (!) 24.4  Lab Platelet Value: 167  Target INR: 2.5 to 3.5    INR from last 7 days        Date/Time INR Value    11/14/24 0523 3.2     11/13/24 0303 2.56     11/12/24 1254 2.5     11/11/24 2355 2.07     11/11/24 0132 1.79     11/10/24 0233 1.43     11/09/24 0903 1.42     11/08/24 0043 1.91                   Dose from last 7 days        Date/Time Dose (mg)    11/14/24 1730 2     11/13/24 1730 2     11/12/24 1507 2     11/11/24 1332 6     11/10/24 1340 6     11/09/24 1503 6                   Average Dose (mg):  (Home dosing: warfarin 6 mg Monday/Wednesday/Friday, 4 mg all other days)  Significant Interactions: Not Applicable  Bridge Therapy: No    Reversal Agent Administered: Vitamin K  Intravenous 11/7  Comments: INR therapeutic today at 3.2. Noted large INR increase from 2.56 to 3.2. Likely now seeing effects from larger 6 mg doses. H/H remains stable. Noted labile renal fx. No significant DDI. Will dose Warfarin 2 mg tonight and re-evalulate INR tomorrow.    Plan:  Warfarin 2 mg  Education Material Provided?: No    Pharmacist suggested discharge dosing: Pending INR, likely able to discharge home on Warfarin 2 mg daily with close outpatient follow up.       Ana Sauceda, PharmD

## 2024-11-15 NOTE — DISCHARGE PLANNING
DC Transport Scheduled    Transport Company Scheduled:  [employer]  [place] [facility] [place]    Scheduled Date: 11/15/2024  Scheduled Time: 1230    Transport Type: [facility]  [facility]    Notified care team of scheduled transport via [employer].     If there are any changes needed to the DC transportation scheduled, please contact [facility] at ext. [phone] between the hours of [phone] Mon-Fri. If outside those hours, contact the ED Case Manager at ext. [phone].

## 2024-11-15 NOTE — PROGRESS NOTES
"      Orthopaedic Progress Note    Interval changes:  Patient doing well   RLE dressings CDI  Cleared for DC to SNF by ortho pending medicine clearance    ROS - Patient denies any new issues.  Pain well controlled.    /48   Pulse (!) 54   Temp 36.3 °C (97.4 °F) (Temporal)   Resp 15   Ht 1.527 m (5' 0.1\")   Wt 109 kg (241 lb)   SpO2 99%     Patient seen and examined  No acute distress  Breathing non labored  RRR  RLE dressings CDI. Patient clearly fires tibialis anterior, EHL, and gastrocnemius/soleus. Sensation is intact to light touch throughout superficial peroneal, deep peroneal, tibial, saphenous, and sural nerve distributions. Strong and palpable 2+ dorsalis pedis and posterior tibial pulses with capillary refill less than 2 seconds. No lower leg tenderness or discomfort.     Recent Labs     11/13/24  0303 11/14/24  0523 11/14/24  1250 11/15/24  0646 11/15/24  1043   WBC 10.7 8.4  --  6.8  --    RBC 2.63* 2.30*  --  2.46*  --    HEMOGLOBIN 8.1* 7.1* 8.1* 7.4* 7.6*   HEMATOCRIT 23.7* 20.6* 24.4* 22.6* 22.6*   MCV 90.1 89.6  --  91.9  --    MCH 30.8 30.9  --  30.1  --    MCHC 34.2 34.5  --  32.7  --    RDW 43.0 43.8  --  43.9  --    PLATELETCT 166 167  --  186  --    MPV 9.8 9.5  --  9.5  --        Active Hospital Problems    Diagnosis     Class 3 severe obesity due to excess calories with serious comorbidity and body mass index (BMI) of 40.0 to 44.9 in adult ([facility]) [E66.813, E66.01, Z68.41]      Priority: Medium    Chronic kidney disease (CKD) stage G3b/A1, moderately decreased glomerular filtration rate (GFR) between 30-44 mL/min/1.73 square meter and albuminuria creatinine ratio less than 30 mg/g [N18.32]      Priority: Medium     Chronic, discussed increasing her hydration and avoiding nephrotoxic medications and NSAIDs.  BP is controlled today in clinic 130/70, she has had a longstanding history of prediabetes however most recent A1c was at 5.3.  GFR (CKD-EPI) >60 mL/min/1.73 m 2 34 Abnormal  "     Bun 8 - 22 mg/dL 38 High     Creatinine 0.50 - 1.40 mg/dL 1.61 High            Paroxysmal atrial fibrillation (HCC) [I48.0]      Priority: Medium     Chronic, continues on Amiodarone 200 mg daily, Torsemide 10 mg daily, Warfarin, Doxazosin 2 mg daily.  She notes that she had a cardioversion done 2 years ago.  Her Coumadin is followed by anticoagulation clinic.      Hx of mechanical aortic valve replacement [V43.3] [Z95.2]      Priority: Medium    Essential hypertension [I10]      Priority: Medium    Tricuspid regurgitation [I07.1]      Priority: Medium    Hypothyroidism [E03.9]      Priority: Low     Chronic, continues on Synthroid 125 mcg each morning on an empty stomach.  Denies any symptoms of hyper or hypothyroidism.  We will recheck TSH prior to her next visit.      Intractable pain [R52]     Acute blood loss anemia [D62]     Nondisplaced unspecified condyle fracture of lower end of right femur, initial encounter for closed fracture ([facility]) [S72.414A]     Hyperglycemia [R73.9]        Assessment/Plan:  Patient doing well   RLE dressings CDI  Cleared for DC to SNF by ortho pending medicine clearance  POD#7 S/P Open treatment with internal fixation of right lateral condyle distal femur fracture.  Wt bearing status - TTWB RLE  Wound care/Drains - Dressings to be changed every other day by nursing. Or PRN for saturation starting POD#2  Future Procedures - none planned   Sutures/Staples out- 14-21 days post operatively. Removal will completed by ortho mid levels only.  PT/OT-initiated  Antibiotics: Perioperative completed  DVT Prophylaxis- TEDS/SCDs/Foot pumps  Bull-not needed per ortho  Case Coordination for Discharge Planning - Disposition per therapy recs.

## 2024-11-15 NOTE — DISCHARGE PLANNING
"HTH/SCP TCN chart review completed. As prior, no new TCN needs identified at this time. Current discharge considerations are anticipated for [facility] to [facility].  Prior TCN obtained S[facility] authorization for [facility] and noted pt with \"will call\" transport status for anticipated dc today to SNF.     TCN will continue to follow and collaborate with discharge planning team as additional post acute needs arise. Thank you.      Completed:  PT/OT continue to recommend post acute placement on 11/14 and 11/13  Choice obtained: none; SNF 1) [facility]- accepted and has SCP auth in place    Pt aware of [facility]'s blanket referral policy; note that pt has been accepted to all [facility] contracted [facility]  "

## 2024-11-18 ENCOUNTER — TELEPHONE (OUTPATIENT)
Dept: VASCULAR LAB | Facility: MEDICAL CENTER | Age: 74
End: 2024-11-18
Payer: MEDICARE

## 2024-11-18 NOTE — TELEPHONE ENCOUNTER
Pt admitted to [facility] ([facility] rehab center, Ph: [phone]) - they manage warfarin while admitted to their service.     S/w pharmacy team at [facility] - clarified warfarin indication & previous maintenance regimen.     LVM w/ pt instructing her to inform us when she knows DC date so [facility] can resume INR monitoring.    Chris Randolph, PharmD, BCACP

## 2024-11-18 NOTE — TELEPHONE ENCOUNTER
[facility]    Caller: Nba - Pharmacist at patient's assisted living facility    Topic/issue: Nba is trying to validate the patient's INR goal and needs a list of home medications that the patient needs to take because the information on her discharge summary isn't adding up. Please advise.     Callback Number: [phone]    Thank you,  Juliet PACHECO    Statement Selected

## 2024-11-20 NOTE — PROGRESS NOTES
Called pt today for monthly outreach and she is in a rehab facility. Pt was having difficulty with her speech and stated that she is in so much pain that she is unable to even think and process this to communicate. Pt had gone into the ER and hospital when she slipped on soup on the floor at [facility] and ended up breaking her left femur and doing damage to the left knee as well. Pt was crying when she was talking with me and reporting that the facility will not give her pain medication to relieve her pain. They are giving her some but pt pain level is an 11/10 and the facility states that since they cannot give her more pain medication long-term, they will not give her enough to relieve her pain. She also stated that they were not giving her some of her other medication and she asked them about it and they stated that they are not with [facility]. Pt stated that the facility is angry with her for they feel that she is tattling on them and they even checked her phone. I asked pt if she has anyone who can advocate for her. She stated that her family will not advocate for her. I called [facility] and left my number, and as it was end of day and there was no one to take my call by the time that they closed. I left my name and number as well as RYLAN Conley as I had communicated some of the information to her as I will not be here tomorrow. I will FU on Thursday.

## 2024-11-20 NOTE — TELEPHONE ENCOUNTER
Thanks. Sometimes the pain medication orders are written poorly and are under dosing her. They are only follow physician orders. She can make an appointment with me and we can go over pain med orders.

## 2024-11-21 ENCOUNTER — TELEPHONE (OUTPATIENT)
Dept: HEALTH INFORMATION MANAGEMENT | Facility: OTHER | Age: 74
End: 2024-11-21
Payer: MEDICARE

## 2024-11-23 ENCOUNTER — HOSPITAL ENCOUNTER (INPATIENT)
Facility: MEDICAL CENTER | Age: 74
End: 2024-11-23
Attending: EMERGENCY MEDICINE | Admitting: INTERNAL MEDICINE
Payer: MEDICARE

## 2024-11-23 ENCOUNTER — APPOINTMENT (OUTPATIENT)
Dept: CARDIOLOGY | Facility: MEDICAL CENTER | Age: 74
DRG: 371 | End: 2024-11-23
Attending: INTERNAL MEDICINE
Payer: MEDICARE

## 2024-11-23 ENCOUNTER — APPOINTMENT (OUTPATIENT)
Dept: RADIOLOGY | Facility: MEDICAL CENTER | Age: 74
DRG: 371 | End: 2024-11-23
Attending: EMERGENCY MEDICINE
Payer: MEDICARE

## 2024-11-23 DIAGNOSIS — D64.9 ANEMIA, UNSPECIFIED TYPE: ICD-10-CM

## 2024-11-23 DIAGNOSIS — Z95.2 HX OF MECHANICAL AORTIC VALVE REPLACEMENT: ICD-10-CM

## 2024-11-23 DIAGNOSIS — R57.9 SHOCK (HCC): ICD-10-CM

## 2024-11-23 DIAGNOSIS — Z79.01 CHRONIC ANTICOAGULATION: ICD-10-CM

## 2024-11-23 DIAGNOSIS — M1A.9XX0 CHRONIC GOUT WITHOUT TOPHUS, UNSPECIFIED CAUSE, UNSPECIFIED SITE: ICD-10-CM

## 2024-11-23 DIAGNOSIS — K68.3 RETROPERITONEAL HEMATOMA: ICD-10-CM

## 2024-11-23 DIAGNOSIS — I48.0 PAROXYSMAL ATRIAL FIBRILLATION (HCC): ICD-10-CM

## 2024-11-23 DIAGNOSIS — S72.414A: ICD-10-CM

## 2024-11-23 DIAGNOSIS — E87.1 HYPONATREMIA: ICD-10-CM

## 2024-11-23 DIAGNOSIS — I95.9 HYPOTENSION, UNSPECIFIED HYPOTENSION TYPE: ICD-10-CM

## 2024-11-23 PROBLEM — R58 RETROPERITONEAL HEMORRHAGE: Status: ACTIVE | Noted: 2024-11-23

## 2024-11-23 PROBLEM — R57.8 HEMORRHAGIC SHOCK (HCC): Status: ACTIVE | Noted: 2024-11-23

## 2024-11-23 LAB
ABO GROUP BLD: NORMAL
ALBUMIN SERPL BCP-MCNC: 2.5 G/DL (ref 3.2–4.9)
ALBUMIN/GLOB SERPL: 1.2 G/DL
ALP SERPL-CCNC: 50 U/L (ref 30–99)
ALT SERPL-CCNC: 16 U/L (ref 2–50)
ANION GAP SERPL CALC-SCNC: 12 MMOL/L (ref 7–16)
ANION GAP SERPL CALC-SCNC: 14 MMOL/L (ref 7–16)
ANION GAP SERPL CALC-SCNC: 14 MMOL/L (ref 7–16)
APTT PPP: 54.5 SEC (ref 24.7–36)
AST SERPL-CCNC: 29 U/L (ref 12–45)
BARCODED ABORH UBTYP: 5100
BARCODED ABORH UBTYP: 600
BARCODED ABORH UBTYP: 6200
BARCODED ABORH UBTYP: 6200
BARCODED PRD CODE UBPRD: NORMAL
BARCODED UNIT NUM UBUNT: NORMAL
BASOPHILS # BLD AUTO: 0.2 % (ref 0–1.8)
BASOPHILS # BLD: 0.03 K/UL (ref 0–0.12)
BILIRUB SERPL-MCNC: 1 MG/DL (ref 0.1–1.5)
BLD GP AB SCN SERPL QL: NORMAL
BUN SERPL-MCNC: 49 MG/DL (ref 8–22)
BUN SERPL-MCNC: 52 MG/DL (ref 8–22)
BUN SERPL-MCNC: 53 MG/DL (ref 8–22)
CA-I SERPL-SCNC: 1 MMOL/L (ref 1.1–1.3)
CALCIUM ALBUM COR SERPL-MCNC: 8.7 MG/DL (ref 8.5–10.5)
CALCIUM SERPL-MCNC: 7.3 MG/DL (ref 8.5–10.5)
CALCIUM SERPL-MCNC: 7.5 MG/DL (ref 8.5–10.5)
CALCIUM SERPL-MCNC: 7.5 MG/DL (ref 8.5–10.5)
CFT BLD TEG: 3.8 MIN (ref 4.6–9.1)
CFT P HPASE BLD TEG: 3.2 MIN (ref 4.3–8.3)
CHLORIDE SERPL-SCNC: 96 MMOL/L (ref 96–112)
CHLORIDE SERPL-SCNC: 99 MMOL/L (ref 96–112)
CHLORIDE SERPL-SCNC: 99 MMOL/L (ref 96–112)
CLOT ANGLE BLD TEG: 78.6 DEGREES (ref 63–78)
CLOT LYSIS 30M P MA LENFR BLD TEG: 0 % (ref 0–2.6)
CO2 SERPL-SCNC: 12 MMOL/L (ref 20–33)
CO2 SERPL-SCNC: 14 MMOL/L (ref 20–33)
CO2 SERPL-SCNC: 15 MMOL/L (ref 20–33)
COMPONENT R 8504R: NORMAL
CORTIS SERPL-MCNC: 62.6 UG/DL (ref 0–23)
CREAT SERPL-MCNC: 1.78 MG/DL (ref 0.5–1.4)
CREAT SERPL-MCNC: 2.33 MG/DL (ref 0.5–1.4)
CREAT SERPL-MCNC: 2.47 MG/DL (ref 0.5–1.4)
CRP SERPL HS-MCNC: 5.62 MG/DL (ref 0–0.75)
CT.EXTRINSIC BLD ROTEM: 0.8 MIN (ref 0.8–2.1)
EKG IMPRESSION: NORMAL
EOSINOPHIL # BLD AUTO: 0.01 K/UL (ref 0–0.51)
EOSINOPHIL NFR BLD: 0.1 % (ref 0–6.9)
ERYTHROCYTE [DISTWIDTH] IN BLOOD BY AUTOMATED COUNT: 47.6 FL (ref 35.9–50)
ERYTHROCYTE [DISTWIDTH] IN BLOOD BY AUTOMATED COUNT: 50.4 FL (ref 35.9–50)
ERYTHROCYTE [DISTWIDTH] IN BLOOD BY AUTOMATED COUNT: 51.3 FL (ref 35.9–50)
EST. AVERAGE GLUCOSE BLD GHB EST-MCNC: 105 MG/DL
FLUAV RNA SPEC QL NAA+PROBE: NEGATIVE
FLUBV RNA SPEC QL NAA+PROBE: NEGATIVE
GFR SERPLBLD CREATININE-BSD FMLA CKD-EPI: 20 ML/MIN/1.73 M 2
GFR SERPLBLD CREATININE-BSD FMLA CKD-EPI: 22 ML/MIN/1.73 M 2
GFR SERPLBLD CREATININE-BSD FMLA CKD-EPI: 30 ML/MIN/1.73 M 2
GLOBULIN SER CALC-MCNC: 2.1 G/DL (ref 1.9–3.5)
GLUCOSE BLD STRIP.AUTO-MCNC: 146 MG/DL (ref 65–99)
GLUCOSE BLD STRIP.AUTO-MCNC: 153 MG/DL (ref 65–99)
GLUCOSE BLD STRIP.AUTO-MCNC: 172 MG/DL (ref 65–99)
GLUCOSE BLD STRIP.AUTO-MCNC: 179 MG/DL (ref 65–99)
GLUCOSE SERPL-MCNC: 165 MG/DL (ref 65–99)
GLUCOSE SERPL-MCNC: 180 MG/DL (ref 65–99)
GLUCOSE SERPL-MCNC: 185 MG/DL (ref 65–99)
HBA1C MFR BLD: 5.3 % (ref 4–5.6)
HCT VFR BLD AUTO: 18.9 % (ref 37–47)
HCT VFR BLD AUTO: 23.2 % (ref 37–47)
HCT VFR BLD AUTO: 24.5 % (ref 37–47)
HGB BLD-MCNC: 6.3 G/DL (ref 12–16)
HGB BLD-MCNC: 7.7 G/DL (ref 12–16)
HGB BLD-MCNC: 7.9 G/DL (ref 12–16)
IMM GRANULOCYTES # BLD AUTO: 0.22 K/UL (ref 0–0.11)
IMM GRANULOCYTES NFR BLD AUTO: 1.2 % (ref 0–0.9)
INR PPP: 2.69 (ref 0.87–1.13)
LACTATE SERPL-SCNC: 1.5 MMOL/L (ref 0.5–2)
LACTATE SERPL-SCNC: 1.6 MMOL/L (ref 0.5–2)
LACTATE SERPL-SCNC: 2.1 MMOL/L (ref 0.5–2)
LV EJECT FRACT  99904: 75
LYMPHOCYTES # BLD AUTO: 0.53 K/UL (ref 1–4.8)
LYMPHOCYTES NFR BLD: 2.9 % (ref 22–41)
MAGNESIUM SERPL-MCNC: 2.2 MG/DL (ref 1.5–2.5)
MCF BLD TEG: 69.1 MM (ref 52–69)
MCF.PLATELET INHIB BLD ROTEM: 33 MM (ref 15–32)
MCH RBC QN AUTO: 30.1 PG (ref 27–33)
MCH RBC QN AUTO: 30.4 PG (ref 27–33)
MCH RBC QN AUTO: 32 PG (ref 27–33)
MCHC RBC AUTO-ENTMCNC: 32.2 G/DL (ref 32.2–35.5)
MCHC RBC AUTO-ENTMCNC: 33.2 G/DL (ref 32.2–35.5)
MCHC RBC AUTO-ENTMCNC: 33.3 G/DL (ref 32.2–35.5)
MCV RBC AUTO: 90.6 FL (ref 81.4–97.8)
MCV RBC AUTO: 94.2 FL (ref 81.4–97.8)
MCV RBC AUTO: 95.9 FL (ref 81.4–97.8)
MONOCYTES # BLD AUTO: 0.77 K/UL (ref 0–0.85)
MONOCYTES NFR BLD AUTO: 4.3 % (ref 0–13.4)
NEUTROPHILS # BLD AUTO: 16.53 K/UL (ref 1.82–7.42)
NEUTROPHILS NFR BLD: 91.3 % (ref 44–72)
NRBC # BLD AUTO: 0.02 K/UL
NRBC BLD-RTO: 0.1 /100 WBC (ref 0–0.2)
NT-PROBNP SERPL IA-MCNC: 3698 PG/ML (ref 0–125)
PA AA BLD-ACNC: 8.4 % (ref 0–11)
PA ADP BLD-ACNC: 46.8 % (ref 0–17)
PLATELET # BLD AUTO: 203 K/UL (ref 164–446)
PLATELET # BLD AUTO: 293 K/UL (ref 164–446)
PLATELET # BLD AUTO: 336 K/UL (ref 164–446)
PMV BLD AUTO: 9 FL (ref 9–12.9)
PMV BLD AUTO: 9.1 FL (ref 9–12.9)
PMV BLD AUTO: 9.5 FL (ref 9–12.9)
POTASSIUM SERPL-SCNC: 4.9 MMOL/L (ref 3.6–5.5)
POTASSIUM SERPL-SCNC: 5 MMOL/L (ref 3.6–5.5)
POTASSIUM SERPL-SCNC: 5 MMOL/L (ref 3.6–5.5)
PROCALCITONIN SERPL-MCNC: 0.18 NG/ML
PRODUCT TYPE UPROD: NORMAL
PROT SERPL-MCNC: 4.6 G/DL (ref 6–8.2)
PROTHROMBIN TIME: 28.7 SEC (ref 12–14.6)
RBC # BLD AUTO: 1.97 M/UL (ref 4.2–5.4)
RBC # BLD AUTO: 2.56 M/UL (ref 4.2–5.4)
RBC # BLD AUTO: 2.6 M/UL (ref 4.2–5.4)
RH BLD: NORMAL
RSV RNA SPEC QL NAA+PROBE: NEGATIVE
SARS-COV-2 RNA RESP QL NAA+PROBE: NOTDETECTED
SODIUM SERPL-SCNC: 124 MMOL/L (ref 135–145)
SODIUM SERPL-SCNC: 125 MMOL/L (ref 135–145)
SODIUM SERPL-SCNC: 126 MMOL/L (ref 135–145)
TEG ALGORITHM TGALG: ABNORMAL
TROPONIN T SERPL-MCNC: 43 NG/L (ref 6–19)
TROPONIN T SERPL-MCNC: 55 NG/L (ref 6–19)
TROPONIN T SERPL-MCNC: 65 NG/L (ref 6–19)
TSH SERPL DL<=0.005 MIU/L-ACNC: 4.46 UIU/ML (ref 0.38–5.33)
UNIT STATUS USTAT: NORMAL
WBC # BLD AUTO: 18.1 K/UL (ref 4.8–10.8)
WBC # BLD AUTO: 30.1 K/UL (ref 4.8–10.8)
WBC # BLD AUTO: 35.9 K/UL (ref 4.8–10.8)

## 2024-11-23 PROCEDURE — 86140 C-REACTIVE PROTEIN: CPT

## 2024-11-23 PROCEDURE — 0241U HCHG SARS-COV-2 COVID-19 NFCT DS RESP RNA 4 TRGT ED POC: CPT

## 2024-11-23 PROCEDURE — 71045 X-RAY EXAM CHEST 1 VIEW: CPT

## 2024-11-23 PROCEDURE — P9016 RBC LEUKOCYTES REDUCED: HCPCS | Mod: 91

## 2024-11-23 PROCEDURE — 86923 COMPATIBILITY TEST ELECTRIC: CPT

## 2024-11-23 PROCEDURE — 83880 ASSAY OF NATRIURETIC PEPTIDE: CPT

## 2024-11-23 PROCEDURE — 93306 TTE W/DOPPLER COMPLETE: CPT

## 2024-11-23 PROCEDURE — 93005 ELECTROCARDIOGRAM TRACING: CPT | Performed by: EMERGENCY MEDICINE

## 2024-11-23 PROCEDURE — 84443 ASSAY THYROID STIM HORMONE: CPT

## 2024-11-23 PROCEDURE — 99291 CRITICAL CARE FIRST HOUR: CPT

## 2024-11-23 PROCEDURE — 93005 ELECTROCARDIOGRAM TRACING: CPT

## 2024-11-23 PROCEDURE — 85730 THROMBOPLASTIN TIME PARTIAL: CPT

## 2024-11-23 PROCEDURE — 84484 ASSAY OF TROPONIN QUANT: CPT | Mod: 91

## 2024-11-23 PROCEDURE — 86900 BLOOD TYPING SEROLOGIC ABO: CPT

## 2024-11-23 PROCEDURE — 30233N1 TRANSFUSION OF NONAUTOLOGOUS RED BLOOD CELLS INTO PERIPHERAL VEIN, PERCUTANEOUS APPROACH: ICD-10-PCS | Performed by: EMERGENCY MEDICINE

## 2024-11-23 PROCEDURE — 36415 COLL VENOUS BLD VENIPUNCTURE: CPT

## 2024-11-23 PROCEDURE — 93306 TTE W/DOPPLER COMPLETE: CPT | Mod: 26 | Performed by: INTERNAL MEDICINE

## 2024-11-23 PROCEDURE — 700105 HCHG RX REV CODE 258: Performed by: INTERNAL MEDICINE

## 2024-11-23 PROCEDURE — 700111 HCHG RX REV CODE 636 W/ 250 OVERRIDE (IP): Mod: JZ | Performed by: EMERGENCY MEDICINE

## 2024-11-23 PROCEDURE — 700101 HCHG RX REV CODE 250: Performed by: INTERNAL MEDICINE

## 2024-11-23 PROCEDURE — 36430 TRANSFUSION BLD/BLD COMPNT: CPT

## 2024-11-23 PROCEDURE — 85347 COAGULATION TIME ACTIVATED: CPT

## 2024-11-23 PROCEDURE — 74176 CT ABD & PELVIS W/O CONTRAST: CPT

## 2024-11-23 PROCEDURE — 700105 HCHG RX REV CODE 258: Performed by: EMERGENCY MEDICINE

## 2024-11-23 PROCEDURE — 80053 COMPREHEN METABOLIC PANEL: CPT

## 2024-11-23 PROCEDURE — 86850 RBC ANTIBODY SCREEN: CPT

## 2024-11-23 PROCEDURE — 85610 PROTHROMBIN TIME: CPT

## 2024-11-23 PROCEDURE — 83605 ASSAY OF LACTIC ACID: CPT

## 2024-11-23 PROCEDURE — 85025 COMPLETE CBC W/AUTO DIFF WBC: CPT

## 2024-11-23 PROCEDURE — 82533 TOTAL CORTISOL: CPT

## 2024-11-23 PROCEDURE — 770022 HCHG ROOM/CARE - ICU (200)

## 2024-11-23 PROCEDURE — 96367 TX/PROPH/DG ADDL SEQ IV INF: CPT

## 2024-11-23 PROCEDURE — 700117 HCHG RX CONTRAST REV CODE 255: Performed by: INTERNAL MEDICINE

## 2024-11-23 PROCEDURE — 700102 HCHG RX REV CODE 250 W/ 637 OVERRIDE(OP): Performed by: INTERNAL MEDICINE

## 2024-11-23 PROCEDURE — 83036 HEMOGLOBIN GLYCOSYLATED A1C: CPT

## 2024-11-23 PROCEDURE — 700111 HCHG RX REV CODE 636 W/ 250 OVERRIDE (IP): Mod: JZ | Performed by: INTERNAL MEDICINE

## 2024-11-23 PROCEDURE — 96368 THER/DIAG CONCURRENT INF: CPT

## 2024-11-23 PROCEDURE — 83735 ASSAY OF MAGNESIUM: CPT

## 2024-11-23 PROCEDURE — 87040 BLOOD CULTURE FOR BACTERIA: CPT

## 2024-11-23 PROCEDURE — 80048 BASIC METABOLIC PNL TOTAL CA: CPT

## 2024-11-23 PROCEDURE — 84145 PROCALCITONIN (PCT): CPT

## 2024-11-23 PROCEDURE — 82330 ASSAY OF CALCIUM: CPT

## 2024-11-23 PROCEDURE — 85027 COMPLETE CBC AUTOMATED: CPT | Mod: 91

## 2024-11-23 PROCEDURE — 96365 THER/PROPH/DIAG IV INF INIT: CPT

## 2024-11-23 PROCEDURE — 86901 BLOOD TYPING SEROLOGIC RH(D): CPT

## 2024-11-23 PROCEDURE — 99291 CRITICAL CARE FIRST HOUR: CPT | Performed by: INTERNAL MEDICINE

## 2024-11-23 PROCEDURE — 82962 GLUCOSE BLOOD TEST: CPT | Mod: 91

## 2024-11-23 PROCEDURE — 85576 BLOOD PLATELET AGGREGATION: CPT | Mod: 91

## 2024-11-23 PROCEDURE — A9270 NON-COVERED ITEM OR SERVICE: HCPCS | Performed by: INTERNAL MEDICINE

## 2024-11-23 PROCEDURE — 85384 FIBRINOGEN ACTIVITY: CPT

## 2024-11-23 RX ORDER — DEXTROSE MONOHYDRATE 25 G/50ML
25 INJECTION, SOLUTION INTRAVENOUS
Status: DISCONTINUED | OUTPATIENT
Start: 2024-11-23 | End: 2024-11-26

## 2024-11-23 RX ORDER — POLYETHYLENE GLYCOL 3350 17 G/17G
17 POWDER, FOR SOLUTION ORAL DAILY
Status: DISCONTINUED | OUTPATIENT
Start: 2024-11-23 | End: 2024-11-30

## 2024-11-23 RX ORDER — ONDANSETRON 4 MG/1
4 TABLET, ORALLY DISINTEGRATING ORAL EVERY 4 HOURS PRN
Status: DISCONTINUED | OUTPATIENT
Start: 2024-11-23 | End: 2024-12-10 | Stop reason: HOSPADM

## 2024-11-23 RX ORDER — NOREPINEPHRINE BITARTRATE 0.03 MG/ML
0-1 INJECTION, SOLUTION INTRAVENOUS CONTINUOUS
Status: DISCONTINUED | OUTPATIENT
Start: 2024-11-23 | End: 2024-11-26

## 2024-11-23 RX ORDER — SODIUM CHLORIDE 9 MG/ML
1000 INJECTION, SOLUTION INTRAVENOUS ONCE
Status: COMPLETED | OUTPATIENT
Start: 2024-11-23 | End: 2024-11-23

## 2024-11-23 RX ORDER — EZETIMIBE 10 MG/1
10 TABLET ORAL DAILY
Status: DISCONTINUED | OUTPATIENT
Start: 2024-11-23 | End: 2024-12-10 | Stop reason: HOSPADM

## 2024-11-23 RX ORDER — LEVOTHYROXINE SODIUM 125 UG/1
125 TABLET ORAL
Status: DISCONTINUED | OUTPATIENT
Start: 2024-11-23 | End: 2024-12-10 | Stop reason: HOSPADM

## 2024-11-23 RX ORDER — ONDANSETRON 2 MG/ML
4 INJECTION INTRAMUSCULAR; INTRAVENOUS EVERY 4 HOURS PRN
Status: DISCONTINUED | OUTPATIENT
Start: 2024-11-23 | End: 2024-12-10 | Stop reason: HOSPADM

## 2024-11-23 RX ORDER — SODIUM CHLORIDE 9 MG/ML
INJECTION, SOLUTION INTRAVENOUS CONTINUOUS
Status: ACTIVE | OUTPATIENT
Start: 2024-11-23 | End: 2024-11-24

## 2024-11-23 RX ORDER — DICLOFENAC SODIUM 1 MG/ML
1 SOLUTION/ DROPS OPHTHALMIC 4 TIMES DAILY
Status: DISCONTINUED | OUTPATIENT
Start: 2024-11-23 | End: 2024-11-23

## 2024-11-23 RX ORDER — LIDOCAINE 4 G/G
2 PATCH TOPICAL EVERY 24 HOURS
Status: DISCONTINUED | OUTPATIENT
Start: 2024-11-23 | End: 2024-12-10 | Stop reason: HOSPADM

## 2024-11-23 RX ORDER — HYDROMORPHONE HYDROCHLORIDE 1 MG/ML
0.5 INJECTION, SOLUTION INTRAMUSCULAR; INTRAVENOUS; SUBCUTANEOUS
Status: DISCONTINUED | OUTPATIENT
Start: 2024-11-23 | End: 2024-11-28

## 2024-11-23 RX ORDER — AMOXICILLIN 250 MG
2 CAPSULE ORAL EVERY EVENING
Status: DISCONTINUED | OUTPATIENT
Start: 2024-11-23 | End: 2024-12-10 | Stop reason: HOSPADM

## 2024-11-23 RX ORDER — AMOXICILLIN 250 MG
2 CAPSULE ORAL 2 TIMES DAILY
Status: DISCONTINUED | OUTPATIENT
Start: 2024-11-23 | End: 2024-11-23

## 2024-11-23 RX ORDER — ACETAMINOPHEN 325 MG/1
650 TABLET ORAL EVERY 6 HOURS PRN
Status: DISCONTINUED | OUTPATIENT
Start: 2024-11-23 | End: 2024-12-10 | Stop reason: HOSPADM

## 2024-11-23 RX ORDER — SODIUM CHLORIDE 9 MG/ML
500 INJECTION, SOLUTION INTRAVENOUS ONCE
Status: COMPLETED | OUTPATIENT
Start: 2024-11-23 | End: 2024-11-23

## 2024-11-23 RX ORDER — OXYCODONE HYDROCHLORIDE 10 MG/1
10 TABLET ORAL EVERY 6 HOURS PRN
Status: ON HOLD | COMMUNITY
End: 2024-12-10

## 2024-11-23 RX ORDER — METHOCARBAMOL 750 MG/1
750 TABLET, FILM COATED ORAL 4 TIMES DAILY
Status: DISCONTINUED | OUTPATIENT
Start: 2024-11-23 | End: 2024-11-24

## 2024-11-23 RX ORDER — TIMOLOL MALEATE 5 MG/ML
1 SOLUTION/ DROPS OPHTHALMIC 2 TIMES DAILY
Status: DISCONTINUED | OUTPATIENT
Start: 2024-11-23 | End: 2024-12-10 | Stop reason: HOSPADM

## 2024-11-23 RX ORDER — ENOXAPARIN SODIUM 150 MG/ML
1 INJECTION SUBCUTANEOUS EVERY 12 HOURS
Status: ON HOLD | COMMUNITY
End: 2024-12-10

## 2024-11-23 RX ORDER — OXYCODONE HYDROCHLORIDE 5 MG/1
5-10 TABLET ORAL EVERY 6 HOURS PRN
Status: DISCONTINUED | OUTPATIENT
Start: 2024-11-23 | End: 2024-12-02

## 2024-11-23 RX ORDER — POLYETHYLENE GLYCOL 3350 17 G/17G
1 POWDER, FOR SOLUTION ORAL
Status: DISCONTINUED | OUTPATIENT
Start: 2024-11-23 | End: 2024-12-10 | Stop reason: HOSPADM

## 2024-11-23 RX ORDER — DICLOFENAC SODIUM 1 MG/ML
1 SOLUTION/ DROPS OPHTHALMIC 4 TIMES DAILY
Status: ON HOLD | COMMUNITY
End: 2024-12-16

## 2024-11-23 RX ORDER — ACETAMINOPHEN 10 MG/ML
1000 INJECTION, SOLUTION INTRAVENOUS ONCE
Status: COMPLETED | OUTPATIENT
Start: 2024-11-23 | End: 2024-11-23

## 2024-11-23 RX ORDER — CEFTRIAXONE 2 G/1
2000 INJECTION, POWDER, FOR SOLUTION INTRAMUSCULAR; INTRAVENOUS ONCE
Status: COMPLETED | OUTPATIENT
Start: 2024-11-23 | End: 2024-11-23

## 2024-11-23 RX ORDER — INSULIN LISPRO 100 [IU]/ML
2-9 INJECTION, SOLUTION INTRAVENOUS; SUBCUTANEOUS
Status: DISCONTINUED | OUTPATIENT
Start: 2024-11-23 | End: 2024-11-26

## 2024-11-23 RX ORDER — WARFARIN SODIUM 4 MG/1
4 TABLET ORAL
COMMUNITY
End: 2024-12-10

## 2024-11-23 RX ADMIN — INSULIN LISPRO 2 UNITS: 100 INJECTION, SOLUTION INTRAVENOUS; SUBCUTANEOUS at 12:04

## 2024-11-23 RX ADMIN — ACETAMINOPHEN 1000 MG: 1000 INJECTION INTRAVENOUS at 07:12

## 2024-11-23 RX ADMIN — LEVOTHYROXINE SODIUM 125 MCG: 0.12 TABLET ORAL at 11:51

## 2024-11-23 RX ADMIN — HUMAN ALBUMIN MICROSPHERES AND PERFLUTREN 3 ML: 10; .22 INJECTION, SOLUTION INTRAVENOUS at 18:00

## 2024-11-23 RX ADMIN — INSULIN LISPRO 2 UNITS: 100 INJECTION, SOLUTION INTRAVENOUS; SUBCUTANEOUS at 18:55

## 2024-11-23 RX ADMIN — SODIUM CHLORIDE: 9 INJECTION, SOLUTION INTRAVENOUS at 11:27

## 2024-11-23 RX ADMIN — OXYCODONE 5 MG: 5 TABLET ORAL at 18:05

## 2024-11-23 RX ADMIN — TIMOLOL MALEATE 1 DROP: 5 SOLUTION OPHTHALMIC at 18:09

## 2024-11-23 RX ADMIN — EZETIMIBE 10 MG: 10 TABLET ORAL at 11:41

## 2024-11-23 RX ADMIN — PROTHROMBIN, COAGULATION FACTOR VII HUMAN, COAGULATION FACTOR IX HUMAN, COAGULATION FACTOR X HUMAN, PROTEIN C, PROTEIN S HUMAN, AND WATER 2000 UNITS: KIT at 10:25

## 2024-11-23 RX ADMIN — POLYETHYLENE GLYCOL 3350 1 PACKET: 17 POWDER, FOR SOLUTION ORAL at 11:44

## 2024-11-23 RX ADMIN — LIDOCAINE 2 PATCH: 4 PATCH TOPICAL at 11:42

## 2024-11-23 RX ADMIN — METHOCARBAMOL TABLETS 750 MG: 750 TABLET, COATED ORAL at 18:56

## 2024-11-23 RX ADMIN — NOREPINEPHRINE BITARTRATE 0.05 MCG/KG/MIN: 0.03 INJECTION, SOLUTION INTRAVENOUS at 14:41

## 2024-11-23 RX ADMIN — POLYETHYLENE GLYCOL 3350 1 PACKET: 17 POWDER, FOR SOLUTION ORAL at 12:26

## 2024-11-23 RX ADMIN — SENNOSIDES AND DOCUSATE SODIUM 2 TABLET: 50; 8.6 TABLET ORAL at 18:06

## 2024-11-23 RX ADMIN — SODIUM CHLORIDE 1000 ML: 9 INJECTION, SOLUTION INTRAVENOUS at 05:30

## 2024-11-23 RX ADMIN — ACETAMINOPHEN 650 MG: 325 TABLET ORAL at 12:01

## 2024-11-23 RX ADMIN — SODIUM CHLORIDE: 9 INJECTION, SOLUTION INTRAVENOUS at 17:27

## 2024-11-23 RX ADMIN — CEFTRIAXONE SODIUM 2000 MG: 2 INJECTION, POWDER, FOR SOLUTION INTRAMUSCULAR; INTRAVENOUS at 06:07

## 2024-11-23 RX ADMIN — SODIUM CHLORIDE 500 ML: 9 INJECTION, SOLUTION INTRAVENOUS at 14:03

## 2024-11-23 RX ADMIN — PHYTONADIONE 10 MG: 10 INJECTION, EMULSION INTRAMUSCULAR; INTRAVENOUS; SUBCUTANEOUS at 10:23

## 2024-11-23 ASSESSMENT — LIFESTYLE VARIABLES
EVER HAD A DRINK FIRST THING IN THE MORNING TO STEADY YOUR NERVES TO GET RID OF A HANGOVER: NO
TOTAL SCORE: 0
ALCOHOL_USE: NO
ALCOHOL_USE: NO
CONSUMPTION TOTAL: INCOMPLETE
DOES PATIENT WANT TO STOP DRINKING: NO
TOTAL SCORE: 0
AVERAGE NUMBER OF DAYS PER WEEK YOU HAVE A DRINK CONTAINING ALCOHOL: 0
HAVE PEOPLE ANNOYED YOU BY CRITICIZING YOUR DRINKING: NO
TOTAL SCORE: 0
TOTAL SCORE: 0
EVER FELT BAD OR GUILTY ABOUT YOUR DRINKING: NO
TOTAL SCORE: 0
ON A TYPICAL DAY WHEN YOU DRINK ALCOHOL HOW MANY DRINKS DO YOU HAVE: 0
HAVE PEOPLE ANNOYED YOU BY CRITICIZING YOUR DRINKING: NO
TOTAL SCORE: 0
HAVE YOU EVER FELT YOU SHOULD CUT DOWN ON YOUR DRINKING: NO
DOES PATIENT WANT TO STOP DRINKING: NO
HOW MANY TIMES IN THE PAST YEAR HAVE YOU HAD 5 OR MORE DRINKS IN A DAY: 0
HAVE YOU EVER FELT YOU SHOULD CUT DOWN ON YOUR DRINKING: NO
EVER HAD A DRINK FIRST THING IN THE MORNING TO STEADY YOUR NERVES TO GET RID OF A HANGOVER: NO
EVER FELT BAD OR GUILTY ABOUT YOUR DRINKING: NO
CONSUMPTION TOTAL: NEGATIVE

## 2024-11-23 ASSESSMENT — COPD QUESTIONNAIRES
DURING THE PAST 4 WEEKS HOW MUCH DID YOU FEEL SHORT OF BREATH: MOST  OR ALL OF THE TIME
COPD SCREENING SCORE: 5
HAVE YOU SMOKED AT LEAST 100 CIGARETTES IN YOUR ENTIRE LIFE: NO/DON'T KNOW
DO YOU EVER COUGH UP ANY MUCUS OR PHLEGM?: NO/ONLY WITH OCCASIONAL COLDS OR INFECTIONS

## 2024-11-23 ASSESSMENT — PAIN DESCRIPTION - PAIN TYPE
TYPE: ACUTE PAIN

## 2024-11-23 ASSESSMENT — FIBROSIS 4 INDEX
FIB4 SCORE: 3.04
FIB4 SCORE: 2.61

## 2024-11-23 NOTE — ASSESSMENT & PLAN NOTE
Right displaced comminuted lateral condyle distal femoral fracture after ground-level fall  [facility] 11/8/2024-Dr. Camargo  Dressing removed and wound examined, looks clean and dry without any signs of infection but 16 days out

## 2024-11-23 NOTE — ED PROVIDER NOTES
ED Provider Note  ED PHYSICIAN NOTE    CHIEF COMPLAINT  Chief Complaint   Patient presents with    Shortness of Breath     BIBA from [facility] where she was recovering after repair of a R femur Fx. Pt woke up feeling SOB, good 02 saturations on room air, however pt was hypotensive initially (62/46) and is pale appearing. Pt also has distended abd and c/o constipation x 1 week. Given 250 cc NS en route. BP is improved at this time (119/95).        EXTERNAL RECORDS REVIEWED  Inpatient Notes admission 11/7/2024 after she had a mechanical fall sustaining a right femoral lateral condyle fracture underwent ORIF, also noted history of atrial fibrillation mechanical aortic valve, on Coumadin hypertension CKD    HPI/ROS  LIMITATION TO HISTORY   Select: Altered mental status / Confusion  OUTSIDE HISTORIAN(S):  EMS who report patient was initially hypotensive systolics in 60s was given 250 NS blood pressure normalized    Yenifer Beasley is a 74 y.o. female who presents with shortness of breath.  Patient is currently in rehab after a surgery for right femur fracture.  She reports that last evening she began feeling shortness of breath and was subsequently transferred here.  She reports no chest pain, no fevers or chills or cough or congestion.  She does have swelling in both her legs since the surgery.  Still pain near the surgical site.  she reports no blood in her stool or dark tarry stool and actually she is also reporting some abdominal fullness and tightness  and has not had a bowel movement in around 1 week.  No report of trauma.    Patient is anticoagulated as above, compliant with medication    PAST MEDICAL HISTORY  Past Medical History:   Diagnosis Date    Allergic rhinitis     Anticoagulation monitoring, special range     Arthritis     OA OF HANDS, KNEESM, HIPS AND BACK    Asthma 10/17/2019    childhood asthma    Atrial fibrillation (HCC)     history of    Back pain     Blood transfusion without reported  "diagnosis     CAD (coronary artery disease) 2007    CABG    Cataract     Chest pain at rest 11/30/2010    CHF (congestive heart failure) ([facility])     Chronic anticoagulation 04/23/2018    Constipation     off and on, pt manages    Coronary heart disease     Delayed emergence from general anesthesia     Diabetes     DIAB FEET EXAM:  1/6/11- Pt reports no history of diabetes    Diabetic neuropathy ([facility]) 01/06/2011    Difficulty breathing     Fall     \"a couple years ago because of my neuopathy\" multiple falls    Gasping for breath     history of    Gastritis 07/2010    H/O UGIB    Gastritis     GERD (gastroesophageal reflux disease)     Tamazight measles     GI bleed     history of    Glaucoma     Heart murmur     Hyperlipidemia     Hypertension     Hyponatremia 04/22/2014    Hypothyroidism     Impaired fasting glucose 10/08/2019    Infectious disease     History of MRSA    Kidney disease     Migraine     Need for influenza vaccination 11/30/2010    ICD-10 transition    Neuropathy in diabetes ([facility])     HILDA (obstructive sleep apnea)     no cpap at this time, on order    Painful joint     Palpitations     Pneumonia     Pneumonia due to organism 04/22/2014    Diagnois update 10/1/2016    Pulmonary hypertension ([facility])     Rash     S/P aortic valve replacement 2007    Shortness of breath 08/29/2022    no c/o at this time    Skin infection 05/11/2018    Sleep apnea     Swelling of lower extremity     TIA (transient ischemic attack) 2005    TIA (transient ischemic attack)     Tonsillitis     Tricuspid regurgitation mild- mod 01/06/2011    Unspecified hemorrhagic conditions     Bleeds easily-GI bleeds with NSAIDS and ASA    Upper GI bleed ON JULY 2007 01/06/2011    Upper GI bleed ON JULY 2007 01/06/2011    URTI (acute upper respiratory infection) 11/30/2010    UTI (urinary tract infection) 04/24/2014    Valvular heart disease     Weakness     Wears glasses        SOCIAL HISTORY  Social History     Tobacco Use    Smoking status: " "Never    Smokeless tobacco: Never   Vaping Use    Vaping status: Never Used   Substance Use Topics    Alcohol use: No    Drug use: Never       CURRENT MEDICATIONS  Home Medications       Reviewed by Oziel Reina (Pharmacy Tech) on 11/23/24 at 0718  Med List Status: Complete     Medication Last Dose Status   Alirocumab (PRALUENT) 150 MG/ML Solution Auto-injector Unknown Active   diclofenac (VOLTAREN) 0.1 % ophthalmic solution 11/22/2024 Active   doxazosin (CARDURA) 8 MG tablet 11/22/2024 Active   enoxaparin (LOVENOX) 120 MG/0.8ML Solution Prefilled Syringe inj 11/22/2024 Active   ezetimibe (ZETIA) 10 MG Tab 11/22/2024 Active   levothyroxine (SYNTHROID) 125 MCG Tab 11/22/2024 Active   lidocaine (ASPERFLEX) 4 % Patch 11/22/2024 Active   methocarbamol (ROBAXIN) 750 MG Tab 11/22/2024 Active   oxyCODONE immediate release (ROXICODONE) 10 MG immediate release tablet 11/23/2024 Active   polyethylene glycol/lytes (MIRALAX) Pack Not Taking Active   senna-docusate (PERICOLACE OR SENOKOT S) 8.6-50 MG Tab 11/22/2024 Active   timolol (TIMOPTIC) 0.5 % Solution 11/21/2024 Active   warfarin (COUMADIN) 2 MG Tab 11/22/2024 Active   warfarin (COUMADIN) 4 MG Tab 11/21/2024 Active                  Audit from Redirected Encounters    **Home medications have not yet been reviewed for this encounter**         ALLERGIES  Allergies   Allergen Reactions    Asa [Aspirin]      GI BLEED    Atorvastatin Shortness of Breath    Cymbalta [Duloxetine Hcl] Unspecified     Feels like a \"zombie\"    Hmg-Coa-R Inhibitors     Latex Swelling    Nsaids      GI BLEED    Tricor Shortness of Breath     Breathing problems      Trilipix [Choline Fenofibrate] Shortness of Breath     SOB    Lyrica [Pregabalin]      SPACED OUT       PHYSICAL EXAM  VITAL SIGNS: /50   Pulse (!) 55   Temp (!) 35.6 °C (96.1 °F)   Resp 16   Ht 1.524 m (5')   Wt 109 kg (241 lb)   LMP  (LMP Unknown)   SpO2 100%   BMI 47.07 kg/m²    Constitutional: Sleepy but " conversant  HENT: Normal inspection, no signs of trauma  Eyes: Normal inspection, Pupils equal, non-icteric pale subconjunctiva  Neck: Grossly normal range of motion. No stridor  Cardiovascular: Regular rate and rhythm, no murmurs.  Symmetric peripheral pulses at radial   Thorax & Lungs: No respiratory distress, No wheezing, No rales, No rhonchi, No chest tenderness.   Abdomen: Distended LLQ tenderness, no mass  Skin: No obvious rash. Warm. Dry.  Pale  Back: No tenderness, No CVA tenderness.   Extremities: No cyanosis, 2+ bilateral lower extremity, ecchymosis and bruising over the right lower extremity with dressing in place that is clean dry intact  Neurologic: Sleepy but conversant, oriented to person/place although not time/event, no facial droop, no drift with the upper extremities intact  strength bilaterally.  Limited motion of the right lower extremity secondary to her surgery, no drift of the left lower extremity, sensation intact to light touch throughout,  Psychiatric: Normal affect for situation        DIAGNOSTIC STUDIES / PROCEDURES  LABS/EKG  Results for orders placed or performed during the hospital encounter of 24   POCT glucose device results    Collection Time: 24  5:10 AM   Result Value Ref Range    POC Glucose, Blood 179 (H) 65 - 99 mg/dL   EKG    Collection Time: 24  5:13 AM   Result Value Ref Range    Report       [facility] Emergency Dept.    Test Date:  2024  Pt Name:    LIMA FIGUEREDO               Department: ER  MRN:        [id number]                      Room:       [facility]  Gender:     Female                       Technician: [id number]  :        1950                   Requested By:ER TRIAGE PROTOCOL  Order #:    [id number]                    Reading MD: JULIUS NINO MD    Measurements  Intervals                                Axis  Rate:       75                           P:          41  SD:         226                          QRS:         26  QRSD:       124                          T:          49  QT:         426  QTc:        476    Interpretive Statements  Sinus rhythm  Prolonged AK interval  Nonspecific intraventricular conduction delay  Baseline wander in lead(s) II,III,aVR,aVF  Compared to ECG 11/07/2024 16:14:01  First degree AV block now present  Short AK interval no longer present  Electronically Signed On 11- 05:21:44 PST by DIPESH ARZOLA     CBC WITH DIFFERENTIAL    Collection Time: 11/23/24  5:17 AM   Result Value Ref Range    WBC 18.1 (H) 4.8 - 10.8 K/uL    RBC 1.97 (L) 4.20 - 5.40 M/uL    Hemoglobin 6.3 (L) 12.0 - 16.0 g/dL    Hematocrit 18.9 (L) 37.0 - 47.0 %    MCV 95.9 81.4 - 97.8 fL    MCH 32.0 27.0 - 33.0 pg    MCHC 33.3 32.2 - 35.5 g/dL    RDW 50.4 (H) 35.9 - 50.0 fL    Platelet Count 203 164 - 446 K/uL    MPV 9.5 9.0 - 12.9 fL    Neutrophils-Polys 91.30 (H) 44.00 - 72.00 %    Lymphocytes 2.90 (L) 22.00 - 41.00 %    Monocytes 4.30 0.00 - 13.40 %    Eosinophils 0.10 0.00 - 6.90 %    Basophils 0.20 0.00 - 1.80 %    Immature Granulocytes 1.20 (H) 0.00 - 0.90 %    Nucleated RBC 0.10 0.00 - 0.20 /100 WBC    Neutrophils (Absolute) 16.53 (H) 1.82 - 7.42 K/uL    Lymphs (Absolute) 0.53 (L) 1.00 - 4.80 K/uL    Monos (Absolute) 0.77 0.00 - 0.85 K/uL    Eos (Absolute) 0.01 0.00 - 0.51 K/uL    Baso (Absolute) 0.03 0.00 - 0.12 K/uL    Immature Granulocytes (abs) 0.22 (H) 0.00 - 0.11 K/uL    NRBC (Absolute) 0.02 K/uL   COMP METABOLIC PANEL    Collection Time: 11/23/24  5:17 AM   Result Value Ref Range    Sodium 124 (L) 135 - 145 mmol/L    Potassium 5.0 3.6 - 5.5 mmol/L    Chloride 96 96 - 112 mmol/L    Co2 14 (L) 20 - 33 mmol/L    Anion Gap 14.0 7.0 - 16.0    Glucose 180 (H) 65 - 99 mg/dL    Bun 49 (H) 8 - 22 mg/dL    Creatinine 1.78 (H) 0.50 - 1.40 mg/dL    Calcium 7.5 (L) 8.5 - 10.5 mg/dL    Correct Calcium 8.7 8.5 - 10.5 mg/dL    AST(SGOT) 29 12 - 45 U/L    ALT(SGPT) 16 2 - 50 U/L    Alkaline Phosphatase 50 30 - 99 U/L    Total  Bilirubin 1.0 0.1 - 1.5 mg/dL    Albumin 2.5 (L) 3.2 - 4.9 g/dL    Total Protein 4.6 (L) 6.0 - 8.2 g/dL    Globulin 2.1 1.9 - 3.5 g/dL    A-G Ratio 1.2 g/dL   TROPONIN    Collection Time: 11/23/24  5:17 AM   Result Value Ref Range    Troponin T 43 (H) 6 - 19 ng/L   proBrain Natriuretic Peptide, NT    Collection Time: 11/23/24  5:17 AM   Result Value Ref Range    NT-proBNP 3698 (H) 0 - 125 pg/mL   C Reactive Protein Quantitative (Non-Cardiac)    Collection Time: 11/23/24  5:17 AM   Result Value Ref Range    Stat C-Reactive Protein 5.62 (H) 0.00 - 0.75 mg/dL   Procalcitonin    Collection Time: 11/23/24  5:17 AM   Result Value Ref Range    Procalcitonin 0.18 <0.25 ng/mL   ESTIMATED GFR    Collection Time: 11/23/24  5:17 AM   Result Value Ref Range    GFR (CKD-EPI) 30 (A) >60 mL/min/1.73 m 2   APTT    Collection Time: 11/23/24  5:45 AM   Result Value Ref Range    APTT 54.5 (H) 24.7 - 36.0 sec   PROTHROMBIN TIME (INR)    Collection Time: 11/23/24  5:45 AM   Result Value Ref Range    PT 28.7 (H) 12.0 - 14.6 sec    INR 2.69 (H) 0.87 - 1.13   COD (ADULT)    Collection Time: 11/23/24  5:45 AM   Result Value Ref Range    ABO Grouping Only A     Rh Grouping Only POS     Antibody Screen-Cod NEG     Component R       Red Blood Cells6    [id number]   issued       11/23/24 07:30    Product Type Red Blood Cells  LR Pheresis     Dispense Status issued     Unit Number (Barcoded) [id number]     Product Code (Barcoded) A7573H98     Blood Type (Barcoded) 0600    Lactic Acid    Collection Time: 11/23/24  5:45 AM   Result Value Ref Range    Lactic Acid 1.6 0.5 - 2.0 mmol/L   UN-XM'D RBC    Collection Time: 11/23/24  5:45 AM   Result Value Ref Range    Component R       Red Cells, LR       [id number]   issued       11/23/24 06:06    Product Type R99     Dispense Status issued     Unit Number (Barcoded) [id number]     Product Code (Barcoded) B5639U47     Blood Type (Barcoded) 5100    Blood Culture - Draw one from central line  and one from peripheral site    Collection Time: 11/23/24  6:05 AM    Specimen: Line; Blood   Result Value Ref Range    Significant Indicator NEG     Source BLD     Site Peripheral     Culture Result       No Growth  Note: Blood cultures are incubated for 5 days and  are monitored continuously.Positive blood cultures  are called to the RN and reported as soon as  they are identified.     Blood Culture - Draw one from central line and one from peripheral site    Collection Time: 11/23/24  6:11 AM    Specimen: Peripheral; Blood   Result Value Ref Range    Significant Indicator NEG     Source BLD     Site PERIPHERAL     Culture Result       No Growth  Note: Blood cultures are incubated for 5 days and  are monitored continuously.Positive blood cultures  are called to the RN and reported as soon as  they are identified.     POC CoV-2, FLU A/B, RSV by PCR    Collection Time: 11/23/24  7:02 AM   Result Value Ref Range    POC Influenza A RNA, PCR Negative Negative    POC Influenza B RNA, PCR Negative Negative    POC RSV, by PCR Negative Negative    POC SARS-CoV-2, PCR NotDetected NotDetected   Lactic Acid    Collection Time: 11/23/24  8:24 AM   Result Value Ref Range    Lactic Acid 1.5 0.5 - 2.0 mmol/L     *Note: Due to a large number of results and/or encounters for the requested time period, some results have not been displayed. A complete set of results can be found in Results Review.       I have independently interpreted this EKG as documented above      RADIOLOGY  I have independently interpreted the diagnostic imaging associated with this visit and am waiting the final reading from the radiologist.   My preliminary interpretation is as follows: Fluid collection noted abdominal skin    CT-RENAL COLIC EVALUATION(A/P W/O)   Final Result   Impression:      1. Large 17 x 10.7 x 9.5 cm hematoma type mixed density fluid collection in the left retroperitoneum and flank abdominal wall. This displaces the left kidney  anteriorly. No significant hydronephrosis.      2. Moderate colonic distention similar to 2007 suggests ileus. No obstruction suggestion. Prior gastric surgery.      3. Heavy vascular calcification. No aneurysm. Sternal wires. Aortic valve prosthesis.      4. No kidney stone or hydronephrosis. Bull catheter in empty bladder.      5. Prior cholecystectomy.                        DX-CHEST-PORTABLE (1 VIEW)   Final Result   Impression:      1. Shallow. Mild left base atelectasis.      2. Cardiomegaly. Sternal wires. Cardiac valve prosthesis.                     US-EXTREMITY VENOUS LOWER BILAT    (Results Pending)   EC-ECHOCARDIOGRAM COMPLETE W/O CONT    (Results Pending)         COURSE & MEDICAL DECISION MAKING    INITIAL ASSESSMENT, COURSE AND PLAN  Care Narrative: 5:19 AM  Patient presents with shortness of breath.  At this point consideration for anemia, pulmonary edema, arrhythmia, volume overload, electrolyte or metabolic derangement, renal insufficiency, she has no fevers or other infectious symptoms to suggest pneumonia or pulmonary infection although this was considered.  She is additionally having some abdominal distention, consideration for obstruction versus constipation versus ileus.  Order for diagnostic labs, CT, x-ray    5:42 AM  Patient becoming hypotensive with systolics to 70s ordered for fluid bolus, sepsis bundle    5:51 AM  Patient's hemoglobin has returned, ordered for transfusion, discussed this with patient    6:10 AM  Patient returned from CT, she is still hypotensive with approximately two thirds of the fluids infused, will transfuse trauma blood    6:17 AM  Received call from radiology to discuss CT consistent with hematoma no obstruction    7:28 AM    Patient is reevaluated, blood pressures proved with systolics in 90s.  She is reporting left sided abdominal/flank pain although given her current continuous blood pressure will hold on opioid pain medications, she was given APAP    Case is  discussed with Dr. Sifuentes for admission.      Interventions  Medications   HYDROmorphone (Dilaudid) injection 0.5 mg (0 mg Intravenous Held 11/23/24 0938)   prothrombin complex conc human (Kcentra) 2,000 Units in 80 mL infusion (has no administration in time range)     And   phytonadione (Aqua-Mephyton) 10 mg in NS 50 mL IVPB (has no administration in time range)   diclofenac (Voltaren) 0.1 % ophthalmic solution 1 Drop (has no administration in time range)   ezetimibe (Zetia) tablet 10 mg (has no administration in time range)   levothyroxine (Synthroid) tablet 125 mcg (has no administration in time range)   lidocaine (Asperflex) 4 % patch 2 Patch (has no administration in time range)   methocarbamol (Robaxin) tablet 750 mg (has no administration in time range)   oxyCODONE immediate-release (Roxicodone) tablet 5-10 mg (has no administration in time range)   polyethylene glycol/lytes (Miralax) Packet 1 Packet (has no administration in time range)   senna-docusate (Pericolace Or Senokot S) 8.6-50 MG per tablet 2 Tablet (has no administration in time range)   timolol (Timoptic) 0.5 % ophthalmic solution 1 Drop (has no administration in time range)   Respiratory Therapy Consult (has no administration in time range)   NS infusion (has no administration in time range)   acetaminophen (Tylenol) tablet 650 mg (has no administration in time range)   senna-docusate (Pericolace Or Senokot S) 8.6-50 MG per tablet 2 Tablet (has no administration in time range)     And   polyethylene glycol/lytes (Miralax) Packet 1 Packet (has no administration in time range)   ondansetron (Zofran) syringe/vial injection 4 mg (has no administration in time range)   ondansetron (Zofran ODT) dispertab 4 mg (has no administration in time range)   insulin lispro (HumaLOG,AdmeLOG) subcutaneous injection (has no administration in time range)     And   dextrose 50% (D50W) injection 25 g (has no administration in time range)   NS (Bolus) 0.9 % infusion  1,000 mL (0 mL Intravenous Stopped 11/23/24 0630)   cefTRIAXone (Rocephin) injection 2,000 mg (2,000 mg Intravenous Given 11/23/24 0607)   acetaminophen (Ofirmev) injection 1,000 mg (0 mg Intravenous Stopped 11/23/24 0727)       Measures  Hydration: Based on the patient's presentation of Hypotension the patient was given IV fluids. IV Hydration was used because oral hydration was not as rapid as required. Upon recheck following hydration, the patient was improved.  Sepsis: Infection was suspected 542 (Time). Sepsis pathway was initiated. 30cc/kg bolus given.  With 1 L of normal saline as well as 1 unit of packed red blood cells antibiotics were given per protocol.  Transfusion: I have explained to the patient the risks and benefits of transfusion of blood products.  This includes, as appropriate, the risk of mild allergic reaction, hemolytic reaction, transfusion-associated lung injury, febrile reactions, circulatory or iron overload, and infection.    We discussed possible alternatives and their risks, including directed donation, autologous transfusion, and no transfusion, including IV or oral iron supplementation, as appropriate.  I believe the patient understands the risks and benefits and was able to express understanding.       PROBLEM LIST    # Shock with hypotension.  I think this may be multifactorial although no clear singular cause at this point.  Patient is anemic with retroperitoneal hematoma, hemoglobin has only dropped 1 point since prior so while this may be contributing to her lower blood pressure seems unlikely to be the full story.  She is started on transfusion, initially 1 unit trauma blood given her hypotension and subsequent unit crossmatched.  Consideration as well for sepsis given her leukocytosis which is new although no clear source at this time.  No obvious findings of pulmonary intra-abdominal infection.  Her indwelling Bull did appear dirty however we have been unable to obtain  additional urine for testing so urinary source is considered as well as bacteremia.  She has no lactic acidosis however.  She is started on sepsis protocol ceftriaxone at this point.  Consideration for adrenal insufficiency and while we are seeing improvements in her blood pressure if she does have some resistant hypotension certainly consideration given her recent prolonged hospitalization as well    # Retroperitoneal hematoma with acute blood loss anemia.  Patient with chronic anemia with recent surgery, on anticoagulation for mechanical valve.  She does have retroperitoneal hematoma, started on transfusion, further management as inpatient to discuss potential reversal with family given her mechanical valve.     # Hyponatremia.  124 from 129, I think likely solute related, continue management as inpatient    # Chronic kidney disease.    # Chronic anticoagulation.  Atrial fibrillation and mechanical aortic valve        DISPOSITION AND DISCUSSIONS  I have discussed management of the patient with the following physicians and HANY's:  as noted above    Patient is admitted in critical condition    FINAL DIAGNOSIS  1. Shock (HCC)        2. Hypotension, unspecified hypotension type        3. Anemia, unspecified type        4. Hyponatremia        5. Chronic anticoagulation        6. Retroperitoneal hematoma             CRITICAL CARE  The very real possibilty of a deterioration of this patient's condition required the highest level of my preparedness for sudden, emergent intervention.  I provided critical care services, which included medication orders, frequent reevaluations of the patient's condition and response to treatment, ordering and reviewing test results, and discussing the case with various consultants.  The critical care time associated with the care of the patient was 45 minutes. Review chart for interventions. This time is exclusive of any other billable procedures.       This dictation was created using voice  recognition software. The accuracy of the dictation is limited to the abilities of the software. I expect there may be some errors of grammar and possibly content. The nursing notes were reviewed and certain aspects of this information were incorporated into this note.    Electronically signed by: Conrado Estrada M.D., 11/23/2024

## 2024-11-23 NOTE — ED NOTES
Bedside report received from off going RN Fam, assumed care of patient.  POC discussed with patient. Call light within reach, all needs addressed at this time.       Fall risk interventions in place: Move the patient closer to the nurse's station, Patient's personal possessions are with in their safe reach, Place fall risk sign on patient's door, Keep floor surfaces clean and dry, and Accompanied to restroom (all applicable per [facility] Fall risk assessment)   Continuous monitoring: Cardiac Leads, Pulse Ox, or Blood Pressure  IVF/IV medications: Infusion per MAR (List Med(s))    Oxygen: How many liters 2L  Bedside sitter: Not Applicable   Isolation: Not Applicable

## 2024-11-23 NOTE — ED NOTES
Un crossmatch blood finished. No transfusion reaction noted.  2nd unit of blood verified with 2nd RN (now infusing cross matched blood). Verbal consent already obtained prior to this transfusion.

## 2024-11-23 NOTE — ASSESSMENT & PLAN NOTE
Suspect spontaneous retroperitoneal hemorrhage on anticoagulation and recent hospitalization/SNF.  17 x 10.7 x 9.5 hematoma left retroperitoneum and flank abdominal wall.  She received PCC.  Trend hemoglobin and hemodynamics, transfuse as needed  Replete calcium  Follow closely in ICU, consider CTA, IR intervention/embolization if further bleeding after reversal of anticoagulation.

## 2024-11-23 NOTE — ASSESSMENT & PLAN NOTE
[facility] mechanical aortic valve placement 2007 Dr. Francis  On chronic anticoagulation with warfarin  Hold warfarin acutely given large retroperitoneal hemorrhage  Informed family of risk of valvular dysfunction and possible CVA but will need to hold anticoagulation for a few days and reassess daily

## 2024-11-23 NOTE — PROGRESS NOTES
1045 Patient to T616 from [facility], currently infusing Kcentra and vitamin K   Dr. Clement states do not need an NG for decompression at this point in time    1130. MD updated on low BP with systolic's in 70s, 83ml/hr NS recently started, norepi on MAR, labs ordered drawn and sent.    4 Eyes Skin Assessment Completed by Sky PATEL RN and Syed ROD RN.    Head WDL  Ears Redness and Blanching  Nose WDL  Mouth WDL  Neck WDL  Breast/Chest WDL  Shoulder Blades WDL  Spine WDL  (R) Arm/Elbow/Hand Bruising  (L) Arm/Elbow/Hand Bruising  Abdomen Distension   Groin Redness and Excoriation  Scrotum/Coccyx/Buttocks Redness, Excoriation, Moisture Fissure, and Discoloration  (R) Leg Incision Bruising  (L) Leg Scar  (R) Heel/Foot/Toe Redness, Blanching, and Boggy   (L) Heel/Foot/Toe Redness, Blanching, and Boggy calloused      Devices In Places ECG, Blood Pressure Cuff, Pulse Ox, Bull, and Nasal Cannula      Interventions In Place NC W/Ear Foams    Possible Skin Injury Yes    Pictures Uploaded Into Epic Yes  Wound Consult Placed Yes  RN Wound Prevention Protocol Ordered Yes

## 2024-11-23 NOTE — ASSESSMENT & PLAN NOTE
Hold warfarin in the setting of acute retroperitoneal hemorrhage  Monitor on telemetry  Optimize electrolytes

## 2024-11-23 NOTE — ED NOTES
Bedside report received from off going RN/tech: Denia, assumed care of patient.  POC discussed with patient. Call light within reach, all needs addressed at this time.       Fall risk interventions in place: Patient's personal possessions are with in their safe reach, Place fall risk sign on patient's door, and Keep floor surfaces clean and dry (all applicable per [place] Fall risk assessment)   Continuous monitoring: Cardiac Leads, Pulse Ox, or Blood Pressure  IVF/IV medications: Not Applicable   Oxygen: How many liters 2L  Bedside sitter: Not Applicable   Isolation: Not Applicable

## 2024-11-23 NOTE — CARE PLAN
The patient is Watcher - Medium risk of patient condition declining or worsening         Progress made toward(s) clinical / shift goals:  MAP has improved from point of admission from 2 units of RBC's, several boluses of NS, and use of Levo. Her anxiety has decreased from admission following blood. Her complaints of shortness of breath have decreased. Her coagulopathy was reversed. She is able to tolerate oral medications and liquids.    Patient is not progressing towards the following goals: She has had 15mL of urine output thus far today. Her creatinine has increased and GFR decreased. She has not had a bowel movement (Miralax administered).     Problem: Urinary - Renal Perfusion  Goal: Ability to achieve and maintain adequate renal perfusion and functioning will improve  Outcome: Not Progressing     Problem: Urinary Elimination  Goal: Establish and maintain regular urinary output  Outcome: Not Progressing     Problem: Bowel Elimination  Goal: Establish and maintain regular bowel function  Outcome: Not Progressing       Problem: Psychosocial  Goal: Patient's level of anxiety will decrease  Outcome: Progressing     Problem: Hemodynamics  Goal: Patient's hemodynamics, fluid balance and neurologic status will be stable or improve  Outcome: Progressing     Problem: Respiratory  Goal: Patient will achieve/maintain optimum respiratory ventilation and gas exchange  Outcome: Progressing     Problem: Risk for Aspiration  Goal: Patient's risk for aspiration will be absent or decrease  Outcome: Progressing     Problem: Nutrition  Goal: Patient's nutritional and fluid intake will be adequate or improve  Outcome: Progressing

## 2024-11-23 NOTE — CONSULTS
Critical Care Consultation    Date of consult: 11/23/2024    Referring Physician  Conrado Estrada M.D.    Reason for Consultation  Left retroperitoneal hematoma    History of Presenting Illness  74 y.o. female, PMH paroxysmal atrial fibrillation, mechanical aortic valve replacement 2007, CKD 3B, hypothyroidism, glaucoma recent hospitalization 11/7 - 11-15 after ground-level fall with right displaced comminuted lateral condyle distal femoral fracture, ORIF on 11/8 and discharged to SNF, who presented 11/23/2024 in transfer from SNF where she was recovering with complains of dyspnea and hypotension, initially SBP 62/46 and pale appearing.  She has been complaining of constipation and has a distended abdomen with left abdominal flank pain.  She received 1 L of crystalloid in the ED and workup included replacement of Bull catheter and CT abdomen without contrast renal colic exam.  This showed a large 17 x 10.7 x 9.5 cm hematoma in the left retroperitoneum and flank abdominal wall.  Hemoglobin was 6.3, down from 7.6 at discharge.  She is receiving 2 L of crystalloid and has been fluid responsive.  Main complaint is that currently is generalized pain more localizing over the left abdomen.  Current INR 2.69.  She is afebrile and denies fever or chills.  White blood cell count is 18.1.    Code Status  Prior    Review of Systems  Review of Systems   Unable to perform ROS: Acuity of condition       Past Medical History  Past Medical History:   Diagnosis Date    Allergic rhinitis     Anticoagulation monitoring, special range     Arthritis     OA OF HANDS, KNEESM, HIPS AND BACK    Asthma 10/17/2019    childhood asthma    Atrial fibrillation (HCC)     history of    Back pain     Blood transfusion without reported diagnosis     CAD (coronary artery disease) 2007    CABG    Cataract     Chest pain at rest 11/30/2010    CHF (congestive heart failure) ([facility])     Chronic anticoagulation 04/23/2018    Constipation     off and on, pt  "manages    Coronary heart disease     Delayed emergence from general anesthesia     Diabetes     DIAB FEET EXAM:  1/6/11- Pt reports no history of diabetes    Diabetic neuropathy (HCC) 01/06/2011    Difficulty breathing     Fall     \"a couple years ago because of my neuopathy\" multiple falls    Gasping for breath     history of    Gastritis 07/2010    H/O UGIB    Gastritis     GERD (gastroesophageal reflux disease)     Stateless measles     GI bleed     history of    Glaucoma     Heart murmur     Hyperlipidemia     Hypertension     Hyponatremia 04/22/2014    Hypothyroidism     Impaired fasting glucose 10/08/2019    Infectious disease     History of MRSA    Kidney disease     Migraine     Need for influenza vaccination 11/30/2010    ICD-10 transition    Neuropathy in diabetes (HCC)     HILDA (obstructive sleep apnea)     no cpap at this time, on order    Painful joint     Palpitations     Pneumonia     Pneumonia due to organism 04/22/2014    Diagnois update 10/1/2016    Pulmonary hypertension (HCC)     Rash     S/P aortic valve replacement 2007    Shortness of breath 08/29/2022    no c/o at this time    Skin infection 05/11/2018    Sleep apnea     Swelling of lower extremity     TIA (transient ischemic attack) 2005    TIA (transient ischemic attack)     Tonsillitis     Tricuspid regurgitation mild- mod 01/06/2011    Unspecified hemorrhagic conditions     Bleeds easily-GI bleeds with NSAIDS and ASA    Upper GI bleed ON JULY 2007 01/06/2011    Upper GI bleed ON JULY 2007 01/06/2011    URTI (acute upper respiratory infection) 11/30/2010    UTI (urinary tract infection) 04/24/2014    Valvular heart disease     Weakness     Wears glasses        Surgical History   has a past surgical history that includes primary c section; hernia repair; mitral valve replace (2007); cholecystectomy; abdominal hysterectomy total; carpal tunnel release (1998); tonsillectomy (1995); shoulder surgery (06/2001); coronary artery bypass, 1; " Sleeve,Gama Vaso Thigh; hysterectomy laparoscopy; Intubation; uvulopharyngopalatoplasty; Ventilator - Continuous; aortic valve replacement; tubal coagulation laparoscopic bilateral; and orif, fracture, femur (Right, 11/8/2024).    Family History  family history includes Breast Cancer in her cousin; Cancer in her maternal aunt, maternal grandmother, and mother; Diabetes in her brother; Heart Disease in her brother, brother, and mother.    Social History   reports that she has never smoked. She has never used smokeless tobacco. She reports that she does not drink alcohol and does not use drugs.    Medications  Home Medications       Reviewed by Oziel Reina (Pharmacy Tech) on 11/23/24 at 0718  Med List Status: Complete     Medication Last Dose Status   Alirocumab (PRALUENT) 150 MG/ML Solution Auto-injector Unknown Active   diclofenac (VOLTAREN) 0.1 % ophthalmic solution 11/22/2024 Active   doxazosin (CARDURA) 8 MG tablet 11/22/2024 Active   enoxaparin (LOVENOX) 120 MG/0.8ML Solution Prefilled Syringe inj 11/22/2024 Active   ezetimibe (ZETIA) 10 MG Tab 11/22/2024 Active   levothyroxine (SYNTHROID) 125 MCG Tab 11/22/2024 Active   lidocaine (ASPERFLEX) 4 % Patch 11/22/2024 Active   methocarbamol (ROBAXIN) 750 MG Tab 11/22/2024 Active   oxyCODONE immediate release (ROXICODONE) 10 MG immediate release tablet 11/23/2024 Active   polyethylene glycol/lytes (MIRALAX) Pack Not Taking Active   senna-docusate (PERICOLACE OR SENOKOT S) 8.6-50 MG Tab 11/22/2024 Active   timolol (TIMOPTIC) 0.5 % Solution 11/21/2024 Active   warfarin (COUMADIN) 2 MG Tab 11/22/2024 Active   warfarin (COUMADIN) 4 MG Tab 11/21/2024 Active                  Audit from Redirected Encounters    **Home medications have not yet been reviewed for this encounter**       No current facility-administered medications for this encounter.     Current Outpatient Medications   Medication Sig Dispense Refill    enoxaparin (LOVENOX) 120 MG/0.8ML Solution  "Prefilled Syringe inj Inject 1 Syringe under the skin every 12 hours.      oxyCODONE immediate release (ROXICODONE) 10 MG immediate release tablet Take 10 mg by mouth every 6 hours as needed for Moderate Pain.      diclofenac (VOLTAREN) 0.1 % ophthalmic solution Administer 1 Drop into both eyes 4 times a day.      warfarin (COUMADIN) 4 MG Tab Take 4 mg by mouth at bedtime. Sunday, Tuesday, Thursday and Saturday      warfarin (COUMADIN) 2 MG Tab Take 1 Tablet by mouth every day at 6 PM. (Patient taking differently: Take 6 mg by mouth at bedtime. Monday, Wednesday, Friday)      lidocaine (ASPERFLEX) 4 % Patch Place 2 Patches on the skin every 24 hours.      methocarbamol (ROBAXIN) 750 MG Tab Take 1 Tablet by mouth 4 times a day.      senna-docusate (PERICOLACE OR SENOKOT S) 8.6-50 MG Tab Take 2 Tablets by mouth 2 times a day. (Patient taking differently: Take 2 Tablets by mouth at bedtime.)      timolol (TIMOPTIC) 0.5 % Solution Drop 1 Drop in both eyes 2 times a day. 15 mL 3    ezetimibe (ZETIA) 10 MG Tab TAKE 1 TABLET BY MOUTH EVERY DAY (Patient taking differently: Take 10 mg by mouth every day.) 90 Tablet 3    doxazosin (CARDURA) 8 MG tablet Take 1 tablet by mouth at bedtime. To lower blood pressure 100 Tablet 3    levothyroxine (SYNTHROID) 125 MCG Tab Take 1 Tablet by mouth every morning on an empty stomach. 100 Tablet 3    polyethylene glycol/lytes (MIRALAX) Pack Take 1 Packet by mouth every day. (Patient not taking: Reported on 11/23/2024)      Alirocumab (PRALUENT) 150 MG/ML Solution Auto-injector Inject 150 mg under the skin every 14 days. 6 mL 3       Allergies  Allergies   Allergen Reactions    Asa [Aspirin]      GI BLEED    Atorvastatin Shortness of Breath    Cymbalta [Duloxetine Hcl] Unspecified     Feels like a \"zombie\"    Hmg-Coa-R Inhibitors     Latex Swelling    Nsaids      GI BLEED    Tricor Shortness of Breath     Breathing problems      Trilipix [Choline Fenofibrate] Shortness of Breath     SOB    " Lyrica [Pregabalin]      SPACED OUT       Vital Signs last 24 hours  Temp:  [35.6 °C (96.1 °F)-36.3 °C (97.3 °F)] 35.6 °C (96.1 °F)  Pulse:  [51-76] 57  Resp:  [12-26] 14  BP: ()/(36-95) 96/46  SpO2:  [95 %-100 %] 100 %    Physical Exam  Physical Exam  Vitals and nursing note reviewed.   Constitutional:       General: She is in acute distress.      Appearance: She is ill-appearing.      Comments: Pale, complaining of left-sided flank pain, mild distress   HENT:      Mouth/Throat:      Mouth: Mucous membranes are dry.   Eyes:      Pupils: Pupils are equal, round, and reactive to light.   Cardiovascular:      Rate and Rhythm: Normal rate and regular rhythm.      Pulses: Normal pulses.   Pulmonary:      Effort: Pulmonary effort is normal. No respiratory distress.      Breath sounds: No wheezing.   Abdominal:      General: There is distension.      Tenderness: There is abdominal tenderness (Mild tenderness left flank, left upper quadrant).   Musculoskeletal:         General: Swelling present.      Cervical back: Neck supple.   Skin:     General: Skin is warm and dry.      Capillary Refill: Capillary refill takes less than 2 seconds.      Coloration: Skin is pale.   Neurological:      General: No focal deficit present.      Mental Status: She is oriented to person, place, and time.      Cranial Nerves: No cranial nerve deficit.         Fluids    Intake/Output Summary (Last 24 hours) at 11/23/2024 0846  Last data filed at 11/23/2024 0630  Gross per 24 hour   Intake 1000 ml   Output --   Net 1000 ml       Laboratory  Recent Results (from the past 48 hours)   POCT glucose device results    Collection Time: 11/23/24  5:10 AM   Result Value Ref Range    POC Glucose, Blood 179 (H) 65 - 99 mg/dL   EKG    Collection Time: 11/23/24  5:13 AM   Result Value Ref Range    Report       [facility] Emergency Dept.    Test Date:  2024-11-23  Pt Name:    LIMA FIGUEREDO               Department: ER  MRN:         [id number]                      Room:       [facility]  Gender:     Female                       Technician: [id number]  :        1950                   Requested By:ER TRIAGE PROTOCOL  Order #:    [id number]                    Reading MD: JULIUS NINO MD    Measurements  Intervals                                Axis  Rate:       75                           P:          41  PA:         226                          QRS:        26  QRSD:       124                          T:          49  QT:         426  QTc:        476    Interpretive Statements  Sinus rhythm  Prolonged PA interval  Nonspecific intraventricular conduction delay  Baseline wander in lead(s) II,III,aVR,aVF  Compared to ECG 2024 16:14:01  First degree AV block now present  Short PA interval no longer present  Electronically Signed On 2024 05:21:44 PST by DIPESH ARZOLA     CBC WITH DIFFERENTIAL    Collection Time: 24  5:17 AM   Result Value Ref Range    WBC 18.1 (H) 4.8 - 10.8 K/uL    RBC 1.97 (L) 4.20 - 5.40 M/uL    Hemoglobin 6.3 (L) 12.0 - 16.0 g/dL    Hematocrit 18.9 (L) 37.0 - 47.0 %    MCV 95.9 81.4 - 97.8 fL    MCH 32.0 27.0 - 33.0 pg    MCHC 33.3 32.2 - 35.5 g/dL    RDW 50.4 (H) 35.9 - 50.0 fL    Platelet Count 203 164 - 446 K/uL    MPV 9.5 9.0 - 12.9 fL    Neutrophils-Polys 91.30 (H) 44.00 - 72.00 %    Lymphocytes 2.90 (L) 22.00 - 41.00 %    Monocytes 4.30 0.00 - 13.40 %    Eosinophils 0.10 0.00 - 6.90 %    Basophils 0.20 0.00 - 1.80 %    Immature Granulocytes 1.20 (H) 0.00 - 0.90 %    Nucleated RBC 0.10 0.00 - 0.20 /100 WBC    Neutrophils (Absolute) 16.53 (H) 1.82 - 7.42 K/uL    Lymphs (Absolute) 0.53 (L) 1.00 - 4.80 K/uL    Monos (Absolute) 0.77 0.00 - 0.85 K/uL    Eos (Absolute) 0.01 0.00 - 0.51 K/uL    Baso (Absolute) 0.03 0.00 - 0.12 K/uL    Immature Granulocytes (abs) 0.22 (H) 0.00 - 0.11 K/uL    NRBC (Absolute) 0.02 K/uL   COMP METABOLIC PANEL    Collection Time: 24  5:17 AM   Result Value Ref Range    Sodium  124 (L) 135 - 145 mmol/L    Potassium 5.0 3.6 - 5.5 mmol/L    Chloride 96 96 - 112 mmol/L    Co2 14 (L) 20 - 33 mmol/L    Anion Gap 14.0 7.0 - 16.0    Glucose 180 (H) 65 - 99 mg/dL    Bun 49 (H) 8 - 22 mg/dL    Creatinine 1.78 (H) 0.50 - 1.40 mg/dL    Calcium 7.5 (L) 8.5 - 10.5 mg/dL    Correct Calcium 8.7 8.5 - 10.5 mg/dL    AST(SGOT) 29 12 - 45 U/L    ALT(SGPT) 16 2 - 50 U/L    Alkaline Phosphatase 50 30 - 99 U/L    Total Bilirubin 1.0 0.1 - 1.5 mg/dL    Albumin 2.5 (L) 3.2 - 4.9 g/dL    Total Protein 4.6 (L) 6.0 - 8.2 g/dL    Globulin 2.1 1.9 - 3.5 g/dL    A-G Ratio 1.2 g/dL   TROPONIN    Collection Time: 11/23/24  5:17 AM   Result Value Ref Range    Troponin T 43 (H) 6 - 19 ng/L   proBrain Natriuretic Peptide, NT    Collection Time: 11/23/24  5:17 AM   Result Value Ref Range    NT-proBNP 3698 (H) 0 - 125 pg/mL   C Reactive Protein Quantitative (Non-Cardiac)    Collection Time: 11/23/24  5:17 AM   Result Value Ref Range    Stat C-Reactive Protein 5.62 (H) 0.00 - 0.75 mg/dL   Procalcitonin    Collection Time: 11/23/24  5:17 AM   Result Value Ref Range    Procalcitonin 0.18 <0.25 ng/mL   ESTIMATED GFR    Collection Time: 11/23/24  5:17 AM   Result Value Ref Range    GFR (CKD-EPI) 30 (A) >60 mL/min/1.73 m 2   APTT    Collection Time: 11/23/24  5:45 AM   Result Value Ref Range    APTT 54.5 (H) 24.7 - 36.0 sec   PROTHROMBIN TIME (INR)    Collection Time: 11/23/24  5:45 AM   Result Value Ref Range    PT 28.7 (H) 12.0 - 14.6 sec    INR 2.69 (H) 0.87 - 1.13   COD (ADULT)    Collection Time: 11/23/24  5:45 AM   Result Value Ref Range    ABO Grouping Only A     Rh Grouping Only POS     Antibody Screen-Cod NEG     Component R       Red Blood Cells6    [id number]   issued       11/23/24 07:30    Product Type Red Blood Cells  LR Pheresis     Dispense Status issued     Unit Number (Barcoded) [id number]     Product Code (Barcoded) X1374S50     Blood Type (Barcoded) 0600    Lactic Acid    Collection Time: 11/23/24  5:45 AM    Result Value Ref Range    Lactic Acid 1.6 0.5 - 2.0 mmol/L   UN-XM'D RBC    Collection Time: 11/23/24  5:45 AM   Result Value Ref Range    Component R       Red Cells, LR       [id number]   issued       11/23/24 06:06    Product Type R99     Dispense Status issued     Unit Number (Barcoded) [id number]     Product Code (Barcoded) C4815S98     Blood Type (Barcoded) 5100    Blood Culture - Draw one from central line and one from peripheral site    Collection Time: 11/23/24  6:05 AM    Specimen: Line; Blood   Result Value Ref Range    Significant Indicator NEG     Source BLD     Site Peripheral     Culture Result       No Growth  Note: Blood cultures are incubated for 5 days and  are monitored continuously.Positive blood cultures  are called to the RN and reported as soon as  they are identified.     Blood Culture - Draw one from central line and one from peripheral site    Collection Time: 11/23/24  6:11 AM    Specimen: Peripheral; Blood   Result Value Ref Range    Significant Indicator NEG     Source BLD     Site PERIPHERAL     Culture Result       No Growth  Note: Blood cultures are incubated for 5 days and  are monitored continuously.Positive blood cultures  are called to the RN and reported as soon as  they are identified.     POC CoV-2, FLU A/B, RSV by PCR    Collection Time: 11/23/24  7:02 AM   Result Value Ref Range    POC Influenza A RNA, PCR Negative Negative    POC Influenza B RNA, PCR Negative Negative    POC RSV, by PCR Negative Negative    POC SARS-CoV-2, PCR NotDetected NotDetected     OP Note:   DATE OF SERVICE:  11/08/2024   PREOPERATIVE DIAGNOSIS:  Right displaced comminuted lateral condyle distal   femur fracture.  POSTOPERATIVE DIAGNOSIS:  Right displaced comminuted lateral condyle distal   femur fracture.  PROCEDURE PERFORMED:  Open treatment with internal fixation of right lateral   condyle distal femur fracture.  SURGEON:  Jason Camargo MD    Imaging  CT-RENAL COLIC EVALUATION(A/P W/O)    Final Result   Impression:      1. Large 17 x 10.7 x 9.5 cm hematoma type mixed density fluid collection in the left retroperitoneum and flank abdominal wall. This displaces the left kidney anteriorly. No significant hydronephrosis.      2. Moderate colonic distention similar to 2007 suggests ileus. No obstruction suggestion. Prior gastric surgery.      3. Heavy vascular calcification. No aneurysm. Sternal wires. Aortic valve prosthesis.      4. No kidney stone or hydronephrosis. Bull catheter in empty bladder.      5. Prior cholecystectomy.                        DX-CHEST-PORTABLE (1 VIEW)   Final Result   Impression:      1. Shallow. Mild left base atelectasis.      2. Cardiomegaly. Sternal wires. Cardiac valve prosthesis.                         Assessment/Plan  * Retroperitoneal hemorrhage- (present on admission)  Assessment & Plan  Suspect spontaneous retroperitoneal hemorrhage on anticoagulation and recent hospitalization/SNF  17 x 10.7 x 9.5 hematoma left retroperitoneum and flank abdominal wall  -Reviewed risk benefit analysis of anticoagulation reversal in the setting of mechanical aortic valve with patient and family briefly  -Recommend for factor PCC reversal given ongoing hypotension and large retroperitoneal hemorrhage  -Trend hemoglobin, transfuse as needed  -Follow closely in ICU, consider CTA, IR intervention/embolization if further bleeding after reversal of anticoagulation    Hemorrhagic shock (HCC)  Assessment & Plan  Transfusing 2 units PRBC emergently for large left retroperitoneal hemorrhage  Reverse warfarin with PCC  Trend hemoglobin  Monitor closely in ICU  Establish 2 large-bore IV access    Differential diagnosis for hypotension includes septic shock and cardiogenic shock.  Will follow-up echocardiogram, trend on telemetry, repeat EKG and troponin, check cortisol level  Blood cultures obtained in ED, 1 dose ceftriaxone given  Lungs clear on CT, urinalysis unremarkable.  Will hold off on  further antibiotics at this time    Nondisplaced unspecified condyle fracture of lower end of right femur, initial encounter for closed fracture (HCC)- (present on admission)  Assessment & Plan  Right displaced comminuted lateral condyle distal femoral fracture after ground-level fall  ORIF 11/8/2024-Dr. Camargo    Class 3 severe obesity due to excess calories with serious comorbidity and body mass index (BMI) of 40.0 to 44.9 in adult ([facility])- (present on admission)  Assessment & Plan  BMI 47    Chronic kidney disease (CKD) stage G3b/A1, moderately decreased glomerular filtration rate (GFR) between 30-44 mL/min/1.73 square meter and albuminuria creatinine ratio less than 30 mg/g- (present on admission)  Assessment & Plan  Follow renal function, avoid nephrotoxic agent    Paroxysmal atrial fibrillation ([facility])- (present on admission)  Assessment & Plan  Hold warfarin in the setting of acute retroperitoneal hemorrhage  Monitor on telemetry    Hx of mechanical aortic valve replacement [V43.3]- (present on admission)  Assessment & Plan  [facility] mechanical aortic valve placement 2007 Dr. Francis  On chronic anticoagulation with warfarin  Hold warfarin acutely given large retroperitoneal hemorrhage    Type 2 diabetes mellitus with kidney complication, without long-term current use of insulin ([facility])- (present on admission)  Assessment & Plan  Continue sliding scale insulin as needed for glycemic control  Follow-up A1c    Essential hypertension- (present on admission)  Assessment & Plan  Currently hypotensive, hold hold meds acutely    HILDA (obstructive sleep apnea)- (present on admission)  Assessment & Plan  HILDA with history of CPAP use  Continue    Hyperlipidemia- (present on admission)  Assessment & Plan  Continue Zetia  On o/p PCSK9 - Praluent (alirocumab)    CAD (coronary artery disease)- (present on admission)  Assessment & Plan  CABG times 3/2007 with aortic valve replacement  Resume home medications as  appropriate    Hypothyroidism- (present on admission)  Assessment & Plan  Continue Synthroid, follow-up TFTs        Discussed patient condition and risk of morbidity and/or mortality with Family, RN, Pharmacy, and ERP .    The patient remains critically ill.  Critical care time = 65 minutes in directly providing and coordinating critical care and extensive data review.  No time overlap and excludes procedures.

## 2024-11-23 NOTE — ED TRIAGE NOTES
Chief Complaint   Patient presents with    Shortness of Breath     BIBA from [facility] where she was recovering after repair of a R femur Fx. Pt woke up feeling SOB, good 02 saturations on room air, however pt was hypotensive initially (62/46) and is pale appearing. Pt also has distended abd and c/o constipation x 1 week. Given 250 cc NS en route. BP is improved at this time (119/95).      Pt ill appearing, pale.

## 2024-11-23 NOTE — PROGRESS NOTES
MONITOR SUMMARY  Rate: 50-76  Rhythm: Sinus Rhythm w/ 1st degree HB  Ectopy: None  Measurements: SC: 0.226, QRS 0.117, Qtc: 0.451,

## 2024-11-23 NOTE — ASSESSMENT & PLAN NOTE
Suspect spontaneous retroperitoneal hemorrhage on anticoagulation and recent hospitalization/SNF.  17 x 10.7 x 9.5 hematoma left retroperitoneum and flank abdominal wall.  She received PCC.  Trend hemoglobin and hemodynamics, transfuse as needed  Replete calcium  Follow closely in ICU, consider CTA, IR intervention/embolization if further bleeding after reversal of anticoagulation. Transfusing 2 units PRBC emergently for large left retroperitoneal hemorrhage  Large bore IV access.    Blood cultures obtained in ED-negative so far - defer antibiotics

## 2024-11-23 NOTE — ASSESSMENT & PLAN NOTE
Low urine output and worsening DIAMOND on CKD secondary to hemorrhagic shock.  FENa suggest pre-renal.  UA pending  Serial BMP  Monitor UOP  Hold isotonic bicarb with resolution of metabolic acidosis  Reviewed dialysis option with patient and family and patient asked to think about it but is leaning towards no

## 2024-11-23 NOTE — ASSESSMENT & PLAN NOTE
Continue sliding scale insulin as needed for glycemic control  Follow-up A1c  Hypoglycemic protocols

## 2024-11-23 NOTE — PROGRESS NOTES
1320 Dr. Clement notified that patient has only had 15ml UOP, bryant visually confirmed to be placed in urethra, and bladder scan performed showing 0-10ml urine present.   500ml NS  bolus orders received.

## 2024-11-24 ENCOUNTER — APPOINTMENT (OUTPATIENT)
Dept: RADIOLOGY | Facility: MEDICAL CENTER | Age: 74
End: 2024-11-24
Attending: INTERNAL MEDICINE
Payer: MEDICARE

## 2024-11-24 LAB
ALBUMIN SERPL BCP-MCNC: 2.6 G/DL (ref 3.2–4.9)
ALBUMIN/GLOB SERPL: 1.4 G/DL
ALP SERPL-CCNC: 55 U/L (ref 30–99)
ALT SERPL-CCNC: 20 U/L (ref 2–50)
ANION GAP SERPL CALC-SCNC: 13 MMOL/L (ref 7–16)
ANION GAP SERPL CALC-SCNC: 15 MMOL/L (ref 7–16)
AST SERPL-CCNC: 50 U/L (ref 12–45)
BILIRUB SERPL-MCNC: 0.7 MG/DL (ref 0.1–1.5)
BUN SERPL-MCNC: 59 MG/DL (ref 8–22)
BUN SERPL-MCNC: 63 MG/DL (ref 8–22)
CALCIUM ALBUM COR SERPL-MCNC: 8.5 MG/DL (ref 8.5–10.5)
CALCIUM SERPL-MCNC: 7.4 MG/DL (ref 8.5–10.5)
CALCIUM SERPL-MCNC: 7.4 MG/DL (ref 8.5–10.5)
CHLORIDE SERPL-SCNC: 95 MMOL/L (ref 96–112)
CHLORIDE SERPL-SCNC: 97 MMOL/L (ref 96–112)
CO2 SERPL-SCNC: 13 MMOL/L (ref 20–33)
CO2 SERPL-SCNC: 15 MMOL/L (ref 20–33)
CREAT SERPL-MCNC: 2.74 MG/DL (ref 0.5–1.4)
CREAT SERPL-MCNC: 2.97 MG/DL (ref 0.5–1.4)
ERYTHROCYTE [DISTWIDTH] IN BLOOD BY AUTOMATED COUNT: 46 FL (ref 35.9–50)
ERYTHROCYTE [DISTWIDTH] IN BLOOD BY AUTOMATED COUNT: 47.1 FL (ref 35.9–50)
ERYTHROCYTE [DISTWIDTH] IN BLOOD BY AUTOMATED COUNT: 48.9 FL (ref 35.9–50)
ERYTHROCYTE [DISTWIDTH] IN BLOOD BY AUTOMATED COUNT: 51 FL (ref 35.9–50)
GFR SERPLBLD CREATININE-BSD FMLA CKD-EPI: 16 ML/MIN/1.73 M 2
GFR SERPLBLD CREATININE-BSD FMLA CKD-EPI: 18 ML/MIN/1.73 M 2
GLOBULIN SER CALC-MCNC: 1.9 G/DL (ref 1.9–3.5)
GLUCOSE BLD STRIP.AUTO-MCNC: 113 MG/DL (ref 65–99)
GLUCOSE BLD STRIP.AUTO-MCNC: 134 MG/DL (ref 65–99)
GLUCOSE BLD STRIP.AUTO-MCNC: 135 MG/DL (ref 65–99)
GLUCOSE BLD STRIP.AUTO-MCNC: 135 MG/DL (ref 65–99)
GLUCOSE BLD STRIP.AUTO-MCNC: 137 MG/DL (ref 65–99)
GLUCOSE BLD STRIP.AUTO-MCNC: 155 MG/DL (ref 65–99)
GLUCOSE SERPL-MCNC: 137 MG/DL (ref 65–99)
GLUCOSE SERPL-MCNC: 143 MG/DL (ref 65–99)
HCT VFR BLD AUTO: 19 % (ref 37–47)
HCT VFR BLD AUTO: 22 % (ref 37–47)
HCT VFR BLD AUTO: 22.1 % (ref 37–47)
HCT VFR BLD AUTO: 23.7 % (ref 37–47)
HGB BLD-MCNC: 6.4 G/DL (ref 12–16)
HGB BLD-MCNC: 7.4 G/DL (ref 12–16)
HGB BLD-MCNC: 7.5 G/DL (ref 12–16)
HGB BLD-MCNC: 8 G/DL (ref 12–16)
INR PPP: 1.18 (ref 0.87–1.13)
LACTATE SERPL-SCNC: 1.4 MMOL/L (ref 0.5–2)
MAGNESIUM SERPL-MCNC: 2.2 MG/DL (ref 1.5–2.5)
MCH RBC QN AUTO: 30.1 PG (ref 27–33)
MCH RBC QN AUTO: 30.5 PG (ref 27–33)
MCH RBC QN AUTO: 30.9 PG (ref 27–33)
MCH RBC QN AUTO: 31.2 PG (ref 27–33)
MCHC RBC AUTO-ENTMCNC: 33.5 G/DL (ref 32.2–35.5)
MCHC RBC AUTO-ENTMCNC: 33.7 G/DL (ref 32.2–35.5)
MCHC RBC AUTO-ENTMCNC: 33.8 G/DL (ref 32.2–35.5)
MCHC RBC AUTO-ENTMCNC: 34.1 G/DL (ref 32.2–35.5)
MCV RBC AUTO: 89.1 FL (ref 81.4–97.8)
MCV RBC AUTO: 90.5 FL (ref 81.4–97.8)
MCV RBC AUTO: 90.9 FL (ref 81.4–97.8)
MCV RBC AUTO: 92.7 FL (ref 81.4–97.8)
PLATELET # BLD AUTO: 313 K/UL (ref 164–446)
PLATELET # BLD AUTO: 388 K/UL (ref 164–446)
PLATELET # BLD AUTO: 390 K/UL (ref 164–446)
PLATELET # BLD AUTO: 393 K/UL (ref 164–446)
PMV BLD AUTO: 9.1 FL (ref 9–12.9)
PMV BLD AUTO: 9.3 FL (ref 9–12.9)
PMV BLD AUTO: 9.4 FL (ref 9–12.9)
PMV BLD AUTO: 9.5 FL (ref 9–12.9)
POTASSIUM SERPL-SCNC: 5.2 MMOL/L (ref 3.6–5.5)
POTASSIUM SERPL-SCNC: 5.7 MMOL/L (ref 3.6–5.5)
PROT SERPL-MCNC: 4.5 G/DL (ref 6–8.2)
PROTHROMBIN TIME: 15.1 SEC (ref 12–14.6)
RBC # BLD AUTO: 2.05 M/UL (ref 4.2–5.4)
RBC # BLD AUTO: 2.43 M/UL (ref 4.2–5.4)
RBC # BLD AUTO: 2.43 M/UL (ref 4.2–5.4)
RBC # BLD AUTO: 2.66 M/UL (ref 4.2–5.4)
SODIUM SERPL-SCNC: 123 MMOL/L (ref 135–145)
SODIUM SERPL-SCNC: 125 MMOL/L (ref 135–145)
WBC # BLD AUTO: 34 K/UL (ref 4.8–10.8)
WBC # BLD AUTO: 37.4 K/UL (ref 4.8–10.8)
WBC # BLD AUTO: 42.6 K/UL (ref 4.8–10.8)
WBC # BLD AUTO: 43.5 K/UL (ref 4.8–10.8)

## 2024-11-24 PROCEDURE — 83605 ASSAY OF LACTIC ACID: CPT

## 2024-11-24 PROCEDURE — 700101 HCHG RX REV CODE 250: Performed by: INTERNAL MEDICINE

## 2024-11-24 PROCEDURE — A9270 NON-COVERED ITEM OR SERVICE: HCPCS | Performed by: INTERNAL MEDICINE

## 2024-11-24 PROCEDURE — 86923 COMPATIBILITY TEST ELECTRIC: CPT

## 2024-11-24 PROCEDURE — 700102 HCHG RX REV CODE 250 W/ 637 OVERRIDE(OP): Performed by: INTERNAL MEDICINE

## 2024-11-24 PROCEDURE — 700105 HCHG RX REV CODE 258: Performed by: INTERNAL MEDICINE

## 2024-11-24 PROCEDURE — 36430 TRANSFUSION BLD/BLD COMPNT: CPT

## 2024-11-24 PROCEDURE — 83735 ASSAY OF MAGNESIUM: CPT

## 2024-11-24 PROCEDURE — 80048 BASIC METABOLIC PNL TOTAL CA: CPT

## 2024-11-24 PROCEDURE — 97602 WOUND(S) CARE NON-SELECTIVE: CPT

## 2024-11-24 PROCEDURE — 80053 COMPREHEN METABOLIC PANEL: CPT

## 2024-11-24 PROCEDURE — 85610 PROTHROMBIN TIME: CPT

## 2024-11-24 PROCEDURE — 93970 EXTREMITY STUDY: CPT

## 2024-11-24 PROCEDURE — P9016 RBC LEUKOCYTES REDUCED: HCPCS

## 2024-11-24 PROCEDURE — 700111 HCHG RX REV CODE 636 W/ 250 OVERRIDE (IP): Mod: JZ

## 2024-11-24 PROCEDURE — 700111 HCHG RX REV CODE 636 W/ 250 OVERRIDE (IP): Mod: JZ,JG | Performed by: INTERNAL MEDICINE

## 2024-11-24 PROCEDURE — 85027 COMPLETE CBC AUTOMATED: CPT | Mod: 91

## 2024-11-24 PROCEDURE — 770022 HCHG ROOM/CARE - ICU (200)

## 2024-11-24 PROCEDURE — 99291 CRITICAL CARE FIRST HOUR: CPT | Performed by: INTERNAL MEDICINE

## 2024-11-24 PROCEDURE — 700111 HCHG RX REV CODE 636 W/ 250 OVERRIDE (IP): Mod: JZ | Performed by: INTERNAL MEDICINE

## 2024-11-24 PROCEDURE — 82962 GLUCOSE BLOOD TEST: CPT | Mod: 91

## 2024-11-24 RX ORDER — DEXTROSE MONOHYDRATE 25 G/50ML
25 INJECTION, SOLUTION INTRAVENOUS ONCE
Status: COMPLETED | OUTPATIENT
Start: 2024-11-24 | End: 2024-11-24

## 2024-11-24 RX ORDER — CALCIUM GLUCONATE 20 MG/ML
2 INJECTION, SOLUTION INTRAVENOUS ONCE
Status: COMPLETED | OUTPATIENT
Start: 2024-11-24 | End: 2024-11-24

## 2024-11-24 RX ORDER — METHOCARBAMOL 750 MG/1
750 TABLET, FILM COATED ORAL EVERY 6 HOURS PRN
Status: DISCONTINUED | OUTPATIENT
Start: 2024-11-24 | End: 2024-12-10 | Stop reason: HOSPADM

## 2024-11-24 RX ORDER — SODIUM CHLORIDE, SODIUM LACTATE, POTASSIUM CHLORIDE, AND CALCIUM CHLORIDE .6; .31; .03; .02 G/100ML; G/100ML; G/100ML; G/100ML
250 INJECTION, SOLUTION INTRAVENOUS ONCE
Status: COMPLETED | OUTPATIENT
Start: 2024-11-24 | End: 2024-11-24

## 2024-11-24 RX ORDER — MIDODRINE HYDROCHLORIDE 5 MG/1
5 TABLET ORAL EVERY 8 HOURS
Status: DISCONTINUED | OUTPATIENT
Start: 2024-11-24 | End: 2024-11-28

## 2024-11-24 RX ADMIN — OXYCODONE 5 MG: 5 TABLET ORAL at 00:08

## 2024-11-24 RX ADMIN — OXYCODONE 5 MG: 5 TABLET ORAL at 08:21

## 2024-11-24 RX ADMIN — MIDODRINE HYDROCHLORIDE 5 MG: 5 TABLET ORAL at 23:05

## 2024-11-24 RX ADMIN — CALCIUM GLUCONATE 2 G: 20 INJECTION, SOLUTION INTRAVENOUS at 10:15

## 2024-11-24 RX ADMIN — SODIUM CHLORIDE, POTASSIUM CHLORIDE, SODIUM LACTATE AND CALCIUM CHLORIDE 250 ML: 600; 310; 30; 20 INJECTION, SOLUTION INTRAVENOUS at 18:40

## 2024-11-24 RX ADMIN — LIDOCAINE 2 PATCH: 4 PATCH TOPICAL at 05:09

## 2024-11-24 RX ADMIN — SENNOSIDES AND DOCUSATE SODIUM 2 TABLET: 50; 8.6 TABLET ORAL at 17:06

## 2024-11-24 RX ADMIN — NOREPINEPHRINE BITARTRATE 0.18 MCG/KG/MIN: 0.03 INJECTION, SOLUTION INTRAVENOUS at 05:10

## 2024-11-24 RX ADMIN — EZETIMIBE 10 MG: 10 TABLET ORAL at 05:09

## 2024-11-24 RX ADMIN — SODIUM ZIRCONIUM CYCLOSILICATE 10 G: 10 POWDER, FOR SUSPENSION ORAL at 23:05

## 2024-11-24 RX ADMIN — SODIUM BICARBONATE 50 MEQ: 84 INJECTION INTRAVENOUS at 02:14

## 2024-11-24 RX ADMIN — MIDODRINE HYDROCHLORIDE 5 MG: 5 TABLET ORAL at 13:57

## 2024-11-24 RX ADMIN — INSULIN HUMAN 5 UNITS: 1 INJECTION, SOLUTION INTRAVENOUS at 18:36

## 2024-11-24 RX ADMIN — METHOCARBAMOL TABLETS 750 MG: 750 TABLET, COATED ORAL at 09:34

## 2024-11-24 RX ADMIN — TIMOLOL MALEATE 1 DROP: 5 SOLUTION OPHTHALMIC at 17:08

## 2024-11-24 RX ADMIN — TIMOLOL MALEATE 1 DROP: 5 SOLUTION OPHTHALMIC at 05:09

## 2024-11-24 RX ADMIN — OXYCODONE 5 MG: 5 TABLET ORAL at 13:57

## 2024-11-24 RX ADMIN — OXYCODONE 10 MG: 5 TABLET ORAL at 20:47

## 2024-11-24 RX ADMIN — LEVOTHYROXINE SODIUM 125 MCG: 0.12 TABLET ORAL at 05:09

## 2024-11-24 RX ADMIN — SODIUM BICARBONATE 100 MEQ: 84 INJECTION INTRAVENOUS at 18:38

## 2024-11-24 RX ADMIN — DEXTROSE MONOHYDRATE 25 G: 25 INJECTION, SOLUTION INTRAVENOUS at 18:34

## 2024-11-24 ASSESSMENT — ENCOUNTER SYMPTOMS
FEVER: 0
EYES NEGATIVE: 1
VOMITING: 0
SPUTUM PRODUCTION: 0
HEADACHES: 0
NAUSEA: 0
DIZZINESS: 0
SORE THROAT: 0
COUGH: 0
CHILLS: 0
NERVOUS/ANXIOUS: 0
BRUISES/BLEEDS EASILY: 0
MYALGIAS: 1
WEAKNESS: 1
ABDOMINAL PAIN: 1
ABDOMINAL PAIN: 0
PALPITATIONS: 0
BLOOD IN STOOL: 0
SHORTNESS OF BREATH: 0

## 2024-11-24 ASSESSMENT — PAIN DESCRIPTION - PAIN TYPE
TYPE: ACUTE PAIN;CHRONIC PAIN
TYPE: ACUTE PAIN
TYPE: ACUTE PAIN;CHRONIC PAIN
TYPE: ACUTE PAIN

## 2024-11-24 NOTE — ASSESSMENT & PLAN NOTE
Oliguric. Prerenal DIAMOND on CKD secondary to hemorrhagic shock. Cr 1.8 at baseline, uptrending. FeNa 03%.  -NS IV  -Avoid nephrotoxins, NSAIDs  -Monitor

## 2024-11-24 NOTE — CARE PLAN
The patient is Watcher - Medium risk of patient condition declining or worsening    Shift Goals  Clinical Goals: Stable hemodynamics  Patient Goals: Pain control  Family Goals: HEATHER      Problem: Pain - Standard  Goal: Alleviation of pain or a reduction in pain to the patient’s comfort goal  Description: Target End Date:  Prior to discharge or change in level of care    Document on Vitals flowsheet    1.  Document pain using the appropriate pain scale per order or unit policy  2.  Educate and implement non-pharmacologic comfort measures (i.e. relaxation, distraction, massage, cold/heat therapy, etc.)  3.  Pain management medications as ordered  4.  Reassess pain after pain med administration per policy  5.  If opiods administered assess patient's response to pain medication is appropriate per POSS sedation scale  6.  Follow pain management plan developed in collaboration with patient and interdisciplinary team (including palliative care or pain specialists if applicable)  Outcome: Progressing

## 2024-11-24 NOTE — PROGRESS NOTES
[facility] ICU Progress Note      Admit Date: 11/23/2024    Resident(s): Aubree Holloway M.D.   Attending:  JOANIE MALONEY/ Dr. Jefferson    Patient ID:    Name:  Yenifer Beasley   YOB: 1950  Age:  74 y.o.  female   MRN:  [id number]    Hospital Course (carried forward and updated):  Yenifer Beasley is a 74 y.o. female with a PMH of paroxysmal atrial fibrillation, Hx of mechanical AVR 2007, CKD 3B, and hypothyroidism admitted 11/23/2024 for hemorrhagic shock 2/2 retroperitoneal hematoma. Recent hospitalization 11/07-11/15 for right femoral fracture s/p ORIF, was discharged to SNF.     Consultants:  Critical Care     Interval Events:    11/24: Hb 6.4 this AM, transfused 1 unit PRBC. Continuing NS IV. Remains on Levophed. CODE STATUS changed to DNAR/DNI after GOC discussion with Dr. Jefferson.       Vitals Range last 24h:  Temp:  [35.5 °C (95.9 °F)-36.5 °C (97.7 °F)] 36.3 °C (97.4 °F)  Pulse:  [63-79] 74  Resp:  [14-38] 16  BP: ()/(37-81) 101/54  SpO2:  [86 %-100 %] 99 %      Intake/Output Summary (Last 24 hours) at 11/24/2024 1332  Last data filed at 11/24/2024 1000  Gross per 24 hour   Intake 2862.25 ml   Output 73 ml   Net 2789.25 ml        Review of Systems   Constitutional:  Positive for malaise/fatigue. Negative for chills and fever.   Respiratory:  Negative for cough and shortness of breath.    Cardiovascular:  Negative for chest pain and palpitations.   Gastrointestinal:  Negative for abdominal pain, nausea and vomiting.   Musculoskeletal:  Positive for joint pain and myalgias.   Neurological:  Negative for dizziness and headaches.       PHYSICAL EXAM:  Vitals:    11/24/24 1245 11/24/24 1300 11/24/24 1315 11/24/24 1321   BP: 104/54 104/55 101/54 101/54   Pulse: 72 75 75 74   Resp: (!) 26 (!) 27 (!) 34 16   Temp:    36.3 °C (97.4 °F)   TempSrc:       SpO2: 92% 99% 94% 99%   Weight:       Height:        Body mass index is 46.37 kg/m².    O2 therapy: Pulse Oximetry: 99 %, O2 (LPM): 0, O2  Delivery Device: Room air w/o2 available    Date 11/24/24 0700 - 11/25/24 0659   Shift 8578-5740 1102-5686 7384-0438 24 Hour Total   INTAKE   I.V. 165.8   165.8     Norepinephrine Volume 66.5   66.5     Volume (mL) (NS infusion) 99.3   99.3   Blood 281.3   281.3     Volume (RELEASE RED BLOOD CELLS) 281.3   281.3   Shift Total 447   447   OUTPUT   Urine 63   63     Output (mL) (Urethral Catheter Latex) 63   63   Shift Total 63   63      384        Physical Exam  Constitutional:       Appearance: She is obese.   HENT:      Head: Normocephalic and atraumatic.   Eyes:      Extraocular Movements: Extraocular movements intact.      Conjunctiva/sclera: Conjunctivae normal.   Cardiovascular:      Rate and Rhythm: Normal rate and regular rhythm.   Pulmonary:      Effort: No respiratory distress.      Breath sounds: Normal breath sounds.   Abdominal:      General: Abdomen is flat. There is no distension.      Palpations: Abdomen is soft.   Musculoskeletal:      Right lower leg: Edema present.      Left lower leg: Edema present.   Skin:     General: Skin is warm and dry.   Neurological:      General: No focal deficit present.      Mental Status: She is alert and oriented to person, place, and time.   Psychiatric:         Mood and Affect: Mood normal.         Behavior: Behavior normal.             Recent Labs     11/23/24  1200 11/23/24  1729 11/24/24  0544   SODIUM 126* 125* 125*   POTASSIUM 4.9 5.0 5.2   CHLORIDE 99 99 97   CO2 15* 12* 15*   BUN 52* 53* 59*   CREATININE 2.33* 2.47* 2.74*   MAGNESIUM 2.2  --  2.2   CALCIUM 7.5* 7.3* 7.4*     Recent Labs     11/23/24  0517 11/23/24  1200 11/23/24  1729 11/24/24  0544   ALTSGPT 16  --   --  20   ASTSGOT 29  --   --  50*   ALKPHOSPHAT 50  --   --  55   TBILIRUBIN 1.0  --   --  0.7   GLUCOSE 180* 185* 165* 143*     Recent Labs     11/23/24  0545 11/23/24  1130 11/24/24  0005 11/24/24  0544 11/24/24  1203   RBC  --    < > 2.43* 2.05* 2.43*   HEMOGLOBIN  --    < > 7.5* 6.4*  7.4*   HEMATOCRIT  --    < > 22.0* 19.0* 22.1*   PLATELETCT  --    < > 390 393 388   PROTHROMBTM 28.7*  --   --  15.1*  --    APTT 54.5*  --   --   --   --    INR 2.69*  --   --  1.18*  --     < > = values in this interval not displayed.     Recent Labs     11/23/24  0517 11/23/24  1130 11/24/24  0005 11/24/24  0544 11/24/24  1203   WBC 18.1*   < > 34.0* 37.4* 42.6*   NEUTSPOLYS 91.30*  --   --   --   --    LYMPHOCYTES 2.90*  --   --   --   --    MONOCYTES 4.30  --   --   --   --    EOSINOPHILS 0.10  --   --   --   --    BASOPHILS 0.20  --   --   --   --    ASTSGOT 29  --   --  50*  --    ALTSGPT 16  --   --  20  --    ALKPHOSPHAT 50  --   --  55  --    TBILIRUBIN 1.0  --   --  0.7  --     < > = values in this interval not displayed.       Meds:   methocarbamol  750 mg      midodrine  5 mg      HYDROmorphone  0.5 mg      ezetimibe  10 mg      levothyroxine  125 mcg      lidocaine  2 Patch      oxyCODONE immediate release  5-10 mg      polyethylene glycol/lytes  17 g      timolol  1 Drop      Respiratory Therapy Consult        acetaminophen  650 mg      senna-docusate  2 Tablet      And    polyethylene glycol/lytes  1 Packet      ondansetron  4 mg      ondansetron  4 mg      insulin lispro  2-9 Units      And    dextrose bolus  25 g      NORepinephrine  0-1 mcg/kg/min (Ideal) 0.2 mcg/kg/min (11/24/24 1000)        Procedures:  None    Imaging:  US-EXTREMITY VENOUS LOWER BILAT   Final Result      EC-ECHOCARDIOGRAM COMPLETE W/ CONT   Final Result      CT-RENAL COLIC EVALUATION(A/P W/O)   Final Result   Impression:      1. Large 17 x 10.7 x 9.5 cm hematoma type mixed density fluid collection in the left retroperitoneum and flank abdominal wall. This displaces the left kidney anteriorly. No significant hydronephrosis.      2. Moderate colonic distention similar to 2007 suggests ileus. No obstruction suggestion. Prior gastric surgery.      3. Heavy vascular calcification. No aneurysm. Sternal wires. Aortic valve  prosthesis.      4. No kidney stone or hydronephrosis. Bull catheter in empty bladder.      5. Prior cholecystectomy.                        DX-CHEST-PORTABLE (1 VIEW)   Final Result   Impression:      1. Shallow. Mild left base atelectasis.      2. Cardiomegaly. Sternal wires. Cardiac valve prosthesis.                         ASSESSEMENT and PLAN:    * Hemorrhagic shock (HCC)  Assessment & Plan  Secondary to retroperitoneal hemorrhage.  S/p PCC reversal on admission.  Received 3 units PRBC this hospital stay.  -Holding outpatient warfarin  -Continuous NS IV  -Monitor, transfuse for Hb < 8.0    Retroperitoneal hemorrhage- (present on admission)  Assessment & Plan  See assessment and plan for hemorrhagic shock    Nondisplaced unspecified condyle fracture of lower end of right femur, initial encounter for closed fracture (HCC)- (present on admission)  Assessment & Plan  S/p ORIF 11/08  -Pain control    Hx of mechanical aortic valve replacement [V43.3]- (present on admission)  Assessment & Plan  -Holding outpatient warfarin in the setting of hemorrhagic shock    CAD (coronary artery disease)- (present on admission)  Assessment & Plan  S/p CABG 2007. ECHO this hospital stay with EF 75%.     Chronic kidney disease (CKD) stage G3b/A1, moderately decreased glomerular filtration rate (GFR) between 30-44 mL/min/1.73 square meter and albuminuria creatinine ratio less than 30 mg/g- (present on admission)  Assessment & Plan  DIAMOND on CKD secondary to hemorrhagic shock. Cr 1.8 at baseline, uptrending.  -Continuous NS IV  -Avoid nephrotoxins, NSAIDs  -Monitor    Paroxysmal atrial fibrillation (HCC)- (present on admission)  Assessment & Plan  -Holding outpatient warfarin in the setting of hemorrhagic shock    Type 2 diabetes mellitus with kidney complication, without long-term current use of insulin (HCC)- (present on admission)  Assessment & Plan  -SSI    HILDA (obstructive sleep apnea)- (present on admission)  Assessment &  Plan  -Continue CPAP    Hyperlipidemia- (present on admission)  Assessment & Plan  -Continue outpatient ezetimibe  -Holding outpatient alirocumab while inpatient    Hypothyroidism- (present on admission)  Assessment & Plan  -Continue outpatient levothyroxine        DISPO: ICU    CODE STATUS: DNAR/DNI    Quality Measures:  Feeding: Renal  Analgesia: Hydromorphone, oxycodone  Sedation: None  Thromboprophylaxis: SCDs  Head of bed: >30 degrees  Ulcer prophylaxis: None  Glycemic control: Correctional: Lispro / Basal: None  Bowel care: Bowel regimen  Indwelling lines: PIV  Deescalation of antibiotics: None      Aubree Holloway M.D.

## 2024-11-24 NOTE — ASSESSMENT & PLAN NOTE
Secondary to retroperitoneal hemorrhage.  S/p PCC reversal on admission.  Received 6 units PRBC this hospital stay.  -Holding outpatient warfarin  -Monitor, transfuse for Hb < 8.0  -Monitor hypocalcemia, replete as necessary

## 2024-11-24 NOTE — PROGRESS NOTES
Critical Care Progress Note    Date of admission  11/23/2024    Chief Complaint  74 y.o. female, PMH paroxysmal atrial fibrillation, mechanical aortic valve replacement 2007, CKD 3B, hypothyroidism, glaucoma recent hospitalization 11/7 - 11-15 after ground-level fall with right displaced comminuted lateral condyle distal femoral fracture, ORIF on 11/8 and discharged to SNF, who presented 11/23/2024 in transfer from SNF where she was recovering with complains of dyspnea and hypotension, initially SBP 62/46 and pale appearing.  She has been complaining of constipation and has a distended abdomen with left abdominal flank pain.  She received 1 L of crystalloid in the ED and workup included replacement of Bull catheter and CT abdomen without contrast renal colic exam.  This showed a large 17 x 10.7 x 9.5 cm hematoma in the left retroperitoneum and flank abdominal wall.  Hemoglobin was 6.3, down from 7.6 at discharge.  She is receiving 2 L of crystalloid and has been fluid responsive.  Main complaint is that currently is generalized pain more localizing over the left abdomen.  Current INR 2.69.  She is afebrile and denies fever or chills.  White blood cell count is 18.1.   (Dr Clement HPI 11/23)    Hospital Course  No notes on file    Interval Problem Update  Reviewed last 24 hour events:    A&O x4  Follows well  SR 60-70s  SBP 90-100s  Norepinephrine 0.18  Fluid balance  Status post   PRBCs  Home auto CPAP 5-10  WOB low  RR teens  Room air O2 sats 95-97%   Desat with sleep 2 lmp  Tmax 97.7, WBC 37.4, greater than 30 for several days  Blood cultures negative so far  Nasal swab PCR panel negative  Recent negative MRSA nares PCR  Procalcitonin normal  Given 2 g ceftriaxone x 1 through ER  Hemoglobin 6.4 down from 7.5    INR 1.18, PT 15.1  , K 5.2, HCO3 15, AG 13, Mg 2.2, Ca 7.4  BUN 59, creatinine 2.74 both mildly increased  AST 50, ALT 20, alk phos 55, T. bili 0.7  Albumin 2.6  SSI  Levothyroxine    Transfuse  1 unit PRBCs and continue serial hemoglobin  May need nephrology consultation  Usual DIAMOND precautions  Check TEG with mapping, DDAVP/platelets?  Antibiotics?  BMP, check lactic acid  Hold antibiotics?  Ca IV    Extended conversation re: goals of care with son and his wife with their children.  Nursing staff present as well.  Daughter works here at [employer] and as clinical experience  The patient was very clear she wanted treatment but not aggressive approach.  No CPR no defibrillation.  Likely no dialysis.  Okay with central line if necessary.  On follow-up after blood blood pressure still acceptable and norepinephrine dose By 50%, adding midodrine-hopefully will be off norepinephrine.  Unfortunately urine output still poor repeat BMP and lactic acid pending.      While actively bleeding using hemoglobin 8 as goal and hemoglobin follow-up after 1 unit of blood 7.4, will transfuse a second unit which is infusing now, follow-up norepinephrine dose now 0.04 almost off    Repeat ID review of systems completely negative, blood cultures pending, status post ceftriaxone yesterday empirically, white count elevated query leukemoid reaction to bleed/transfusions.    Review of Systems  Review of Systems   Constitutional:  Positive for malaise/fatigue. Negative for chills and fever.   HENT:  Negative for congestion and sore throat.    Eyes: Negative.    Respiratory:  Negative for cough, sputum production and shortness of breath.    Cardiovascular:  Negative for chest pain.   Gastrointestinal:  Positive for abdominal pain (Improved). Negative for blood in stool, melena, nausea and vomiting.   Genitourinary:  Negative for dysuria.   Musculoskeletal:  Positive for joint pain (Right lower extremity).   Neurological:  Positive for weakness (Weak all over especially right lower extremity). Negative for headaches.   Endo/Heme/Allergies:  Does not bruise/bleed easily.   Psychiatric/Behavioral:  The patient is not nervous/anxious.          Vital Signs for last 24 hours   Temp:  [36.1 °C (96.9 °F)-36.5 °C (97.7 °F)] 36.3 °C (97.4 °F)  Pulse:  [65-84] 67  Resp:  [16-39] 28  BP: ()/(40-81) 87/40  SpO2:  [86 %-100 %] 99 %    Hemodynamic parameters for last 24 hours       Respiratory Information for the last 24 hours       Physical Exam   Physical Exam  Vitals reviewed.   Constitutional:       Appearance: She is morbidly obese. She is not ill-appearing.   HENT:      Head: Normocephalic and atraumatic.      Mouth/Throat:      Mouth: Mucous membranes are dry.   Eyes:      General: No scleral icterus.     Pupils: Pupils are equal, round, and reactive to light.   Cardiovascular:      Rate and Rhythm: Normal rate and regular rhythm.      Heart sounds: No murmur heard.     No gallop.   Pulmonary:      Effort: Pulmonary effort is normal.      Breath sounds: No wheezing, rhonchi or rales.   Abdominal:      General: Abdomen is protuberant. Bowel sounds are normal. There is no distension.      Palpations: Abdomen is soft.      Tenderness: There is abdominal tenderness (Left-sided). There is no right CVA tenderness, left CVA tenderness or guarding.   Musculoskeletal:      Right lower leg: No edema.      Left lower leg: No edema.   Skin:     General: Skin is warm and dry.      Capillary Refill: Capillary refill takes less than 2 seconds.   Neurological:      General: No focal deficit present.      Mental Status: She is alert and oriented to person, place, and time. Mental status is at baseline.   Psychiatric:         Mood and Affect: Mood normal.         Behavior: Behavior normal.         Thought Content: Thought content normal.         Medications  Current Facility-Administered Medications   Medication Dose Route Frequency Provider Last Rate Last Admin    methocarbamol (Robaxin) tablet 750 mg  750 mg Oral Q6HRS PRN Robert Jefferson M.D.        midodrine (Proamatine) tablet 5 mg  5 mg Oral Q8HRS Rboert Jefferson M.D.   5 mg at 11/24/24 9923     HYDROmorphone (Dilaudid) injection 0.5 mg  0.5 mg Intravenous Q3HRS PRN Elliot Clement M.D.        ezetimibe (Zetia) tablet 10 mg  10 mg Oral DAILY Elliot Clement M.D.   10 mg at 11/24/24 0509    levothyroxine (Synthroid) tablet 125 mcg  125 mcg Oral AM ES Elliot Clement M.D.   125 mcg at 11/24/24 0509    lidocaine (Asperflex) 4 % patch 2 Patch  2 Patch Transdermal Q24HRS Elliot Clement M.D.   2 Patch at 11/24/24 0509    oxyCODONE immediate-release (Roxicodone) tablet 5-10 mg  5-10 mg Oral Q6HRS PRN Elliot Clement M.D.   5 mg at 11/24/24 1357    polyethylene glycol/lytes (Miralax) Packet 1 Packet  17 g Oral DAILY Elliot Clement M.D.   1 Packet at 11/23/24 1144    timolol (Timoptic) 0.5 % ophthalmic solution 1 Drop  1 Drop Both Eyes BID Elliot Clement M.D.   1 Drop at 11/24/24 0509    Respiratory Therapy Consult   Nebulization Continuous RT Elliot Clement M.D.        acetaminophen (Tylenol) tablet 650 mg  650 mg Oral Q6HRS PRN Elliot Clement M.D.   650 mg at 11/23/24 1201    senna-docusate (Pericolace Or Senokot S) 8.6-50 MG per tablet 2 Tablet  2 Tablet Oral Q EVENING Elliot Clement M.D.   2 Tablet at 11/23/24 1806    And    polyethylene glycol/lytes (Miralax) Packet 1 Packet  1 Packet Oral QDAY PRN Elliot Clement M.D.   1 Packet at 11/23/24 1226    ondansetron (Zofran) syringe/vial injection 4 mg  4 mg Intravenous Q4HRS PRN Elliot Clement M.D.        ondansetron (Zofran ODT) dispertab 4 mg  4 mg Oral Q4HRS PRN Elliot Clement M.D.        insulin lispro (HumaLOG,AdmeLOG) subcutaneous injection  2-9 Units Subcutaneous 4X/DAY ACHS Elliot Clement M.D.   2 Units at 11/23/24 1855    And    dextrose 50% (D50W) injection 25 g  25 g Intravenous Q15 MIN PRN lEliot Clement M.D.        norepinephrine (Levophed) 8 mg in 250 mL NS infusion (premix)  0-1 mcg/kg/min (Ideal) Intravenous Continuous Elliot Clement M.D. 3.4 mL/hr at 11/24/24 1400 0.04 mcg/kg/min at 11/24/24 1400       Fluids    Intake/Output Summary  (Last 24 hours) at 11/24/2024 1526  Last data filed at 11/24/2024 1400  Gross per 24 hour   Intake 3092.92 ml   Output 103 ml   Net 2989.92 ml       Laboratory          Recent Labs     11/23/24  1200 11/23/24  1729 11/24/24  0544   SODIUM 126* 125* 125*   POTASSIUM 4.9 5.0 5.2   CHLORIDE 99 99 97   CO2 15* 12* 15*   BUN 52* 53* 59*   CREATININE 2.33* 2.47* 2.74*   MAGNESIUM 2.2  --  2.2   CALCIUM 7.5* 7.3* 7.4*     Recent Labs     11/23/24  0517 11/23/24  1200 11/23/24  1729 11/24/24  0544   ALTSGPT 16  --   --  20   ASTSGOT 29  --   --  50*   ALKPHOSPHAT 50  --   --  55   TBILIRUBIN 1.0  --   --  0.7   GLUCOSE 180* 185* 165* 143*     Recent Labs     11/23/24  0517 11/23/24  1130 11/24/24  0005 11/24/24  0544 11/24/24  1203   WBC 18.1*   < > 34.0* 37.4* 42.6*   NEUTSPOLYS 91.30*  --   --   --   --    LYMPHOCYTES 2.90*  --   --   --   --    MONOCYTES 4.30  --   --   --   --    EOSINOPHILS 0.10  --   --   --   --    BASOPHILS 0.20  --   --   --   --    ASTSGOT 29  --   --  50*  --    ALTSGPT 16  --   --  20  --    ALKPHOSPHAT 50  --   --  55  --    TBILIRUBIN 1.0  --   --  0.7  --     < > = values in this interval not displayed.     Recent Labs     11/23/24  0545 11/23/24  1130 11/24/24  0005 11/24/24  0544 11/24/24  1203   RBC  --    < > 2.43* 2.05* 2.43*   HEMOGLOBIN  --    < > 7.5* 6.4* 7.4*   HEMATOCRIT  --    < > 22.0* 19.0* 22.1*   PLATELETCT  --    < > 390 393 388   PROTHROMBTM 28.7*  --   --  15.1*  --    APTT 54.5*  --   --   --   --    INR 2.69*  --   --  1.18*  --     < > = values in this interval not displayed.       Imaging  X-Ray:  I have personally reviewed the images and compared with prior images.  EKG:  I have personally reviewed the images and compared with prior images.  CT:    Reviewed    Assessment/Plan  * Hemorrhagic shock (HCC)  Assessment & Plan  Transfusing 2 units PRBC emergently for large left retroperitoneal hemorrhage  Reversed warfarin with PCC in the ER  Trend hemoglobin every 6, with  DIAMOND and active bleeding will use hemoglobin goal of 8 for now  Monitor closely in ICU  Contain 2 large-bore IV access, has 3    Differential diagnosis for hypotension includes septic shock and cardiogenic shock.  Will follow-up echocardiogram, trend on telemetry, repeat EKG and troponin,   Cortisol level 62.6  Blood cultures obtained in ED-negative so far   Ceftriaxone 1 dose given in ED but not continued by admitting team  ID ROS negative x 2 today although WBC elevated-query reaction of blood/shock or leukemoid reaction  Lungs clear on CT, urinalysis unremarkable.  Will hold off on further antibiotics at this time    2 g packed cells given in the ER 11/23  Hemoglobin 6.4 a.m. 11/24 1 unit packed cells given  Follow-up hemoglobin 7.4, blood pressure still soft, second unit packed cells given-now totaling 4 units    Retroperitoneal hemorrhage- (present on admission)  Assessment & Plan  Suspect spontaneous retroperitoneal hemorrhage on anticoagulation and recent hospitalization/SNF  17 x 10.7 x 9.5 hematoma left retroperitoneum and flank abdominal wall  -Reviewed risk benefit analysis of anticoagulation reversal in the setting of mechanical aortic valve with patient and family briefly  -Recommend for factor PCC reversal given ongoing hypotension and large retroperitoneal hemorrhage  -Trend hemoglobin, transfuse as needed  -Follow closely in ICU, consider CTA, IR intervention/embolization if further bleeding after reversal of anticoagulation  Appears to be stabilizing?  Follow hemoglobin and hemodynamics    Nondisplaced unspecified condyle fracture of lower end of right femur, initial encounter for closed fracture (HCC)- (present on admission)  Assessment & Plan  Right displaced comminuted lateral condyle distal femoral fracture after ground-level fall  ORIF 11/8/2024-Dr. Camargo  Dressing removed and wound examined, looks clean and dry without any signs of infection but 16 days out    Class 3 severe obesity due to  excess calories with serious comorbidity and body mass index (BMI) of 40.0 to 44.9 in adult (HCC)- (present on admission)  Assessment & Plan  BMI 47    Chronic kidney disease (CKD) stage G3b/A1, moderately decreased glomerular filtration rate (GFR) between 30-44 mL/min/1.73 square meter and albuminuria creatinine ratio less than 30 mg/g- (present on admission)  Assessment & Plan  Low urine output and worsening DIAMOND/CKD secondary to hemorrhagic shock  Serial BMP  Monitor UO  Avoid nephrotoxins  Volume resuscitation ongoing primarily with packed cells  Encouraging p.o., IV fluids if does not take p.o. well  Bull catheter monitor urine output  Renally dose medicines as clinically prudent  Reviewed dialysis option with patient and family and patient asked to think about it but is leaning towards no    Paroxysmal atrial fibrillation ([facility])- (present on admission)  Assessment & Plan  Hold warfarin in the setting of acute retroperitoneal hemorrhage  Monitor on telemetry  Optimize electrolytes    Hx of mechanical aortic valve replacement [V43.3]- (present on admission)  Assessment & Plan  [facility] mechanical aortic valve placement 2007 Dr. Francis  On chronic anticoagulation with warfarin  Hold warfarin acutely given large retroperitoneal hemorrhage  Informed family of risk of valvular dysfunction and possible CVA but will need to hold anticoagulation for a few days and reassess daily    Type 2 diabetes mellitus with kidney complication, without long-term current use of insulin ([facility])- (present on admission)  Assessment & Plan  Continue sliding scale insulin as needed for glycemic control  Follow-up A1c  Hypoglycemic protocols    Essential hypertension- (present on admission)  Assessment & Plan  BP low on pressors but improving, holding antihypertensive regimen for now    HILDA (obstructive sleep apnea)- (present on admission)  Assessment & Plan  HILDA with history of CPAP use  Continue CPAP as she is willing to use  BMI  46.4  Normal TSH    Hyperlipidemia- (present on admission)  Assessment & Plan  Continue Zetia  On o/p PCSK9 - Praluent (alirocumab)    CAD (coronary artery disease)- (present on admission)  Assessment & Plan  CABG times 3/2007 with aortic valve replacement  Resume home medications as appropriate  Follow-up echo if remains on pressors    Hypothyroidism- (present on admission)  Assessment & Plan  Continue Synthroid  TSH normal         VTE:  Contraindicated  Ulcer: Not Indicated  Lines: Bull Catheter  Ongoing indication addressed    I have performed a physical exam and reviewed and updated ROS and Plan today (11/24/2024). In review of yesterday's note (11/23/2024), there are no changes except as documented above.     Discussed patient condition and risk of morbidity and/or mortality with Family, RN, RT, [employer] [facility] resident, Code status disscussed, Charge nurse / hot rounds, and Patient  The patient remains critically ill.  Critical care time = 60 minutes in directly providing and coordinating critical care and extensive data review.  No time overlap and excludes procedures.

## 2024-11-24 NOTE — ACP (ADVANCE CARE PLANNING)
"CODE STATUS DISCUSSION     Patient desired to discuss this topic with MD. Patient understands current situation, she is able to weight benefits, risks and limitations of interventions, she is able to express her decision of declining any form of resuscitation and would like her body to \"pass naturally if the time comes\". Based on our conversation I believe she is more inclined to give priority to comfort and QOL rather than longevity at this point, as she feels she \"has gone through a lot\". She is not ready to make the decision about intubation, she would like to think about a Do Not Intubate decision at this time. Code status changed to DNR/ I OK. Son and daughter in law, who is a CCU nurse, were present during the conversation. All questions answered.   "

## 2024-11-25 ENCOUNTER — APPOINTMENT (OUTPATIENT)
Dept: RADIOLOGY | Facility: MEDICAL CENTER | Age: 74
DRG: 371 | End: 2024-11-25
Attending: EMERGENCY MEDICINE
Payer: MEDICARE

## 2024-11-25 ENCOUNTER — APPOINTMENT (OUTPATIENT)
Dept: RADIOLOGY | Facility: MEDICAL CENTER | Age: 74
DRG: 371 | End: 2024-11-25
Payer: MEDICARE

## 2024-11-25 PROBLEM — E87.5 HYPERKALEMIA: Status: ACTIVE | Noted: 2024-11-25

## 2024-11-25 LAB
ALBUMIN SERPL BCP-MCNC: 2.3 G/DL (ref 3.2–4.9)
ALBUMIN/GLOB SERPL: 1.3 G/DL
ALP SERPL-CCNC: 51 U/L (ref 30–99)
ALT SERPL-CCNC: 16 U/L (ref 2–50)
ANION GAP SERPL CALC-SCNC: 10 MMOL/L (ref 7–16)
ANION GAP SERPL CALC-SCNC: 14 MMOL/L (ref 7–16)
ANION GAP SERPL CALC-SCNC: 15 MMOL/L (ref 7–16)
AST SERPL-CCNC: 36 U/L (ref 12–45)
BILIRUB SERPL-MCNC: 1 MG/DL (ref 0.1–1.5)
BUN SERPL-MCNC: 65 MG/DL (ref 8–22)
BUN SERPL-MCNC: 65 MG/DL (ref 8–22)
BUN SERPL-MCNC: 70 MG/DL (ref 8–22)
CALCIUM ALBUM COR SERPL-MCNC: 8.3 MG/DL (ref 8.5–10.5)
CALCIUM SERPL-MCNC: 6.9 MG/DL (ref 8.5–10.5)
CALCIUM SERPL-MCNC: 7.4 MG/DL (ref 8.5–10.5)
CALCIUM SERPL-MCNC: 8.4 MG/DL (ref 8.5–10.5)
CHLORIDE SERPL-SCNC: 94 MMOL/L (ref 96–112)
CHLORIDE SERPL-SCNC: 96 MMOL/L (ref 96–112)
CHLORIDE SERPL-SCNC: 98 MMOL/L (ref 96–112)
CO2 SERPL-SCNC: 15 MMOL/L (ref 20–33)
CO2 SERPL-SCNC: 16 MMOL/L (ref 20–33)
CO2 SERPL-SCNC: 19 MMOL/L (ref 20–33)
CREAT SERPL-MCNC: 2.84 MG/DL (ref 0.5–1.4)
CREAT SERPL-MCNC: 2.85 MG/DL (ref 0.5–1.4)
CREAT SERPL-MCNC: 3.07 MG/DL (ref 0.5–1.4)
CREAT UR-MCNC: 162 MG/DL
ERYTHROCYTE [DISTWIDTH] IN BLOOD BY AUTOMATED COUNT: 46.2 FL (ref 35.9–50)
ERYTHROCYTE [DISTWIDTH] IN BLOOD BY AUTOMATED COUNT: 47.1 FL (ref 35.9–50)
ERYTHROCYTE [DISTWIDTH] IN BLOOD BY AUTOMATED COUNT: 47.9 FL (ref 35.9–50)
ERYTHROCYTE [DISTWIDTH] IN BLOOD BY AUTOMATED COUNT: 49 FL (ref 35.9–50)
GFR SERPLBLD CREATININE-BSD FMLA CKD-EPI: 15 ML/MIN/1.73 M 2
GFR SERPLBLD CREATININE-BSD FMLA CKD-EPI: 17 ML/MIN/1.73 M 2
GFR SERPLBLD CREATININE-BSD FMLA CKD-EPI: 17 ML/MIN/1.73 M 2
GLOBULIN SER CALC-MCNC: 1.8 G/DL (ref 1.9–3.5)
GLUCOSE BLD STRIP.AUTO-MCNC: 120 MG/DL (ref 65–99)
GLUCOSE BLD STRIP.AUTO-MCNC: 122 MG/DL (ref 65–99)
GLUCOSE BLD STRIP.AUTO-MCNC: 125 MG/DL (ref 65–99)
GLUCOSE BLD STRIP.AUTO-MCNC: 140 MG/DL (ref 65–99)
GLUCOSE SERPL-MCNC: 127 MG/DL (ref 65–99)
GLUCOSE SERPL-MCNC: 140 MG/DL (ref 65–99)
GLUCOSE SERPL-MCNC: 149 MG/DL (ref 65–99)
HCT VFR BLD AUTO: 21.1 % (ref 37–47)
HCT VFR BLD AUTO: 21.2 % (ref 37–47)
HCT VFR BLD AUTO: 22.9 % (ref 37–47)
HCT VFR BLD AUTO: 23.2 % (ref 37–47)
HGB BLD-MCNC: 7.2 G/DL (ref 12–16)
HGB BLD-MCNC: 7.6 G/DL (ref 12–16)
HGB BLD-MCNC: 7.9 G/DL (ref 12–16)
HGB BLD-MCNC: 8.2 G/DL (ref 12–16)
INR PPP: 1.16 (ref 0.87–1.13)
MAGNESIUM SERPL-MCNC: 2 MG/DL (ref 1.5–2.5)
MCH RBC QN AUTO: 30.2 PG (ref 27–33)
MCH RBC QN AUTO: 30.4 PG (ref 27–33)
MCH RBC QN AUTO: 30.6 PG (ref 27–33)
MCH RBC QN AUTO: 31.3 PG (ref 27–33)
MCHC RBC AUTO-ENTMCNC: 34.1 G/DL (ref 32.2–35.5)
MCHC RBC AUTO-ENTMCNC: 34.5 G/DL (ref 32.2–35.5)
MCHC RBC AUTO-ENTMCNC: 35.3 G/DL (ref 32.2–35.5)
MCHC RBC AUTO-ENTMCNC: 35.8 G/DL (ref 32.2–35.5)
MCV RBC AUTO: 86.6 FL (ref 81.4–97.8)
MCV RBC AUTO: 87.2 FL (ref 81.4–97.8)
MCV RBC AUTO: 87.4 FL (ref 81.4–97.8)
MCV RBC AUTO: 89 FL (ref 81.4–97.8)
PLATELET # BLD AUTO: 240 K/UL (ref 164–446)
PLATELET # BLD AUTO: 255 K/UL (ref 164–446)
PLATELET # BLD AUTO: 283 K/UL (ref 164–446)
PLATELET # BLD AUTO: 304 K/UL (ref 164–446)
PMV BLD AUTO: 8.5 FL (ref 9–12.9)
PMV BLD AUTO: 8.8 FL (ref 9–12.9)
PMV BLD AUTO: 8.9 FL (ref 9–12.9)
PMV BLD AUTO: 9 FL (ref 9–12.9)
POTASSIUM SERPL-SCNC: 4.5 MMOL/L (ref 3.6–5.5)
POTASSIUM SERPL-SCNC: 4.7 MMOL/L (ref 3.6–5.5)
POTASSIUM SERPL-SCNC: 4.7 MMOL/L (ref 3.6–5.5)
PROT SERPL-MCNC: 4.1 G/DL (ref 6–8.2)
PROTHROMBIN TIME: 14.8 SEC (ref 12–14.6)
RBC # BLD AUTO: 2.37 M/UL (ref 4.2–5.4)
RBC # BLD AUTO: 2.43 M/UL (ref 4.2–5.4)
RBC # BLD AUTO: 2.62 M/UL (ref 4.2–5.4)
RBC # BLD AUTO: 2.68 M/UL (ref 4.2–5.4)
SODIUM SERPL-SCNC: 124 MMOL/L (ref 135–145)
SODIUM SERPL-SCNC: 125 MMOL/L (ref 135–145)
SODIUM SERPL-SCNC: 128 MMOL/L (ref 135–145)
SODIUM UR-SCNC: <20 MMOL/L
WBC # BLD AUTO: 22 K/UL (ref 4.8–10.8)
WBC # BLD AUTO: 29.1 K/UL (ref 4.8–10.8)
WBC # BLD AUTO: 34.5 K/UL (ref 4.8–10.8)
WBC # BLD AUTO: 36.9 K/UL (ref 4.8–10.8)

## 2024-11-25 PROCEDURE — 80053 COMPREHEN METABOLIC PANEL: CPT

## 2024-11-25 PROCEDURE — 700111 HCHG RX REV CODE 636 W/ 250 OVERRIDE (IP): Performed by: EMERGENCY MEDICINE

## 2024-11-25 PROCEDURE — A9270 NON-COVERED ITEM OR SERVICE: HCPCS | Performed by: INTERNAL MEDICINE

## 2024-11-25 PROCEDURE — 82570 ASSAY OF URINE CREATININE: CPT

## 2024-11-25 PROCEDURE — 87086 URINE CULTURE/COLONY COUNT: CPT

## 2024-11-25 PROCEDURE — 99291 CRITICAL CARE FIRST HOUR: CPT | Mod: 25 | Performed by: EMERGENCY MEDICINE

## 2024-11-25 PROCEDURE — 86923 COMPATIBILITY TEST ELECTRIC: CPT | Mod: 91

## 2024-11-25 PROCEDURE — 700105 HCHG RX REV CODE 258: Performed by: EMERGENCY MEDICINE

## 2024-11-25 PROCEDURE — 97163 PT EVAL HIGH COMPLEX 45 MIN: CPT

## 2024-11-25 PROCEDURE — 83735 ASSAY OF MAGNESIUM: CPT

## 2024-11-25 PROCEDURE — P9016 RBC LEUKOCYTES REDUCED: HCPCS

## 2024-11-25 PROCEDURE — 84300 ASSAY OF URINE SODIUM: CPT

## 2024-11-25 PROCEDURE — 770022 HCHG ROOM/CARE - ICU (200)

## 2024-11-25 PROCEDURE — C1751 CATH, INF, PER/CENT/MIDLINE: HCPCS

## 2024-11-25 PROCEDURE — 82962 GLUCOSE BLOOD TEST: CPT | Mod: 91

## 2024-11-25 PROCEDURE — 36556 INSERT NON-TUNNEL CV CATH: CPT | Mod: GC | Performed by: EMERGENCY MEDICINE

## 2024-11-25 PROCEDURE — 71045 X-RAY EXAM CHEST 1 VIEW: CPT

## 2024-11-25 PROCEDURE — 700102 HCHG RX REV CODE 250 W/ 637 OVERRIDE(OP): Performed by: INTERNAL MEDICINE

## 2024-11-25 PROCEDURE — 36430 TRANSFUSION BLD/BLD COMPNT: CPT

## 2024-11-25 PROCEDURE — 80048 BASIC METABOLIC PNL TOTAL CA: CPT | Mod: 91

## 2024-11-25 PROCEDURE — 700101 HCHG RX REV CODE 250: Performed by: INTERNAL MEDICINE

## 2024-11-25 PROCEDURE — 85027 COMPLETE CBC AUTOMATED: CPT | Mod: 91

## 2024-11-25 PROCEDURE — 36556 INSERT NON-TUNNEL CV CATH: CPT

## 2024-11-25 PROCEDURE — 02H633Z INSERTION OF INFUSION DEVICE INTO RIGHT ATRIUM, PERCUTANEOUS APPROACH: ICD-10-PCS | Performed by: EMERGENCY MEDICINE

## 2024-11-25 PROCEDURE — 85610 PROTHROMBIN TIME: CPT

## 2024-11-25 RX ORDER — CALCIUM GLUCONATE 20 MG/ML
2 INJECTION, SOLUTION INTRAVENOUS ONCE
Status: COMPLETED | OUTPATIENT
Start: 2024-11-25 | End: 2024-11-25

## 2024-11-25 RX ORDER — SODIUM BICARBONATE IN D5W 150/1000ML
PLASTIC BAG, INJECTION (ML) INTRAVENOUS CONTINUOUS
Status: DISCONTINUED | OUTPATIENT
Start: 2024-11-25 | End: 2024-11-26

## 2024-11-25 RX ADMIN — SENNOSIDES AND DOCUSATE SODIUM 2 TABLET: 50; 8.6 TABLET ORAL at 18:00

## 2024-11-25 RX ADMIN — TIMOLOL MALEATE 1 DROP: 5 SOLUTION OPHTHALMIC at 17:58

## 2024-11-25 RX ADMIN — SODIUM ZIRCONIUM CYCLOSILICATE 10 G: 10 POWDER, FOR SUSPENSION ORAL at 11:05

## 2024-11-25 RX ADMIN — TIMOLOL MALEATE 1 DROP: 5 SOLUTION OPHTHALMIC at 05:24

## 2024-11-25 RX ADMIN — LEVOTHYROXINE SODIUM 125 MCG: 0.12 TABLET ORAL at 05:24

## 2024-11-25 RX ADMIN — SODIUM ZIRCONIUM CYCLOSILICATE 10 G: 10 POWDER, FOR SUSPENSION ORAL at 21:21

## 2024-11-25 RX ADMIN — OXYCODONE 5 MG: 5 TABLET ORAL at 11:52

## 2024-11-25 RX ADMIN — MIDODRINE HYDROCHLORIDE 5 MG: 5 TABLET ORAL at 13:40

## 2024-11-25 RX ADMIN — POLYETHYLENE GLYCOL 3350 1 PACKET: 17 POWDER, FOR SOLUTION ORAL at 05:24

## 2024-11-25 RX ADMIN — SODIUM BICARBONATE: 84 INJECTION, SOLUTION INTRAVENOUS at 11:07

## 2024-11-25 RX ADMIN — SODIUM ZIRCONIUM CYCLOSILICATE 10 G: 10 POWDER, FOR SUSPENSION ORAL at 13:42

## 2024-11-25 RX ADMIN — OXYCODONE 10 MG: 5 TABLET ORAL at 19:04

## 2024-11-25 RX ADMIN — EZETIMIBE 10 MG: 10 TABLET ORAL at 05:24

## 2024-11-25 RX ADMIN — MIDODRINE HYDROCHLORIDE 5 MG: 5 TABLET ORAL at 05:25

## 2024-11-25 RX ADMIN — OXYCODONE 5 MG: 5 TABLET ORAL at 05:32

## 2024-11-25 RX ADMIN — MIDODRINE HYDROCHLORIDE 5 MG: 5 TABLET ORAL at 23:27

## 2024-11-25 RX ADMIN — CALCIUM GLUCONATE 2 G: 20 INJECTION, SOLUTION INTRAVENOUS at 09:17

## 2024-11-25 ASSESSMENT — FIBROSIS 4 INDEX: FIB4 SCORE: 3.2

## 2024-11-25 ASSESSMENT — PAIN DESCRIPTION - PAIN TYPE
TYPE: SURGICAL PAIN
TYPE: SURGICAL PAIN
TYPE: ACUTE PAIN
TYPE: ACUTE PAIN
TYPE: SURGICAL PAIN
TYPE: ACUTE PAIN
TYPE: ACUTE PAIN

## 2024-11-25 ASSESSMENT — ENCOUNTER SYMPTOMS
FEVER: 0
CHILLS: 0
HEADACHES: 0
MYALGIAS: 1
ABDOMINAL PAIN: 0
SHORTNESS OF BREATH: 0
ABDOMINAL PAIN: 1
DIZZINESS: 0
BRUISES/BLEEDS EASILY: 0
NAUSEA: 0
VOMITING: 0
SPUTUM PRODUCTION: 0
EYES NEGATIVE: 1
NERVOUS/ANXIOUS: 0
SORE THROAT: 0
WEAKNESS: 1
COUGH: 0
BLOOD IN STOOL: 0
PALPITATIONS: 0

## 2024-11-25 ASSESSMENT — COGNITIVE AND FUNCTIONAL STATUS - GENERAL
SUGGESTED CMS G CODE MODIFIER MOBILITY: CM
STANDING UP FROM CHAIR USING ARMS: TOTAL
WALKING IN HOSPITAL ROOM: TOTAL
CLIMB 3 TO 5 STEPS WITH RAILING: TOTAL
TURNING FROM BACK TO SIDE WHILE IN FLAT BAD: A LOT
MOVING FROM LYING ON BACK TO SITTING ON SIDE OF FLAT BED: A LOT
MOVING TO AND FROM BED TO CHAIR: TOTAL
MOBILITY SCORE: 8

## 2024-11-25 ASSESSMENT — GAIT ASSESSMENTS: GAIT LEVEL OF ASSIST: UNABLE TO PARTICIPATE

## 2024-11-25 NOTE — PROCEDURES
Central Line Placement Procedure  Indication: Lack of IV access and hemorrhagic shock      Consent: Verbal consent obtained from patient. Indication, potential risks and complications were discussed with patient and  at bedside, patient verbalized understanding and agreeing with procedure.      Procedure: The patient was positioned appropriately and the skin over the left internal jugular vein was prepped with Chloraprep. Local anesthesia was obtained by infiltration using 5cc of 1% Lidocaine without epinephrine.  Under direct US guidance, a large bore needle was used to infiltrate the vein.  A guide wire was then inserted into the vein through the needle. Dilation was then completed after small incision with scalpel over guidewire. A 7 fr triple lumen catheter was then inserted into the vessel over the guide wire.  All ports showed good, free flowing blood return and were flushed with saline solution.  The catheter was then securely fastened to the skin with sutures and covered with a sterile dressing.  A post procedure X-ray was ordered and  it has been confirmed in the right position. Bedside US imaging guidance used for the procedure.     The patient tolerated the procedure well.     Complications: None     My attending, Dr. Sandoval, was present throughout the procedure.

## 2024-11-25 NOTE — DISCHARGE PLANNING
Case Management Discharge Planning    Admission Date: 11/23/2024  GMLOS: 4.4  ALOS: 2    6-Clicks ADL Score:    6-Clicks Mobility Score:    PT and/or OT Eval ordered: Yes  Post-acute Referrals Ordered: Yes  Post-acute Choice Obtained: No  Has referral(s) been sent to post-acute provider:  No      Anticipated Discharge Dispo: Discharge Disposition: D/T to SNF with Medicare cert in anticipation of skilled care (03)    DME Needed: No    Action(s) Taken: Chart review completed. RNCM met with patient at bedside to obtain information for assessment.     Per patient, she was [facility] at [facility] and would like to return if post-acute placement is recommended this admission    RNCM sent blanket SNF per protocol to [facility] [id number]     Escalations Completed: None    Medically Clear: No    Next Steps: CM to follow up with IDT regarding DCP needs/barriers    Barriers to Discharge: Medical clearance    Is the patient up for discharge tomorrow: No        Care Transition Team Assessment    Case management role discussed; understanding of case management role verbalized   Demographics on facesheet verified  Please see H&P for pertinent PMH and reason for admission  Patient's PCP is: Jose Vela  Patient's preferred pharmacy is: [facility]  Housing: Prior to last admission, patient states she lived with her daughter and adult niece in a single story home; no JUSTIN/patient was transferred to [facility] post last admission  IADLS/ADLS: Independent at baseline  Mental Health Concerns: Denies  Substance Abuse: Denies  Monthly Income/employment status: Question not asked/patient is retired   DME: None at baseline  O2: None at baseline  Prior services: [facility]  Discharge support: Daughters, family  Transportation: Denies needing assistance  Discharge planning: SNF      Information Source  Orientation Level: Oriented X4  Information Given By: Patient  Who is responsible for making decisions for  patient? : Patient    Readmission Evaluation  Is this a readmission?: No    Elopement Risk  Legal Hold: No  Ambulatory or Self Mobile in Wheelchair: No-Not an Elopement Risk  Disoriented: No  Psychiatric Symptoms: None  History of Wandering: No  Elopement this Admit: No  Vocalizing Wanting to Leave: No  Displays Behaviors, Body Language Wanting to Leave: No-Not at Risk for Elopement  Elopement Risk: Not at Risk for Elopement    Interdisciplinary Discharge Planning  Does Admitting Nurse Feel This Could be a Complex Discharge?: No  Primary Care Physician: Jose Vela  Lives with - Patient's Self Care Capacity: Adult Children, Related Adult  Patient or legal guardian wants to designate a caregiver: No  Support Systems: Children, Family Member(s), Friends / Neighbors  Housing / Facility: [address]  Do You Take your Prescribed Medications Regularly: Yes  Able to Return to Previous ADL's: Future Time w/Therapy  Mobility Issues: No  Prior Services: None, Home-Independent  Assistance Needed: No    Discharge Preparedness  What is your plan after discharge?: Uncertain - pending medical team collaboration  What are your discharge supports?: Child, Other (comment)  Prior Functional Level: Ambulatory, Drives Self, Independent with Activities of Daily Living, Independent with Medication Management  Difficulity with ADLs: None  Difficulity with IADLs: None    Functional Assesment  Prior Functional Level: Ambulatory, Drives Self, Independent with Activities of Daily Living, Independent with Medication Management    Finances  Financial Barriers to Discharge: No  Prescription Coverage: Yes    Vision / Hearing Impairment  Vision Impairment : Yes  Right Eye Vision: Impaired, Wears Glasses  Left Eye Vision: Impaired, Wears Glasses  Hearing Impairment : No    Values / Beliefs / Concerns  Values / Beliefs Concerns : No    Advance Directive  Advance Directive?: DPOA for Health Care  Durable Power of  Name and Contact :  Fatimah Colon [phone]    Domestic Abuse  Have you ever been the victim of abuse or violence?: No  Physical Abuse or Sexual Abuse: No  Verbal Abuse or Emotional Abuse: No  Possible Abuse/Neglect Reported to:: Not Applicable    Psychological Assessment  History of Substance Abuse: None  History of Psychiatric Problems: No  Non-compliant with Treatment: No  Newly Diagnosed Illness: No    Discharge Risks or Barriers  Discharge risks or barriers?: Complex medical needs  Patient risk factors: Cognitive / sensory / physical deficit, Complex medical needs, Multiple organizational systems involved, Vulnerable adult    Anticipated Discharge Information  Discharge Disposition: D/T to SNF with Medicare cert in anticipation of skilled care (03)

## 2024-11-25 NOTE — PROGRESS NOTES
K is rising, low UOP. Will administer 250 cc bolus, insulin + dextrose, K binders, and bicarb. Trend BMP q6 hours.     Rajiv Pike M.D.

## 2024-11-25 NOTE — CARE PLAN
Problem: Pain - Standard  Goal: Alleviation of pain or a reduction in pain to the patient’s comfort goal  Outcome: Progressing     Problem: Fall Risk  Goal: Patient will remain free from falls  Outcome: Progressing   The patient is Watcher - Medium risk of patient condition declining or worsening    Shift Goals  Clinical Goals: VSS, blood transfusion, rest, comfort  Patient Goals: Rest  Family Goals: HEATHER    Progress made toward(s) clinical / shift goals:  Patient turned q2 hours while in bed. Monitored for pain throughout shift.

## 2024-11-25 NOTE — THERAPY
"Retroperitoneal hemorrhage, Physical Therapy   Initial Evaluation     Patient Name: Yenifer Beasley  Age:  74 y.o., Sex:  female  Medical Record #: [id number]  Today's Date: 11/25/2024     Precautions  Precautions: Fall Risk;Toe Touch Weight Bearing Right Lower Extremity  Comments: R knee ROM as tolerated per therapy notes from recent admission after her fall and R LE surgery. Pt reports basline \"bad L knee\"    Assessment  Patient is 74 y.o. female with a diagnosis of retroperitoneal hemorrhage,  PMH paroxysmal atrial fibrillation, mechanical aortic valve replacement 2007, CKD 3B, hypothyroidism, glaucoma recent hospitalization 11/7 - 11-15 after ground-level fall with right displaced comminuted lateral condyle distal femoral fracture, ORIF on 11/8 and discharged to SNF, who presented 11/23/2024 in transfer from SNF where she was recovering with complains of dyspnea and hypotension, initially SBP 62/46 and pale appearing. .  Additional factors influencing patient status / progress : today, pt is most limited by c/o fatigue with sitting EOB and ongoing R knee limited AROM and pain. PT to follow, see details below. .      Plan    Physical Therapy Initial Treatment Plan   Treatment Plan : Bed Mobility, Neuro Re-Education / Balance, Therapeutic Activities, Therapeutic Exercise, Self Care / Home Evaluation  Treatment Frequency: 3 Times per Week  Duration: Until Therapy Goals Met    DC Equipment Recommendations: Unable to determine at this time  Discharge Recommendations: Recommend post-acute placement for additional physical therapy services prior to discharge home            Objective       11/25/24 1043   Initial Contact Note    Initial Contact Note Order Received and Verified, Physical Therapy Evaluation in Progress with Full Report to Follow.   Precautions   Precautions Fall Risk;Toe Touch Weight Bearing Right Lower Extremity   Comments R knee ROM as tolerated per therapy notes from recent admission after her fall " "and R LE surgery. Pt reports basline \"bad L knee\"   Vitals   Blood Pressure  135/71  (sitting EOB)   Pain 0 - 10 Group   Therapist Pain Assessment During Activity;Nurse Notified  (R LE very painful, not rated)   Prior Living Situation   Prior Services None  (daughter helps if needed when home)   Housing / Facility 1 Story House   Steps Into Home   (having ramp installed in December)   Steps In Home 0   Equipment Owned Front-Wheel Walker;Single Point Cane   Lives with - Patient's Self Care Capacity Adult Children  (daughter works, pt is usually ok when home alone)   Comments living situation before fall and SNF   Prior Level of Functional Mobility   Bed Mobility Required Assist  (pt sleeps in recliner at home.)   Transfer Status Required Assist   Ambulation Dependent   Assistive Devices Used Wheelchair   Wheelchair Required Assist   Comments At SNF, pt has been working with therapy on seated slide board from bed to wc and back. Pt reports that it has been difficult.   History of Falls   History of Falls Yes   Date of Last Fall   (part of last admit)   Cognition    Cognition / Consciousness WDL   Active ROM Lower Body    Active ROM Lower Body  X   Comments R knee flexion reaches 45 degrees at max. L knee flexin wfl.   Strength Lower Body   Lower Body Strength  X   Comments R LE grossly 2-/5, limited by pain. L LE at least 3/5.   Balance Assessment   Sitting Balance (Static) Poor +   Sitting Balance (Dynamic) Poor +   Weight Shift Sitting Poor   Bed Mobility    Supine to Sit Total Assist   Sit to Supine Total Assist   Scooting   (in sitting, pt able to scoot self back with min A)   Comments limited sitting endurance ,reports too tired to remain sitting up.   Gait Analysis   Gait Level Of Assist Unable to Participate   Functional Mobility   Sit to Stand Unable to Participate   6 Clicks Assessment - How much HELP from from another person do you currently need... (If the patient hasn't done an activity recently, how much " help from another person do you think he/she would need if he/she tried?)   Turning from your back to your side while in a flat bed without using bedrails? 2   Moving from lying on your back to sitting on the side of a flat bed without using bedrails? 2   Moving to and from a bed to a chair (including a wheelchair)? 1   Standing up from a chair using your arms (e.g., wheelchair, or bedside chair)? 1   Walking in hospital room? 1   Climbing 3-5 steps with a railing? 1   6 clicks Mobility Score 8   Activity Tolerance   Sitting Edge of Bed 10 min   Edema / Skin Assessment   Comments sutures lateral side R knee   Short Term Goals    Short Term Goal # 1 Pt will perform bed mobility with mod A in 6 visits.   Short Term Goal # 2 Pt will maintain sitting self supported x 5 min in 6 visits to improve functional indep.   Short Term Goal # 3 Pt will perform seated slide board transfer from bed to wc with mod A in 6 visits to improve functional indep.   Education Group   Education Provided Role of Physical Therapist   Role of Physical Therapist Patient Response Patient;Acceptance;Explanation;Action Demonstration   Exercises - Seated Patient Response Family;Acceptance;Explanation;Action Demonstration  (gentle AROM of R knee flexion/extension)   Physical Therapy Initial Treatment Plan    Treatment Plan  Bed Mobility;Neuro Re-Education / Balance;Therapeutic Activities;Therapeutic Exercise;Self Care / Home Evaluation   Treatment Frequency 3 Times per Week   Duration Until Therapy Goals Met   Problem List    Problems Pain;Impaired Bed Mobility;Impaired Transfers;Functional Strength Deficit;Impaired Balance;Decreased Activity Tolerance   Anticipated Discharge Equipment and Recommendations   DC Equipment Recommendations Unable to determine at this time   Discharge Recommendations Recommend post-acute placement for additional physical therapy services prior to discharge home   Interdisciplinary Plan of Care Collaboration   IDT  Collaboration with  Nursing   Patient Position at End of Therapy In Bed;Call Light within Reach;Tray Table within Reach;Phone within Reach;Bed Alarm On   Collaboration Comments nsg updated.   Session Information   Date / Session Number  11/25-1 (1/3, 11/30)

## 2024-11-25 NOTE — PROGRESS NOTES
[facility] Progress Note      Admit Date: 11/23/2024    Resident(s): Aubree Holloway M.D.   Attending:  JOANIE MALONEY/ Dr. Jefferson    Patient ID:    Name:  Yenifer Beasley   YOB: 1950  Age:  74 y.o.  female   MRN:  [id number]    Hospital Course (carried forward and updated):  Yenifer Beasley is a 74 y.o. female with a PMH of paroxysmal atrial fibrillation, Hx of mechanical AVR 2007, CKD 3B, and hypothyroidism admitted 11/23/2024 for hemorrhagic shock 2/2 retroperitoneal hematoma. Recent hospitalization 11/07-11/15 for right femoral fracture s/p ORIF, was discharged to SNF.     Consultants:  Critical Care     Interval Events:    11/24: Hb 6.4 this AM, transfused 1 unit PRBC. Continuing NS IV. Remains on Levophed. CODE STATUS changed to DNAR/DNI after GOC discussion with Dr. Jefferson.   11/25: Hyperkalemia overnight with metabolic acidosis and worsening DIAMOND. Remains on Levophed. CVC placement due to no vascular access. Attempted to call patient's son for update, no answer.     Vitals Range last 24h:  Temp:  [36.1 °C (96.9 °F)-36.7 °C (98 °F)] 36.1 °C (97 °F)  Pulse:  [62-82] 76  Resp:  [15-35] 20  BP: ()/(32-71) 135/71  SpO2:  [93 %-100 %] 96 %      Intake/Output Summary (Last 24 hours) at 11/25/2024 1524  Last data filed at 11/25/2024 0943  Gross per 24 hour   Intake 965.88 ml   Output 280 ml   Net 685.88 ml        Review of Systems   Constitutional:  Positive for malaise/fatigue. Negative for chills and fever.   Respiratory:  Negative for cough and shortness of breath.    Cardiovascular:  Negative for chest pain and palpitations.   Gastrointestinal:  Negative for abdominal pain, nausea and vomiting.   Musculoskeletal:  Positive for joint pain and myalgias.   Neurological:  Negative for dizziness and headaches.       PHYSICAL EXAM:  Vitals:    11/25/24 0615 11/25/24 0700 11/25/24 0800 11/25/24 1043   BP: 121/58 131/60  135/71   Pulse: 71 76     Resp: 15 20     Temp:   36.1 °C (97 °F)     TempSrc:   Temporal    SpO2: 100% 96%     Weight:       Height:        Body mass index is 46.2 kg/m².    O2 therapy: Pulse Oximetry: 96 %, O2 (LPM): 2, O2 Delivery Device: Nasal Cannula    Date 11/25/24 0700 - 11/26/24 0659   Shift 1191-0727 6596-7923 [id number] 24 Hour Total   INTAKE   I.V. 61.1   61.1     Norepinephrine Volume 61.1   61.1   Shift Total 61.1   61.1   OUTPUT   Urine 100   100     Output (mL) (Urethral Catheter Latex) 100   100   Shift Total 100   100   NET -38.9   -38.9        Physical Exam  Constitutional:       Appearance: She is obese.   HENT:      Head: Normocephalic and atraumatic.   Eyes:      Extraocular Movements: Extraocular movements intact.      Conjunctiva/sclera: Conjunctivae normal.   Cardiovascular:      Rate and Rhythm: Normal rate and regular rhythm.   Pulmonary:      Effort: No respiratory distress.      Breath sounds: Normal breath sounds.   Abdominal:      General: Abdomen is flat. There is no distension.      Palpations: Abdomen is soft.   Musculoskeletal:      Right lower leg: Edema present.      Left lower leg: Edema present.   Skin:     General: Skin is warm and dry.   Neurological:      General: No focal deficit present.      Mental Status: She is alert and oriented to person, place, and time.   Psychiatric:         Mood and Affect: Mood normal.         Behavior: Behavior normal.             Recent Labs     11/23/24  1200 11/23/24  1729 11/24/24  0544 11/24/24  1703 11/25/24  0000 11/25/24  0555   SODIUM 126*   < > 125* 123* 128* 124*   POTASSIUM 4.9   < > 5.2 5.7* 4.7 4.7   CHLORIDE 99   < > 97 95* 98 94*   CO2 15*   < > 15* 13* 16* 15*   BUN 52*   < > 59* 63* 65* 65*   CREATININE 2.33*   < > 2.74* 2.97* 3.07* 2.84*   MAGNESIUM 2.2  --  2.2  --   --  2.0   CALCIUM 7.5*   < > 7.4* 7.4* 7.4* 6.9*    < > = values in this interval not displayed.     Recent Labs     11/23/24  0517 11/23/24  1200 11/24/24  0544 11/24/24  1703 11/25/24  0000 11/25/24  0555   [facility] 16  --  20   --   --  16   ASTSGOT 29  --  50*  --   --  36   ALKPHOSPHAT 50  --  55  --   --  51   TBILIRUBIN 1.0  --  0.7  --   --  1.0   GLUCOSE 180*   < > 143* 137* 149* 127*    < > = values in this interval not displayed.     Recent Labs     11/23/24  0545 11/23/24  1130 11/24/24  0544 11/24/24  1203 11/24/24  1703 11/25/24  0000 11/25/24  0555   RBC  --    < > 2.05*   < > 2.66* 2.37* 2.68*   HEMOGLOBIN  --    < > 6.4*   < > 8.0* 7.2* 8.2*   HEMATOCRIT  --    < > 19.0*   < > 23.7* 21.1* 23.2*   PLATELETCT  --    < > 393   < > 313 304 255   PROTHROMBTM 28.7*  --  15.1*  --   --   --  14.8*   APTT 54.5*  --   --   --   --   --   --    INR 2.69*  --  1.18*  --   --   --  1.16*    < > = values in this interval not displayed.     Recent Labs     11/23/24  0517 11/23/24  1130 11/24/24  0544 11/24/24  1203 11/24/24  1703 11/25/24  0000 11/25/24  0555   WBC 18.1*   < > 37.4*   < > 43.5* 36.9* 34.5*   NEUTSPOLYS 91.30*  --   --   --   --   --   --    LYMPHOCYTES 2.90*  --   --   --   --   --   --    MONOCYTES 4.30  --   --   --   --   --   --    EOSINOPHILS 0.10  --   --   --   --   --   --    BASOPHILS 0.20  --   --   --   --   --   --    ASTSGOT 29  --  50*  --   --   --  36   ALTSGPT 16  --  20  --   --   --  16   ALKPHOSPHAT 50  --  55  --   --   --  51   TBILIRUBIN 1.0  --  0.7  --   --   --  1.0    < > = values in this interval not displayed.       Meds:   sodium bicarbonate 150 meq in D5W 1000 mL   100 mL/hr at 11/25/24 1107    methocarbamol  750 mg      midodrine  5 mg      sodium zirconium cyclosilicate (Lokelma) packet  10 g      Followed by    [START ON 11/27/2024] sodium zirconium cyclosilicate (Lokelma) packet  10 g      HYDROmorphone  0.5 mg      ezetimibe  10 mg      levothyroxine  125 mcg      lidocaine  2 Patch      oxyCODONE immediate release  5-10 mg      polyethylene glycol/lytes  17 g      timolol  1 Drop      Respiratory Therapy Consult        acetaminophen  650 mg      senna-docusate  2 Tablet      And     polyethylene glycol/lytes  1 Packet      ondansetron  4 mg      ondansetron  4 mg      insulin lispro  2-9 Units      And    dextrose bolus  25 g      NORepinephrine  0-1 mcg/kg/min (Ideal) Stopped (11/25/24 1114)        Procedures:  None    Imaging:  US-EXTREMITY VENOUS LOWER BILAT   Final Result      EC-ECHOCARDIOGRAM COMPLETE W/ CONT   Final Result      CT-RENAL COLIC EVALUATION(A/P W/O)   Final Result   Impression:      1. Large 17 x 10.7 x 9.5 cm hematoma type mixed density fluid collection in the left retroperitoneum and flank abdominal wall. This displaces the left kidney anteriorly. No significant hydronephrosis.      2. Moderate colonic distention similar to 2007 suggests ileus. No obstruction suggestion. Prior gastric surgery.      3. Heavy vascular calcification. No aneurysm. Sternal wires. Aortic valve prosthesis.      4. No kidney stone or hydronephrosis. Bull catheter in empty bladder.      5. Prior cholecystectomy.                        DX-CHEST-PORTABLE (1 VIEW)   Final Result   Impression:      1. Shallow. Mild left base atelectasis.      2. Cardiomegaly. Sternal wires. Cardiac valve prosthesis.                         ASSESSEMENT and PLAN:    * Hemorrhagic shock (HCC)  Assessment & Plan  Secondary to retroperitoneal hemorrhage.  S/p PCC reversal on admission.  Received 4 units PRBC this hospital stay.  -Holding outpatient warfarin  -Monitor, transfuse for Hb < 8.0  -Monitor hypocalcemia, replete as necessary    Retroperitoneal hemorrhage- (present on admission)  Assessment & Plan  See assessment and plan for hemorrhagic shock    Nondisplaced unspecified condyle fracture of lower end of right femur, initial encounter for closed fracture (HCC)- (present on admission)  Assessment & Plan  S/p ORIF 11/08  -Pain control    Chronic kidney disease (CKD) stage G3b/A1, moderately decreased glomerular filtration rate (GFR) between 30-44 mL/min/1.73 square meter and albuminuria creatinine ratio less than 30  mg/g- (present on admission)  Assessment & Plan  Oliguric. Prerenal DIAMOND on CKD secondary to hemorrhagic shock. Cr 1.8 at baseline, uptrending. FeNa 03%.  -Sodium bicarbonate drip for metabolic acidosis  -Avoid nephrotoxins, NSAIDs  -Monitor    Hx of mechanical aortic valve replacement [V43.3]- (present on admission)  Assessment & Plan  -Holding outpatient warfarin in the setting of hemorrhagic shock    CAD (coronary artery disease)- (present on admission)  Assessment & Plan  S/p CABG 2007. ECHO this hospital stay with EF 75%.     Hyperkalemia  Assessment & Plan  In the setting of DIAMOND on CKD.  Received Lokelma, insulin, dextrose.  -Monitor    Paroxysmal atrial fibrillation (HCC)- (present on admission)  Assessment & Plan  -Holding outpatient warfarin in the setting of hemorrhagic shock    Type 2 diabetes mellitus with kidney complication, without long-term current use of insulin (HCC)- (present on admission)  Assessment & Plan  -SSI    HILDA (obstructive sleep apnea)- (present on admission)  Assessment & Plan  -Continue CPAP    Hyperlipidemia- (present on admission)  Assessment & Plan  -Continue outpatient ezetimibe  -Holding outpatient alirocumab while inpatient    Hypothyroidism- (present on admission)  Assessment & Plan  -Continue outpatient levothyroxine        DISPO: ICU    CODE STATUS: DNAR/DNI    Quality Measures:  Feeding: Renal  Analgesia: Hydromorphone, oxycodone  Sedation: None  Thromboprophylaxis: SCDs  Head of bed: >30 degrees  Ulcer prophylaxis: None  Glycemic control: Correctional: Lispro / Basal: None  Bowel care: Bowel regimen  Indwelling lines: CVC  Deescalation of antibiotics: None      Aubree Holloway M.D.

## 2024-11-25 NOTE — DIETARY
NUTRITION SERVICES: BMI - Pt with BMI >40 (=Body mass index is 46.2 kg/m².), weight measured via bed scale. Class III obesity. Edema: 2+ edema to BLE + BUE documented in flowsheets. Weight loss counseling not appropriate in acute care setting.     RECOMMEND - If appropriate at DC please refer to outpatient nutrition services for weight management.     RD will screen weekly per department policy; available PRN.

## 2024-11-25 NOTE — PROGRESS NOTES
Critical Care Progress Note    Date of admission  11/23/2024    Chief Complaint  74 y.o. female, PMH paroxysmal atrial fibrillation, mechanical aortic valve replacement 2007 on warfarin, CKD 3B, hypothyroidism, glaucoma recent hospitalization 11/7-11/15 after ground-level fall with right displaced comminuted lateral condyle distal femoral fracture, ORIF on 11/8 and discharged to SNF, who presented 11/23/2024 in transfer from SNF where she was recovering with complains of dyspnea and hypotension, initial BP of 62/46 and pale appearing.  She has been complaining of constipation and has a distended abdomen with left abdominal flank pain.  She received 1 L of crystalloid in the ED and workup included replacement of Bryant catheter and CT abdomen without contrast renal colic exam.  This showed a large 17 x 10.7 x 9.5 cm hematoma in the left retroperitoneum and flank abdominal wall.  Hemoglobin was 6.3, down from 7.6 at discharge.  INR 2.7.      Hospital Course  11/24: 3 RBCs during the day, 1 RBC overnight    Interval Problem Update  Events in last 24 hours: 4 RBC in last 24 hours  N: afebrile  CV: on low dose norepinephrine, mech AVR 2007  R: 2L NC  FEN/GI: Diet as tolerated  /Renal: hypoNa, metabolic acidosis, hypoCa, Cr 2.8 from 3.0  bryant in place.  Only 160mL UOP overnight  Heme: hemoglobin 8.2 from 7.2, 1 RBC overnight, holding warfarin  ID: leukocytosis w/o localizing signs/symptoms of infection  Skin/MSK: n/a  Endo: glucose 140s    Family: daughter works here at [employer] in clinical experience.  Home medications reconciled       Review of Systems  Review of Systems   Constitutional:  Positive for malaise/fatigue. Negative for chills and fever.   HENT:  Negative for congestion and sore throat.    Eyes: Negative.    Respiratory:  Negative for cough, sputum production and shortness of breath.    Cardiovascular:  Negative for chest pain.   Gastrointestinal:  Positive for abdominal pain (Improved). Negative for blood  in stool, melena, nausea and vomiting.   Genitourinary:  Negative for dysuria.   Musculoskeletal:  Positive for joint pain (Right lower extremity).   Neurological:  Positive for weakness (Weak all over especially right lower extremity). Negative for headaches.   Endo/Heme/Allergies:  Does not bruise/bleed easily.   Psychiatric/Behavioral:  The patient is not nervous/anxious.         Vital Signs for last 24 hours   Temp:  [36.1 °C (96.9 °F)-36.7 °C (98.1 °F)] 36.1 °C (97 °F)  Pulse:  [62-84] 76  Resp:  [15-39] 20  BP: ()/(32-60) 131/60  SpO2:  [92 %-100 %] 96 %    Hemodynamic parameters for last 24 hours       Respiratory Information for the last 24 hours       Physical Exam   Physical Exam  Vitals reviewed.   Constitutional:       Appearance: She is morbidly obese. She is not ill-appearing.   HENT:      Head: Normocephalic and atraumatic.      Mouth/Throat:      Mouth: Mucous membranes are dry.   Eyes:      General: No scleral icterus.     Pupils: Pupils are equal, round, and reactive to light.   Cardiovascular:      Rate and Rhythm: Normal rate and regular rhythm.      Heart sounds: No murmur heard.     No gallop.   Pulmonary:      Effort: Pulmonary effort is normal.      Breath sounds: No wheezing, rhonchi or rales.   Abdominal:      General: Abdomen is protuberant. Bowel sounds are normal. There is no distension.      Palpations: Abdomen is soft.      Tenderness: There is abdominal tenderness (Left-sided). There is no right CVA tenderness, left CVA tenderness or guarding.   Musculoskeletal:      Right lower leg: No edema.      Left lower leg: No edema.   Skin:     General: Skin is warm and dry.      Capillary Refill: Capillary refill takes less than 2 seconds.   Neurological:      General: No focal deficit present.      Mental Status: She is alert and oriented to person, place, and time. Mental status is at baseline.   Psychiatric:         Mood and Affect: Mood normal.         Behavior: Behavior normal.          Thought Content: Thought content normal.         Medications  Current Facility-Administered Medications   Medication Dose Route Frequency Provider Last Rate Last Admin    sodium bicarbonate 150 mEq in D5W infusion (premix)   Intravenous Continuous North Sandoval M.D. 100 mL/hr at 11/25/24 1107 New Bag at 11/25/24 1107    methocarbamol (Robaxin) tablet 750 mg  750 mg Oral Q6HRS PRN Robert Jefferson M.D.        midodrine (Proamatine) tablet 5 mg  5 mg Oral Q8HRS Robert Jefferson M.D.   5 mg at 11/25/24 0525    sodium zirconium cyclosilicate (Lokelma) packet 10 g  10 g Oral TID Rajiv Pike M.D.   10 g at 11/25/24 1105    Followed by    [START ON 11/27/2024] sodium zirconium cyclosilicate (Lokelma) packet 10 g  10 g Oral DAILY AT NOON Rajiv Pike M.D.        HYDROmorphone (Dilaudid) injection 0.5 mg  0.5 mg Intravenous Q3HRS PRN Elliot Clement M.D.        ezetimibe (Zetia) tablet 10 mg  10 mg Oral DAILY Elliot Clement M.D.   10 mg at 11/25/24 0524    levothyroxine (Synthroid) tablet 125 mcg  125 mcg Oral AM ES Elliot Clement M.D.   125 mcg at 11/25/24 0524    lidocaine (Asperflex) 4 % patch 2 Patch  2 Patch Transdermal Q24HRS Elliot Clement M.D.   2 Patch at 11/24/24 0509    oxyCODONE immediate-release (Roxicodone) tablet 5-10 mg  5-10 mg Oral Q6HRS PRN Elliot Clement M.D.   5 mg at 11/25/24 1152    polyethylene glycol/lytes (Miralax) Packet 1 Packet  17 g Oral DAILY Elliot Clement M.D.   1 Packet at 11/25/24 0524    timolol (Timoptic) 0.5 % ophthalmic solution 1 Drop  1 Drop Both Eyes BID Elliot Clement M.D.   1 Drop at 11/25/24 0524    Respiratory Therapy Consult   Nebulization Continuous RT Elliot Clement M.D.        acetaminophen (Tylenol) tablet 650 mg  650 mg Oral Q6HRS PRN Elliot Clement M.D.   650 mg at 11/23/24 1201    senna-docusate (Pericolace Or Senokot S) 8.6-50 MG per tablet 2 Tablet  2 Tablet Oral Q EVENING Elliot Clement M.D.   2 Tablet at 11/24/24 1706    And     polyethylene glycol/lytes (Miralax) Packet 1 Packet  1 Packet Oral QDAY PRN Elliot Clement M.D.   1 Packet at 11/23/24 1226    ondansetron (Zofran) syringe/vial injection 4 mg  4 mg Intravenous Q4HRS PRN Elliot Clement M.D.        ondansetron (Zofran ODT) dispertab 4 mg  4 mg Oral Q4HRS PRN Elliot Clement M.D.        insulin lispro (HumaLOG,AdmeLOG) subcutaneous injection  2-9 Units Subcutaneous 4X/DAY ACHS Elliot Clement M.D.   2 Units at 11/23/24 1855    And    dextrose 50% (D50W) injection 25 g  25 g Intravenous Q15 MIN PRN Elliot Clement M.D.        norepinephrine (Levophed) 8 mg in 250 mL NS infusion (premix)  0-1 mcg/kg/min (Ideal) Intravenous Continuous Elliot Clement M.D.   Stopped at 11/25/24 1114       Fluids    Intake/Output Summary (Last 24 hours) at 11/25/2024 1234  Last data filed at 11/25/2024 0943  Gross per 24 hour   Intake 1196.55 ml   Output 310 ml   Net 886.55 ml       Laboratory          Recent Labs     11/23/24  1200 11/23/24  1729 11/24/24  0544 11/24/24  1703 11/25/24  0000 11/25/24  0555   SODIUM 126*   < > 125* 123* 128* 124*   POTASSIUM 4.9   < > 5.2 5.7* 4.7 4.7   CHLORIDE 99   < > 97 95* 98 94*   CO2 15*   < > 15* 13* 16* 15*   BUN 52*   < > 59* 63* 65* 65*   CREATININE 2.33*   < > 2.74* 2.97* 3.07* 2.84*   MAGNESIUM 2.2  --  2.2  --   --  2.0   CALCIUM 7.5*   < > 7.4* 7.4* 7.4* 6.9*    < > = values in this interval not displayed.     Recent Labs     11/23/24  0517 11/23/24  1200 11/24/24  0544 11/24/24  1703 11/25/24  0000 11/25/24  0555   ALTSGPT 16  --  20  --   --  16   ASTSGOT 29  --  50*  --   --  36   ALKPHOSPHAT 50  --  55  --   --  51   TBILIRUBIN 1.0  --  0.7  --   --  1.0   GLUCOSE 180*   < > 143* 137* 149* 127*    < > = values in this interval not displayed.     Recent Labs     11/23/24  0517 11/23/24  1130 11/24/24  0544 11/24/24  1203 11/24/24  1703 11/25/24  0000 11/25/24  0555   WBC 18.1*   < > 37.4*   < > 43.5* 36.9* 34.5*   NEUTSPOLYS 91.30*  --   --   --   --    --   --    LYMPHOCYTES 2.90*  --   --   --   --   --   --    MONOCYTES 4.30  --   --   --   --   --   --    EOSINOPHILS 0.10  --   --   --   --   --   --    BASOPHILS 0.20  --   --   --   --   --   --    ASTSGOT 29  --  50*  --   --   --  36   ALTSGPT 16  --  20  --   --   --  16   ALKPHOSPHAT 50  --  55  --   --   --  51   TBILIRUBIN 1.0  --  0.7  --   --   --  1.0    < > = values in this interval not displayed.     Recent Labs     11/23/24  0545 11/23/24  1130 11/24/24  0544 11/24/24  1203 11/24/24  1703 11/25/24  0000 11/25/24  0555   RBC  --    < > 2.05*   < > 2.66* 2.37* 2.68*   HEMOGLOBIN  --    < > 6.4*   < > 8.0* 7.2* 8.2*   HEMATOCRIT  --    < > 19.0*   < > 23.7* 21.1* 23.2*   PLATELETCT  --    < > 393   < > 313 304 255   PROTHROMBTM 28.7*  --  15.1*  --   --   --  14.8*   APTT 54.5*  --   --   --   --   --   --    INR 2.69*  --  1.18*  --   --   --  1.16*    < > = values in this interval not displayed.       Imaging  X-Ray:  I have personally reviewed the images and compared with prior images.  EKG:  I have personally reviewed the images and compared with prior images.  CT:    Reviewed    Assessment/Plan  * Hemorrhagic shock (HCC)  Assessment & Plan  Suspect spontaneous retroperitoneal hemorrhage on anticoagulation and recent hospitalization/SNF.  17 x 10.7 x 9.5 hematoma left retroperitoneum and flank abdominal wall.  She received PCC.  Trend hemoglobin and hemodynamics, transfuse as needed  Replete calcium  Follow closely in ICU, consider CTA, IR intervention/embolization if further bleeding after reversal of anticoagulation. Transfusing 2 units PRBC emergently for large left retroperitoneal hemorrhage  Large bore IV access.    Blood cultures obtained in ED-negative so far - defer antibiotics    Nondisplaced unspecified condyle fracture of lower end of right femur, initial encounter for closed fracture (HCC)- (present on admission)  Assessment & Plan  Right displaced comminuted lateral condyle distal  femoral fracture after ground-level fall  ORIF 11/8/2024-Dr. Camargo  Dressing removed and wound examined, looks clean and dry without any signs of infection but 16 days out    Chronic kidney disease (CKD) stage G3b/A1, moderately decreased glomerular filtration rate (GFR) between 30-44 mL/min/1.73 square meter and albuminuria creatinine ratio less than 30 mg/g- (present on admission)  Assessment & Plan  Low urine output and worsening DIAMOND on CKD secondary to hemorrhagic shock  Serial BMP  Monitor UOP  UA, FENa  Isotonic bicarb infusion for metabolic acidosis  Volume resuscitation ongoing primarily with packed cells  Reviewed dialysis option with patient and family and patient asked to think about it but is leaning towards no    Paroxysmal atrial fibrillation (HCC)- (present on admission)  Assessment & Plan  Hold warfarin in the setting of acute retroperitoneal hemorrhage  Monitor on telemetry  Optimize electrolytes    Hx of mechanical aortic valve replacement [V43.3]- (present on admission)  Assessment & Plan  [facility] mechanical aortic valve placement 2007 Dr. Francis  On chronic anticoagulation with warfarin  Hold warfarin acutely given large retroperitoneal hemorrhage  Informed family of risk of valvular dysfunction and possible CVA but will need to hold anticoagulation for a few days and reassess daily    Type 2 diabetes mellitus with kidney complication, without long-term current use of insulin (HCC)- (present on admission)  Assessment & Plan  Continue sliding scale insulin as needed for glycemic control  Follow-up A1c  Hypoglycemic protocols    Essential hypertension- (present on admission)  Assessment & Plan  Hold home antihypertensives    Hyperlipidemia- (present on admission)  Assessment & Plan  Continue Zetia  On o/p PCSK9 - Praluent (alirocumab)    CAD (coronary artery disease)- (present on admission)  Assessment & Plan  CABG times 3/2007 with mechanical aortic valve replacement      Hypothyroidism-  (present on admission)  Assessment & Plan  Continue levothyroxine  TSH normal    Class 3 severe obesity due to excess calories with serious comorbidity and body mass index (BMI) of 40.0 to 44.9 in adult (HCC)- (present on admission)  Assessment & Plan  BMI 47    HILDA (obstructive sleep apnea)- (present on admission)  Assessment & Plan  HILDA with nocturnal CPAP         VTE:  Contraindicated  Ulcer: Not Indicated  Lines: Bull Catheter  Ongoing indication addressed    Critical care time: 70 minutes, excluding procedure.    North Sandoval MD, [facility]  Critical Care Medicine  12:34 PM

## 2024-11-25 NOTE — CARE PLAN
The patient is Watcher - Medium risk of patient condition declining or worsening    Shift Goals  Clinical Goals: decrease vasopressor needs, decrease O2, mobility  Patient Goals: rest, comfort, pain mangement  Family Goals: HEATHER    Progress made toward(s) clinical / shift goals:      Problem: Knowledge Deficit - Standard  Goal: Patient and family/care givers will demonstrate understanding of plan of care, disease process/condition, diagnostic tests and medications  Outcome: Progressing     Problem: Pain - Standard  Goal: Alleviation of pain or a reduction in pain to the patient’s comfort goal  Outcome: Progressing     Problem: Fall Risk  Goal: Patient will remain free from falls  Outcome: Progressing     Problem: Hemodynamics  Goal: Patient's hemodynamics, fluid balance and neurologic status will be stable or improve  Outcome: Progressing

## 2024-11-25 NOTE — WOUND TEAM
[facility]  Inpatient Services  Wound and Skin Care Brief Evaluation    Admission Date: 11/23/2024     Last order of IP CONSULT TO WOUND CARE was found on 11/23/2024 from Hospital Encounter on 11/23/2024     HPI, PMH, SH: Reviewed    Chief Complaint   Patient presents with    Shortness of Breath     BIBA from [facility] where she was recovering after repair of a R femur Fx. Pt woke up feeling SOB, good 02 saturations on room air, however pt was hypotensive initially (62/46) and is pale appearing. Pt also has distended abd and c/o constipation x 1 week. Given 250 cc NS en route. BP is improved at this time (119/95).      Diagnosis: Retroperitoneal hemorrhage [R58]    Unit where seen by Wound Team: T616/00     Wound consult placed regarding RLE and Buttocks. Chart and images reviewed. This discussed with bedside RN Echo. This clinician in to assess patient. Patient pleasant and agreeable. Patient's intergluteal cleft with MASD. Continue with barrier paste, barrier wipes, and offloading. Non-selectively debrided with Perineal Wipes (Barrier wipes).     Incision site dry and intact, left open to air. Please consult surgical team if any further concerns arise.  BL Heels pink and blanching. Offloaded heels with pillows due to patient swelling.     No pressure injuries or advanced wound care needs identified. Wound consult completed. No further follow up unless indicated and consulted.     Moisture Associated Skin Damage 11/13/24 Sacrum (Active)   First Observed Date/First Observed Time: 11/13/24 1144   Wound Location : Sacrum      Assessments 11/24/2024  2:00 PM   Wound Image      NEXT Weekly Photo (Inpatient Only) 12/01/24   Drainage Amount None   Periwound Assessment Pink;Red   Periwound Protectant Barrier Paste;Skin Protectant Wipes to Periwound   IAD Containment Device Indwelling Catheter       PREVENTATIVE INTERVENTIONS:    Q shift Kamar - performed per nursing policy  Q shift pressure point  assessments - performed per nursing policy    Surface/Positioning  ICU Low Airloss - Currently in Place  Reposition q 2 hours - Currently in Place  TAPs Turning system - Currently in Place    Offloading/Redistribution  Sacral offloading dressing (Silicone dressing) - Currently in Place  Float Heels off Bed with Pillows - Currently in Place           Respiratory  Silicone O2 tubing - Currently in Place    Containment/Moisture Prevention    Dri-brennen pad - Currently in Place  Bull Catheter - Currently in Place  Barrier wipes - Currently in Place  Barrier paste - Currently in Place

## 2024-11-26 LAB
ALBUMIN SERPL BCP-MCNC: 2.3 G/DL (ref 3.2–4.9)
ALBUMIN/GLOB SERPL: 1.4 G/DL
ALP SERPL-CCNC: 49 U/L (ref 30–99)
ALT SERPL-CCNC: 19 U/L (ref 2–50)
ANION GAP SERPL CALC-SCNC: 11 MMOL/L (ref 7–16)
ANION GAP SERPL CALC-SCNC: 11 MMOL/L (ref 7–16)
ANION GAP SERPL CALC-SCNC: 12 MMOL/L (ref 7–16)
ANION GAP SERPL CALC-SCNC: 13 MMOL/L (ref 7–16)
AST SERPL-CCNC: 28 U/L (ref 12–45)
BILIRUB SERPL-MCNC: 1.3 MG/DL (ref 0.1–1.5)
BUN SERPL-MCNC: 62 MG/DL (ref 8–22)
BUN SERPL-MCNC: 67 MG/DL (ref 8–22)
BUN SERPL-MCNC: 69 MG/DL (ref 8–22)
BUN SERPL-MCNC: 69 MG/DL (ref 8–22)
CALCIUM ALBUM COR SERPL-MCNC: 8.5 MG/DL (ref 8.5–10.5)
CALCIUM SERPL-MCNC: 7.1 MG/DL (ref 8.5–10.5)
CALCIUM SERPL-MCNC: 7.1 MG/DL (ref 8.5–10.5)
CALCIUM SERPL-MCNC: 7.3 MG/DL (ref 8.5–10.5)
CALCIUM SERPL-MCNC: 7.4 MG/DL (ref 8.5–10.5)
CHLORIDE SERPL-SCNC: 91 MMOL/L (ref 96–112)
CHLORIDE SERPL-SCNC: 94 MMOL/L (ref 96–112)
CO2 SERPL-SCNC: 19 MMOL/L (ref 20–33)
CO2 SERPL-SCNC: 21 MMOL/L (ref 20–33)
CO2 SERPL-SCNC: 22 MMOL/L (ref 20–33)
CO2 SERPL-SCNC: 23 MMOL/L (ref 20–33)
CREAT SERPL-MCNC: 2.13 MG/DL (ref 0.5–1.4)
CREAT SERPL-MCNC: 2.44 MG/DL (ref 0.5–1.4)
CREAT SERPL-MCNC: 2.63 MG/DL (ref 0.5–1.4)
CREAT SERPL-MCNC: 2.89 MG/DL (ref 0.5–1.4)
ERYTHROCYTE [DISTWIDTH] IN BLOOD BY AUTOMATED COUNT: 48 FL (ref 35.9–50)
ERYTHROCYTE [DISTWIDTH] IN BLOOD BY AUTOMATED COUNT: 48.2 FL (ref 35.9–50)
ERYTHROCYTE [DISTWIDTH] IN BLOOD BY AUTOMATED COUNT: 48.7 FL (ref 35.9–50)
GFR SERPLBLD CREATININE-BSD FMLA CKD-EPI: 17 ML/MIN/1.73 M 2
GFR SERPLBLD CREATININE-BSD FMLA CKD-EPI: 19 ML/MIN/1.73 M 2
GFR SERPLBLD CREATININE-BSD FMLA CKD-EPI: 20 ML/MIN/1.73 M 2
GFR SERPLBLD CREATININE-BSD FMLA CKD-EPI: 24 ML/MIN/1.73 M 2
GLOBULIN SER CALC-MCNC: 1.6 G/DL (ref 1.9–3.5)
GLUCOSE BLD STRIP.AUTO-MCNC: 108 MG/DL (ref 65–99)
GLUCOSE SERPL-MCNC: 110 MG/DL (ref 65–99)
GLUCOSE SERPL-MCNC: 111 MG/DL (ref 65–99)
GLUCOSE SERPL-MCNC: 121 MG/DL (ref 65–99)
GLUCOSE SERPL-MCNC: 124 MG/DL (ref 65–99)
HCT VFR BLD AUTO: 23.7 % (ref 37–47)
HCT VFR BLD AUTO: 26 % (ref 37–47)
HCT VFR BLD AUTO: 27.3 % (ref 37–47)
HGB BLD-MCNC: 8.6 G/DL (ref 12–16)
HGB BLD-MCNC: 9.3 G/DL (ref 12–16)
HGB BLD-MCNC: 9.9 G/DL (ref 12–16)
INR PPP: 1.25 (ref 0.87–1.13)
MAGNESIUM SERPL-MCNC: 2 MG/DL (ref 1.5–2.5)
MCH RBC QN AUTO: 31.7 PG (ref 27–33)
MCH RBC QN AUTO: 31.8 PG (ref 27–33)
MCH RBC QN AUTO: 32.4 PG (ref 27–33)
MCHC RBC AUTO-ENTMCNC: 35.8 G/DL (ref 32.2–35.5)
MCHC RBC AUTO-ENTMCNC: 36.3 G/DL (ref 32.2–35.5)
MCHC RBC AUTO-ENTMCNC: 36.3 G/DL (ref 32.2–35.5)
MCV RBC AUTO: 87.5 FL (ref 81.4–97.8)
MCV RBC AUTO: 89 FL (ref 81.4–97.8)
MCV RBC AUTO: 89.2 FL (ref 81.4–97.8)
PLATELET # BLD AUTO: 204 K/UL (ref 164–446)
PLATELET # BLD AUTO: 218 K/UL (ref 164–446)
PLATELET # BLD AUTO: 236 K/UL (ref 164–446)
PMV BLD AUTO: 8.6 FL (ref 9–12.9)
PMV BLD AUTO: 8.6 FL (ref 9–12.9)
PMV BLD AUTO: 8.7 FL (ref 9–12.9)
POTASSIUM SERPL-SCNC: 3.8 MMOL/L (ref 3.6–5.5)
POTASSIUM SERPL-SCNC: 4 MMOL/L (ref 3.6–5.5)
POTASSIUM SERPL-SCNC: 4.1 MMOL/L (ref 3.6–5.5)
POTASSIUM SERPL-SCNC: 4.2 MMOL/L (ref 3.6–5.5)
PROT SERPL-MCNC: 3.9 G/DL (ref 6–8.2)
PROTHROMBIN TIME: 15.8 SEC (ref 12–14.6)
RBC # BLD AUTO: 2.71 M/UL (ref 4.2–5.4)
RBC # BLD AUTO: 2.92 M/UL (ref 4.2–5.4)
RBC # BLD AUTO: 3.06 M/UL (ref 4.2–5.4)
SODIUM SERPL-SCNC: 125 MMOL/L (ref 135–145)
SODIUM SERPL-SCNC: 126 MMOL/L (ref 135–145)
SODIUM SERPL-SCNC: 126 MMOL/L (ref 135–145)
SODIUM SERPL-SCNC: 128 MMOL/L (ref 135–145)
WBC # BLD AUTO: 16.8 K/UL (ref 4.8–10.8)
WBC # BLD AUTO: 17.7 K/UL (ref 4.8–10.8)
WBC # BLD AUTO: 18.5 K/UL (ref 4.8–10.8)

## 2024-11-26 PROCEDURE — 700101 HCHG RX REV CODE 250: Performed by: INTERNAL MEDICINE

## 2024-11-26 PROCEDURE — 700105 HCHG RX REV CODE 258: Performed by: EMERGENCY MEDICINE

## 2024-11-26 PROCEDURE — 85027 COMPLETE CBC AUTOMATED: CPT | Mod: 91

## 2024-11-26 PROCEDURE — 700111 HCHG RX REV CODE 636 W/ 250 OVERRIDE (IP): Mod: JZ | Performed by: INTERNAL MEDICINE

## 2024-11-26 PROCEDURE — 82962 GLUCOSE BLOOD TEST: CPT

## 2024-11-26 PROCEDURE — P9016 RBC LEUKOCYTES REDUCED: HCPCS

## 2024-11-26 PROCEDURE — 30243N1 TRANSFUSION OF NONAUTOLOGOUS RED BLOOD CELLS INTO CENTRAL VEIN, PERCUTANEOUS APPROACH: ICD-10-PCS | Performed by: EMERGENCY MEDICINE

## 2024-11-26 PROCEDURE — 99233 SBSQ HOSP IP/OBS HIGH 50: CPT | Performed by: EMERGENCY MEDICINE

## 2024-11-26 PROCEDURE — 700102 HCHG RX REV CODE 250 W/ 637 OVERRIDE(OP): Performed by: INTERNAL MEDICINE

## 2024-11-26 PROCEDURE — 99223 1ST HOSP IP/OBS HIGH 75: CPT | Performed by: HOSPITALIST

## 2024-11-26 PROCEDURE — 51798 US URINE CAPACITY MEASURE: CPT

## 2024-11-26 PROCEDURE — 80053 COMPREHEN METABOLIC PANEL: CPT

## 2024-11-26 PROCEDURE — 770020 HCHG ROOM/CARE - TELE (206)

## 2024-11-26 PROCEDURE — 36430 TRANSFUSION BLD/BLD COMPNT: CPT

## 2024-11-26 PROCEDURE — A9270 NON-COVERED ITEM OR SERVICE: HCPCS | Performed by: INTERNAL MEDICINE

## 2024-11-26 PROCEDURE — 83735 ASSAY OF MAGNESIUM: CPT

## 2024-11-26 PROCEDURE — 80048 BASIC METABOLIC PNL TOTAL CA: CPT

## 2024-11-26 PROCEDURE — 700111 HCHG RX REV CODE 636 W/ 250 OVERRIDE (IP): Performed by: EMERGENCY MEDICINE

## 2024-11-26 PROCEDURE — 85610 PROTHROMBIN TIME: CPT

## 2024-11-26 PROCEDURE — 86923 COMPATIBILITY TEST ELECTRIC: CPT

## 2024-11-26 RX ORDER — SODIUM CHLORIDE 9 MG/ML
500 INJECTION, SOLUTION INTRAVENOUS ONCE
Status: COMPLETED | OUTPATIENT
Start: 2024-11-26 | End: 2024-11-26

## 2024-11-26 RX ADMIN — TIMOLOL MALEATE 1 DROP: 5 SOLUTION OPHTHALMIC at 16:22

## 2024-11-26 RX ADMIN — SODIUM ZIRCONIUM CYCLOSILICATE 10 G: 10 POWDER, FOR SUSPENSION ORAL at 09:31

## 2024-11-26 RX ADMIN — POLYETHYLENE GLYCOL 3350 1 PACKET: 17 POWDER, FOR SOLUTION ORAL at 05:42

## 2024-11-26 RX ADMIN — SENNOSIDES AND DOCUSATE SODIUM 2 TABLET: 50; 8.6 TABLET ORAL at 16:22

## 2024-11-26 RX ADMIN — OXYCODONE 10 MG: 5 TABLET ORAL at 19:22

## 2024-11-26 RX ADMIN — MIDODRINE HYDROCHLORIDE 5 MG: 5 TABLET ORAL at 05:42

## 2024-11-26 RX ADMIN — EZETIMIBE 10 MG: 10 TABLET ORAL at 05:42

## 2024-11-26 RX ADMIN — OXYCODONE 10 MG: 5 TABLET ORAL at 10:38

## 2024-11-26 RX ADMIN — SODIUM CHLORIDE 500 ML: 9 INJECTION, SOLUTION INTRAVENOUS at 15:20

## 2024-11-26 RX ADMIN — MIDODRINE HYDROCHLORIDE 5 MG: 5 TABLET ORAL at 13:44

## 2024-11-26 RX ADMIN — LEVOTHYROXINE SODIUM 125 MCG: 0.12 TABLET ORAL at 05:42

## 2024-11-26 RX ADMIN — LIDOCAINE 2 PATCH: 4 PATCH TOPICAL at 05:49

## 2024-11-26 RX ADMIN — SODIUM BICARBONATE: 84 INJECTION, SOLUTION INTRAVENOUS at 03:57

## 2024-11-26 RX ADMIN — TIMOLOL MALEATE 1 DROP: 5 SOLUTION OPHTHALMIC at 05:50

## 2024-11-26 RX ADMIN — HYDROMORPHONE HYDROCHLORIDE 0.5 MG: 1 INJECTION, SOLUTION INTRAMUSCULAR; INTRAVENOUS; SUBCUTANEOUS at 13:43

## 2024-11-26 RX ADMIN — OXYCODONE 10 MG: 5 TABLET ORAL at 01:06

## 2024-11-26 ASSESSMENT — ENCOUNTER SYMPTOMS
HEADACHES: 0
BRUISES/BLEEDS EASILY: 0
PALPITATIONS: 0
FEVER: 0
EYES NEGATIVE: 1
NAUSEA: 0
CHILLS: 0
NERVOUS/ANXIOUS: 0
ABDOMINAL PAIN: 1
COUGH: 0
WEAKNESS: 1
ABDOMINAL PAIN: 0
BLOOD IN STOOL: 0
SORE THROAT: 0
SHORTNESS OF BREATH: 0
SPUTUM PRODUCTION: 0
DIZZINESS: 0
MYALGIAS: 1
VOMITING: 0

## 2024-11-26 ASSESSMENT — PAIN DESCRIPTION - PAIN TYPE
TYPE: ACUTE PAIN

## 2024-11-26 ASSESSMENT — FIBROSIS 4 INDEX: FIB4 SCORE: 1.99

## 2024-11-26 NOTE — CARE PLAN
The patient is Watcher - Medium risk of patient condition declining or worsening    Shift Goals  Clinical Goals: Hemodynamics  Patient Goals: Pain control, Rest  Family Goals: HEATHER    Progress made toward(s) clinical / shift goals:    Problem: Hemodynamics  Goal: Patient's hemodynamics, fluid balance and neurologic status will be stable or improve  Description: Target End Date:  Prior to discharge or change in level of care    Document on Assessment and I/O flowsheet templates    1.  Monitor vital signs, pulse oximetry and cardiac monitor per provider order and/or policy  2.  Maintain blood pressure per provider order  3.  Hemodynamic monitoring per provider order  4.  Manage IV fluids and IV infusions  5.  Monitor intake and output  6.  Daily weights per unit policy or provider order  7.  Assess peripheral pulses and capillary refill  8.  Assess color and body temperature  9.  Position patient for maximum circulation/cardiac output  10. Monitor for signs/symptoms of excessive bleeding  11. Assess mental status, restlessness and changes in level of consciousness  12. Monitor temperature and report fever or hypothermia to provider immediately. Consideration of targeted temperature management.  Outcome: Progressing  Note: SB/SR, MAP > 65, off levo, good UOP. Transfused an additional unit of RBCs     Problem: Pain - Standard  Goal: Alleviation of pain or a reduction in pain to the patient’s comfort goal  Description: Target End Date:  Prior to discharge or change in level of care    Document on Vitals flowsheet    1.  Document pain using the appropriate pain scale per order or unit policy  2.  Educate and implement non-pharmacologic comfort measures (i.e. relaxation, distraction, massage, cold/heat therapy, etc.)  3.  Pain management medications as ordered  4.  Reassess pain after pain med administration per policy  5.  If opiods administered assess patient's response to pain medication is appropriate per POSS sedation  scale  6.  Follow pain management plan developed in collaboration with patient and interdisciplinary team (including palliative care or pain specialists if applicable)  Outcome: Progressing  Note: Patient endorses right hip pain. Assessed frequently. Given per MAR with good effect

## 2024-11-26 NOTE — CARE PLAN
Problem: Knowledge Deficit - Standard  Goal: Patient and family/care givers will demonstrate understanding of plan of care, disease process/condition, diagnostic tests and medications  Outcome: Not Progressing  Note: Frequent discussion and reinforcement on POC      Problem: Pain - Standard  Goal: Alleviation of pain or a reduction in pain to the patient’s comfort goal  Outcome: Progressing     Problem: Fall Risk  Goal: Patient will remain free from falls  Outcome: Progressing

## 2024-11-26 NOTE — PROGRESS NOTES
4 Eyes Skin Assessment Completed by PREETHI Marcum and PREETHI Ventura.    Head WDL  Ears Redness  Nose Non-Blanching          Mouth WDL  Neck WDL  Breast/Chest WDL  Shoulder Blades WDL  Spine WDL  (R) Arm/Elbow/Hand Bruising  (L) Arm/Elbow/Hand Bruising  Abdomen Bruising and Incision, Left Flank Bruising   Groin Redness and Excoriation, non- blanching L. Thigh    Scrotum/Coccyx/Buttocks Redness, Non-Blanching, and Moisture Fissure    (R) Leg Bruising and Incision  (L) Leg Scar  (R) Heel/Foot/Toe Redness, Blanching, and Boggy  (L) Heel/Foot/Toe Redness, Blanching, and Boggy          Devices In Places ECG, Tele Box, Blood Pressure Cuff, Pulse Ox, and Central Line      Interventions In Place NC W/Ear Foams, Heel Mepilex, Sacral Mepilex, TAP System, and Q2 Turns    Possible Skin Injury Yes    Pictures Uploaded Into Epic Yes  Wound Consult Placed Yes  RN Wound Prevention Protocol Ordered Yes

## 2024-11-26 NOTE — DOCUMENTATION QUERY
[facility]                                                                       Query Response Note      PATIENT:               LIMA FIGUEREDO  ACCT #:                  [id number]  MRN:                     [id number]  :                      1950  ADMIT DATE:       2024 5:01 AM  DISCH DATE:          RESPONDING  PROVIDER #:        [id number]           QUERY TEXT:    Please clarify the relationship, if any, between retroperitoneal hematoma and Warfarin.    Note: If you agree with a diagnosis listed, please remember to include it in your concurrent daily documentation and onto the Discharge Summary.    The patient's Clinical Indicators include:  Hgb 6.3 on arrival    ED provider note - Retroperitoneal hematoma with acute blood loss anemia    Treatment - Hold anticoagulant; Multiple PRBC's administered    Risk factors - Retroperitoneal hematoma with acute blood loss anemia while on Warfarin    Thank you,  Ayana CARRILLON  Clinical   Connect via [employer]  Options provided:   -- Condition retroperitoneal hematoma is due to/associated with use of Warfarin (hemorrhagic disorder due to circulating anticoagulant)   -- Condition retroperitoneal hematoma is not due to the use of Warfarin, (please document what retroperitoneal hematoma is related to, if known)   -- Other explanation, (please specify other explanation)   -- Unable to determine      Query created by: Ayana Newell on 2024 3:41 PM    RESPONSE TEXT:    Condition retroperitoneal hematoma is due to/associated with use of Warfarin (hemorrhagic disorder due to circulating anticoagulant)       QUERY TEXT:    Please clarify the clinical/diagnostic relevance for the clinical indicators documented.    Note: If you agree with a diagnosis listed, please remember to include it in your concurrent daily documentation and onto the Discharge  Summary.    The patient's clinical indicators include:  Hgb 6.3 on arrival    ED provider note - Retroperitoneal hematoma with acute blood loss anemia    Treatment - Hold anticoagulant; Multiple PRBC's administered    Risk factors - Retroperitoneal hematoma with acute blood loss anemia while on anticoagulant    Thank you,  Ayana Newell BSN  Clinical   Connect via [employer]  Options provided:   -- Acute blood loss anemia is clinically significant and ruled in   -- Findings of no clinical significance   -- Other explanation, (Please specify the other explanation)   -- Unable to determine      Query created by: Ayana Newell on 11/25/2024 3:42 PM    RESPONSE TEXT:    Acute blood loss anemia is clinically significant and ruled in          Electronically signed by:  CRISTIAN RUDD 11/25/2024 4:21 PM

## 2024-11-26 NOTE — PROGRESS NOTES
Anticoagulation Summary  As of 2021    INR goal:  2.5-3.5   TTR:  61.3 % (4.7 y)   INR used for dosin.73 (11/3/2021)   Warfarin maintenance plan:  4 mg (4 mg x 1) every day   Weekly warfarin total:  28 mg   Plan last modified:  Sybil Frazier PharmD (2021)   Next INR check:  2021   Target end date:  Indefinite    Indications    Chronic anticoagulation [Z79.01]  Hx of mechanical aortic valve replacement [V43.3] [Z95.2]  TIA (transient ischemic attack) [G45.9]  Atrial fibrillation (HCC) [I48.91] [I48.91]             Anticoagulation Episode Summary     INR check location:      Preferred lab:      Send INR reminders to:      Comments:  [employer] home meter      Anticoagulation Care Providers     Provider Role Specialty Phone number    Michael J Bloch, M.D. Referring Internal Medicine [phone]    Emily MartinezD Responsible            Refer to Anticoagulation Patient Findings for HPI  Patient Findings     Positives:  Signs/symptoms of bleeding (Patient reports 3 nosebleeds this week. Patient states the 1st & 2nd nosebleeds took approximatley 15 min to stop. The 3rd nosebleed took approximatley 1 minute to stop. )    Negatives:  Signs/symptoms of thrombosis, Laboratory test error suspected, Change in health, Change in alcohol use, Change in activity, Upcoming invasive procedure, Emergency department visit, Upcoming dental procedure, Missed doses, Extra doses, Change in medications, Change in diet/appetite, Hospital admission, Bruising, Other complaints          Spoke with patient to report a therapeutic INR.      Pt is NOT on antiplatelet therapy.    Pt instructed to continue with current warfarin dosing regimen, confirms dosing.   Advised patient that if nosebleeds become worse/are taking longer to resolve to call clinic.     Will follow up in 2 week(s).     iEleen Cartagena, Pharmacy Intern     Spine appears normal, range of motion is not limited, no muscle or joint tenderness

## 2024-11-26 NOTE — PROGRESS NOTES
Patient's daughter-in-law, Lurdes at bedside, updates provided, spoke at length regarding concerns related to current POC.

## 2024-11-26 NOTE — PROGRESS NOTES
All lab samples and IV meds delayed due to inability to get IV access despite attempts by RN and VAT. MD placed CVC, CXR pending interpretation to verify placement.

## 2024-11-26 NOTE — FLOWSHEET NOTE
11/25/24 7977   Events/Summary/Plan   Events/Summary/Plan Pt refused CPAP. No distress noted.

## 2024-11-26 NOTE — CONSULTS
Hospital Medicine Consultation    Date of Service  11/26/2024    Referring Physician  North Sandoval M.D.    Consulting Physician  Tico Gill M.D.    Reason for Consultation  Retroperitoneal hematoma.    History of Presenting Illness  74 y.o. female who presented 11/23/2024 with shortness of breath.  Ms. Beasley has a past medical history of atrial fibrillation, mechanical aortic valve replacement on chronic coumadin therapy that was admitted to this hospital from 11/7 through 11/15 with ground level fall and right femur fracture. She underwent open treatment with internal fixation of right lateral   condyle distal femur fracture by Dr. Camargo on 11/8. She was given one unit RBCs and was discharged to rehab. On 11/23 she was brought to the ER with hypotension (62/46) and in the ER her Hb was 6.3 and chronic kidney disease Cr 1.78. A CT revealed a 17 cm x 10.7 cm x 9.5 cm retroperitoneal hematoma. Her INR was 2.69. She was given reversal with K centra, transfused 2 units RBCs and admitted to the ICU.     Review of Systems  Review of Systems   Respiratory:  Negative for shortness of breath.    Cardiovascular:  Negative for chest pain.   All other systems reviewed and are negative.      Past Medical History   has a past medical history of Allergic rhinitis, Anticoagulation monitoring, special range, Arthritis, Asthma (10/17/2019), Atrial fibrillation ([facility]), Back pain, Blood transfusion without reported diagnosis, CAD (coronary artery disease) (2007), Cataract, Chest pain at rest (11/30/2010), CHF (congestive heart failure) ([facility]), Chronic anticoagulation (04/23/2018), Constipation, Coronary heart disease, Delayed emergence from general anesthesia, Diabetes, Diabetic neuropathy ([facility]) (01/06/2011), Difficulty breathing, Fall, Gasping for breath, Gastritis (07/2010), Gastritis, GERD (gastroesophageal reflux disease), Citizen of [place] measles, GI bleed, Glaucoma, Heart murmur, Hyperlipidemia, Hypertension, Hyponatremia  (04/22/2014), Hypothyroidism, Impaired fasting glucose (10/08/2019), Infectious disease, Kidney disease, Migraine, Need for influenza vaccination (11/30/2010), Neuropathy in diabetes (HCC), HILDA (obstructive sleep apnea), Painful joint, Palpitations, Pneumonia, Pneumonia due to organism (04/22/2014), Pulmonary hypertension ([facility]), Rash, S/P aortic valve replacement (2007), Shortness of breath (08/29/2022), Skin infection (05/11/2018), Sleep apnea, Swelling of lower extremity, TIA (transient ischemic attack) (2005), TIA (transient ischemic attack), Tonsillitis, Tricuspid regurgitation mild- mod (01/06/2011), Unspecified hemorrhagic conditions, Upper GI bleed ON JULY 2007 (01/06/2011), Upper GI bleed ON JULY 2007 (01/06/2011), URTI (acute upper respiratory infection) (11/30/2010), UTI (urinary tract infection) (04/24/2014), Valvular heart disease, Weakness, and Wears glasses.    Surgical History   has a past surgical history that includes primary c section; hernia repair; mitral valve replace (2007); cholecystectomy; abdominal hysterectomy total; carpal tunnel release (1998); tonsillectomy (1995); shoulder surgery (06/2001); coronary artery bypass, 1; Sleeve,Gama Vaso Thigh; hysterectomy laparoscopy; Intubation; uvulopharyngopalatoplasty; Ventilator - Continuous; aortic valve replacement; tubal coagulation laparoscopic bilateral; and orif, fracture, femur (Right, 11/8/2024).    Family History  family history includes Breast Cancer in her cousin; Cancer in her maternal aunt, maternal grandmother, and mother; Diabetes in her brother; Heart Disease in her brother, brother, and mother.    Social History   reports that she has never smoked. She has never used smokeless tobacco. She reports that she does not drink alcohol and does not use drugs.    Medications  Prior to Admission Medications   Prescriptions Last Dose Informant Patient Reported? Taking?   Alirocumab (PRALUENT) 150 MG/ML Solution Auto-injector Unknown MAR from  Other Facility No No   Sig: Inject 150 mg under the skin every 14 days.   diclofenac (VOLTAREN) 0.1 % ophthalmic solution 11/22/2024 at  7:54 AM MAR from Other Facility Yes Yes   Sig: Administer 1 Drop into both eyes 4 times a day.   doxazosin (CARDURA) 8 MG tablet 11/22/2024 at  8:25 PM MAR from Other Facility No Yes   Sig: Take 1 tablet by mouth at bedtime. To lower blood pressure   enoxaparin (LOVENOX) 120 MG/0.8ML Solution Prefilled Syringe inj 11/22/2024 at  8:25 PM MAR from Other Facility Yes Yes   Sig: Inject 1 Syringe under the skin every 12 hours.   ezetimibe (ZETIA) 10 MG Tab 11/22/2024 at  7:54 AM MAR from Other Facility No Yes   Sig: TAKE 1 TABLET BY MOUTH EVERY DAY   Patient taking differently: Take 10 mg by mouth every day.   levothyroxine (SYNTHROID) 125 MCG Tab 11/22/2024 at  4:00 AM MAR from Other Facility No Yes   Sig: Take 1 Tablet by mouth every morning on an empty stomach.   lidocaine (ASPERFLEX) 4 % Patch 11/22/2024 at 10:35 PM MAR from Other Facility No Yes   Sig: Place 2 Patches on the skin every 24 hours.   methocarbamol (ROBAXIN) 750 MG Tab 11/22/2024 at  5:31 PM MAR from Other Facility No Yes   Sig: Take 1 Tablet by mouth 4 times a day.   oxyCODONE immediate release (ROXICODONE) 10 MG immediate release tablet 11/23/2024 at 12:05 AM MAR from Other Facility Yes Yes   Sig: Take 10 mg by mouth every 6 hours as needed for Moderate Pain.   polyethylene glycol/lytes (MIRALAX) Pack Not Taking MAR from Other Facility No No   Sig: Take 1 Packet by mouth every day.   Patient not taking: Reported on 11/23/2024   senna-docusate (PERICOLACE OR SENOKOT S) 8.6-50 MG Tab 11/22/2024 at  7:57 PM MAR from Other Facility No Yes   Sig: Take 2 Tablets by mouth 2 times a day.   Patient taking differently: Take 2 Tablets by mouth at bedtime.   timolol (TIMOPTIC) 0.5 % Solution 11/21/2024 at  7:54 AM MAR from Other Facility No Yes   Sig: Drop 1 Drop in both eyes 2 times a day.   warfarin (COUMADIN) 2 MG Tab  "11/22/2024 at  7:57 PM MAR from Other Facility No Yes   Sig: Take 1 Tablet by mouth every day at 6 PM.   Patient taking differently: Take 6 mg by mouth at bedtime. Monday, Wednesday, Friday   warfarin (COUMADIN) 4 MG Tab 11/21/2024 at  8:39 PM MAR from Other Facility Yes Yes   Sig: Take 4 mg by mouth at bedtime. Sunday, Tuesday, Thursday and Saturday      Facility-Administered Medications: None       Allergies  Allergies   Allergen Reactions    Asa [Aspirin]      GI BLEED    Atorvastatin Shortness of Breath    Cymbalta [Duloxetine Hcl] Unspecified     Feels like a \"zombie\"    Hmg-Coa-R Inhibitors     Latex Swelling    Nsaids      GI BLEED    Tricor Shortness of Breath     Breathing problems      Trilipix [Choline Fenofibrate] Shortness of Breath     SOB    Lyrica [Pregabalin]      SPACED OUT       Physical Exam  Temp:  [35.9 °C (96.7 °F)-37 °C (98.6 °F)] 36.8 °C (98.2 °F)  Pulse:  [56-86] 59  Resp:  [16-35] 24  BP: ()/(35-86) 115/53  SpO2:  [92 %-97 %] 97 %    Physical Exam  Vitals and nursing note reviewed.   Constitutional:       Appearance: She is obese. She is ill-appearing.   Neck:      Comments: Right IJ central line  Cardiovascular:      Rate and Rhythm: Normal rate.   Pulmonary:      Effort: Pulmonary effort is normal.      Breath sounds: Normal breath sounds.   Abdominal:      General: There is distension.      Tenderness: There is no abdominal tenderness.   Musculoskeletal:      Right lower leg: Edema present.      Left lower leg: Edema present.      Comments: Right thigh incision with staples   Skin:     Coloration: Skin is pale.   Neurological:      General: No focal deficit present.      Mental Status: She is alert and oriented to person, place, and time.         Fluids  Date 11/26/24 0700 - 11/27/24 0659   Shift 2180-4457 0777-4244 7434-1785 24 Hour Total   INTAKE   I.V. 399.9   399.9   Shift Total 399.9   399.9   OUTPUT   Urine 35   35   Shift Total 35   35   Weight (kg) 107.3 107.3 107.3 107.3 "       Laboratory  Recent Labs     11/25/24  1620 11/25/24  2325 11/26/24  0530   WBC 29.1* 22.0* 16.8*   RBC 2.43* 2.62* 2.71*   HEMOGLOBIN 7.6* 7.9* 8.6*   HEMATOCRIT 21.2* 22.9* 23.7*   MCV 87.2 87.4 87.5   MCH 31.3 30.2 31.7   MCHC 35.8* 34.5 36.3*   RDW 47.9 47.1 48.0   PLATELETCT 283 240 204   MPV 8.8* 8.5* 8.7*     Recent Labs     11/25/24  1620 11/25/24  2325 11/26/24  0530   SODIUM 125* 126* 126*   POTASSIUM 4.5 4.2 4.0   CHLORIDE 96 94* 94*   CO2 19* 19* 21   GLUCOSE 140* 121* 110*   BUN 70* 69* 69*   CREATININE 2.85* 2.63* 2.89*   CALCIUM 8.4* 7.1* 7.1*     Recent Labs     11/24/24  0544 11/25/24  0555 11/26/24  0530   INR 1.18* 1.16* 1.25*                 Imaging  DX-CHEST-LIMITED (1 VIEW)   Final Result      1.  Dense opacification of the left lower lung. This may represent a combination of consolidation and pleural effusion.   2.  Right base atelectasis.   3.  Support apparatus as above. No pneumothorax seen.      US-EXTREMITY VENOUS LOWER BILAT   Final Result      EC-ECHOCARDIOGRAM COMPLETE W/ CONT   Final Result      CT-RENAL COLIC EVALUATION(A/P W/O)   Final Result   Impression:      1. Large 17 x 10.7 x 9.5 cm hematoma type mixed density fluid collection in the left retroperitoneum and flank abdominal wall. This displaces the left kidney anteriorly. No significant hydronephrosis.      2. Moderate colonic distention similar to 2007 suggests ileus. No obstruction suggestion. Prior gastric surgery.      3. Heavy vascular calcification. No aneurysm. Sternal wires. Aortic valve prosthesis.      4. No kidney stone or hydronephrosis. Bull catheter in empty bladder.      5. Prior cholecystectomy.                        DX-CHEST-PORTABLE (1 VIEW)   Final Result   Impression:      1. Shallow. Mild left base atelectasis.      2. Cardiomegaly. Sternal wires. Cardiac valve prosthesis.                         Assessment/Plan  * Retroperitoneal hemorrhage- (present on admission)  Assessment & Plan  A CT revealed  a 17 cm x 10.7 cm x 9.5 cm retroperitoneal hematoma  Spontaneous hemorrhage in the setting of coumadin use  INR 2.69 on admission was reversed and red blood cells were transfused  Hold coumadin  Check Hb and INR in the morning        Hemorrhagic shock (HCC)- (present on admission)  Assessment & Plan  Due to retroperitoneal bleed  Requiring emergent blood transfusions      Hx of mechanical aortic valve replacement [V43.3]- (present on admission)  Assessment & Plan  2007 by Dr. Awan  Hold coumadin for now given her active bleed    Hyponatremia- (present on admission)  Assessment & Plan  This persists  Na low at 125  Euvolemic  Check BMP in the morning    Hyperkalemia- (present on admission)  Assessment & Plan  resolved    ABLA (acute blood loss anemia)- (present on admission)  Assessment & Plan  Secondary to retroperitoneal hemorrhage requiring blood transfusions.    Chronic kidney disease (CKD) stage G3b/A1, moderately decreased glomerular filtration rate (GFR) between 30-44 mL/min/1.73 square meter and albuminuria creatinine ratio less than 30 mg/g- (present on admission)  Assessment & Plan  Last admission her Cr was high 1 to low 2 range  Cr 1.7 on admission and is 2.13  Follow urine output  BMP ordered for the morning.    Paroxysmal atrial fibrillation (HCC)- (present on admission)  Assessment & Plan  Hx of    Nondisplaced unspecified condyle fracture of lower end of right femur, initial encounter for closed fracture ([facility])- (present on admission)  Assessment & Plan  S/p surgery by Dr. Camargo on 11/8  PT/OT  She likely will require post-acute rehab.    Class 3 severe obesity due to excess calories with serious comorbidity and body mass index (BMI) of 40.0 to 44.9 in adult (HCC)- (present on admission)  Assessment & Plan  BMI 46    HILDA (obstructive sleep apnea)- (present on admission)  Assessment & Plan  Continue nocturna CPAP

## 2024-11-26 NOTE — PROGRESS NOTES
Critical Care Progress Note    Date of admission  11/23/2024    Chief Complaint  74 y.o. female, PMH paroxysmal atrial fibrillation, mechanical aortic valve replacement 2007 on warfarin, CKD 3B, hypothyroidism, glaucoma recent hospitalization 11/7-11/15 after ground-level fall with right displaced comminuted lateral condyle distal femoral fracture, ORIF on 11/8 and discharged to SNF, who presented 11/23/2024 in transfer from SNF where she was recovering with complains of dyspnea and hypotension, initial BP of 62/46 and pale appearing.  She has been complaining of constipation and has a distended abdomen with left abdominal flank pain.  She received 1 L of crystalloid in the ED and workup included replacement of Bull catheter and CT abdomen without contrast renal colic exam.  This showed a large 17 x 10.7 x 9.5 cm hematoma in the left retroperitoneum and flank abdominal wall.  Hemoglobin was 6.3, down from 7.6 at discharge.  INR 2.7.  She received PCC in the ED.    Hospital Course  11/24: 3 RBCs during the day, 1 RBC overnight  11/25: 1 RBC overnight    Interval Problem Update  Events in last 24 hours: 1 RBC in the last 24 hours  N: afebrile  CV: on low dose norepinephrine, mech AVR 2007  R: 2L NC  FEN/GI: Diet as tolerated  /Renal: hypoNa, metabolic acidosis resolved, Cr 2.4, vascillating but generally downtrending.  425mL UOP in the last 24 hours.  +1.8L net.  Heme: hemoglobin 8.2 from 7.2, 1 RBC overnight, holding warfarin  ID: leukocytosis w/o localizing signs/symptoms of infection  Skin/MSK: n/a  Endo: glucose 140s    Family: daughter works here at [employer] in clinical experience.  Home medications reconciled       Review of Systems  Review of Systems   Constitutional:  Positive for malaise/fatigue. Negative for chills and fever.   HENT:  Negative for congestion and sore throat.    Eyes: Negative.    Respiratory:  Negative for cough, sputum production and shortness of breath.    Cardiovascular:  Negative for  chest pain.   Gastrointestinal:  Positive for abdominal pain (Improved). Negative for blood in stool, melena, nausea and vomiting.   Genitourinary:  Negative for dysuria.   Musculoskeletal:  Positive for joint pain (Right lower extremity).   Neurological:  Positive for weakness (Weak all over especially right lower extremity). Negative for headaches.   Endo/Heme/Allergies:  Does not bruise/bleed easily.   Psychiatric/Behavioral:  The patient is not nervous/anxious.         Vital Signs for last 24 hours   Temp:  [35.9 °C (96.7 °F)-37 °C (98.6 °F)] 36.2 °C (97.2 °F)  Pulse:  [56-86] 61  Resp:  [16-35] 21  BP: ()/(35-78) 132/59  SpO2:  [90 %-97 %] 95 %    Hemodynamic parameters for last 24 hours       Respiratory Information for the last 24 hours       Physical Exam   Physical Exam  Vitals reviewed.   Constitutional:       Appearance: She is morbidly obese. She is not ill-appearing.   HENT:      Head: Normocephalic and atraumatic.      Mouth/Throat:      Mouth: Mucous membranes are dry.   Eyes:      General: No scleral icterus.     Pupils: Pupils are equal, round, and reactive to light.   Cardiovascular:      Rate and Rhythm: Normal rate and regular rhythm.      Heart sounds: No murmur heard.     No gallop.   Pulmonary:      Effort: Pulmonary effort is normal.      Breath sounds: No wheezing, rhonchi or rales.   Abdominal:      General: Abdomen is protuberant. Bowel sounds are normal. There is no distension.      Palpations: Abdomen is soft.      Tenderness: There is abdominal tenderness (Left-sided). There is no right CVA tenderness, left CVA tenderness or guarding.   Musculoskeletal:      Right lower leg: No edema.      Left lower leg: No edema.   Skin:     General: Skin is warm and dry.      Capillary Refill: Capillary refill takes less than 2 seconds.   Neurological:      General: No focal deficit present.      Mental Status: She is alert and oriented to person, place, and time. Mental status is at baseline.    Psychiatric:         Mood and Affect: Mood normal.         Behavior: Behavior normal.         Thought Content: Thought content normal.         Medications  Current Facility-Administered Medications   Medication Dose Route Frequency Provider Last Rate Last Admin    NS (Bolus) 0.9 % infusion 500 mL  500 mL Intravenous Once North Sandoval M.D.        methocarbamol (Robaxin) tablet 750 mg  750 mg Oral Q6HRS PRN Robert Jefferson M.D.        midodrine (Proamatine) tablet 5 mg  5 mg Oral Q8HRS Robert Jefferson M.D.   5 mg at 11/26/24 1344    HYDROmorphone (Dilaudid) injection 0.5 mg  0.5 mg Intravenous Q3HRS PRN Elliot Clement M.D.   0.5 mg at 11/26/24 1343    ezetimibe (Zetia) tablet 10 mg  10 mg Oral DAILY Elliot Clmeent M.D.   10 mg at 11/26/24 0542    levothyroxine (Synthroid) tablet 125 mcg  125 mcg Oral AM ES Elliot Clement M.D.   125 mcg at 11/26/24 0542    lidocaine (Asperflex) 4 % patch 2 Patch  2 Patch Transdermal Q24HRS Elliot Clement M.D.   2 Patch at 11/26/24 0549    oxyCODONE immediate-release (Roxicodone) tablet 5-10 mg  5-10 mg Oral Q6HRS PRN Elliot Clement M.D.   10 mg at 11/26/24 1038    polyethylene glycol/lytes (Miralax) Packet 1 Packet  17 g Oral DAILY Elliot Clement M.D.   1 Packet at 11/26/24 0542    timolol (Timoptic) 0.5 % ophthalmic solution 1 Drop  1 Drop Both Eyes BID Elliot Clement M.D.   1 Drop at 11/26/24 0550    Respiratory Therapy Consult   Nebulization Continuous RT Elliot Clement M.D.        acetaminophen (Tylenol) tablet 650 mg  650 mg Oral Q6HRS PRN Elliot Clement M.D.   650 mg at 11/23/24 1201    senna-docusate (Pericolace Or Senokot S) 8.6-50 MG per tablet 2 Tablet  2 Tablet Oral Q EVENING Elliot Clement M.D.   2 Tablet at 11/25/24 1800    And    polyethylene glycol/lytes (Miralax) Packet 1 Packet  1 Packet Oral QDAY PRN Elliot Clement M.D.   1 Packet at 11/23/24 1226    ondansetron (Zofran) syringe/vial injection 4 mg  4 mg Intravenous Q4HRS PRN Elliot Clement M.D.         ondansetron (Zofran ODT) dispertab 4 mg  4 mg Oral Q4HRS PRN Elliot Clement M.D.           Fluids    Intake/Output Summary (Last 24 hours) at 11/26/2024 1431  Last data filed at 11/26/2024 1200  Gross per 24 hour   Intake 2185.57 ml   Output 600 ml   Net 1585.57 ml       Laboratory          Recent Labs     11/24/24  0544 11/24/24  1703 11/25/24  0555 11/25/24  1620 11/25/24  2325 11/26/24  0530 11/26/24  1043   SODIUM 125*   < > 124*   < > 126* 126* 128*   POTASSIUM 5.2   < > 4.7   < > 4.2 4.0 4.1   CHLORIDE 97   < > 94*   < > 94* 94* 94*   CO2 15*   < > 15*   < > 19* 21 22   BUN 59*   < > 65*   < > 69* 69* 67*   CREATININE 2.74*   < > 2.84*   < > 2.63* 2.89* 2.44*   MAGNESIUM 2.2  --  2.0  --   --  2.0  --    CALCIUM 7.4*   < > 6.9*   < > 7.1* 7.1* 7.3*    < > = values in this interval not displayed.     Recent Labs     11/24/24  0544 11/24/24  1703 11/25/24  0555 11/25/24  1620 11/25/24  2325 11/26/24  0530 11/26/24  1043   ALTSGPT 20  --  16  --   --  19  --    ASTSGOT 50*  --  36  --   --  28  --    ALKPHOSPHAT 55  --  51  --   --  49  --    TBILIRUBIN 0.7  --  1.0  --   --  1.3  --    GLUCOSE 143*   < > 127*   < > 121* 110* 124*    < > = values in this interval not displayed.     Recent Labs     11/24/24  0544 11/24/24  1203 11/25/24  0555 11/25/24  1620 11/25/24  2325 11/26/24  0530 11/26/24  1043   WBC 37.4*   < > 34.5*   < > 22.0* 16.8* 18.5*   ASTSGOT 50*  --  36  --   --  28  --    ALTSGPT 20  --  16  --   --  19  --    ALKPHOSPHAT 55  --  51  --   --  49  --    TBILIRUBIN 0.7  --  1.0  --   --  1.3  --     < > = values in this interval not displayed.     Recent Labs     11/24/24  0544 11/24/24  1203 11/25/24  0555 11/25/24  1620 11/25/24  2325 11/26/24  0530 11/26/24  1043   RBC 2.05*   < > 2.68*   < > 2.62* 2.71* 2.92*   HEMOGLOBIN 6.4*   < > 8.2*   < > 7.9* 8.6* 9.3*   HEMATOCRIT 19.0*   < > 23.2*   < > 22.9* 23.7* 26.0*   PLATELETCT 393   < > 255   < > 240 204 218   PROTHROMBTM 15.1*  --  14.8*   --   --  15.8*  --    INR 1.18*  --  1.16*  --   --  1.25*  --     < > = values in this interval not displayed.       Imaging  X-Ray:  I have personally reviewed the images and compared with prior images.  EKG:  I have personally reviewed the images and compared with prior images.  CT:    Reviewed    Assessment/Plan  * Hemorrhagic shock (HCC)  Assessment & Plan  Suspect spontaneous retroperitoneal hemorrhage on anticoagulation and recent hospitalization/SNF.  17 x 10.7 x 9.5 hematoma left retroperitoneum and flank abdominal wall.  She received PCC.  Trend hemoglobin and hemodynamics, transfuse as needed  Replete calcium  Follow closely in ICU, consider CTA, IR intervention/embolization if further bleeding after reversal of anticoagulation. Transfusing 2 units PRBC emergently for large left retroperitoneal hemorrhage  Large bore IV access.    Blood cultures obtained in ED-negative so far - defer antibiotics    Nondisplaced unspecified condyle fracture of lower end of right femur, initial encounter for closed fracture (HCC)- (present on admission)  Assessment & Plan  Right displaced comminuted lateral condyle distal femoral fracture after ground-level fall  ORIF 11/8/2024-Dr. Camargo  Dressing removed and wound examined, looks clean and dry without any signs of infection but 16 days out    Chronic kidney disease (CKD) stage G3b/A1, moderately decreased glomerular filtration rate (GFR) between 30-44 mL/min/1.73 square meter and albuminuria creatinine ratio less than 30 mg/g- (present on admission)  Assessment & Plan  Low urine output and worsening DIAMOND on CKD secondary to hemorrhagic shock.  FENa suggest pre-renal.  UA pending  Serial BMP  Monitor UOP  Hold isotonic bicarb with resolution of metabolic acidosis  Reviewed dialysis option with patient and family and patient asked to think about it but is leaning towards no    Paroxysmal atrial fibrillation (HCC)- (present on admission)  Assessment & Plan  Hold warfarin in  the setting of acute retroperitoneal hemorrhage  Monitor on telemetry  Optimize electrolytes    Hx of mechanical aortic valve replacement [V43.3]- (present on admission)  Assessment & Plan  [facility] mechanical aortic valve placement 2007 Dr. Francis  On chronic anticoagulation with warfarin  Hold warfarin acutely given large retroperitoneal hemorrhage  Informed family of risk of valvular dysfunction and possible CVA but will need to hold anticoagulation for a few days and reassess daily    Type 2 diabetes mellitus with kidney complication, without long-term current use of insulin ([facility])- (present on admission)  Assessment & Plan  Continue sliding scale insulin as needed for glycemic control  Follow-up A1c  Hypoglycemic protocols    Hyponatremia- (present on admission)  Assessment & Plan  Hypovolemic hyponatremia. Asymptomatic.  Restore intravascular volume    Essential hypertension- (present on admission)  Assessment & Plan  Hold home antihypertensives    Hyperlipidemia- (present on admission)  Assessment & Plan  Continue Zetia  On PCSK9 at home - Praluent (alirocumab)    CAD (coronary artery disease)- (present on admission)  Assessment & Plan  CABG times 3/2007 with mechanical aortic valve replacement      Hypothyroidism- (present on admission)  Assessment & Plan  Continue levothyroxine  TSH normal    Class 3 severe obesity due to excess calories with serious comorbidity and body mass index (BMI) of 40.0 to 44.9 in adult ([facility])- (present on admission)  Assessment & Plan  BMI 47    HILDA (obstructive sleep apnea)- (present on admission)  Assessment & Plan  HILDA with nocturnal CPAP         VTE:  Contraindicated  Ulcer: Not Indicated  Lines: Bull Catheter  Ongoing indication addressed    L3 follow-up    North Sandoval MD, [facility]  Critical Care Medicine  12:34 PM

## 2024-11-26 NOTE — CARE PLAN
The patient is Watcher - Medium risk of patient condition declining or worsening    Shift Goals  Clinical Goals: Stable hemodynamics  Patient Goals: Rest, pain control  Family Goals: HEATHER    Progress made toward(s) clinical / shift goals:      Problem: Pain - Standard  Goal: Alleviation of pain or a reduction in pain to the patient’s comfort goal  Outcome: Progressing     Problem: Knowledge Deficit - Standard  Goal: Patient and family/care givers will demonstrate understanding of plan of care, disease process/condition, diagnostic tests and medications  Outcome: Progressing     Problem: Fall Risk  Goal: Patient will remain free from falls  Outcome: Progressing     Problem: Hemodynamics  Goal: Patient's hemodynamics, fluid balance and neurologic status will be stable or improve  Outcome: Progressing       Patient is not progressing towards the following goals:

## 2024-11-27 LAB
ANION GAP SERPL CALC-SCNC: 13 MMOL/L (ref 7–16)
BUN SERPL-MCNC: 65 MG/DL (ref 8–22)
CALCIUM SERPL-MCNC: 7.3 MG/DL (ref 8.5–10.5)
CHLORIDE SERPL-SCNC: 90 MMOL/L (ref 96–112)
CO2 SERPL-SCNC: 19 MMOL/L (ref 20–33)
CREAT SERPL-MCNC: 2.05 MG/DL (ref 0.5–1.4)
ERYTHROCYTE [DISTWIDTH] IN BLOOD BY AUTOMATED COUNT: 48.4 FL (ref 35.9–50)
GFR SERPLBLD CREATININE-BSD FMLA CKD-EPI: 25 ML/MIN/1.73 M 2
GLUCOSE SERPL-MCNC: 95 MG/DL (ref 65–99)
HCT VFR BLD AUTO: 26.7 % (ref 37–47)
HGB BLD-MCNC: 9.3 G/DL (ref 12–16)
INR PPP: 1.11 (ref 0.87–1.13)
MCH RBC QN AUTO: 31 PG (ref 27–33)
MCHC RBC AUTO-ENTMCNC: 34.8 G/DL (ref 32.2–35.5)
MCV RBC AUTO: 89 FL (ref 81.4–97.8)
PLATELET # BLD AUTO: 208 K/UL (ref 164–446)
PMV BLD AUTO: 8.7 FL (ref 9–12.9)
POTASSIUM SERPL-SCNC: 4 MMOL/L (ref 3.6–5.5)
PROTHROMBIN TIME: 14.3 SEC (ref 12–14.6)
RBC # BLD AUTO: 3 M/UL (ref 4.2–5.4)
SODIUM SERPL-SCNC: 122 MMOL/L (ref 135–145)
SODIUM SERPL-SCNC: 125 MMOL/L (ref 135–145)
WBC # BLD AUTO: 16.6 K/UL (ref 4.8–10.8)

## 2024-11-27 PROCEDURE — 770020 HCHG ROOM/CARE - TELE (206)

## 2024-11-27 PROCEDURE — 85610 PROTHROMBIN TIME: CPT

## 2024-11-27 PROCEDURE — 85027 COMPLETE CBC AUTOMATED: CPT

## 2024-11-27 PROCEDURE — 700111 HCHG RX REV CODE 636 W/ 250 OVERRIDE (IP): Mod: JZ | Performed by: INTERNAL MEDICINE

## 2024-11-27 PROCEDURE — 700102 HCHG RX REV CODE 250 W/ 637 OVERRIDE(OP): Performed by: INTERNAL MEDICINE

## 2024-11-27 PROCEDURE — 97530 THERAPEUTIC ACTIVITIES: CPT

## 2024-11-27 PROCEDURE — 700102 HCHG RX REV CODE 250 W/ 637 OVERRIDE(OP): Performed by: HOSPITALIST

## 2024-11-27 PROCEDURE — A9270 NON-COVERED ITEM OR SERVICE: HCPCS | Performed by: HOSPITALIST

## 2024-11-27 PROCEDURE — 700101 HCHG RX REV CODE 250: Performed by: INTERNAL MEDICINE

## 2024-11-27 PROCEDURE — 97602 WOUND(S) CARE NON-SELECTIVE: CPT

## 2024-11-27 PROCEDURE — 84295 ASSAY OF SERUM SODIUM: CPT

## 2024-11-27 PROCEDURE — A9270 NON-COVERED ITEM OR SERVICE: HCPCS | Performed by: INTERNAL MEDICINE

## 2024-11-27 PROCEDURE — 97110 THERAPEUTIC EXERCISES: CPT

## 2024-11-27 PROCEDURE — 99233 SBSQ HOSP IP/OBS HIGH 50: CPT | Performed by: HOSPITALIST

## 2024-11-27 PROCEDURE — 80048 BASIC METABOLIC PNL TOTAL CA: CPT

## 2024-11-27 PROCEDURE — 51798 US URINE CAPACITY MEASURE: CPT

## 2024-11-27 PROCEDURE — 99222 1ST HOSP IP/OBS MODERATE 55: CPT | Performed by: INTERNAL MEDICINE

## 2024-11-27 PROCEDURE — 97166 OT EVAL MOD COMPLEX 45 MIN: CPT

## 2024-11-27 PROCEDURE — 36415 COLL VENOUS BLD VENIPUNCTURE: CPT

## 2024-11-27 RX ADMIN — MIDODRINE HYDROCHLORIDE 5 MG: 5 TABLET ORAL at 21:28

## 2024-11-27 RX ADMIN — TIMOLOL MALEATE 1 DROP: 5 SOLUTION OPHTHALMIC at 17:22

## 2024-11-27 RX ADMIN — OXYCODONE 5 MG: 5 TABLET ORAL at 04:11

## 2024-11-27 RX ADMIN — OXYCODONE 5 MG: 5 TABLET ORAL at 19:37

## 2024-11-27 RX ADMIN — LIDOCAINE 2 PATCH: 4 PATCH TOPICAL at 05:03

## 2024-11-27 RX ADMIN — OXYCODONE 10 MG: 5 TABLET ORAL at 13:04

## 2024-11-27 RX ADMIN — HYDROMORPHONE HYDROCHLORIDE 0.5 MG: 1 INJECTION, SOLUTION INTRAMUSCULAR; INTRAVENOUS; SUBCUTANEOUS at 09:04

## 2024-11-27 RX ADMIN — POLYETHYLENE GLYCOL 3350 1 PACKET: 17 POWDER, FOR SOLUTION ORAL at 05:03

## 2024-11-27 RX ADMIN — LEVOTHYROXINE SODIUM 125 MCG: 0.12 TABLET ORAL at 05:03

## 2024-11-27 RX ADMIN — EZETIMIBE 10 MG: 10 TABLET ORAL at 05:03

## 2024-11-27 RX ADMIN — TIMOLOL MALEATE 1 DROP: 5 SOLUTION OPHTHALMIC at 05:29

## 2024-11-27 RX ADMIN — HYDROMORPHONE HYDROCHLORIDE 0.5 MG: 1 INJECTION, SOLUTION INTRAMUSCULAR; INTRAVENOUS; SUBCUTANEOUS at 00:42

## 2024-11-27 ASSESSMENT — ENCOUNTER SYMPTOMS
CLAUDICATION: 0
DEPRESSION: 0
BACK PAIN: 0
HEARTBURN: 0
PALPITATIONS: 0
CHILLS: 0
VOMITING: 0
FEVER: 0
COUGH: 0
PND: 0
BRUISES/BLEEDS EASILY: 0
BLURRED VISION: 0
HEMOPTYSIS: 0
WHEEZING: 0
NAUSEA: 0
HEADACHES: 0
DOUBLE VISION: 0
MYALGIAS: 0
DIZZINESS: 0

## 2024-11-27 ASSESSMENT — COGNITIVE AND FUNCTIONAL STATUS - GENERAL
SUGGESTED CMS G CODE MODIFIER MOBILITY: CN
CLIMB 3 TO 5 STEPS WITH RAILING: TOTAL
HELP NEEDED FOR BATHING: A LOT
DRESSING REGULAR LOWER BODY CLOTHING: TOTAL
STANDING UP FROM CHAIR USING ARMS: TOTAL
CLIMB 3 TO 5 STEPS WITH RAILING: TOTAL
DAILY ACTIVITIY SCORE: 14
DRESSING REGULAR UPPER BODY CLOTHING: A LITTLE
SUGGESTED CMS G CODE MODIFIER MOBILITY: CM
DRESSING REGULAR LOWER BODY CLOTHING: TOTAL
MOVING TO AND FROM BED TO CHAIR: TOTAL
TURNING FROM BACK TO SIDE WHILE IN FLAT BAD: TOTAL
MOVING FROM LYING ON BACK TO SITTING ON SIDE OF FLAT BED: TOTAL
MOBILITY SCORE: 7
MOVING TO AND FROM BED TO CHAIR: TOTAL
TOILETING: TOTAL
TURNING FROM BACK TO SIDE WHILE IN FLAT BAD: A LOT
DRESSING REGULAR UPPER BODY CLOTHING: TOTAL
SUGGESTED CMS G CODE MODIFIER DAILY ACTIVITY: CL
SUGGESTED CMS G CODE MODIFIER DAILY ACTIVITY: CK
PERSONAL GROOMING: A LITTLE
MOVING FROM LYING ON BACK TO SITTING ON SIDE OF FLAT BED: TOTAL
DAILY ACTIVITIY SCORE: 9
EATING MEALS: A LITTLE
WALKING IN HOSPITAL ROOM: TOTAL
STANDING UP FROM CHAIR USING ARMS: TOTAL
TOILETING: A LOT
WALKING IN HOSPITAL ROOM: TOTAL
HELP NEEDED FOR BATHING: TOTAL
EATING MEALS: A LITTLE
PERSONAL GROOMING: A LOT
MOBILITY SCORE: 6

## 2024-11-27 ASSESSMENT — FIBROSIS 4 INDEX: FIB4 SCORE: 2.25

## 2024-11-27 ASSESSMENT — PAIN DESCRIPTION - PAIN TYPE
TYPE: ACUTE PAIN

## 2024-11-27 ASSESSMENT — ACTIVITIES OF DAILY LIVING (ADL): TOILETING: INDEPENDENT

## 2024-11-27 ASSESSMENT — GAIT ASSESSMENTS: GAIT LEVEL OF ASSIST: UNABLE TO PARTICIPATE

## 2024-11-27 NOTE — WOUND TEAM
[facility]  Inpatient Services  Wound and Skin Care Brief Evaluation    Admission Date: 11/23/2024     Last order of IP CONSULT TO WOUND CARE was found on 11/26/2024 from Hospital Encounter on 11/23/2024     HPI, PMH, SH: Reviewed    Chief Complaint   Patient presents with    Shortness of Breath     BIBA from [facility] where she was recovering after repair of a R femur Fx. Pt woke up feeling SOB, good 02 saturations on room air, however pt was hypotensive initially (62/46) and is pale appearing. Pt also has distended abd and c/o constipation x 1 week. Given 250 cc NS en route. BP is improved at this time (119/95).      Diagnosis: Retroperitoneal hemorrhage [R58]    Unit where seen by Wound Team: T810/00     Wound consult placed regarding bilateral nose (bridge). Chart and images reviewed. This discussed with bedside RN Trish. This clinician in to assess patient. Patient pleasant and agreeable. Non-selectively debrided with N/A.     No pressure injuries or advanced wound care needs identified. Wound consult completed. No further follow up unless indicated and consulted.     Wound 11/25/24 Abrasion Right bilateral sides of nose, blanching (Active)   Date First Assessed/Time First Assessed: 11/25/24 0800   Present on Original Admission: No  Hand Hygiene Completed: Yes  Primary Wound Type: Abrasion  Laterality: Right  Wound Description (Comments): bilateral sides of nose, blanching      Assessments 11/27/2024 11:00 AM   Wound Image      Site Assessment Clean;Dry;Intact;Pink   Periwound Assessment Blanchable erythema   Closure Open to air   Treatments Offloading;Other (Comment)   Dressing Status Open to Air   Wound Team Following Not following     Assessed - L inner thigh - intact and blanching. Edematous            PREVENTATIVE INTERVENTIONS:    Q shift Kamar - performed per nursing policy  Q shift pressure point assessments - performed per nursing policy    Surface/Positioning  Standard/trauma  mattress - Currently in Place  TAPs Turning system - Currently in Place    Offloading/Redistribution  Heel float boots (Prevalon boot) - Currently in Place      Respiratory  Silicone O2 tubing - Currently in Place  Gray Foam Ear protectors - Currently in Place    Containment/Moisture Prevention    Dri-brennen pad - Currently in Place  Barrier paste - Currently in Place

## 2024-11-27 NOTE — PROGRESS NOTES
Hospital Medicine Daily Progress Note    Date of Service  11/27/2024    Chief Complaint  Yenifer Beasley is a 74 y.o. female admitted 11/23/2024 with retroperitoneal hematoma    Hospital Course  74 y.o. female who presented 11/23/2024 with shortness of breath.  Ms. Beasley has a past medical history of atrial fibrillation, mechanical aortic valve replacement on chronic coumadin therapy that was admitted to this hospital from 11/7 through 11/15 with ground level fall and right femur fracture. She underwent open treatment with internal fixation of right lateral   condyle distal femur fracture by Dr. Camargo on 11/8. She was given one unit RBCs and was discharged to rehab. On 11/23 she was brought to the ER with hypotension (62/46) and in the ER her Hb was 6.3 and chronic kidney disease Cr 1.78. A CT revealed a 17 cm x 10.7 cm x 9.5 cm retroperitoneal hematoma. Her INR was 2.69. She was given reversal with K centra, transfused 2 units RBCs and admitted to the ICU.     Interval Problem Update  11/27 patient is resting in bed, she is alert and oriented, she complain of generalized weakness, hemoglobin appears to be stable, blood pressure is stable, will discuss with cardiology regarding when to restart oral if okay to continue holding Coumadin, patient will likely require placement when medically stable, discussed with bedside nurse charge nurse  pharmacist, cannot have an answer.    I have discussed this patient's plan of care and discharge plan at IDT rounds today with Case Management, Nursing, Nursing leadership, and other members of the IDT team.    Consultants/Specialty  Cardiology  Critical care    Code Status  DNAR/DNI    Disposition  The patient is not medically cleared for discharge to home or a post-acute facility.      I have placed the appropriate orders for post-discharge needs.    Review of Systems  Review of Systems   Constitutional:  Positive for malaise/fatigue. Negative for chills and  fever.   Eyes:  Negative for blurred vision and double vision.   Respiratory:  Negative for cough, hemoptysis and wheezing.    Cardiovascular:  Negative for chest pain, palpitations, claudication, leg swelling and PND.   Gastrointestinal:  Negative for heartburn, nausea and vomiting.   Genitourinary:  Negative for hematuria and urgency.   Musculoskeletal:  Negative for back pain and myalgias.   Skin:  Negative for rash.   Neurological:  Negative for dizziness and headaches.   Endo/Heme/Allergies:  Does not bruise/bleed easily.   Psychiatric/Behavioral:  Negative for depression.         Physical Exam  Temp:  [36.5 °C (97.7 °F)-36.7 °C (98.1 °F)] 36.6 °C (97.9 °F)  Pulse:  [62-74] 74  Resp:  [18-32] 18  BP: (113-153)/(49-67) 120/57  SpO2:  [93 %-98 %] 95 %    Physical Exam  Vitals and nursing note reviewed.   Constitutional:       Appearance: She is obese. She is ill-appearing.   Eyes:      General:         Right eye: No discharge.         Left eye: No discharge.      Conjunctiva/sclera: Conjunctivae normal.   Cardiovascular:      Rate and Rhythm: Normal rate and regular rhythm.      Pulses: Normal pulses.      Heart sounds: Normal heart sounds.   Pulmonary:      Effort: Pulmonary effort is normal. No respiratory distress.      Breath sounds: Normal breath sounds.   Abdominal:      General: Bowel sounds are normal. There is no distension.      Tenderness: There is no abdominal tenderness.   Musculoskeletal:         General: Normal range of motion.      Cervical back: Normal range of motion and neck supple.      Right lower leg: Edema present.      Left lower leg: Edema present.   Skin:     General: Skin is warm.      Capillary Refill: Capillary refill takes less than 2 seconds.      Coloration: Skin is not jaundiced.   Neurological:      General: No focal deficit present.      Mental Status: She is alert and oriented to person, place, and time.      Cranial Nerves: No cranial nerve deficit.   Psychiatric:         Mood  and Affect: Mood normal.         Behavior: Behavior normal.         Fluids    Intake/Output Summary (Last 24 hours) at 11/27/2024 1328  Last data filed at 11/27/2024 0900  Gross per 24 hour   Intake 1580 ml   Output 650 ml   Net 930 ml        Laboratory  Recent Labs     11/26/24  1043 11/26/24  1630 11/27/24  0200   WBC 18.5* 17.7* 16.6*   RBC 2.92* 3.06* 3.00*   HEMOGLOBIN 9.3* 9.9* 9.3*   HEMATOCRIT 26.0* 27.3* 26.7*   MCV 89.0 89.2 89.0   MCH 31.8 32.4 31.0   MCHC 35.8* 36.3* 34.8   RDW 48.7 48.2 48.4   PLATELETCT 218 236 208   MPV 8.6* 8.6* 8.7*     Recent Labs     11/26/24  1043 11/26/24  1630 11/27/24  0200   SODIUM 128* 125* 122*   POTASSIUM 4.1 3.8 4.0   CHLORIDE 94* 91* 90*   CO2 22 23 19*   GLUCOSE 124* 111* 95   BUN 67* 62* 65*   CREATININE 2.44* 2.13* 2.05*   CALCIUM 7.3* 7.4* 7.3*     Recent Labs     11/25/24  0555 11/26/24  0530 11/27/24  0200   INR 1.16* 1.25* 1.11               Imaging  DX-CHEST-LIMITED (1 VIEW)   Final Result      1.  Dense opacification of the left lower lung. This may represent a combination of consolidation and pleural effusion.   2.  Right base atelectasis.   3.  Support apparatus as above. No pneumothorax seen.      US-EXTREMITY VENOUS LOWER BILAT   Final Result      EC-ECHOCARDIOGRAM COMPLETE W/ CONT   Final Result      CT-RENAL COLIC EVALUATION(A/P W/O)   Final Result   Impression:      1. Large 17 x 10.7 x 9.5 cm hematoma type mixed density fluid collection in the left retroperitoneum and flank abdominal wall. This displaces the left kidney anteriorly. No significant hydronephrosis.      2. Moderate colonic distention similar to 2007 suggests ileus. No obstruction suggestion. Prior gastric surgery.      3. Heavy vascular calcification. No aneurysm. Sternal wires. Aortic valve prosthesis.      4. No kidney stone or hydronephrosis. Bull catheter in empty bladder.      5. Prior cholecystectomy.                        DX-CHEST-PORTABLE (1 VIEW)   Final Result   Impression:       1. Shallow. Mild left base atelectasis.      2. Cardiomegaly. Sternal wires. Cardiac valve prosthesis.                          Assessment/Plan  * Retroperitoneal hemorrhage- (present on admission)  Assessment & Plan  A CT revealed a 17 cm x 10.7 cm x 9.5 cm retroperitoneal hematoma  Spontaneous hemorrhage in the setting of coumadin use  INR 2.69 on admission was reversed and red blood cells were transfused  Hold coumadin  Check Hb and INR in the morning  Continue monitoring hemoglobin level    INR 1.1 point      Hyperkalemia- (present on admission)  Assessment & Plan  resolved    Hemorrhagic shock (HCC)- (present on admission)  Assessment & Plan  Due to retroperitoneal bleed  Requiring emergent blood transfusions  Hemoglobin 9.3    ABLA (acute blood loss anemia)- (present on admission)  Assessment & Plan  Secondary to retroperitoneal hemorrhage requiring blood transfusions.  Continue monitoring    Nondisplaced unspecified condyle fracture of lower end of right femur, initial encounter for closed fracture ([facility])- (present on admission)  Assessment & Plan  S/p surgery by Dr. Camargo on 11/8  PT/OT  She likely will require post-acute rehab.    Class 3 severe obesity due to excess calories with serious comorbidity and body mass index (BMI) of 40.0 to 44.9 in adult ([facility])- (present on admission)  Assessment & Plan  BMI 46    Chronic kidney disease (CKD) stage G3b/A1, moderately decreased glomerular filtration rate (GFR) between 30-44 mL/min/1.73 square meter and albuminuria creatinine ratio less than 30 mg/g- (present on admission)  Assessment & Plan  Last admission her Cr was high 1 to low 2 range  Cr 1.7 on admission and is 2.13  Follow urine output  BMP ordered for the morning.  Creatinine 2.05    Paroxysmal atrial fibrillation (HCC)- (present on admission)  Assessment & Plan  Hx of  Holding Coumadin    Hx of mechanical aortic valve replacement [V43.3]- (present on admission)  Assessment & Plan  2007 by   Lv  Hold coumadin for now given her active bleed  Will discuss with cardiology to see when Coumadin can be restarted or if we can keep holding it for a little longer    Hyponatremia- (present on admission)  Assessment & Plan  This persists  Na low at 125  Euvolemic  Check BMP in the morning  Sodium 122  Free water restriction  Continue monitoring sodium levels    Essential hypertension- (present on admission)  Assessment & Plan  Monitoring    HILDA (obstructive sleep apnea)- (present on admission)  Assessment & Plan  Continue nocturna CPAP    CAD (coronary artery disease)- (present on admission)  Assessment & Plan  No chest pain, continue close monitoring         VTE prophylaxis: SCDs    I have performed a physical exam and reviewed and updated ROS and Plan today (11/27/2024). In review of yesterday's note (11/26/2024), there are no changes except as documented above.      Total time of 52 minutes spent prepping to see patient (e.g. reviewing  tests/imaging results, notes from consultants, bedside nurse, night shift ) obtaining and/or reviewing separately obtained history. Performing a medically appropriate examination and evaluation.  Counseling and educating the patient.  Ordering medications, tests, or procedures.  Referring and communicating with other health care professionals.  Documenting clinical information in EPIC.  Independently interpreting results and communicating results to patient.  Care coordination.

## 2024-11-27 NOTE — ASSESSMENT & PLAN NOTE
Last admission her Cr was high 1 to low 2 range  Cr 1.7 on admission and is 2.13  Follow urine output  BMP ordered for the morning.  Creatinine 1.24

## 2024-11-27 NOTE — PROGRESS NOTES
Received report from PREETHI Marcum ([facility]). Pt is A&O x 4. Pt transported via gurney with CHARLENE RN and zoll monitor. Pt arrived to unit, tele box on patient, confirmed with monitor room. Pt transferred via slide board from icu bed to hospital bed, tolerated well. Pt oriented to unit and call light, verbalized understanding. Fall precautions in place. Call light and bedside table within reach, bed locked in lowest position with controls on. Assessment completed. Will continue to monitor.

## 2024-11-27 NOTE — ASSESSMENT & PLAN NOTE
Secondary to retroperitoneal hemorrhage requiring blood transfusions.  Continue monitoring  Hemoglobin appears to be stable  Probably will restart Coumadin in a day or 2  Continue monitor hemoglobin  Coumadin day 2 out of 5

## 2024-11-27 NOTE — ASSESSMENT & PLAN NOTE
2007 by Dr. Awan  Hold coumadin for now given her active bleed  Will discuss with cardiology to see when Coumadin can be restarted or if we can keep holding it for a little longer  Heart failure restart Coumadin in 1 to 2 days  Start heparin Coumadin today day 2/5

## 2024-11-27 NOTE — PROGRESS NOTES
4 Eyes Skin Assessment Completed by PREETHI Mcclelland and FAHEEM Honeycutt.    Head WDL  Ears Redness and Blanching  Nose Redness and Non-Blanching scab to R side of nose.  Mouth WDL  Neck WDL  Breast/Chest WDL  Shoulder Blades WDL  Spine WDL  (R) Arm/Elbow/Hand Redness and Bruising  (L) Arm/Elbow/Hand Redness and Bruising  Abdomen Bruising and Incision L flank bruising noted  Groin Redness and Excoriation/non blanching L upper thigh  Scrotum/Coccyx/Buttocks Redness, Non-Blanching, Excoriation, and Moisture Fissure  (R) Leg Bruising and Incision/Edema  (L) Leg Scar, Bruising, and Edema  (R) Heel/Foot/Toe Redness, Blanching, and Boggy  (L) Heel/Foot/Toe Redness, Blanching, and Boggy          Devices In Places Tele Box, Pulse Ox, and Central Line      Interventions In Place Gray Ear Foams    Possible Skin Injury Yes    Pictures Uploaded Into Epic Yes  Wound Consult Placed Yes  RN Wound Prevention Protocol Ordered Yes

## 2024-11-27 NOTE — CARE PLAN
The patient is Watcher - Medium risk of patient condition declining or worsening    Shift Goals: monitor Hgb and UOP, rest  Clinical Goals: Stable hemodynamics  Patient Goals: Rest, pain control  Family Goals: HEATHER    Progress made toward(s) clinical / shift goals:    Problem: Pain - Standard  Goal: Alleviation of pain or a reduction in pain to the patient’s comfort goal  Outcome: Progressing  Note: Assess and monitor for pain. Provide pharmacological and non-pharmacological interventions as appropriate. Re-evaluate and continue to monitor.       Problem: Skin Integrity  Goal: Skin integrity is maintained or improved  Outcome: Progressing  Note: Assess skin and monitor for skin breakdown. Alleviate pressure to bony prominences and provide assistance with turning, repositioning, ROM and mobility as appropriate. Use of interdry, mepilex and wedges as needed. Continue to monitor.         Patient is not progressing towards the following goals:

## 2024-11-27 NOTE — TELEPHONE ENCOUNTER
Kailee FAGAN called me back and left message concerning APS sending this case to long term care ombudsman.

## 2024-11-27 NOTE — PROGRESS NOTES
[facility] ICU Progress Note      Admit Date: 11/23/2024    Resident(s): Aubree Holloway M.D.   Attending:  JOANIE MALONEY/ Dr. Jefferson    Patient ID:    Name:  Yenifer Beasley   YOB: 1950  Age:  74 y.o.  female   MRN:  [id number]    Hospital Course (carried forward and updated):  Yenifer Beasley is a 74 y.o. female with a PMH of paroxysmal atrial fibrillation, Hx of mechanical AVR 2007, CKD 3B, and hypothyroidism admitted 11/23/2024 for hemorrhagic shock 2/2 retroperitoneal hematoma. Recent hospitalization 11/07-11/15 for right femoral fracture s/p ORIF, was discharged to SNF.     Consultants:  Critical Care     Interval Events:    11/24: Hb 6.4 this AM, transfused 1 unit PRBC. Continuing NS IV. Remains on Levophed. CODE STATUS changed to DNAR/DNI after GOC discussion with Dr. Maddox and Dr. Jefferson.   11/25: Hyperkalemia overnight with metabolic acidosis and worsening DIAMOND. Remains on Levophed. CVC placement due to no vascular access. Attempted to call patient's son for update, no answer.   11/26: Received 1 unit PRBC this AM for Hb 7.9. Cr improving, UOP slightly increased 425 ml. Will administer additional IVF for prerenal DIAMOND. Transfer to telemetry.    Vitals Range last 24h:  Temp:  [35.9 °C (96.7 °F)-37 °C (98.6 °F)] 36.7 °C (98.1 °F)  Pulse:  [56-86] 70  Resp:  [16-35] 18  BP: ()/(35-78) 140/59  SpO2:  [90 %-97 %] 93 %      Intake/Output Summary (Last 24 hours) at 11/26/2024 1628  Last data filed at 11/26/2024 1605  Gross per 24 hour   Intake 2985.57 ml   Output 750 ml   Net 2235.57 ml        Review of Systems   Constitutional:  Positive for malaise/fatigue. Negative for chills and fever.   Respiratory:  Negative for cough and shortness of breath.    Cardiovascular:  Negative for chest pain and palpitations.   Gastrointestinal:  Negative for abdominal pain, nausea and vomiting.   Musculoskeletal:  Positive for joint pain and myalgias.   Neurological:  Negative for dizziness and  headaches.       PHYSICAL EXAM:  Vitals:    11/26/24 1300 11/26/24 1400 11/26/24 1500 11/26/24 1600   BP: 132/59 114/59  (!) 140/59   Pulse: 61 62 68 70   Resp: (!) 21 20 (!) 32 18   Temp:    36.7 °C (98.1 °F)   TempSrc:       SpO2: 95% 95% 95% 93%   Weight:       Height:        Body mass index is 46.2 kg/m².    O2 therapy: Pulse Oximetry: 93 %, O2 (LPM): 1, O2 Delivery Device: Nasal Cannula    Date 11/26/24 0700 - 11/27/24 0659   Shift 3942-0808 9520-6983 4380-3876 24 Hour Total   INTAKE   P.O.  800  800     P.O.  800  800   I.V. 399.9   399.9     Volume (mL) (sodium bicarbonate 150 mEq in D5W infusion (premix)) 399.9   399.9   Shift Total 399.9 800  1199.9   OUTPUT   Urine 455   455     Output (mL) ([REMOVED] Urethral Catheter Latex 11/26/24 1400) 455   455   Stool         Number of Times Stooled 1 x   1 x   Shift Total 455   455   NET -55.1 800  744.9        Physical Exam  Constitutional:       Appearance: She is obese.   HENT:      Head: Normocephalic and atraumatic.   Eyes:      Extraocular Movements: Extraocular movements intact.      Conjunctiva/sclera: Conjunctivae normal.   Cardiovascular:      Rate and Rhythm: Normal rate and regular rhythm.   Pulmonary:      Effort: No respiratory distress.      Breath sounds: Normal breath sounds.   Abdominal:      General: Abdomen is flat. There is no distension.      Palpations: Abdomen is soft.   Musculoskeletal:      Right lower leg: Edema present.      Left lower leg: Edema present.   Skin:     General: Skin is warm and dry.   Neurological:      General: No focal deficit present.      Mental Status: She is alert and oriented to person, place, and time.   Psychiatric:         Mood and Affect: Mood normal.         Behavior: Behavior normal.             Recent Labs     11/24/24  0544 11/24/24  1703 11/25/24  0555 11/25/24  1620 11/25/24  2325 11/26/24  0530 11/26/24  1043   SODIUM 125*   < > 124*   < > 126* 126* 128*   POTASSIUM 5.2   < > 4.7   < > 4.2 4.0 4.1    CHLORIDE 97   < > 94*   < > 94* 94* 94*   CO2 15*   < > 15*   < > 19* 21 22   BUN 59*   < > 65*   < > 69* 69* 67*   CREATININE 2.74*   < > 2.84*   < > 2.63* 2.89* 2.44*   MAGNESIUM 2.2  --  2.0  --   --  2.0  --    CALCIUM 7.4*   < > 6.9*   < > 7.1* 7.1* 7.3*    < > = values in this interval not displayed.     Recent Labs     11/24/24  0544 11/24/24  1703 11/25/24  0555 11/25/24 1620 11/25/24 2325 11/26/24 0530 11/26/24  1043   ALTSGPT 20  --  16  --   --  19  --    ASTSGOT 50*  --  36  --   --  28  --    ALKPHOSPHAT 55  --  51  --   --  49  --    TBILIRUBIN 0.7  --  1.0  --   --  1.3  --    GLUCOSE 143*   < > 127*   < > 121* 110* 124*    < > = values in this interval not displayed.     Recent Labs     11/24/24  0544 11/24/24  1203 11/25/24  0555 11/25/24 1620 11/25/24 2325 11/26/24 0530 11/26/24  1043   RBC 2.05*   < > 2.68*   < > 2.62* 2.71* 2.92*   HEMOGLOBIN 6.4*   < > 8.2*   < > 7.9* 8.6* 9.3*   HEMATOCRIT 19.0*   < > 23.2*   < > 22.9* 23.7* 26.0*   PLATELETCT 393   < > 255   < > 240 204 218   PROTHROMBTM 15.1*  --  14.8*  --   --  15.8*  --    INR 1.18*  --  1.16*  --   --  1.25*  --     < > = values in this interval not displayed.     Recent Labs     11/24/24  0544 11/24/24  1203 11/25/24  0555 11/25/24  1620 11/25/24  2325 11/26/24  0530 11/26/24  1043   WBC 37.4*   < > 34.5*   < > 22.0* 16.8* 18.5*   ASTSGOT 50*  --  36  --   --  28  --    ALTSGPT 20  --  16  --   --  19  --    ALKPHOSPHAT 55  --  51  --   --  49  --    TBILIRUBIN 0.7  --  1.0  --   --  1.3  --     < > = values in this interval not displayed.       Meds:   methocarbamol  750 mg      midodrine  5 mg      HYDROmorphone  0.5 mg      ezetimibe  10 mg      levothyroxine  125 mcg      lidocaine  2 Patch      oxyCODONE immediate release  5-10 mg      polyethylene glycol/lytes  17 g      timolol  1 Drop      Respiratory Therapy Consult        acetaminophen  650 mg      senna-docusate  2 Tablet      And    polyethylene glycol/lytes  1 Packet       ondansetron  4 mg      ondansetron  4 mg          Procedures:  None    Imaging:  DX-CHEST-LIMITED (1 VIEW)   Final Result      1.  Dense opacification of the left lower lung. This may represent a combination of consolidation and pleural effusion.   2.  Right base atelectasis.   3.  Support apparatus as above. No pneumothorax seen.      US-EXTREMITY VENOUS LOWER BILAT   Final Result      EC-ECHOCARDIOGRAM COMPLETE W/ CONT   Final Result      CT-RENAL COLIC EVALUATION(A/P W/O)   Final Result   Impression:      1. Large 17 x 10.7 x 9.5 cm hematoma type mixed density fluid collection in the left retroperitoneum and flank abdominal wall. This displaces the left kidney anteriorly. No significant hydronephrosis.      2. Moderate colonic distention similar to 2007 suggests ileus. No obstruction suggestion. Prior gastric surgery.      3. Heavy vascular calcification. No aneurysm. Sternal wires. Aortic valve prosthesis.      4. No kidney stone or hydronephrosis. Bull catheter in empty bladder.      5. Prior cholecystectomy.                        DX-CHEST-PORTABLE (1 VIEW)   Final Result   Impression:      1. Shallow. Mild left base atelectasis.      2. Cardiomegaly. Sternal wires. Cardiac valve prosthesis.                         ASSESSEMENT and PLAN:    * Hemorrhagic shock (HCC)  Assessment & Plan  Secondary to retroperitoneal hemorrhage.  S/p PCC reversal on admission.  Received 6 units PRBC this hospital stay.  -Holding outpatient warfarin  -Monitor, transfuse for Hb < 8.0  -Monitor hypocalcemia, replete as necessary    Retroperitoneal hemorrhage- (present on admission)  Assessment & Plan  See assessment and plan for hemorrhagic shock    Nondisplaced unspecified condyle fracture of lower end of right femur, initial encounter for closed fracture (HCC)- (present on admission)  Assessment & Plan  S/p ORIF 11/08  -Pain control    Chronic kidney disease (CKD) stage G3b/A1, moderately decreased glomerular filtration rate  (GFR) between 30-44 mL/min/1.73 square meter and albuminuria creatinine ratio less than 30 mg/g- (present on admission)  Assessment & Plan  Oliguric. Prerenal DIAMOND on CKD secondary to hemorrhagic shock. Cr 1.8 at baseline, uptrending. FeNa 03%.  -NS IV  -Avoid nephrotoxins, NSAIDs  -Monitor    Hx of mechanical aortic valve replacement [V43.3]- (present on admission)  Assessment & Plan  -Holding outpatient warfarin in the setting of hemorrhagic shock    CAD (coronary artery disease)- (present on admission)  Assessment & Plan  S/p CABG 2007. ECHO this hospital stay with EF 75%.     Hyperkalemia  Assessment & Plan  In the setting of DIAMOND on CKD.  Received Lokelma, insulin, dextrose.  -Monitor    Paroxysmal atrial fibrillation (HCC)- (present on admission)  Assessment & Plan  -Holding outpatient warfarin in the setting of hemorrhagic shock    Type 2 diabetes mellitus with kidney complication, without long-term current use of insulin (HCC)- (present on admission)  Assessment & Plan  -SSI    HILDA (obstructive sleep apnea)- (present on admission)  Assessment & Plan  -Continue CPAP    Hyperlipidemia- (present on admission)  Assessment & Plan  -Continue outpatient ezetimibe  -Holding outpatient alirocumab while inpatient    Hypothyroidism- (present on admission)  Assessment & Plan  -Continue outpatient levothyroxine        DISPO: Transfer to telemetry    CODE STATUS: DNAR/DNI    Quality Measures:  Feeding: Renal  Analgesia: Hydromorphone, oxycodone  Sedation: None  Thromboprophylaxis: SCDs  Head of bed: >30 degrees  Ulcer prophylaxis: None  Glycemic control: Correctional: Lispro / Basal: None  Bowel care: Bowel regimen  Indwelling lines: CVC  Deescalation of antibiotics: None      Aubree Holloway M.D.

## 2024-11-27 NOTE — ASSESSMENT & PLAN NOTE
This persists  Na low at 125  Check BMP in the morning  Sodium 122  Free water restriction  Continue monitoring sodium levels    Likely due to fluid overload will start IV Lasix with close monitoring  Sodium 124 today continue monitoring continue free water restriction  Sodium 126

## 2024-11-27 NOTE — THERAPY
Occupational Therapy   Initial Evaluation     Patient Name: Yenifer Beasley  Age:  74 y.o., Sex:  female  Medical Record #: [id number]  Today's Date: 11/27/2024     Precautions  Precautions: Fall Risk, Toe Touch Weight Bearing Right Lower Extremity  Comments: R knee ROMAT; precautions per prior admission notes    Assessment    Patient is 74 y.o. female admitted with retroperitoneal hemorrhage. Other pertinent medical history includes a-fib, mechanical aortic valve replacement, CKD, hypothyroidism, glaucoma, and recent hospitalization 11/7-11/15 after GLF caused R femoral fx s/p ORIF 11/8. Pt seen for OT evaluation. Pt required mod-max A for bed mobility, min A to wash face while seated, and max-total A to don/doff socks. Prior to fall, pt lived with her daughter and was independent with ADLs/IADLs. Pt current functional performance limited by generalized weakness, impaired activity tolerance, impaired balance, and pain. Pt will benefit from skilled OT while admitted to acute care.     Plan    Occupational Therapy Initial Treatment Plan   Treatment Interventions: Self Care / Activities of Daily Living, Adaptive Equipment, Neuro Re-Education / Balance, Therapeutic Exercises, Therapeutic Activity  Treatment Frequency: 3 Times per Week  Duration: Until Therapy Goals Met    DC Equipment Recommendations: Unable to determine at this time  Discharge Recommendations: Recommend post-acute placement for additional occupational therapy services prior to discharge home      Objective     11/27/24 0856   Prior Living Situation   Prior Services None   Housing / Facility [address]   Steps Into Home   (ramp to be installed in December)   Bathroom Set up Bathtub / Shower Combination   Equipment Owned Front-Wheel Walker;Single Point Cane   Lives with - Patient's Self Care Capacity Adult Children   Comments Prior living situation prior to SNF. Pt lives with her daughter who can assist as needed when home from work. Pt is working  with [employer] to get a shower chair and ramp.  (At SNF, pt has been working with therapy on SB transfers.)   Prior Level of ADL Function   Self Feeding Independent   Grooming / Hygiene Independent   Bathing Independent   Dressing Independent   Toileting Independent   Comments Prior to SNF   Prior Level of IADL Function   Medication Management Independent   Laundry Independent   Kitchen Mobility Independent   Finances Independent   Home Management Independent   Shopping Independent   Prior Level Of Mobility Independent With Device in Community;Independent With Device in Home   Driving / Transportation Driving Independent   Comments Prior to SNF   History of Falls   History of Falls Yes   Date of Last Fall   (Fall in [employer]. Slipped on spilled soup.)   Precautions   Precautions Fall Risk;Toe Touch Weight Bearing Right Lower Extremity   Comments R knee ROMAT; precautions per prior admission notes   Vitals   Pulse 73   Blood Pressure  (!) 149/65   Pulse Oximetry 95 %   O2 (LPM) 1   O2 Delivery Device Silicone Nasal Cannula   Vitals Comments taken EOB, reported some dizziness that improved with time upright   Pain   Pain Scales 0 to 10 Scale    Pain 0 - 10 Group   Therapist Pain Assessment Nurse Notified;During Activity  (B LE pain, not rated, agreeable to session)   Non Verbal Descriptors   Non Verbal Scale  Calm   Cognition    Cognition / Consciousness WDL   Level of Consciousness Alert   Comments Pleasant and cooperative   Passive ROM Upper Body   Passive ROM Upper Body WDL   Active ROM Upper Body   Active ROM Upper Body  WDL   Strength Upper Body   Upper Body Strength  X   Gross Strength Generalized Weakness, Equal Bilaterally.    Sensation Upper Body   Upper Extremity Sensation  WDL   Upper Body Muscle Tone   Upper Body Muscle Tone  WDL   Coordination Upper Body   Coordination WDL   Balance Assessment   Sitting Balance (Static) Fair -   Sitting Balance (Dynamic) Poor +   Weight Shift Sitting Poor   Bed Mobility     Supine to Sit Moderate Assist   Sit to Supine Maximal Assist   Scooting Maximal Assist   Rolling Maximal Assist to Rt.;Maximum Assist to Lt.   Comments extra time required, x2 assist for safety   ADL Assessment   Grooming Minimal Assist;Seated  (wiped face)   Lower Body Dressing Total Assist  (don/doff socks)   Functional Mobility   Sit to Stand Unable to Participate   Mobility EOB only   Visual Perception   Visual Perception  Not Tested   Activity Tolerance   Sitting in Chair NT   Sitting Edge of Bed <10 min   Standing NT   Comments limited by weakness, pain   Patient / Family Goals   Patient / Family Goal #1 to go home   Short Term Goals   Short Term Goal # 1 Pt will perform ADL transfer w/ min A   Short Term Goal # 2 Pt will perform LB dressing w/ AE PRN and min A   Short Term Goal # 3 Pt will tolerate sitting EOB for >10 min in prep for ADL tasks   Short Term Goal # 4 Pt will perform seated g/h w/ supv   Education Group   Education Provided Activities of Daily Living;Role of Occupational Therapist;Weight Bearing Precautions;Pathology of bedrest   Role of Occupational Therapist Patient Response Patient;Acceptance;Explanation;Verbal Demonstration   ADL Patient Response Patient;Acceptance;Explanation;Demonstration;Verbal Demonstration;Action Demonstration   Weight Bearing Precautions Patient Response Patient;Acceptance;Explanation;Verbal Demonstration   Pathology of Bedrest Patient Response Patient;Acceptance;Explanation;Verbal Demonstration   Occupational Therapy Initial Treatment Plan    Treatment Interventions Self Care / Activities of Daily Living;Adaptive Equipment;Neuro Re-Education / Balance;Therapeutic Exercises;Therapeutic Activity   Treatment Frequency 3 Times per Week   Duration Until Therapy Goals Met   Problem List   Problem List Decreased Active Daily Living Skills;Decreased Homemaking Skills;Decreased Functional Mobility;Decreased Activity Tolerance;Impaired Postural Control / Balance

## 2024-11-27 NOTE — DISCHARGE PLANNING
HTH/SCP TCN chart review completed. Collaborated with BOBBI Moon prior to meeting with the pt. The most current review of medical record, knowledge of pt's PLOF and social support, LACE+ score of 75, 6 clicks scores of 8 mobility were considered.      Pt seen at bedside. Introduced TCN program. Provided education regarding post acute levels of care. Education provided regarding case management policy for blanket SNF referrals. Discussed HTH/SCP plan benefits. Pt verbalizes understanding.     Note that pt was admitted from [facility] and reports she is anticipating return to this facility once medically cleared. Note that CM has sent blanket referrals per policy and pt has multiple accepting SNFs at this time. Would note per review, [facility] remains pending though per this TCN discussion with TCN covering [facility] facility, they will accept back to this SNF for continued rehabilitation post dc from [facility]. TCN will monitor for formal acceptance and assist with providing SNF auth as appropriate once closer to medical clearance.    No additional provider recs at this time and no choice indicated as in collaboration with CM anticipated return to [facility]. CM to follow with pt to provide/obtain final choice once [facility] formally accepts (see above). TCN will continue to follow and collaborate with discharge planning team as additional post acute needs arise. Thank you.     Completed today:  PT with recommendations for post acute placement 11/25  OT consult in place; will monitor  Choice obtained: will monitor for final choice (anticipating return to [facility] as pt admitted from this facility*; see above)   Pt aware of [facility]'s blanket referral policy and blanket referrals sent; pt has multiple accepting facilities (see above)  SCP with [facility] PCP. discussed possible outpatient transitional PCP follow up if indicated and pt in agreement; sent information to assist in scheduling     *update: note discussed with LATASHA  team/concurrent RN and that SCP auth for return to [facility]/SNF is in place at this time

## 2024-11-27 NOTE — HOSPITAL COURSE
74 y.o. female who presented 11/23/2024 with shortness of breath.  Ms. Beasley has a past medical history of atrial fibrillation, mechanical aortic valve replacement on chronic coumadin therapy that was admitted to this hospital from 11/7 through 11/15 with ground level fall and right femur fracture. She underwent open treatment with internal fixation of right lateral   condyle distal femur fracture by Dr. Camargo on 11/8. She was given one unit RBCs and was discharged to rehab. On 11/23 she was brought to the ER with hypotension (62/46) and in the ER her Hb was 6.3 and chronic kidney disease Cr 1.78. A CT revealed a 17 cm x 10.7 cm x 9.5 cm retroperitoneal hematoma. Her INR was 2.69. She was given reversal with K centra, transfused 2 units RBCs and admitted to the ICU.

## 2024-11-27 NOTE — CONSULTS
Cardiology Consult Note:    Chris Cordoba M.D.  Date & Time note created:    11/27/2024   3:09 PM     Referring MD:  Dr. Woods    Patient ID:   Name:             Yenifer Beasley   YOB: 1950  Age:                 74 y.o.  female   MRN:               [id number]                                                             Chief Complaint / Reason for consult:  The need for long-term anticoagulation in the setting of mechanical aortic valve and retroperitoneal bleed.    History of Present Illness:    This is a 74-year-old woman with prior history of mechanical aortic valve, atrial fibrillation on chronic Coumadin therapy, presented to the hospital with ground-level fall and right femur fracture.  Patient underwent open treatment with surgical invention.  Cardiology been consulted for the need of long-term anticoagulation.  She is complicated by a retroperitoneal bleed at this time.  She did require 2 units of red blood cell transfusion.    I personally interpreted the images of her transthoracic echocardiogram which showed preserved LV function, mechanical aortic valve appear to be functioning properly.    Review of Systems:      Constitutional: Denies fevers, Denies weight changes  Eyes: Denies changes in vision, no eye pain  Ears/Nose/Throat/Mouth: Denies nasal congestion or sore throat   Cardiovascular: no chest pain, no palpitations   Respiratory: no shortness of breath , Denies cough  Gastrointestinal/Hepatic: Denies abdominal pain, nausea, vomiting, diarrhea, constipation or GI bleeding   Genitourinary: Denies dysuria or frequency  Musculoskeletal/Rheum: Denies  joint pain and swelling   Skin: Denies rash  Neurological: Denies headache, confusion, memory loss or focal weakness/parasthesias  Psychiatric: denies mood disorder   Endocrine: Shellie thyroid problems  Heme/Oncology/Lymph Nodes: Denies enlarged lymph nodes, denies brusing or known bleeding disorder  All other systems were  "reviewed and are negative (AMA/CMS criteria)                Past Medical History:   Past Medical History:   Diagnosis Date    Allergic rhinitis     Anticoagulation monitoring, special range     Arthritis     OA OF HANDS, KNEESM, HIPS AND BACK    Asthma 10/17/2019    childhood asthma    Atrial fibrillation (Former[facility])     history of    Back pain     Blood transfusion without reported diagnosis     CAD (coronary artery disease) 2007    CABG    Cataract     Chest pain at rest 11/30/2010    CHF (congestive heart failure) (Former[facility])     Chronic anticoagulation 04/23/2018    Constipation     off and on, pt manages    Coronary heart disease     Delayed emergence from general anesthesia     Diabetes     DIAB FEET EXAM:  1/6/11- Pt reports no history of diabetes    Diabetic neuropathy ([facility]) 01/06/2011    Difficulty breathing     Fall     \"a couple years ago because of my neuopathy\" multiple falls    Gasping for breath     history of    Gastritis 07/2010    H/O UGIB    Gastritis     GERD (gastroesophageal reflux disease)     [place]ic measles     GI bleed     history of    Glaucoma     Heart murmur     Hyperlipidemia     Hypertension     Hyponatremia 04/22/2014    Hypothyroidism     Impaired fasting glucose 10/08/2019    Infectious disease     History of MRSA    Kidney disease     Migraine     Need for influenza vaccination 11/30/2010    ICD-10 transition    Neuropathy in diabetes ([facility])     HILDA (obstructive sleep apnea)     no cpap at this time, on order    Painful joint     Palpitations     Pneumonia     Pneumonia due to organism 04/22/2014    Diagnois update 10/1/2016    Pulmonary hypertension ([facility])     Rash     S/P aortic valve replacement 2007    Shortness of breath 08/29/2022    no c/o at this time    Skin infection 05/11/2018    Sleep apnea     Swelling of lower extremity     TIA (transient ischemic attack) 2005    TIA (transient ischemic attack)     Tonsillitis     Tricuspid regurgitation mild- mod 01/06/2011    Unspecified " hemorrhagic conditions     Bleeds easily-GI bleeds with NSAIDS and ASA    Upper GI bleed ON JULY 2007 01/06/2011    Upper GI bleed ON JULY 2007 01/06/2011    URTI (acute upper respiratory infection) 11/30/2010    UTI (urinary tract infection) 04/24/2014    Valvular heart disease     Weakness     Wears glasses      Active Hospital Problems    Diagnosis     Class 3 severe obesity due to excess calories with serious comorbidity and body mass index (BMI) of 40.0 to 44.9 in adult ([facility]) [E66.813, E66.01, Z68.41]      Priority: Medium    Chronic kidney disease (CKD) stage G3b/A1, moderately decreased glomerular filtration rate (GFR) between 30-44 mL/min/1.73 square meter and albuminuria creatinine ratio less than 30 mg/g [N18.32]      Priority: Medium    Paroxysmal atrial fibrillation (HCC) [I48.0]      Priority: Medium    Hx of mechanical aortic valve replacement [V43.3] [Z95.2]      Priority: Medium    Hyponatremia [E87.1]      Priority: Medium    Essential hypertension [I10]      Priority: Medium    HILDA (obstructive sleep apnea) [G47.33]      Priority: Medium    CAD (coronary artery disease) [I25.10]      Priority: Medium    Hyperlipidemia [E78.5]      Priority: Medium    Hypothyroidism [E03.9]      Priority: Low    Hyperkalemia [E87.5]     Retroperitoneal hemorrhage [R58]     Hemorrhagic shock ([facility]) [R57.8]     ABLA (acute blood loss anemia) [D62]     Nondisplaced unspecified condyle fracture of lower end of right femur, initial encounter for closed fracture ([facility]) [S72.414A]        Past Surgical History:  Past Surgical History:   Procedure Laterality Date    ORIF, FRACTURE, FEMUR Right 11/8/2024    Procedure: ORIF, FRACTURE, FEMUR;  Surgeon: Jason Camargo M.D.;  Location: SURGERY [facility];  Service: Orthopedics    MITRAL VALVE REPLACE  2007    AORTIC VALVE    SHOULDER SURGERY  06/2001    NEER DECOMOPRESSION LEFT SHOULDER    CARPAL TUNNEL RELEASE  1998    B/L    TONSILLECTOMY  1995    PARTIAL PALATOPLASTY     ABDOMINAL HYSTERECTOMY TOTAL      TAHBSOO    AORTIC VALVE REPLACEMENT      CHOLECYSTECTOMY      CORONARY ARTERY BYPASS, 1      Triple Bypass    HERNIA REPAIR      VENTRAL HERNIA REPAIR    HYSTERECTOMY LAPAROSCOPY      INTUBATION      PRIMARY C SECTION      SLEEVE,MEJIA VASO THIGH      TUBAL COAGULATION LAPAROSCOPIC BILATERAL      UVULOPHARYNGOPALATOPLASTY      VENTILATOR CONTINUOUS         Hospital Medications:    Current Facility-Administered Medications:     methocarbamol (Robaxin) tablet 750 mg, 750 mg, Oral, Q6HRS PRN, Robert Jefferson M.D.    midodrine (Proamatine) tablet 5 mg, 5 mg, Oral, Q8HRS, Elbert Woods M.D., 5 mg at 11/26/24 1344    HYDROmorphone (Dilaudid) injection 0.5 mg, 0.5 mg, Intravenous, Q3HRS PRN, Elliot Clement M.D., 0.5 mg at 11/27/24 0904    ezetimibe (Zetia) tablet 10 mg, 10 mg, Oral, DAILY, Elliot Clement M.D., 10 mg at 11/27/24 0503    levothyroxine (Synthroid) tablet 125 mcg, 125 mcg, Oral, AM ES, Elliot Clement M.D., 125 mcg at 11/27/24 0503    lidocaine (Asperflex) 4 % patch 2 Patch, 2 Patch, Transdermal, Q24HRS, Elliot Clement M.D., 2 Patch at 11/27/24 0503    oxyCODONE immediate-release (Roxicodone) tablet 5-10 mg, 5-10 mg, Oral, Q6HRS PRN, Elliot Clement M.D., 10 mg at 11/27/24 1304    polyethylene glycol/lytes (Miralax) Packet 1 Packet, 17 g, Oral, DAILY, Elliot Clement M.D., 1 Packet at 11/27/24 0503    timolol (Timoptic) 0.5 % ophthalmic solution 1 Drop, 1 Drop, Both Eyes, BID, Elliot Clement M.D., 1 Drop at 11/27/24 0529    Respiratory Therapy Consult, , Nebulization, Continuous RT, Elliot Clement M.D.    acetaminophen (Tylenol) tablet 650 mg, 650 mg, Oral, Q6HRS PRN, Elliot Clement M.D., 650 mg at 11/23/24 1201    senna-docusate (Pericolace Or Senokot S) 8.6-50 MG per tablet 2 Tablet, 2 Tablet, Oral, Q EVENING, 2 Tablet at 11/26/24 1622 **AND** polyethylene glycol/lytes (Miralax) Packet 1 Packet, 1 Packet, Oral, QDAY PRN, Elliot Clement M.D., 1 Packet at  11/23/24 1226    ondansetron (Zofran) syringe/vial injection 4 mg, 4 mg, Intravenous, Q4HRS PRN, Elliot Clement M.D.    ondansetron (Zofran ODT) dispertab 4 mg, 4 mg, Oral, Q4HRS PRN, Elliot Clement M.D.    Current Outpatient Medications:  Medications Prior to Admission   Medication Sig Dispense Refill Last Dose/Taking    enoxaparin (LOVENOX) 120 MG/0.8ML Solution Prefilled Syringe inj Inject 1 Syringe under the skin every 12 hours.   11/22/2024 at  8:25 PM    oxyCODONE immediate release (ROXICODONE) 10 MG immediate release tablet Take 10 mg by mouth every 6 hours as needed for Moderate Pain.   11/23/2024 at 12:05 AM    diclofenac (VOLTAREN) 0.1 % ophthalmic solution Administer 1 Drop into both eyes 4 times a day.   11/22/2024 at  7:54 AM    warfarin (COUMADIN) 4 MG Tab Take 4 mg by mouth at bedtime. Sunday, Tuesday, Thursday and Saturday 11/21/2024 at  8:39 PM    warfarin (COUMADIN) 2 MG Tab Take 1 Tablet by mouth every day at 6 PM. (Patient taking differently: Take 6 mg by mouth at bedtime. Monday, Wednesday, Friday)   11/22/2024 at  7:57 PM    lidocaine (ASPERFLEX) 4 % Patch Place 2 Patches on the skin every 24 hours.   11/22/2024 at 10:35 PM    methocarbamol (ROBAXIN) 750 MG Tab Take 1 Tablet by mouth 4 times a day.   11/22/2024 at  5:31 PM    senna-docusate (PERICOLACE OR SENOKOT S) 8.6-50 MG Tab Take 2 Tablets by mouth 2 times a day. (Patient taking differently: Take 2 Tablets by mouth at bedtime.)   11/22/2024 at  7:57 PM    timolol (TIMOPTIC) 0.5 % Solution Drop 1 Drop in both eyes 2 times a day. 15 mL 3 11/21/2024 at  7:54 AM    ezetimibe (ZETIA) 10 MG Tab TAKE 1 TABLET BY MOUTH EVERY DAY (Patient taking differently: Take 10 mg by mouth every day.) 90 Tablet 3 11/22/2024 at  7:54 AM    doxazosin (CARDURA) 8 MG tablet Take 1 tablet by mouth at bedtime. To lower blood pressure 100 Tablet 3 11/22/2024 at  8:25 PM    levothyroxine (SYNTHROID) 125 MCG Tab Take 1 Tablet by mouth every morning on an empty  "stomach. 100 Tablet 3 2024 at  4:00 AM    polyethylene glycol/lytes (MIRALAX) Pack Take 1 Packet by mouth every day. (Patient not taking: Reported on 2024)   Not Taking    Alirocumab (PRALUENT) 150 MG/ML Solution Auto-injector Inject 150 mg under the skin every 14 days. 6 mL 3 Unknown       Medication Allergy:  Allergies   Allergen Reactions    Asa [Aspirin]      GI BLEED    Atorvastatin Shortness of Breath    Cymbalta [Duloxetine Hcl] Unspecified     Feels like a \"zombie\"    Hmg-Coa-R Inhibitors     Latex Swelling    Nsaids      GI BLEED    Tricor Shortness of Breath     Breathing problems      Trilipix [Choline Fenofibrate] Shortness of Breath     SOB    Lyrica [Pregabalin]      SPACED OUT       Family History:  Family History   Problem Relation Age of Onset    Heart Disease Mother         a fib    Cancer Mother         Cervical: palate:      Cancer Maternal Grandmother         Leukemia,  21 y.o. 193    Cancer Maternal Aunt         Lung CA (+3 dtrs  from CA)    Heart Disease Brother         Heart attack, open heart surgery,     Diabetes Brother         Insulin dependent    Heart Disease Brother         Heart attack, open heart surgery     Breast Cancer Cousin        Social History:  Social History     Socioeconomic History    Marital status:      Spouse name: Not on file    Number of children: Not on file    Years of education: Not on file    Highest education level: Bachelor's degree (e.g., BA, AB, BS)   Occupational History    Not on file   Tobacco Use    Smoking status: Never    Smokeless tobacco: Never   Vaping Use    Vaping status: Never Used   Substance and Sexual Activity    Alcohol use: No    Drug use: Never    Sexual activity: Not Currently   Other Topics Concern    Not on file   Social History Narrative    Not on file     Social Drivers of Health     Financial Resource Strain: Low Risk  (2024)    Overall Financial Resource Strain (CARDIA) "     Difficulty of Paying Living Expenses: Not hard at all   Recent Concern: Financial Resource Strain - Medium Risk (7/10/2024)    Overall Financial Resource Strain (CARDIA)     Difficulty of Paying Living Expenses: Somewhat hard   Food Insecurity: No Food Insecurity (11/8/2024)    Hunger Vital Sign     Worried About Running Out of Food in the Last Year: Never true     Ran Out of Food in the Last Year: Never true   Transportation Needs: No Transportation Needs (11/8/2024)    PRAPARE - Transportation     Lack of Transportation (Medical): No     Lack of Transportation (Non-Medical): No   Physical Activity: Inactive (8/1/2024)    Exercise Vital Sign     Days of Exercise per Week: 0 days     Minutes of Exercise per Session: 0 min   Stress: No Stress Concern Present (8/1/2024)    [employer] [employer] - Occupational Stress Questionnaire     Feeling of Stress : Not at all   Social Connections: Socially Isolated (8/1/2024)    Social Connection and Isolation Panel [NHANES]     Frequency of Communication with Friends and Family: Three times a week     Frequency of Social Gatherings with Friends and Family: Never     Attends Religion Services: Never     Active Member of Clubs or Organizations: No     Attends Club or Organization Meetings: Never     Marital Status:    Intimate Partner Violence: Not At Risk (11/8/2024)    Humiliation, Afraid, Rape, and Kick questionnaire     Fear of Current or Ex-Partner: No     Emotionally Abused: No     Physically Abused: No     Sexually Abused: No   Housing Stability: Low Risk  (11/8/2024)    Housing Stability Vital Sign     Unable to Pay for Housing in the Last Year: No     Number of Times Moved in the Last Year: 0     Homeless in the Last Year: No         Physical Exam:  Vitals/ General Appearance:   Weight/BMI: Body mass index is 52.92 kg/m².  /57   Pulse 74   Temp 36.6 °C (97.9 °F) (Temporal)   Resp 18   Ht 1.524 m (5')   Wt 123 kg (270 lb 15.1 oz)    SpO2 95%   Vitals:    11/27/24 0502 11/27/24 0744 11/27/24 0856 11/27/24 1300   BP: 123/60 115/49 (!) 149/65 120/57   Pulse:  64 73 74   Resp:  20  18   Temp:  36.5 °C (97.7 °F)  36.6 °C (97.9 °F)   TempSrc:  Temporal  Temporal   SpO2:  97% 95% 95%   Weight:       Height:         Oxygen Therapy:  Pulse Oximetry: 95 %, O2 (LPM): 0.5, O2 Delivery Device: Silicone Nasal Cannula    Constitutional:   No acute distress  HENMT:  Normocephalic, Atraumatic.  Eyes:  EOMI, No discharge.  Neck:  no JVD.  Cardiovascular:  Normal heart rate, Normal rhythm.  Extremitites with intact distal pulses, no cyanosis, or edema.  Lungs:  No respiratory distress.  Abdomen: Soft, No tenderness, No guarding, No rebound.  Skin: No significant rash.  Neurologic: Alert & oriented x 3, No focal deficits noted, cranial nerves II through X are intact.  Psychiatric: Affect normal, Judgment normal, Mood normal.      MDM (Data Review):     Records reviewed and summarized in current documentation    Lab Data Review:  Recent Results (from the past 24 hours)   CBC without Differential    Collection Time: 11/26/24  4:30 PM   Result Value Ref Range    WBC 17.7 (H) 4.8 - 10.8 K/uL    RBC 3.06 (L) 4.20 - 5.40 M/uL    Hemoglobin 9.9 (L) 12.0 - 16.0 g/dL    Hematocrit 27.3 (L) 37.0 - 47.0 %    MCV 89.2 81.4 - 97.8 fL    MCH 32.4 27.0 - 33.0 pg    MCHC 36.3 (H) 32.2 - 35.5 g/dL    RDW 48.2 35.9 - 50.0 fL    Platelet Count 236 164 - 446 K/uL    MPV 8.6 (L) 9.0 - 12.9 fL   Basic Metabolic Panel    Collection Time: 11/26/24  4:30 PM   Result Value Ref Range    Sodium 125 (L) 135 - 145 mmol/L    Potassium 3.8 3.6 - 5.5 mmol/L    Chloride 91 (L) 96 - 112 mmol/L    Co2 23 20 - 33 mmol/L    Glucose 111 (H) 65 - 99 mg/dL    Bun 62 (H) 8 - 22 mg/dL    Creatinine 2.13 (H) 0.50 - 1.40 mg/dL    Calcium 7.4 (L) 8.5 - 10.5 mg/dL    Anion Gap 11.0 7.0 - 16.0   ESTIMATED GFR    Collection Time: 11/26/24  4:30 PM   Result Value Ref Range    GFR (CKD-EPI) 24 (A) >60  mL/min/1.73 m 2   Basic Metabolic Panel    Collection Time: 11/27/24  2:00 AM   Result Value Ref Range    Sodium 122 (L) 135 - 145 mmol/L    Potassium 4.0 3.6 - 5.5 mmol/L    Chloride 90 (L) 96 - 112 mmol/L    Co2 19 (L) 20 - 33 mmol/L    Glucose 95 65 - 99 mg/dL    Bun 65 (H) 8 - 22 mg/dL    Creatinine 2.05 (H) 0.50 - 1.40 mg/dL    Calcium 7.3 (L) 8.5 - 10.5 mg/dL    Anion Gap 13.0 7.0 - 16.0   CBC without Differential    Collection Time: 11/27/24  2:00 AM   Result Value Ref Range    WBC 16.6 (H) 4.8 - 10.8 K/uL    RBC 3.00 (L) 4.20 - 5.40 M/uL    Hemoglobin 9.3 (L) 12.0 - 16.0 g/dL    Hematocrit 26.7 (L) 37.0 - 47.0 %    MCV 89.0 81.4 - 97.8 fL    MCH 31.0 27.0 - 33.0 pg    MCHC 34.8 32.2 - 35.5 g/dL    RDW 48.4 35.9 - 50.0 fL    Platelet Count 208 164 - 446 K/uL    MPV 8.7 (L) 9.0 - 12.9 fL   Prothrombin Time    Collection Time: 11/27/24  2:00 AM   Result Value Ref Range    PT 14.3 12.0 - 14.6 sec    INR 1.11 0.87 - 1.13   ESTIMATED GFR    Collection Time: 11/27/24  2:00 AM   Result Value Ref Range    GFR (CKD-EPI) 25 (A) >60 mL/min/1.73 m 2       Imaging/Procedures Review:    Chest Xray:  Reviewed    EKG:   As in HPI.     MDM (Assessment and Plan):     Active Hospital Problems    Diagnosis     Class 3 severe obesity due to excess calories with serious comorbidity and body mass index (BMI) of 40.0 to 44.9 in adult ([facility]) [E66.813, E66.01, Z68.41]      Priority: Medium    Chronic kidney disease (CKD) stage G3b/A1, moderately decreased glomerular filtration rate (GFR) between 30-44 mL/min/1.73 square meter and albuminuria creatinine ratio less than 30 mg/g [N18.32]      Priority: Medium    Paroxysmal atrial fibrillation (HCC) [I48.0]      Priority: Medium    Hx of mechanical aortic valve replacement [V43.3] [Z95.2]      Priority: Medium    Hyponatremia [E87.1]      Priority: Medium    Essential hypertension [I10]      Priority: Medium    HILDA (obstructive sleep apnea) [G47.33]      Priority: Medium    CAD (coronary  artery disease) [I25.10]      Priority: Medium    Hyperlipidemia [E78.5]      Priority: Medium    Hypothyroidism [E03.9]      Priority: Low    Hyperkalemia [E87.5]     Retroperitoneal hemorrhage [R58]     Hemorrhagic shock (HCC) [R57.8]     ABLA (acute blood loss anemia) [D62]     Nondisplaced unspecified condyle fracture of lower end of right femur, initial encounter for closed fracture (HCC) [S72.414A]          At this time, based on the position of the aortic valve and the high pressure system, okay to be off of anticoagulation temporarily.  Will need to restart however as soon as possible once bleeding issue resolves.        Chris Cordoba MD.   Cardiology Inpatient Service.  [facility].  [phone].  Luz Bergeron.

## 2024-11-27 NOTE — ASSESSMENT & PLAN NOTE
Due to retroperitoneal bleed  Requiring emergent blood transfusions  Hemoglobin 9.4  Due to history of metallic aortic valve replacement will help to start Coumadin since patient received Kcentra will help to bridge patient with IV heparin since patient might be pro-coagulative at the beginning of Coumadin treatment.  Continue close monitoring for any signs of bleeding.  Hemoglobin 9.2

## 2024-11-27 NOTE — WOUND TEAM
Ting Milligan , with wound care, non-selective debridement performed using wound cleanser/NS and gauze. Please see  Chel's  wound note for further wound care details.

## 2024-11-27 NOTE — ASSESSMENT & PLAN NOTE
A CT revealed a 17 cm x 10.7 cm x 9.5 cm retroperitoneal hematoma  Spontaneous hemorrhage in the setting of coumadin use  INR 2.69 on admission was reversed and red blood cells were transfused  Hold coumadin  Check Hb and INR in the morning  Continue monitoring hemoglobin level    INR 1.18

## 2024-11-28 LAB
ALBUMIN SERPL BCP-MCNC: 2.8 G/DL (ref 3.2–4.9)
ALBUMIN/GLOB SERPL: 1.6 G/DL
ALP SERPL-CCNC: 59 U/L (ref 30–99)
ALT SERPL-CCNC: 19 U/L (ref 2–50)
ANION GAP SERPL CALC-SCNC: 10 MMOL/L (ref 7–16)
AST SERPL-CCNC: 25 U/L (ref 12–45)
BACTERIA BLD CULT: NORMAL
BACTERIA BLD CULT: NORMAL
BACTERIA UR CULT: NORMAL
BILIRUB SERPL-MCNC: 1.2 MG/DL (ref 0.1–1.5)
BUN SERPL-MCNC: 63 MG/DL (ref 8–22)
CALCIUM ALBUM COR SERPL-MCNC: 8.6 MG/DL (ref 8.5–10.5)
CALCIUM SERPL-MCNC: 7.6 MG/DL (ref 8.5–10.5)
CHLORIDE SERPL-SCNC: 90 MMOL/L (ref 96–112)
CO2 SERPL-SCNC: 22 MMOL/L (ref 20–33)
CREAT SERPL-MCNC: 1.75 MG/DL (ref 0.5–1.4)
ERYTHROCYTE [DISTWIDTH] IN BLOOD BY AUTOMATED COUNT: 47.9 FL (ref 35.9–50)
GFR SERPLBLD CREATININE-BSD FMLA CKD-EPI: 30 ML/MIN/1.73 M 2
GLOBULIN SER CALC-MCNC: 1.8 G/DL (ref 1.9–3.5)
GLUCOSE SERPL-MCNC: 105 MG/DL (ref 65–99)
HCT VFR BLD AUTO: 26.8 % (ref 37–47)
HGB BLD-MCNC: 9.4 G/DL (ref 12–16)
MAGNESIUM SERPL-MCNC: 2.2 MG/DL (ref 1.5–2.5)
MCH RBC QN AUTO: 32 PG (ref 27–33)
MCHC RBC AUTO-ENTMCNC: 35.1 G/DL (ref 32.2–35.5)
MCV RBC AUTO: 91.2 FL (ref 81.4–97.8)
PLATELET # BLD AUTO: 212 K/UL (ref 164–446)
PMV BLD AUTO: 8.9 FL (ref 9–12.9)
POTASSIUM SERPL-SCNC: 3.8 MMOL/L (ref 3.6–5.5)
PROT SERPL-MCNC: 4.6 G/DL (ref 6–8.2)
RBC # BLD AUTO: 2.94 M/UL (ref 4.2–5.4)
SIGNIFICANT IND 70042: NORMAL
SITE SITE: NORMAL
SODIUM SERPL-SCNC: 122 MMOL/L (ref 135–145)
SODIUM SERPL-SCNC: 124 MMOL/L (ref 135–145)
SODIUM SERPL-SCNC: 125 MMOL/L (ref 135–145)
SOURCE SOURCE: NORMAL
WBC # BLD AUTO: 14.1 K/UL (ref 4.8–10.8)

## 2024-11-28 PROCEDURE — 84295 ASSAY OF SERUM SODIUM: CPT

## 2024-11-28 PROCEDURE — 770020 HCHG ROOM/CARE - TELE (206)

## 2024-11-28 PROCEDURE — 700102 HCHG RX REV CODE 250 W/ 637 OVERRIDE(OP): Performed by: INTERNAL MEDICINE

## 2024-11-28 PROCEDURE — 700101 HCHG RX REV CODE 250: Performed by: INTERNAL MEDICINE

## 2024-11-28 PROCEDURE — 99233 SBSQ HOSP IP/OBS HIGH 50: CPT | Performed by: HOSPITALIST

## 2024-11-28 PROCEDURE — 99232 SBSQ HOSP IP/OBS MODERATE 35: CPT | Performed by: NURSE PRACTITIONER

## 2024-11-28 PROCEDURE — 83735 ASSAY OF MAGNESIUM: CPT

## 2024-11-28 PROCEDURE — A9270 NON-COVERED ITEM OR SERVICE: HCPCS | Performed by: HOSPITALIST

## 2024-11-28 PROCEDURE — 85027 COMPLETE CBC AUTOMATED: CPT

## 2024-11-28 PROCEDURE — 700102 HCHG RX REV CODE 250 W/ 637 OVERRIDE(OP): Performed by: HOSPITALIST

## 2024-11-28 PROCEDURE — 36415 COLL VENOUS BLD VENIPUNCTURE: CPT

## 2024-11-28 PROCEDURE — 80053 COMPREHEN METABOLIC PANEL: CPT

## 2024-11-28 PROCEDURE — A9270 NON-COVERED ITEM OR SERVICE: HCPCS | Performed by: INTERNAL MEDICINE

## 2024-11-28 PROCEDURE — 700111 HCHG RX REV CODE 636 W/ 250 OVERRIDE (IP): Mod: JZ | Performed by: HOSPITALIST

## 2024-11-28 RX ORDER — FUROSEMIDE 10 MG/ML
20 INJECTION INTRAMUSCULAR; INTRAVENOUS
Status: DISCONTINUED | OUTPATIENT
Start: 2024-11-28 | End: 2024-12-01

## 2024-11-28 RX ORDER — HYDROMORPHONE HYDROCHLORIDE 1 MG/ML
0.5 INJECTION, SOLUTION INTRAMUSCULAR; INTRAVENOUS; SUBCUTANEOUS
Status: DISCONTINUED | OUTPATIENT
Start: 2024-11-28 | End: 2024-12-10 | Stop reason: HOSPADM

## 2024-11-28 RX ADMIN — OXYCODONE 10 MG: 5 TABLET ORAL at 02:41

## 2024-11-28 RX ADMIN — TIMOLOL MALEATE 1 DROP: 5 SOLUTION OPHTHALMIC at 05:13

## 2024-11-28 RX ADMIN — LIDOCAINE 2 PATCH: 4 PATCH TOPICAL at 05:13

## 2024-11-28 RX ADMIN — SENNOSIDES AND DOCUSATE SODIUM 2 TABLET: 50; 8.6 TABLET ORAL at 18:09

## 2024-11-28 RX ADMIN — FUROSEMIDE 20 MG: 10 INJECTION, SOLUTION INTRAVENOUS at 10:27

## 2024-11-28 RX ADMIN — POLYETHYLENE GLYCOL 3350 1 PACKET: 17 POWDER, FOR SOLUTION ORAL at 05:14

## 2024-11-28 RX ADMIN — OXYCODONE 5 MG: 5 TABLET ORAL at 09:30

## 2024-11-28 RX ADMIN — EZETIMIBE 10 MG: 10 TABLET ORAL at 05:14

## 2024-11-28 RX ADMIN — OXYCODONE 10 MG: 5 TABLET ORAL at 16:19

## 2024-11-28 RX ADMIN — TIMOLOL MALEATE 1 DROP: 5 SOLUTION OPHTHALMIC at 18:09

## 2024-11-28 RX ADMIN — MIDODRINE HYDROCHLORIDE 5 MG: 5 TABLET ORAL at 05:17

## 2024-11-28 RX ADMIN — LEVOTHYROXINE SODIUM 125 MCG: 0.12 TABLET ORAL at 05:17

## 2024-11-28 ASSESSMENT — ENCOUNTER SYMPTOMS
BACK PAIN: 0
BLURRED VISION: 0
DEPRESSION: 0
MYALGIAS: 0
CLAUDICATION: 0
FEVER: 0
SHORTNESS OF BREATH: 0
WHEEZING: 0
COUGH: 0
HEADACHES: 0
PND: 0
STRIDOR: 0
CHILLS: 0
VOMITING: 0
HEARTBURN: 0
CHEST TIGHTNESS: 0
BRUISES/BLEEDS EASILY: 0
PALPITATIONS: 0
HEMOPTYSIS: 0
DOUBLE VISION: 0
NAUSEA: 0
CHOKING: 0
DIZZINESS: 0

## 2024-11-28 ASSESSMENT — PAIN DESCRIPTION - PAIN TYPE
TYPE: ACUTE PAIN

## 2024-11-28 ASSESSMENT — FIBROSIS 4 INDEX: FIB4 SCORE: 1.97

## 2024-11-28 NOTE — CARE PLAN
The patient is Stable - Low risk of patient condition declining or worsening    Shift Goals  Clinical Goals: pain control, VSS, skin integrity, increase mobility  Patient Goals: pain control/ rest  Family Goals: not present    Progress made toward(s) clinical / shift goals:    Problem: Knowledge Deficit - Standard  Goal: Patient and family/care givers will demonstrate understanding of plan of care, disease process/condition, diagnostic tests and medications  Description: Target End Date:  1-3 days or as soon as patient condition allows    Document in Patient Education    1.  Patient and family/caregiver oriented to unit, equipment, visitation policy and means for communicating concern  2.  Complete/review Learning Assessment  3.  Assess knowledge level of disease process/condition, treatment plan, diagnostic tests and medications  4.  Explain disease process/condition, treatment plan, diagnostic tests and medications  Outcome: Progressing       Patient is not progressing towards the following goals:

## 2024-11-28 NOTE — THERAPY
Physical Therapy   Daily Treatment     Patient Name: Yenifer Beasley  Age:  74 y.o., Sex:  female  Medical Record #: [id number]  Today's Date: 11/27/2024     Precautions  Precautions: Fall Risk;Toe Touch Weight Bearing Right Lower Extremity  Comments: R knee ROMAT    Assessment  Pt seen for PT tx session with mobility detailed down below. Pt is at Max for bed mobility with poor initiation due to body habitus and RLE pain. However, pt demonstrates good EOB balance and was able to sit up for ~20 minutes unsupported. Pt did fatigue completing exercises at EOB and had to lay back down. Continue to recommend placement when medically cleared, will follow.     Plan    Treatment Plan Status: Continue Current Treatment Plan  Type of Treatment: Bed Mobility, Neuro Re-Education / Balance, Therapeutic Activities, Therapeutic Exercise, Self Care / Home Evaluation  Treatment Frequency: 3 Times per Week  Treatment Duration: Until Therapy Goals Met    DC Equipment Recommendations: Unable to determine at this time  Discharge Recommendations: Recommend post-acute placement for additional physical therapy services prior to discharge home        Vitals   O2 (LPM) 0.5   O2 Delivery Device Silicone Nasal Cannula   Pain 0 - 10 Group   Therapist Pain Assessment   (RLE pain with bed mobility, better once at EOB)   Cognition    Cognition / Consciousness WDL   Sitting Lower Body Exercises   Sitting Lower Body Exercises Yes   Long Arc Quad   (2x5)   Other Treatments   Other Treatments Provided working on arm depressions at EOB working on butt clearance off to prepare for slide board transfers. pt needing increased cueing for hand placement and sequencing   Balance   Sitting Balance (Static) Fair -   Sitting Balance (Dynamic) Poor +   Weight Shift Sitting Poor   Skilled Intervention Verbal Cuing   Comments good EOB balance with no LOB   Bed Mobility    Supine to Sit Maximal Assist   Sit to Supine Maximal Assist   Scooting Maximal Assist    Skilled Intervention Verbal Cuing   Comments 2p assist for safety   Gait Analysis   Gait Level Of Assist Unable to Participate   Functional Mobility   Sit to Stand Unable to Participate   Bed, Chair, Wheelchair Transfer Unable to Participate   Mobility EOB only   6 Clicks Assessment - How much HELP from from another person do you currently need... (If the patient hasn't done an activity recently, how much help from another person do you think he/she would need if he/she tried?)   Turning from your back to your side while in a flat bed without using bedrails? 2   Moving from lying on your back to sitting on the side of a flat bed without using bedrails? 1   Moving to and from a bed to a chair (including a wheelchair)? 1   Standing up from a chair using your arms (e.g., wheelchair, or bedside chair)? 1   Walking in hospital room? 1   Climbing 3-5 steps with a railing? 1   6 clicks Mobility Score 7   Short Term Goals    Short Term Goal # 1 Pt will perform bed mobility with mod A in 6 visits.   Goal Outcome # 1 goal not met   Short Term Goal # 2 Pt will maintain sitting self supported x 5 min in 6 visits to improve functional indep.   Goal Outcome # 2 Goal met   Short Term Goal # 3 Pt will perform seated slide board transfer from bed to wc with mod A in 6 visits to improve functional indep.   Goal Outcome # 3 Goal not met   Physical Therapy Treatment Plan   Physical Therapy Treatment Plan Continue Current Treatment Plan   Anticipated Discharge Equipment and Recommendations   DC Equipment Recommendations Unable to determine at this time   Discharge Recommendations Recommend post-acute placement for additional physical therapy services prior to discharge home   Interdisciplinary Plan of Care Collaboration   IDT Collaboration with  Certified Nursing Assistant;Nursing   Patient Position at End of Therapy In Bed;Bed Alarm On;Call Light within Reach;Tray Table within Reach;Phone within Reach   Collaboration Comments RN  updated   Session Information   Date / Session Number  11/27- 2 (2/3, 11/30)            14

## 2024-11-28 NOTE — PROGRESS NOTES
Cardiology Follow Up Progress Note    Date of Service  11/28/2024    Attending Physician  MATTI Dang*    Chief Complaint   The need for long-term anticoagulation in the setting of mechanical aortic valve and retroperitoneal bleed.       HPI  Yenifer Beasley is a 74 y.o. female admitted 11/23/2024 as a transfer from SNF (recent hip surgery) with dyspnea, left abdominal flank pain found hypotensive.  Initial SBP 60s    PMH: Recent right femoral fracture s/p repair (hospitalized 11/7/2024 -11/15/2024) CAD with CABG x 3 2007, mechanical aortic valve replacement 2007, on OAC with warfarin, INR goal 2.5 -3.5, CKD stage IIIb, type 2 diabetes, BMI 53, HILDA on CPAP, mechanical aortic valve replacement, moderate MS, moderate to severe TR, PAF , hyperlipidemia    Interim Events    No overnight cardiac events  Telemetry-SR  SBP appropriate  Labs reviewed    Scr 1.75  H&H= 9.4/26.8 stable      Review of Systems  Review of Systems   Respiratory:  Negative for cough, choking, chest tightness, shortness of breath, wheezing and stridor.        Vital signs in last 24 hours  Temp:  [36.5 °C (97.7 °F)-36.7 °C (98.1 °F)] 36.5 °C (97.7 °F)  Pulse:  [63-94] 94  Resp:  [16-20] 20  BP: (110-151)/(46-63) 138/56  SpO2:  [92 %-99 %] 92 %    Physical Exam  Physical Exam  Cardiovascular:      Rate and Rhythm: Normal rate and regular rhythm.      Pulses: Normal pulses.   Pulmonary:      Effort: Pulmonary effort is normal.   Skin:     General: Skin is warm.   Neurological:      Mental Status: She is alert. Mental status is at baseline.   Psychiatric:         Mood and Affect: Mood normal.         Lab Review  Lab Results   Component Value Date/Time    WBC 14.1 (H) 11/28/2024 01:12 AM    RBC 2.94 (L) 11/28/2024 01:12 AM    HEMOGLOBIN 9.4 (L) 11/28/2024 01:12 AM    HEMATOCRIT 26.8 (L) 11/28/2024 01:12 AM    MCV 91.2 11/28/2024 01:12 AM    MCH 32.0 11/28/2024 01:12 AM    MCHC 35.1 11/28/2024 01:12 AM    MPV 8.9 (L) 11/28/2024  "01:12 AM      Lab Results   Component Value Date/Time    SODIUM 122 (L) 11/28/2024 01:12 AM    POTASSIUM 3.8 11/28/2024 01:12 AM    CHLORIDE 90 (L) 11/28/2024 01:12 AM    CO2 22 11/28/2024 01:12 AM    GLUCOSE 105 (H) 11/28/2024 01:12 AM    BUN 63 (H) 11/28/2024 01:12 AM    CREATININE 1.75 (H) 11/28/2024 01:12 AM    CREATININE 1.6 (H) 11/26/2007 12:28 AM      Lab Results   Component Value Date/Time    ASTSGOT 25 11/28/2024 01:12 AM    ALTSGPT 19 11/28/2024 01:12 AM     Lab Results   Component Value Date/Time    CHOLSTRLTOT 127 09/18/2024 10:15 AM    LDL 45 09/18/2024 10:15 AM    HDL 62 09/18/2024 10:15 AM    TRIGLYCERIDE 99 09/18/2024 10:15 AM    TROPONINT 65 (H) 11/23/2024 05:29 PM       No results for input(s): \"NTPROBNP\" in the last 72 hours.    Cardiac Imaging and Procedures Review  EKG: Sinus rhythm    Echocardiogram:    CONCLUSIONS  Compared to the prior study on 09/09/2024.   The left ventricular ejection fraction is visually estimated to be   greater than 75%.  Mild mitral stenosis.  Mean transvalvular gradient is 4mmHg at a heart rate of  68 BPM.  Known mechanical aortic valve. Gradient is not well measured.         CT abdomen without contrast 11/23/2024  1. Large 17 x 10.7 x 9.5 cm hematoma type mixed density fluid collection in the left retroperitoneum and flank abdominal wall. This displaces the left kidney anteriorly. No significant hydronephrosis.     2. Moderate colonic distention similar to 2007 suggests ileus. No obstruction suggestion. Prior gastric surgery.     3. Heavy vascular calcification. No aneurysm. Sternal wires. Aortic valve prosthesis.     4. No kidney stone or hydronephrosis. Bull catheter in empty bladder.     5. Prior cholecystectomy.    Assessment/Plan      # Retroperitoneal hemorrhage, spontaneous in the setting of warfarin use.  # INR on admission 2.69  # Hemorrhagic shock secondary to above s/p PRBCx2.   # Mechanical aortic valve replacement 2007.  # CAD s/p CABG x3 2007.  # " Paroxysmal A-fib.  # On OAC with warfarin, target INR 2.5-3.5.  # CKD stage IIIb.  # BMI 53  # CKD stage IIIb    -Hemodynamically stable.  -Hemoglobin/hematocrit stable 9.4/26.8.  -SBP appropriate.  -Warfarin on hold, INR 1.11  -Plan is to resume warfarin once bleeding issue resolves.      I personally spent a total of 15 minutes which includes face-to-face time and non-face-to-face time spent on preparing to see the patient, reviewing hospital notes and tests, obtaining history from the patient, performing a medically appropriate exam, counseling and educating the patient, ordering medications/tests/procedures/referrals as clinically indicated, and documenting information in the electronic medical record.       Thank you for allowing me to participate in the care of this patient.  I will continue to follow this patient    Please contact me with any questions.    RADHA Ortiz.   Cardiologist, [facility]  [phone]

## 2024-11-28 NOTE — PROGRESS NOTES
Hospital Medicine Daily Progress Note    Date of Service  11/28/2024    Chief Complaint  Yenifer Beasley is a 74 y.o. female admitted 11/23/2024 with retroperitoneal hematoma    Hospital Course  74 y.o. female who presented 11/23/2024 with shortness of breath.  Ms. Beasley has a past medical history of atrial fibrillation, mechanical aortic valve replacement on chronic coumadin therapy that was admitted to this hospital from 11/7 through 11/15 with ground level fall and right femur fracture. She underwent open treatment with internal fixation of right lateral   condyle distal femur fracture by Dr. Camargo on 11/8. She was given one unit RBCs and was discharged to rehab. On 11/23 she was brought to the ER with hypotension (62/46) and in the ER her Hb was 6.3 and chronic kidney disease Cr 1.78. A CT revealed a 17 cm x 10.7 cm x 9.5 cm retroperitoneal hematoma. Her INR was 2.69. She was given reversal with K centra, transfused 2 units RBCs and admitted to the ICU.     Interval Problem Update  11/27 patient is resting in bed, she is alert and oriented, she complain of generalized weakness, hemoglobin appears to be stable, blood pressure is stable, will discuss with cardiology regarding when to restart oral if okay to continue holding Coumadin, patient will likely require placement when medically stable, discussed with bedside nurse charge nurse  pharmacist, cannot have an answer.  11/28 patient is in bed, feels well, hb appears to be stable, discussed with cardio, if hb remains stable will restart warfarin in am, started on low dose lasix due to lower ext edema, Na 122 in am. Continue close monitoring. Discussed with , pharmacist,nurse staff.    I have discussed this patient's plan of care and discharge plan at IDT rounds today with Case Management, Nursing, Nursing leadership, and other members of the IDT team.    Consultants/Specialty  Cardiology  Critical care    Code  Status  DNAR/DNI    Disposition  The patient is not medically cleared for discharge to home or a post-acute facility.      I have placed the appropriate orders for post-discharge needs.    Review of Systems  Review of Systems   Constitutional:  Positive for malaise/fatigue. Negative for chills and fever.   Eyes:  Negative for blurred vision and double vision.   Respiratory:  Negative for cough, hemoptysis and wheezing.    Cardiovascular:  Negative for chest pain, palpitations, claudication, leg swelling and PND.   Gastrointestinal:  Negative for heartburn, nausea and vomiting.   Genitourinary:  Negative for hematuria and urgency.   Musculoskeletal:  Negative for back pain and myalgias.   Skin:  Negative for rash.   Neurological:  Negative for dizziness and headaches.   Endo/Heme/Allergies:  Does not bruise/bleed easily.   Psychiatric/Behavioral:  Negative for depression.         Physical Exam  Temp:  [36.5 °C (97.7 °F)-36.7 °C (98.1 °F)] 36.5 °C (97.7 °F)  Pulse:  [63-94] 94  Resp:  [16-20] 20  BP: (110-151)/(46-63) 137/58  SpO2:  [92 %-99 %] 96 %    Physical Exam  Vitals and nursing note reviewed.   Constitutional:       Appearance: She is obese. She is ill-appearing.   Eyes:      General:         Right eye: No discharge.         Left eye: No discharge.      Conjunctiva/sclera: Conjunctivae normal.   Cardiovascular:      Rate and Rhythm: Normal rate and regular rhythm.      Pulses: Normal pulses.      Heart sounds: Normal heart sounds.   Pulmonary:      Effort: Pulmonary effort is normal. No respiratory distress.      Breath sounds: Normal breath sounds.   Abdominal:      General: Bowel sounds are normal. There is no distension.      Tenderness: There is no abdominal tenderness.   Musculoskeletal:         General: Normal range of motion.      Cervical back: Normal range of motion and neck supple.      Right lower leg: Edema present.      Left lower leg: Edema present.   Skin:     General: Skin is warm.       Capillary Refill: Capillary refill takes less than 2 seconds.      Coloration: Skin is not jaundiced.   Neurological:      General: No focal deficit present.      Mental Status: She is alert and oriented to person, place, and time.      Cranial Nerves: No cranial nerve deficit.   Psychiatric:         Mood and Affect: Mood normal.         Behavior: Behavior normal.         Fluids    Intake/Output Summary (Last 24 hours) at 11/28/2024 1351  Last data filed at 11/28/2024 0700  Gross per 24 hour   Intake 350 ml   Output 1425 ml   Net -1075 ml        Laboratory  Recent Labs     11/26/24  1630 11/27/24  0200 11/28/24  0112   WBC 17.7* 16.6* 14.1*   RBC 3.06* 3.00* 2.94*   HEMOGLOBIN 9.9* 9.3* 9.4*   HEMATOCRIT 27.3* 26.7* 26.8*   MCV 89.2 89.0 91.2   MCH 32.4 31.0 32.0   MCHC 36.3* 34.8 35.1   RDW 48.2 48.4 47.9   PLATELETCT 236 208 212   MPV 8.6* 8.7* 8.9*     Recent Labs     11/26/24  1630 11/27/24  0200 11/27/24  1716 11/28/24  0112 11/28/24  0909   SODIUM 125* 122* 125* 122* 124*   POTASSIUM 3.8 4.0  --  3.8  --    CHLORIDE 91* 90*  --  90*  --    CO2 23 19*  --  22  --    GLUCOSE 111* 95  --  105*  --    BUN 62* 65*  --  63*  --    CREATININE 2.13* 2.05*  --  1.75*  --    CALCIUM 7.4* 7.3*  --  7.6*  --      Recent Labs     11/26/24  0530 11/27/24  0200   INR 1.25* 1.11               Imaging  DX-CHEST-LIMITED (1 VIEW)   Final Result      1.  Dense opacification of the left lower lung. This may represent a combination of consolidation and pleural effusion.   2.  Right base atelectasis.   3.  Support apparatus as above. No pneumothorax seen.      US-EXTREMITY VENOUS LOWER BILAT   Final Result      EC-ECHOCARDIOGRAM COMPLETE W/ CONT   Final Result      CT-RENAL COLIC EVALUATION(A/P W/O)   Final Result   Impression:      1. Large 17 x 10.7 x 9.5 cm hematoma type mixed density fluid collection in the left retroperitoneum and flank abdominal wall. This displaces the left kidney anteriorly. No significant hydronephrosis.       2. Moderate colonic distention similar to 2007 suggests ileus. No obstruction suggestion. Prior gastric surgery.      3. Heavy vascular calcification. No aneurysm. Sternal wires. Aortic valve prosthesis.      4. No kidney stone or hydronephrosis. Bull catheter in empty bladder.      5. Prior cholecystectomy.                        DX-CHEST-PORTABLE (1 VIEW)   Final Result   Impression:      1. Shallow. Mild left base atelectasis.      2. Cardiomegaly. Sternal wires. Cardiac valve prosthesis.                          Assessment/Plan  * Retroperitoneal hemorrhage- (present on admission)  Assessment & Plan  A CT revealed a 17 cm x 10.7 cm x 9.5 cm retroperitoneal hematoma  Spontaneous hemorrhage in the setting of coumadin use  INR 2.69 on admission was reversed and red blood cells were transfused  Hold coumadin  Check Hb and INR in the morning  Continue monitoring hemoglobin level    INR 1.1 point  Monitoring      Hyperkalemia- (present on admission)  Assessment & Plan  resolved    Hemorrhagic shock (HCC)- (present on admission)  Assessment & Plan  Due to retroperitoneal bleed  Requiring emergent blood transfusions  Hemoglobin 9.3    ABLA (acute blood loss anemia)- (present on admission)  Assessment & Plan  Secondary to retroperitoneal hemorrhage requiring blood transfusions.  Continue monitoring  Hemoglobin appears to be stable  Probably will restart Coumadin in a day or 2    Nondisplaced unspecified condyle fracture of lower end of right femur, initial encounter for closed fracture (HCC)- (present on admission)  Assessment & Plan  S/p surgery by Dr. Camargo on 11/8  PT/OT  She likely will require post-acute rehab.    Class 3 severe obesity due to excess calories with serious comorbidity and body mass index (BMI) of 40.0 to 44.9 in adult (HCC)- (present on admission)  Assessment & Plan  BMI 46    Chronic kidney disease (CKD) stage G3b/A1, moderately decreased glomerular filtration rate (GFR) between 30-44  mL/min/1.73 square meter and albuminuria creatinine ratio less than 30 mg/g- (present on admission)  Assessment & Plan  Last admission her Cr was high 1 to low 2 range  Cr 1.7 on admission and is 2.13  Follow urine output  BMP ordered for the morning.  Creatinine 1.75    Paroxysmal atrial fibrillation (HCC)- (present on admission)  Assessment & Plan  Hx of  Holding Coumadin    Hx of mechanical aortic valve replacement [V43.3]- (present on admission)  Assessment & Plan  2007 by Dr. Awan  Hold coumadin for now given her active bleed  Will discuss with cardiology to see when Coumadin can be restarted or if we can keep holding it for a little longer  Heart failure restart Coumadin in 1 to 2 days    Hyponatremia- (present on admission)  Assessment & Plan  This persists  Na low at 125  Check BMP in the morning  Sodium 122  Free water restriction  Continue monitoring sodium levels    Likely due to fluid overload will start IV Lasix with close monitoring    Essential hypertension- (present on admission)  Assessment & Plan  Monitoring    HILDA (obstructive sleep apnea)- (present on admission)  Assessment & Plan  Continue nocturna CPAP    CAD (coronary artery disease)- (present on admission)  Assessment & Plan  No chest pain, continue close monitoring         VTE prophylaxis: SCDs    I have performed a physical exam and reviewed and updated ROS and Plan today (11/28/2024). In review of yesterday's note (11/27/2024), there are no changes except as documented above.      Total time of 51 minutes spent prepping to see patient (e.g. reviewing  tests/imaging results, notes from consultants, bedside nurse, night shift ) obtaining and/or reviewing separately obtained history. Performing a medically appropriate examination and evaluation.  Counseling and educating the patient.  Ordering medications, tests, or procedures.  Referring and communicating with other health care professionals.  Documenting clinical information in EPIC.   Independently interpreting results and communicating results to patient.  Care coordination.

## 2024-11-29 LAB
ANION GAP SERPL CALC-SCNC: 12 MMOL/L (ref 7–16)
APTT PPP: 25.5 SEC (ref 24.7–36)
BUN SERPL-MCNC: 60 MG/DL (ref 8–22)
CALCIUM SERPL-MCNC: 7.8 MG/DL (ref 8.5–10.5)
CHLORIDE SERPL-SCNC: 92 MMOL/L (ref 96–112)
CO2 SERPL-SCNC: 21 MMOL/L (ref 20–33)
CREAT SERPL-MCNC: 1.51 MG/DL (ref 0.5–1.4)
ERYTHROCYTE [DISTWIDTH] IN BLOOD BY AUTOMATED COUNT: 49.8 FL (ref 35.9–50)
GFR SERPLBLD CREATININE-BSD FMLA CKD-EPI: 36 ML/MIN/1.73 M 2
GLUCOSE SERPL-MCNC: 119 MG/DL (ref 65–99)
HCT VFR BLD AUTO: 27.5 % (ref 37–47)
HGB BLD-MCNC: 9.4 G/DL (ref 12–16)
INR PPP: 1.12 (ref 0.87–1.13)
MCH RBC QN AUTO: 31.5 PG (ref 27–33)
MCHC RBC AUTO-ENTMCNC: 34.2 G/DL (ref 32.2–35.5)
MCV RBC AUTO: 92.3 FL (ref 81.4–97.8)
PLATELET # BLD AUTO: 199 K/UL (ref 164–446)
PMV BLD AUTO: 8.9 FL (ref 9–12.9)
POTASSIUM SERPL-SCNC: 4.2 MMOL/L (ref 3.6–5.5)
PROTHROMBIN TIME: 14.4 SEC (ref 12–14.6)
RBC # BLD AUTO: 2.98 M/UL (ref 4.2–5.4)
SODIUM SERPL-SCNC: 124 MMOL/L (ref 135–145)
SODIUM SERPL-SCNC: 124 MMOL/L (ref 135–145)
SODIUM SERPL-SCNC: 125 MMOL/L (ref 135–145)
SODIUM SERPL-SCNC: 125 MMOL/L (ref 135–145)
UFH PPP CHRO-ACNC: <0.1 IU/ML
UFH PPP CHRO-ACNC: >1.1 IU/ML
WBC # BLD AUTO: 12.4 K/UL (ref 4.8–10.8)

## 2024-11-29 PROCEDURE — A9270 NON-COVERED ITEM OR SERVICE: HCPCS | Performed by: INTERNAL MEDICINE

## 2024-11-29 PROCEDURE — 700102 HCHG RX REV CODE 250 W/ 637 OVERRIDE(OP): Performed by: INTERNAL MEDICINE

## 2024-11-29 PROCEDURE — 84295 ASSAY OF SERUM SODIUM: CPT | Mod: 91

## 2024-11-29 PROCEDURE — 85520 HEPARIN ASSAY: CPT

## 2024-11-29 PROCEDURE — 700102 HCHG RX REV CODE 250 W/ 637 OVERRIDE(OP): Performed by: HOSPITALIST

## 2024-11-29 PROCEDURE — 700111 HCHG RX REV CODE 636 W/ 250 OVERRIDE (IP): Mod: JZ | Performed by: HOSPITALIST

## 2024-11-29 PROCEDURE — 51798 US URINE CAPACITY MEASURE: CPT

## 2024-11-29 PROCEDURE — 85730 THROMBOPLASTIN TIME PARTIAL: CPT

## 2024-11-29 PROCEDURE — 85610 PROTHROMBIN TIME: CPT

## 2024-11-29 PROCEDURE — 97530 THERAPEUTIC ACTIVITIES: CPT | Mod: CQ

## 2024-11-29 PROCEDURE — A9270 NON-COVERED ITEM OR SERVICE: HCPCS | Performed by: HOSPITALIST

## 2024-11-29 PROCEDURE — 700101 HCHG RX REV CODE 250: Performed by: INTERNAL MEDICINE

## 2024-11-29 PROCEDURE — 80048 BASIC METABOLIC PNL TOTAL CA: CPT

## 2024-11-29 PROCEDURE — 85027 COMPLETE CBC AUTOMATED: CPT

## 2024-11-29 PROCEDURE — 36415 COLL VENOUS BLD VENIPUNCTURE: CPT

## 2024-11-29 PROCEDURE — 770020 HCHG ROOM/CARE - TELE (206)

## 2024-11-29 PROCEDURE — 99233 SBSQ HOSP IP/OBS HIGH 50: CPT | Performed by: HOSPITALIST

## 2024-11-29 RX ORDER — HEPARIN SODIUM 1000 [USP'U]/ML
40 INJECTION, SOLUTION INTRAVENOUS; SUBCUTANEOUS PRN
Status: DISCONTINUED | OUTPATIENT
Start: 2024-11-29 | End: 2024-12-07

## 2024-11-29 RX ORDER — WARFARIN SODIUM 6 MG/1
6 TABLET ORAL
Status: DISCONTINUED | OUTPATIENT
Start: 2024-11-29 | End: 2024-11-30

## 2024-11-29 RX ORDER — HEPARIN SODIUM 1000 [USP'U]/ML
80 INJECTION, SOLUTION INTRAVENOUS; SUBCUTANEOUS ONCE
Status: COMPLETED | OUTPATIENT
Start: 2024-11-29 | End: 2024-11-29

## 2024-11-29 RX ORDER — HEPARIN SODIUM 5000 [USP'U]/100ML
0-30 INJECTION, SOLUTION INTRAVENOUS CONTINUOUS
Status: DISCONTINUED | OUTPATIENT
Start: 2024-11-29 | End: 2024-12-07

## 2024-11-29 RX ORDER — WARFARIN SODIUM 4 MG/1
4 TABLET ORAL
Status: DISCONTINUED | OUTPATIENT
Start: 2024-11-30 | End: 2024-11-30

## 2024-11-29 RX ADMIN — HEPARIN SODIUM 6400 UNITS: 1000 INJECTION, SOLUTION INTRAVENOUS; SUBCUTANEOUS at 13:59

## 2024-11-29 RX ADMIN — POLYETHYLENE GLYCOL 3350 1 PACKET: 17 POWDER, FOR SOLUTION ORAL at 05:01

## 2024-11-29 RX ADMIN — FUROSEMIDE 20 MG: 10 INJECTION, SOLUTION INTRAVENOUS at 04:48

## 2024-11-29 RX ADMIN — OXYCODONE 10 MG: 5 TABLET ORAL at 04:48

## 2024-11-29 RX ADMIN — HEPARIN SODIUM 18 UNITS/KG/HR: 5000 INJECTION, SOLUTION INTRAVENOUS at 14:01

## 2024-11-29 RX ADMIN — TIMOLOL MALEATE 1 DROP: 5 SOLUTION OPHTHALMIC at 18:26

## 2024-11-29 RX ADMIN — EZETIMIBE 10 MG: 10 TABLET ORAL at 04:48

## 2024-11-29 RX ADMIN — OXYCODONE 10 MG: 5 TABLET ORAL at 18:25

## 2024-11-29 RX ADMIN — OXYCODONE 10 MG: 5 TABLET ORAL at 12:24

## 2024-11-29 RX ADMIN — TIMOLOL MALEATE 1 DROP: 5 SOLUTION OPHTHALMIC at 04:57

## 2024-11-29 RX ADMIN — SENNOSIDES AND DOCUSATE SODIUM 2 TABLET: 50; 8.6 TABLET ORAL at 18:26

## 2024-11-29 RX ADMIN — WARFARIN SODIUM 6 MG: 6 TABLET ORAL at 18:26

## 2024-11-29 RX ADMIN — LEVOTHYROXINE SODIUM 125 MCG: 0.12 TABLET ORAL at 04:48

## 2024-11-29 RX ADMIN — LIDOCAINE 2 PATCH: 4 PATCH TOPICAL at 04:48

## 2024-11-29 ASSESSMENT — ENCOUNTER SYMPTOMS
DIZZINESS: 0
DEPRESSION: 0
WHEEZING: 0
PALPITATIONS: 0
NAUSEA: 0
HEADACHES: 0
HEMOPTYSIS: 0
COUGH: 0
VOMITING: 0
FEVER: 0
BACK PAIN: 0
DOUBLE VISION: 0
HEARTBURN: 0
BRUISES/BLEEDS EASILY: 0
BLURRED VISION: 0
CLAUDICATION: 0
PND: 0
CHILLS: 0
MYALGIAS: 0

## 2024-11-29 ASSESSMENT — COGNITIVE AND FUNCTIONAL STATUS - GENERAL
WALKING IN HOSPITAL ROOM: TOTAL
MOBILITY SCORE: 8
STANDING UP FROM CHAIR USING ARMS: TOTAL
MOVING FROM LYING ON BACK TO SITTING ON SIDE OF FLAT BED: A LOT
TURNING FROM BACK TO SIDE WHILE IN FLAT BAD: TOTAL
CLIMB 3 TO 5 STEPS WITH RAILING: TOTAL
MOVING TO AND FROM BED TO CHAIR: A LOT
SUGGESTED CMS G CODE MODIFIER MOBILITY: CM

## 2024-11-29 ASSESSMENT — PAIN DESCRIPTION - PAIN TYPE
TYPE: ACUTE PAIN

## 2024-11-29 ASSESSMENT — CHA2DS2 SCORE
CHF OR LEFT VENTRICULAR DYSFUNCTION: NO
VASCULAR DISEASE: NO
PRIOR STROKE OR TIA OR THROMBOEMBOLISM: NO
CHA2DS2 VASC SCORE: 3
AGE 75 OR GREATER: NO
DIABETES: NO
HYPERTENSION: YES
SEX: FEMALE
AGE 65 TO 74: YES

## 2024-11-29 ASSESSMENT — GAIT ASSESSMENTS: GAIT LEVEL OF ASSIST: UNABLE TO PARTICIPATE

## 2024-11-29 ASSESSMENT — FIBROSIS 4 INDEX: FIB4 SCORE: 2.1

## 2024-11-29 NOTE — CARE PLAN
The patient is Watcher - Medium risk of patient condition declining or worsening    Shift Goals  Clinical Goals: Diuresis, pain control  Patient Goals: pain control/ rest  Family Goals: not present    Progress made toward(s) clinical / shift goals:  yes    Patient is not progressing towards the following goals:

## 2024-11-29 NOTE — PROGRESS NOTES
Inpatient Anticoagulation Service Note for 2024    Reason for Anticoagulation: Aortic Mechanical Valve Replacement, Atrial Fibrillation     [id number] VASc Score: 3  HAS-BLED Score: 2    Hemoglobin Value: (!) 9.4  Hematocrit Value: (!) 27.5  Lab Platelet Value: 199  Target INR: 2.5 to 3.5    INR from last 7 days        Date/Time INR Value    24 1216 1.12     24 0200 1.11     24 0530 1.25     24 0555 1.16     24 0544 1.18     24 0545 2.69                   Dose from last 7 days        Date/Time Dose (mg)    24 1305 6                   Average Dose (mg):    Home dosinmg on Mon/Wed/Friday and 4mg all other days    Significant Interactions:   Not Applicable    Bridge Therapy:   Yes (Parenteral bridge with heparin drip)    Date of Last VTE Event:    (N/A)    Bridge Therapy Start Date:   24    Days of Overlap Therapy:   0    INR Value Greater than 2 Prior to Discontinuation of Parenteral Anticoagulation:   Not Applicable     Reversal Agent Administered:   PCC (Prothrombin Complex Concentrate) (S/p Kcentra on )    Comments:   Patient on warfarin for afib & history of mechanical aortic valve. Patient follows with [facility] outpatient. INR goal is between 2.5 - 3.5 (confirmed via anti-coagulation clinic notes). Home warfarin dosing is 6mg on Mon/Wed/ and 4mg daily all other days. Patient has a Renal diet ordered. No major drug-drug interactions noted. Hgb/hct/PLT low but stable    Plan:    Warfarin restarting today after retroperitoneal bleed s/p Kcentra on  and 6u PRBC ( - ).     Baseline INR 1.12. Will restart warfarin at home dosing of 6mg tonight. Prior home dosing with stable INR trends as evidenced by therapeutic time in range of 69.9% (over a 7.7 year time frame).     Concomitant parenteral bridging with heparin gtt started today in addition, as patient is in a hyper-coagulable state given warfarin re-start. Heparin  specifically chosen due to recent history of bleed.  Recommend continuing parenteral bridge for at least 5 days AND at least 2 therapeutic INR values.     Education Material Provided?:   No (Established warfarin patient)    Pharmacist suggested discharge dosing:   In the event patient were to discharge, anticipate patient can be discharged on warfarin 6mg Mon/Wed/Friday and 4mg daily all other days. Despite bleed, hesitant to change home dosing given historically stable therapeutic time in range.     Recommend INR 72 hours within discharge with close follow-up with anticoagulation clinic.       Eileen Cartagena, PharmD

## 2024-11-29 NOTE — PROGRESS NOTES
Monitor Summary  Rhythm: SR/1ST DEGREE/BBB  Rate: 61-66  Ectopy: PVC/PAC/COUP  .23 / .11 / .39

## 2024-11-29 NOTE — PROGRESS NOTES
Monitor Summary  Rhythm: SR w/ 1AVB  Rate: 62-72  Ectopy: Frequent PVCs, PACs  .26 / .11 / .43    Physical copy scanned into chart.

## 2024-11-29 NOTE — PROGRESS NOTES
Problem: Discharge Planning  Goal: Discharge to home or other facility with appropriate resources  Outcome: Progressing     Problem: Safety - Adult  Goal: Free from fall injury  Outcome: Progressing     Problem: Cardiovascular - Adult  Goal: Maintains optimal cardiac output and hemodynamic stability  Outcome: Progressing  Goal: Absence of cardiac dysrhythmias or at baseline  Outcome: Progressing     Problem: Gastrointestinal - Adult  Goal: Minimal or absence of nausea and vomiting  Outcome: Progressing  Goal: Maintains or returns to baseline bowel function  Outcome: Progressing  Goal: Maintains adequate nutritional intake  Outcome: Progressing     Problem: Hematologic - Adult  Goal: Maintains hematologic stability  Outcome: Progressing      Hospital Medicine Daily Progress Note    Date of Service  11/29/2024    Chief Complaint  Yenifer Beasley is a 74 y.o. female admitted 11/23/2024 with retroperitoneal hematoma    Hospital Course  74 y.o. female who presented 11/23/2024 with shortness of breath.  Ms. Beasley has a past medical history of atrial fibrillation, mechanical aortic valve replacement on chronic coumadin therapy that was admitted to this hospital from 11/7 through 11/15 with ground level fall and right femur fracture. She underwent open treatment with internal fixation of right lateral   condyle distal femur fracture by Dr. Camargo on 11/8. She was given one unit RBCs and was discharged to rehab. On 11/23 she was brought to the ER with hypotension (62/46) and in the ER her Hb was 6.3 and chronic kidney disease Cr 1.78. A CT revealed a 17 cm x 10.7 cm x 9.5 cm retroperitoneal hematoma. Her INR was 2.69. She was given reversal with K centra, transfused 2 units RBCs and admitted to the ICU.     Interval Problem Update  11/27 patient is resting in bed, she is alert and oriented, she complain of generalized weakness, hemoglobin appears to be stable, blood pressure is stable, will discuss with cardiology regarding when to restart oral if okay to continue holding Coumadin, patient will likely require placement when medically stable, discussed with bedside nurse charge nurse  pharmacist, cannot have an answer.  11/28 patient is in bed, feels well, hb appears to be stable, discussed with cardio, if hb remains stable will restart warfarin in am, started on low dose lasix due to lower ext edema, Na 122 in am. Continue close monitoring. Discussed with , pharmacist,nurse staff.  11/29 patient is resting in bed, she is alert oriented follows commands she denies any new complaints, hemoglobin is 9.4 today it was 9.4 yesterday 9.3 the day before, will start anticoagulation today discussed with pharmacist since patient received  Kcentra and will have to start Coumadin bridging with heparin with close monitoring since Coumadin might be procoagulant at the beginning of the treatment, continue monitoring hemoglobin continue monitoring vital signs, discussed with bedside nurse charge nurse  pharmacist all question answered.    I have discussed this patient's plan of care and discharge plan at IDT rounds today with Case Management, Nursing, Nursing leadership, and other members of the IDT team.    Consultants/Specialty  Cardiology  Critical care    Code Status  DNAR/DNI    Disposition  The patient is not medically cleared for discharge to home or a post-acute facility.      I have placed the appropriate orders for post-discharge needs.    Review of Systems  Review of Systems   Constitutional:  Positive for malaise/fatigue. Negative for chills and fever.   Eyes:  Negative for blurred vision and double vision.   Respiratory:  Negative for cough, hemoptysis and wheezing.    Cardiovascular:  Negative for chest pain, palpitations, claudication, leg swelling and PND.   Gastrointestinal:  Negative for heartburn, nausea and vomiting.   Genitourinary:  Negative for hematuria and urgency.   Musculoskeletal:  Negative for back pain and myalgias.   Skin:  Negative for rash.   Neurological:  Negative for dizziness and headaches.   Endo/Heme/Allergies:  Does not bruise/bleed easily.   Psychiatric/Behavioral:  Negative for depression.         Physical Exam  Temp:  [36.5 °C (97.7 °F)-36.9 °C (98.4 °F)] 36.9 °C (98.4 °F)  Pulse:  [61-68] 68  Resp:  [16-20] 18  BP: (132-146)/(51-63) 143/60  SpO2:  [90 %-96 %] 91 %    Physical Exam  Vitals and nursing note reviewed.   Constitutional:       Appearance: She is obese. She is ill-appearing.   Eyes:      General:         Right eye: No discharge.         Left eye: No discharge.      Conjunctiva/sclera: Conjunctivae normal.   Cardiovascular:      Rate and Rhythm: Normal rate and regular rhythm.      Pulses:  Normal pulses.      Heart sounds: Normal heart sounds.   Pulmonary:      Effort: Pulmonary effort is normal. No respiratory distress.      Breath sounds: Normal breath sounds.   Abdominal:      General: Bowel sounds are normal. There is no distension.      Tenderness: There is no abdominal tenderness.   Musculoskeletal:         General: Normal range of motion.      Cervical back: Normal range of motion and neck supple.      Right lower leg: Edema present.      Left lower leg: Edema present.   Skin:     General: Skin is warm.      Capillary Refill: Capillary refill takes less than 2 seconds.      Coloration: Skin is not jaundiced.   Neurological:      General: No focal deficit present.      Mental Status: She is alert and oriented to person, place, and time.      Cranial Nerves: No cranial nerve deficit.   Psychiatric:         Mood and Affect: Mood normal.         Behavior: Behavior normal.         Fluids    Intake/Output Summary (Last 24 hours) at 11/29/2024 1325  Last data filed at 11/29/2024 0810  Gross per 24 hour   Intake 480 ml   Output 1750 ml   Net -1270 ml        Laboratory  Recent Labs     11/27/24  0200 11/28/24  0112 11/29/24  0046   WBC 16.6* 14.1* 12.4*   RBC 3.00* 2.94* 2.98*   HEMOGLOBIN 9.3* 9.4* 9.4*   HEMATOCRIT 26.7* 26.8* 27.5*   MCV 89.0 91.2 92.3   MCH 31.0 32.0 31.5   MCHC 34.8 35.1 34.2   RDW 48.4 47.9 49.8   PLATELETCT 208 212 199   MPV 8.7* 8.9* 8.9*     Recent Labs     11/27/24  0200 11/27/24  1716 11/28/24  0112 11/28/24  0909 11/28/24  1631 11/29/24  0046 11/29/24  0904   SODIUM 122*   < > 122*   < > 125* 124*  125* 124*   POTASSIUM 4.0  --  3.8  --   --  4.2  --    CHLORIDE 90*  --  90*  --   --  92*  --    CO2 19*  --  22  --   --  21  --    GLUCOSE 95  --  105*  --   --  119*  --    BUN 65*  --  63*  --   --  60*  --    CREATININE 2.05*  --  1.75*  --   --  1.51*  --    CALCIUM 7.3*  --  7.6*  --   --  7.8*  --     < > = values in this interval not displayed.     Recent Labs      11/27/24  0200 11/29/24  1216   APTT  --  25.5   INR 1.11 1.12               Imaging  DX-CHEST-LIMITED (1 VIEW)   Final Result      1.  Dense opacification of the left lower lung. This may represent a combination of consolidation and pleural effusion.   2.  Right base atelectasis.   3.  Support apparatus as above. No pneumothorax seen.      US-EXTREMITY VENOUS LOWER BILAT   Final Result      EC-ECHOCARDIOGRAM COMPLETE W/ CONT   Final Result      CT-RENAL COLIC EVALUATION(A/P W/O)   Final Result   Impression:      1. Large 17 x 10.7 x 9.5 cm hematoma type mixed density fluid collection in the left retroperitoneum and flank abdominal wall. This displaces the left kidney anteriorly. No significant hydronephrosis.      2. Moderate colonic distention similar to 2007 suggests ileus. No obstruction suggestion. Prior gastric surgery.      3. Heavy vascular calcification. No aneurysm. Sternal wires. Aortic valve prosthesis.      4. No kidney stone or hydronephrosis. Bull catheter in empty bladder.      5. Prior cholecystectomy.                        DX-CHEST-PORTABLE (1 VIEW)   Final Result   Impression:      1. Shallow. Mild left base atelectasis.      2. Cardiomegaly. Sternal wires. Cardiac valve prosthesis.                          Assessment/Plan  * Retroperitoneal hemorrhage- (present on admission)  Assessment & Plan  A CT revealed a 17 cm x 10.7 cm x 9.5 cm retroperitoneal hematoma  Spontaneous hemorrhage in the setting of coumadin use  INR 2.69 on admission was reversed and red blood cells were transfused  Hold coumadin  Check Hb and INR in the morning  Continue monitoring hemoglobin level    INR 1.1 point  Monitoring  Continue monitoring for any signs of bleeding      Hyperkalemia- (present on admission)  Assessment & Plan  resolved    Hemorrhagic shock (HCC)- (present on admission)  Assessment & Plan  Due to retroperitoneal bleed  Requiring emergent blood transfusions  Hemoglobin 9.4  Due to history of metallic  aortic valve replacement will help to start Coumadin since patient received Kcentra will help to bridge patient with IV heparin since patient might be pro-coagulative at the beginning of Coumadin treatment.  Continue close monitoring for any signs of bleeding.    ABLA (acute blood loss anemia)- (present on admission)  Assessment & Plan  Secondary to retroperitoneal hemorrhage requiring blood transfusions.  Continue monitoring  Hemoglobin appears to be stable  Probably will restart Coumadin in a day or 2    Nondisplaced unspecified condyle fracture of lower end of right femur, initial encounter for closed fracture (HCC)- (present on admission)  Assessment & Plan  S/p surgery by Dr. Camargo on 11/8  PT/OT  She likely will require post-acute rehab.    Class 3 severe obesity due to excess calories with serious comorbidity and body mass index (BMI) of 40.0 to 44.9 in adult (HCC)- (present on admission)  Assessment & Plan  BMI 46    Chronic kidney disease (CKD) stage G3b/A1, moderately decreased glomerular filtration rate (GFR) between 30-44 mL/min/1.73 square meter and albuminuria creatinine ratio less than 30 mg/g- (present on admission)  Assessment & Plan  Last admission her Cr was high 1 to low 2 range  Cr 1.7 on admission and is 2.13  Follow urine output  BMP ordered for the morning.  Creatinine 1.51    Paroxysmal atrial fibrillation (HCC)- (present on admission)  Assessment & Plan  Hx of  Holding Coumadin    Hx of mechanical aortic valve replacement [V43.3]- (present on admission)  Assessment & Plan  2007 by Dr. Awan  Hold coumadin for now given her active bleed  Will discuss with cardiology to see when Coumadin can be restarted or if we can keep holding it for a little longer  Heart failure restart Coumadin in 1 to 2 days  Start heparin Coumadin today day 1/5    Hyponatremia- (present on admission)  Assessment & Plan  This persists  Na low at 125  Check BMP in the morning  Sodium 122  Free water  restriction  Continue monitoring sodium levels    Likely due to fluid overload will start IV Lasix with close monitoring  Sodium 124 today continue monitoring continue free water restriction    Essential hypertension- (present on admission)  Assessment & Plan  Monitoring    HILDA (obstructive sleep apnea)- (present on admission)  Assessment & Plan  Continue nocturna CPAP    CAD (coronary artery disease)- (present on admission)  Assessment & Plan  No chest pain, continue close monitoring         VTE prophylaxis: SCDs    I have performed a physical exam and reviewed and updated ROS and Plan today (11/29/2024). In review of yesterday's note (11/28/2024), there are no changes except as documented above.    Total time of 52 minutes spent prepping to see patient (e.g. reviewing  tests/imaging results, notes from consultants, bedside nurse, night shift ) obtaining and/or reviewing separately obtained history. Performing a medically appropriate examination and evaluation.  Counseling and educating the patient.  Ordering medications, tests, or procedures.  Referring and communicating with other health care professionals.  Documenting clinical information in EPIC.  Independently interpreting results and communicating results to patient.  Care coordination.      Patient is has a high medical complexity, complex decision making and is at high risk for complication, morbidity, and mortality.

## 2024-11-29 NOTE — DISCHARGE PLANNING
"HTH/SCP TCN chart review completed. Current discharge considerations are SNF. Noted per prior TCN, \"anticipating return to [facility] as pt admitted from this facility* however TCN will monitor as [facility] currently pending.  Patient has multiple other accepting SNFs at this time.     TCN will continue to follow and collaborate with discharge planning team as additional post acute needs arise. Thank you.    Completed:  PT/OT recommend post acute placement 11/27  Choice obtained: none  Pt aware of [facility]'s blanket referral policy and blanket referrals sent; pt has multiple accepting facilities (see above)  SCP with [facility] PCP. discussed possible outpatient transitional PCP follow up if indicated and pt in agreement; sent information to assist in scheduling   "

## 2024-11-29 NOTE — THERAPY
"Physical Therapy   Daily Treatment     Patient Name: Yenifer Beasley  Age:  74 y.o., Sex:  female  Medical Record #: [id number]  Today's Date: 11/29/2024     Precautions  Precautions: Fall Risk;Toe Touch Weight Bearing Right Lower Extremity  Comments: R knee ROMAT    Assessment    Pt greeted and seen for PT treatment. Pt does not mobilize in/out of bed at baseline, she sleeps in the recliner. MaxA x2 person for bed mobility today. She is able to maintain her sitting balance EOB, she was also able to scoot a handful of inches to the R and L twice today w/ Nieves and VC for head/hip relationship and VC to use L LE. Recommend slide board next session. Pt is limited by short stature and body habitus but had no LOB EOB and no sliding forward. Continue to recommend x2 person for safety.  Pt currently limited by decreased strength and decreased activity tolerance which negatively impacts functional mobility. Pt will continue to benefit from skilled PT to address deficits.       Plan    Treatment Plan Status: Continue Current Treatment Plan  Type of Treatment: Bed Mobility, Neuro Re-Education / Balance, Therapeutic Activities, Therapeutic Exercise, Self Care / Home Evaluation  Treatment Frequency: 3 Times per Week  Treatment Duration: Until Therapy Goals Met    DC Equipment Recommendations: Unable to determine at this time  Discharge Recommendations: Recommend post-acute placement for additional physical therapy services prior to discharge home      Subjective  \"Thank you!\"       11/29/24 0950   Vitals   Pulse 67   Pulse Oximetry 90 %   O2 (LPM) 0   O2 Delivery Device None - Room Air   Pain 0 - 10 Group   Therapist Pain Assessment Post Activity Pain Same as Prior to Activity;Nurse Notified  (pt did report \"pain all over\" but improved R knee pain.)   Cognition    Level of Consciousness Alert   Comments Pt is very pleasant and cooperative.   Sitting Lower Body Exercises   Long Arc Quad 1 set of 10;Right;Left   Balance "   Sitting Balance (Static) Fair -   Sitting Balance (Dynamic) Fair -   Weight Shift Sitting Fair   Skilled Intervention Verbal Cuing;Tactile Cuing;Sequencing;Facilitation;Compensatory Strategies   Comments Pt is somewhat limited by body habitus and short stature, patient would likely have improved scooting with platform step under feet. However throughout scooting today w/o platform step she did not have any LOB or slipping toward EOB.   Bed Mobility    Supine to Sit Maximal Assist   Sit to Supine Maximal Assist   Scooting Maximal Assist   Rolling Total Assist to Rt.;Total Assist to Lt.   Skilled Intervention Verbal Cuing;Facilitation;Compensatory Strategies;Tactile Cuing;Sequencing   Comments HOB elevated. x2 person, pt does not mobilizing in/out of bed at baseline, she sleeps in the recliner at home.   Gait Analysis   Gait Level Of Assist Unable to Participate   Functional Mobility   Sit to Stand Unable to Participate   Comments Patient familiar with this patient from previous stay, she is unable to stand while maintaining her TTWB on the R LE.   Short Term Goals    Short Term Goal # 1 Pt will perform bed mobility with mod A in 6 visits.   Goal Outcome # 1 goal not met   Short Term Goal # 2 Pt will maintain sitting self supported x 5 min in 6 visits to improve functional indep.   Goal Outcome # 2 Goal met   Short Term Goal # 3 Pt will perform seated slide board transfer from bed to wc with mod A in 6 visits to improve functional indep.   Goal Outcome # 3 Goal not met   Supervising Physical Therapist (PTA Treatments Only)   Supervising Physical Therapist Clovis Grider

## 2024-11-29 NOTE — CARE PLAN
The patient is Watcher - Medium risk of patient condition declining or worsening    Shift Goals  Clinical Goals: pain control, skin integrity  Patient Goals: pain control/ rest  Family Goals: not present    Progress made toward(s) clinical / shift goals:  yes      Patient is not progressing towards the following goals:

## 2024-11-29 NOTE — CARE PLAN
The patient is Stable - Low risk of patient condition declining or worsening    Shift Goals  Clinical Goals: pain control, skin integrity  Patient Goals: pain control/ rest  Family Goals: not present    Progress made toward(s) clinical / shift goals:    Problem: Pain - Standard  Goal: Alleviation of pain or a reduction in pain to the patient’s comfort goal  Outcome: Progressing  Note: Assess and monitor for pain. Provide pharmacological and non-pharmacological interventions as appropriate. Re-evaluate and continue to monitor.       Problem: Skin Integrity  Goal: Skin integrity is maintained or improved  Outcome: Progressing  Note: Assess skin and monitor for skin breakdown. Alleviate pressure to bony prominences and provide assistance with turning, repositioning, ROM and mobility as appropriate. Use of mepilex, interdry and barrier cream as needed. Continue to monitor.         Patient is not progressing towards the following goals:

## 2024-11-30 ENCOUNTER — APPOINTMENT (OUTPATIENT)
Dept: RADIOLOGY | Facility: MEDICAL CENTER | Age: 74
DRG: 371 | End: 2024-11-30
Attending: HOSPITALIST
Payer: MEDICARE

## 2024-11-30 VITALS
SYSTOLIC BLOOD PRESSURE: 136 MMHG | WEIGHT: 291.01 LBS | HEIGHT: 60 IN | RESPIRATION RATE: 33 BRPM | OXYGEN SATURATION: 91 % | TEMPERATURE: 97.9 F | HEART RATE: 73 BPM | BODY MASS INDEX: 57.13 KG/M2 | DIASTOLIC BLOOD PRESSURE: 58 MMHG

## 2024-11-30 PROBLEM — K56.7 ILEUS (HCC): Status: ACTIVE | Noted: 2024-11-30

## 2024-11-30 LAB
ANION GAP SERPL CALC-SCNC: 10 MMOL/L (ref 7–16)
BUN SERPL-MCNC: 56 MG/DL (ref 8–22)
CALCIUM SERPL-MCNC: 7.7 MG/DL (ref 8.5–10.5)
CHLORIDE SERPL-SCNC: 92 MMOL/L (ref 96–112)
CO2 SERPL-SCNC: 23 MMOL/L (ref 20–33)
CREAT SERPL-MCNC: 1.24 MG/DL (ref 0.5–1.4)
ERYTHROCYTE [DISTWIDTH] IN BLOOD BY AUTOMATED COUNT: 51.5 FL (ref 35.9–50)
GFR SERPLBLD CREATININE-BSD FMLA CKD-EPI: 46 ML/MIN/1.73 M 2
GLUCOSE SERPL-MCNC: 131 MG/DL (ref 65–99)
HCT VFR BLD AUTO: 27.4 % (ref 37–47)
HGB BLD-MCNC: 9.2 G/DL (ref 12–16)
INR PPP: 1.18 (ref 0.87–1.13)
MCH RBC QN AUTO: 30.7 PG (ref 27–33)
MCHC RBC AUTO-ENTMCNC: 33.6 G/DL (ref 32.2–35.5)
MCV RBC AUTO: 91.3 FL (ref 81.4–97.8)
PLATELET # BLD AUTO: 199 K/UL (ref 164–446)
PMV BLD AUTO: 8.5 FL (ref 9–12.9)
POTASSIUM SERPL-SCNC: 4 MMOL/L (ref 3.6–5.5)
PROTHROMBIN TIME: 15 SEC (ref 12–14.6)
RBC # BLD AUTO: 3 M/UL (ref 4.2–5.4)
SODIUM SERPL-SCNC: 125 MMOL/L (ref 135–145)
SODIUM SERPL-SCNC: 126 MMOL/L (ref 135–145)
UFH PPP CHRO-ACNC: 0.53 IU/ML
UFH PPP CHRO-ACNC: 0.57 IU/ML
WBC # BLD AUTO: 12.4 K/UL (ref 4.8–10.8)

## 2024-11-30 PROCEDURE — 85027 COMPLETE CBC AUTOMATED: CPT

## 2024-11-30 PROCEDURE — 99233 SBSQ HOSP IP/OBS HIGH 50: CPT | Performed by: HOSPITALIST

## 2024-11-30 PROCEDURE — 700101 HCHG RX REV CODE 250: Performed by: INTERNAL MEDICINE

## 2024-11-30 PROCEDURE — 51798 US URINE CAPACITY MEASURE: CPT

## 2024-11-30 PROCEDURE — 36415 COLL VENOUS BLD VENIPUNCTURE: CPT

## 2024-11-30 PROCEDURE — 80048 BASIC METABOLIC PNL TOTAL CA: CPT

## 2024-11-30 PROCEDURE — 74018 RADEX ABDOMEN 1 VIEW: CPT

## 2024-11-30 PROCEDURE — 700111 HCHG RX REV CODE 636 W/ 250 OVERRIDE (IP): Mod: JZ | Performed by: HOSPITALIST

## 2024-11-30 PROCEDURE — 700102 HCHG RX REV CODE 250 W/ 637 OVERRIDE(OP): Performed by: HOSPITALIST

## 2024-11-30 PROCEDURE — 84295 ASSAY OF SERUM SODIUM: CPT

## 2024-11-30 PROCEDURE — 85520 HEPARIN ASSAY: CPT | Mod: 91

## 2024-11-30 PROCEDURE — 85610 PROTHROMBIN TIME: CPT

## 2024-11-30 PROCEDURE — A9270 NON-COVERED ITEM OR SERVICE: HCPCS | Performed by: HOSPITALIST

## 2024-11-30 PROCEDURE — A9270 NON-COVERED ITEM OR SERVICE: HCPCS | Performed by: INTERNAL MEDICINE

## 2024-11-30 PROCEDURE — 700102 HCHG RX REV CODE 250 W/ 637 OVERRIDE(OP): Performed by: INTERNAL MEDICINE

## 2024-11-30 PROCEDURE — 770020 HCHG ROOM/CARE - TELE (206)

## 2024-11-30 RX ORDER — WARFARIN SODIUM 6 MG/1
6 TABLET ORAL
Status: COMPLETED | OUTPATIENT
Start: 2024-11-30 | End: 2024-11-30

## 2024-11-30 RX ORDER — WARFARIN SODIUM 6 MG/1
6 TABLET ORAL
Status: DISCONTINUED | OUTPATIENT
Start: 2024-12-02 | End: 2024-12-01

## 2024-11-30 RX ORDER — BISACODYL 10 MG
10 SUPPOSITORY, RECTAL RECTAL DAILY
Status: DISCONTINUED | OUTPATIENT
Start: 2024-11-30 | End: 2024-12-10 | Stop reason: HOSPADM

## 2024-11-30 RX ORDER — WARFARIN SODIUM 4 MG/1
4 TABLET ORAL
Status: DISCONTINUED | OUTPATIENT
Start: 2024-12-01 | End: 2024-12-01

## 2024-11-30 RX ADMIN — OXYCODONE 5 MG: 5 TABLET ORAL at 23:35

## 2024-11-30 RX ADMIN — HEPARIN SODIUM 15 UNITS/KG/HR: 5000 INJECTION, SOLUTION INTRAVENOUS at 08:58

## 2024-11-30 RX ADMIN — TIMOLOL MALEATE 1 DROP: 5 SOLUTION OPHTHALMIC at 16:25

## 2024-11-30 RX ADMIN — BISACODYL 10 MG: 10 SUPPOSITORY RECTAL at 16:25

## 2024-11-30 RX ADMIN — WARFARIN SODIUM 6 MG: 6 TABLET ORAL at 16:24

## 2024-11-30 RX ADMIN — OXYCODONE 10 MG: 5 TABLET ORAL at 00:59

## 2024-11-30 RX ADMIN — POLYETHYLENE GLYCOL 3350 1 PACKET: 17 POWDER, FOR SOLUTION ORAL at 05:01

## 2024-11-30 RX ADMIN — LEVOTHYROXINE SODIUM 125 MCG: 0.12 TABLET ORAL at 05:01

## 2024-11-30 RX ADMIN — OXYCODONE 10 MG: 5 TABLET ORAL at 09:02

## 2024-11-30 RX ADMIN — FUROSEMIDE 20 MG: 10 INJECTION, SOLUTION INTRAVENOUS at 05:01

## 2024-11-30 RX ADMIN — EZETIMIBE 10 MG: 10 TABLET ORAL at 05:01

## 2024-11-30 RX ADMIN — ACETAMINOPHEN 650 MG: 325 TABLET ORAL at 20:02

## 2024-11-30 RX ADMIN — SENNOSIDES AND DOCUSATE SODIUM 2 TABLET: 50; 8.6 TABLET ORAL at 16:25

## 2024-11-30 RX ADMIN — TIMOLOL MALEATE 1 DROP: 5 SOLUTION OPHTHALMIC at 05:02

## 2024-11-30 RX ADMIN — LIDOCAINE 2 PATCH: 4 PATCH TOPICAL at 05:01

## 2024-11-30 ASSESSMENT — PAIN DESCRIPTION - PAIN TYPE
TYPE: ACUTE PAIN
TYPE: CHRONIC PAIN

## 2024-11-30 ASSESSMENT — ENCOUNTER SYMPTOMS
FEVER: 0
BLURRED VISION: 0
CLAUDICATION: 0
WHEEZING: 0
NAUSEA: 0
CHILLS: 0
BACK PAIN: 0
HEADACHES: 0
HEMOPTYSIS: 0
DEPRESSION: 0
HEARTBURN: 0
COUGH: 0
MYALGIAS: 0
DIZZINESS: 0
VOMITING: 0
DOUBLE VISION: 0
PND: 0
PALPITATIONS: 0
BRUISES/BLEEDS EASILY: 0

## 2024-11-30 ASSESSMENT — FIBROSIS 4 INDEX: FIB4 SCORE: 2.1

## 2024-11-30 NOTE — PROGRESS NOTES
Inpatient Anticoagulation Service Note for 2024    Reason for Anticoagulation: Aortic Mechanical Valve Replacement  , Atrial Fibrillation   [id number] VASc Score: 3  HAS-BLED Score: 2    Hemoglobin Value: (!) 9.2  Hematocrit Value: (!) 27.4  Lab Platelet Value: 199  Target INR: 2.5 to 3.5    INR from last 7 days        Date/Time INR Value    24 0303 1.18     24 1216 1.12     24 0200 1.11     24 0530 1.25     24 0555 1.16     24 0544 1.18                   Dose from last 7 days        Date/Time Dose (mg)    24 1413 6     24 1305 6                   Average Dose (mg):    (Home dosinmg on Mon/Wed/Friday and 4mg all other days)    Significant Interactions:   Not Applicable    Bridge Therapy:   Yes (Parenteral bridge with heparin drip)    Date of Last VTE Event:    (N/A)    Bridge Therapy Start Date:   24    Days of Overlap Therapy:   1     Reversal Agent Administered:   PCC (Prothrombin Complex Concentrate) (S/p Kcentra on )      Comments:   Patient on warfarin for afib & history of mechanical aortic valve. Patient follows with [facility] outpatient. INR goal is between 2.5 - 3.5 (confirmed via anti-coagulation clinic notes). Home warfarin dosing is 6mg on Mon/Wed/ and 4mg daily all other days. Patient has a Renal diet ordered. No major drug-drug interactions noted. Hgb/hct/PLT low but stable.     Prior home dosing with stable INR trends as evidenced by therapeutic time in range of 69.9% (over a 7.7 year time frame).      Plan:    Warfarin restarted yesterday after retroperitoneal bleed s/p Kcentra on  and 6u PRBC ( - ).      INR today is 1.18 (baseline INR ay prior was 1.12). INR is sub-therapeutic as expected given warfarin was restarted yesterday.     Will BOLUS warfarin with 6mg tonight to expedite time to INR goal. Effects of warfarin are not anticipated to seen for at least 3-5 days after start.       Concomitant parenteral bridging with heparin gtt in addition, as patient is in a hyper-coagulable state given warfarin re-start. Heparin specifically chosen due to recent history of bleed.  Recommend continuing parenteral bridge for at least 5 days AND at least 2 therapeutic INR values.      Education Material Provided?:   No (Established warfarin patient)     Pharmacist suggested discharge dosing:   In the event patient were to discharge, anticipate patient can be discharged on warfarin 6mg Mon/Wed/Friday and 4mg daily all other days. Despite bleed, hesitant to change home dosing given historically stable therapeutic time in range.      Recommend INR 72 hours within discharge with close follow-up with anticoagulation clinic.     Eileen Cartagena, PharmD

## 2024-11-30 NOTE — CARE PLAN
The patient is Stable - Low risk of patient condition declining or worsening    Shift Goals: void post bryant, pain control  Clinical Goals: Diuresis, pain control  Patient Goals: pain control/ rest  Family Goals: not present    Progress made toward(s) clinical / shift goals:    Problem: Pain - Standard  Goal: Alleviation of pain or a reduction in pain to the patient’s comfort goal  Outcome: Progressing  Note: Assess and monitor for pain. Provide pharmacological and non-pharmacological interventions as appropriate. Re-evaluate and continue to monitor.       Problem: Skin Integrity  Goal: Skin integrity is maintained or improved  Outcome: Progressing  Note: Assess skin and monitor for skin breakdown. Alleviate pressure to bony prominences and provide assistance with turning, repositioning, ROM and mobility as appropriate. Use of mepitel and barrier cream as needed. Continue to monitor.         Patient is not progressing towards the following goals:

## 2024-11-30 NOTE — PROGRESS NOTES
Bedside report received from off going RN/tech: Maria Ines, assumed care of patient.     Fall Risk Score: MODERATE RISK  Fall risk interventions in place: Provide patient/family education based on risk assessment, Educate patient/family to call staff for assistance when getting out of bed, Place fall precaution signage outside patient door, Utilize bed/chair fall alarm, and Bed alarm connected correctly  Bed type: Low air loss (Kamar Score less than 17 interventions in place)  Patient on cardiac monitor: Yes  IVF/IV medications: Not Applicable   Oxygen: Room Air  Bedside sitter: Not Applicable   Isolation: Not applicable

## 2024-11-30 NOTE — PROGRESS NOTES
Monitor Summary  Rhythm: SR w 1st degree  Rate: 61-68  Ectopy: Occasional PVC  .24 / .12 / .46    Physical copy to be uploaded into chart.

## 2024-11-30 NOTE — PROGRESS NOTES
Hospital Medicine Daily Progress Note    Date of Service  11/30/2024    Chief Complaint  Yenifer Beasley is a 74 y.o. female admitted 11/23/2024 with retroperitoneal hematoma    Hospital Course  74 y.o. female who presented 11/23/2024 with shortness of breath.  Ms. Beasley has a past medical history of atrial fibrillation, mechanical aortic valve replacement on chronic coumadin therapy that was admitted to this hospital from 11/7 through 11/15 with ground level fall and right femur fracture. She underwent open treatment with internal fixation of right lateral   condyle distal femur fracture by Dr. Camargo on 11/8. She was given one unit RBCs and was discharged to rehab. On 11/23 she was brought to the ER with hypotension (62/46) and in the ER her Hb was 6.3 and chronic kidney disease Cr 1.78. A CT revealed a 17 cm x 10.7 cm x 9.5 cm retroperitoneal hematoma. Her INR was 2.69. She was given reversal with K centra, transfused 2 units RBCs and admitted to the ICU.     Interval Problem Update  11/27 patient is resting in bed, she is alert and oriented, she complain of generalized weakness, hemoglobin appears to be stable, blood pressure is stable, will discuss with cardiology regarding when to restart oral if okay to continue holding Coumadin, patient will likely require placement when medically stable, discussed with bedside nurse charge nurse  pharmacist, cannot have an answer.  11/28 patient is in bed, feels well, hb appears to be stable, discussed with cardio, if hb remains stable will restart warfarin in am, started on low dose lasix due to lower ext edema, Na 122 in am. Continue close monitoring. Discussed with , pharmacist,nurse staff.  11/29 patient is resting in bed, she is alert oriented follows commands she denies any new complaints, hemoglobin is 9.4 today it was 9.4 yesterday 9.3 the day before, will start anticoagulation today discussed with pharmacist since patient received  Kcentra and will have to start Coumadin bridging with heparin with close monitoring since Coumadin might be procoagulant at the beginning of the treatment, continue monitoring hemoglobin continue monitoring vital signs, discussed with bedside nurse charge nurse  pharmacist all question answered.  11/30 patient is resting in bed, hemoglobin 9.2, tolerating heparin drip, patient having abdominal distention still, patient said that abdominal distention is improved comparing from yesterday, she is passing flatus and she has been having very small bowel movements, patient denies any nausea or vomiting, KUB showed probably ileus, continue supportive treatment, patient needs to mobilize as much as possible, will try with suppository today, discussed with bedside nurse charge nurse  pharmacist.    I have discussed this patient's plan of care and discharge plan at IDT rounds today with Case Management, Nursing, Nursing leadership, and other members of the IDT team.    Consultants/Specialty  Cardiology  Critical care    Code Status  DNAR/DNI    Disposition  The patient is not medically cleared for discharge to home or a post-acute facility.      I have placed the appropriate orders for post-discharge needs.    Review of Systems  Review of Systems   Constitutional:  Positive for malaise/fatigue. Negative for chills and fever.   Eyes:  Negative for blurred vision and double vision.   Respiratory:  Negative for cough, hemoptysis and wheezing.    Cardiovascular:  Negative for chest pain, palpitations, claudication, leg swelling and PND.   Gastrointestinal:  Negative for heartburn, nausea and vomiting.        Abdominal distention although better.  Patient   Genitourinary:  Negative for hematuria and urgency.   Musculoskeletal:  Negative for back pain and myalgias.   Skin:  Negative for rash.   Neurological:  Negative for dizziness and headaches.   Endo/Heme/Allergies:  Does not bruise/bleed easily.    Psychiatric/Behavioral:  Negative for depression.         Physical Exam  Temp:  [36.4 °C (97.5 °F)-37 °C (98.6 °F)] 36.4 °C (97.5 °F)  Pulse:  [62-71] 62  Resp:  [16-18] 18  BP: (132-157)/(52-67) 157/60  SpO2:  [89 %-93 %] 92 %    Physical Exam  Vitals and nursing note reviewed.   Constitutional:       Appearance: She is obese. She is ill-appearing.   Eyes:      General:         Right eye: No discharge.         Left eye: No discharge.      Conjunctiva/sclera: Conjunctivae normal.   Cardiovascular:      Rate and Rhythm: Normal rate and regular rhythm.      Pulses: Normal pulses.      Heart sounds: Normal heart sounds.   Pulmonary:      Effort: Pulmonary effort is normal. No respiratory distress.      Breath sounds: Normal breath sounds.   Abdominal:      General: Bowel sounds are normal. There is distension.      Tenderness: There is no abdominal tenderness.   Musculoskeletal:         General: Normal range of motion.      Cervical back: Normal range of motion and neck supple.      Right lower leg: Edema present.      Left lower leg: Edema present.   Skin:     General: Skin is warm.      Capillary Refill: Capillary refill takes less than 2 seconds.      Coloration: Skin is not jaundiced.   Neurological:      General: No focal deficit present.      Mental Status: She is alert and oriented to person, place, and time.      Cranial Nerves: No cranial nerve deficit.   Psychiatric:         Mood and Affect: Mood normal.         Behavior: Behavior normal.         Fluids    Intake/Output Summary (Last 24 hours) at 11/30/2024 1339  Last data filed at 11/30/2024 1107  Gross per 24 hour   Intake 1140 ml   Output 1100 ml   Net 40 ml        Laboratory  Recent Labs     11/28/24  0112 11/29/24  0046 11/30/24  0303   WBC 14.1* 12.4* 12.4*   RBC 2.94* 2.98* 3.00*   HEMOGLOBIN 9.4* 9.4* 9.2*   HEMATOCRIT 26.8* 27.5* 27.4*   MCV 91.2 92.3 91.3   MCH 32.0 31.5 30.7   MCHC 35.1 34.2 33.6   RDW 47.9 49.8 51.5*   PLATELETCT 212 199 199    MPV 8.9* 8.9* 8.5*     Recent Labs     11/28/24  0112 11/28/24  0909 11/29/24  0046 11/29/24  0904 11/29/24  1721 11/30/24  0303 11/30/24  0955   SODIUM 122*   < > 124*  125*   < > 125* 125* 126*   POTASSIUM 3.8  --  4.2  --   --  4.0  --    CHLORIDE 90*  --  92*  --   --  92*  --    CO2 22  --  21  --   --  23  --    GLUCOSE 105*  --  119*  --   --  131*  --    BUN 63*  --  60*  --   --  56*  --    CREATININE 1.75*  --  1.51*  --   --  1.24  --    CALCIUM 7.6*  --  7.8*  --   --  7.7*  --     < > = values in this interval not displayed.     Recent Labs     11/29/24  1216 11/30/24  0303   APTT 25.5  --    INR 1.12 1.18*               Imaging  [id number] VIEW   Final Result         Diffuse gaseous distention of the small bowel and colon, especially the cecum, could relate to ileus.      DX-CHEST-LIMITED (1 VIEW)   Final Result      1.  Dense opacification of the left lower lung. This may represent a combination of consolidation and pleural effusion.   2.  Right base atelectasis.   3.  Support apparatus as above. No pneumothorax seen.      US-EXTREMITY VENOUS LOWER BILAT   Final Result      EC-ECHOCARDIOGRAM COMPLETE W/ CONT   Final Result      CT-RENAL COLIC EVALUATION(A/P W/O)   Final Result   Impression:      1. Large 17 x 10.7 x 9.5 cm hematoma type mixed density fluid collection in the left retroperitoneum and flank abdominal wall. This displaces the left kidney anteriorly. No significant hydronephrosis.      2. Moderate colonic distention similar to 2007 suggests ileus. No obstruction suggestion. Prior gastric surgery.      3. Heavy vascular calcification. No aneurysm. Sternal wires. Aortic valve prosthesis.      4. No kidney stone or hydronephrosis. Bull catheter in empty bladder.      5. Prior cholecystectomy.                        DX-CHEST-PORTABLE (1 VIEW)   Final Result   Impression:      1. Shallow. Mild left base atelectasis.      2. Cardiomegaly. Sternal wires. Cardiac valve prosthesis.                           Assessment/Plan  * Retroperitoneal hemorrhage- (present on admission)  Assessment & Plan  A CT revealed a 17 cm x 10.7 cm x 9.5 cm retroperitoneal hematoma  Spontaneous hemorrhage in the setting of coumadin use  INR 2.69 on admission was reversed and red blood cells were transfused  Hold coumadin  Check Hb and INR in the morning  Continue monitoring hemoglobin level    INR 1.18      Ileus ([facility])  Assessment & Plan  Probably due to lack of mobility  Patient needs to mobilize as much as possible  Patient without nausea or vomiting and she is passing small amount of stool  Will try suppository today, patient needs to mobilize as much as possible again    Hyperkalemia- (present on admission)  Assessment & Plan  resolved    Hemorrhagic shock (HCC)- (present on admission)  Assessment & Plan  Due to retroperitoneal bleed  Requiring emergent blood transfusions  Hemoglobin 9.4  Due to history of metallic aortic valve replacement will help to start Coumadin since patient received Kcentra will help to bridge patient with IV heparin since patient might be pro-coagulative at the beginning of Coumadin treatment.  Continue close monitoring for any signs of bleeding.  Hemoglobin 9.2    ABLA (acute blood loss anemia)- (present on admission)  Assessment & Plan  Secondary to retroperitoneal hemorrhage requiring blood transfusions.  Continue monitoring  Hemoglobin appears to be stable  Probably will restart Coumadin in a day or 2  Continue monitor hemoglobin  Coumadin day 2 out of 5    Nondisplaced unspecified condyle fracture of lower end of right femur, initial encounter for closed fracture ([facility])- (present on admission)  Assessment & Plan  S/p surgery by Dr. Camargo on 11/8  PT/OT  She likely will require post-acute rehab.    Class 3 severe obesity due to excess calories with serious comorbidity and body mass index (BMI) of 40.0 to 44.9 in adult ([facility])- (present on admission)  Assessment & Plan  BMI 46    Chronic kidney  disease (CKD) stage G3b/A1, moderately decreased glomerular filtration rate (GFR) between 30-44 mL/min/1.73 square meter and albuminuria creatinine ratio less than 30 mg/g- (present on admission)  Assessment & Plan  Last admission her Cr was high 1 to low 2 range  Cr 1.7 on admission and is 2.13  Follow urine output  BMP ordered for the morning.  Creatinine 1.24    Paroxysmal atrial fibrillation (HCC)- (present on admission)  Assessment & Plan  Hx of  Holding Coumadin    Hx of mechanical aortic valve replacement [V43.3]- (present on admission)  Assessment & Plan  2007 by Dr. Awan  Hold coumadin for now given her active bleed  Will discuss with cardiology to see when Coumadin can be restarted or if we can keep holding it for a little longer  Heart failure restart Coumadin in 1 to 2 days  Start heparin Coumadin today day 2/5    Hyponatremia- (present on admission)  Assessment & Plan  This persists  Na low at 125  Check BMP in the morning  Sodium 122  Free water restriction  Continue monitoring sodium levels    Likely due to fluid overload will start IV Lasix with close monitoring  Sodium 124 today continue monitoring continue free water restriction  Sodium 126    Essential hypertension- (present on admission)  Assessment & Plan  Monitoring    HILDA (obstructive sleep apnea)- (present on admission)  Assessment & Plan  Continue nocturna CPAP    CAD (coronary artery disease)- (present on admission)  Assessment & Plan  No chest pain, continue close monitoring    Hypothyroidism- (present on admission)  Assessment & Plan  Continue supplement         VTE prophylaxis: SCDs    I have performed a physical exam and reviewed and updated ROS and Plan today (11/30/2024). In review of yesterday's note (11/29/2024), there are no changes except as documented above.    Total time of 54 minutes spent prepping to see patient (e.g. reviewing  tests/imaging results, notes from consultants, bedside nurse, night shift ) obtaining and/or  reviewing separately obtained history. Performing a medically appropriate examination and evaluation.  Counseling and educating the patient.  Ordering medications, tests, or procedures.  Referring and communicating with other health care professionals.  Documenting clinical information in EPIC.  Independently interpreting results and communicating results to patient.  Care coordination.      Patient is has a high medical complexity, complex decision making and is at high risk for complication, morbidity, and mortality.

## 2024-11-30 NOTE — ASSESSMENT & PLAN NOTE
Probably due to lack of mobility  Patient needs to mobilize as much as possible  Patient without nausea or vomiting and she is passing small amount of stool  Will try suppository today, patient needs to mobilize as much as possible again

## 2024-12-01 LAB
ALBUMIN SERPL BCP-MCNC: 2.6 G/DL (ref 3.2–4.9)
ANION GAP SERPL CALC-SCNC: 10 MMOL/L (ref 7–16)
BUN SERPL-MCNC: 52 MG/DL (ref 8–22)
CALCIUM SERPL-MCNC: 7.9 MG/DL (ref 8.5–10.5)
CHLORIDE SERPL-SCNC: 94 MMOL/L (ref 96–112)
CO2 SERPL-SCNC: 22 MMOL/L (ref 20–33)
CREAT SERPL-MCNC: 1.14 MG/DL (ref 0.5–1.4)
ERYTHROCYTE [DISTWIDTH] IN BLOOD BY AUTOMATED COUNT: 51.2 FL (ref 35.9–50)
GFR SERPLBLD CREATININE-BSD FMLA CKD-EPI: 51 ML/MIN/1.73 M 2
GLUCOSE SERPL-MCNC: 149 MG/DL (ref 65–99)
HCT VFR BLD AUTO: 28.5 % (ref 37–47)
HGB BLD-MCNC: 9.6 G/DL (ref 12–16)
INR PPP: 1.26 (ref 0.87–1.13)
MAGNESIUM SERPL-MCNC: 2.1 MG/DL (ref 1.5–2.5)
MCH RBC QN AUTO: 30.9 PG (ref 27–33)
MCHC RBC AUTO-ENTMCNC: 33.7 G/DL (ref 32.2–35.5)
MCV RBC AUTO: 91.6 FL (ref 81.4–97.8)
PHOSPHATE SERPL-MCNC: 2.7 MG/DL (ref 2.5–4.5)
PLATELET # BLD AUTO: 193 K/UL (ref 164–446)
PMV BLD AUTO: 8.5 FL (ref 9–12.9)
POTASSIUM SERPL-SCNC: 4.1 MMOL/L (ref 3.6–5.5)
PROTHROMBIN TIME: 15.9 SEC (ref 12–14.6)
RBC # BLD AUTO: 3.11 M/UL (ref 4.2–5.4)
SODIUM SERPL-SCNC: 126 MMOL/L (ref 135–145)
UFH PPP CHRO-ACNC: 0.47 IU/ML
WBC # BLD AUTO: 11.7 K/UL (ref 4.8–10.8)

## 2024-12-01 PROCEDURE — 80048 BASIC METABOLIC PNL TOTAL CA: CPT

## 2024-12-01 PROCEDURE — 99233 SBSQ HOSP IP/OBS HIGH 50: CPT | Performed by: HOSPITALIST

## 2024-12-01 PROCEDURE — A9270 NON-COVERED ITEM OR SERVICE: HCPCS | Performed by: HOSPITALIST

## 2024-12-01 PROCEDURE — 85027 COMPLETE CBC AUTOMATED: CPT

## 2024-12-01 PROCEDURE — 36415 COLL VENOUS BLD VENIPUNCTURE: CPT

## 2024-12-01 PROCEDURE — 84100 ASSAY OF PHOSPHORUS: CPT

## 2024-12-01 PROCEDURE — 700101 HCHG RX REV CODE 250: Performed by: INTERNAL MEDICINE

## 2024-12-01 PROCEDURE — 83735 ASSAY OF MAGNESIUM: CPT

## 2024-12-01 PROCEDURE — 700111 HCHG RX REV CODE 636 W/ 250 OVERRIDE (IP): Performed by: HOSPITALIST

## 2024-12-01 PROCEDURE — 85520 HEPARIN ASSAY: CPT

## 2024-12-01 PROCEDURE — 85610 PROTHROMBIN TIME: CPT

## 2024-12-01 PROCEDURE — 770020 HCHG ROOM/CARE - TELE (206)

## 2024-12-01 PROCEDURE — 82040 ASSAY OF SERUM ALBUMIN: CPT

## 2024-12-01 PROCEDURE — 700102 HCHG RX REV CODE 250 W/ 637 OVERRIDE(OP): Performed by: HOSPITALIST

## 2024-12-01 PROCEDURE — A9270 NON-COVERED ITEM OR SERVICE: HCPCS | Performed by: INTERNAL MEDICINE

## 2024-12-01 PROCEDURE — 700102 HCHG RX REV CODE 250 W/ 637 OVERRIDE(OP): Performed by: INTERNAL MEDICINE

## 2024-12-01 RX ORDER — WARFARIN SODIUM 4 MG/1
4 TABLET ORAL
Status: DISCONTINUED | OUTPATIENT
Start: 2024-12-03 | End: 2024-12-05

## 2024-12-01 RX ORDER — WARFARIN SODIUM 6 MG/1
6 TABLET ORAL
Status: COMPLETED | OUTPATIENT
Start: 2024-12-01 | End: 2024-12-01

## 2024-12-01 RX ORDER — WARFARIN SODIUM 6 MG/1
6 TABLET ORAL
Status: DISCONTINUED | OUTPATIENT
Start: 2024-12-02 | End: 2024-12-05

## 2024-12-01 RX ORDER — FUROSEMIDE 10 MG/ML
20 INJECTION INTRAMUSCULAR; INTRAVENOUS
Status: DISCONTINUED | OUTPATIENT
Start: 2024-12-01 | End: 2024-12-02

## 2024-12-01 RX ADMIN — LIDOCAINE 2 PATCH: 4 PATCH TOPICAL at 04:51

## 2024-12-01 RX ADMIN — SENNOSIDES AND DOCUSATE SODIUM 2 TABLET: 50; 8.6 TABLET ORAL at 17:53

## 2024-12-01 RX ADMIN — EZETIMIBE 10 MG: 10 TABLET ORAL at 04:50

## 2024-12-01 RX ADMIN — OXYCODONE 5 MG: 5 TABLET ORAL at 16:41

## 2024-12-01 RX ADMIN — LEVOTHYROXINE SODIUM 125 MCG: 0.12 TABLET ORAL at 04:50

## 2024-12-01 RX ADMIN — FUROSEMIDE 20 MG: 10 INJECTION, SOLUTION INTRAVENOUS at 16:35

## 2024-12-01 RX ADMIN — TIMOLOL MALEATE 1 DROP: 5 SOLUTION OPHTHALMIC at 16:36

## 2024-12-01 RX ADMIN — TIMOLOL MALEATE 1 DROP: 5 SOLUTION OPHTHALMIC at 04:51

## 2024-12-01 RX ADMIN — OXYCODONE 10 MG: 5 TABLET ORAL at 08:49

## 2024-12-01 RX ADMIN — HEPARIN SODIUM 15 UNITS/KG/HR: 5000 INJECTION, SOLUTION INTRAVENOUS at 04:56

## 2024-12-01 RX ADMIN — WARFARIN SODIUM 6 MG: 6 TABLET ORAL at 17:53

## 2024-12-01 RX ADMIN — FUROSEMIDE 20 MG: 10 INJECTION, SOLUTION INTRAVENOUS at 04:50

## 2024-12-01 ASSESSMENT — ENCOUNTER SYMPTOMS
PALPITATIONS: 0
HEARTBURN: 0
DEPRESSION: 0
WHEEZING: 0
BLURRED VISION: 0
BACK PAIN: 0
COUGH: 0
PND: 0
CLAUDICATION: 0
VOMITING: 0
DIZZINESS: 0
DOUBLE VISION: 0
HEMOPTYSIS: 0
CHILLS: 0
HEADACHES: 0
NAUSEA: 0
FEVER: 0
MYALGIAS: 0
BRUISES/BLEEDS EASILY: 0

## 2024-12-01 ASSESSMENT — PAIN DESCRIPTION - PAIN TYPE
TYPE: ACUTE PAIN
TYPE: ACUTE PAIN;SURGICAL PAIN;CHRONIC PAIN
TYPE: ACUTE PAIN

## 2024-12-01 ASSESSMENT — FIBROSIS 4 INDEX: FIB4 SCORE: 2.17

## 2024-12-01 NOTE — PROGRESS NOTES
Hospital Medicine Daily Progress Note    Date of Service  12/1/2024    Chief Complaint  Yenifer Beasley is a 74 y.o. female admitted 11/23/2024 with retroperitoneal hematoma    Hospital Course  74 y.o. female who presented 11/23/2024 with shortness of breath.  Ms. Beasley has a past medical history of atrial fibrillation, mechanical aortic valve replacement on chronic coumadin therapy that was admitted to this hospital from 11/7 through 11/15 with ground level fall and right femur fracture. She underwent open treatment with internal fixation of right lateral   condyle distal femur fracture by Dr. Camargo on 11/8. She was given one unit RBCs and was discharged to rehab. On 11/23 she was brought to the ER with hypotension (62/46) and in the ER her Hb was 6.3 and chronic kidney disease Cr 1.78. A CT revealed a 17 cm x 10.7 cm x 9.5 cm retroperitoneal hematoma. Her INR was 2.69. She was given reversal with K centra, transfused 2 units RBCs and admitted to the ICU.     Interval Problem Update  11/27 patient is resting in bed, she is alert and oriented, she complain of generalized weakness, hemoglobin appears to be stable, blood pressure is stable, will discuss with cardiology regarding when to restart oral if okay to continue holding Coumadin, patient will likely require placement when medically stable, discussed with bedside nurse charge nurse  pharmacist, cannot have an answer.  11/28 patient is in bed, feels well, hb appears to be stable, discussed with cardio, if hb remains stable will restart warfarin in am, started on low dose lasix due to lower ext edema, Na 122 in am. Continue close monitoring. Discussed with , pharmacist,nurse staff.  11/29 patient is resting in bed, she is alert oriented follows commands she denies any new complaints, hemoglobin is 9.4 today it was 9.4 yesterday 9.3 the day before, will start anticoagulation today discussed with pharmacist since patient received  Kcentra and will have to start Coumadin bridging with heparin with close monitoring since Coumadin might be procoagulant at the beginning of the treatment, continue monitoring hemoglobin continue monitoring vital signs, discussed with bedside nurse charge nurse  pharmacist all question answered.  11/30 patient is resting in bed, hemoglobin 9.2, tolerating heparin drip, patient having abdominal distention still, patient said that abdominal distention is improved comparing from yesterday, she is passing flatus and she has been having very small bowel movements, patient denies any nausea or vomiting, KUB showed probably ileus, continue supportive treatment, patient needs to mobilize as much as possible, will try with suppository today, discussed with bedside nurse charge nurse  pharmacist.  12/1 patient is in bed, no fever or chills, not in distress, patient had a bm today, abdomen feels better, no nausea or vomiting, needs to continue protein supplement, increased lasix to 20 mg iv bid, continue monitoring for signs of bleeding. Discussed with bedside nurse, charge nurse, .     I have discussed this patient's plan of care and discharge plan at IDT rounds today with Case Management, Nursing, Nursing leadership, and other members of the IDT team.    Consultants/Specialty  Cardiology  Critical care    Code Status  DNAR/DNI    Disposition  The patient is not medically cleared for discharge to home or a post-acute facility.      I have placed the appropriate orders for post-discharge needs.    Review of Systems  Review of Systems   Constitutional:  Positive for malaise/fatigue. Negative for chills and fever.   Eyes:  Negative for blurred vision and double vision.   Respiratory:  Negative for cough, hemoptysis and wheezing.    Cardiovascular:  Negative for chest pain, palpitations, claudication, leg swelling and PND.   Gastrointestinal:  Negative for heartburn, nausea and vomiting.         Abdominal distention although better.  Patient   Genitourinary:  Negative for hematuria and urgency.   Musculoskeletal:  Negative for back pain and myalgias.   Skin:  Negative for rash.   Neurological:  Negative for dizziness and headaches.   Endo/Heme/Allergies:  Does not bruise/bleed easily.   Psychiatric/Behavioral:  Negative for depression.         Physical Exam  Temp:  [36.5 °C (97.7 °F)-36.9 °C (98.4 °F)] 36.7 °C (98.1 °F)  Pulse:  [55-73] 55  Resp:  [18-33] 18  BP: (129-160)/(51-67) 146/51  SpO2:  [90 %-92 %] 92 %    Physical Exam  Vitals and nursing note reviewed.   Constitutional:       Appearance: She is obese. She is ill-appearing.   Eyes:      General: No scleral icterus.     Conjunctiva/sclera: Conjunctivae normal.   Cardiovascular:      Rate and Rhythm: Normal rate and regular rhythm.      Pulses: Normal pulses.      Heart sounds: Normal heart sounds.   Pulmonary:      Effort: Pulmonary effort is normal. No respiratory distress.      Breath sounds: Normal breath sounds.   Abdominal:      General: Bowel sounds are normal. There is distension.      Tenderness: There is no abdominal tenderness.   Musculoskeletal:         General: Normal range of motion.      Cervical back: Normal range of motion and neck supple.      Right lower leg: Edema present.      Left lower leg: Edema present.   Skin:     General: Skin is warm.      Capillary Refill: Capillary refill takes less than 2 seconds.      Coloration: Skin is not jaundiced.   Neurological:      General: No focal deficit present.      Mental Status: She is alert and oriented to person, place, and time.      Cranial Nerves: No cranial nerve deficit.   Psychiatric:         Mood and Affect: Mood normal.         Behavior: Behavior normal.         Fluids    Intake/Output Summary (Last 24 hours) at 12/1/2024 1206  Last data filed at 12/1/2024 0451  Gross per 24 hour   Intake 480 ml   Output 1125 ml   Net -645 ml        Laboratory  Recent Labs     11/29/24  0046  11/30/24  0303 12/01/24  0204   WBC 12.4* 12.4* 11.7*   RBC 2.98* 3.00* 3.11*   HEMOGLOBIN 9.4* 9.2* 9.6*   HEMATOCRIT 27.5* 27.4* 28.5*   MCV 92.3 91.3 91.6   MCH 31.5 30.7 30.9   MCHC 34.2 33.6 33.7   RDW 49.8 51.5* 51.2*   PLATELETCT 199 199 193   MPV 8.9* 8.5* 8.5*     Recent Labs     11/29/24  0046 11/29/24  0904 11/30/24  0303 11/30/24  0955 12/01/24  0204   SODIUM 124*  125*   < > 125* 126* 126*   POTASSIUM 4.2  --  4.0  --  4.1   CHLORIDE 92*  --  92*  --  94*   CO2 21  --  23  --  22   GLUCOSE 119*  --  131*  --  149*   BUN 60*  --  56*  --  52*   CREATININE 1.51*  --  1.24  --  1.14   CALCIUM 7.8*  --  7.7*  --  7.9*    < > = values in this interval not displayed.     Recent Labs     11/29/24  1216 11/30/24  0303 12/01/24  0101   APTT 25.5  --   --    INR 1.12 1.18* 1.26*               Imaging  [id number] VIEW   Final Result         Diffuse gaseous distention of the small bowel and colon, especially the cecum, could relate to ileus.      DX-CHEST-LIMITED (1 VIEW)   Final Result      1.  Dense opacification of the left lower lung. This may represent a combination of consolidation and pleural effusion.   2.  Right base atelectasis.   3.  Support apparatus as above. No pneumothorax seen.      US-EXTREMITY VENOUS LOWER BILAT   Final Result      EC-ECHOCARDIOGRAM COMPLETE W/ CONT   Final Result      CT-RENAL COLIC EVALUATION(A/P W/O)   Final Result   Impression:      1. Large 17 x 10.7 x 9.5 cm hematoma type mixed density fluid collection in the left retroperitoneum and flank abdominal wall. This displaces the left kidney anteriorly. No significant hydronephrosis.      2. Moderate colonic distention similar to 2007 suggests ileus. No obstruction suggestion. Prior gastric surgery.      3. Heavy vascular calcification. No aneurysm. Sternal wires. Aortic valve prosthesis.      4. No kidney stone or hydronephrosis. Bull catheter in empty bladder.      5. Prior cholecystectomy.                         DX-CHEST-PORTABLE (1 VIEW)   Final Result   Impression:      1. Shallow. Mild left base atelectasis.      2. Cardiomegaly. Sternal wires. Cardiac valve prosthesis.                          Assessment/Plan  * Retroperitoneal hemorrhage- (present on admission)  Assessment & Plan  A CT revealed a 17 cm x 10.7 cm x 9.5 cm retroperitoneal hematoma  Spontaneous hemorrhage in the setting of coumadin use  INR 2.69 on admission was reversed and red blood cells were transfused  Hold coumadin  Check Hb and INR in the morning  Continue monitoring hemoglobin level    INR 1.26      Ileus (HCC)  Assessment & Plan  Probably due to lack of mobility  Patient needs to mobilize as much as possible  Patient without nausea or vomiting and she is passing small amount of stool  Will try suppository today, patient needs to mobilize as much as possible again    Hyperkalemia- (present on admission)  Assessment & Plan  resolved    Hemorrhagic shock (HCC)- (present on admission)  Assessment & Plan  Due to retroperitoneal bleed  Requiring emergent blood transfusions  Hemoglobin 9.4  Due to history of metallic aortic valve replacement will help to start Coumadin since patient received Kcentra will help to bridge patient with IV heparin since patient might be pro-coagulative at the beginning of Coumadin treatment.  Continue close monitoring for any signs of bleeding.  Hemoglobin 9.6    ABLA (acute blood loss anemia)- (present on admission)  Assessment & Plan  Secondary to retroperitoneal hemorrhage requiring blood transfusions.  Continue monitoring  Hemoglobin appears to be stable  Probably will restart Coumadin in a day or 2  Continue monitor hemoglobin  Coumadin day 3 out of 5    Nondisplaced unspecified condyle fracture of lower end of right femur, initial encounter for closed fracture (HCC)- (present on admission)  Assessment & Plan  S/p surgery by Dr. Camargo on 11/8  PT/OT  She likely will require post-acute rehab.    Class 3 severe  obesity due to excess calories with serious comorbidity and body mass index (BMI) of 40.0 to 44.9 in adult (HCC)- (present on admission)  Assessment & Plan  BMI 46    Chronic kidney disease (CKD) stage G3b/A1, moderately decreased glomerular filtration rate (GFR) between 30-44 mL/min/1.73 square meter and albuminuria creatinine ratio less than 30 mg/g- (present on admission)  Assessment & Plan  Last admission her Cr was high 1 to low 2 range  Cr 1.7 on admission and is 2.13  Follow urine output  BMP ordered for the morning.  Creatinine 1.14    Paroxysmal atrial fibrillation (HCC)- (present on admission)  Assessment & Plan  Hx of  Holding Coumadin    Hx of mechanical aortic valve replacement [V43.3]- (present on admission)  Assessment & Plan  2007 by Dr. Awan  Hold coumadin for now given her active bleed  Will discuss with cardiology to see when Coumadin can be restarted or if we can keep holding it for a little longer  Heart failure restart Coumadin in 1 to 2 days  Start heparin Coumadin today day 2/5    Hyponatremia- (present on admission)  Assessment & Plan  This persists  Na low at 125  Check BMP in the morning  Sodium 122  Free water restriction  Continue monitoring sodium levels    Likely due to fluid overload will start IV Lasix with close monitoring  Sodium 124 today continue monitoring continue free water restriction  Sodium 126    Essential hypertension- (present on admission)  Assessment & Plan  Monitoring    HILDA (obstructive sleep apnea)- (present on admission)  Assessment & Plan  Continue nocturna CPAP    CAD (coronary artery disease)- (present on admission)  Assessment & Plan  No chest pain, continue close monitoring    Hypothyroidism- (present on admission)  Assessment & Plan  Continue supplement         VTE prophylaxis: SCDs    I have performed a physical exam and reviewed and updated ROS and Plan today (12/1/2024). In review of yesterday's note (11/30/2024), there are no changes except as  documented above.    Total time of 51 minutes spent prepping to see patient (e.g. reviewing  tests/imaging results, notes from consultants, bedside nurse, night shift ) obtaining and/or reviewing separately obtained history. Performing a medically appropriate examination and evaluation.  Counseling and educating the patient.  Ordering medications, tests, or procedures.  Referring and communicating with other health care professionals.  Documenting clinical information in EPIC.  Independently interpreting results and communicating results to patient.  Care coordination.        Patient is has a high medical complexity, complex decision making and is at high risk for complication, morbidity, and mortality.

## 2024-12-01 NOTE — CARE PLAN
The patient is Watcher - Medium risk of patient condition declining or worsening    Shift Goals  Clinical Goals: Hep gtt, pain control, skin integrity  Patient Goals: Pain control  Family Goals: NA    Progress made toward(s) clinical / shift goals:    Problem: Knowledge Deficit - Standard  Goal: Patient and family/care givers will demonstrate understanding of plan of care, disease process/condition, diagnostic tests and medications  Outcome: Progressing     Problem: Pain - Standard  Goal: Alleviation of pain or a reduction in pain to the patient’s comfort goal  Outcome: Progressing     Problem: Fall Risk  Goal: Patient will remain free from falls  Outcome: Progressing     Problem: Bowel Elimination  Goal: Establish and maintain regular bowel function  Outcome: Progressing  Note: Patient had three BM tody     Problem: Skin Integrity  Goal: Skin integrity is maintained or improved  Outcome: Progressing       Patient is not progressing towards the following goals:

## 2024-12-01 NOTE — PROGRESS NOTES
Monitor Summary  Rhythm: Normal Sinus Rhythm and Sinus Bradycardia  Rate: 52-66  Ectopy: PVCs  .29 / .08 / .45    12 hr Chart check.

## 2024-12-01 NOTE — PROGRESS NOTES
Bedside report received from off going RN/tech: Marty ORO, assumed care of patient.     Fall Risk Score: HIGH RISK  Fall risk interventions in place: Place yellow fall risk ID band on patient, Provide patient/family education based on risk assessment, Educate patient/family to call staff for assistance when getting out of bed, Place fall precaution signage outside patient door, Place patient in room close to nursing station, Utilize bed/chair fall alarm, Notify charge of high risk for huddle, and Bed alarm connected correctly  Bed type: Low air loss (Kamar Score less than 17 interventions in place)  Patient on cardiac monitor: Yes  IVF/IV medications: Infusion per MAR (List Med(s)) Hep gtt 15 units/kg/hr  Oxygen: Room Air  Bedside sitter: Not Applicable   Isolation: Not applicable

## 2024-12-01 NOTE — PROGRESS NOTES
Monitor Summary:   Rhythm: SR  Rate: 65-73  Measurement: .34/.12/.51  Ectopy: 1st degree block    Strip data uploaded separately

## 2024-12-01 NOTE — CARE PLAN
Problem: Pain - Standard  Goal: Alleviation of pain or a reduction in pain to the patient’s comfort goal  Outcome: Progressing     Problem: Fall Risk  Goal: Patient will remain free from falls  Outcome: Progressing     Problem: Hemodynamics  Goal: Patient's hemodynamics, fluid balance and neurologic status will be stable or improve  Outcome: Progressing     Problem: Bowel Elimination  Goal: Establish and maintain regular bowel function  Outcome: Progressing     Problem: Skin Integrity  Goal: Skin integrity is maintained or improved  Outcome: Progressing   The patient is Watcher - Medium risk of patient condition declining or worsening    Shift Goals  Clinical Goals: diuresis  Patient Goals: pain control/ rest  Family Goals: not present    Progress made toward(s) clinical / shift goals:  progressing    Patient is not progressing towards the following goals:

## 2024-12-01 NOTE — PROGRESS NOTES
Inpatient Anticoagulation Service Note for 2024    Reason for Anticoagulation: Aortic Mechanical Valve Replacement  , Atrial Fibrillation     [id number] VASc Score: 3  HAS-BLED Score: 2    Hemoglobin Value: (!) 9.6  Hematocrit Value: (!) 28.5  Lab Platelet Value: 193  Target INR: 2.5 to 3.5    INR from last 7 days        Date/Time INR Value    24 0101 1.26     24 0303 1.18     24 1216 1.12     24 0200 1.11     24 0530 1.25     24 0555 1.16                   Dose from last 7 days        Date/Time Dose (mg)    24 1232 6     24 1413 6     24 1305 6                   Average Dose (mg):    (Home dosinmg on Mon/Wed/Friday and 4mg all other days)    Significant Interactions:   Not Applicable    Bridge Therapy:   Yes (Parenteral bridge with heparin drip)    Date of Last VTE Event:    (N/A)    Bridge Therapy Start Date:   24    Days of Overlap Therapy:   2     INR Value Greater than 2 Prior to Discontinuation of Parenteral Anticoagulation:   Not Applicable       Reversal Agent Administered:   PCC (Prothrombin Complex Concentrate) (S/p Kcentra on )    Comments:   Patient on warfarin for afib & history of mechanical aortic valve. Patient follows with [facility] outpatient. INR goal is between 2.5 - 3.5 (confirmed via anti-coagulation clinic notes). Home warfarin dosing is 6mg on Mon/Wed/ and 4mg daily all other days. Patient has a Renal diet ordered. No major drug-drug interactions noted. Hgb/hct/PLT low but stable.      Prior home dosing with stable INR trends as evidenced by therapeutic time in range of 69.9% (over a 7.7 year time frame).      Plan:    Warfarin restarted  after retroperitoneal bleed s/p Kcentra on  and 6u PRBC ( - ).      INR today is 1.26 (INR day prior was 1.18). INR is sub-therapeutic as expected given warfarin was restarted on , however trending up appropriately.     Will BOLUS warfarin  with 6mg again tonight to expedite time to INR goal. Effects of warfarin are not anticipated to seen for at least 3-5 days after start.      Concomitant parenteral bridging with heparin gtt in addition, as patient is in a hyper-coagulable state given warfarin re-start. Heparin specifically chosen due to recent history of bleed.  Recommend continuing parenteral bridge for at least 5 days AND at least 2 therapeutic INR values.      Education Material Provided?:   No (Established warfarin patient)     Pharmacist suggested discharge dosing:   In the event patient were to discharge, anticipate patient can be discharged on warfarin 6mg Mon/Wed/Friday and 4mg daily all other days. Despite bleed, hesitant to change home dosing given historically stable therapeutic time in range.      Recommend INR 72 hours within discharge with close follow-up with anticoagulation clinic.     Eileen Cartagena, EmilyD

## 2024-12-02 ENCOUNTER — APPOINTMENT (OUTPATIENT)
Dept: RADIOLOGY | Facility: MEDICAL CENTER | Age: 74
DRG: 371 | End: 2024-12-02
Attending: HOSPITALIST
Payer: MEDICARE

## 2024-12-02 LAB
ANION GAP SERPL CALC-SCNC: 9 MMOL/L (ref 7–16)
BUN SERPL-MCNC: 48 MG/DL (ref 8–22)
CALCIUM SERPL-MCNC: 8.3 MG/DL (ref 8.5–10.5)
CHLORIDE SERPL-SCNC: 97 MMOL/L (ref 96–112)
CO2 SERPL-SCNC: 23 MMOL/L (ref 20–33)
CREAT SERPL-MCNC: 1.19 MG/DL (ref 0.5–1.4)
ERYTHROCYTE [DISTWIDTH] IN BLOOD BY AUTOMATED COUNT: 55.9 FL (ref 35.9–50)
GFR SERPLBLD CREATININE-BSD FMLA CKD-EPI: 48 ML/MIN/1.73 M 2
GLUCOSE SERPL-MCNC: 110 MG/DL (ref 65–99)
HCT VFR BLD AUTO: 27.1 % (ref 37–47)
HCT VFR BLD AUTO: 27.5 % (ref 37–47)
HCT VFR BLD AUTO: 27.5 % (ref 37–47)
HGB BLD-MCNC: 8.8 G/DL (ref 12–16)
HGB BLD-MCNC: 8.9 G/DL (ref 12–16)
HGB BLD-MCNC: 9.2 G/DL (ref 12–16)
INR PPP: 1.29 (ref 0.87–1.13)
MCH RBC QN AUTO: 30.8 PG (ref 27–33)
MCHC RBC AUTO-ENTMCNC: 32 G/DL (ref 32.2–35.5)
MCV RBC AUTO: 96.2 FL (ref 81.4–97.8)
PLATELET # BLD AUTO: 195 K/UL (ref 164–446)
PMV BLD AUTO: 8.5 FL (ref 9–12.9)
POTASSIUM SERPL-SCNC: 4 MMOL/L (ref 3.6–5.5)
PROTHROMBIN TIME: 16.1 SEC (ref 12–14.6)
RBC # BLD AUTO: 2.86 M/UL (ref 4.2–5.4)
SODIUM SERPL-SCNC: 129 MMOL/L (ref 135–145)
UFH PPP CHRO-ACNC: 0.32 IU/ML
WBC # BLD AUTO: 12.3 K/UL (ref 4.8–10.8)

## 2024-12-02 PROCEDURE — 97535 SELF CARE MNGMENT TRAINING: CPT

## 2024-12-02 PROCEDURE — 99233 SBSQ HOSP IP/OBS HIGH 50: CPT | Performed by: HOSPITALIST

## 2024-12-02 PROCEDURE — 700111 HCHG RX REV CODE 636 W/ 250 OVERRIDE (IP): Performed by: HOSPITALIST

## 2024-12-02 PROCEDURE — A9270 NON-COVERED ITEM OR SERVICE: HCPCS | Performed by: INTERNAL MEDICINE

## 2024-12-02 PROCEDURE — 700101 HCHG RX REV CODE 250: Performed by: INTERNAL MEDICINE

## 2024-12-02 PROCEDURE — 700102 HCHG RX REV CODE 250 W/ 637 OVERRIDE(OP): Performed by: HOSPITALIST

## 2024-12-02 PROCEDURE — 85018 HEMOGLOBIN: CPT

## 2024-12-02 PROCEDURE — 85027 COMPLETE CBC AUTOMATED: CPT

## 2024-12-02 PROCEDURE — 85014 HEMATOCRIT: CPT

## 2024-12-02 PROCEDURE — 770020 HCHG ROOM/CARE - TELE (206)

## 2024-12-02 PROCEDURE — 85610 PROTHROMBIN TIME: CPT

## 2024-12-02 PROCEDURE — A9270 NON-COVERED ITEM OR SERVICE: HCPCS | Performed by: HOSPITALIST

## 2024-12-02 PROCEDURE — 36415 COLL VENOUS BLD VENIPUNCTURE: CPT

## 2024-12-02 PROCEDURE — 700102 HCHG RX REV CODE 250 W/ 637 OVERRIDE(OP): Performed by: INTERNAL MEDICINE

## 2024-12-02 PROCEDURE — 74018 RADEX ABDOMEN 1 VIEW: CPT

## 2024-12-02 PROCEDURE — 85520 HEPARIN ASSAY: CPT

## 2024-12-02 PROCEDURE — 80048 BASIC METABOLIC PNL TOTAL CA: CPT

## 2024-12-02 RX ORDER — FUROSEMIDE 10 MG/ML
20 INJECTION INTRAMUSCULAR; INTRAVENOUS EVERY 8 HOURS
Status: DISCONTINUED | OUTPATIENT
Start: 2024-12-02 | End: 2024-12-06

## 2024-12-02 RX ORDER — OXYCODONE HYDROCHLORIDE 5 MG/1
5 TABLET ORAL EVERY 4 HOURS PRN
Status: DISCONTINUED | OUTPATIENT
Start: 2024-12-02 | End: 2024-12-10 | Stop reason: HOSPADM

## 2024-12-02 RX ORDER — OXYCODONE HYDROCHLORIDE 10 MG/1
10 TABLET ORAL EVERY 4 HOURS PRN
Status: DISCONTINUED | OUTPATIENT
Start: 2024-12-02 | End: 2024-12-10 | Stop reason: HOSPADM

## 2024-12-02 RX ADMIN — OXYCODONE HYDROCHLORIDE 10 MG: 10 TABLET ORAL at 15:10

## 2024-12-02 RX ADMIN — TIMOLOL MALEATE 1 DROP: 5 SOLUTION OPHTHALMIC at 19:02

## 2024-12-02 RX ADMIN — OXYCODONE HYDROCHLORIDE 10 MG: 10 TABLET ORAL at 09:20

## 2024-12-02 RX ADMIN — LEVOTHYROXINE SODIUM 125 MCG: 0.12 TABLET ORAL at 04:46

## 2024-12-02 RX ADMIN — WARFARIN SODIUM 6 MG: 6 TABLET ORAL at 19:03

## 2024-12-02 RX ADMIN — FUROSEMIDE 20 MG: 10 INJECTION, SOLUTION INTRAVENOUS at 04:46

## 2024-12-02 RX ADMIN — FUROSEMIDE 20 MG: 10 INJECTION, SOLUTION INTRAVENOUS at 22:34

## 2024-12-02 RX ADMIN — HYDROMORPHONE HYDROCHLORIDE 0.5 MG: 1 INJECTION, SOLUTION INTRAMUSCULAR; INTRAVENOUS; SUBCUTANEOUS at 08:25

## 2024-12-02 RX ADMIN — SENNOSIDES AND DOCUSATE SODIUM 2 TABLET: 50; 8.6 TABLET ORAL at 19:02

## 2024-12-02 RX ADMIN — METHOCARBAMOL TABLETS 750 MG: 500 TABLET, COATED ORAL at 08:22

## 2024-12-02 RX ADMIN — HEPARIN SODIUM 15 UNITS/KG/HR: 5000 INJECTION, SOLUTION INTRAVENOUS at 01:51

## 2024-12-02 RX ADMIN — TIMOLOL MALEATE 1 DROP: 5 SOLUTION OPHTHALMIC at 04:46

## 2024-12-02 RX ADMIN — LIDOCAINE 2 PATCH: 4 PATCH TOPICAL at 04:46

## 2024-12-02 RX ADMIN — OXYCODONE 5 MG: 5 TABLET ORAL at 03:26

## 2024-12-02 RX ADMIN — OXYCODONE HYDROCHLORIDE 10 MG: 10 TABLET ORAL at 20:41

## 2024-12-02 RX ADMIN — METHOCARBAMOL TABLETS 750 MG: 500 TABLET, COATED ORAL at 16:05

## 2024-12-02 RX ADMIN — EZETIMIBE 10 MG: 10 TABLET ORAL at 04:45

## 2024-12-02 ASSESSMENT — ENCOUNTER SYMPTOMS
VOMITING: 0
CHILLS: 0
DOUBLE VISION: 0
FEVER: 0
BACK PAIN: 0
MYALGIAS: 0
BRUISES/BLEEDS EASILY: 0
CLAUDICATION: 0
PND: 0
PALPITATIONS: 0
WHEEZING: 0
COUGH: 0
HEMOPTYSIS: 0
BLURRED VISION: 0
DEPRESSION: 0
NAUSEA: 0
HEADACHES: 0
HEARTBURN: 0
DIZZINESS: 0

## 2024-12-02 ASSESSMENT — PAIN DESCRIPTION - PAIN TYPE
TYPE: ACUTE PAIN

## 2024-12-02 ASSESSMENT — COGNITIVE AND FUNCTIONAL STATUS - GENERAL
TOILETING: A LOT
EATING MEALS: A LITTLE
DRESSING REGULAR UPPER BODY CLOTHING: A LITTLE
SUGGESTED CMS G CODE MODIFIER DAILY ACTIVITY: CK
HELP NEEDED FOR BATHING: A LOT
DAILY ACTIVITIY SCORE: 15
DRESSING REGULAR LOWER BODY CLOTHING: A LOT
PERSONAL GROOMING: A LITTLE

## 2024-12-02 ASSESSMENT — FIBROSIS 4 INDEX: FIB4 SCORE: 2.15

## 2024-12-02 NOTE — PROGRESS NOTES
Assumed care of patient; received bedside report from Harleen ORO. No needs voiced by patient at this time.

## 2024-12-02 NOTE — PROGRESS NOTES
Hospital Medicine Daily Progress Note    Date of Service  12/2/2024    Chief Complaint  Yenifer Beasley is a 74 y.o. female admitted 11/23/2024 with retroperitoneal hematoma    Hospital Course  74 y.o. female who presented 11/23/2024 with shortness of breath.  Ms. Beasley has a past medical history of atrial fibrillation, mechanical aortic valve replacement on chronic coumadin therapy that was admitted to this hospital from 11/7 through 11/15 with ground level fall and right femur fracture. She underwent open treatment with internal fixation of right lateral   condyle distal femur fracture by Dr. Camargo on 11/8. She was given one unit RBCs and was discharged to rehab. On 11/23 she was brought to the ER with hypotension (62/46) and in the ER her Hb was 6.3 and chronic kidney disease Cr 1.78. A CT revealed a 17 cm x 10.7 cm x 9.5 cm retroperitoneal hematoma. Her INR was 2.69. She was given reversal with K centra, transfused 2 units RBCs and admitted to the ICU.     Interval Problem Update  11/27 patient is resting in bed, she is alert and oriented, she complain of generalized weakness, hemoglobin appears to be stable, blood pressure is stable, will discuss with cardiology regarding when to restart oral if okay to continue holding Coumadin, patient will likely require placement when medically stable, discussed with bedside nurse charge nurse  pharmacist, cannot have an answer.  11/28 patient is in bed, feels well, hb appears to be stable, discussed with cardio, if hb remains stable will restart warfarin in am, started on low dose lasix due to lower ext edema, Na 122 in am. Continue close monitoring. Discussed with , pharmacist,nurse staff.  11/29 patient is resting in bed, she is alert oriented follows commands she denies any new complaints, hemoglobin is 9.4 today it was 9.4 yesterday 9.3 the day before, will start anticoagulation today discussed with pharmacist since patient received  Kcentra and will have to start Coumadin bridging with heparin with close monitoring since Coumadin might be procoagulant at the beginning of the treatment, continue monitoring hemoglobin continue monitoring vital signs, discussed with bedside nurse charge nurse  pharmacist all question answered.  11/30 patient is resting in bed, hemoglobin 9.2, tolerating heparin drip, patient having abdominal distention still, patient said that abdominal distention is improved comparing from yesterday, she is passing flatus and she has been having very small bowel movements, patient denies any nausea or vomiting, KUB showed probably ileus, continue supportive treatment, patient needs to mobilize as much as possible, will try with suppository today, discussed with bedside nurse charge nurse  pharmacist.  12/1 patient is in bed, no fever or chills, not in distress, patient had a bm today, abdomen feels better, no nausea or vomiting, needs to continue protein supplement, increased lasix to 20 mg iv bid, continue monitoring for signs of bleeding. Discussed with bedside nurse, charge nurse, .   12/2 patient in bed, no fever or chills, no chest pain, c/o right knee pain, pain medications adjusted, increased lasix today to tid, repeat hb stable, continue monitoring for any bleeding while on heparin drip. Day 4/5 heparin/warfarion.     I have discussed this patient's plan of care and discharge plan at IDT rounds today with Case Management, Nursing, Nursing leadership, and other members of the IDT team.    Consultants/Specialty  Cardiology  Critical care    Code Status  DNAR/DNI    Disposition  Medically Cleared  I have placed the appropriate orders for post-discharge needs.    Review of Systems  Review of Systems   Constitutional:  Positive for malaise/fatigue. Negative for chills and fever.   Eyes:  Negative for blurred vision and double vision.   Respiratory:  Negative for cough, hemoptysis and  wheezing.    Cardiovascular:  Negative for chest pain, palpitations, claudication, leg swelling and PND.   Gastrointestinal:  Negative for heartburn, nausea and vomiting.        Abdominal distention although better.  Patient   Genitourinary:  Negative for hematuria and urgency.   Musculoskeletal:  Negative for back pain and myalgias.   Skin:  Negative for rash.   Neurological:  Negative for dizziness and headaches.   Endo/Heme/Allergies:  Does not bruise/bleed easily.   Psychiatric/Behavioral:  Negative for depression.         Physical Exam  Temp:  [36.6 °C (97.9 °F)-36.9 °C (98.4 °F)] 36.6 °C (97.9 °F)  Pulse:  [60-69] 69  Resp:  [18-24] 19  BP: (137-151)/(49-62) 151/58  SpO2:  [92 %-98 %] 98 %    Physical Exam  Vitals and nursing note reviewed.   Constitutional:       Appearance: She is obese. She is ill-appearing.   Eyes:      General: No scleral icterus.     Conjunctiva/sclera: Conjunctivae normal.   Cardiovascular:      Rate and Rhythm: Normal rate and regular rhythm.      Pulses: Normal pulses.      Heart sounds: Normal heart sounds.   Pulmonary:      Effort: Pulmonary effort is normal. No respiratory distress.      Breath sounds: Normal breath sounds.   Abdominal:      General: Bowel sounds are normal. There is distension.      Tenderness: There is no abdominal tenderness.   Musculoskeletal:         General: Normal range of motion.      Cervical back: Normal range of motion and neck supple.      Right lower leg: Edema present.      Left lower leg: Edema present.   Skin:     General: Skin is warm.      Capillary Refill: Capillary refill takes less than 2 seconds.      Coloration: Skin is not jaundiced.   Neurological:      General: No focal deficit present.      Mental Status: She is alert and oriented to person, place, and time.      Cranial Nerves: No cranial nerve deficit.   Psychiatric:         Mood and Affect: Mood normal.         Behavior: Behavior normal.         Fluids    Intake/Output Summary (Last 24  hours) at 12/2/2024 1537  Last data filed at 12/2/2024 1505  Gross per 24 hour   Intake 435 ml   Output 2500 ml   Net -2065 ml        Laboratory  Recent Labs     11/30/24  0303 12/01/24  0204 12/02/24  0208 12/02/24  0855   WBC 12.4* 11.7* 12.3*  --    RBC 3.00* 3.11* 2.86*  --    HEMOGLOBIN 9.2* 9.6* 8.8* 9.2*   HEMATOCRIT 27.4* 28.5* 27.5* 27.1*   MCV 91.3 91.6 96.2  --    MCH 30.7 30.9 30.8  --    MCHC 33.6 33.7 32.0*  --    RDW 51.5* 51.2* 55.9*  --    PLATELETCT 199 193 195  --    MPV 8.5* 8.5* 8.5*  --      Recent Labs     11/30/24  0303 11/30/24  0955 12/01/24  0204 12/02/24  0208   SODIUM 125* 126* 126* 129*   POTASSIUM 4.0  --  4.1 4.0   CHLORIDE 92*  --  94* 97   CO2 23  --  22 23   GLUCOSE 131*  --  149* 110*   BUN 56*  --  52* 48*   CREATININE 1.24  --  1.14 1.19   CALCIUM 7.7*  --  7.9* 8.3*     Recent Labs     11/30/24  0303 12/01/24  0101 12/02/24  0208   INR 1.18* 1.26* 1.29*               Imaging  [id number] VIEW   Final Result      Slight interval decrease in marked colonic gaseous distention with the cecal diameter measuring 14.2 cm, previously 14.6 cm. Findings could be related to ileus or distal colonic obstruction.      [id number] VIEW   Final Result         Diffuse gaseous distention of the small bowel and colon, especially the cecum, could relate to ileus.      DX-CHEST-LIMITED (1 VIEW)   Final Result      1.  Dense opacification of the left lower lung. This may represent a combination of consolidation and pleural effusion.   2.  Right base atelectasis.   3.  Support apparatus as above. No pneumothorax seen.      US-EXTREMITY VENOUS LOWER BILAT   Final Result      EC-ECHOCARDIOGRAM COMPLETE W/ CONT   Final Result      CT-RENAL COLIC EVALUATION(A/P W/O)   Final Result   Impression:      1. Large 17 x 10.7 x 9.5 cm hematoma type mixed density fluid collection in the left retroperitoneum and flank abdominal wall. This displaces the left kidney anteriorly. No significant hydronephrosis.       2. Moderate colonic distention similar to 2007 suggests ileus. No obstruction suggestion. Prior gastric surgery.      3. Heavy vascular calcification. No aneurysm. Sternal wires. Aortic valve prosthesis.      4. No kidney stone or hydronephrosis. Bull catheter in empty bladder.      5. Prior cholecystectomy.                        DX-CHEST-PORTABLE (1 VIEW)   Final Result   Impression:      1. Shallow. Mild left base atelectasis.      2. Cardiomegaly. Sternal wires. Cardiac valve prosthesis.                          Assessment/Plan  * Retroperitoneal hemorrhage- (present on admission)  Assessment & Plan  A CT revealed a 17 cm x 10.7 cm x 9.5 cm retroperitoneal hematoma  Spontaneous hemorrhage in the setting of coumadin use  INR 2.69 on admission was reversed and red blood cells were transfused  Hold coumadin  Check Hb and INR in the morning  Continue monitoring hemoglobin level    INR 1.29      Ileus (HCC)  Assessment & Plan  Probably due to lack of mobility  Patient needs to mobilize as much as possible  Patient without nausea or vomiting and she is passing small amount of stool  Will try suppository today, patient needs to mobilize as much as possible again    Hyperkalemia- (present on admission)  Assessment & Plan  resolved    Hemorrhagic shock (HCC)- (present on admission)  Assessment & Plan  Due to retroperitoneal bleed  Requiring emergent blood transfusions  Hemoglobin 9.4  Due to history of metallic aortic valve replacement will help to start Coumadin since patient received Kcentra will help to bridge patient with IV heparin since patient might be pro-coagulative at the beginning of Coumadin treatment.  Continue close monitoring for any signs of bleeding.  Hemoglobin 9.2    ABLA (acute blood loss anemia)- (present on admission)  Assessment & Plan  Secondary to retroperitoneal hemorrhage requiring blood transfusions.  Continue monitoring  Hemoglobin appears to be stable  Probably will restart Coumadin in a  day or 2  Continue monitor hemoglobin  Coumadin day 4 out of 5    Nondisplaced unspecified condyle fracture of lower end of right femur, initial encounter for closed fracture (HCC)- (present on admission)  Assessment & Plan  S/p surgery by Dr. Camargo on 11/8  PT/OT  She likely will require post-acute rehab.    Class 3 severe obesity due to excess calories with serious comorbidity and body mass index (BMI) of 40.0 to 44.9 in adult (HCC)- (present on admission)  Assessment & Plan  BMI 46    Chronic kidney disease (CKD) stage G3b/A1, moderately decreased glomerular filtration rate (GFR) between 30-44 mL/min/1.73 square meter and albuminuria creatinine ratio less than 30 mg/g- (present on admission)  Assessment & Plan  Last admission her Cr was high 1 to low 2 range  Cr 1.7 on admission and is 2.13  Follow urine output  BMP ordered for the morning.  Creatinine 1.19    Paroxysmal atrial fibrillation (HCC)- (present on admission)  Assessment & Plan  Hx of  Holding Coumadin    Hx of mechanical aortic valve replacement [V43.3]- (present on admission)  Assessment & Plan  2007 by Dr. Awan  Hold coumadin for now given her active bleed  Will discuss with cardiology to see when Coumadin can be restarted or if we can keep holding it for a little longer  Heart failure restart Coumadin in 1 to 2 days  Start heparin Coumadin today day 4/5    Hyponatremia- (present on admission)  Assessment & Plan  This persists  Na low at 125  Check BMP in the morning  Sodium 122  Free water restriction  Continue monitoring sodium levels    Likely due to fluid overload will start IV Lasix with close monitoring  Sodium 124 today continue monitoring continue free water restriction  Sodium 129    Essential hypertension- (present on admission)  Assessment & Plan  Monitoring    HILDA (obstructive sleep apnea)- (present on admission)  Assessment & Plan  Continue nocturna CPAP    CAD (coronary artery disease)- (present on admission)  Assessment &  Plan  No chest pain, continue close monitoring    Hypothyroidism- (present on admission)  Assessment & Plan  Continue supplement         VTE prophylaxis: SCDs    I have performed a physical exam and reviewed and updated ROS and Plan today (12/2/2024). In review of yesterday's note (12/1/2024), there are no changes except as documented above.      Continue monitoring while on heparin for any signs of bleeding.   Monitoring inr daily  Monitoring cbc daily.     Total time of 52 minutes spent prepping to see patient (e.g. reviewing  tests/imaging results, notes from consultants, bedside nurse, night shift ) obtaining and/or reviewing separately obtained history. Performing a medically appropriate examination and evaluation.  Counseling and educating the patient.  Ordering medications, tests, or procedures.  Referring and communicating with other health care professionals.  Documenting clinical information in EPIC.  Independently interpreting results and communicating results to patient.  Care coordination.    Patient is has a high medical complexity, complex decision making and is at high risk for complication, morbidity, and mortality.

## 2024-12-02 NOTE — PROGRESS NOTES
Monitor Summary  Rhythm: Normal Sinus Rhythm and Sinus Bradycardia with a first degree  Rate: 57-64  Ectopy: PVCs  .26 / .06 / .42          12 hr Chart check.

## 2024-12-02 NOTE — THERAPY
"Occupational Therapy   Daily Treatment     Patient Name: Yenifer Beasley  Age:  74 y.o., Sex:  female  Medical Record #: [id number]  Today's Date: 12/2/2024     Precautions  Precautions: Fall Risk, Toe Touch Weight Bearing Right Lower Extremity  Comments: R knee ROMAT    Assessment    Pt seen for OT treatment. Pt required max A for bed mobility, max A to don socks, min A for lateral scoots EOB, and min A for seated hair brushing/combing. Discussed use of slide board in future sessions to work on ADL transfers such as to BSC transfer. Pt was very motivated to work with therapy this date. Pt current functional performance limited by impaired activity tolerance, impaired balance, and generalized weakness. Pt will continue to benefit from skilled OT while admitted to acute care.     Plan    Occupational Therapy Initial Treatment Plan   Treatment Interventions: Self Care / Activities of Daily Living, Adaptive Equipment, Neuro Re-Education / Balance, Therapeutic Exercises, Therapeutic Activity  Treatment Frequency: 3 Times per Week  Duration: Until Therapy Goals Met    DC Equipment Recommendations: Unable to determine at this time  Discharge Recommendations: Recommend post-acute placement for additional occupational therapy services prior to discharge home     Subjective    \"You just made my day.\"     Objective     12/02/24 1353   Vitals   Vitals Comments noted increased work of breathing, O2/HR remained stable   Pain   Pain Scales 0 to 10 Scale    Pain 0 - 10 Group   Therapist Pain Assessment During Activity;Nurse Notified  (Not rated, agreeable to session)   Non Verbal Descriptors   Non Verbal Scale  Calm   Cognition    Cognition / Consciousness WDL   Level of Consciousness Alert   Comments Pleasant, cooperative, motivated to work with therapy   Other Treatments   Other Treatments Provided Continued education regarding the role OT and the pathology of bedrest. Discussed use of slide board in future sessions to start " working towards ADL transfers such as getting to a BSC. Pt agreeable.   Balance   Sitting Balance (Static) Fair -   Sitting Balance (Dynamic) Fair -   Weight Shift Sitting Fair   Skilled Intervention Verbal Cuing;Tactile Cuing;Sequencing;Facilitation   Comments x2 assist for pt safety   Bed Mobility    Supine to Sit Maximal Assist   Sit to Supine Maximal Assist   Scooting Maximal Assist   Rolling Maximal Assist to Rt.;Maximum Assist to Lt.   Skilled Intervention Verbal Cuing;Tactile Cuing;Sequencing;Postural Facilitation;Facilitation   Comments x2 assist for pt safety   Activities of Daily Living   Grooming Minimal Assist;Seated  (combed/brushed hair, min A due to fatigue and knots in hair)   Lower Body Dressing Maximal Assist  (don/doff socks)   Functional Mobility   Mobility EOB for ADLs and lateral scooting only   Skilled Intervention Verbal Cuing;Tactile Cuing;Sequencing;Postural Facilitation;Facilitation   Comments x2 assist for pt safety   Activity Tolerance   Sitting in Chair NT   Sitting Edge of Bed >5 min   Standing NT   Comments limited by weakness, fatigue   Patient / Family Goals   Patient / Family Goal #1 to go home   Goal #1 Outcome Progressing as expected   Short Term Goals   Short Term Goal # 1 Pt will perform ADL transfer w/ min A   Goal Outcome # 1 Progressing as expected   Short Term Goal # 2 Pt will perform LB dressing w/ AE PRN and min A   Goal Outcome # 2 Goal not met   Short Term Goal # 3 Pt will tolerate sitting EOB for >10 min in prep for ADL tasks   Goal Outcome # 3 Progressing as expected   Short Term Goal # 4 Pt will perform seated g/h w/ supv   Goal Outcome # 4 Progressing as expected   Education Group   Education Provided Role of Occupational Therapist;Activities of Daily Living;Transfers;Pathology of bedrest   Role of Occupational Therapist Patient Response Patient;Acceptance;Explanation;Verbal Demonstration   Transfers Patient Response Patient;Acceptance;Explanation;Verbal  Demonstration  (Discussed slide board for future sessions)   ADL Patient Response Patient;Acceptance;Explanation;Demonstration;Verbal Demonstration;Action Demonstration   Pathology of Bedrest Patient Response Patient;Acceptance;Explanation;Verbal Demonstration

## 2024-12-02 NOTE — CARE PLAN
The patient is Watcher - Medium risk of patient condition declining or worsening    Shift Goals  Clinical Goals: diuresis, pain control  Patient Goals: pain control  Family Goals: man    Progress made toward(s) clinical / shift goals:  yes      Patient is not progressing towards the following goals:

## 2024-12-02 NOTE — PROGRESS NOTES
Inpatient Anticoagulation Service Note for 2024    Reason for Anticoagulation: Aortic Mechanical Valve Replacement, Atrial Fibrillation     [id number] VASc Score: 3  HAS-BLED Score: 2    Hemoglobin Value: (!) 8.8  Hematocrit Value: (!) 27.5  Lab Platelet Value: 195  Target INR: 2.5 to 3.5    INR from last 7 days        Date/Time INR Value    24 0208 1.29     24 0101 1.26     24 0303 1.18     24 1216 1.12     24 0200 1.11     24 0530 1.25                   Dose from last 7 days        Date/Time Dose (mg)    24 0905 6     24 1232 6     24 1413 6     24 1305 6                   Average Dose (mg):    (Home dosinmg on Mon/Wed/Friday and 4mg all other days)    Significant Interactions:   Not Applicable    Bridge Therapy:   Yes (Parenteral bridge with heparin drip)    Date of Last VTE Event:    (N/A)    Bridge Therapy Start Date:   24    Days of Overlap Therapy:   3     INR Value Greater than 2 Prior to Discontinuation of Parenteral Anticoagulation:   Not Applicable     Reversal Agent Administered:   PCC (Prothrombin Complex Concentrate) (S/p Kcentra on )    Comments:   Patient on warfarin for afib & history of mechanical aortic valve. Patient follows with [facility] outpatient. INR goal is between 2.5 - 3.5 (confirmed via anti-coagulation clinic notes). Home warfarin dosing is 6mg on Mon/Wed/ and 4mg daily all other days. Patient has a Renal diet ordered. No major drug-drug interactions noted. Hgb/hct w/ slight decrease today, PLT stable. No charted bleed events overnight.    Prior home dosing with stable INR trends as evidenced by therapeutic time in range of 69.9% (over a 7.7 year time frame).      Plan:    Warfarin restarted  after retroperitoneal bleed s/p Kcentra on  and 6u PRBC ( - ).      INR today is sub-therapeutic today at 1.29 (INR day prior was 1.26).     Will BOLUS warfarin with 6mg again  tonight to expedite time to INR goal. Anticipate will see effects of INR in the next 1-2 days.     Concomitant parenteral bridging with heparin gtt in addition, as patient is in a hyper-coagulable state given warfarin re-start. Heparin specifically chosen due to recent history of bleed.  Recommend continuing parenteral bridge for at least 5 days AND at least 2 therapeutic INR values. Heparin gtt has been therapeutic for the last 3 days.     Education Material Provided?:   No (Established warfarin patient)     Pharmacist suggested discharge dosing:   In the event patient were to discharge, anticipate patient can be discharged on warfarin 6mg Mon/Wed/Friday and 4mg daily all other days. Despite bleed, hesitant to change home dosing given historically stable therapeutic time in range.      Recommend INR 72 hours within discharge with close follow-up with anticoagulation clinic.

## 2024-12-02 NOTE — CARE PLAN
The patient is Watcher - Medium risk of patient condition declining or worsening    Shift Goals  Clinical Goals: hep gtt, VSS  Patient Goals: pain control  Family Goals: man    Progress made toward(s) clinical / shift goals:    Problem: Knowledge Deficit - Standard  Goal: Patient and family/care givers will demonstrate understanding of plan of care, disease process/condition, diagnostic tests and medications  Outcome: Progressing  Note: Pt updated on plan of care, all questions answered at this time.      Problem: Skin Integrity  Goal: Skin integrity is maintained or improved  Outcome: Progressing  Note: DAVID, TAPs, q2 turns, sacral mepilex, heel float boots.

## 2024-12-03 LAB
ANION GAP SERPL CALC-SCNC: 8 MMOL/L (ref 7–16)
BUN SERPL-MCNC: 48 MG/DL (ref 8–22)
CALCIUM SERPL-MCNC: 8.1 MG/DL (ref 8.5–10.5)
CHLORIDE SERPL-SCNC: 98 MMOL/L (ref 96–112)
CO2 SERPL-SCNC: 24 MMOL/L (ref 20–33)
CREAT SERPL-MCNC: 1.21 MG/DL (ref 0.5–1.4)
ERYTHROCYTE [DISTWIDTH] IN BLOOD BY AUTOMATED COUNT: 55.4 FL (ref 35.9–50)
GFR SERPLBLD CREATININE-BSD FMLA CKD-EPI: 47 ML/MIN/1.73 M 2
GLUCOSE SERPL-MCNC: 131 MG/DL (ref 65–99)
HCT VFR BLD AUTO: 23.1 % (ref 37–47)
HCT VFR BLD AUTO: 23.5 % (ref 37–47)
HCT VFR BLD AUTO: 26.1 % (ref 37–47)
HGB BLD-MCNC: 7.6 G/DL (ref 12–16)
HGB BLD-MCNC: 7.8 G/DL (ref 12–16)
HGB BLD-MCNC: 8.2 G/DL (ref 12–16)
INR PPP: 1.41 (ref 0.87–1.13)
MCH RBC QN AUTO: 31.5 PG (ref 27–33)
MCHC RBC AUTO-ENTMCNC: 33.2 G/DL (ref 32.2–35.5)
MCV RBC AUTO: 94.8 FL (ref 81.4–97.8)
PLATELET # BLD AUTO: 196 K/UL (ref 164–446)
PMV BLD AUTO: 8.9 FL (ref 9–12.9)
POTASSIUM SERPL-SCNC: 4.1 MMOL/L (ref 3.6–5.5)
PROTHROMBIN TIME: 17.3 SEC (ref 12–14.6)
RBC # BLD AUTO: 2.48 M/UL (ref 4.2–5.4)
SODIUM SERPL-SCNC: 130 MMOL/L (ref 135–145)
UFH PPP CHRO-ACNC: 0.15 IU/ML
UFH PPP CHRO-ACNC: 0.25 IU/ML
UFH PPP CHRO-ACNC: 0.55 IU/ML
WBC # BLD AUTO: 15.9 K/UL (ref 4.8–10.8)

## 2024-12-03 PROCEDURE — A9270 NON-COVERED ITEM OR SERVICE: HCPCS | Performed by: INTERNAL MEDICINE

## 2024-12-03 PROCEDURE — 85018 HEMOGLOBIN: CPT | Mod: 91

## 2024-12-03 PROCEDURE — 99233 SBSQ HOSP IP/OBS HIGH 50: CPT | Performed by: HOSPITALIST

## 2024-12-03 PROCEDURE — 700102 HCHG RX REV CODE 250 W/ 637 OVERRIDE(OP): Performed by: INTERNAL MEDICINE

## 2024-12-03 PROCEDURE — 97530 THERAPEUTIC ACTIVITIES: CPT

## 2024-12-03 PROCEDURE — A9270 NON-COVERED ITEM OR SERVICE: HCPCS | Performed by: HOSPITALIST

## 2024-12-03 PROCEDURE — 85027 COMPLETE CBC AUTOMATED: CPT

## 2024-12-03 PROCEDURE — 770020 HCHG ROOM/CARE - TELE (206)

## 2024-12-03 PROCEDURE — 700111 HCHG RX REV CODE 636 W/ 250 OVERRIDE (IP): Performed by: HOSPITALIST

## 2024-12-03 PROCEDURE — 85014 HEMATOCRIT: CPT

## 2024-12-03 PROCEDURE — 85520 HEPARIN ASSAY: CPT | Mod: 91

## 2024-12-03 PROCEDURE — 700102 HCHG RX REV CODE 250 W/ 637 OVERRIDE(OP): Performed by: HOSPITALIST

## 2024-12-03 PROCEDURE — 80048 BASIC METABOLIC PNL TOTAL CA: CPT

## 2024-12-03 PROCEDURE — 85610 PROTHROMBIN TIME: CPT

## 2024-12-03 PROCEDURE — 36415 COLL VENOUS BLD VENIPUNCTURE: CPT

## 2024-12-03 PROCEDURE — 700101 HCHG RX REV CODE 250: Performed by: INTERNAL MEDICINE

## 2024-12-03 RX ADMIN — TIMOLOL MALEATE 1 DROP: 5 SOLUTION OPHTHALMIC at 05:02

## 2024-12-03 RX ADMIN — SENNOSIDES AND DOCUSATE SODIUM 2 TABLET: 50; 8.6 TABLET ORAL at 17:51

## 2024-12-03 RX ADMIN — OXYCODONE 5 MG: 5 TABLET ORAL at 12:51

## 2024-12-03 RX ADMIN — HEPARIN SODIUM 19 UNITS/KG/HR: 5000 INJECTION, SOLUTION INTRAVENOUS at 22:22

## 2024-12-03 RX ADMIN — OXYCODONE 5 MG: 5 TABLET ORAL at 17:50

## 2024-12-03 RX ADMIN — FUROSEMIDE 20 MG: 10 INJECTION, SOLUTION INTRAVENOUS at 13:29

## 2024-12-03 RX ADMIN — HEPARIN SODIUM 3200 UNITS: 1000 INJECTION, SOLUTION INTRAVENOUS; SUBCUTANEOUS at 21:13

## 2024-12-03 RX ADMIN — METHOCARBAMOL TABLETS 750 MG: 500 TABLET, COATED ORAL at 05:02

## 2024-12-03 RX ADMIN — FUROSEMIDE 20 MG: 10 INJECTION, SOLUTION INTRAVENOUS at 05:02

## 2024-12-03 RX ADMIN — EZETIMIBE 10 MG: 10 TABLET ORAL at 05:02

## 2024-12-03 RX ADMIN — TIMOLOL MALEATE 1 DROP: 5 SOLUTION OPHTHALMIC at 17:51

## 2024-12-03 RX ADMIN — HEPARIN SODIUM 3200 UNITS: 1000 INJECTION, SOLUTION INTRAVENOUS; SUBCUTANEOUS at 03:07

## 2024-12-03 RX ADMIN — OXYCODONE 5 MG: 5 TABLET ORAL at 08:50

## 2024-12-03 RX ADMIN — HEPARIN SODIUM 15 UNITS/KG/HR: 5000 INJECTION, SOLUTION INTRAVENOUS at 00:02

## 2024-12-03 RX ADMIN — LIDOCAINE 2 PATCH: 4 PATCH TOPICAL at 05:01

## 2024-12-03 RX ADMIN — OXYCODONE HYDROCHLORIDE 10 MG: 10 TABLET ORAL at 20:58

## 2024-12-03 RX ADMIN — OXYCODONE HYDROCHLORIDE 10 MG: 10 TABLET ORAL at 02:57

## 2024-12-03 RX ADMIN — METHOCARBAMOL TABLETS 750 MG: 500 TABLET, COATED ORAL at 11:54

## 2024-12-03 RX ADMIN — LEVOTHYROXINE SODIUM 125 MCG: 0.12 TABLET ORAL at 05:02

## 2024-12-03 RX ADMIN — WARFARIN SODIUM 4 MG: 4 TABLET ORAL at 17:51

## 2024-12-03 RX ADMIN — FUROSEMIDE 20 MG: 10 INJECTION, SOLUTION INTRAVENOUS at 21:45

## 2024-12-03 ASSESSMENT — COGNITIVE AND FUNCTIONAL STATUS - GENERAL
STANDING UP FROM CHAIR USING ARMS: TOTAL
SUGGESTED CMS G CODE MODIFIER MOBILITY: CN
MOBILITY SCORE: 6
TURNING FROM BACK TO SIDE WHILE IN FLAT BAD: TOTAL
MOVING TO AND FROM BED TO CHAIR: TOTAL
WALKING IN HOSPITAL ROOM: TOTAL
CLIMB 3 TO 5 STEPS WITH RAILING: TOTAL
MOVING FROM LYING ON BACK TO SITTING ON SIDE OF FLAT BED: TOTAL

## 2024-12-03 ASSESSMENT — ENCOUNTER SYMPTOMS
BACK PAIN: 0
NAUSEA: 0
BLURRED VISION: 0
HEADACHES: 0
DEPRESSION: 0
FEVER: 0
HEARTBURN: 0
MYALGIAS: 0
PND: 0
VOMITING: 0
CHILLS: 0
BRUISES/BLEEDS EASILY: 0
COUGH: 0
WHEEZING: 0
DOUBLE VISION: 0
CLAUDICATION: 0
HEMOPTYSIS: 0
PALPITATIONS: 0
DIZZINESS: 0

## 2024-12-03 ASSESSMENT — PAIN DESCRIPTION - PAIN TYPE
TYPE: ACUTE PAIN

## 2024-12-03 ASSESSMENT — GAIT ASSESSMENTS: GAIT LEVEL OF ASSIST: UNABLE TO PARTICIPATE

## 2024-12-03 ASSESSMENT — FIBROSIS 4 INDEX: FIB4 SCORE: 2.14

## 2024-12-03 NOTE — PROGRESS NOTES
Bedside report received from off going RN/tech: Laura, assumed care of patient.     Fall Risk Score: HIGH RISK  Fall risk interventions in place: Place yellow fall risk ID band on patient, Provide patient/family education based on risk assessment, Educate patient/family to call staff for assistance when getting out of bed, Place fall precaution signage outside patient door, Utilize bed/chair fall alarm, and Bed alarm connected correctly  Bed type: Low air loss (Kamar Score less than 17 interventions in place)  Patient on cardiac monitor: Yes  IVF/IV medications: Infusion per MAR (List Med(s)) Heparin  Oxygen: How many liters 1L  Bedside sitter: Not Applicable   Isolation: Not applicable

## 2024-12-03 NOTE — PROGRESS NOTES
Monitor Summary  Rhythm: Normal Sinus Rhythm and Sinus Bradycardia  Rate: 56-67  Ectopy: PVCs, PACs  .22 / .10 / .44

## 2024-12-03 NOTE — DISCHARGE PLANNING
"H/SCP TCN chart review completed.  Per chart review, PT/OT recommends post-acute placement.  Current discharge considerations are for post-acute placement/SNF when medically cleared.  Noted per prior TCN, \"anticipating return to [facility] as pt admitted from this facility\" and [facility] has accepted.  SCP authorization for [facility] previously completed.      TCN will continue to follow and collaborate with discharge planning team as additional post acute needs arise. Thank you.    Completed:  PT recommend post acute placement 11/29.   OT recommend post acute placement 12/02.   Choice obtained: none  Pt aware of [facility]'s blanket referral policy and blanket referrals sent; pt has multiple accepting facilities (see above)  SCP with [facility] PCP. discussed possible outpatient transitional PCP follow up if indicated and pt in agreement; sent information to assist in scheduling   "

## 2024-12-03 NOTE — PROGRESS NOTES
Inpatient Anticoagulation Service Note for 12/3/2024    Reason for Anticoagulation: Aortic Mechanical Valve Replacement  , Atrial Fibrillation   [id number] VASc Score: 3  HAS-BLED Score: 2    Hemoglobin Value: (!) 8.2  Hematocrit Value: (!) 26.1  Lab Platelet Value: 196  Target INR: 2.5 to 3.5    INR from last 7 days        Date/Time INR Value    24 0112 1.41     24 0208 1.29     24 0101 1.26     24 0303 1.18     24 1216 1.12     24 0200 1.11                   Dose from last 7 days        Date/Time Dose (mg)    24 1254 4     24 0905 6     24 1232 6     24 1413 6     24 1305 6                   Average Dose (mg):    (Home dosinmg on Mon/Wed/Friday and 4mg all other days)    Significant Interactions:   Not Applicable    Bridge Therapy:   Yes (Parenteral bridge with heparin drip)    Date of Last VTE Event:    (N/A)    Bridge Therapy Start Date:   24    Days of Overlap Therapy:   4    INR Value Greater than 2 Prior to Discontinuation of Parenteral Anticoagulation:   Not Applicable    Reversal Agent Administered:   PCC (Prothrombin Complex Concentrate) (S/p Kcentra on )    Comments:   Patient on warfarin for afib & history of mechanical aortic valve. Patient follows with [facility] outpatient. INR goal is between 2.5 - 3.5 (confirmed via anti-coagulation clinic notes). Home warfarin dosing is 6mg on Mon/Wed/ and 4mg daily all other days. Patient has a Renal diet ordered. No major drug-drug interactions noted. Hgb/hct low but somewhat stable. No charted bleed events overnight.     Prior home dosing with stable INR trends as evidenced by therapeutic time in range of 69.9% (over a 7.7 year time frame).      Plan:    Warfarin restarted  after retroperitoneal bleed s/p Kcentra on  and 6u PRBC ( - ).      INR today is sub-therapeutic today at 1.41 (INR day prior was 1.29). INR trending up appropriately       Will dose warfarin with home dosing of 4mg tonight.     Anticipate that INR will start to climb next 1-2 days. Given recent bleed history and patient being on concomitantly heparin gtt, would like to avoid large INR jumps.      Concomitant parenteral bridging with heparin gtt in addition, as patient is in a hyper-coagulable state given warfarin re-start. Heparin specifically chosen due to recent history of bleed.  Recommend continuing parenteral bridge for at least 5 days AND at least 2 therapeutic INR values. Heparin gtt has been therapeutic for the last 3 days.     Education Material Provided?:   No (Established warfarin patient)     Pharmacist suggested discharge dosing:   In the event patient were to discharge, anticipate patient can be discharged on warfarin 6mg Mon/Wed/Friday and 4mg daily all other days. Despite bleed, hesitant to change home dosing given historically stable therapeutic time in range.      Recommend INR 72 hours within discharge with close follow-up with anticoagulation clinic.

## 2024-12-03 NOTE — PROGRESS NOTES
Assumed care of patient; received bedside report from Heide ORO. Patient voices no needs at this time.

## 2024-12-03 NOTE — DISCHARGE PLANNING
Case Management Discharge Planning    Admission Date: 11/23/2024  GMLOS: 3.6  ALOS: 10    6-Clicks ADL Score: 15  6-Clicks Mobility Score: 8  PT and/or OT Eval ordered: Yes  Post-acute Referrals Ordered: Yes  Post-acute Choice Obtained: Yes  Has referral(s) been sent to post-acute provider:  Yes    Anticipated Discharge Dispo: Discharge Disposition: D/T to SNF with Medicare cert in anticipation of skilled care (03)    DME Needed: No    Action(s) Taken: Patient discussed in rounds. Not medically cleared for discharge to SNF per MD.     Patient has been accepted by [facility] which is pt's 1st choice. Plan to discharge to [facility] when medically cleared.     Escalations Completed: None    Medically Clear: No    Next Steps: F/U with medical team regarding D/C needs/ barriers.     Barriers to Discharge: Medical clearance    Is the patient up for discharge tomorrow: No

## 2024-12-03 NOTE — PROGRESS NOTES
[facility] Medicine Daily Progress Note    Date of Service  12/3/2024    Chief Complaint  Yenifer Beasley is a 74 y.o. female admitted 11/23/2024 with retroperitoneal hematoma    Hospital Course  Patient is a 74 y.o. female who presented to [facility] on 11/23/2024 with shortness of breath.  Ms. Beasley has a past medical history of atrial fibrillation, mechanical aortic valve replacement on chronic coumadin therapy and was admitted to this hospital from 11/7 through 11/15 with ground level fall and right femur fracture. She underwent open treatment with internal fixation of right lateral condyle distal femur fracture by Dr. Camargo on 11/8. She was given one unit RBCs and was discharged to rehab. On 11/23 she was brought to the ER with hypotension (62/46) and in the ER her Hb was 6.3 and chronic kidney disease Cr 1.78. A CT revealed a 17 cm x 10.7 cm x 9.5 cm retroperitoneal hematoma. Her INR was 2.69. She was given reversal with K centra, transfused 2 units RBCs and admitted to the ICU.       Interval Problem Update  Axox3, stable on 1L. H/h decreased some overnight, she remains on a heparin/coumadin bridge. Denies chest pain, minimal R leg pain. She reports last bm was at least 5 days ago, denies abdominal pain, she is passing flatus, discussed with nursing, giving suppository today, had miralax etc yesterday, following. ROS otherwise negative    I have discussed this patient's plan of care and discharge plan at IDT rounds today with Case Management, Nursing, Nursing leadership, and other members of the IDT team.    Consultants/Specialty  Cardiology  Critical care    Code Status  DNAR/DNI    Disposition  The patient is not medically cleared for discharge to home or a post-acute facility.      I have placed the appropriate orders for post-discharge needs.    Review of Systems  Review of Systems   Constitutional:  Positive for malaise/fatigue. Negative for chills and fever.   Eyes:  Negative for blurred vision and  double vision.   Respiratory:  Negative for cough, hemoptysis and wheezing.    Cardiovascular:  Negative for chest pain, palpitations, claudication, leg swelling and PND.   Gastrointestinal:  Negative for heartburn, nausea and vomiting.        Abdominal distention although better.  Patient   Genitourinary:  Negative for hematuria and urgency.   Musculoskeletal:  Negative for back pain and myalgias.   Skin:  Negative for rash.   Neurological:  Negative for dizziness and headaches.   Endo/Heme/Allergies:  Does not bruise/bleed easily.   Psychiatric/Behavioral:  Negative for depression.         Physical Exam  Temp:  [36.4 °C (97.5 °F)-36.8 °C (98.2 °F)] 36.8 °C (98.2 °F)  Pulse:  [58-72] 63  Resp:  [16-18] 18  BP: (108-141)/(38-57) 126/46  SpO2:  [90 %-98 %] 98 %    Physical Exam  Vitals and nursing note reviewed. Exam conducted with a chaperone present.   Constitutional:       General: She is not in acute distress.     Appearance: Normal appearance. She is obese. She is not ill-appearing or diaphoretic.   HENT:      Head: Normocephalic.      Nose: Nose normal.      Mouth/Throat:      Mouth: Mucous membranes are moist.   Eyes:      General: No scleral icterus.     Conjunctiva/sclera: Conjunctivae normal.      Pupils: Pupils are equal, round, and reactive to light.   Cardiovascular:      Rate and Rhythm: Normal rate and regular rhythm.      Pulses: Normal pulses.      Heart sounds: Normal heart sounds.   Pulmonary:      Effort: Pulmonary effort is normal. No respiratory distress.      Breath sounds: Normal breath sounds.   Abdominal:      General: Abdomen is flat. Bowel sounds are normal. There is distension.      Palpations: Abdomen is soft.      Tenderness: There is no abdominal tenderness.   Musculoskeletal:         General: No swelling or deformity. Normal range of motion.      Cervical back: Normal range of motion and neck supple.      Right lower leg: Edema present.      Left lower leg: Edema present.   Skin:      General: Skin is warm and dry.      Capillary Refill: Capillary refill takes less than 2 seconds.      Coloration: Skin is not jaundiced.   Neurological:      General: No focal deficit present.      Mental Status: She is alert and oriented to person, place, and time.      Cranial Nerves: No cranial nerve deficit.   Psychiatric:         Mood and Affect: Mood normal.         Behavior: Behavior normal.         Fluids    Intake/Output Summary (Last 24 hours) at 12/3/2024 1721  Last data filed at 12/3/2024 1318  Gross per 24 hour   Intake 386 ml   Output 700 ml   Net -314 ml        Laboratory  Recent Labs     12/01/24  0204 12/02/24  0208 12/02/24  0855 12/02/24  1701 12/03/24  0112 12/03/24  0918   WBC 11.7* 12.3*  --   --  15.9*  --    RBC 3.11* 2.86*  --   --  2.48*  --    HEMOGLOBIN 9.6* 8.8*   < > 8.9* 7.8* 8.2*   HEMATOCRIT 28.5* 27.5*   < > 27.5* 23.5* 26.1*   MCV 91.6 96.2  --   --  94.8  --    MCH 30.9 30.8  --   --  31.5  --    MCHC 33.7 32.0*  --   --  33.2  --    RDW 51.2* 55.9*  --   --  55.4*  --    PLATELETCT 193 195  --   --  196  --    MPV 8.5* 8.5*  --   --  8.9*  --     < > = values in this interval not displayed.     Recent Labs     12/01/24  0204 12/02/24 0208 12/03/24  0112   SODIUM 126* 129* 130*   POTASSIUM 4.1 4.0 4.1   CHLORIDE 94* 97 98   CO2 22 23 24   GLUCOSE 149* 110* 131*   BUN 52* 48* 48*   CREATININE 1.14 1.19 1.21   CALCIUM 7.9* 8.3* 8.1*     Recent Labs     12/01/24  0101 12/02/24  0208 12/03/24  0112   INR 1.26* 1.29* 1.41*               Imaging  [id number] VIEW   Final Result      Slight interval decrease in marked colonic gaseous distention with the cecal diameter measuring 14.2 cm, previously 14.6 cm. Findings could be related to ileus or distal colonic obstruction.      [id number] VIEW   Final Result         Diffuse gaseous distention of the small bowel and colon, especially the cecum, could relate to ileus.      DX-CHEST-LIMITED (1 VIEW)   Final Result      1.  Dense  opacification of the left lower lung. This may represent a combination of consolidation and pleural effusion.   2.  Right base atelectasis.   3.  Support apparatus as above. No pneumothorax seen.      US-EXTREMITY VENOUS LOWER BILAT   Final Result      EC-ECHOCARDIOGRAM COMPLETE W/ CONT   Final Result      CT-RENAL COLIC EVALUATION(A/P W/O)   Final Result   Impression:      1. Large 17 x 10.7 x 9.5 cm hematoma type mixed density fluid collection in the left retroperitoneum and flank abdominal wall. This displaces the left kidney anteriorly. No significant hydronephrosis.      2. Moderate colonic distention similar to 2007 suggests ileus. No obstruction suggestion. Prior gastric surgery.      3. Heavy vascular calcification. No aneurysm. Sternal wires. Aortic valve prosthesis.      4. No kidney stone or hydronephrosis. Bull catheter in empty bladder.      5. Prior cholecystectomy.                        DX-CHEST-PORTABLE (1 VIEW)   Final Result   Impression:      1. Shallow. Mild left base atelectasis.      2. Cardiomegaly. Sternal wires. Cardiac valve prosthesis.                          Assessment/Plan  * Retroperitoneal hemorrhage- (present on admission)  Assessment & Plan  A CT revealed a 17 cm x 10.7 cm x 9.5 cm retroperitoneal hematoma  Spontaneous hemorrhage in the setting of coumadin use  INR 2.69 on admission was reversed and red blood cells were transfused  Hold coumadin  Check Hb and INR in the morning  Continue monitoring hemoglobin level    Repeat inr in am       Ileus (HCC)  Assessment & Plan  Probably due to lack of mobility  Patient needs to mobilize as much as possible  Patient without nausea or vomiting and she is passing small amount of stool  Will try suppository today, patient needs to mobilize as much as possible again    Hyperkalemia- (present on admission)  Assessment & Plan  resolved    Hemorrhagic shock (HCC)- (present on admission)  Assessment & Plan  Due to retroperitoneal  bleed  Requiring emergent blood transfusions  Hemoglobin 9.4  Due to history of metallic aortic valve replacement will help to start Coumadin since patient received Kcentra will help to bridge patient with IV heparin since patient might be pro-coagulative at the beginning of Coumadin treatment.  Continue close monitoring   Continue serial h/h and transfuse if needed    ABLA (acute blood loss anemia)- (present on admission)  Assessment & Plan  Secondary to retroperitoneal hemorrhage requiring blood transfusions.  Continue monitoring, cbc and inr in am ( inr remains below 2 for now)  Hemoglobin slowly decreasing  Continue monitor hemoglobin  Coumadin day 5  Cbc in am     Nondisplaced unspecified condyle fracture of lower end of right femur, initial encounter for closed fracture (HCC)- (present on admission)  Assessment & Plan  S/p surgery by Dr. Camargo on 11/8  PT/OT  She likely will require post-acute rehab.    Class 3 severe obesity due to excess calories with serious comorbidity and body mass index (BMI) of 40.0 to 44.9 in adult (HCC)- (present on admission)  Assessment & Plan  BMI 46    Chronic kidney disease (CKD) stage G3b/A1, moderately decreased glomerular filtration rate (GFR) between 30-44 mL/min/1.73 square meter and albuminuria creatinine ratio less than 30 mg/g- (present on admission)  Assessment & Plan  Fluctuating  Avoid nephrotoxins  Bmp in am     Paroxysmal atrial fibrillation (HCC)- (present on admission)  Assessment & Plan  Hx of  Holding Coumadin    Hx of mechanical aortic valve replacement [V43.3]- (present on admission)  Assessment & Plan  2007 by Dr. Awan  Hold coumadin for now given her active bleed  Will discuss with cardiology to see when Coumadin can be restarted or if we can keep holding it for a little longer  Heart failure restart Coumadin in 1 to 2 days  Start heparin Coumadin today day 4/5    Hyponatremia- (present on admission)  Assessment & Plan  Improving  Bmp in am      Essential hypertension- (present on admission)  Assessment & Plan  Monitoring    HILDA (obstructive sleep apnea)- (present on admission)  Assessment & Plan  Continue nocturna CPAP    CAD (coronary artery disease)- (present on admission)  Assessment & Plan  No chest pain, continue close monitoring    Hypothyroidism- (present on admission)  Assessment & Plan  Continue supplement         VTE prophylaxis: SCDs    I have performed a physical exam and reviewed and updated ROS and Plan today (12/3/2024). In review of yesterday's note (12/2/2024), there are no changes except as documented above.      Patient is has a high medical complexity, complex decision making and is at high risk for complication, morbidity, and mortality.

## 2024-12-03 NOTE — CARE PLAN
The patient is Watcher - Medium risk of patient condition declining or worsening    Shift Goals  Clinical Goals: diuresis, pain control, q2 turns  Patient Goals: pain control  Family Goals: man    Progress made toward(s) clinical / shift goals:    Problem: Pain - Standard  Goal: Alleviation of pain or a reduction in pain to the patient’s comfort goal  Outcome: Not Met     Problem: Knowledge Deficit - Standard  Goal: Patient and family/care givers will demonstrate understanding of plan of care, disease process/condition, diagnostic tests and medications  Outcome: Progressing     Problem: Fall Risk  Goal: Patient will remain free from falls  Outcome: Progressing     Problem: Hemodynamics  Goal: Patient's hemodynamics, fluid balance and neurologic status will be stable or improve  Outcome: Progressing     Problem: Respiratory  Goal: Patient will achieve/maintain optimum respiratory ventilation and gas exchange  Outcome: Progressing     Problem: Bowel Elimination  Goal: Establish and maintain regular bowel function  Outcome: Progressing     Problem: Skin Integrity  Goal: Skin integrity is maintained or improved  Outcome: Progressing       Patient is not progressing towards the following goals:      Problem: Pain - Standard  Goal: Alleviation of pain or a reduction in pain to the patient’s comfort goal  Outcome: Not Met

## 2024-12-04 LAB
ANION GAP SERPL CALC-SCNC: 10 MMOL/L (ref 7–16)
BASOPHILS # BLD AUTO: 0.3 % (ref 0–1.8)
BASOPHILS # BLD: 0.04 K/UL (ref 0–0.12)
BUN SERPL-MCNC: 51 MG/DL (ref 8–22)
CALCIUM SERPL-MCNC: 7.9 MG/DL (ref 8.5–10.5)
CHLORIDE SERPL-SCNC: 96 MMOL/L (ref 96–112)
CO2 SERPL-SCNC: 24 MMOL/L (ref 20–33)
CREAT SERPL-MCNC: 1.39 MG/DL (ref 0.5–1.4)
EOSINOPHIL # BLD AUTO: 0.23 K/UL (ref 0–0.51)
EOSINOPHIL NFR BLD: 1.5 % (ref 0–6.9)
ERYTHROCYTE [DISTWIDTH] IN BLOOD BY AUTOMATED COUNT: 56.1 FL (ref 35.9–50)
GFR SERPLBLD CREATININE-BSD FMLA CKD-EPI: 40 ML/MIN/1.73 M 2
GLUCOSE SERPL-MCNC: 132 MG/DL (ref 65–99)
HCT VFR BLD AUTO: 21.5 % (ref 37–47)
HCT VFR BLD AUTO: 22.1 % (ref 37–47)
HGB BLD-MCNC: 7.1 G/DL (ref 12–16)
HGB BLD-MCNC: 7.2 G/DL (ref 12–16)
IMM GRANULOCYTES # BLD AUTO: 0.18 K/UL (ref 0–0.11)
IMM GRANULOCYTES NFR BLD AUTO: 1.2 % (ref 0–0.9)
INR PPP: 1.61 (ref 0.87–1.13)
LYMPHOCYTES # BLD AUTO: 0.98 K/UL (ref 1–4.8)
LYMPHOCYTES NFR BLD: 6.5 % (ref 22–41)
MCH RBC QN AUTO: 30.9 PG (ref 27–33)
MCHC RBC AUTO-ENTMCNC: 32.6 G/DL (ref 32.2–35.5)
MCV RBC AUTO: 94.8 FL (ref 81.4–97.8)
MONOCYTES # BLD AUTO: 1.1 K/UL (ref 0–0.85)
MONOCYTES NFR BLD AUTO: 7.3 % (ref 0–13.4)
NEUTROPHILS # BLD AUTO: 12.44 K/UL (ref 1.82–7.42)
NEUTROPHILS NFR BLD: 83.2 % (ref 44–72)
NRBC # BLD AUTO: 0 K/UL
NRBC BLD-RTO: 0 /100 WBC (ref 0–0.2)
PLATELET # BLD AUTO: 203 K/UL (ref 164–446)
PMV BLD AUTO: 8.9 FL (ref 9–12.9)
POTASSIUM SERPL-SCNC: 4.1 MMOL/L (ref 3.6–5.5)
PROTHROMBIN TIME: 19.2 SEC (ref 12–14.6)
RBC # BLD AUTO: 2.33 M/UL (ref 4.2–5.4)
SODIUM SERPL-SCNC: 130 MMOL/L (ref 135–145)
UFH PPP CHRO-ACNC: 0.41 IU/ML
UFH PPP CHRO-ACNC: 0.54 IU/ML
UFH PPP CHRO-ACNC: 0.74 IU/ML
UFH PPP CHRO-ACNC: 0.79 IU/ML
WBC # BLD AUTO: 15 K/UL (ref 4.8–10.8)

## 2024-12-04 PROCEDURE — 85018 HEMOGLOBIN: CPT

## 2024-12-04 PROCEDURE — 700111 HCHG RX REV CODE 636 W/ 250 OVERRIDE (IP): Performed by: HOSPITALIST

## 2024-12-04 PROCEDURE — 99233 SBSQ HOSP IP/OBS HIGH 50: CPT | Performed by: HOSPITALIST

## 2024-12-04 PROCEDURE — 85025 COMPLETE CBC W/AUTO DIFF WBC: CPT

## 2024-12-04 PROCEDURE — 97535 SELF CARE MNGMENT TRAINING: CPT

## 2024-12-04 PROCEDURE — A9270 NON-COVERED ITEM OR SERVICE: HCPCS | Performed by: HOSPITALIST

## 2024-12-04 PROCEDURE — 700101 HCHG RX REV CODE 250: Performed by: INTERNAL MEDICINE

## 2024-12-04 PROCEDURE — 700102 HCHG RX REV CODE 250 W/ 637 OVERRIDE(OP): Performed by: HOSPITALIST

## 2024-12-04 PROCEDURE — 51798 US URINE CAPACITY MEASURE: CPT

## 2024-12-04 PROCEDURE — 80048 BASIC METABOLIC PNL TOTAL CA: CPT

## 2024-12-04 PROCEDURE — A9270 NON-COVERED ITEM OR SERVICE: HCPCS | Performed by: INTERNAL MEDICINE

## 2024-12-04 PROCEDURE — 700102 HCHG RX REV CODE 250 W/ 637 OVERRIDE(OP): Performed by: INTERNAL MEDICINE

## 2024-12-04 PROCEDURE — 97530 THERAPEUTIC ACTIVITIES: CPT

## 2024-12-04 PROCEDURE — 85014 HEMATOCRIT: CPT

## 2024-12-04 PROCEDURE — 770020 HCHG ROOM/CARE - TELE (206)

## 2024-12-04 PROCEDURE — 36415 COLL VENOUS BLD VENIPUNCTURE: CPT

## 2024-12-04 PROCEDURE — 85610 PROTHROMBIN TIME: CPT

## 2024-12-04 PROCEDURE — 85520 HEPARIN ASSAY: CPT

## 2024-12-04 RX ORDER — LIDOCAINE 4 G/G
1 PATCH TOPICAL EVERY 24 HOURS
Status: DISCONTINUED | OUTPATIENT
Start: 2024-12-05 | End: 2024-12-10 | Stop reason: HOSPADM

## 2024-12-04 RX ORDER — CALCIUM CARBONATE 500 MG/1
500 TABLET, CHEWABLE ORAL ONCE
Status: COMPLETED | OUTPATIENT
Start: 2024-12-04 | End: 2024-12-04

## 2024-12-04 RX ADMIN — LIDOCAINE 2 PATCH: 4 PATCH TOPICAL at 04:36

## 2024-12-04 RX ADMIN — EZETIMIBE 10 MG: 10 TABLET ORAL at 04:35

## 2024-12-04 RX ADMIN — FUROSEMIDE 20 MG: 10 INJECTION, SOLUTION INTRAVENOUS at 04:34

## 2024-12-04 RX ADMIN — WARFARIN SODIUM 6 MG: 6 TABLET ORAL at 17:22

## 2024-12-04 RX ADMIN — OXYCODONE HYDROCHLORIDE 10 MG: 10 TABLET ORAL at 04:35

## 2024-12-04 RX ADMIN — LEVOTHYROXINE SODIUM 125 MCG: 0.12 TABLET ORAL at 04:35

## 2024-12-04 RX ADMIN — TIMOLOL MALEATE 1 DROP: 5 SOLUTION OPHTHALMIC at 17:22

## 2024-12-04 RX ADMIN — SENNOSIDES AND DOCUSATE SODIUM 2 TABLET: 50; 8.6 TABLET ORAL at 17:22

## 2024-12-04 RX ADMIN — METHOCARBAMOL TABLETS 750 MG: 500 TABLET, COATED ORAL at 16:03

## 2024-12-04 RX ADMIN — OXYCODONE HYDROCHLORIDE 10 MG: 10 TABLET ORAL at 17:26

## 2024-12-04 RX ADMIN — ANTACID TABLETS 500 MG: 500 TABLET, CHEWABLE ORAL at 16:04

## 2024-12-04 RX ADMIN — TIMOLOL MALEATE 1 DROP: 5 SOLUTION OPHTHALMIC at 04:35

## 2024-12-04 RX ADMIN — BISACODYL 10 MG: 10 SUPPOSITORY RECTAL at 04:35

## 2024-12-04 RX ADMIN — FUROSEMIDE 20 MG: 10 INJECTION, SOLUTION INTRAVENOUS at 14:21

## 2024-12-04 RX ADMIN — HEPARIN SODIUM 17 UNITS/KG/HR: 5000 INJECTION, SOLUTION INTRAVENOUS at 15:28

## 2024-12-04 RX ADMIN — FUROSEMIDE 20 MG: 10 INJECTION, SOLUTION INTRAVENOUS at 21:35

## 2024-12-04 RX ADMIN — OXYCODONE HYDROCHLORIDE 10 MG: 10 TABLET ORAL at 12:31

## 2024-12-04 RX ADMIN — METHOCARBAMOL TABLETS 750 MG: 500 TABLET, COATED ORAL at 02:09

## 2024-12-04 ASSESSMENT — ENCOUNTER SYMPTOMS
HEMOPTYSIS: 0
BACK PAIN: 0
DIZZINESS: 0
HEARTBURN: 0
FEVER: 0
DOUBLE VISION: 0
PALPITATIONS: 0
NAUSEA: 0
MYALGIAS: 0
BLURRED VISION: 0
DEPRESSION: 0
COUGH: 0
HEADACHES: 0
BRUISES/BLEEDS EASILY: 0
VOMITING: 0
PND: 0
WHEEZING: 0
CLAUDICATION: 0
CHILLS: 0

## 2024-12-04 ASSESSMENT — PAIN DESCRIPTION - PAIN TYPE
TYPE: ACUTE PAIN

## 2024-12-04 ASSESSMENT — COGNITIVE AND FUNCTIONAL STATUS - GENERAL
PERSONAL GROOMING: A LITTLE
DRESSING REGULAR LOWER BODY CLOTHING: A LOT
TOILETING: A LOT
SUGGESTED CMS G CODE MODIFIER DAILY ACTIVITY: CK
DRESSING REGULAR UPPER BODY CLOTHING: A LITTLE
DAILY ACTIVITIY SCORE: 16
HELP NEEDED FOR BATHING: A LOT

## 2024-12-04 ASSESSMENT — FIBROSIS 4 INDEX: FIB4 SCORE: 2.06

## 2024-12-04 NOTE — PROGRESS NOTES
Bedside report received from off going RN/tech: Laura, assumed care of patient.     Fall Risk Score: HIGH RISK  Fall risk interventions in place: Place yellow fall risk ID band on patient, Provide patient/family education based on risk assessment, Educate patient/family to call staff for assistance when getting out of bed, Place fall precaution signage outside patient door, Place patient in room close to nursing station, Utilize bed/chair fall alarm, Notify charge of high risk for huddle, and Bed alarm connected correctly  Bed type: Low air loss (Kamar Score less than 17 interventions in place)  Patient on cardiac monitor: Yes  IVF/IV medications: Infusion per MAR (List Med(s)) Heparin @ 17   Oxygen: How many liters 1L, Traced the line to wall oxygen, and No oxygen tank in room  Bedside sitter: Not Applicable   Isolation: Not applicable

## 2024-12-04 NOTE — PROGRESS NOTES
[facility] Medicine Daily Progress Note    Date of Service  12/4/2024    Chief Complaint  Yenifer Beasley is a 74 y.o. female admitted 11/23/2024 with retroperitoneal hematoma    Hospital Course  Patient is a 74 y.o. female who presented to [facility] on 11/23/2024 with shortness of breath.  Ms. Beasley has a past medical history of atrial fibrillation, mechanical aortic valve replacement on chronic coumadin therapy and was admitted to this hospital from 11/7 through 11/15 with ground level fall and right femur fracture. She underwent open treatment with internal fixation of right lateral condyle distal femur fracture by Dr. Camargo on 11/8. She was given one unit RBCs and was discharged to rehab. On 11/23 she was brought to the ER with hypotension (62/46) and in the ER her Hb was 6.3 and chronic kidney disease Cr 1.78. A CT revealed a 17 cm x 10.7 cm x 9.5 cm retroperitoneal hematoma. Her INR was 2.69. She was given reversal with K centra, transfused 2 units RBCs and admitted to the ICU.       Interval Problem Update  Axox3, she continues to do well on 1L. States she is feeling much better after finally having a BM. Mild stable lower abdominal pain/ soreness at site of hematoma, no chest pain, no other pain, denies sob, no dizziness. INR remains subtherapeutic - I discussed this with pharmacy who is adjusting coumadin dose, continue bridge. Discussed trial without bryant with patient and nursing. ROS otherwise negative.     I have discussed this patient's plan of care and discharge plan at IDT rounds today with Case Management, Nursing, Nursing leadership, and other members of the IDT team.    Consultants/Specialty  Cardiology  Critical care    Code Status  DNAR/DNI    Disposition  The patient is not medically cleared for discharge to home or a post-acute facility.      I have placed the appropriate orders for post-discharge needs.    Review of Systems  Review of Systems   Constitutional:  Positive for  malaise/fatigue. Negative for chills and fever.   Eyes:  Negative for blurred vision and double vision.   Respiratory:  Negative for cough, hemoptysis and wheezing.    Cardiovascular:  Negative for chest pain, palpitations, claudication, leg swelling and PND.   Gastrointestinal:  Negative for heartburn, nausea and vomiting.        Abdominal distention although better.  Patient   Genitourinary:  Negative for hematuria and urgency.   Musculoskeletal:  Negative for back pain and myalgias.   Skin:  Negative for rash.   Neurological:  Negative for dizziness and headaches.   Endo/Heme/Allergies:  Does not bruise/bleed easily.   Psychiatric/Behavioral:  Negative for depression.         Physical Exam  Temp:  [36.3 °C (97.3 °F)-36.8 °C (98.2 °F)] 36.4 °C (97.6 °F)  Pulse:  [61-71] 61  Resp:  [16-18] 18  BP: (102-132)/(40-49) 102/41  SpO2:  [94 %-98 %] 96 %    Physical Exam  Vitals and nursing note reviewed. Exam conducted with a chaperone present.   Constitutional:       General: She is not in acute distress.     Appearance: Normal appearance. She is obese. She is not ill-appearing or diaphoretic.   HENT:      Head: Normocephalic.      Nose: Nose normal.      Mouth/Throat:      Mouth: Mucous membranes are moist.   Eyes:      General: No scleral icterus.     Conjunctiva/sclera: Conjunctivae normal.      Pupils: Pupils are equal, round, and reactive to light.   Cardiovascular:      Rate and Rhythm: Normal rate and regular rhythm.      Pulses: Normal pulses.      Heart sounds: Normal heart sounds.   Pulmonary:      Effort: Pulmonary effort is normal. No respiratory distress.      Breath sounds: Normal breath sounds.   Abdominal:      General: Abdomen is flat. Bowel sounds are normal. There is distension.      Palpations: Abdomen is soft.      Tenderness: There is no abdominal tenderness.   Musculoskeletal:         General: No swelling or deformity. Normal range of motion.      Cervical back: Normal range of motion and neck  supple.      Right lower leg: Edema present.      Left lower leg: Edema present.   Skin:     General: Skin is warm and dry.      Capillary Refill: Capillary refill takes less than 2 seconds.      Coloration: Skin is not jaundiced.   Neurological:      General: No focal deficit present.      Mental Status: She is alert and oriented to person, place, and time.      Cranial Nerves: No cranial nerve deficit.   Psychiatric:         Mood and Affect: Mood normal.         Behavior: Behavior normal.         Fluids    Intake/Output Summary (Last 24 hours) at 12/4/2024 1517  Last data filed at 12/4/2024 1333  Gross per 24 hour   Intake 716 ml   Output 1480 ml   Net -764 ml        Laboratory  Recent Labs     12/02/24  0208 12/02/24  0855 12/03/24  0112 12/03/24  0918 12/03/24  1732 12/04/24  0111 12/04/24  1037   WBC 12.3*  --  15.9*  --   --  15.0*  --    RBC 2.86*  --  2.48*  --   --  2.33*  --    HEMOGLOBIN 8.8*   < > 7.8*   < > 7.6* 7.2* 7.1*   HEMATOCRIT 27.5*   < > 23.5*   < > 23.1* 22.1* 21.5*   MCV 96.2  --  94.8  --   --  94.8  --    MCH 30.8  --  31.5  --   --  30.9  --    MCHC 32.0*  --  33.2  --   --  32.6  --    RDW 55.9*  --  55.4*  --   --  56.1*  --    PLATELETCT 195  --  196  --   --  203  --    MPV 8.5*  --  8.9*  --   --  8.9*  --     < > = values in this interval not displayed.     Recent Labs     12/02/24 0208 12/03/24  0112 12/04/24  0111   SODIUM 129* 130* 130*   POTASSIUM 4.0 4.1 4.1   CHLORIDE 97 98 96   CO2 23 24 24   GLUCOSE 110* 131* 132*   BUN 48* 48* 51*   CREATININE 1.19 1.21 1.39   CALCIUM 8.3* 8.1* 7.9*     Recent Labs     12/02/24 0208 12/03/24  0112 12/04/24  0111   INR 1.29* 1.41* 1.61*               Imaging  [id number] VIEW   Final Result      Slight interval decrease in marked colonic gaseous distention with the cecal diameter measuring 14.2 cm, previously 14.6 cm. Findings could be related to ileus or distal colonic obstruction.      [id number] VIEW   Final Result         Diffuse  gaseous distention of the small bowel and colon, especially the cecum, could relate to ileus.      DX-CHEST-LIMITED (1 VIEW)   Final Result      1.  Dense opacification of the left lower lung. This may represent a combination of consolidation and pleural effusion.   2.  Right base atelectasis.   3.  Support apparatus as above. No pneumothorax seen.      US-EXTREMITY VENOUS LOWER BILAT   Final Result      EC-ECHOCARDIOGRAM COMPLETE W/ CONT   Final Result      CT-RENAL COLIC EVALUATION(A/P W/O)   Final Result   Impression:      1. Large 17 x 10.7 x 9.5 cm hematoma type mixed density fluid collection in the left retroperitoneum and flank abdominal wall. This displaces the left kidney anteriorly. No significant hydronephrosis.      2. Moderate colonic distention similar to 2007 suggests ileus. No obstruction suggestion. Prior gastric surgery.      3. Heavy vascular calcification. No aneurysm. Sternal wires. Aortic valve prosthesis.      4. No kidney stone or hydronephrosis. Bull catheter in empty bladder.      5. Prior cholecystectomy.                        DX-CHEST-PORTABLE (1 VIEW)   Final Result   Impression:      1. Shallow. Mild left base atelectasis.      2. Cardiomegaly. Sternal wires. Cardiac valve prosthesis.                          Assessment/Plan  * Retroperitoneal hemorrhage- (present on admission)  Assessment & Plan  A CT revealed a 17 cm x 10.7 cm x 9.5 cm retroperitoneal hematoma  Spontaneous hemorrhage in the setting of coumadin use  INR 2.69 on admission was reversed and red blood cells were transfused  Hold coumadin  Check Hb and INR in the morning  Continue monitoring hemoglobin level    Repeat inr in am       Ileus (HCC)  Assessment & Plan  Resolved  Continue bowel regimen    Hyperkalemia- (present on admission)  Assessment & Plan  resolved    Hemorrhagic shock (HCC)- (present on admission)  Assessment & Plan  Due to retroperitoneal bleed  Requiring emergent blood transfusions  Due to history of  metallic aortic valve replacement will help to start Coumadin since patient received Kcentra will help to bridge patient with IV heparin since patient might be pro-coagulative at the beginning of Coumadin treatment.  Continue close monitoring   Continue serial h/h and transfuse if needed    ABLA (acute blood loss anemia)- (present on admission)  Assessment & Plan  Secondary to retroperitoneal hemorrhage requiring blood transfusions.  Continue monitoring, cbc and inr in am ( inr remains below 2 for now - see above addressing with pharmacy)  Hemoglobin remains low but no significant drop the past few days  Coumadin day 6  Cbc and inr in am     Nondisplaced unspecified condyle fracture of lower end of right femur, initial encounter for closed fracture (HCC)- (present on admission)  Assessment & Plan  S/p surgery by Dr. Camargo on 11/8  PT/OT  She likely will require post-acute rehab.    Class 3 severe obesity due to excess calories with serious comorbidity and body mass index (BMI) of 40.0 to 44.9 in adult (HCC)- (present on admission)  Assessment & Plan  BMI 46    Chronic kidney disease (CKD) stage G3b/A1, moderately decreased glomerular filtration rate (GFR) between 30-44 mL/min/1.73 square meter and albuminuria creatinine ratio less than 30 mg/g- (present on admission)  Assessment & Plan  Fluctuating  Avoid nephrotoxins  Bmp in am     Paroxysmal atrial fibrillation (HCC)- (present on admission)  Assessment & Plan  Hx of  Coumadin/ heparin bridge    Hx of mechanical aortic valve replacement [V43.3]- (present on admission)  Assessment & Plan  2007 by Dr. Awan  Hold coumadin for now given her active bleed  Will discuss with cardiology to see when Coumadin can be restarted or if we can keep holding it for a little longer  Continue heparin/coumadin bridge - see above    Hyponatremia- (present on admission)  Assessment & Plan  Improving  Bmp in am     Essential hypertension- (present on admission)  Assessment &  Plan  Monitoring    HILDA (obstructive sleep apnea)- (present on admission)  Assessment & Plan  Continue nocturna CPAP    CAD (coronary artery disease)- (present on admission)  Assessment & Plan  No chest pain, continue close monitoring    Hypothyroidism- (present on admission)  Assessment & Plan  Continue supplement         VTE prophylaxis: SCDs    I have performed a physical exam and reviewed and updated ROS and Plan today (12/4/2024). In review of yesterday's note (12/3/2024), there are no changes except as documented above.      Patient is has a high medical complexity, complex decision making and is at high risk for complication, morbidity, and mortality.

## 2024-12-04 NOTE — CARE PLAN
The patient is Stable - Low risk of patient condition declining or worsening    Shift Goals  Clinical Goals: diurese, turns, pain control  Patient Goals: have bm  Family Goals: man    Progress made toward(s) clinical / shift goals:    Problem: Knowledge Deficit - Standard  Goal: Patient and family/care givers will demonstrate understanding of plan of care, disease process/condition, diagnostic tests and medications  Outcome: Progressing     Problem: Pain - Standard  Goal: Alleviation of pain or a reduction in pain to the patient’s comfort goal  Outcome: Progressing     Problem: Fall Risk  Goal: Patient will remain free from falls  Outcome: Progressing     Problem: Psychosocial  Goal: Patient's level of anxiety will decrease  Outcome: Progressing     Problem: Respiratory  Goal: Patient will achieve/maintain optimum respiratory ventilation and gas exchange  Outcome: Progressing     Problem: Urinary - Renal Perfusion  Goal: Ability to achieve and maintain adequate renal perfusion and functioning will improve  Outcome: Progressing     Problem: Bowel Elimination  Goal: Establish and maintain regular bowel function  Outcome: Progressing     Problem: Skin Integrity  Goal: Skin integrity is maintained or improved  Outcome: Progressing       Patient is not progressing towards the following goals:

## 2024-12-04 NOTE — CARE PLAN
The patient is Stable - Low risk of patient condition declining or worsening    Shift Goals  Clinical Goals: diurese, pain control, Q2 turns  Patient Goals: pain control, sleep well  Family Goals: man    Progress made toward(s) clinical / shift goals:      Problem: Knowledge Deficit - Standard  Goal: Patient and family/care givers will demonstrate understanding of plan of care, disease process/condition, diagnostic tests and medications  Outcome: Progressing     Problem: Pain - Standard  Goal: Alleviation of pain or a reduction in pain to the patient’s comfort goal  Outcome: Progressing     Problem: Fall Risk  Goal: Patient will remain free from falls  Outcome: Progressing     Problem: Hemodynamics  Goal: Patient's hemodynamics, fluid balance and neurologic status will be stable or improve  Outcome: Progressing     Problem: Respiratory  Goal: Patient will achieve/maintain optimum respiratory ventilation and gas exchange  Outcome: Progressing     Problem: Urinary Elimination  Goal: Establish and maintain regular urinary output  Outcome: Progressing     Problem: Bowel Elimination  Goal: Establish and maintain regular bowel function  Outcome: Progressing     Problem: Skin Integrity  Goal: Skin integrity is maintained or improved  Outcome: Progressing

## 2024-12-04 NOTE — PROGRESS NOTES
Inpatient Anticoagulation Service Note for 2024    Reason for Anticoagulation: Aortic Mechanical Valve Replacement , Atrial Fibrillation   [id number] VASc Score: 3  HAS-BLED Score: 2    Hemoglobin Value: (!) 7.1  Hematocrit Value: (!) 21.5  Lab Platelet Value: 203  Target INR: 2.5 to 3.5    INR from last 7 days        Date/Time INR Value    24 0111 1.61     24 0112 1.41     24 0208 1.29     24 0101 1.26     24 0303 1.18     24 1216 1.12                   Dose from last 7 days        Date/Time Dose (mg)    24 1310 6     24 1254 4     24 0905 6     24 1232 6     24 1413 6     24 1305 6                   Average Dose (mg):    (Home dosinmg on Mon/Wed/Friday and 4mg all other days)    Significant Interactions:   Not Applicable    Bridge Therapy:   Yes (Parenteral bridge with heparin drip)    Date of Last VTE Event:    (N/A)    Bridge Therapy Start Date:   24    Days of Overlap Therapy:   5     INR Value Greater than 2 Prior to Discontinuation of Parenteral Anticoagulation:   Not Applicable     Reversal Agent Administered:   PCC (Prothrombin Complex Concentrate) (S/p Kcentra on )    Comments:   Patient on warfarin for afib & history of mechanical aortic valve. Patient follows with [facility] outpatient. INR goal is between 2.5 - 3.5 (confirmed via anti-coagulation clinic notes). Home warfarin dosing is 6mg on Mon/Wed/ and 4mg daily all other days. Patient has a Renal diet ordered. No major drug-drug interactions noted. Hgb/hct low. No charted bleed events overnight.     Prior home dosing with stable INR trends as evidenced by therapeutic time in range of 69.9% (over a 7.7 year time frame).      Plan:    Warfarin restarted  after retroperitoneal bleed s/p Kcentra on  and 6u PRBC ( - ).      INR today is sub-therapeutic today at 1.61 (INR day prior was 1.41). INR trending up at at an  appropriate rate given considerations of bleed history.      Will dose warfarin with home dosing of 6mg tonight.      Anticipate INR will continue to trend up. Given recent bleed history and patient being on concomitantly heparin gtt, would like to avoid large INR jumps.      Concomitant parenteral bridging with heparin gtt in addition, as patient is in a hyper-coagulable state given warfarin re-start. Heparin specifically chosen due to recent history of bleed.  Recommend continuing parenteral bridge for at least 5 days AND at least 2 therapeutic INR values.      Education Material Provided?:   No (Established warfarin patient)     Pharmacist suggested discharge dosing:   In the event patient were to discharge, anticipate patient can be discharged on warfarin 6mg Mon/Wed/Friday and 4mg daily all other days. Despite bleed, hesitant to change home dosing given historically stable therapeutic time in range.      Recommend INR 72 hours within discharge with close follow-up with anticoagulation clinic.      Eileen Cartagena, PharmD

## 2024-12-04 NOTE — DISCHARGE PLANNING
"TCN following. HTH/SCP chart reviewed. No new TCN needs identified. Discussed with BOBBI Moon. Please see prior TCN note from 12/2/24 for most recent discharge planning considerations if indicated. Current discharge considerations are for post-acute placement/SNF when medically cleared. As prior,, \"anticipating return to [facility] as patient admitted from this facility\" and [facility] has accepted and SCP authorization for [facility] previously completed and authorization dates have been updated.        TCN will continue to follow and collaborate with discharge planning team as additional post acute needs arise. Thank you.     Completed:  PT continues to recommend post acute placement 12/3   OT continues recommend post acute placement 12/02.   Choice obtained: none  Pt aware of [facility]'s blanket referral policy and blanket referrals sent; pt has multiple accepting SNF facilities (see above)  SCP with [facility] PCP. discussed possible outpatient transitional PCP follow up if indicated and pt in agreement; sent information to assist in scheduling.   "

## 2024-12-04 NOTE — THERAPY
Physical Therapy   Daily Treatment     Patient Name: Yenifer Beasley  Age:  74 y.o., Sex:  female  Medical Record #: [id number]  Today's Date: 12/3/2024     Precautions  Precautions: Fall Risk;Toe Touch Weight Bearing Right Lower Extremity  Comments: R knee ROMAT. significant edema    Assessment    Patient continues to be limited by pain, impaired balance and coordination, functional weakness, edema, and decreased activity tolerance. She mobilized as detailed below. Was able to tolerate sitting EOB for 10-15 minutes; recommend patient mobilize to EOB at least 2x/day with RN staff as able to progress strength and tolerance. Will continue to follow.    Plan    Treatment Plan Status: Continue Current Treatment Plan  Type of Treatment: Bed Mobility, Neuro Re-Education / Balance, Therapeutic Activities, Therapeutic Exercise, Self Care / Home Evaluation  Treatment Frequency: 3 Times per Week  Treatment Duration: Until Therapy Goals Met    DC Equipment Recommendations: Unable to determine at this time  Discharge Recommendations: Recommend post-acute placement for additional physical therapy services prior to discharge home      Subjective    RN cleared patient for therapy; patient received in bed, agreeable to treatment     Objective       12/03/24 1540   Time In/Time Out   Therapy Start Time 1511   Therapy End Time 1540   Total Therapy Time 29   Charge Group   Charges  Yes   PT Therapeutic Activities (Units) 2   Total Time Spent   PT Total Time Yes   PT Therapeutic Activities Time Spent (Mins) 29   PT Total Time Spent (Calculated) 29   Precautions   Precautions Fall Risk;Toe Touch Weight Bearing Right Lower Extremity   Comments R knee ROMAT. significant edema   Vitals   O2 (LPM) 1   O2 Delivery Device Silicone Nasal Cannula   Pain 0 - 10 Group   Location Generalized   Therapist Pain Assessment During Activity;Nurse Notified   Cognition    Cognition / Consciousness WDL   Level of Consciousness Alert   Comments pleasant,  cooperative   Active ROM Lower Body    Comments grossly limited by edema   Balance   Sitting Balance (Static) Fair -   Sitting Balance (Dynamic) Poor +   Weight Shift Sitting Poor   Skilled Intervention Verbal Cuing;Compensatory Strategies;Sequencing;Postural Facilitation   Comments patient utilized BUE for support while sitting EOB   Bed Mobility    Supine to Sit Maximal Assist   Sit to Supine Maximal Assist  (x2)   Scooting Maximal Assist  (x2)   Rolling Maximum Assist to Lt.;Maximal Assist to Rt.   Skilled Intervention Verbal Cuing;Compensatory Strategies;Sequencing;Facilitation;Postural Facilitation   Gait Analysis   Gait Level Of Assist Unable to Participate   Weight Bearing Status TTWB RLE   Functional Mobility   Sit to Stand Unable to Participate   Bed, Chair, Wheelchair Transfer Unable to Participate   Comments patient with limited ability to utilize BUE for lift off of buttocks from bed surface, unable to scoot and unable to perform lateral lean (as would be needed for slide board placement). slide board transfer deferred given performance, significant edema, and decreased activity tolerance   6 Clicks Assessment - How much HELP from from another person do you currently need... (If the patient hasn't done an activity recently, how much help from another person do you think he/she would need if he/she tried?)   Turning from your back to your side while in a flat bed without using bedrails? 1   Moving from lying on your back to sitting on the side of a flat bed without using bedrails? 1   Moving to and from a bed to a chair (including a wheelchair)? 1   Standing up from a chair using your arms (e.g., wheelchair, or bedside chair)? 1   Walking in hospital room? 1   Climbing 3-5 steps with a railing? 1   6 clicks Mobility Score 6   Short Term Goals    Short Term Goal # 1 Pt will perform bed mobility with mod A in 6 visits.   Goal Outcome # 1 goal not met   Short Term Goal # 2 Pt will maintain sitting self  supported x 5 min in 6 visits to improve functional indep.   Goal Outcome # 2 Progressing as expected   Short Term Goal # 3 Pt will perform seated slide board transfer from bed to wc with mod A in 6 visits to improve functional indep.   Goal Outcome # 3 Goal not met   Physical Therapy Treatment Plan   Physical Therapy Treatment Plan Continue Current Treatment Plan   Anticipated Discharge Equipment and Recommendations   DC Equipment Recommendations Unable to determine at this time   Discharge Recommendations Recommend post-acute placement for additional physical therapy services prior to discharge home   Interdisciplinary Plan of Care Collaboration   IDT Collaboration with  Nursing   Patient Position at End of Therapy In Bed;Call Light within Reach;Tray Table within Reach;Phone within Reach   Collaboration Comments RN aware of visit, response   Session Information   Date / Session Number  12/3-4 (1/3, 12/7)

## 2024-12-04 NOTE — PROGRESS NOTES
Monitor Summary  Rhythm: SB - SR, First Degree HB   Rate: 58 - 61  Ectopy: PVC, PAC  .26 / .10 / .44

## 2024-12-04 NOTE — PROGRESS NOTES
Bedside report received from off going RN/tech: PREETHI Armendariz, assumed care of patient.     Fall Risk Score: HIGH RISK  Fall risk interventions in place: Place yellow fall risk ID band on patient, Provide patient/family education based on risk assessment, Educate patient/family to call staff for assistance when getting out of bed, Place fall precaution signage outside patient door, Place patient in room close to nursing station, Utilize bed/chair fall alarm, Notify charge of high risk for huddle, and Bed alarm connected correctly  Bed type: Low air loss (Kamar Score less than 17 interventions in place)  Patient on cardiac monitor: Yes  IVF/IV medications: Infusion per MAR (List Med(s)) heparin gtt  Oxygen: How many liters 1L, Traced the line to wall oxygen, and No oxygen tank in room  Bedside sitter: Not Applicable   Isolation: Not applicable

## 2024-12-04 NOTE — DISCHARGE PLANNING
"TCN following. HTH/SCP chart reviewed. No new TCN needs identified. Please see prior TCN note from 12/2/24 for most recent discharge planning considerations if indicated. Current discharge considerations are for post-acute placement/SNF when medically cleared.  Noted per prior TCN, \"anticipating [facility] to [facility] as patient admitted from this facility\" and [facility] has accepted.  SCP authorization for [facility] previously completed and authorization dates updated.        TCN will continue to follow and collaborate with discharge planning team as additional post acute needs arise. Thank you.    Completed:  PT recommend post acute placement 11/29.   OT recommend post acute placement 12/02.   Choice obtained: none  Pt aware of [facility] blanket referral policy and blanket referrals sent; pt has multiple accepting facilities (see above)  SCP with [facility] PCP. discussed possible outpatient transitional PCP follow up if indicated and pt in agreement; sent information to assist in scheduling.     Addendum:  1648- Per chart review, PT recommends post-acute placement on 12/3/24.    "

## 2024-12-05 ENCOUNTER — APPOINTMENT (OUTPATIENT)
Dept: RADIOLOGY | Facility: MEDICAL CENTER | Age: 74
DRG: 371 | End: 2024-12-05
Attending: PHYSICIAN ASSISTANT
Payer: MEDICARE

## 2024-12-05 ENCOUNTER — APPOINTMENT (OUTPATIENT)
Dept: RADIOLOGY | Facility: MEDICAL CENTER | Age: 74
DRG: 371 | End: 2024-12-05
Attending: ORTHOPAEDIC SURGERY
Payer: MEDICARE

## 2024-12-05 PROBLEM — D72.829 LEUKOCYTOSIS: Status: ACTIVE | Noted: 2024-12-05

## 2024-12-05 LAB
ABO GROUP BLD: ABNORMAL
ANION GAP SERPL CALC-SCNC: 10 MMOL/L (ref 7–16)
BASOPHILS # BLD AUTO: 0.3 % (ref 0–1.8)
BASOPHILS # BLD: 0.05 K/UL (ref 0–0.12)
BLD GP AB SCN SERPL QL: ABNORMAL
BUN SERPL-MCNC: 60 MG/DL (ref 8–22)
CALCIUM SERPL-MCNC: 8.2 MG/DL (ref 8.5–10.5)
CHLORIDE SERPL-SCNC: 95 MMOL/L (ref 96–112)
CO2 SERPL-SCNC: 23 MMOL/L (ref 20–33)
CREAT SERPL-MCNC: 1.53 MG/DL (ref 0.5–1.4)
EOSINOPHIL # BLD AUTO: 0.09 K/UL (ref 0–0.51)
EOSINOPHIL NFR BLD: 0.5 % (ref 0–6.9)
ERYTHROCYTE [DISTWIDTH] IN BLOOD BY AUTOMATED COUNT: 58.6 FL (ref 35.9–50)
GFR SERPLBLD CREATININE-BSD FMLA CKD-EPI: 36 ML/MIN/1.73 M 2
GLUCOSE SERPL-MCNC: 140 MG/DL (ref 65–99)
HCT VFR BLD AUTO: 20.6 % (ref 37–47)
HCT VFR BLD AUTO: 23.9 % (ref 37–47)
HGB BLD-MCNC: 6.5 G/DL (ref 12–16)
HGB BLD-MCNC: 7.7 G/DL (ref 12–16)
IMM GRANULOCYTES # BLD AUTO: 0.19 K/UL (ref 0–0.11)
IMM GRANULOCYTES NFR BLD AUTO: 1.1 % (ref 0–0.9)
INR PPP: 1.68 (ref 0.87–1.13)
LYMPHOCYTES # BLD AUTO: 0.9 K/UL (ref 1–4.8)
LYMPHOCYTES NFR BLD: 5.2 % (ref 22–41)
MCH RBC QN AUTO: 30.7 PG (ref 27–33)
MCHC RBC AUTO-ENTMCNC: 31.6 G/DL (ref 32.2–35.5)
MCV RBC AUTO: 97.2 FL (ref 81.4–97.8)
MONOCYTES # BLD AUTO: 1.27 K/UL (ref 0–0.85)
MONOCYTES NFR BLD AUTO: 7.3 % (ref 0–13.4)
NEUTROPHILS # BLD AUTO: 14.82 K/UL (ref 1.82–7.42)
NEUTROPHILS NFR BLD: 85.6 % (ref 44–72)
NRBC # BLD AUTO: 0 K/UL
NRBC BLD-RTO: 0 /100 WBC (ref 0–0.2)
PLATELET # BLD AUTO: 216 K/UL (ref 164–446)
PMV BLD AUTO: 9.3 FL (ref 9–12.9)
POTASSIUM SERPL-SCNC: 4.4 MMOL/L (ref 3.6–5.5)
PROTHROMBIN TIME: 19.9 SEC (ref 12–14.6)
RBC # BLD AUTO: 2.12 M/UL (ref 4.2–5.4)
RH BLD: ABNORMAL
SODIUM SERPL-SCNC: 128 MMOL/L (ref 135–145)
UFH PPP CHRO-ACNC: 0.47 IU/ML
WBC # BLD AUTO: 17.3 K/UL (ref 4.8–10.8)

## 2024-12-05 PROCEDURE — 85520 HEPARIN ASSAY: CPT

## 2024-12-05 PROCEDURE — 85025 COMPLETE CBC W/AUTO DIFF WBC: CPT

## 2024-12-05 PROCEDURE — 700102 HCHG RX REV CODE 250 W/ 637 OVERRIDE(OP): Performed by: HOSPITALIST

## 2024-12-05 PROCEDURE — 85610 PROTHROMBIN TIME: CPT

## 2024-12-05 PROCEDURE — 85014 HEMATOCRIT: CPT

## 2024-12-05 PROCEDURE — 80048 BASIC METABOLIC PNL TOTAL CA: CPT

## 2024-12-05 PROCEDURE — 700101 HCHG RX REV CODE 250: Performed by: HOSPITALIST

## 2024-12-05 PROCEDURE — 700111 HCHG RX REV CODE 636 W/ 250 OVERRIDE (IP): Performed by: HOSPITALIST

## 2024-12-05 PROCEDURE — 51798 US URINE CAPACITY MEASURE: CPT

## 2024-12-05 PROCEDURE — 86850 RBC ANTIBODY SCREEN: CPT

## 2024-12-05 PROCEDURE — 36415 COLL VENOUS BLD VENIPUNCTURE: CPT

## 2024-12-05 PROCEDURE — 86923 COMPATIBILITY TEST ELECTRIC: CPT

## 2024-12-05 PROCEDURE — 700101 HCHG RX REV CODE 250: Performed by: INTERNAL MEDICINE

## 2024-12-05 PROCEDURE — 86901 BLOOD TYPING SEROLOGIC RH(D): CPT

## 2024-12-05 PROCEDURE — 700102 HCHG RX REV CODE 250 W/ 637 OVERRIDE(OP): Performed by: INTERNAL MEDICINE

## 2024-12-05 PROCEDURE — A9270 NON-COVERED ITEM OR SERVICE: HCPCS | Performed by: HOSPITALIST

## 2024-12-05 PROCEDURE — 86900 BLOOD TYPING SEROLOGIC ABO: CPT

## 2024-12-05 PROCEDURE — 99233 SBSQ HOSP IP/OBS HIGH 50: CPT | Performed by: HOSPITALIST

## 2024-12-05 PROCEDURE — A9270 NON-COVERED ITEM OR SERVICE: HCPCS | Performed by: INTERNAL MEDICINE

## 2024-12-05 PROCEDURE — 770001 HCHG ROOM/CARE - MED/SURG/GYN PRIV*

## 2024-12-05 PROCEDURE — 36430 TRANSFUSION BLD/BLD COMPNT: CPT

## 2024-12-05 PROCEDURE — 73562 X-RAY EXAM OF KNEE 3: CPT | Mod: RT

## 2024-12-05 PROCEDURE — 73552 X-RAY EXAM OF FEMUR 2/>: CPT | Mod: RT

## 2024-12-05 PROCEDURE — P9016 RBC LEUKOCYTES REDUCED: HCPCS

## 2024-12-05 PROCEDURE — 85018 HEMOGLOBIN: CPT

## 2024-12-05 RX ORDER — WARFARIN SODIUM 6 MG/1
6 TABLET ORAL DAILY
Status: DISCONTINUED | OUTPATIENT
Start: 2024-12-05 | End: 2024-12-05

## 2024-12-05 RX ORDER — WARFARIN SODIUM 4 MG/1
8 TABLET ORAL
Status: COMPLETED | OUTPATIENT
Start: 2024-12-05 | End: 2024-12-05

## 2024-12-05 RX ADMIN — METHOCARBAMOL TABLETS 750 MG: 500 TABLET, COATED ORAL at 17:01

## 2024-12-05 RX ADMIN — FUROSEMIDE 20 MG: 10 INJECTION, SOLUTION INTRAVENOUS at 12:35

## 2024-12-05 RX ADMIN — OXYCODONE HYDROCHLORIDE 10 MG: 10 TABLET ORAL at 23:52

## 2024-12-05 RX ADMIN — OXYCODONE HYDROCHLORIDE 10 MG: 10 TABLET ORAL at 20:11

## 2024-12-05 RX ADMIN — WARFARIN SODIUM 8 MG: 6 TABLET ORAL at 17:03

## 2024-12-05 RX ADMIN — LIDOCAINE 1 PATCH: 4 PATCH TOPICAL at 04:06

## 2024-12-05 RX ADMIN — LEVOTHYROXINE SODIUM 125 MCG: 0.12 TABLET ORAL at 04:06

## 2024-12-05 RX ADMIN — OXYCODONE HYDROCHLORIDE 10 MG: 10 TABLET ORAL at 07:38

## 2024-12-05 RX ADMIN — FUROSEMIDE 20 MG: 10 INJECTION, SOLUTION INTRAVENOUS at 04:06

## 2024-12-05 RX ADMIN — LIDOCAINE 2 PATCH: 4 PATCH TOPICAL at 04:05

## 2024-12-05 RX ADMIN — TIMOLOL MALEATE 1 DROP: 5 SOLUTION OPHTHALMIC at 17:04

## 2024-12-05 RX ADMIN — TIMOLOL MALEATE 1 DROP: 5 SOLUTION OPHTHALMIC at 04:06

## 2024-12-05 RX ADMIN — SENNOSIDES AND DOCUSATE SODIUM 2 TABLET: 50; 8.6 TABLET ORAL at 17:02

## 2024-12-05 RX ADMIN — HEPARIN SODIUM 17 UNITS/KG/HR: 5000 INJECTION, SOLUTION INTRAVENOUS at 08:12

## 2024-12-05 RX ADMIN — METHOCARBAMOL TABLETS 750 MG: 500 TABLET, COATED ORAL at 04:06

## 2024-12-05 RX ADMIN — OXYCODONE HYDROCHLORIDE 10 MG: 10 TABLET ORAL at 14:43

## 2024-12-05 RX ADMIN — EZETIMIBE 10 MG: 10 TABLET ORAL at 04:06

## 2024-12-05 RX ADMIN — OXYCODONE HYDROCHLORIDE 10 MG: 10 TABLET ORAL at 00:09

## 2024-12-05 ASSESSMENT — PAIN DESCRIPTION - PAIN TYPE
TYPE: ACUTE PAIN

## 2024-12-05 ASSESSMENT — ENCOUNTER SYMPTOMS
WHEEZING: 0
BLURRED VISION: 0
VOMITING: 0
DIZZINESS: 0
BRUISES/BLEEDS EASILY: 0
HEMOPTYSIS: 0
HEARTBURN: 0
DEPRESSION: 0
DOUBLE VISION: 0
FEVER: 0
PND: 0
MYALGIAS: 0
NAUSEA: 0
CLAUDICATION: 0
COUGH: 0
BACK PAIN: 0
CHILLS: 0
HEADACHES: 0
PALPITATIONS: 0

## 2024-12-05 ASSESSMENT — FIBROSIS 4 INDEX: FIB4 SCORE: 1.94

## 2024-12-05 NOTE — PROGRESS NOTES
Bedside report received from off going RN/tech: Ariella ORO, assumed care of patient.     Fall Risk Score: HIGH RISK  Fall risk interventions in place: Place yellow fall risk ID band on patient, Provide patient/family education based on risk assessment, Educate patient/family to call staff for assistance when getting out of bed, Place fall precaution signage outside patient door, Place patient in room close to nursing station, Utilize bed/chair fall alarm, Notify charge of high risk for huddle, and Bed alarm connected correctly  Bed type: Low air loss (Kamar Score less than 17 interventions in place)  Patient on cardiac monitor: Yes  IVF/IV medications: Infusion per MAR (List Med(s)) heparin gtt  Oxygen: How many liters 1L  Bedside sitter: Not Applicable   Isolation: Not applicable

## 2024-12-05 NOTE — PROGRESS NOTES
Orthopaedic Progress Note    Interval changes:  Patient doing well  RLE staples removed and seristrips placed no issues with incision  Cleared for DC to SNF by ortho pending medicine clearance    ROS - Patient denies any new issues.  Pain well controlled.    /49   Pulse (!) 59   Temp 36.2 °C (97.2 °F) (Temporal)   Resp 18   Ht 1.524 m (5')   Wt 120 kg (265 lb 3.4 oz)   SpO2 94%     Patient seen and examined  No acute distress  Breathing non labored  RRR  RLE staples removed, surgical incision is well approximated and is dry and clean.  There is no erythema, induration, or signs of infection. DNVI, moves all tows, cap refill <2 sec.      Recent Labs     12/03/24  0112 12/03/24  0918 12/04/24  0111 12/04/24  1037 12/05/24  0249   WBC 15.9*  --  15.0*  --  17.3*   RBC 2.48*  --  2.33*  --  2.12*   HEMOGLOBIN 7.8*   < > 7.2* 7.1* 6.5*   HEMATOCRIT 23.5*   < > 22.1* 21.5* 20.6*   MCV 94.8  --  94.8  --  97.2   MCH 31.5  --  30.9  --  30.7   MCHC 33.2  --  32.6  --  31.6*   RDW 55.4*  --  56.1*  --  58.6*   PLATELETCT 196  --  203  --  216   MPV 8.9*  --  8.9*  --  9.3    < > = values in this interval not displayed.       Active Hospital Problems    Diagnosis     Class 3 severe obesity due to excess calories with serious comorbidity and body mass index (BMI) of 40.0 to 44.9 in adult ([facility]) [E66.813, E66.01, Z68.41]      Priority: Medium    Chronic kidney disease (CKD) stage G3b/A1, moderately decreased glomerular filtration rate (GFR) between 30-44 mL/min/1.73 square meter and albuminuria creatinine ratio less than 30 mg/g [N18.32]      Priority: Medium     Chronic, discussed increasing her hydration and avoiding nephrotoxic medications and NSAIDs.  BP is controlled today in clinic 130/70, she has had a longstanding history of prediabetes however most recent A1c was at 5.3.  GFR (CKD-EPI) >60 mL/min/1.73 m 2 34 Abnormal      Bun 8 - 22 mg/dL 38 High     Creatinine 0.50 - 1.40 mg/dL 1.61 High             Paroxysmal atrial fibrillation (HCC) [I48.0]      Priority: Medium     Chronic, continues on Amiodarone 200 mg daily, Torsemide 10 mg daily, Warfarin, Doxazosin 2 mg daily.  She notes that she had a cardioversion done 2 years ago.  Her Coumadin is followed by anticoagulation clinic.      Hx of mechanical aortic valve replacement [V43.3] [Z95.2]      Priority: Medium    Hyponatremia [E87.1]      Priority: Medium    Essential hypertension [I10]      Priority: Medium    HILDA (obstructive sleep apnea) [G47.33]      Priority: Medium     Chronic, she states that she does have sleep apnea and has a CPAP at home however she is having another sleep study done as they feel like she might need oxygen titrated in as her oxygen on the last study showed that it dropped below 80% for 5 to 6 hours throughout the night.  Her sleep study is scheduled for May 21, 2022, and she does see pulmonary Dr. Kim.      CAD (coronary artery disease) [I25.10]      Priority: Medium    Hyperlipidemia [E78.5]      Priority: Medium    Hypothyroidism [E03.9]      Priority: Low     Chronic, continues on Synthroid 125 mcg each morning on an empty stomach.  Denies any symptoms of hyper or hypothyroidism.  We will recheck TSH prior to her next visit.      Leukocytosis [D72.829]     Ileus (HCC) [K56.7]     Hyperkalemia [E87.5]     Retroperitoneal hemorrhage [R58]     Hemorrhagic shock (HCC) [R57.8]     ABLA (acute blood loss anemia) [D62]     Nondisplaced unspecified condyle fracture of lower end of right femur, initial encounter for closed fracture ([facility]) [S72.414A]        Assessment/Plan:  Patient doing well  RLE staples removed and seristrips placed no issues with incision  Cleared for DC to SNF by ortho pending medicine clearance  POD#27 S/P Open treatment with internal fixation of right lateral condyle distal femur fracture.  Wt bearing status - TTWB RLE  Wound care/Drains - open to air  Future Procedures - none planned    Sutures/Staples out- 14-21  days post operatively. Removal will completed by ortho mid levels only.  PT/OT-initiated  Antibiotics: Perioperative completed  DVT Prophylaxis- TEDS/SCDs/Foot pumps/warfarin  Bull-not needed per ortho  Case Coordination for Discharge Planning - Disposition per therapy recs.

## 2024-12-05 NOTE — DISCHARGE PLANNING
[facility] Transitional Care Coordination    Ongoing medical management and therapy needs.  Anticipate post acute services to facilitate safe transition back to community.  Please consider PMR referral to assist with discharge planning.

## 2024-12-05 NOTE — PROGRESS NOTES
Bedside report received from off going RN/tech: Jose, assumed care of patient.     Fall Risk Score: HIGH RISK  Fall risk interventions in place: Place yellow fall risk ID band on patient, Provide patient/family education based on risk assessment, Educate patient/family to call staff for assistance when getting out of bed, Place fall precaution signage outside patient door, Place patient in room close to nursing station, Utilize bed/chair fall alarm, Notify charge of high risk for huddle, and Bed alarm connected correctly  Bed type: Regular (Kamar Score less than 17 interventions in place)  Patient on cardiac monitor: Yes  IVF/IV medications: Infusion per MAR (List Med(s)) heparin @ 17 units/kg/h  Oxygen: How many liters 1L  Bedside sitter: Not Applicable   Isolation: Not applicable

## 2024-12-05 NOTE — PROGRESS NOTES
[facility] Medicine Daily Progress Note    Date of Service  12/5/2024    Chief Complaint  Yenifer Beasley is a 74 y.o. female admitted 11/23/2024 with retroperitoneal hematoma    Hospital Course  Patient is a 74 y.o. female who presented to [facility] on 11/23/2024 with shortness of breath.  Ms. Beasley has a past medical history of atrial fibrillation, mechanical aortic valve replacement on chronic coumadin therapy and was admitted to this hospital from 11/7 through 11/15 with ground level fall and right femur fracture. She underwent open treatment with internal fixation of right lateral condyle distal femur fracture by Dr. Camargo on 11/8. She was given one unit RBCs and was discharged to rehab. On 11/23 she was brought to the ER with hypotension (62/46) and in the ER her Hb was 6.3 and chronic kidney disease Cr 1.78. A CT revealed a 17 cm x 10.7 cm x 9.5 cm retroperitoneal hematoma. Her INR was 2.69. She was given reversal with K centra, transfused 2 units RBCs and admitted to the ICU.       Interval Problem Update  Axox3, she reports mild itching at recent R leg surgical site, I reviewed notes and examined site, appears staples are ready to come out, I spoke with ortho who will come and address. Patient denies dizziness, no sob, hbg decreased some again, will transfuse prbc today, due to mechanical valve need to continue anticoagulation. I discussed plan with pharmacy as inr remains low and they will adjust coumadin dose, will continue heparin bridge for now with ongoing close monitoring. She denies sob, stable on 1L, ongoing stable pain at L hematoma site, clinically dry and mild increase in creatinine, she has a normal ef, I will stop lasix. Continue to follow, also discussed with nursing, needed straight cath overnight but low post void today, continue to follow. ROS otherwise negative    I have discussed this patient's plan of care and discharge plan at IDT rounds today with Case Management, Nursing, Nursing  leadership, and other members of the IDT team.    Consultants/Specialty  Cardiology  Critical care    Code Status  DNAR/DNI    Disposition  Medically Cleared  I have placed the appropriate orders for post-discharge needs.    Review of Systems  Review of Systems   Constitutional:  Positive for malaise/fatigue. Negative for chills and fever.   Eyes:  Negative for blurred vision and double vision.   Respiratory:  Negative for cough, hemoptysis and wheezing.    Cardiovascular:  Negative for chest pain, palpitations, claudication, leg swelling and PND.   Gastrointestinal:  Negative for heartburn, nausea and vomiting.   Genitourinary:  Negative for hematuria and urgency.   Musculoskeletal:  Negative for back pain and myalgias.   Skin:  Negative for rash.   Neurological:  Negative for dizziness and headaches.   Endo/Heme/Allergies:  Does not bruise/bleed easily.   Psychiatric/Behavioral:  Negative for depression.         Physical Exam  Temp:  [36.2 °C (97.2 °F)-37 °C (98.6 °F)] 36.2 °C (97.2 °F)  Pulse:  [55-62] 59  Resp:  [18] 18  BP: ()/(39-50) 108/49  SpO2:  [92 %-98 %] 94 %    Physical Exam  Vitals and nursing note reviewed. Exam conducted with a chaperone present.   Constitutional:       General: She is not in acute distress.     Appearance: Normal appearance. She is obese. She is not ill-appearing or diaphoretic.   HENT:      Head: Normocephalic.      Nose: Nose normal.      Mouth/Throat:      Mouth: Mucous membranes are moist.   Eyes:      General: No scleral icterus.     Conjunctiva/sclera: Conjunctivae normal.      Pupils: Pupils are equal, round, and reactive to light.   Cardiovascular:      Rate and Rhythm: Normal rate and regular rhythm.      Pulses: Normal pulses.      Heart sounds: Normal heart sounds.   Pulmonary:      Effort: Pulmonary effort is normal. No respiratory distress.      Breath sounds: Normal breath sounds.   Abdominal:      General: Abdomen is flat. Bowel sounds are normal. There is no  distension.      Palpations: Abdomen is soft.      Tenderness: There is no abdominal tenderness.      Comments: L lower abdominal bruising stable   Musculoskeletal:         General: No swelling or deformity. Normal range of motion.      Cervical back: Normal range of motion and neck supple.      Right lower leg: Edema present.      Left lower leg: Edema present.      Comments: Right leg, surgical site cdi with staples in place   Skin:     General: Skin is warm and dry.      Capillary Refill: Capillary refill takes less than 2 seconds.      Coloration: Skin is not jaundiced.   Neurological:      General: No focal deficit present.      Mental Status: She is alert and oriented to person, place, and time.      Cranial Nerves: No cranial nerve deficit.   Psychiatric:         Mood and Affect: Mood normal.         Behavior: Behavior normal.         Fluids    Intake/Output Summary (Last 24 hours) at 12/5/2024 1426  Last data filed at 12/5/2024 1200  Gross per 24 hour   Intake 621.42 ml   Output 400 ml   Net 221.42 ml        Laboratory  Recent Labs     12/03/24  0112 12/03/24  0918 12/04/24  0111 12/04/24  1037 12/05/24  0249   WBC 15.9*  --  15.0*  --  17.3*   RBC 2.48*  --  2.33*  --  2.12*   HEMOGLOBIN 7.8*   < > 7.2* 7.1* 6.5*   HEMATOCRIT 23.5*   < > 22.1* 21.5* 20.6*   MCV 94.8  --  94.8  --  97.2   MCH 31.5  --  30.9  --  30.7   MCHC 33.2  --  32.6  --  31.6*   RDW 55.4*  --  56.1*  --  58.6*   PLATELETCT 196  --  203  --  216   MPV 8.9*  --  8.9*  --  9.3    < > = values in this interval not displayed.     Recent Labs     12/03/24  0112 12/04/24  0111 12/05/24  0249   SODIUM 130* 130* 128*   POTASSIUM 4.1 4.1 4.4   CHLORIDE 98 96 95*   CO2 24 24 23   GLUCOSE 131* 132* 140*   BUN 48* 51* 60*   CREATININE 1.21 1.39 1.53*   CALCIUM 8.1* 7.9* 8.2*     Recent Labs     12/03/24  0112 12/04/24  0111 12/05/24  0249   INR 1.41* 1.61* 1.68*               Imaging  [id number] VIEW   Final Result      Slight interval decrease  in marked colonic gaseous distention with the cecal diameter measuring 14.2 cm, previously 14.6 cm. Findings could be related to ileus or distal colonic obstruction.      [id number] VIEW   Final Result         Diffuse gaseous distention of the small bowel and colon, especially the cecum, could relate to ileus.      DX-CHEST-LIMITED (1 VIEW)   Final Result      1.  Dense opacification of the left lower lung. This may represent a combination of consolidation and pleural effusion.   2.  Right base atelectasis.   3.  Support apparatus as above. No pneumothorax seen.      US-EXTREMITY VENOUS LOWER BILAT   Final Result      EC-ECHOCARDIOGRAM COMPLETE W/ CONT   Final Result      CT-RENAL COLIC EVALUATION(A/P W/O)   Final Result   Impression:      1. Large 17 x 10.7 x 9.5 cm hematoma type mixed density fluid collection in the left retroperitoneum and flank abdominal wall. This displaces the left kidney anteriorly. No significant hydronephrosis.      2. Moderate colonic distention similar to 2007 suggests ileus. No obstruction suggestion. Prior gastric surgery.      3. Heavy vascular calcification. No aneurysm. Sternal wires. Aortic valve prosthesis.      4. No kidney stone or hydronephrosis. Bull catheter in empty bladder.      5. Prior cholecystectomy.                        DX-CHEST-PORTABLE (1 VIEW)   Final Result   Impression:      1. Shallow. Mild left base atelectasis.      2. Cardiomegaly. Sternal wires. Cardiac valve prosthesis.                          Assessment/Plan  * Retroperitoneal hemorrhage- (present on admission)  Assessment & Plan  A CT revealed a 17 cm x 10.7 cm x 9.5 cm retroperitoneal hematoma  Spontaneous hemorrhage in the setting of coumadin use  INR 2.69 on admission was reversed and red blood cells were transfused  Hold coumadin  Check Hb and INR in the morning  Continue monitoring hemoglobin level    Repeat inr in am       Leukocytosis  Assessment & Plan  Increasing some  Clinically improving,  suspect from hematoma  Will continue to follow and monitor  Cbc in am     Ileus ([facility])  Assessment & Plan  Resolved  Continue bowel regimen    Hyperkalemia- (present on admission)  Assessment & Plan  resolved    Hemorrhagic shock (HCC)- (present on admission)  Assessment & Plan  Due to retroperitoneal bleed  Requiring emergent blood transfusions  Due to history of metallic aortic valve replacement will help to start Coumadin since patient received Kcentra will help to bridge patient with IV heparin since patient might be pro-coagulative at the beginning of Coumadin treatment.  Continue close monitoring   Continue serial h/h and transfuse if needed    ABLA (acute blood loss anemia)- (present on admission)  Assessment & Plan  Secondary to retroperitoneal hemorrhage requiring blood transfusions.  Continue monitoring, cbc and inr in am ( inr remains below 2 for now - see above addressing again with pharmacy)  Hemoglobin continues to slowly decrease, transfuse prbc today  Coumadin day 7  Cbc and inr in am     Nondisplaced unspecified condyle fracture of lower end of right femur, initial encounter for closed fracture ([facility])- (present on admission)  Assessment & Plan  S/p surgery by Dr. Camargo on 11/8  PT/OT  She likely will require post-acute rehab.    Class 3 severe obesity due to excess calories with serious comorbidity and body mass index (BMI) of 40.0 to 44.9 in adult ([facility])- (present on admission)  Assessment & Plan  BMI 46    Chronic kidney disease (CKD) stage G3b/A1, moderately decreased glomerular filtration rate (GFR) between 30-44 mL/min/1.73 square meter and albuminuria creatinine ratio less than 30 mg/g- (present on admission)  Assessment & Plan  Fluctuating  Avoid nephrotoxins  Bmp in am     Paroxysmal atrial fibrillation (HCC)- (present on admission)  Assessment & Plan  Hx of  Coumadin/ heparin bridge    Hx of mechanical aortic valve replacement [V43.3]- (present on admission)  Assessment & Plan  2007 by   Lv  Continue heparin/coumadin bridge - see above    Hyponatremia- (present on admission)  Assessment & Plan  Fluctuating, stopping lasix, following  Bmp in am       Essential hypertension- (present on admission)  Assessment & Plan  Monitoring    HILDA (obstructive sleep apnea)- (present on admission)  Assessment & Plan  Continue nocturna CPAP    CAD (coronary artery disease)- (present on admission)  Assessment & Plan  No chest pain, continue close monitoring    Hypothyroidism- (present on admission)  Assessment & Plan  Continue supplement         VTE prophylaxis: SCDs    I have performed a physical exam and reviewed and updated ROS and Plan today (12/5/2024). In review of yesterday's note (12/4/2024), there are no changes except as documented above.      Patient is has a high medical complexity, complex decision making and is at high risk for complication, morbidity, and mortality.

## 2024-12-05 NOTE — THERAPY
"Occupational Therapy  Daily Treatment     Patient Name: Yenifer Beasley  Age:  74 y.o., Sex:  female  Medical Record #: [id number]  Today's Date: 12/4/2024     Precautions  Precautions: Fall Risk, Toe Touch Weight Bearing Right Lower Extremity  Comments: ROMAT R LE    Assessment    Pt seen for OT treatment. Pt required max A for bed mobility, mod A (increased from last OT session) for lateral scooting, mod A to don socks using AE, and min A to brush hair while seated EOB. Pt reported that she was tired and felt she was \"having an off day\", however was still very motivated to work with therapy. Pt tolerated sitting EOB for ~15-20 minutes prior to fatiguing this date. Pt current functional performance limited by generalized weakness, impaired activity tolerance, impaired balance, and pain. Pt will continue to benefit from skilled OT while admitted to acute care.     Plan    Treatment Plan Status: Continue Current Treatment Plan  Type of Treatment: Self Care / Activities of Daily Living, Adaptive Equipment, Neuro Re-Education / Balance, Therapeutic Exercises, Therapeutic Activity  Treatment Frequency: 3 Times per Week  Treatment Duration: Until Therapy Goals Met    DC Equipment Recommendations: Unable to determine at this time  Discharge Recommendations: Recommend post-acute placement for additional occupational therapy services prior to discharge home     Subjective    \"I have just not been having a great day\"    Objective     12/04/24 1435   Pain   Pain Scales 0 to 10 Scale    Pain 0 - 10 Group   Therapist Pain Assessment During Activity;Nurse Notified  (R LE pain, not rated, agreeable to session)   Non Verbal Descriptors   Non Verbal Scale  Calm   Cognition    Cognition / Consciousness WDL   Level of Consciousness Alert   Comments Very pleasant, cooperative, motivated to work with therapy   Balance   Sitting Balance (Static) Fair -   Sitting Balance (Dynamic) Fair -   Weight Shift Sitting Poor   Skilled " Intervention Verbal Cuing;Sequencing;Tactile Cuing;Facilitation   Comments x2 assist for safety   Bed Mobility    Supine to Sit Maximal Assist   Sit to Supine Maximal Assist   Scooting Maximal Assist   Rolling Maximal Assist to Rt.;Maximum Assist to Lt.   Skilled Intervention Verbal Cuing;Tactile Cuing;Sequencing;Facilitation   Comments x2 assist for safety, introduced use of sheet to aid moving LE   Activities of Daily Living   Grooming Minimal Assist;Seated  (hair brushing)   Lower Body Dressing Moderate Assist  (don/doff socks with AE)   Skilled Intervention Verbal Cuing;Tactile Cuing;Postural Facilitation;Sequencing;Facilitation;Compensatory Strategies   Functional Mobility   Sit to Stand Unable to Participate   Mobility EOB>lateral scooting>supine   Skilled Intervention Verbal Cuing;Tactile Cuing;Sequencing;Postural Facilitation;Facilitation;Compensatory Strategies   Comments required increased assist for lateral scooting compared to prior session with this therapist, slideboard defered due to this   Visual Perception   Visual Perception  Not Tested   Activity Tolerance   Sitting in Chair NT   Sitting Edge of Bed ~20 min   Standing NT   Comments limited by weakness, fatigue   Patient / Family Goals   Patient / Family Goal #1 to go home   Goal #1 Outcome Progressing as expected   Short Term Goals   Short Term Goal # 1 Pt will perform ADL transfer w/ min A   Goal Outcome # 1 Progressing slower than expected   Short Term Goal # 2 Pt will perform LB dressing w/ AE PRN and min A   Goal Outcome # 2 Progressing as expected   Short Term Goal # 3 Pt will tolerate sitting EOB for >10 min in prep for ADL tasks   Goal Outcome # 3 Progressing as expected   Short Term Goal # 4 Pt will perform seated g/h w/ supv   Goal Outcome # 4 Progressing as expected   Education Group   Education Provided Role of Occupational Therapist;Activities of Daily Living;Pathology of bedrest   Role of Occupational Therapist Patient Response  Patient;Acceptance;Explanation;Verbal Demonstration   ADL Patient Response Patient;Acceptance;Explanation;Demonstration;Action Demonstration;Verbal Demonstration   Pathology of Bedrest Patient Response Patient;Acceptance;Explanation;Verbal Demonstration

## 2024-12-05 NOTE — CARE PLAN
The patient is Stable - Low risk of patient condition declining or worsening    Shift Goals  Clinical Goals: diurese, safety, turns  Patient Goals: rest, pain control  Family Goals: man    Progress made toward(s) clinical / shift goals:    Problem: Knowledge Deficit - Standard  Goal: Patient and family/care givers will demonstrate understanding of plan of care, disease process/condition, diagnostic tests and medications  Outcome: Progressing     Problem: Pain - Standard  Goal: Alleviation of pain or a reduction in pain to the patient’s comfort goal  Outcome: Progressing     Problem: Fall Risk  Goal: Patient will remain free from falls  Outcome: Progressing     Problem: Psychosocial  Goal: Patient's level of anxiety will decrease  Outcome: Progressing     Problem: Hemodynamics  Goal: Patient's hemodynamics, fluid balance and neurologic status will be stable or improve  Outcome: Progressing     Problem: Respiratory  Goal: Patient will achieve/maintain optimum respiratory ventilation and gas exchange  Outcome: Progressing     Problem: Urinary - Renal Perfusion  Goal: Ability to achieve and maintain adequate renal perfusion and functioning will improve  Outcome: Progressing       Patient is not progressing towards the following goals:

## 2024-12-05 NOTE — DISCHARGE PLANNING
"TCN following. HTH/SCP chart reviewed. As prior, no new TCN needs identified. Discussed with BOBBI Moon. Please see prior TCN note from 12/2/24 for most recent discharge planning considerations if indicated. Current discharge considerations are for post-acute placement/SNF when medically cleared. As prior, \"anticipating [facility] to [facility] as patient admitted from this facility\" and [facility] has accepted and SCP authorization for [facility] previously completed and authorization dates have been updated.        TCN will continue to follow and collaborate with discharge planning team as additional post acute needs arise. Thank you.     Completed:  PT continues to recommend post acute placement 12/3   OT continues recommend post acute placement 12/4.   Choice obtained: none  Pt aware of [facility]'s blanket referral policy and blanket referrals sent; pt has multiple accepting SNF facilities (see above)  SCP with [facility] PCP. discussed possible outpatient transitional PCP follow up if indicated and pt in agreement; sent information to assist in scheduling.   "

## 2024-12-05 NOTE — CARE PLAN
The patient is Stable - Low risk of patient condition declining or worsening    Shift Goals  Clinical Goals: diuresis, pain management, monitor urinary retention  Patient Goals: pain control/rest  Family Goals: man    Progress made toward(s) clinical / shift goals:    Problem: Knowledge Deficit - Standard  Goal: Patient and family/care givers will demonstrate understanding of plan of care, disease process/condition, diagnostic tests and medications  Outcome: Progressing  Note: Discuss and review POC with patient/family. Re-educate as needed.     Problem: Fall Risk  Goal: Patient will remain free from falls  Outcome: Progressing  Note: Fall risk assessment complete. Fall precautions implemented; bed alarm on, patient wearing non-slip socks, call light within reach, hourly rounding in progress, and tolieting needs assessed.        Patient is not progressing towards the following goals:

## 2024-12-05 NOTE — ASSESSMENT & PLAN NOTE
stable  Clinically improving, suspect from hematoma  Will continue to follow and monitor  Cbc in am

## 2024-12-05 NOTE — PROGRESS NOTES
Inpatient Anticoagulation Service Note for 2024      Reason for Anticoagulation: Aortic Mechanical Valve Replacement  , Atrial Fibrillation   HPT2MG4 VASc Score: 3  HAS-BLED Score: 2    Hemoglobin Value: (!) 6.5  Hematocrit Value: (!) 20.6  Lab Platelet Value: 216  Target INR: 2.5 to 3.5    INR from last 7 days        Date/Time INR Value    24 0249 1.68     24 0111 1.61     24 0112 1.41     24 0208 1.29     24 0101 1.26     24 0303 1.18     24 1216 1.12                   Dose from last 7 days        Date/Time Dose (mg)    24 1255 8     24 1310 6     24 1254 4     24 0905 6     24 1232 6     24 1413 6     24 1305 6                   Average Dose (mg):  (Home dosinmg on Mon/Wed/Friday and 4mg all other days)  Significant Interactions: Not Applicable  Bridge Therapy: Yes  Date of Last VTE Event:  (N/A)  Bridge Therapy Start Date: 24  Days of Overlap Therapy: 6   INR Value Greater than 2 Prior to Discontinuation of Parenteral Anticoagulation: Not Applicable     Reversal Agent Administered: PCC (Prothrombin Complex Concentrate) (s/p Kcentra on )    A/P:  Patient on warfarin for afib & history of mechanical aortic valve. Follows with [facility] outpatient. INR goal is between 2.5 - 3.5 (confirmed via anti-coagulation clinic notes). Home warfarin dosing is 6mg on Mon/Wed/ and 4mg daily all other days. Patient has a Renal diet ordered. No major drug-drug interactions noted. Hgb/hct low. No charted bleed events overnight.     INR remains subtherapeutic at 1.68. Will give increased dose of 8mg tonight (instead of 4mg dose). Of note pt has received 40mg in the past 7 days (17% increase from home dose), so hesitant to increase dose too much at this time. Will repeat INR tomorrow AM.    Education Material Provided?: No (Established warfarin patient)    Pharmacist suggested discharge dosing: TBD, likely  home dose of 6mg on Mon/Wed/Friday and 4mg all other days      Nga Smith, EmilyD

## 2024-12-06 LAB
ANION GAP SERPL CALC-SCNC: 9 MMOL/L (ref 7–16)
BASOPHILS # BLD AUTO: 0.4 % (ref 0–1.8)
BASOPHILS # BLD: 0.07 K/UL (ref 0–0.12)
BUN SERPL-MCNC: 68 MG/DL (ref 8–22)
CALCIUM SERPL-MCNC: 8.1 MG/DL (ref 8.5–10.5)
CHLORIDE SERPL-SCNC: 94 MMOL/L (ref 96–112)
CO2 SERPL-SCNC: 23 MMOL/L (ref 20–33)
CREAT SERPL-MCNC: 1.94 MG/DL (ref 0.5–1.4)
EOSINOPHIL # BLD AUTO: 0.2 K/UL (ref 0–0.51)
EOSINOPHIL NFR BLD: 1.2 % (ref 0–6.9)
ERYTHROCYTE [DISTWIDTH] IN BLOOD BY AUTOMATED COUNT: 61.1 FL (ref 35.9–50)
GFR SERPLBLD CREATININE-BSD FMLA CKD-EPI: 27 ML/MIN/1.73 M 2
GLUCOSE SERPL-MCNC: 116 MG/DL (ref 65–99)
HCT VFR BLD AUTO: 21.8 % (ref 37–47)
HGB BLD-MCNC: 7.1 G/DL (ref 12–16)
IMM GRANULOCYTES # BLD AUTO: 0.27 K/UL (ref 0–0.11)
IMM GRANULOCYTES NFR BLD AUTO: 1.7 % (ref 0–0.9)
INR PPP: 2.03 (ref 0.87–1.13)
LYMPHOCYTES # BLD AUTO: 1.12 K/UL (ref 1–4.8)
LYMPHOCYTES NFR BLD: 6.9 % (ref 22–41)
MCH RBC QN AUTO: 30.1 PG (ref 27–33)
MCHC RBC AUTO-ENTMCNC: 32.6 G/DL (ref 32.2–35.5)
MCV RBC AUTO: 92.4 FL (ref 81.4–97.8)
MONOCYTES # BLD AUTO: 1.36 K/UL (ref 0–0.85)
MONOCYTES NFR BLD AUTO: 8.3 % (ref 0–13.4)
NEUTROPHILS # BLD AUTO: 13.27 K/UL (ref 1.82–7.42)
NEUTROPHILS NFR BLD: 81.5 % (ref 44–72)
NRBC # BLD AUTO: 0 K/UL
NRBC BLD-RTO: 0 /100 WBC (ref 0–0.2)
PLATELET # BLD AUTO: 222 K/UL (ref 164–446)
PMV BLD AUTO: 8.9 FL (ref 9–12.9)
POTASSIUM SERPL-SCNC: 4.6 MMOL/L (ref 3.6–5.5)
PROTHROMBIN TIME: 23 SEC (ref 12–14.6)
RBC # BLD AUTO: 2.36 M/UL (ref 4.2–5.4)
SODIUM SERPL-SCNC: 126 MMOL/L (ref 135–145)
UFH PPP CHRO-ACNC: 0.4 IU/ML
WBC # BLD AUTO: 16.3 K/UL (ref 4.8–10.8)

## 2024-12-06 PROCEDURE — 85520 HEPARIN ASSAY: CPT

## 2024-12-06 PROCEDURE — 700101 HCHG RX REV CODE 250: Performed by: INTERNAL MEDICINE

## 2024-12-06 PROCEDURE — 85610 PROTHROMBIN TIME: CPT

## 2024-12-06 PROCEDURE — 700102 HCHG RX REV CODE 250 W/ 637 OVERRIDE(OP): Performed by: HOSPITALIST

## 2024-12-06 PROCEDURE — A9270 NON-COVERED ITEM OR SERVICE: HCPCS | Performed by: INTERNAL MEDICINE

## 2024-12-06 PROCEDURE — 700102 HCHG RX REV CODE 250 W/ 637 OVERRIDE(OP): Performed by: INTERNAL MEDICINE

## 2024-12-06 PROCEDURE — 700101 HCHG RX REV CODE 250: Performed by: HOSPITALIST

## 2024-12-06 PROCEDURE — 99233 SBSQ HOSP IP/OBS HIGH 50: CPT | Performed by: HOSPITALIST

## 2024-12-06 PROCEDURE — A9270 NON-COVERED ITEM OR SERVICE: HCPCS | Performed by: HOSPITALIST

## 2024-12-06 PROCEDURE — 51798 US URINE CAPACITY MEASURE: CPT

## 2024-12-06 PROCEDURE — 97530 THERAPEUTIC ACTIVITIES: CPT | Mod: CQ

## 2024-12-06 PROCEDURE — 770006 HCHG ROOM/CARE - MED/SURG/GYN SEMI*

## 2024-12-06 PROCEDURE — 85025 COMPLETE CBC W/AUTO DIFF WBC: CPT

## 2024-12-06 PROCEDURE — 700111 HCHG RX REV CODE 636 W/ 250 OVERRIDE (IP): Performed by: HOSPITALIST

## 2024-12-06 PROCEDURE — 80048 BASIC METABOLIC PNL TOTAL CA: CPT

## 2024-12-06 PROCEDURE — 36415 COLL VENOUS BLD VENIPUNCTURE: CPT

## 2024-12-06 RX ORDER — WARFARIN SODIUM 6 MG/1
6 TABLET ORAL
Status: DISCONTINUED | OUTPATIENT
Start: 2024-12-06 | End: 2024-12-10 | Stop reason: HOSPADM

## 2024-12-06 RX ORDER — WARFARIN SODIUM 4 MG/1
4 TABLET ORAL
Status: DISCONTINUED | OUTPATIENT
Start: 2024-12-07 | End: 2024-12-10 | Stop reason: HOSPADM

## 2024-12-06 RX ADMIN — OXYCODONE HYDROCHLORIDE 10 MG: 10 TABLET ORAL at 04:13

## 2024-12-06 RX ADMIN — TIMOLOL MALEATE 1 DROP: 5 SOLUTION OPHTHALMIC at 17:05

## 2024-12-06 RX ADMIN — LIDOCAINE 2 PATCH: 4 PATCH TOPICAL at 04:13

## 2024-12-06 RX ADMIN — HEPARIN SODIUM 17 UNITS/KG/HR: 5000 INJECTION, SOLUTION INTRAVENOUS at 19:35

## 2024-12-06 RX ADMIN — HYDROMORPHONE HYDROCHLORIDE 0.5 MG: 1 INJECTION, SOLUTION INTRAMUSCULAR; INTRAVENOUS; SUBCUTANEOUS at 19:50

## 2024-12-06 RX ADMIN — OXYCODONE HYDROCHLORIDE 10 MG: 10 TABLET ORAL at 17:05

## 2024-12-06 RX ADMIN — LEVOTHYROXINE SODIUM 125 MCG: 0.12 TABLET ORAL at 04:14

## 2024-12-06 RX ADMIN — SENNOSIDES AND DOCUSATE SODIUM 2 TABLET: 50; 8.6 TABLET ORAL at 17:05

## 2024-12-06 RX ADMIN — EZETIMIBE 10 MG: 10 TABLET ORAL at 04:13

## 2024-12-06 RX ADMIN — TIMOLOL MALEATE 1 DROP: 5 SOLUTION OPHTHALMIC at 04:15

## 2024-12-06 RX ADMIN — WARFARIN SODIUM 6 MG: 6 TABLET ORAL at 17:05

## 2024-12-06 RX ADMIN — METHOCARBAMOL TABLETS 750 MG: 500 TABLET, COATED ORAL at 13:37

## 2024-12-06 RX ADMIN — LIDOCAINE 1 PATCH: 4 PATCH TOPICAL at 04:13

## 2024-12-06 RX ADMIN — OXYCODONE HYDROCHLORIDE 10 MG: 10 TABLET ORAL at 10:58

## 2024-12-06 RX ADMIN — HEPARIN SODIUM 17 UNITS/KG/HR: 5000 INJECTION, SOLUTION INTRAVENOUS at 00:50

## 2024-12-06 ASSESSMENT — FIBROSIS 4 INDEX
FIB4 SCORE: 1.89
FIB4 SCORE: 1.89

## 2024-12-06 ASSESSMENT — PAIN DESCRIPTION - PAIN TYPE
TYPE: ACUTE PAIN

## 2024-12-06 ASSESSMENT — ENCOUNTER SYMPTOMS
HEARTBURN: 0
WHEEZING: 0
CHILLS: 0
PALPITATIONS: 0
BLURRED VISION: 0
DEPRESSION: 0
DIZZINESS: 0
HEADACHES: 0
DOUBLE VISION: 0
BACK PAIN: 0
PND: 0
VOMITING: 0
MYALGIAS: 0
FEVER: 0
HEMOPTYSIS: 0
CLAUDICATION: 0
BRUISES/BLEEDS EASILY: 0
COUGH: 0
NAUSEA: 0

## 2024-12-06 ASSESSMENT — GAIT ASSESSMENTS: GAIT LEVEL OF ASSIST: UNABLE TO PARTICIPATE

## 2024-12-06 ASSESSMENT — COGNITIVE AND FUNCTIONAL STATUS - GENERAL
CLIMB 3 TO 5 STEPS WITH RAILING: TOTAL
STANDING UP FROM CHAIR USING ARMS: TOTAL
TURNING FROM BACK TO SIDE WHILE IN FLAT BAD: TOTAL
MOVING TO AND FROM BED TO CHAIR: TOTAL
MOBILITY SCORE: 6
SUGGESTED CMS G CODE MODIFIER MOBILITY: CN
WALKING IN HOSPITAL ROOM: TOTAL
MOVING FROM LYING ON BACK TO SITTING ON SIDE OF FLAT BED: TOTAL

## 2024-12-06 NOTE — DISCHARGE PLANNING
"TCN following. HTH/SCP chart reviewed. As prior, no new TCN needs identified. Please see prior TCN note from 12/2/24 for most recent discharge planning considerations if indicated. Current discharge considerations remain for post-acute placement/SNF when medically cleared. As prior, \"anticipating [facility] to [facility] as patient [facility] facility\" and [facility] has accepted and S[facility] authorization for [facility] previously completed and authorization dates have been updated.        TCN will continue to follow and collaborate with discharge planning team as additional post acute needs arise. Thank you.     Completed:  PT continues to recommend post acute placement 12/3   OT continues recommend post acute placement 12/4.   Choice obtained: none  Pt aware of [facility] blanket referral policy and blanket referrals sent; pt has multiple accepting SNF facilities (see above)  SCP with [facility] PCP. discussed possible outpatient transitional PCP follow up if indicated and pt in agreement; sent information to assist in scheduling.  "

## 2024-12-06 NOTE — THERAPY
"Physical Therapy   Daily Treatment     Patient Name: Yenifer Beasley  Age:  74 y.o., Sex:  female  Medical Record #: [id number]  Today's Date: 12/6/2024     Precautions  Precautions: Fall Risk;Toe Touch Weight Bearing Right Lower Extremity  Comments: ROMAT R LE    Assessment    Pt greeted and seen for PT treatment. Pt presenting more fatigued than usual. She needs MaxA x2 person for bed mobility. With time she was able to maintain her sitting balance. MaxA for lateral scooting EOB. Pt currently limited by impaired balance, decreased strength and decreased activity tolerance which negatively impacts functional mobility. Pt will continue to benefit from skilled PT to address deficits.       Plan    Treatment Plan Status: Continue Current Treatment Plan  Type of Treatment: Bed Mobility, Neuro Re-Education / Balance, Therapeutic Activities, Therapeutic Exercise, Self Care / Home Evaluation  Treatment Frequency: 3 Times per Week  Treatment Duration: Until Therapy Goals Met    DC Equipment Recommendations: Unable to determine at this time  Discharge Recommendations: Recommend post-acute placement for additional physical therapy services prior to discharge home      Subjective  \"I always feel better after working with therapy\"       12/06/24 1520   Vitals   Pulse Oximetry 97 %   O2 (LPM) 0   O2 Delivery Device Room air w/o2 available   Pain 0 - 10 Group   Therapist Pain Assessment Post Activity Pain Same as Prior to Activity;Nurse Notified  (no c/o pain)   Cognition    Level of Consciousness Alert   Comments Very pleasant, cooperative, motivated to work with therapy   Supine Lower Body Exercise   Supine Lower Body Exercises Yes   Short Arc Quad 1 set of 10;Right ;Left   Balance   Sitting Balance (Static) Fair -   Sitting Balance (Dynamic) Fair -   Weight Shift Sitting Poor   Skilled Intervention Verbal Cuing;Tactile Cuing;Sequencing;Facilitation;Compensatory Strategies   Comments x2 assist   Bed Mobility    Supine to Sit " Maximal Assist   Sit to Supine Maximal Assist   Scooting Maximal Assist   Rolling Maximal Assist to Rt.;Maximum Assist to Lt.   Skilled Intervention Verbal Cuing;Tactile Cuing;Sequencing;Facilitation;Compensatory Strategies   Comments pt sleeps in chair at baseline   Gait Analysis   Gait Level Of Assist Unable to Participate   Functional Mobility   Sit to Stand Unable to Participate   Bed, Chair, Wheelchair Transfer Unable to Participate   Mobility scooting EOB   Skilled Intervention Verbal Cuing;Facilitation;Sequencing;Tactile Cuing;Compensatory Strategies   Comments Pt presenting more fatigued than usual. She initially needed posterior support to sit EOB but progressed to scooting with assist.   Short Term Goals    Short Term Goal # 1 Pt will perform bed mobility with mod A in 6 visits.   Goal Outcome # 1 goal not met   Short Term Goal # 2 Pt will maintain sitting self supported x 5 min in 6 visits to improve functional indep.   Goal Outcome # 2 Progressing as expected   Short Term Goal # 3 Pt will perform seated slide board transfer from bed to wc with mod A in 6 visits to improve functional indep.   Goal Outcome # 3 Goal not met   Supervising Physical Therapist (PTA Treatments Only)   Supervising Physical Therapist Salbador Atkinson            Principal Discharge DX:	Closed displaced fracture of left acetabulum, unspecified portion of acetabulum, initial encounter

## 2024-12-06 NOTE — PROGRESS NOTES
Assumed care of patient and received report from day shift RN. A&O x 4. VSS. Pt is medical. Plan of care discussed with patient and verbalized understanding. Bed locked and in lowest position. Pt educated to fall risk and fall precautions in place. Call light within reach. All questions answered and no other needs indicated at this time.

## 2024-12-06 NOTE — PROGRESS NOTES
[facility] Medicine Daily Progress Note    Date of Service  12/6/2024    Chief Complaint  Yenifer Beasley is a 74 y.o. female admitted 11/23/2024 with retroperitoneal hematoma    Hospital Course  Patient is a 74 y.o. female who presented to [facility] on 11/23/2024 with shortness of breath.  Ms. Beasley has a past medical history of atrial fibrillation, mechanical aortic valve replacement on chronic coumadin therapy and was admitted to this hospital from 11/7 through 11/15 with ground level fall and right femur fracture. She underwent open treatment with internal fixation of right lateral condyle distal femur fracture by Dr. Camargo on 11/8. She was given one unit RBCs and was discharged to rehab. On 11/23 she was brought to the ER with hypotension (62/46) and in the ER her Hb was 6.3 and chronic kidney disease Cr 1.78. A CT revealed a 17 cm x 10.7 cm x 9.5 cm retroperitoneal hematoma. Her INR was 2.69. She was given reversal with K centra, transfused 2 units RBCs and admitted to the ICU.       Interval Problem Update  Axox3, states she is feeling much better today, r leg staples out, pain at hematoma site much improving, voiding bowel and bladder, sister's at bedside stable on room air, creat did increase small amount but euvolemic on exam. ROS otherwise negative. Highly complex    I have discussed this patient's plan of care and discharge plan at IDT rounds today with Case Management, Nursing, Nursing leadership, and other members of the IDT team.    Consultants/Specialty  Cardiology  Critical care    Code Status  DNAR/DNI    Disposition  The patient is not medically cleared for discharge to home or a post-acute facility.      I have placed the appropriate orders for post-discharge needs.    Review of Systems  Review of Systems   Constitutional:  Positive for malaise/fatigue. Negative for chills and fever.   Eyes:  Negative for blurred vision and double vision.   Respiratory:  Negative for cough, hemoptysis and  wheezing.    Cardiovascular:  Negative for chest pain, palpitations, claudication, leg swelling and PND.   Gastrointestinal:  Negative for heartburn, nausea and vomiting.   Genitourinary:  Negative for hematuria and urgency.   Musculoskeletal:  Negative for back pain and myalgias.   Skin:  Negative for rash.   Neurological:  Negative for dizziness and headaches.   Endo/Heme/Allergies:  Does not bruise/bleed easily.   Psychiatric/Behavioral:  Negative for depression.         Physical Exam  Temp:  [36.4 °C (97.6 °F)-37 °C (98.6 °F)] 36.4 °C (97.6 °F)  Pulse:  [58-64] 59  Resp:  [16-18] 16  BP: (123-137)/(46-55) 137/51  SpO2:  [94 %-100 %] 97 %    Physical Exam  Vitals and nursing note reviewed. Exam conducted with a chaperone present.   Constitutional:       General: She is not in acute distress.     Appearance: Normal appearance. She is obese. She is not ill-appearing or diaphoretic.   HENT:      Head: Normocephalic.      Nose: Nose normal.      Mouth/Throat:      Mouth: Mucous membranes are moist.   Eyes:      General: No scleral icterus.     Conjunctiva/sclera: Conjunctivae normal.      Pupils: Pupils are equal, round, and reactive to light.   Cardiovascular:      Rate and Rhythm: Normal rate and regular rhythm.      Pulses: Normal pulses.      Heart sounds: Normal heart sounds.   Pulmonary:      Effort: Pulmonary effort is normal. No respiratory distress.      Breath sounds: Normal breath sounds.   Abdominal:      General: Abdomen is flat. Bowel sounds are normal. There is no distension.      Palpations: Abdomen is soft.      Tenderness: There is no abdominal tenderness.      Comments: L lower abdominal bruising stable   Musculoskeletal:         General: No swelling or deformity. Normal range of motion.      Cervical back: Normal range of motion and neck supple.      Right lower leg: Edema present.      Left lower leg: Edema present.      Comments: Right leg, recent surgical site cdi, steri strips on place    Skin:     General: Skin is warm and dry.      Capillary Refill: Capillary refill takes less than 2 seconds.      Coloration: Skin is not jaundiced.   Neurological:      General: No focal deficit present.      Mental Status: She is alert and oriented to person, place, and time.      Cranial Nerves: No cranial nerve deficit.   Psychiatric:         Mood and Affect: Mood normal.         Behavior: Behavior normal.         Fluids    Intake/Output Summary (Last 24 hours) at 12/6/2024 1541  Last data filed at 12/6/2024 0930  Gross per 24 hour   Intake 410 ml   Output 0 ml   Net 410 ml        Laboratory  Recent Labs     12/04/24  0111 12/04/24  1037 12/05/24  0249 12/05/24  1730 12/06/24  0326   WBC 15.0*  --  17.3*  --  16.3*   RBC 2.33*  --  2.12*  --  2.36*   HEMOGLOBIN 7.2*   < > 6.5* 7.7* 7.1*   HEMATOCRIT 22.1*   < > 20.6* 23.9* 21.8*   MCV 94.8  --  97.2  --  92.4   MCH 30.9  --  30.7  --  30.1   MCHC 32.6  --  31.6*  --  32.6   RDW 56.1*  --  58.6*  --  61.1*   PLATELETCT 203  --  216  --  222   MPV 8.9*  --  9.3  --  8.9*    < > = values in this interval not displayed.     Recent Labs     12/04/24  0111 12/05/24  0249 12/06/24  0326   SODIUM 130* 128* 126*   POTASSIUM 4.1 4.4 4.6   CHLORIDE 96 95* 94*   CO2 24 23 23   GLUCOSE 132* 140* 116*   BUN 51* 60* 68*   CREATININE 1.39 1.53* 1.94*   CALCIUM 7.9* 8.2* 8.1*     Recent Labs     12/04/24  0111 12/05/24  0249 12/06/24  0326   INR 1.61* 1.68* 2.03*               Imaging  DX-KNEE 3 VIEWS RIGHT   Final Result         Redemonstration of fracture of the lateral femoral condyle with plate and screw fixation. No definite healing change seen.         DX-FEMUR-2+ RIGHT   Final Result         Redemonstration of fracture of the lateral femoral condyle with plate and screw fixation. No definite healing change seen.      AY-[id number]-5 VIEW   Final Result      Slight interval decrease in marked colonic gaseous distention with the cecal diameter measuring 14.2 cm, previously  14.6 cm. Findings could be related to ileus or distal colonic obstruction.      QT-[id number]-7 VIEW   Final Result         Diffuse gaseous distention of the small bowel and colon, especially the cecum, could relate to ileus.      DX-CHEST-LIMITED (1 VIEW)   Final Result      1.  Dense opacification of the left lower lung. This may represent a combination of consolidation and pleural effusion.   2.  Right base atelectasis.   3.  Support apparatus as above. No pneumothorax seen.      US-EXTREMITY VENOUS LOWER BILAT   Final Result      EC-ECHOCARDIOGRAM COMPLETE W/ CONT   Final Result      CT-RENAL COLIC EVALUATION(A/P W/O)   Final Result   Impression:      1. Large 17 x 10.7 x 9.5 cm hematoma type mixed density fluid collection in the left retroperitoneum and flank abdominal wall. This displaces the left kidney anteriorly. No significant hydronephrosis.      2. Moderate colonic distention similar to 2007 suggests ileus. No obstruction suggestion. Prior gastric surgery.      3. Heavy vascular calcification. No aneurysm. Sternal wires. Aortic valve prosthesis.      4. No kidney stone or hydronephrosis. Bull catheter in empty bladder.      5. Prior cholecystectomy.                        DX-CHEST-PORTABLE (1 VIEW)   Final Result   Impression:      1. Shallow. Mild left base atelectasis.      2. Cardiomegaly. Sternal wires. Cardiac valve prosthesis.                          Assessment/Plan  * Retroperitoneal hemorrhage- (present on admission)  Assessment & Plan  A CT revealed a 17 cm x 10.7 cm x 9.5 cm retroperitoneal hematoma  Spontaneous hemorrhage in the setting of coumadin use  INR 2.69 on admission was reversed and red blood cells were transfused  Hold coumadin  Check Hb and INR in the morning  Continue monitoring hemoglobin level    Repeat inr in am       Leukocytosis  Assessment & Plan  Increasing some  Clinically improving, suspect from hematoma  Will continue to follow and monitor  Cbc in am     Ileus  (HCC)  Assessment & Plan  Resolved  Continue bowel regimen    Hyperkalemia- (present on admission)  Assessment & Plan  resolved    Hemorrhagic shock (HCC)- (present on admission)  Assessment & Plan  Due to retroperitoneal bleed  Requiring emergent blood transfusions  Due to history of metallic aortic valve replacement will help to start Coumadin since patient received Kcentra will help to bridge patient with IV heparin since patient might be pro-coagulative at the beginning of Coumadin treatment.  Continue close monitoring   Continue serial h/h and transfuse if needed    ABLA (acute blood loss anemia)- (present on admission)  Assessment & Plan  Secondary to retroperitoneal hemorrhage requiring blood transfusions.  Continue monitoring, cbc and inr in am   Hemoglobin in stable range, hematoma clinically improving  Coumadin day 8, inr finally at 2, if remains 2 or higher in the am can stop iv heparin   Cbc and inr in am     Nondisplaced unspecified condyle fracture of lower end of right femur, initial encounter for closed fracture ([facility])- (present on admission)  Assessment & Plan  S/p surgery by Dr. Camargo on 11/8  PT/OT  She likely will require post-acute rehab.    Class 3 severe obesity due to excess calories with serious comorbidity and body mass index (BMI) of 40.0 to 44.9 in adult (HCC)- (present on admission)  Assessment & Plan  BMI 46    Chronic kidney disease (CKD) stage G3b/A1, moderately decreased glomerular filtration rate (GFR) between 30-44 mL/min/1.73 square meter and albuminuria creatinine ratio less than 30 mg/g- (present on admission)  Assessment & Plan  Fluctuating, lasix contributed, did get am dose yesterday before it was stopped, so will stay off lasix and repeat bmp in am   Avoid nephrotoxins      Paroxysmal atrial fibrillation (HCC)- (present on admission)  Assessment & Plan  Hx of  Coumadin/ heparin bridge    Hx of mechanical aortic valve replacement [V43.3]- (present on admission)  Assessment &  Plan  2007 by Dr. Awan  Continue heparin/coumadin bridge - see above    Hyponatremia- (present on admission)  Assessment & Plan  Fluctuating, decreased overnight, will check osmols and serum na, following  Bmp in am       Essential hypertension- (present on admission)  Assessment & Plan  Monitoring    HILDA (obstructive sleep apnea)- (present on admission)  Assessment & Plan  Continue nocturna CPAP    CAD (coronary artery disease)- (present on admission)  Assessment & Plan  No chest pain, continue close monitoring    Hypothyroidism- (present on admission)  Assessment & Plan  Continue supplement         VTE prophylaxis: SCDs    I have performed a physical exam and reviewed and updated ROS and Plan today (12/6/2024). In review of yesterday's note (12/5/2024), there are no changes except as documented above.      Patient is has a high medical complexity, complex decision making and is at high risk for complication, morbidity, and mortality.

## 2024-12-06 NOTE — PROGRESS NOTES
Bedside report received from off going RN/tech: Kalani ORO, assumed care of patient.     Fall Risk Score: HIGH RISK  Fall risk interventions in place: Place yellow fall risk ID band on patient, Provide patient/family education based on risk assessment, Educate patient/family to call staff for assistance when getting out of bed, Place fall precaution signage outside patient door, Place patient in room close to nursing station, Utilize bed/chair fall alarm, Notify charge of high risk for huddle, and Bed alarm connected correctly  Bed type: Low air loss (Kaamr Score less than 17 interventions in place)  Patient on cardiac monitor: No   IVF/IV medications: Not Applicable   Oxygen: How many liters 2L  Bedside sitter: Not Applicable   Isolation: Not applicable

## 2024-12-06 NOTE — CARE PLAN
The patient is Stable - Low risk of patient condition declining or worsening    Shift Goals  Clinical Goals: hemodynamic stabililty, pain management  Patient Goals: sleep  Family Goals: man    Progress made toward(s) clinical / shift goals:    Problem: Knowledge Deficit - Standard  Goal: Patient and family/care givers will demonstrate understanding of plan of care, disease process/condition, diagnostic tests and medications  Outcome: Progressing     Problem: Pain - Standard  Goal: Alleviation of pain or a reduction in pain to the patient’s comfort goal  Outcome: Progressing     Problem: Fall Risk  Goal: Patient will remain free from falls  Outcome: Progressing     Problem: Psychosocial  Goal: Patient's level of anxiety will decrease  Outcome: Progressing     Problem: Hemodynamics  Goal: Patient's hemodynamics, fluid balance and neurologic status will be stable or improve  Outcome: Progressing     Problem: Respiratory  Goal: Patient will achieve/maintain optimum respiratory ventilation and gas exchange  Outcome: Progressing     Problem: Venous Thromboembolism (VTE) Prevention  Goal: The patient will remain free from venous thromboembolism (VTE)  Outcome: Progressing       Patient is not progressing towards the following goals:

## 2024-12-06 NOTE — DIETARY
Nutrition Services: Update   Day 13 of admit.  Yenifer Beasley is a 74 y.o. female with admitting DX of Retroperitoneal hemorrhage.    Poor PO intake noted per chart review.    Pt is currently on Renal diet with free water restrictions. Providing Ensure Compact with meals. Recorded PO intake of meals has been minimal, but pt in drinking the Ensure. Per pt, the food tastes bland, but she does really like the Ensure. Obtained menu order for dinner.    Inadequate oral intake related to poor appetite as evidenced by recorded PO intake of meals <25%.    Recommendations/Plan:  Adjust supplements to Ensure Plus High Protein to provide more nutrition while intake of meals is poor.   Encourage intake of meals and supplements >50%.  Document intake of all meals as % taken in ADL's to provide interdisciplinary communication across all shifts.    RD following

## 2024-12-06 NOTE — PROGRESS NOTES
Bedside report received from off going RN/tech: Jose, assumed care of patient.     Fall Risk Score: HIGH RISK  Fall risk interventions in place: Place yellow fall risk ID band on patient, Provide patient/family education based on risk assessment, Educate patient/family to call staff for assistance when getting out of bed, Place fall precaution signage outside patient door, Utilize bed/chair fall alarm, Notify charge of high risk for huddle, and Bed alarm connected correctly  Bed type: Low air loss (Kamar Score less than 17 interventions in place)  Patient on cardiac monitor: No   IVF/IV medications: Infusion per MAR (List Med(s)) heparin @ 17 units/kg/h  Oxygen: How many liters 1L  Bedside sitter: Not Applicable   Isolation: Not applicable

## 2024-12-06 NOTE — CARE PLAN
The patient is Stable - Low risk of patient condition declining or worsening    Shift Goals  Clinical Goals: monitor h&h, monitor heparin gtt  Patient Goals: rest, comfort  Family Goals: pt comfort    Progress made toward(s) clinical / shift goals:  pt is a&o x4. O2 sats maintained on room air. Pain medication given per MAR. IV patent and saline locked. PT/OT worked with pt this shift.    Patient is not progressing towards the following goals: Bull placed due to retention.

## 2024-12-06 NOTE — PROGRESS NOTES
4 Eyes Skin Assessment Completed by PREETHI Castano and Candis RN.    Head WDL  Ears WDL  Nose wounds to bilateral nose  Mouth scab to lip  Neck WDL  Breast/Chest WDL  Shoulder Blades WDL  Spine WDL  (R) Arm/Elbow/Hand WDL  (L) Arm/Elbow/Hand non-blanching redness, swelling  Abdomen redness to pannus  Groin WDL  Scrotum/Coccyx/Buttocks Redness and Blanching  (R) Leg Swelling, incision with steri strips  (L) Leg Swelling  (R) Heel/Foot/Toe Redness, Blanching, and Swelling  (L) Heel/Foot/Toe Redness, Blanching, and Swelling          Devices In Places IV, NC      Interventions In Place Gray Ear Foams, Heel Mepilex, and Low Air Loss Mattress    Possible Skin Injury Yes    Pictures Uploaded Into Epic Yes  Wound Consult Placed N/A - already placed  RN Wound Prevention Protocol Ordered No

## 2024-12-07 LAB
ANION GAP SERPL CALC-SCNC: 10 MMOL/L (ref 7–16)
APTT PPP: 202.4 SEC (ref 24.7–36)
BASOPHILS # BLD AUTO: 0.4 % (ref 0–1.8)
BASOPHILS # BLD: 0.06 K/UL (ref 0–0.12)
BUN SERPL-MCNC: 66 MG/DL (ref 8–22)
CALCIUM SERPL-MCNC: 8.2 MG/DL (ref 8.5–10.5)
CHLORIDE SERPL-SCNC: 95 MMOL/L (ref 96–112)
CO2 SERPL-SCNC: 23 MMOL/L (ref 20–33)
CREAT SERPL-MCNC: 1.71 MG/DL (ref 0.5–1.4)
EOSINOPHIL # BLD AUTO: 0.06 K/UL (ref 0–0.51)
EOSINOPHIL NFR BLD: 0.4 % (ref 0–6.9)
ERYTHROCYTE [DISTWIDTH] IN BLOOD BY AUTOMATED COUNT: 58.9 FL (ref 35.9–50)
GFR SERPLBLD CREATININE-BSD FMLA CKD-EPI: 31 ML/MIN/1.73 M 2
GLUCOSE SERPL-MCNC: 126 MG/DL (ref 65–99)
HCT VFR BLD AUTO: 21.6 % (ref 37–47)
HGB BLD-MCNC: 7.1 G/DL (ref 12–16)
IMM GRANULOCYTES # BLD AUTO: 0.27 K/UL (ref 0–0.11)
IMM GRANULOCYTES NFR BLD AUTO: 1.7 % (ref 0–0.9)
INR PPP: 2.32 (ref 0.87–1.13)
INR PPP: 2.42 (ref 0.87–1.13)
LYMPHOCYTES # BLD AUTO: 1.01 K/UL (ref 1–4.8)
LYMPHOCYTES NFR BLD: 6.4 % (ref 22–41)
MCH RBC QN AUTO: 30.3 PG (ref 27–33)
MCHC RBC AUTO-ENTMCNC: 32.9 G/DL (ref 32.2–35.5)
MCV RBC AUTO: 92.3 FL (ref 81.4–97.8)
MONOCYTES # BLD AUTO: 1.11 K/UL (ref 0–0.85)
MONOCYTES NFR BLD AUTO: 7 % (ref 0–13.4)
NEUTROPHILS # BLD AUTO: 13.29 K/UL (ref 1.82–7.42)
NEUTROPHILS NFR BLD: 84.1 % (ref 44–72)
NRBC # BLD AUTO: 0 K/UL
NRBC BLD-RTO: 0 /100 WBC (ref 0–0.2)
OSMOLALITY SERPL: 291 MOSM/KG H2O (ref 278–298)
PLATELET # BLD AUTO: 258 K/UL (ref 164–446)
PMV BLD AUTO: 8.9 FL (ref 9–12.9)
POTASSIUM SERPL-SCNC: 4.7 MMOL/L (ref 3.6–5.5)
PROTHROMBIN TIME: 25.6 SEC (ref 12–14.6)
PROTHROMBIN TIME: 26.4 SEC (ref 12–14.6)
RBC # BLD AUTO: 2.34 M/UL (ref 4.2–5.4)
SODIUM SERPL-SCNC: 128 MMOL/L (ref 135–145)
UFH PPP CHRO-ACNC: 0.38 IU/ML
WBC # BLD AUTO: 15.8 K/UL (ref 4.8–10.8)

## 2024-12-07 PROCEDURE — 700111 HCHG RX REV CODE 636 W/ 250 OVERRIDE (IP): Performed by: HOSPITALIST

## 2024-12-07 PROCEDURE — A9270 NON-COVERED ITEM OR SERVICE: HCPCS | Performed by: HOSPITALIST

## 2024-12-07 PROCEDURE — 700102 HCHG RX REV CODE 250 W/ 637 OVERRIDE(OP): Performed by: HOSPITALIST

## 2024-12-07 PROCEDURE — A9270 NON-COVERED ITEM OR SERVICE: HCPCS | Performed by: INTERNAL MEDICINE

## 2024-12-07 PROCEDURE — 700102 HCHG RX REV CODE 250 W/ 637 OVERRIDE(OP): Performed by: INTERNAL MEDICINE

## 2024-12-07 PROCEDURE — 83930 ASSAY OF BLOOD OSMOLALITY: CPT

## 2024-12-07 PROCEDURE — 85610 PROTHROMBIN TIME: CPT

## 2024-12-07 PROCEDURE — 700111 HCHG RX REV CODE 636 W/ 250 OVERRIDE (IP): Mod: JZ | Performed by: HOSPITALIST

## 2024-12-07 PROCEDURE — 700101 HCHG RX REV CODE 250: Performed by: HOSPITALIST

## 2024-12-07 PROCEDURE — 80048 BASIC METABOLIC PNL TOTAL CA: CPT

## 2024-12-07 PROCEDURE — 85520 HEPARIN ASSAY: CPT

## 2024-12-07 PROCEDURE — 85730 THROMBOPLASTIN TIME PARTIAL: CPT

## 2024-12-07 PROCEDURE — 700101 HCHG RX REV CODE 250: Performed by: INTERNAL MEDICINE

## 2024-12-07 PROCEDURE — 770006 HCHG ROOM/CARE - MED/SURG/GYN SEMI*

## 2024-12-07 PROCEDURE — 36415 COLL VENOUS BLD VENIPUNCTURE: CPT

## 2024-12-07 PROCEDURE — 85025 COMPLETE CBC W/AUTO DIFF WBC: CPT

## 2024-12-07 PROCEDURE — 99233 SBSQ HOSP IP/OBS HIGH 50: CPT | Performed by: HOSPITALIST

## 2024-12-07 RX ORDER — FUROSEMIDE 10 MG/ML
40 INJECTION INTRAMUSCULAR; INTRAVENOUS ONCE
Status: COMPLETED | OUTPATIENT
Start: 2024-12-07 | End: 2024-12-07

## 2024-12-07 RX ADMIN — OXYCODONE HYDROCHLORIDE 10 MG: 10 TABLET ORAL at 07:51

## 2024-12-07 RX ADMIN — HYDROMORPHONE HYDROCHLORIDE 0.5 MG: 1 INJECTION, SOLUTION INTRAMUSCULAR; INTRAVENOUS; SUBCUTANEOUS at 04:43

## 2024-12-07 RX ADMIN — LIDOCAINE 2 PATCH: 4 PATCH TOPICAL at 06:00

## 2024-12-07 RX ADMIN — SENNOSIDES AND DOCUSATE SODIUM 2 TABLET: 50; 8.6 TABLET ORAL at 17:25

## 2024-12-07 RX ADMIN — HYDROMORPHONE HYDROCHLORIDE 0.5 MG: 1 INJECTION, SOLUTION INTRAMUSCULAR; INTRAVENOUS; SUBCUTANEOUS at 09:18

## 2024-12-07 RX ADMIN — OXYCODONE HYDROCHLORIDE 10 MG: 10 TABLET ORAL at 19:52

## 2024-12-07 RX ADMIN — OXYCODONE HYDROCHLORIDE 10 MG: 10 TABLET ORAL at 12:09

## 2024-12-07 RX ADMIN — LIDOCAINE 1 PATCH: 4 PATCH TOPICAL at 04:44

## 2024-12-07 RX ADMIN — LEVOTHYROXINE SODIUM 125 MCG: 0.12 TABLET ORAL at 04:44

## 2024-12-07 RX ADMIN — HYDROMORPHONE HYDROCHLORIDE 0.5 MG: 1 INJECTION, SOLUTION INTRAMUSCULAR; INTRAVENOUS; SUBCUTANEOUS at 13:22

## 2024-12-07 RX ADMIN — POLYETHYLENE GLYCOL 3350 1 PACKET: 17 POWDER, FOR SOLUTION ORAL at 17:25

## 2024-12-07 RX ADMIN — HEPARIN SODIUM 17 UNITS/KG/HR: 5000 INJECTION, SOLUTION INTRAVENOUS at 13:17

## 2024-12-07 RX ADMIN — TIMOLOL MALEATE 1 DROP: 5 SOLUTION OPHTHALMIC at 17:25

## 2024-12-07 RX ADMIN — TIMOLOL MALEATE 1 DROP: 5 SOLUTION OPHTHALMIC at 12:02

## 2024-12-07 RX ADMIN — FUROSEMIDE 40 MG: 10 INJECTION, SOLUTION INTRAVENOUS at 15:16

## 2024-12-07 RX ADMIN — OXYCODONE HYDROCHLORIDE 10 MG: 10 TABLET ORAL at 03:36

## 2024-12-07 RX ADMIN — METHOCARBAMOL TABLETS 750 MG: 500 TABLET, COATED ORAL at 06:26

## 2024-12-07 RX ADMIN — EZETIMIBE 10 MG: 10 TABLET ORAL at 04:44

## 2024-12-07 RX ADMIN — WARFARIN SODIUM 4 MG: 4 TABLET ORAL at 17:25

## 2024-12-07 ASSESSMENT — COGNITIVE AND FUNCTIONAL STATUS - GENERAL
STANDING UP FROM CHAIR USING ARMS: TOTAL
PERSONAL GROOMING: A LOT
CLIMB 3 TO 5 STEPS WITH RAILING: TOTAL
SUGGESTED CMS G CODE MODIFIER DAILY ACTIVITY: CL
MOVING TO AND FROM BED TO CHAIR: TOTAL
MOBILITY SCORE: 6
MOVING FROM LYING ON BACK TO SITTING ON SIDE OF FLAT BED: TOTAL
DRESSING REGULAR LOWER BODY CLOTHING: TOTAL
SUGGESTED CMS G CODE MODIFIER MOBILITY: CN
WALKING IN HOSPITAL ROOM: TOTAL
TURNING FROM BACK TO SIDE WHILE IN FLAT BAD: TOTAL
TOILETING: A LOT
DAILY ACTIVITIY SCORE: 13
DRESSING REGULAR UPPER BODY CLOTHING: A LOT
HELP NEEDED FOR BATHING: A LOT

## 2024-12-07 ASSESSMENT — ENCOUNTER SYMPTOMS
LOSS OF CONSCIOUSNESS: 0
DIARRHEA: 0
PALPITATIONS: 0
CHILLS: 0
FEVER: 0
ABDOMINAL PAIN: 1
DIZZINESS: 0
HEADACHES: 0
SHORTNESS OF BREATH: 0
NAUSEA: 0
BACK PAIN: 1
VOMITING: 0
COUGH: 0

## 2024-12-07 ASSESSMENT — PAIN DESCRIPTION - PAIN TYPE
TYPE: ACUTE PAIN
TYPE: ACUTE PAIN;CHRONIC PAIN
TYPE: ACUTE PAIN
TYPE: ACUTE PAIN;CHRONIC PAIN
TYPE: ACUTE PAIN
TYPE: ACUTE PAIN
TYPE: ACUTE PAIN;CHRONIC PAIN

## 2024-12-07 ASSESSMENT — PATIENT HEALTH QUESTIONNAIRE - PHQ9
SUM OF ALL RESPONSES TO PHQ9 QUESTIONS 1 AND 2: 0
2. FEELING DOWN, DEPRESSED, IRRITABLE, OR HOPELESS: NOT AT ALL
1. LITTLE INTEREST OR PLEASURE IN DOING THINGS: NOT AT ALL

## 2024-12-07 NOTE — CARE PLAN
The patient is Stable - Low risk of patient condition declining or worsening    Shift Goals  Clinical Goals: Pts pain will be managed to a 7/10 throughout shift  Patient Goals: pain control and rest  Family Goals: HEATHER    Progress made toward(s) clinical / shift goals:  Pts pain was maintained to a 7/10 throughout shift due to pharmacologic (see MAR) and non pharmacologic (rest, repositioning, communication, hourly rounding) intervention.    Patient is not progressing towards the following goals: NA

## 2024-12-07 NOTE — CARE PLAN
The patient is Stable - Low risk of patient condition declining or worsening    Shift Goals  Clinical Goals: Monitor H&H and heparin gtt protocol  Patient Goals: Rest comfortably  Family Goals: HEATHER    Progress made toward(s) clinical / shift goals:  Discussed with patient medications available to her to control her pain, she is progressing her overall knowledge of medications for pain and asks for them appropriately from members of the healthcare team.       Problem: Knowledge Deficit - Standard  Goal: Patient and family/care givers will demonstrate understanding of plan of care, disease process/condition, diagnostic tests and medications  Outcome: Progressing     Problem: Pain - Standard  Goal: Alleviation of pain or a reduction in pain to the patient’s comfort goal  Outcome: Progressing     Problem: Fall Risk  Goal: Patient will remain free from falls  Outcome: Progressing

## 2024-12-07 NOTE — DISCHARGE PLANNING
DPA LM for  [facility] with [facility] to see if there is a bed available for pt today per LSW CM request.

## 2024-12-07 NOTE — PROGRESS NOTES
4 Eyes Skin Assessment Completed by PREETHI Perez and PREETHI Torres.    Head WDL  Ears WDL  Nose redness from glasses  Mouth abrasion to lip  Neck WDL old IJ dressing  Breast/Chest Redness, yeast  Shoulder Blades WDL  Spine WDL  (R) Arm/Elbow/Hand Redness and Bruising  (L) Arm/Elbow/Hand Redness and Bruising  Abdomen WDL  Groin Redness and Excoriation  Scrotum/Coccyx/Buttocks Redness  (R) Leg Redness, Bruising, and Incision  (L) Leg Swelling and Edema  (R) Heel/Foot/Toe Swelling and Edema, redness, boggy  (L) Heel/Foot/Toe Swelling and Edema, redness, boggy          Devices In Places Blood Pressure Cuff, Pulse Ox, and Bull      Interventions In Place Heel Mepilex, TAP System, Pillows, Q2 Turns, and Low Air Loss Mattress    Possible Skin Injury Yes    Pictures Uploaded Into Epic Yes  Wound Consult Placed Yes  RN Wound Prevention Protocol Ordered Yes

## 2024-12-07 NOTE — DISCHARGE PLANNING
Case Management Discharge Planning    Admission Date: 11/23/2024  GMLOS: 5.3  ALOS: 14    6-Clicks ADL Score: 13  6-Clicks Mobility Score: 6  PT and/or OT Eval ordered: Yes  Post-acute Referrals Ordered: Yes  Post-acute Choice Obtained: Yes  Has referral(s) been sent to post-acute provider:  Yes      Anticipated Discharge Dispo: Discharge Disposition: D/T to SNF with Medicare cert in anticipation of skilled care (03)    DME Needed: No    Action(s) Taken: LMSW was updated by MD that the patient's Hgb is stable and she is medically cleared to DC. LMSW requested for the DPA to reach out to [facility] for placement.     Escalations Completed: None    Medically Clear: Yes    Next Steps: LMSW to follow until DC.     Barriers to Discharge: Pending Placement    Is the patient up for discharge tomorrow: No

## 2024-12-07 NOTE — PROGRESS NOTES
Inpatient Anticoagulation Service Note for 2024      Reason for Anticoagulation: Aortic Mechanical Valve Replacement  , Atrial Fibrillation   [id number] VASc Score: 3  HAS-BLED Score: 2    Hemoglobin Value: (!) 7.1  Hematocrit Value: (!) 21.6  Lab Platelet Value: 258  Target INR: 2.5 to 3.5    INR from last 7 days        Date/Time INR Value    24 0821 2.32     24 0456 2.42     24 0326 2.03     24 0249 1.68     24 0111 1.61     24 0112 1.41     24 0208 1.29     24 0101 1.26                   Dose from last 7 days        Date/Time Dose (mg)    24 1229 4     24 1609 6     24 1255 8     24 1310 6     24 1254 4     24 0905 6     24 1232 6     24 1413 6                   Average Dose (mg):  (Home dosinmg on Mon/Wed/Friday and 4mg all other days)  Significant Interactions: Not Applicable  Bridge Therapy: Yes  Date of Last VTE Event:  (N/A)  Bridge Therapy Start Date: 24  Days of Overlap Therapy: 8 - heparin drip stopped on  at 1330    Reversal Agent Administered: PCC (Prothrombin Complex Concentrate) (s/p Kcentra on ) and vitamin K 10 mg IV on  and PRBC given on 24    Patient is on warfarin for history of a-fib and mechanical aortic valve. Managed by [facility] anticoagulation clinic. Goal targeting 2.5 - 3.5. Disease concerns regarding warfarin sensitivity include retroperitoneal hemorrhage requiring kcentra and vitamin K 10 mg IV on 2024. Diet ordered. H/H low but stable no overt s/sx of bleeding.     Plan: INR 2.42 and 2.32 on 2024 subtherapeutic per goal 2.5 to 3.5 heparin drip discontinued on  per discussion with provider. Warfarin 4 mg dose to be given on  in evening. INR in AM       Education Material Provided?: No (Established warfarin patient)    Pharmacist suggested discharge dosing: likely home dose of 6mg on Mon/Wed/Friday and 4mg all other days. Recommend follow up INR  within 48-72 hours of discharge      JACOB Herrera, EmilyD

## 2024-12-07 NOTE — PROGRESS NOTES
Inpatient Anticoagulation Service Note for 2024      Reason for Anticoagulation: Aortic Mechanical Valve Replacement  , Atrial Fibrillation   [id number] VASc Score: 3  HAS-BLED Score: 2    Hemoglobin Value: (!) 7.1  Hematocrit Value: (!) 21.8  Lab Platelet Value: 222  Target INR: 2.5 to 3.5    INR from last 7 days        Date/Time INR Value    24 0326 2.03     24 0249 1.68     24 0111 1.61     24 0112 1.41     24 0208 1.29     24 0101 1.26     24 0303 1.18                   Dose from last 7 days        Date/Time Dose (mg)    24 1609 6     24 1255 8     24 1310 6     24 1254 4     24 0905 6     24 1232 6     24 1413 6                   Average Dose (mg):  (Home dosinmg on Mon/Wed/Friday and 4mg all other days)  Significant Interactions: Not Applicable  Bridge Therapy: Yes  Date of Last VTE Event:  (N/A)  Bridge Therapy Start Date: 24  Days of Overlap Therapy: 7   INR Value Greater than 2 Prior to Discontinuation of Parenteral Anticoagulation: Not Applicable     Reversal Agent Administered: PCC (Prothrombin Complex Concentrate) (s/p Kcentra on )    A/P:  Patient on warfarin for afib & history of mechanical aortic valve. Follows with [facility] outpatient. INR goal is between 2.5 - 3.5 (confirmed via anti-coagulation clinic notes). Home warfarin dosing is 6mg on Mon/Wed/ and 4mg daily all other days. Patient has a Renal diet ordered. No major drug-drug interactions noted. Hgb/hct low, transfused one unit PRBC yesterday. No charted bleed events overnight.      INR remains subtherapeutic at 2.03, increasing appropriately. Will resume home dosing regimen, 6mg tonight. Will repeat INR tomorrow AM.     Education Material Provided?: No (Established warfarin patient)     Pharmacist suggested discharge dosing: TBD, likely home dose of 6mg on Mon/Wed/Friday and 4mg all other days. Recommend follow up INR  within 48-72 hours of discharge      Nga Smith, PharmD

## 2024-12-08 LAB
ANION GAP SERPL CALC-SCNC: 11 MMOL/L (ref 7–16)
BARCODED ABORH UBTYP: 600
BARCODED ABORH UBTYP: 6200
BARCODED PRD CODE UBPRD: NORMAL
BARCODED PRD CODE UBPRD: NORMAL
BARCODED UNIT NUM UBUNT: NORMAL
BARCODED UNIT NUM UBUNT: NORMAL
BASOPHILS # BLD AUTO: 0.3 % (ref 0–1.8)
BASOPHILS # BLD: 0.04 K/UL (ref 0–0.12)
BUN SERPL-MCNC: 64 MG/DL (ref 8–22)
CALCIUM SERPL-MCNC: 8.2 MG/DL (ref 8.5–10.5)
CHLORIDE SERPL-SCNC: 97 MMOL/L (ref 96–112)
CO2 SERPL-SCNC: 21 MMOL/L (ref 20–33)
COMPONENT R 8504R: NORMAL
COMPONENT R 8504R: NORMAL
CREAT SERPL-MCNC: 1.52 MG/DL (ref 0.5–1.4)
EOSINOPHIL # BLD AUTO: 0.03 K/UL (ref 0–0.51)
EOSINOPHIL NFR BLD: 0.2 % (ref 0–6.9)
ERYTHROCYTE [DISTWIDTH] IN BLOOD BY AUTOMATED COUNT: 59.1 FL (ref 35.9–50)
GFR SERPLBLD CREATININE-BSD FMLA CKD-EPI: 36 ML/MIN/1.73 M 2
GLUCOSE SERPL-MCNC: 120 MG/DL (ref 65–99)
HCT VFR BLD AUTO: 21.3 % (ref 37–47)
HGB BLD-MCNC: 7.1 G/DL (ref 12–16)
IMM GRANULOCYTES # BLD AUTO: 0.21 K/UL (ref 0–0.11)
IMM GRANULOCYTES NFR BLD AUTO: 1.4 % (ref 0–0.9)
INR PPP: 2.11 (ref 0.87–1.13)
LYMPHOCYTES # BLD AUTO: 0.92 K/UL (ref 1–4.8)
LYMPHOCYTES NFR BLD: 6.1 % (ref 22–41)
MCH RBC QN AUTO: 30.9 PG (ref 27–33)
MCHC RBC AUTO-ENTMCNC: 33.3 G/DL (ref 32.2–35.5)
MCV RBC AUTO: 92.6 FL (ref 81.4–97.8)
MONOCYTES # BLD AUTO: 1.12 K/UL (ref 0–0.85)
MONOCYTES NFR BLD AUTO: 7.4 % (ref 0–13.4)
NEUTROPHILS # BLD AUTO: 12.85 K/UL (ref 1.82–7.42)
NEUTROPHILS NFR BLD: 84.6 % (ref 44–72)
NRBC # BLD AUTO: 0 K/UL
NRBC BLD-RTO: 0 /100 WBC (ref 0–0.2)
PLATELET # BLD AUTO: 291 K/UL (ref 164–446)
PMV BLD AUTO: 8.8 FL (ref 9–12.9)
POTASSIUM SERPL-SCNC: 4.7 MMOL/L (ref 3.6–5.5)
PRODUCT TYPE UPROD: NORMAL
PRODUCT TYPE UPROD: NORMAL
PROTHROMBIN TIME: 23.8 SEC (ref 12–14.6)
RBC # BLD AUTO: 2.3 M/UL (ref 4.2–5.4)
SODIUM SERPL-SCNC: 129 MMOL/L (ref 135–145)
UNIT STATUS USTAT: NORMAL
UNIT STATUS USTAT: NORMAL
WBC # BLD AUTO: 15.2 K/UL (ref 4.8–10.8)

## 2024-12-08 PROCEDURE — 700102 HCHG RX REV CODE 250 W/ 637 OVERRIDE(OP): Performed by: HOSPITALIST

## 2024-12-08 PROCEDURE — A9270 NON-COVERED ITEM OR SERVICE: HCPCS | Performed by: INTERNAL MEDICINE

## 2024-12-08 PROCEDURE — 36415 COLL VENOUS BLD VENIPUNCTURE: CPT

## 2024-12-08 PROCEDURE — A9270 NON-COVERED ITEM OR SERVICE: HCPCS | Performed by: HOSPITALIST

## 2024-12-08 PROCEDURE — P9016 RBC LEUKOCYTES REDUCED: HCPCS

## 2024-12-08 PROCEDURE — 99233 SBSQ HOSP IP/OBS HIGH 50: CPT | Performed by: HOSPITALIST

## 2024-12-08 PROCEDURE — 700101 HCHG RX REV CODE 250: Performed by: HOSPITALIST

## 2024-12-08 PROCEDURE — 700102 HCHG RX REV CODE 250 W/ 637 OVERRIDE(OP): Performed by: INTERNAL MEDICINE

## 2024-12-08 PROCEDURE — 700101 HCHG RX REV CODE 250: Performed by: INTERNAL MEDICINE

## 2024-12-08 PROCEDURE — 85025 COMPLETE CBC W/AUTO DIFF WBC: CPT

## 2024-12-08 PROCEDURE — 36430 TRANSFUSION BLD/BLD COMPNT: CPT

## 2024-12-08 PROCEDURE — 770006 HCHG ROOM/CARE - MED/SURG/GYN SEMI*

## 2024-12-08 PROCEDURE — 85610 PROTHROMBIN TIME: CPT

## 2024-12-08 PROCEDURE — 80048 BASIC METABOLIC PNL TOTAL CA: CPT

## 2024-12-08 PROCEDURE — 86923 COMPATIBILITY TEST ELECTRIC: CPT

## 2024-12-08 PROCEDURE — 700111 HCHG RX REV CODE 636 W/ 250 OVERRIDE (IP): Mod: JZ | Performed by: HOSPITALIST

## 2024-12-08 RX ORDER — FUROSEMIDE 10 MG/ML
40 INJECTION INTRAMUSCULAR; INTRAVENOUS
Status: DISCONTINUED | OUTPATIENT
Start: 2024-12-08 | End: 2024-12-10 | Stop reason: HOSPADM

## 2024-12-08 RX ADMIN — OXYCODONE HYDROCHLORIDE 10 MG: 10 TABLET ORAL at 12:03

## 2024-12-08 RX ADMIN — SENNOSIDES AND DOCUSATE SODIUM 2 TABLET: 50; 8.6 TABLET ORAL at 16:53

## 2024-12-08 RX ADMIN — EZETIMIBE 10 MG: 10 TABLET ORAL at 05:09

## 2024-12-08 RX ADMIN — LEVOTHYROXINE SODIUM 125 MCG: 0.12 TABLET ORAL at 05:09

## 2024-12-08 RX ADMIN — WARFARIN SODIUM 4 MG: 4 TABLET ORAL at 16:53

## 2024-12-08 RX ADMIN — OXYCODONE HYDROCHLORIDE 10 MG: 10 TABLET ORAL at 05:09

## 2024-12-08 RX ADMIN — OXYCODONE HYDROCHLORIDE 10 MG: 10 TABLET ORAL at 16:53

## 2024-12-08 RX ADMIN — LIDOCAINE 1 PATCH: 4 PATCH TOPICAL at 05:10

## 2024-12-08 RX ADMIN — FUROSEMIDE 40 MG: 10 INJECTION, SOLUTION INTRAVENOUS at 16:54

## 2024-12-08 RX ADMIN — OXYCODONE HYDROCHLORIDE 10 MG: 10 TABLET ORAL at 20:55

## 2024-12-08 RX ADMIN — TIMOLOL MALEATE 1 DROP: 5 SOLUTION OPHTHALMIC at 05:10

## 2024-12-08 RX ADMIN — TIMOLOL MALEATE 1 DROP: 5 SOLUTION OPHTHALMIC at 18:14

## 2024-12-08 RX ADMIN — FUROSEMIDE 40 MG: 10 INJECTION, SOLUTION INTRAVENOUS at 09:26

## 2024-12-08 RX ADMIN — LIDOCAINE 2 PATCH: 4 PATCH TOPICAL at 05:09

## 2024-12-08 ASSESSMENT — PAIN DESCRIPTION - PAIN TYPE
TYPE: ACUTE PAIN;CHRONIC PAIN
TYPE: ACUTE PAIN

## 2024-12-08 ASSESSMENT — ENCOUNTER SYMPTOMS
VOMITING: 0
DIZZINESS: 0
FEVER: 0
ABDOMINAL PAIN: 1
PALPITATIONS: 0
DIARRHEA: 0
SHORTNESS OF BREATH: 0
CHILLS: 0
LOSS OF CONSCIOUSNESS: 0
COUGH: 0
HEADACHES: 0
BACK PAIN: 1
NAUSEA: 0

## 2024-12-08 ASSESSMENT — PATIENT HEALTH QUESTIONNAIRE - PHQ9
1. LITTLE INTEREST OR PLEASURE IN DOING THINGS: NOT AT ALL
2. FEELING DOWN, DEPRESSED, IRRITABLE, OR HOPELESS: NOT AT ALL
SUM OF ALL RESPONSES TO PHQ9 QUESTIONS 1 AND 2: 0

## 2024-12-08 NOTE — CARE PLAN
The patient is Stable - Low risk of patient condition declining or worsening    Shift Goals  Clinical Goals: Patient will express a pain level of 5/10 or less throughout shift after pain relieving interventions have been performed.  Patient Goals: Pain control and rest  Family Goals: HEATHER    Progress made toward(s) clinical / shift goals:  Patient continues to learn about her overall plan of care and orders in place to protect her skin integrity and pain.      Problem: Knowledge Deficit - Standard  Goal: Patient and family/care givers will demonstrate understanding of plan of care, disease process/condition, diagnostic tests and medications  Outcome: Progressing     Problem: Fall Risk  Goal: Patient will remain free from falls  Outcome: Progressing       Patient is not progressing towards the following goals:    Problem: Pain - Standard  Goal: Alleviation of pain or a reduction in pain to the patient’s comfort goal  Outcome: Not Met

## 2024-12-08 NOTE — CARE PLAN
The patient is Stable - Low risk of patient condition declining or worsening    Shift Goals  Clinical Goals: Patient will express a pain level of 5/10 or less throughout shift after pain relieving interventions have been performed.  Patient Goals: Pain control and rest  Family Goals: HEATHER    Progress made toward(s) clinical / shift goals:  education done on POC, all questions and concerns were addressed    Problem: Knowledge Deficit - Standard  Goal: Patient and family/care givers will demonstrate understanding of plan of care, disease process/condition, diagnostic tests and medications  Outcome: Progressing     Problem: Pain - Standard  Goal: Alleviation of pain or a reduction in pain to the patient’s comfort goal  Outcome: Progressing     Problem: Respiratory  Goal: Patient will achieve/maintain optimum respiratory ventilation and gas exchange  Outcome: Progressing       Patient is not progressing towards the following goals:

## 2024-12-08 NOTE — PROGRESS NOTES
Hospital Medicine Daily Progress Note    Date of Service  12/8/2024    Chief Complaint  Yenifer Beasley is a 74 y.o. female admitted 11/23/2024 with shock    Hospital Course  75yo PMHx AFib, mechanical AVR on warfarin, CKD, Femur Fx from [facility] on 11/15 now s/p ORIF, DC'd to rehab.  Came back with hypotension and Hgb of 6.3  Found to have a retroperitoneal hematoma    Interval Problem Update  Pt c/o pain with mobility in bed primarily in her back.  No other complaints    HR 60s  -160  On RA  AFebrile  UOP 1250ml/24hrs after 40mg IV lasix x 1    I have discussed this patient's plan of care and discharge plan at IDT rounds today with Case Management, Nursing, Nursing leadership, and other members of the IDT team.    Consultants/Specialty      Code Status  DNAR/DNI    Disposition  The patient is medically cleared for discharge to home or a post-acute facility.      I have placed the appropriate orders for post-discharge needs.    Review of Systems  Review of Systems   Constitutional:  Negative for chills and fever.   Respiratory:  Negative for cough and shortness of breath.    Cardiovascular:  Positive for leg swelling. Negative for chest pain and palpitations.   Gastrointestinal:  Positive for abdominal pain. Negative for diarrhea, nausea and vomiting.   Genitourinary:  Negative for dysuria and urgency.   Musculoskeletal:  Positive for back pain.   Neurological:  Negative for dizziness, loss of consciousness and headaches.        Physical Exam  Temp:  [36.2 °C (97.1 °F)-36.7 °C (98.1 °F)] 36.6 °C (97.8 °F)  Pulse:  [60-63] 63  Resp:  [16-18] 16  BP: (148-179)/(56-67) 148/64  SpO2:  [90 %-93 %] 90 %    Physical Exam  Constitutional:       General: She is not in acute distress.     Appearance: Normal appearance. She is well-developed. She is obese. She is not diaphoretic.   HENT:      Head: Normocephalic and atraumatic.   Neck:      Vascular: No JVD.   Cardiovascular:      Rate and Rhythm: Normal rate and  regular rhythm.      Heart sounds: Murmur heard.   Pulmonary:      Effort: Pulmonary effort is normal. No respiratory distress.      Breath sounds: No stridor. No wheezing or rales.   Abdominal:      Palpations: Abdomen is soft.      Tenderness: There is no abdominal tenderness. There is no guarding or rebound.   Musculoskeletal:      Right lower leg: Edema present.      Left lower leg: Edema present.   Skin:     General: Skin is warm and dry.      Coloration: Skin is pale.      Findings: No rash.   Neurological:      Mental Status: She is oriented to person, place, and time.   Psychiatric:         Mood and Affect: Mood normal.         Behavior: Behavior normal.         Thought Content: Thought content normal.         Fluids    Intake/Output Summary (Last 24 hours) at 12/8/2024 0627  Last data filed at 12/7/2024 1700  Gross per 24 hour   Intake 118 ml   Output 700 ml   Net -582 ml        Laboratory  Recent Labs     12/05/24  1730 12/06/24  0326 12/07/24  0456   WBC  --  16.3* 15.8*   RBC  --  2.36* 2.34*   HEMOGLOBIN 7.7* 7.1* 7.1*   HEMATOCRIT 23.9* 21.8* 21.6*   MCV  --  92.4 92.3   MCH  --  30.1 30.3   MCHC  --  32.6 32.9   RDW  --  61.1* 58.9*   PLATELETCT  --  222 258   MPV  --  8.9* 8.9*     Recent Labs     12/06/24  0326 12/07/24  0456   SODIUM 126* 128*   POTASSIUM 4.6 4.7   CHLORIDE 94* 95*   CO2 23 23   GLUCOSE 116* 126*   BUN 68* 66*   CREATININE 1.94* 1.71*   CALCIUM 8.1* 8.2*     Recent Labs     12/06/24  0326 12/07/24  0456 12/07/24  0821   APTT  --   --  202.4*   INR 2.03* 2.42* 2.32*               Imaging  DX-KNEE 3 VIEWS RIGHT   Final Result         Redemonstration of fracture of the lateral femoral condyle with plate and screw fixation. No definite healing change seen.         DX-FEMUR-2+ RIGHT   Final Result         Redemonstration of fracture of the lateral femoral condyle with plate and screw fixation. No definite healing change seen.      XD-[id number]-9 VIEW   Final Result      Slight interval  decrease in marked colonic gaseous distention with the cecal diameter measuring 14.2 cm, previously 14.6 cm. Findings could be related to ileus or distal colonic obstruction.      NG-[id number]-6 VIEW   Final Result         Diffuse gaseous distention of the small bowel and colon, especially the cecum, could relate to ileus.      DX-CHEST-LIMITED (1 VIEW)   Final Result      1.  Dense opacification of the left lower lung. This may represent a combination of consolidation and pleural effusion.   2.  Right base atelectasis.   3.  Support apparatus as above. No pneumothorax seen.      US-EXTREMITY VENOUS LOWER BILAT   Final Result      EC-ECHOCARDIOGRAM COMPLETE W/ CONT   Final Result      CT-RENAL COLIC EVALUATION(A/P W/O)   Final Result   Impression:      1. Large 17 x 10.7 x 9.5 cm hematoma type mixed density fluid collection in the left retroperitoneum and flank abdominal wall. This displaces the left kidney anteriorly. No significant hydronephrosis.      2. Moderate colonic distention similar to 2007 suggests ileus. No obstruction suggestion. Prior gastric surgery.      3. Heavy vascular calcification. No aneurysm. Sternal wires. Aortic valve prosthesis.      4. No kidney stone or hydronephrosis. Bull catheter in empty bladder.      5. Prior cholecystectomy.                        DX-CHEST-PORTABLE (1 VIEW)   Final Result   Impression:      1. Shallow. Mild left base atelectasis.      2. Cardiomegaly. Sternal wires. Cardiac valve prosthesis.                          Assessment/Plan  * Retroperitoneal hemorrhage- (present on admission)  Assessment & Plan  A CT revealed a 17 cm x 10.7 cm x 9.5 cm retroperitoneal hematoma  Spontaneous hemorrhage in the setting of coumadin use  INR 2.69 on admission was reversed and red blood cells were transfused  Has been stable on heparin gtts and now therapeutic on coumadin    12/8  Hgb stable however remains low around 7: will give one unit to help with her progress in  Rehab      Leukocytosis  Assessment & Plan  stable  Clinically improving, suspect from hematoma  Will continue to follow and monitor  Cbc in am     Ileus ([facility])  Assessment & Plan  Resolved  Continue bowel regimen    Hyperkalemia- (present on admission)  Assessment & Plan  resolved    Hemorrhagic shock ([facility])- (present on admission)  Assessment & Plan  Due to retroperitoneal bleed  Requiring emergent blood transfusions  Due to history of metallic aortic valve replacement will help to start Coumadin since patient received Kcentra will help to bridge patient with IV heparin since patient might be pro-coagulative at the beginning of Coumadin treatment.  Continue close monitoring   Continue serial h/h and transfuse if needed    12/8  Remains stable though low at 7.1.  giving one unit not due to acute bleeding but rather to help with exertional dyspnea and rehab participation    ABLA (acute blood loss anemia)- (present on admission)  Assessment & Plan  Secondary to retroperitoneal hemorrhage requiring blood transfusions.  Continue monitoring, cbc and inr in am   Hemoglobin in stable range, hematoma clinically improving  Coumadin day 8, inr finally at 2, if remains 2 or higher in the am can stop iv heparin   Cbc and inr in am     Nondisplaced unspecified condyle fracture of lower end of right femur, initial encounter for closed fracture ([facility])- (present on admission)  Assessment & Plan  S/p surgery by Dr. Camargo on 11/8  PT/OT  She will require post-acute rehab.    Class 3 severe obesity due to excess calories with serious comorbidity and body mass index (BMI) of 40.0 to 44.9 in adult ([facility])- (present on admission)  Assessment & Plan  BMI 46    Chronic kidney disease (CKD) stage G3b/A1, moderately decreased glomerular filtration rate (GFR) between 30-44 mL/min/1.73 square meter and albuminuria creatinine ratio less than 30 mg/g- (present on admission)  Assessment & Plan  Fluctuating  Trial lasix for hypervolemic hypoNa 12/7  with improvement in Na and creat.  Start BID lasix 40mg IV  Daily BMP to monitor for renal toxicity      Paroxysmal atrial fibrillation ([facility])- (present on admission)  Assessment & Plan  Hx of  Now therapeutic on warfarin: daily INR and titrate to goal of 2.5-3.5    Hx of mechanical aortic valve replacement [V43.3]- (present on admission)  Assessment & Plan  2007 by Dr. Awan  Warfarin per pharmacy protocol  Daily INR  Goal 2.5-3.5    Hyponatremia- (present on admission)  Assessment & Plan  Hypervolemic on exam  U Osm on this admission 160s  U Na<20  Climbed overnight after one dose of lasix: start 40mg IV lasix BID.  Daily BMP to monitor for renal toxicity      Essential hypertension- (present on admission)  Assessment & Plan  Monitoring    HILDA (obstructive sleep apnea)- (present on admission)  Assessment & Plan  Continue nocturnal CPAP    CAD (coronary artery disease)- (present on admission)  Assessment & Plan  No chest pain, continue close monitoring    Hypothyroidism- (present on admission)  Assessment & Plan  Continue supplement         VTE prophylaxis: VTE Selection    I have performed a physical exam and reviewed and updated ROS and Plan today (12/8/2024). In review of yesterday's note (12/7/2024), there are no changes except as documented above.

## 2024-12-08 NOTE — PROGRESS NOTES
4 Eyes Skin Assessment Completed by Candis RN and PREETHI Heck.    Head WDL  Ears WDL  Nose Wounds on bilateral sides of nose due to glasses  Mouth WDL  Neck WDL  Breast/Chest WDL  Shoulder Blades WDL  Spine WDL  (R) Arm/Elbow/Hand Redness, Blanching, and Bruising  (L) Arm/Elbow/Hand Redness non-blanching bruising  Abdomen Redness, Blanching, and Bruising  Groin Redness and Blanching  Scrotum/Coccyx/Buttocks Redness and Blanching  (R) Leg Swelling, incision with steri strips   (L) Leg Swelling  (R) Heel/Foot/Toe Redness, Blanching, and Swelling  (L) Heel/Foot/Toe Redness, Blanching, and Swelling          Devices In Places Blood Pressure Cuff      Interventions In Place Gray Ear Foams, NC W/Ear Foams, InterDry, Heel Mepilex, Heel Float Boots, TAP System, Pillows, Q2 Turns, and Barrier Cream    Possible Skin Injury Yes    Pictures Uploaded Into Epic Yes  Wound Consult Placed N/A wound consulted  RN Wound Prevention Protocol Ordered No

## 2024-12-08 NOTE — PROGRESS NOTES
Inpatient Anticoagulation Service Note for 2024      Reason for Anticoagulation: Aortic Mechanical Valve Replacement  , Atrial Fibrillation   [id number] VASc Score: 3  HAS-BLED Score: 2    Hemoglobin Value: (!) 7.1  Hematocrit Value: (!) 21.3  Lab Platelet Value: 291  Target INR: 2.5 to 3.5    INR from last 7 days        Date/Time INR Value    24 0610 2.11     24 0821 2.32     24 0456 2.42     24 0326 2.03     24 0249 1.68     24 0111 1.61     24 0112 1.41     24 0208 1.29                   Dose from last 7 days        Date/Time Dose (mg)    24 0840 4     24 1229 4     24 1609 6     24 1255 8     24 1310 6     24 1254 4     24 0905 6     24 1232 6                   Average Dose (mg):  (Home dosinmg on Mon/Wed/Friday and 4mg all other days)  Significant Interactions: Not Applicable  Bridge Therapy: No  Date of Last VTE Event:  (N/A)  Bridge Therapy Start Date: 24  Days of Overlap Therapy: 8  INR Value Greater than 2 Prior to Discontinuation of Parenteral Anticoagulation: Not Applicable   Reversal Agent Administered: Vitamin K  Intravenous    Assessment: No interval changes in overall clinical status, diet, or DDI.  H/H low but stable.  Platelets stable WNL.  No new overt s/sx of bleeding.  INR decreased today.      Plan:  Continue home regimen, scheduled to receive 4 mg tonight.  Follow up INR in the morning.  Education Material Provided?: No (Established warfarin patient)    Pharmacist suggested discharge dosing: warfarin 6 mg every M/W/ and 4 mg all other days.  Recommend a follow up INR within 3 days of discharge.     Sri Carmichael, EmilyD, BCPS

## 2024-12-09 LAB
ANION GAP SERPL CALC-SCNC: 9 MMOL/L (ref 7–16)
BASOPHILS # BLD AUTO: 0.3 % (ref 0–1.8)
BASOPHILS # BLD: 0.04 K/UL (ref 0–0.12)
BUN SERPL-MCNC: 69 MG/DL (ref 8–22)
CALCIUM SERPL-MCNC: 8.1 MG/DL (ref 8.5–10.5)
CHLORIDE SERPL-SCNC: 98 MMOL/L (ref 96–112)
CO2 SERPL-SCNC: 22 MMOL/L (ref 20–33)
CREAT SERPL-MCNC: 1.86 MG/DL (ref 0.5–1.4)
EOSINOPHIL # BLD AUTO: 0.11 K/UL (ref 0–0.51)
EOSINOPHIL NFR BLD: 0.9 % (ref 0–6.9)
ERYTHROCYTE [DISTWIDTH] IN BLOOD BY AUTOMATED COUNT: 58.3 FL (ref 35.9–50)
GFR SERPLBLD CREATININE-BSD FMLA CKD-EPI: 28 ML/MIN/1.73 M 2
GLUCOSE SERPL-MCNC: 107 MG/DL (ref 65–99)
HCT VFR BLD AUTO: 23.6 % (ref 37–47)
HGB BLD-MCNC: 7.9 G/DL (ref 12–16)
IMM GRANULOCYTES # BLD AUTO: 0.25 K/UL (ref 0–0.11)
IMM GRANULOCYTES NFR BLD AUTO: 2.1 % (ref 0–0.9)
INR PPP: 2.26 (ref 0.87–1.13)
LYMPHOCYTES # BLD AUTO: 0.94 K/UL (ref 1–4.8)
LYMPHOCYTES NFR BLD: 7.9 % (ref 22–41)
MCH RBC QN AUTO: 31.1 PG (ref 27–33)
MCHC RBC AUTO-ENTMCNC: 33.5 G/DL (ref 32.2–35.5)
MCV RBC AUTO: 92.9 FL (ref 81.4–97.8)
MONOCYTES # BLD AUTO: 0.89 K/UL (ref 0–0.85)
MONOCYTES NFR BLD AUTO: 7.4 % (ref 0–13.4)
NEUTROPHILS # BLD AUTO: 9.73 K/UL (ref 1.82–7.42)
NEUTROPHILS NFR BLD: 81.4 % (ref 44–72)
NRBC # BLD AUTO: 0.02 K/UL
NRBC BLD-RTO: 0.2 /100 WBC (ref 0–0.2)
PLATELET # BLD AUTO: 248 K/UL (ref 164–446)
PMV BLD AUTO: 8.4 FL (ref 9–12.9)
POTASSIUM SERPL-SCNC: 4.9 MMOL/L (ref 3.6–5.5)
PROTHROMBIN TIME: 25.1 SEC (ref 12–14.6)
RBC # BLD AUTO: 2.54 M/UL (ref 4.2–5.4)
SODIUM SERPL-SCNC: 129 MMOL/L (ref 135–145)
WBC # BLD AUTO: 12 K/UL (ref 4.8–10.8)

## 2024-12-09 PROCEDURE — 700102 HCHG RX REV CODE 250 W/ 637 OVERRIDE(OP): Performed by: HOSPITALIST

## 2024-12-09 PROCEDURE — 700101 HCHG RX REV CODE 250: Performed by: HOSPITALIST

## 2024-12-09 PROCEDURE — 85025 COMPLETE CBC W/AUTO DIFF WBC: CPT

## 2024-12-09 PROCEDURE — 700111 HCHG RX REV CODE 636 W/ 250 OVERRIDE (IP): Mod: JZ | Performed by: HOSPITALIST

## 2024-12-09 PROCEDURE — 36415 COLL VENOUS BLD VENIPUNCTURE: CPT

## 2024-12-09 PROCEDURE — 700102 HCHG RX REV CODE 250 W/ 637 OVERRIDE(OP): Performed by: INTERNAL MEDICINE

## 2024-12-09 PROCEDURE — 770006 HCHG ROOM/CARE - MED/SURG/GYN SEMI*

## 2024-12-09 PROCEDURE — 700101 HCHG RX REV CODE 250: Performed by: INTERNAL MEDICINE

## 2024-12-09 PROCEDURE — 85610 PROTHROMBIN TIME: CPT

## 2024-12-09 PROCEDURE — A9270 NON-COVERED ITEM OR SERVICE: HCPCS | Performed by: INTERNAL MEDICINE

## 2024-12-09 PROCEDURE — A9270 NON-COVERED ITEM OR SERVICE: HCPCS | Performed by: HOSPITALIST

## 2024-12-09 PROCEDURE — 99231 SBSQ HOSP IP/OBS SF/LOW 25: CPT | Performed by: HOSPITALIST

## 2024-12-09 PROCEDURE — 80048 BASIC METABOLIC PNL TOTAL CA: CPT

## 2024-12-09 RX ADMIN — TIMOLOL MALEATE 1 DROP: 5 SOLUTION OPHTHALMIC at 16:41

## 2024-12-09 RX ADMIN — METHOCARBAMOL TABLETS 750 MG: 500 TABLET, COATED ORAL at 08:40

## 2024-12-09 RX ADMIN — WARFARIN SODIUM 6 MG: 6 TABLET ORAL at 16:41

## 2024-12-09 RX ADMIN — TIMOLOL MALEATE 1 DROP: 5 SOLUTION OPHTHALMIC at 05:38

## 2024-12-09 RX ADMIN — LEVOTHYROXINE SODIUM 125 MCG: 0.12 TABLET ORAL at 05:36

## 2024-12-09 RX ADMIN — FUROSEMIDE 40 MG: 10 INJECTION, SOLUTION INTRAVENOUS at 05:36

## 2024-12-09 RX ADMIN — EZETIMIBE 10 MG: 10 TABLET ORAL at 05:36

## 2024-12-09 RX ADMIN — OXYCODONE HYDROCHLORIDE 10 MG: 10 TABLET ORAL at 12:10

## 2024-12-09 RX ADMIN — OXYCODONE HYDROCHLORIDE 10 MG: 10 TABLET ORAL at 21:34

## 2024-12-09 RX ADMIN — OXYCODONE HYDROCHLORIDE 10 MG: 10 TABLET ORAL at 16:40

## 2024-12-09 RX ADMIN — POLYETHYLENE GLYCOL 3350 1 PACKET: 17 POWDER, FOR SOLUTION ORAL at 08:40

## 2024-12-09 RX ADMIN — LIDOCAINE 1 PATCH: 4 PATCH TOPICAL at 05:37

## 2024-12-09 RX ADMIN — SENNOSIDES AND DOCUSATE SODIUM 2 TABLET: 50; 8.6 TABLET ORAL at 16:41

## 2024-12-09 RX ADMIN — OXYCODONE HYDROCHLORIDE 10 MG: 10 TABLET ORAL at 06:02

## 2024-12-09 RX ADMIN — LIDOCAINE 2 PATCH: 4 PATCH TOPICAL at 05:37

## 2024-12-09 RX ADMIN — BISACODYL 10 MG: 10 SUPPOSITORY RECTAL at 05:38

## 2024-12-09 ASSESSMENT — PAIN DESCRIPTION - PAIN TYPE
TYPE: ACUTE PAIN

## 2024-12-09 ASSESSMENT — ENCOUNTER SYMPTOMS
FEVER: 0
HEADACHES: 0
SHORTNESS OF BREATH: 0
COUGH: 0
CHILLS: 0
BACK PAIN: 1
PALPITATIONS: 0
NAUSEA: 0
DIZZINESS: 0
ABDOMINAL PAIN: 1
LOSS OF CONSCIOUSNESS: 0
DIARRHEA: 0
VOMITING: 0

## 2024-12-09 ASSESSMENT — PATIENT HEALTH QUESTIONNAIRE - PHQ9
SUM OF ALL RESPONSES TO PHQ9 QUESTIONS 1 AND 2: 0
1. LITTLE INTEREST OR PLEASURE IN DOING THINGS: NOT AT ALL
2. FEELING DOWN, DEPRESSED, IRRITABLE, OR HOPELESS: NOT AT ALL

## 2024-12-09 NOTE — PROGRESS NOTES
4 Eyes Skin Assessment Completed by PREETHI Nelson and PREETHI Gaviria.    Head WDL  Ears WDL  Nose Redness and Non-Blanching - wounds on bilateral side of nose due to eyeglasses  Mouth Scab  Neck WDL  Breast/Chest Redness and Blanching, Moisture Fissure  Shoulder Blades Redness and Blanching  Spine Redness and Blanching  (R) Arm/Elbow/Hand Redness, Blanching, Bruising, Swelling, and Edema  (L) Arm/Elbow/Hand Redness, Blanching, Bruising, Swelling, and Edema  Abdomen Redness, Blanching, and Bruising, Skin Tear/Excoriations, Distended/Swelling  Groin Redness and Blanching  Scrotum/Coccyx/Buttocks Redness, Blanching  (R) Leg Redness, Blanching, Bruising, Swelling, Weeping, Edema, and Incision with steristrips, Blister R inner thigh  (L) Leg Redness, Blanching, and Bruising, Skin Tear/Excoriation, Swelling, Edema  (R) Heel/Foot/Toe Redness, Blanching, Swelling, and Edema  (L) Heel/Foot/Toe Redness and Blanching, Swelling, Edema                          Devices In Places Bull and Nasal Cannula, PIV line      Interventions In Place NC W/Ear Foams, InterDry, Heel Mepilex, Heel Float Boots, TAP System, Pillows, Q2 Turns, Low Air Loss Mattress, and Barrier Cream, Mepitel One    Possible Skin Injury Yes    Pictures Uploaded Into Epic Yes  Wound Consult Placed Yes  RN Wound Prevention Protocol Ordered Yes

## 2024-12-09 NOTE — DISCHARGE PLANNING
Spoke To: Leigh  Agency/Facility Name: [facility]  Plan or Request: Bed available for tomorrow, requesting a [id number] transport time.

## 2024-12-09 NOTE — CARE PLAN
The patient is Stable - Low risk of patient condition declining or worsening    Shift Goals  Clinical Goals: Patient will express pain level less than 5/10 after pain relieving interventions within the shift  Patient Goals: Pain Control, Rest  Family Goals: HEATHER    Progress made toward(s) clinical / shift goals:  Medicated per MAR for complaints of pain, with some relief verbalized. Non-pharmacologic comfort measures offered. Repositioning done, max assist, refused most of turning despite education. Poor skin turgor, edema noted, wound care consulted for skin blister and excoriations. FC in placed, draining clear, yellowish urine. Able to make needs known, call light placed within easy reach.     Patient is not progressing towards the following goals:      Problem: Pain - Standard  Goal: Alleviation of pain or a reduction in pain to the patient’s comfort goal  Description: Target End Date:  Prior to discharge or change in level of care    Document on Vitals flowsheet    1.  Document pain using the appropriate pain scale per order or unit policy  2.  Educate and implement non-pharmacologic comfort measures (i.e. relaxation, distraction, massage, cold/heat therapy, etc.)  3.  Pain management medications as ordered  4.  Reassess pain after pain med administration per policy  5.  If opiods administered assess patient's response to pain medication is appropriate per POSS sedation scale  6.  Follow pain management plan developed in collaboration with patient and interdisciplinary team (including palliative care or pain specialists if applicable)  Outcome: Not Progressing     Problem: Psychosocial  Goal: Patient's ability to verbalize feelings about condition will improve  Description: Target End Date:  Prior to discharge or change in level of care    1.  Discuss coping with medical condition and its effects  2.  Encourage patient participation in care  3.  Encourage acknowledgement of body changes and accompanying  emotions  4.  Perform depression screening  Outcome: Not Progressing      Problem: Mobility  Goal: Patient's capacity to carry out activities will improve  Description: Target End Date:  Prior to discharge or change in level of care    1.  Assess for barriers to mobility/activity  2.  Implement activity per interdisciplinary team recommendations  3.  Target activity level identified and patient/family/caregiver aware of goal  4.  Provide assistive devices  5.  Instruct patient/caregiver on proper use of assistive/adaptive devices  6.  Schedule activities and rest periods to decrease effects of fatigue  7.  Encourage mobilization to extent of ability  8.  Maintain proper body alignment  9.  Provide adequate pain management to allow progressive mobilization  Outcome: Not Progressing

## 2024-12-09 NOTE — PROGRESS NOTES
"Hospital Medicine Daily Progress Note    Date of Service  12/9/2024    Chief Complaint  Yenifer Beasley is a 74 y.o. female admitted 11/23/2024 with shock    Hospital Course  75yo PMHx AFib, mechanical AVR on warfarin, CKD, Femur Fx from [facility] on 11/15 now s/p ORIF, DC'd to rehab.  Came back with hypotension and Hgb of 6.3  Found to have a retroperitoneal hematoma    Interval Problem Update  Feels \"good\" today  Back/belly pain lessening    HR 60s  -150  On RA  AFebrile  UOP not charted    I have discussed this patient's plan of care and discharge plan at IDT rounds today with Case Management, Nursing, Nursing leadership, and other members of the IDT team.    Consultants/Specialty      Code Status  DNAR/DNI    Disposition  Medically Cleared  I have placed the appropriate orders for post-discharge needs.    Review of Systems  Review of Systems   Constitutional:  Negative for chills and fever.   Respiratory:  Negative for cough and shortness of breath.    Cardiovascular:  Positive for leg swelling. Negative for chest pain and palpitations.   Gastrointestinal:  Positive for abdominal pain. Negative for diarrhea, nausea and vomiting.   Genitourinary:  Negative for dysuria and urgency.   Musculoskeletal:  Positive for back pain.   Neurological:  Negative for dizziness, loss of consciousness and headaches.        Physical Exam  Temp:  [36.1 °C (97 °F)-36.7 °C (98 °F)] 36.2 °C (97.2 °F)  Pulse:  [60-70] 68  Resp:  [16-18] 18  BP: (136-160)/(48-70) 136/52  SpO2:  [90 %-100 %] 99 %    Physical Exam  Constitutional:       General: She is not in acute distress.     Appearance: Normal appearance. She is well-developed. She is obese. She is not diaphoretic.   HENT:      Head: Normocephalic and atraumatic.   Neck:      Vascular: No JVD.   Cardiovascular:      Rate and Rhythm: Normal rate and regular rhythm.      Heart sounds: Murmur heard.   Pulmonary:      Effort: Pulmonary effort is normal. No respiratory distress.      " Breath sounds: No stridor. No wheezing or rales.   Abdominal:      Palpations: Abdomen is soft.      Tenderness: There is no abdominal tenderness. There is no guarding or rebound.   Musculoskeletal:      Right lower leg: Edema present.      Left lower leg: Edema present.   Skin:     General: Skin is warm and dry.      Coloration: Skin is pale.      Findings: No rash.   Neurological:      Mental Status: She is oriented to person, place, and time.   Psychiatric:         Mood and Affect: Mood normal.         Behavior: Behavior normal.         Thought Content: Thought content normal.         Fluids    Intake/Output Summary (Last 24 hours) at 12/9/2024 0700  Last data filed at 12/9/2024 0623  Gross per 24 hour   Intake 900 ml   Output 750 ml   Net 150 ml        Laboratory  Recent Labs     12/07/24  0456 12/08/24  0610 12/09/24  0108   WBC 15.8* 15.2* 12.0*   RBC 2.34* 2.30* 2.54*   HEMOGLOBIN 7.1* 7.1* 7.9*   HEMATOCRIT 21.6* 21.3* 23.6*   MCV 92.3 92.6 92.9   MCH 30.3 30.9 31.1   MCHC 32.9 33.3 33.5   RDW 58.9* 59.1* 58.3*   PLATELETCT 258 291 248   MPV 8.9* 8.8* 8.4*     Recent Labs     12/07/24  0456 12/08/24  0610   SODIUM 128* 129*   POTASSIUM 4.7 4.7   CHLORIDE 95* 97   CO2 23 21   GLUCOSE 126* 120*   BUN 66* 64*   CREATININE 1.71* 1.52*   CALCIUM 8.2* 8.2*     Recent Labs     12/07/24  0821 12/08/24  0610 12/09/24  0108   APTT 202.4*  --   --    INR 2.32* 2.11* 2.26*               Imaging  DX-KNEE 3 VIEWS RIGHT   Final Result         Redemonstration of fracture of the lateral femoral condyle with plate and screw fixation. No definite healing change seen.         DX-FEMUR-2+ RIGHT   Final Result         Redemonstration of fracture of the lateral femoral condyle with plate and screw fixation. No definite healing change seen.      TN-CTMAUVG-0 VIEW   Final Result      Slight interval decrease in marked colonic gaseous distention with the cecal diameter measuring 14.2 cm, previously 14.6 cm. Findings could be related  to ileus or distal colonic obstruction.      OF-EVEJPUM-0 VIEW   Final Result         Diffuse gaseous distention of the small bowel and colon, especially the cecum, could relate to ileus.      DX-CHEST-LIMITED (1 VIEW)   Final Result      1.  Dense opacification of the left lower lung. This may represent a combination of consolidation and pleural effusion.   2.  Right base atelectasis.   3.  Support apparatus as above. No pneumothorax seen.      US-EXTREMITY VENOUS LOWER BILAT   Final Result      EC-ECHOCARDIOGRAM COMPLETE W/ CONT   Final Result      CT-RENAL COLIC EVALUATION(A/P W/O)   Final Result   Impression:      1. Large 17 x 10.7 x 9.5 cm hematoma type mixed density fluid collection in the left retroperitoneum and flank abdominal wall. This displaces the left kidney anteriorly. No significant hydronephrosis.      2. Moderate colonic distention similar to 2007 suggests ileus. No obstruction suggestion. Prior gastric surgery.      3. Heavy vascular calcification. No aneurysm. Sternal wires. Aortic valve prosthesis.      4. No kidney stone or hydronephrosis. Bull catheter in empty bladder.      5. Prior cholecystectomy.                        DX-CHEST-PORTABLE (1 VIEW)   Final Result   Impression:      1. Shallow. Mild left base atelectasis.      2. Cardiomegaly. Sternal wires. Cardiac valve prosthesis.                          Assessment/Plan  * Retroperitoneal hemorrhage- (present on admission)  Assessment & Plan  A CT revealed a 17 cm x 10.7 cm x 9.5 cm retroperitoneal hematoma  Spontaneous hemorrhage in the setting of coumadin use  INR 2.69 on admission was reversed and red blood cells were transfused  Has been stable on heparin gtts and now therapeutic on coumadin    12/9  Hgb stable   Back/abd pain lessening      Leukocytosis  Assessment & Plan  stable  Clinically improving, suspect from hematoma  Will continue to follow and monitor  Cbc in am     Ileus (HCC)  Assessment & Plan  Resolved  Continue bowel  regimen    Hyperkalemia- (present on admission)  Assessment & Plan  resolved    Hemorrhagic shock (HCC)- (present on admission)  Assessment & Plan  Due to retroperitoneal bleed  Requiring emergent blood transfusions  Due to history of metallic aortic valve replacement will help to start Coumadin since patient received Kcentra will help to bridge patient with IV heparin since patient might be pro-coagulative at the beginning of Coumadin treatment.  Continue close monitoring   Continue serial h/h and transfuse if needed    12/9  Remains stable     ABLA (acute blood loss anemia)- (present on admission)  Assessment & Plan  Secondary to retroperitoneal hemorrhage requiring blood transfusions.  Continue monitoring, cbc and inr in am   Hemoglobin in stable range, hematoma clinically improving  Coumadin day 8, inr finally at 2, if remains 2 or higher in the am can stop iv heparin   Cbc and inr in am     Nondisplaced unspecified condyle fracture of lower end of right femur, initial encounter for closed fracture (HCC)- (present on admission)  Assessment & Plan  S/p surgery by Dr. Camargo on 11/8  PT/OT  She will require post-acute rehab.    Class 3 severe obesity due to excess calories with serious comorbidity and body mass index (BMI) of 40.0 to 44.9 in adult (HCC)- (present on admission)  Assessment & Plan  BMI 46    Chronic kidney disease (CKD) stage G3b/A1, moderately decreased glomerular filtration rate (GFR) between 30-44 mL/min/1.73 square meter and albuminuria creatinine ratio less than 30 mg/g- (present on admission)  Assessment & Plan  Fluctuating  Trial lasix for hypervolemic hypoNa 12/7 with improvement in Na and creat.  Start BID lasix 40mg IV  Daily BMP to monitor for renal toxicity      Paroxysmal atrial fibrillation (HCC)- (present on admission)  Assessment & Plan  Hx of  Now therapeutic on warfarin: daily INR and titrate to goal of 2.5-3.5    Hx of mechanical aortic valve replacement [V43.3]- (present on  admission)  Assessment & Plan  2007 by Dr. Awan  Warfarin per pharmacy protocol  Daily INR  Goal 2.5-3.5    Hyponatremia- (present on admission)  Assessment & Plan  Hypervolemic on exam  U Osm on this admission 160s  U Na<20  Na has stabilized in upper 120s      Essential hypertension- (present on admission)  Assessment & Plan  Monitoring    HILDA (obstructive sleep apnea)- (present on admission)  Assessment & Plan  Continue nocturnal CPAP    CAD (coronary artery disease)- (present on admission)  Assessment & Plan  No chest pain, continue close monitoring    Hypothyroidism- (present on admission)  Assessment & Plan  Continue supplement         VTE prophylaxis: VTE Selection    I have performed a physical exam and reviewed and updated ROS and Plan today (12/9/2024). In review of yesterday's note (12/8/2024), there are no changes except as documented above.

## 2024-12-09 NOTE — DISCHARGE PLANNING
HTH/SCP TCN chart review completed. Collaborated with BOBBI Varela. Discussed current anticipated discharge plan noted to discharge to [facility]. Per review and as noted, noted patient an anticipated dc to [facility], tomorrow.     As SCP authorization is complete for anticipated return to [facility], no new TCN needs identified in patient discharge planning at this time. Thank you.     Completed:  PT continues to recommend post acute placement 12/6  OT continues recommend post acute placement 12/4.   Choice obtained by BOBBI. Per review and as noted, current anticipated dc plan is [facility] tomorrow  SCP with [facility] PCP. Update sent to scheduling as current anticipated dc plan is now post acute placement/ [facility]

## 2024-12-09 NOTE — CARE PLAN
The patient is Stable - Low risk of patient condition declining or worsening    Shift Goals  Clinical Goals: Pts pain will be managed throughout shift  Patient Goals: pain control and rest  Family Goals: HEATHER    Progress made toward(s) clinical / shift goals:  Pts pain was managed throughout shift due to pharmacologic (see MAR) and nonpharmacologic (communication, hourly rounding, and repositioning) intervention    Patient is not progressing towards the following goals: NA

## 2024-12-09 NOTE — PROGRESS NOTES
Inpatient Anticoagulation Service Note for 2024      Reason for Anticoagulation: Aortic Mechanical Valve Replacement  , Atrial Fibrillation   [id number] VASc Score: 3  HAS-BLED Score: 2    Hemoglobin Value: (!) 7.9  Hematocrit Value: (!) 23.6  Lab Platelet Value: 248  Target INR: 2.5 to 3.5    INR from last 7 days        Date/Time INR Value    24 0108 2.26     24 0610 2.11     24 0821 2.32     24 0456 2.42     24 0326 2.03     24 0249 1.68     24 0111 1.61     24 0112 1.41                   Dose from last 7 days        Date/Time Dose (mg)    24 1214 6     24 0840 4     24 1229 4     24 1609 6     24 1255 8     24 1310 6     24 1254 4                   Average Dose (mg):  (Home dosinmg on Mon/Wed/Friday and 4mg all other days)  Significant Interactions: Not Applicable  Bridge Therapy: No  Reversal Agent Administered: Vitamin K  Intravenous    Assessment: No interval changes in overall clinical status, diet, or DDI.  H/H low but stable.  Platelets stable WNL.  No new overt s/sx of bleeding.  INR moving towards therapeutic range.      Plan:  Continue home regimen, scheduled to receive 6 mg tonight.  Follow up INR in the AM.    Education Material Provided?: No (Established warfarin patient)    Pharmacist suggested discharge dosing: warfarin 6 mg every M// and 4 mg all other days. Recommend a follow up INR within 3 days of discharge.      Sri Carmichael, EmilyD, BCPS

## 2024-12-10 ENCOUNTER — HOSPITAL ENCOUNTER (INPATIENT)
Facility: MEDICAL CENTER | Age: 74
LOS: 9 days | DRG: 371 | End: 2024-12-19
Attending: EMERGENCY MEDICINE | Admitting: HOSPITALIST
Payer: MEDICARE

## 2024-12-10 ENCOUNTER — APPOINTMENT (OUTPATIENT)
Dept: RADIOLOGY | Facility: MEDICAL CENTER | Age: 74
DRG: 371 | End: 2024-12-10
Attending: EMERGENCY MEDICINE
Payer: MEDICARE

## 2024-12-10 VITALS
BODY MASS INDEX: 52.55 KG/M2 | SYSTOLIC BLOOD PRESSURE: 157 MMHG | RESPIRATION RATE: 20 BRPM | HEIGHT: 60 IN | HEART RATE: 68 BPM | DIASTOLIC BLOOD PRESSURE: 52 MMHG | WEIGHT: 267.64 LBS | OXYGEN SATURATION: 96 % | TEMPERATURE: 98.1 F

## 2024-12-10 DIAGNOSIS — I80.8 THROMBOPHLEBITIS OF LEFT ARM: ICD-10-CM

## 2024-12-10 DIAGNOSIS — I34.2 NONRHEUMATIC MITRAL VALVE STENOSIS: ICD-10-CM

## 2024-12-10 DIAGNOSIS — G45.0 VERTEBROBASILAR ARTERY SYNDROME: ICD-10-CM

## 2024-12-10 DIAGNOSIS — E66.813 CLASS 3 SEVERE OBESITY DUE TO EXCESS CALORIES WITH SERIOUS COMORBIDITY AND BODY MASS INDEX (BMI) OF 40.0 TO 44.9 IN ADULT (HCC): ICD-10-CM

## 2024-12-10 DIAGNOSIS — R60.0 LOWER LEG EDEMA: ICD-10-CM

## 2024-12-10 DIAGNOSIS — I07.1 TRICUSPID VALVE INSUFFICIENCY, UNSPECIFIED ETIOLOGY: ICD-10-CM

## 2024-12-10 DIAGNOSIS — R73.9 HYPERGLYCEMIA: ICD-10-CM

## 2024-12-10 DIAGNOSIS — Z87.81 STATUS POST CLOSED FRACTURE OF RIGHT FEMUR: ICD-10-CM

## 2024-12-10 DIAGNOSIS — N17.9 ACUTE KIDNEY INJURY SUPERIMPOSED ON STAGE 3B CHRONIC KIDNEY DISEASE (HCC): ICD-10-CM

## 2024-12-10 DIAGNOSIS — E11.42 DIABETIC POLYNEUROPATHY ASSOCIATED WITH TYPE 2 DIABETES MELLITUS (HCC): ICD-10-CM

## 2024-12-10 DIAGNOSIS — I10 ESSENTIAL HYPERTENSION: ICD-10-CM

## 2024-12-10 DIAGNOSIS — K56.7 ILEUS (HCC): ICD-10-CM

## 2024-12-10 DIAGNOSIS — R79.89 ELEVATED LFTS: ICD-10-CM

## 2024-12-10 DIAGNOSIS — I77.810 ECTATIC THORACIC AORTA (HCC): ICD-10-CM

## 2024-12-10 DIAGNOSIS — Z95.1 HISTORY OF CORONARY ARTERY BYPASS GRAFT: ICD-10-CM

## 2024-12-10 DIAGNOSIS — Z95.2 HX OF MECHANICAL AORTIC VALVE REPLACEMENT: ICD-10-CM

## 2024-12-10 DIAGNOSIS — R58 RETROPERITONEAL HEMORRHAGE: ICD-10-CM

## 2024-12-10 DIAGNOSIS — I48.0 PAROXYSMAL ATRIAL FIBRILLATION (HCC): ICD-10-CM

## 2024-12-10 DIAGNOSIS — K21.9 GASTROESOPHAGEAL REFLUX DISEASE, UNSPECIFIED WHETHER ESOPHAGITIS PRESENT: ICD-10-CM

## 2024-12-10 DIAGNOSIS — G47.33 OSA (OBSTRUCTIVE SLEEP APNEA): Chronic | ICD-10-CM

## 2024-12-10 DIAGNOSIS — J81.0 ACUTE PULMONARY EDEMA (HCC): ICD-10-CM

## 2024-12-10 DIAGNOSIS — D62 ABLA (ACUTE BLOOD LOSS ANEMIA): ICD-10-CM

## 2024-12-10 DIAGNOSIS — M1A.9XX0 CHRONIC GOUT WITHOUT TOPHUS, UNSPECIFIED CAUSE, UNSPECIFIED SITE: ICD-10-CM

## 2024-12-10 DIAGNOSIS — J96.01 ACUTE RESPIRATORY FAILURE WITH HYPOXIA (HCC): ICD-10-CM

## 2024-12-10 DIAGNOSIS — S72.414A: ICD-10-CM

## 2024-12-10 DIAGNOSIS — H40.89 OTHER GLAUCOMA OF BOTH EYES: ICD-10-CM

## 2024-12-10 DIAGNOSIS — K59.00 CONSTIPATION, UNSPECIFIED CONSTIPATION TYPE: ICD-10-CM

## 2024-12-10 DIAGNOSIS — R57.9 SHOCK (HCC): ICD-10-CM

## 2024-12-10 DIAGNOSIS — I27.20 PULMONARY HYPERTENSION (HCC): ICD-10-CM

## 2024-12-10 DIAGNOSIS — E03.9 HYPOTHYROIDISM, UNSPECIFIED TYPE: ICD-10-CM

## 2024-12-10 DIAGNOSIS — I50.33 ACUTE ON CHRONIC DIASTOLIC HEART FAILURE (HCC): ICD-10-CM

## 2024-12-10 DIAGNOSIS — R06.02 SHORTNESS OF BREATH: ICD-10-CM

## 2024-12-10 DIAGNOSIS — E78.5 HYPERLIPIDEMIA, UNSPECIFIED HYPERLIPIDEMIA TYPE: ICD-10-CM

## 2024-12-10 DIAGNOSIS — R52 INTRACTABLE PAIN: ICD-10-CM

## 2024-12-10 DIAGNOSIS — E87.1 HYPONATREMIA: ICD-10-CM

## 2024-12-10 DIAGNOSIS — N18.32 ACUTE KIDNEY INJURY SUPERIMPOSED ON STAGE 3B CHRONIC KIDNEY DISEASE (HCC): ICD-10-CM

## 2024-12-10 DIAGNOSIS — I21.4 NSTEMI (NON-ST ELEVATED MYOCARDIAL INFARCTION) (HCC): ICD-10-CM

## 2024-12-10 DIAGNOSIS — D64.9 ANEMIA, UNSPECIFIED TYPE: ICD-10-CM

## 2024-12-10 DIAGNOSIS — I25.810 CORONARY ARTERY DISEASE INVOLVING CORONARY BYPASS GRAFT OF NATIVE HEART WITHOUT ANGINA PECTORIS: ICD-10-CM

## 2024-12-10 DIAGNOSIS — N28.1 ACQUIRED RENAL CYST OF RIGHT KIDNEY: ICD-10-CM

## 2024-12-10 DIAGNOSIS — R79.89 ELEVATED TROPONIN: ICD-10-CM

## 2024-12-10 DIAGNOSIS — M19.90 ARTHRITIS: ICD-10-CM

## 2024-12-10 DIAGNOSIS — N18.32 CHRONIC KIDNEY DISEASE (CKD) STAGE G3B/A1, MODERATELY DECREASED GLOMERULAR FILTRATION RATE (GFR) BETWEEN 30-44 ML/MIN/1.73 SQUARE METER AND ALBUMINURIA CREATININE RATIO LESS THAN 30 MG/G: ICD-10-CM

## 2024-12-10 DIAGNOSIS — J45.909 ASTHMA, UNSPECIFIED ASTHMA SEVERITY, UNSPECIFIED WHETHER COMPLICATED, UNSPECIFIED WHETHER PERSISTENT: ICD-10-CM

## 2024-12-10 DIAGNOSIS — L03.116 CELLULITIS OF LEFT LOWER EXTREMITY: ICD-10-CM

## 2024-12-10 DIAGNOSIS — D68.69 SECONDARY HYPERCOAGULABLE STATE (HCC): ICD-10-CM

## 2024-12-10 DIAGNOSIS — E66.01 CLASS 3 SEVERE OBESITY DUE TO EXCESS CALORIES WITH SERIOUS COMORBIDITY AND BODY MASS INDEX (BMI) OF 40.0 TO 44.9 IN ADULT (HCC): ICD-10-CM

## 2024-12-10 DIAGNOSIS — R57.8 HEMORRHAGIC SHOCK (HCC): ICD-10-CM

## 2024-12-10 DIAGNOSIS — M10.9 GOUTY ARTHROPATHY: ICD-10-CM

## 2024-12-10 DIAGNOSIS — D72.825 BANDEMIA: ICD-10-CM

## 2024-12-10 DIAGNOSIS — E87.5 HYPERKALEMIA: ICD-10-CM

## 2024-12-10 LAB
ALBUMIN SERPL BCP-MCNC: 2.9 G/DL (ref 3.2–4.9)
ALBUMIN/GLOB SERPL: 1 G/DL
ALP SERPL-CCNC: 64 U/L (ref 30–99)
ALT SERPL-CCNC: 13 U/L (ref 2–50)
ANION GAP SERPL CALC-SCNC: 13 MMOL/L (ref 7–16)
ANION GAP SERPL CALC-SCNC: 9 MMOL/L (ref 7–16)
APTT PPP: 33.2 SEC (ref 24.7–36)
AST SERPL-CCNC: 19 U/L (ref 12–45)
BASOPHILS # BLD AUTO: 0.2 % (ref 0–1.8)
BASOPHILS # BLD: 0.07 K/UL (ref 0–0.12)
BILIRUB SERPL-MCNC: 1.2 MG/DL (ref 0.1–1.5)
BUN SERPL-MCNC: 57 MG/DL (ref 8–22)
BUN SERPL-MCNC: 64 MG/DL (ref 8–22)
CALCIUM ALBUM COR SERPL-MCNC: 9.6 MG/DL (ref 8.5–10.5)
CALCIUM SERPL-MCNC: 8.6 MG/DL (ref 8.5–10.5)
CALCIUM SERPL-MCNC: 8.7 MG/DL (ref 8.5–10.5)
CHLORIDE SERPL-SCNC: 95 MMOL/L (ref 96–112)
CHLORIDE SERPL-SCNC: 98 MMOL/L (ref 96–112)
CO2 SERPL-SCNC: 23 MMOL/L (ref 20–33)
CO2 SERPL-SCNC: 26 MMOL/L (ref 20–33)
CREAT SERPL-MCNC: 1.24 MG/DL (ref 0.5–1.4)
CREAT SERPL-MCNC: 1.28 MG/DL (ref 0.5–1.4)
EKG IMPRESSION: NORMAL
EOSINOPHIL # BLD AUTO: 0.05 K/UL (ref 0–0.51)
EOSINOPHIL NFR BLD: 0.2 % (ref 0–6.9)
ERYTHROCYTE [DISTWIDTH] IN BLOOD BY AUTOMATED COUNT: 59.7 FL (ref 35.9–50)
GFR SERPLBLD CREATININE-BSD FMLA CKD-EPI: 44 ML/MIN/1.73 M 2
GFR SERPLBLD CREATININE-BSD FMLA CKD-EPI: 46 ML/MIN/1.73 M 2
GLOBULIN SER CALC-MCNC: 2.8 G/DL (ref 1.9–3.5)
GLUCOSE SERPL-MCNC: 101 MG/DL (ref 65–99)
GLUCOSE SERPL-MCNC: 158 MG/DL (ref 65–99)
HCT VFR BLD AUTO: 30.2 % (ref 37–47)
HGB BLD-MCNC: 9.4 G/DL (ref 12–16)
IMM GRANULOCYTES # BLD AUTO: 0.25 K/UL (ref 0–0.11)
IMM GRANULOCYTES NFR BLD AUTO: 0.9 % (ref 0–0.9)
INR PPP: 2.27 (ref 0.87–1.13)
INR PPP: 2.48 (ref 0.87–1.13)
LYMPHOCYTES # BLD AUTO: 0.36 K/UL (ref 1–4.8)
LYMPHOCYTES NFR BLD: 1.2 % (ref 22–41)
MAGNESIUM SERPL-MCNC: 2 MG/DL (ref 1.5–2.5)
MCH RBC QN AUTO: 30.2 PG (ref 27–33)
MCHC RBC AUTO-ENTMCNC: 31.1 G/DL (ref 32.2–35.5)
MCV RBC AUTO: 97.1 FL (ref 81.4–97.8)
MONOCYTES # BLD AUTO: 0.46 K/UL (ref 0–0.85)
MONOCYTES NFR BLD AUTO: 1.6 % (ref 0–13.4)
NEUTROPHILS # BLD AUTO: 27.66 K/UL (ref 1.82–7.42)
NEUTROPHILS NFR BLD: 95.9 % (ref 44–72)
NRBC # BLD AUTO: 0.02 K/UL
NRBC BLD-RTO: 0.1 /100 WBC (ref 0–0.2)
NT-PROBNP SERPL IA-MCNC: ABNORMAL PG/ML (ref 0–125)
PHOSPHATE SERPL-MCNC: 3 MG/DL (ref 2.5–4.5)
PLATELET # BLD AUTO: 329 K/UL (ref 164–446)
PMV BLD AUTO: 8.5 FL (ref 9–12.9)
POTASSIUM SERPL-SCNC: 5.2 MMOL/L (ref 3.6–5.5)
POTASSIUM SERPL-SCNC: 5.5 MMOL/L (ref 3.6–5.5)
PROT SERPL-MCNC: 5.7 G/DL (ref 6–8.2)
PROTHROMBIN TIME: 25.2 SEC (ref 12–14.6)
PROTHROMBIN TIME: 27 SEC (ref 12–14.6)
RBC # BLD AUTO: 3.11 M/UL (ref 4.2–5.4)
SODIUM SERPL-SCNC: 131 MMOL/L (ref 135–145)
SODIUM SERPL-SCNC: 133 MMOL/L (ref 135–145)
TROPONIN T SERPL-MCNC: 267 NG/L (ref 6–19)
WBC # BLD AUTO: 28.9 K/UL (ref 4.8–10.8)

## 2024-12-10 PROCEDURE — 84100 ASSAY OF PHOSPHORUS: CPT

## 2024-12-10 PROCEDURE — 83735 ASSAY OF MAGNESIUM: CPT

## 2024-12-10 PROCEDURE — A9270 NON-COVERED ITEM OR SERVICE: HCPCS | Performed by: INTERNAL MEDICINE

## 2024-12-10 PROCEDURE — 83880 ASSAY OF NATRIURETIC PEPTIDE: CPT

## 2024-12-10 PROCEDURE — 85610 PROTHROMBIN TIME: CPT

## 2024-12-10 PROCEDURE — 85610 PROTHROMBIN TIME: CPT | Mod: 91

## 2024-12-10 PROCEDURE — 85730 THROMBOPLASTIN TIME PARTIAL: CPT

## 2024-12-10 PROCEDURE — 36415 COLL VENOUS BLD VENIPUNCTURE: CPT

## 2024-12-10 PROCEDURE — 99239 HOSP IP/OBS DSCHRG MGMT >30: CPT | Performed by: STUDENT IN AN ORGANIZED HEALTH CARE EDUCATION/TRAINING PROGRAM

## 2024-12-10 PROCEDURE — 700102 HCHG RX REV CODE 250 W/ 637 OVERRIDE(OP): Performed by: INTERNAL MEDICINE

## 2024-12-10 PROCEDURE — 83036 HEMOGLOBIN GLYCOSYLATED A1C: CPT

## 2024-12-10 PROCEDURE — 700101 HCHG RX REV CODE 250: Performed by: HOSPITALIST

## 2024-12-10 PROCEDURE — 80048 BASIC METABOLIC PNL TOTAL CA: CPT

## 2024-12-10 PROCEDURE — 700111 HCHG RX REV CODE 636 W/ 250 OVERRIDE (IP): Mod: JZ | Performed by: EMERGENCY MEDICINE

## 2024-12-10 PROCEDURE — A9270 NON-COVERED ITEM OR SERVICE: HCPCS | Performed by: HOSPITALIST

## 2024-12-10 PROCEDURE — 700102 HCHG RX REV CODE 250 W/ 637 OVERRIDE(OP): Performed by: HOSPITALIST

## 2024-12-10 PROCEDURE — 99285 EMERGENCY DEPT VISIT HI MDM: CPT

## 2024-12-10 PROCEDURE — 770020 HCHG ROOM/CARE - TELE (206)

## 2024-12-10 PROCEDURE — 93005 ELECTROCARDIOGRAM TRACING: CPT | Mod: TC

## 2024-12-10 PROCEDURE — 80053 COMPREHEN METABOLIC PANEL: CPT

## 2024-12-10 PROCEDURE — 700101 HCHG RX REV CODE 250: Performed by: INTERNAL MEDICINE

## 2024-12-10 PROCEDURE — 96374 THER/PROPH/DIAG INJ IV PUSH: CPT

## 2024-12-10 PROCEDURE — 93005 ELECTROCARDIOGRAM TRACING: CPT | Mod: TC | Performed by: EMERGENCY MEDICINE

## 2024-12-10 PROCEDURE — 85025 COMPLETE CBC W/AUTO DIFF WBC: CPT

## 2024-12-10 PROCEDURE — 71045 X-RAY EXAM CHEST 1 VIEW: CPT

## 2024-12-10 PROCEDURE — 700111 HCHG RX REV CODE 636 W/ 250 OVERRIDE (IP): Performed by: HOSPITALIST

## 2024-12-10 PROCEDURE — 84484 ASSAY OF TROPONIN QUANT: CPT

## 2024-12-10 RX ORDER — WARFARIN SODIUM 6 MG/1
6 TABLET ORAL SEE ADMIN INSTRUCTIONS
Status: ON HOLD | COMMUNITY
End: 2024-12-16

## 2024-12-10 RX ORDER — FUROSEMIDE 10 MG/ML
20 INJECTION INTRAMUSCULAR; INTRAVENOUS ONCE
Status: COMPLETED | OUTPATIENT
Start: 2024-12-10 | End: 2024-12-10

## 2024-12-10 RX ORDER — OXYCODONE HYDROCHLORIDE 5 MG/1
5 TABLET ORAL EVERY 8 HOURS PRN
Qty: 3 TABLET | Refills: 0 | Status: SHIPPED
Start: 2024-12-10 | End: 2024-12-10

## 2024-12-10 RX ORDER — ACETAMINOPHEN 325 MG/1
650 TABLET ORAL EVERY 4 HOURS PRN
Status: ON HOLD | COMMUNITY
End: 2024-12-16

## 2024-12-10 RX ORDER — OXYCODONE HYDROCHLORIDE 10 MG/1
10 TABLET ORAL EVERY 8 HOURS PRN
Qty: 3 TABLET | Refills: 0 | Status: ON HOLD
Start: 2024-12-10 | End: 2024-12-16

## 2024-12-10 RX ORDER — WARFARIN SODIUM 4 MG/1
4 TABLET ORAL SEE ADMIN INSTRUCTIONS
Status: ON HOLD | COMMUNITY
End: 2024-12-16

## 2024-12-10 RX ADMIN — FUROSEMIDE 20 MG: 10 INJECTION, SOLUTION INTRAVENOUS at 21:25

## 2024-12-10 RX ADMIN — LIDOCAINE 2 PATCH: 4 PATCH TOPICAL at 06:13

## 2024-12-10 RX ADMIN — OXYCODONE HYDROCHLORIDE 10 MG: 10 TABLET ORAL at 06:12

## 2024-12-10 RX ADMIN — EZETIMIBE 10 MG: 10 TABLET ORAL at 06:13

## 2024-12-10 RX ADMIN — METHOCARBAMOL TABLETS 750 MG: 500 TABLET, COATED ORAL at 10:35

## 2024-12-10 RX ADMIN — TIMOLOL MALEATE 1 DROP: 5 SOLUTION OPHTHALMIC at 06:42

## 2024-12-10 RX ADMIN — LEVOTHYROXINE SODIUM 125 MCG: 0.12 TABLET ORAL at 06:13

## 2024-12-10 RX ADMIN — HYDROMORPHONE HYDROCHLORIDE 0.5 MG: 1 INJECTION, SOLUTION INTRAMUSCULAR; INTRAVENOUS; SUBCUTANEOUS at 08:02

## 2024-12-10 RX ADMIN — OXYCODONE HYDROCHLORIDE 10 MG: 10 TABLET ORAL at 01:27

## 2024-12-10 RX ADMIN — LIDOCAINE 1 PATCH: 4 PATCH TOPICAL at 06:14

## 2024-12-10 ASSESSMENT — PATIENT HEALTH QUESTIONNAIRE - PHQ9
2. FEELING DOWN, DEPRESSED, IRRITABLE, OR HOPELESS: NOT AT ALL
SUM OF ALL RESPONSES TO PHQ9 QUESTIONS 1 AND 2: 0
1. LITTLE INTEREST OR PLEASURE IN DOING THINGS: NOT AT ALL

## 2024-12-10 ASSESSMENT — PAIN DESCRIPTION - PAIN TYPE
TYPE: ACUTE PAIN

## 2024-12-10 ASSESSMENT — FIBROSIS 4 INDEX: FIB4 SCORE: 1.69

## 2024-12-10 NOTE — DISCHARGE PLANNING
DPA confirmed the bed availability at [facility]. They requested 1300 transport. CM called patient's yolandaer Fatimah to update her about the discharge, message left for her. IMM completed with the patient at the bedside. Transportation request placed via Ride line, MD and Charge RN updated in rounds.

## 2024-12-10 NOTE — CARE PLAN
The patient is Stable - Low risk of patient condition declining or worsening    Shift Goals  Clinical Goals: Patient pain will be managed to comfort level less than 5/10 within the shift  Patient Goals: Pain Control, Rest  Family Goals: HEATHER    Progress made toward(s) clinical / shift goals:  Medicated per MAR for complaints of pain, with some relief verbalized. Non-pharmacologic comfort measures offered. Repositioning done, max assist, refused most of turning despite education. Poor skin turgor and edema noted, skin protective interventions in placed. FC in placed, draining clear, yellowish urine. Able to make needs known, call light placed within easy reach.     Patient is not progressing towards the following goals:      Problem: Pain - Standard  Goal: Alleviation of pain or a reduction in pain to the patient’s comfort goal  Description: Target End Date:  Prior to discharge or change in level of care    Document on Vitals flowsheet    1.  Document pain using the appropriate pain scale per order or unit policy  2.  Educate and implement non-pharmacologic comfort measures (i.e. relaxation, distraction, massage, cold/heat therapy, etc.)  3.  Pain management medications as ordered  4.  Reassess pain after pain med administration per policy  5.  If opiods administered assess patient's response to pain medication is appropriate per POSS sedation scale  6.  Follow pain management plan developed in collaboration with patient and interdisciplinary team (including palliative care or pain specialists if applicable)  Outcome: Not Progressing     Problem: Urinary - Renal Perfusion  Goal: Ability to achieve and maintain adequate renal perfusion and functioning will improve  Description: Target End Date:  Prior to discharge or change in level of care    Document on I/O and Assessment flowsheet    1.  Urine output will remain greater than 0.5ml/Kg/HR  2.  Monitor amount and/or characteristics of urine per order/policy. Specific  gravity per order/policy  3.  Assess signs and symptoms of renal dysfunction  Outcome: Not Progressing     Problem: Skin Integrity  Goal: Skin integrity is maintained or improved  Description: Target End Date:  Prior to discharge or change in level of care    Document interventions on Skin Risk/Kamar flowsheet groups and corresponding LDA    1.  Assess and monitor skin integrity, appearance and/or temperature  2.  Assess risk factors for impaired skin integrity and/or pressures ulcers  3.  Implement precautions to protect skin integrity in collaboration with interdisciplinary team  4.  Implement pressure ulcer prevention protocol if at risk for skin breakdown  5.  Confirm wound care consult if at risk for skin breakdown  6.  Ensure patient use of pressure relieving devices  (Low air loss bed, waffle overlay, heel protectors, ROHO cushion, etc)  Outcome: Not Progressing     Problem: Mobility  Goal: Patient's capacity to carry out activities will improve  Description: Target End Date:  Prior to discharge or change in level of care    1.  Assess for barriers to mobility/activity  2.  Implement activity per interdisciplinary team recommendations  3.  Target activity level identified and patient/family/caregiver aware of goal  4.  Provide assistive devices  5.  Instruct patient/caregiver on proper use of assistive/adaptive devices  6.  Schedule activities and rest periods to decrease effects of fatigue  7.  Encourage mobilization to extent of ability  8.  Maintain proper body alignment  9.  Provide adequate pain management to allow progressive mobilization  Outcome: Not Progressing

## 2024-12-10 NOTE — DISCHARGE SUMMARY
Discharge Summary    CHIEF COMPLAINT ON ADMISSION  Chief Complaint   Patient presents with    Shortness of Breath     BIBA from [facility] where she was recovering after repair of a R femur Fx. Pt woke up feeling SOB, good 02 saturations on room air, however pt was hypotensive initially (62/46) and is pale appearing. Pt also has distended abd and c/o constipation x 1 week. Given 250 cc NS en route. BP is improved at this time (119/95).        Reason for Admission  EMS     Admission Date  11/23/2024    CODE STATUS  DNAR/DNI    HPI & HOSPITAL COURSE    74 y.o. female who presented 11/23/2024 with shortness of breath.  Ms. Beasley has a past medical history of atrial fibrillation, mechanical aortic valve replacement on chronic coumadin therapy that was admitted to this hospital from 11/7 through 11/15 with ground level fall and right femur fracture. She underwent open treatment with internal fixation of right lateral condyle distal femur fracture by Dr. Camargo on 11/8. She was given one unit RBCs and was discharged to rehab. On 11/23 she was brought to the ER with hypotension (62/46) and in the ER her Hb was 6.3 and chronic kidney disease Cr 1.78. A CT revealed a 17 cm x 10.7 cm x 9.5 cm retroperitoneal hematoma. Her INR was 2.69. She was given reversal with K centra, transfused 2 units RBCs and admitted to the ICU.  During hospital course her H&H remained stable on warfarin.  Labs with improvement in renal function, sodium level.      Patient seen and examined at bedside on day of discharge.  Vitals remained stable.  Labs with a stable H&H, remained asymptomatic, improved renal function.  INR 2.48    At the time of discharge patient remain hemodynamically stable ,asymptomatic ,labs remain unremarkable .  Need to follow-up with INR as outpatient  Patient will be discharged with close follow up with PCP, cardiology, nephrology.discharge plan was discussed with patient in details .  Patient agreed with discharge plan   and  all questions answered.           Therefore, she is discharged in good and stable condition to skilled nursing facility.    The patient met 2-midnight criteria for an inpatient stay at the time of discharge.    Discharge Date  12/10/2024      FOLLOW UP ITEMS POST DISCHARGE  Follow-up INR in 2 to 3 days at SNF    DISCHARGE DIAGNOSES  Principal Problem:    Retroperitoneal hemorrhage (POA: Yes)  Active Problems:    Hypothyroidism (POA: Yes)      Overview: Chronic, continues on Synthroid 125 mcg each morning on an empty stomach.        Denies any symptoms of hyper or hypothyroidism.  We will recheck TSH prior       to her next visit.    CAD (coronary artery disease) (POA: Yes)    Hyperlipidemia (POA: Yes)    HILDA (obstructive sleep apnea) (Chronic) (POA: Yes)      Overview: Chronic, she states that she does have sleep apnea and has a CPAP at home       however she is having another sleep study done as they feel like she might       need oxygen titrated in as her oxygen on the last study showed that it       dropped below 80% for 5 to 6 hours throughout the night.  Her sleep study       is scheduled for May 21, 2022, and she does see pulmonary Dr. Kim.    Essential hypertension (POA: Yes)    Hyponatremia (POA: Yes)    Hx of mechanical aortic valve replacement [V43.3] (POA: Yes)    Paroxysmal atrial fibrillation (HCC) (POA: Yes)      Overview: Chronic, continues on Amiodarone 200 mg daily, Torsemide 10 mg daily,       Warfarin, Doxazosin 2 mg daily.  She notes that she had a cardioversion       done 2 years ago.  Her Coumadin is followed by anticoagulation clinic.    Chronic kidney disease (CKD) stage G3b/A1, moderately decreased glomerular filtration rate (GFR) between 30-44 mL/min/1.73 square meter and albuminuria creatinine ratio less than 30 mg/g (POA: Yes)      Overview: Chronic, discussed increasing her hydration and avoiding nephrotoxic       medications and NSAIDs.  BP is controlled today in clinic 130/70,  she has       had a longstanding history of prediabetes however most recent A1c was at       5.3.      GFR (CKD-EPI) >60 mL/min/1.73 m 2 34 Abnormal              Bun 8 - 22 mg/dL 38 High         Creatinine 0.50 - 1.40 mg/dL 1.61 High                  Class 3 severe obesity due to excess calories with serious comorbidity and body mass index (BMI) of 40.0 to 44.9 in adult (HCC) (POA: Yes)    Nondisplaced unspecified condyle fracture of lower end of right femur, initial encounter for closed fracture ([facility]) (POA: Yes)    ABLA (acute blood loss anemia) (POA: Yes)    Hemorrhagic shock (HCC) (POA: Yes)    Hyperkalemia (POA: Yes)    Ileus (HCC) (POA: Unknown)    Leukocytosis (POA: Unknown)  Resolved Problems:    Type 2 diabetes mellitus with kidney complication, without long-term current use of insulin ([facility]) (POA: Yes)      Overview: Chronic, not on medication management at this time.  She does have chronic       polyneuropathy due to her diabetes and her bilateral lower feet and some       in her hands she is due for an updated A1c which we will do in a few weeks       prior to her next appointment.  She does not check her blood sugars       regularly at home.  Denies any symptoms of hypo or hyperglycemia.      FOLLOW UP  Future Appointments   Date Time Provider Department Center   12/13/2024  9:45 AM STEPHON Patton None   12/17/2024  1:00 PM STEPH Avalos None   8/25/2025 10:15 AM [facility] EXAM 10 E[facility]   9/18/2025 11:15 AM STEPHON Burch None     [facility] [address] [id number] [phone]          MEDICATIONS ON DISCHARGE     Medication List        CHANGE how you take these medications        Instructions   oxyCODONE immediate-release 10 MG Tabs  What changed:   medication strength  how much to take  when to take this  reasons to take this  Commonly known as: Roxicodone   Take 1 Tablet by mouth every 8  hours as needed for Severe Pain for up to 1 day.  Dose: 5 mg     * warfarin 4 MG Tabs  What changed: Another medication with the same name was changed. Make sure you understand how and when to take each.  Commonly known as: Coumadin   Take 4 mg by mouth at bedtime. Sunday, Tuesday, Thursday and Saturday  Dose: 4 mg     * warfarin 2 MG Tabs  What changed:   how much to take  when to take this  additional instructions  Commonly known as: Coumadin   Take 1 Tablet by mouth every day at 6 PM.  Dose: 2 mg           * This list has 2 medication(s) that are the same as other medications prescribed for you. Read the directions carefully, and ask your doctor or other care provider to review them with you.                CONTINUE taking these medications        Instructions   diclofenac 0.1 % ophthalmic solution  Commonly known as: Voltaren   Administer 1 Drop into both eyes 4 times a day.  Dose: 1 Drop     doxazosin 8 MG tablet  Commonly known as: Cardura   Take 1 tablet by mouth at bedtime. To lower blood pressure  Dose: 8 mg     ezetimibe 10 MG Tabs  Commonly known as: Zetia   TAKE 1 TABLET BY MOUTH EVERY DAY     levothyroxine 125 MCG Tabs  Commonly known as: Synthroid   Doctor's comments: Please advise to patient over due for her Annual yearly exam with  to continue refilling medications on a regular bases, thank you.  Take 1 Tablet by mouth every morning on an empty stomach.  Dose: 125 mcg     methocarbamol 750 MG Tabs  Commonly known as: Robaxin   Take 1 Tablet by mouth 4 times a day.  Dose: 750 mg     Praluent 150 MG/ML Soaj  Generic drug: Alirocumab   Inject 150 mg under the skin every 14 days.  Dose: 150 mg     senna-docusate 8.6-50 MG Tabs  Commonly known as: Pericolace Or Senokot S   Take 2 Tablets by mouth 2 times a day.  Dose: 2 Tablet     timolol 0.5 % Soln  Commonly known as: Timoptic   Drop 1 Drop in both eyes 2 times a day.  Dose: 1 Drop            STOP taking these medications      lidocaine 4  "% Ptch  Commonly known as: Asperflex     Lovenox 120 MG/0.8ML Sosy inj  Generic drug: enoxaparin            ASK your doctor about these medications        Instructions   polyethylene glycol/lytes Pack  Commonly known as: Miralax   Take 1 Packet by mouth every day.  Dose: 17 g              Allergies  Allergies   Allergen Reactions    Asa [Aspirin]      GI BLEED    Atorvastatin Shortness of Breath    Cymbalta [Duloxetine Hcl] Unspecified     Feels like a \"zombie\"    Hmg-Coa-R Inhibitors     Latex Swelling    Nsaids      GI BLEED    Tricor Shortness of Breath     Breathing problems      Trilipix [Choline Fenofibrate] Shortness of Breath     SOB    Lyrica [Pregabalin]      SPACED OUT       DIET  Orders Placed This Encounter   Procedures    Diet Order Diet: Renal (Renal); Fluid modifications: (optional): Free Water Restrictions Only     Standing Status:   Standing     Number of Occurrences:   1     Order Specific Question:   Diet:     Answer:   Renal [8]     Comments:   Renal     Order Specific Question:   Fluid modifications: (optional)     Answer:   Free Water Restrictions Only [12]       ACTIVITY  As tolerated.  Weight bearing as tolerated    CONSULTATIONS  DX-KNEE 3 VIEWS RIGHT   Final Result         Redemonstration of fracture of the lateral femoral condyle with plate and screw fixation. No definite healing change seen.         DX-FEMUR-2+ RIGHT   Final Result         Redemonstration of fracture of the lateral femoral condyle with plate and screw fixation. No definite healing change seen.      BA-AVAEHYU-0 VIEW   Final Result      Slight interval decrease in marked colonic gaseous distention with the cecal diameter measuring 14.2 cm, previously 14.6 cm. Findings could be related to ileus or distal colonic obstruction.      JP-NKILOKW-6 VIEW   Final Result         Diffuse gaseous distention of the small bowel and colon, especially the cecum, could relate to ileus.      DX-CHEST-LIMITED (1 VIEW)   Final Result      1. "  Dense opacification of the left lower lung. This may represent a combination of consolidation and pleural effusion.   2.  Right base atelectasis.   3.  Support apparatus as above. No pneumothorax seen.      US-EXTREMITY VENOUS LOWER BILAT   Final Result      EC-ECHOCARDIOGRAM COMPLETE W/ CONT   Final Result      CT-RENAL COLIC EVALUATION(A/P W/O)   Final Result   Impression:      1. Large 17 x 10.7 x 9.5 cm hematoma type mixed density fluid collection in the left retroperitoneum and flank abdominal wall. This displaces the left kidney anteriorly. No significant hydronephrosis.      2. Moderate colonic distention similar to 2007 suggests ileus. No obstruction suggestion. Prior gastric surgery.      3. Heavy vascular calcification. No aneurysm. Sternal wires. Aortic valve prosthesis.      4. No kidney stone or hydronephrosis. Bull catheter in empty bladder.      5. Prior cholecystectomy.                        DX-CHEST-PORTABLE (1 VIEW)   Final Result   Impression:      1. Shallow. Mild left base atelectasis.      2. Cardiomegaly. Sternal wires. Cardiac valve prosthesis.                               LABORATORY  Lab Results   Component Value Date    SODIUM 133 (L) 12/10/2024    POTASSIUM 5.2 12/10/2024    CHLORIDE 98 12/10/2024    CO2 26 12/10/2024    GLUCOSE 101 (H) 12/10/2024    BUN 64 (H) 12/10/2024    CREATININE 1.28 12/10/2024    CREATININE 1.6 (H) 11/26/2007        Lab Results   Component Value Date    WBC 12.0 (H) 12/09/2024    HEMOGLOBIN 7.9 (L) 12/09/2024    HEMATOCRIT 23.6 (L) 12/09/2024    PLATELETCT 248 12/09/2024        Total time of the discharge process exceeds 33 minutes.

## 2024-12-10 NOTE — DISCHARGE PLANNING
TCN following. HTH/SCP chart reviewed. No new TCN needs identified. Please see prior TCN note from 12/9 for most recent discharge planning considerations if indicated. Current discharge considerations are noted to anticipated discharge to [facility], today.     As SCP authorization is complete for anticipated return to [facility], no new TCN needs identified in patient discharge planning at this time. Thank you.      Completed:  PT continues to recommend post acute placement 12/6  OT continues recommend post acute placement 12/4.   Choice obtained by CM. Per review and as noted, current anticipated dc plan is [facility] tomorrow  SCP with [facility] PCP. Update sent to scheduling as current anticipated dc plan is now post acute placement/ [facility]

## 2024-12-10 NOTE — PROGRESS NOTES
4 Eyes Skin Assessment Completed by PREETHI Nelson and PREETHI Trejo.    Head WDL  Ears WDL  Nose Redness and Non-Blanching - wounds on bilateral sides of nose due to eyeglasses  Mouth Scab  Neck WDL  Breast/Chest Redness and Blanching, Moisture Fissure, Flaky  Shoulder Blades Redness and Blanching  Spine Redness and Blanching  (R) Arm/Elbow/Hand Redness, Blanching, Bruising, Swelling, and Edema  (L) Arm/Elbow/Hand Redness, Blanching, and Swelling, Bruising, Edema  Abdomen Redness, Blanching, and Bruising, Swelling, Excoriations  Groin Redness and Blanching  Scrotum/Coccyx/Buttocks Redness, Blanching, and Moisture Fissure  (R) Leg Redness, Blanching, Scar, Bruising, Swelling, Weeping, Edema, and Incision with steristrips, Excoriations R inner thigh  (L) Leg Redness and Blanching, Bruising, Excoriations, Weeping, Swelling, Edema  (R) Heel/Foot/Toe Redness, Blanching, Swelling, and Edema  (L) Heel/Foot/Toe Redness, Blanching, Swelling, and Edema          Devices In Places Bull and Nasal Cannula, PIV line      Interventions In Place NC W/Ear Foams, InterDry, Heel Mepilex, Heel Float Boots, TAP System, Pillows, Q2 Turns, Low Air Loss Mattress, and Barrier Cream, Mepitel One    Possible Skin Injury Yes    Pictures Uploaded Into Epic N/A  Wound Consult Placed N/A - already consulted  RN Wound Prevention Protocol Ordered Yes

## 2024-12-10 NOTE — PROGRESS NOTES
Pt discharged. IV removed, Vs prior DC, BP (!) 157/52   Pulse 68   Temp 36.7 °C (98.1 °F) (Temporal)   Resp 18   Ht 1.524 m (5')   Wt 121 kg (267 lb 10.2 oz)   SpO2 96%  on room air. Discharge instructions provided to patient, pt verbalizes understanding. Pt states all questions have been answered. Copy of discharge paperwork provided to pt, signed copy in chart. Pt states all belongings in possession. COBRA packet Pt left unit via  transport in stable condition.

## 2024-12-10 NOTE — CARE PLAN
The patient is Stable - Low risk of patient condition declining or worsening    Shift Goals  Clinical Goals: Pain control, maintain skin integrity, and safety  Patient Goals: Rest and pain control  Family Goals: No family at bedside    Progress made toward(s) clinical / shift goals:    Problem: Pain - Standard  Goal: Alleviation of pain or a reduction in pain to the patient’s comfort goal  Description: Target End Date:  Prior to discharge or change in level of care    Document on Vitals flowsheet    1.  Document pain using the appropriate pain scale per order or unit policy  2.  Educate and implement non-pharmacologic comfort measures (i.e. relaxation, distraction, massage, cold/heat therapy, etc.)  3.  Pain management medications as ordered  4.  Reassess pain after pain med administration per policy  5.  If opiods administered assess patient's response to pain medication is appropriate per POSS sedation scale  6.  Follow pain management plan developed in collaboration with patient and interdisciplinary team (including palliative care or pain specialists if applicable)  Outcome: Progressing     Problem: Fall Risk  Goal: Patient will remain free from falls  Description: Target End Date:  Prior to discharge or change in level of care    Document interventions on the [facility] [place] Fall Risk Assessment    1.  Assess for fall risk factors  2.  Implement fall precautions  Outcome: Progressing     Problem: Skin Integrity  Goal: Skin integrity is maintained or improved  Description: Target End Date:  Prior to discharge or change in level of care    Document interventions on Skin Risk/Kamar flowsheet groups and corresponding LDA    1.  Assess and monitor skin integrity, appearance and/or temperature  2.  Assess risk factors for impaired skin integrity and/or pressures ulcers  3.  Implement precautions to protect skin integrity in collaboration with interdisciplinary team  4.  Implement pressure ulcer prevention protocol if  at risk for skin breakdown  5.  Confirm wound care consult if at risk for skin breakdown  6.  Ensure patient use of pressure relieving devices  (Low air loss bed, waffle overlay, heel protectors, ROHO cushion, etc)  Outcome: Progressing       Patient is not progressing towards the following goals:

## 2024-12-10 NOTE — DISCHARGE PLANNING
Agency/Facility Name: [facility]  Plan or Request: DPA left vm for Leigh regarding a bed today and transport at 1300 (carmen)    0950- CHELI confirmed with Leigh at [facility], transport with [facility] at 1300 today.

## 2024-12-10 NOTE — DISCHARGE PLANNING
DC Transport Scheduled    Transport Company Scheduled:  T  Used portal to schedule transport.    Scheduled Date: 12/10/24  Scheduled Time: 1300    Transport Type: [facility]  Destination: [facility]   Destination address: [address] [place] [address]      Notified care team of scheduled transport via Voalte.     If there are any changes needed to the DC transportation scheduled, please contact [facility] at ext. [phone] between the hours of 0029-4037. If outside those hours, contact the ED Case Manager at ext. [phone].

## 2024-12-11 ENCOUNTER — APPOINTMENT (OUTPATIENT)
Dept: RADIOLOGY | Facility: MEDICAL CENTER | Age: 74
DRG: 371 | End: 2024-12-11
Attending: HOSPITALIST
Payer: MEDICARE

## 2024-12-11 ENCOUNTER — APPOINTMENT (OUTPATIENT)
Dept: RADIOLOGY | Facility: MEDICAL CENTER | Age: 74
DRG: 371 | End: 2024-12-11
Payer: MEDICARE

## 2024-12-11 PROBLEM — I80.8 THROMBOPHLEBITIS OF LEFT ARM: Status: ACTIVE | Noted: 2024-12-11

## 2024-12-11 PROBLEM — Z87.81 STATUS POST CLOSED FRACTURE OF RIGHT FEMUR: Status: ACTIVE | Noted: 2024-12-11

## 2024-12-11 PROBLEM — L03.90 CELLULITIS: Status: ACTIVE | Noted: 2024-12-11

## 2024-12-11 PROBLEM — J81.0 ACUTE PULMONARY EDEMA (HCC): Status: ACTIVE | Noted: 2024-12-11

## 2024-12-11 PROBLEM — I21.4 NSTEMI (NON-ST ELEVATED MYOCARDIAL INFARCTION) (HCC): Status: ACTIVE | Noted: 2024-12-11

## 2024-12-11 LAB
ALBUMIN SERPL BCP-MCNC: 2.7 G/DL (ref 3.2–4.9)
ALBUMIN/GLOB SERPL: 1.1 G/DL
ALP SERPL-CCNC: 63 U/L (ref 30–99)
ALT SERPL-CCNC: 11 U/L (ref 2–50)
ANION GAP SERPL CALC-SCNC: 10 MMOL/L (ref 7–16)
ANION GAP SERPL CALC-SCNC: 12 MMOL/L (ref 7–16)
ANION GAP SERPL CALC-SCNC: 12 MMOL/L (ref 7–16)
APPEARANCE UR: CLEAR
AST SERPL-CCNC: 30 U/L (ref 12–45)
BACTERIA #/AREA URNS HPF: ABNORMAL /HPF
BASOPHILS # BLD AUTO: 0.2 % (ref 0–1.8)
BASOPHILS # BLD: 0.04 K/UL (ref 0–0.12)
BILIRUB SERPL-MCNC: 1.1 MG/DL (ref 0.1–1.5)
BILIRUB UR QL STRIP.AUTO: NEGATIVE
BUN SERPL-MCNC: 60 MG/DL (ref 8–22)
BUN SERPL-MCNC: 61 MG/DL (ref 8–22)
BUN SERPL-MCNC: 63 MG/DL (ref 8–22)
CALCIUM ALBUM COR SERPL-MCNC: 9.5 MG/DL (ref 8.5–10.5)
CALCIUM SERPL-MCNC: 8.1 MG/DL (ref 8.5–10.5)
CALCIUM SERPL-MCNC: 8.1 MG/DL (ref 8.5–10.5)
CALCIUM SERPL-MCNC: 8.5 MG/DL (ref 8.5–10.5)
CASTS URNS QL MICRO: ABNORMAL /LPF (ref 0–2)
CHLORIDE SERPL-SCNC: 93 MMOL/L (ref 96–112)
CHLORIDE SERPL-SCNC: 93 MMOL/L (ref 96–112)
CHLORIDE SERPL-SCNC: 96 MMOL/L (ref 96–112)
CHLORIDE UR-SCNC: 22 MMOL/L
CO2 SERPL-SCNC: 23 MMOL/L (ref 20–33)
CO2 SERPL-SCNC: 23 MMOL/L (ref 20–33)
CO2 SERPL-SCNC: 24 MMOL/L (ref 20–33)
COLOR UR: YELLOW
CREAT SERPL-MCNC: 1.58 MG/DL (ref 0.5–1.4)
CREAT SERPL-MCNC: 1.64 MG/DL (ref 0.5–1.4)
CREAT SERPL-MCNC: 1.82 MG/DL (ref 0.5–1.4)
CREAT UR-MCNC: 99.81 MG/DL
EKG IMPRESSION: NORMAL
EOSINOPHIL # BLD AUTO: 0.17 K/UL (ref 0–0.51)
EOSINOPHIL NFR BLD: 0.8 % (ref 0–6.9)
EPITHELIAL CELLS 1715: ABNORMAL /HPF (ref 0–5)
ERYTHROCYTE [DISTWIDTH] IN BLOOD BY AUTOMATED COUNT: 59.7 FL (ref 35.9–50)
EST. AVERAGE GLUCOSE BLD GHB EST-MCNC: 100 MG/DL
FLUAV RNA SPEC QL NAA+PROBE: NEGATIVE
FLUBV RNA SPEC QL NAA+PROBE: NEGATIVE
GFR SERPLBLD CREATININE-BSD FMLA CKD-EPI: 29 ML/MIN/1.73 M 2
GFR SERPLBLD CREATININE-BSD FMLA CKD-EPI: 33 ML/MIN/1.73 M 2
GFR SERPLBLD CREATININE-BSD FMLA CKD-EPI: 34 ML/MIN/1.73 M 2
GLOBULIN SER CALC-MCNC: 2.4 G/DL (ref 1.9–3.5)
GLUCOSE SERPL-MCNC: 139 MG/DL (ref 65–99)
GLUCOSE SERPL-MCNC: 147 MG/DL (ref 65–99)
GLUCOSE SERPL-MCNC: 180 MG/DL (ref 65–99)
GLUCOSE UR STRIP.AUTO-MCNC: NEGATIVE MG/DL
HBA1C MFR BLD: 5.1 % (ref 4–5.6)
HCT VFR BLD AUTO: 27.4 % (ref 37–47)
HGB BLD-MCNC: 8.8 G/DL (ref 12–16)
HYALINE CAST   1831: PRESENT /LPF
IMM GRANULOCYTES # BLD AUTO: 0.15 K/UL (ref 0–0.11)
IMM GRANULOCYTES NFR BLD AUTO: 0.7 % (ref 0–0.9)
KETONES UR STRIP.AUTO-MCNC: NEGATIVE MG/DL
LACTATE SERPL-SCNC: 1.2 MMOL/L (ref 0.5–2)
LACTATE SERPL-SCNC: 1.3 MMOL/L (ref 0.5–2)
LACTATE SERPL-SCNC: 1.4 MMOL/L (ref 0.5–2)
LACTATE SERPL-SCNC: 1.8 MMOL/L (ref 0.5–2)
LEUKOCYTE ESTERASE UR QL STRIP.AUTO: ABNORMAL
LYMPHOCYTES # BLD AUTO: 0.65 K/UL (ref 1–4.8)
LYMPHOCYTES NFR BLD: 3.1 % (ref 22–41)
MCH RBC QN AUTO: 30.6 PG (ref 27–33)
MCHC RBC AUTO-ENTMCNC: 32.1 G/DL (ref 32.2–35.5)
MCV RBC AUTO: 95.1 FL (ref 81.4–97.8)
MICRO URNS: ABNORMAL
MONOCYTES # BLD AUTO: 0.47 K/UL (ref 0–0.85)
MONOCYTES NFR BLD AUTO: 2.2 % (ref 0–13.4)
NEUTROPHILS # BLD AUTO: 19.73 K/UL (ref 1.82–7.42)
NEUTROPHILS NFR BLD: 93 % (ref 44–72)
NITRITE UR QL STRIP.AUTO: NEGATIVE
NRBC # BLD AUTO: 0 K/UL
NRBC BLD-RTO: 0 /100 WBC (ref 0–0.2)
OSMOLALITY UR: 437 MOSM/KG H2O (ref 300–900)
PH UR STRIP.AUTO: 5 [PH] (ref 5–8)
PLATELET # BLD AUTO: 242 K/UL (ref 164–446)
PMV BLD AUTO: 8.6 FL (ref 9–12.9)
POTASSIUM SERPL-SCNC: 5 MMOL/L (ref 3.6–5.5)
POTASSIUM SERPL-SCNC: 5 MMOL/L (ref 3.6–5.5)
POTASSIUM SERPL-SCNC: 5.1 MMOL/L (ref 3.6–5.5)
PROCALCITONIN SERPL-MCNC: 0.75 NG/ML
PROT SERPL-MCNC: 5.1 G/DL (ref 6–8.2)
PROT UR QL STRIP: NEGATIVE MG/DL
RBC # BLD AUTO: 2.88 M/UL (ref 4.2–5.4)
RBC # URNS HPF: ABNORMAL /HPF (ref 0–2)
RBC UR QL AUTO: NEGATIVE
RSV RNA SPEC QL NAA+PROBE: NEGATIVE
SARS-COV-2 RNA RESP QL NAA+PROBE: NOTDETECTED
SODIUM SERPL-SCNC: 128 MMOL/L (ref 135–145)
SODIUM SERPL-SCNC: 128 MMOL/L (ref 135–145)
SODIUM SERPL-SCNC: 130 MMOL/L (ref 135–145)
SODIUM UR-SCNC: <20 MMOL/L
SP GR UR STRIP.AUTO: 1.01
SPECIMEN SOURCE: NORMAL
TROPONIN T SERPL-MCNC: 377 NG/L (ref 6–19)
TROPONIN T SERPL-MCNC: 409 NG/L (ref 6–19)
TROPONIN T SERPL-MCNC: 478 NG/L (ref 6–19)
UROBILINOGEN UR STRIP.AUTO-MCNC: 1 EU/DL
WBC # BLD AUTO: 21.2 K/UL (ref 4.8–10.8)
WBC #/AREA URNS HPF: ABNORMAL /HPF
YEAST BUDDING URNS QL: PRESENT /HPF

## 2024-12-11 PROCEDURE — 0241U HCHG SARS-COV-2 COVID-19 NFCT DS RESP RNA 4 TRGT MIC: CPT

## 2024-12-11 PROCEDURE — 87040 BLOOD CULTURE FOR BACTERIA: CPT

## 2024-12-11 PROCEDURE — 81001 URINALYSIS AUTO W/SCOPE: CPT

## 2024-12-11 PROCEDURE — 83605 ASSAY OF LACTIC ACID: CPT | Mod: 91

## 2024-12-11 PROCEDURE — 700105 HCHG RX REV CODE 258: Performed by: HOSPITALIST

## 2024-12-11 PROCEDURE — 97530 THERAPEUTIC ACTIVITIES: CPT

## 2024-12-11 PROCEDURE — A9270 NON-COVERED ITEM OR SERVICE: HCPCS | Performed by: HOSPITALIST

## 2024-12-11 PROCEDURE — 85025 COMPLETE CBC W/AUTO DIFF WBC: CPT

## 2024-12-11 PROCEDURE — 93005 ELECTROCARDIOGRAM TRACING: CPT | Mod: TC | Performed by: HOSPITALIST

## 2024-12-11 PROCEDURE — 700101 HCHG RX REV CODE 250: Performed by: HOSPITALIST

## 2024-12-11 PROCEDURE — 99223 1ST HOSP IP/OBS HIGH 75: CPT | Mod: 25 | Performed by: HOSPITALIST

## 2024-12-11 PROCEDURE — 97535 SELF CARE MNGMENT TRAINING: CPT

## 2024-12-11 PROCEDURE — 99497 ADVNCD CARE PLAN 30 MIN: CPT | Mod: 25 | Performed by: HOSPITALIST

## 2024-12-11 PROCEDURE — 83935 ASSAY OF URINE OSMOLALITY: CPT

## 2024-12-11 PROCEDURE — 93010 ELECTROCARDIOGRAM REPORT: CPT | Performed by: INTERNAL MEDICINE

## 2024-12-11 PROCEDURE — 84484 ASSAY OF TROPONIN QUANT: CPT

## 2024-12-11 PROCEDURE — 82436 ASSAY OF URINE CHLORIDE: CPT

## 2024-12-11 PROCEDURE — 770020 HCHG ROOM/CARE - TELE (206)

## 2024-12-11 PROCEDURE — 80053 COMPREHEN METABOLIC PANEL: CPT

## 2024-12-11 PROCEDURE — 80048 BASIC METABOLIC PNL TOTAL CA: CPT | Mod: 91

## 2024-12-11 PROCEDURE — 84300 ASSAY OF URINE SODIUM: CPT

## 2024-12-11 PROCEDURE — 82570 ASSAY OF URINE CREATININE: CPT

## 2024-12-11 PROCEDURE — 97163 PT EVAL HIGH COMPLEX 45 MIN: CPT

## 2024-12-11 PROCEDURE — 700102 HCHG RX REV CODE 250 W/ 637 OVERRIDE(OP)

## 2024-12-11 PROCEDURE — 700102 HCHG RX REV CODE 250 W/ 637 OVERRIDE(OP): Performed by: HOSPITALIST

## 2024-12-11 PROCEDURE — 97166 OT EVAL MOD COMPLEX 45 MIN: CPT

## 2024-12-11 PROCEDURE — 84145 PROCALCITONIN (PCT): CPT

## 2024-12-11 PROCEDURE — 99233 SBSQ HOSP IP/OBS HIGH 50: CPT | Mod: GC | Performed by: STUDENT IN AN ORGANIZED HEALTH CARE EDUCATION/TRAINING PROGRAM

## 2024-12-11 PROCEDURE — A9270 NON-COVERED ITEM OR SERVICE: HCPCS

## 2024-12-11 PROCEDURE — 700111 HCHG RX REV CODE 636 W/ 250 OVERRIDE (IP): Mod: JZ | Performed by: HOSPITALIST

## 2024-12-11 RX ORDER — FUROSEMIDE 10 MG/ML
40 INJECTION INTRAMUSCULAR; INTRAVENOUS
Status: DISCONTINUED | OUTPATIENT
Start: 2024-12-11 | End: 2024-12-12

## 2024-12-11 RX ORDER — LEVOTHYROXINE SODIUM 125 UG/1
125 TABLET ORAL
Status: DISCONTINUED | OUTPATIENT
Start: 2024-12-11 | End: 2024-12-19 | Stop reason: HOSPADM

## 2024-12-11 RX ORDER — ACETAMINOPHEN 325 MG/1
650 TABLET ORAL EVERY 6 HOURS PRN
Status: DISCONTINUED | OUTPATIENT
Start: 2024-12-11 | End: 2024-12-12

## 2024-12-11 RX ORDER — METHOCARBAMOL 500 MG/1
750 TABLET, FILM COATED ORAL 4 TIMES DAILY
Status: DISCONTINUED | OUTPATIENT
Start: 2024-12-11 | End: 2024-12-14

## 2024-12-11 RX ORDER — OXYCODONE HYDROCHLORIDE 10 MG/1
10 TABLET ORAL EVERY 8 HOURS PRN
Status: DISCONTINUED | OUTPATIENT
Start: 2024-12-11 | End: 2024-12-12

## 2024-12-11 RX ORDER — DOXAZOSIN 2 MG/1
8 TABLET ORAL
Status: DISCONTINUED | OUTPATIENT
Start: 2024-12-11 | End: 2024-12-12

## 2024-12-11 RX ORDER — OXYCODONE HYDROCHLORIDE 5 MG/1
5 TABLET ORAL EVERY 4 HOURS PRN
Status: DISCONTINUED | OUTPATIENT
Start: 2024-12-11 | End: 2024-12-12

## 2024-12-11 RX ORDER — TIMOLOL MALEATE 5 MG/ML
1 SOLUTION/ DROPS OPHTHALMIC 2 TIMES DAILY
Status: DISCONTINUED | OUTPATIENT
Start: 2024-12-11 | End: 2024-12-19 | Stop reason: HOSPADM

## 2024-12-11 RX ORDER — WARFARIN SODIUM 6 MG/1
6 TABLET ORAL
Status: DISCONTINUED | OUTPATIENT
Start: 2024-12-11 | End: 2024-12-15

## 2024-12-11 RX ORDER — ONDANSETRON 4 MG/1
4 TABLET, ORALLY DISINTEGRATING ORAL EVERY 4 HOURS PRN
Status: DISCONTINUED | OUTPATIENT
Start: 2024-12-11 | End: 2024-12-15

## 2024-12-11 RX ORDER — ONDANSETRON 2 MG/ML
4 INJECTION INTRAMUSCULAR; INTRAVENOUS EVERY 4 HOURS PRN
Status: DISCONTINUED | OUTPATIENT
Start: 2024-12-11 | End: 2024-12-15

## 2024-12-11 RX ORDER — WARFARIN SODIUM 4 MG/1
4 TABLET ORAL
Status: DISCONTINUED | OUTPATIENT
Start: 2024-12-12 | End: 2024-12-15

## 2024-12-11 RX ORDER — OXYCODONE HYDROCHLORIDE 5 MG/1
10 TABLET ORAL EVERY 8 HOURS PRN
Status: DISCONTINUED | OUTPATIENT
Start: 2024-12-11 | End: 2024-12-11

## 2024-12-11 RX ADMIN — OXYCODONE 10 MG: 5 TABLET ORAL at 23:30

## 2024-12-11 RX ADMIN — OXYCODONE 10 MG: 5 TABLET ORAL at 00:58

## 2024-12-11 RX ADMIN — DOXAZOSIN 8 MG: 2 TABLET ORAL at 00:57

## 2024-12-11 RX ADMIN — WARFARIN SODIUM 6 MG: 6 TABLET ORAL at 17:12

## 2024-12-11 RX ADMIN — AMPICILLIN AND SULBACTAM 3 G: 1; 2 INJECTION, POWDER, FOR SOLUTION INTRAMUSCULAR; INTRAVENOUS at 07:15

## 2024-12-11 RX ADMIN — OXYCODONE 5 MG: 5 TABLET ORAL at 18:06

## 2024-12-11 RX ADMIN — METHOCARBAMOL TABLETS 750 MG: 750 TABLET, COATED ORAL at 17:12

## 2024-12-11 RX ADMIN — DOXAZOSIN 8 MG: 2 TABLET ORAL at 20:22

## 2024-12-11 RX ADMIN — TIMOLOL MALEATE 1 DROP: 5 SOLUTION OPHTHALMIC at 06:30

## 2024-12-11 RX ADMIN — METHOCARBAMOL TABLETS 750 MG: 750 TABLET, COATED ORAL at 08:07

## 2024-12-11 RX ADMIN — METHOCARBAMOL TABLETS 750 MG: 750 TABLET, COATED ORAL at 20:22

## 2024-12-11 RX ADMIN — LEVOTHYROXINE SODIUM 125 MCG: 0.12 TABLET ORAL at 07:20

## 2024-12-11 RX ADMIN — OXYCODONE 5 MG: 5 TABLET ORAL at 08:43

## 2024-12-11 RX ADMIN — AMPICILLIN AND SULBACTAM 3 G: 1; 2 INJECTION, POWDER, FOR SOLUTION INTRAMUSCULAR; INTRAVENOUS at 17:16

## 2024-12-11 RX ADMIN — TIMOLOL MALEATE 1 DROP: 5 SOLUTION OPHTHALMIC at 17:12

## 2024-12-11 RX ADMIN — METHOCARBAMOL TABLETS 750 MG: 750 TABLET, COATED ORAL at 12:29

## 2024-12-11 RX ADMIN — AMPICILLIN AND SULBACTAM 3 G: 1; 2 INJECTION, POWDER, FOR SOLUTION INTRAMUSCULAR; INTRAVENOUS at 12:32

## 2024-12-11 ASSESSMENT — ENCOUNTER SYMPTOMS
PND: 0
DIAPHORESIS: 0
BRUISES/BLEEDS EASILY: 0
SPUTUM PRODUCTION: 0
BACK PAIN: 0
FEVER: 0
DOUBLE VISION: 0
COUGH: 0
FLANK PAIN: 0
PALPITATIONS: 0
PHOTOPHOBIA: 0
WHEEZING: 0
ABDOMINAL PAIN: 0
EYE PAIN: 0
ORTHOPNEA: 1
POLYDIPSIA: 0
HEMOPTYSIS: 0
SHORTNESS OF BREATH: 1
CLAUDICATION: 0
VOMITING: 0
HEARTBURN: 0
BLOOD IN STOOL: 0
SINUS PAIN: 0
BLURRED VISION: 0
CHILLS: 0
TINGLING: 0
NAUSEA: 0
FALLS: 0
SORE THROAT: 0
WEAKNESS: 0
HALLUCINATIONS: 0
CONSTIPATION: 0
STRIDOR: 0
TREMORS: 0
DEPRESSION: 0
DIARRHEA: 0
NECK PAIN: 0
HEADACHES: 0
MYALGIAS: 0
DIZZINESS: 0

## 2024-12-11 ASSESSMENT — COGNITIVE AND FUNCTIONAL STATUS - GENERAL
TOILETING: A LOT
SUGGESTED CMS G CODE MODIFIER DAILY ACTIVITY: CK
TURNING FROM BACK TO SIDE WHILE IN FLAT BAD: TOTAL
PERSONAL GROOMING: A LITTLE
WALKING IN HOSPITAL ROOM: A LOT
HELP NEEDED FOR BATHING: A LITTLE
WALKING IN HOSPITAL ROOM: TOTAL
SUGGESTED CMS G CODE MODIFIER DAILY ACTIVITY: CK
MOBILITY SCORE: 12
STANDING UP FROM CHAIR USING ARMS: TOTAL
STANDING UP FROM CHAIR USING ARMS: A LOT
MOVING FROM LYING ON BACK TO SITTING ON SIDE OF FLAT BED: A LOT
DAILY ACTIVITIY SCORE: 19
MOVING TO AND FROM BED TO CHAIR: A LOT
MOVING FROM LYING ON BACK TO SITTING ON SIDE OF FLAT BED: TOTAL
DRESSING REGULAR UPPER BODY CLOTHING: A LOT
HELP NEEDED FOR BATHING: A LOT
TURNING FROM BACK TO SIDE WHILE IN FLAT BAD: A LOT
DRESSING REGULAR UPPER BODY CLOTHING: A LITTLE
DAILY ACTIVITIY SCORE: 15
DRESSING REGULAR LOWER BODY CLOTHING: A LITTLE
SUGGESTED CMS G CODE MODIFIER MOBILITY: CL
CLIMB 3 TO 5 STEPS WITH RAILING: A LOT
DRESSING REGULAR LOWER BODY CLOTHING: A LOT
CLIMB 3 TO 5 STEPS WITH RAILING: TOTAL
TOILETING: A LOT
MOVING TO AND FROM BED TO CHAIR: TOTAL
MOBILITY SCORE: 6
SUGGESTED CMS G CODE MODIFIER MOBILITY: CN

## 2024-12-11 ASSESSMENT — COPD QUESTIONNAIRES
HAVE YOU SMOKED AT LEAST 100 CIGARETTES IN YOUR ENTIRE LIFE: NO/DON'T KNOW
COPD SCREENING SCORE: 5
DURING THE PAST 4 WEEKS HOW MUCH DID YOU FEEL SHORT OF BREATH: MOST  OR ALL OF THE TIME
DO YOU EVER COUGH UP ANY MUCUS OR PHLEGM?: NO/ONLY WITH OCCASIONAL COLDS OR INFECTIONS

## 2024-12-11 ASSESSMENT — SOCIAL DETERMINANTS OF HEALTH (SDOH)
WITHIN THE LAST YEAR, HAVE YOU BEEN AFRAID OF YOUR PARTNER OR EX-PARTNER?: NO
IN THE PAST 12 MONTHS, HAS THE ELECTRIC, GAS, OIL, OR WATER COMPANY THREATENED TO SHUT OFF SERVICE IN YOUR HOME?: NO
WITHIN THE LAST YEAR, HAVE YOU BEEN HUMILIATED OR EMOTIONALLY ABUSED IN OTHER WAYS BY YOUR PARTNER OR EX-PARTNER?: NO
WITHIN THE PAST 12 MONTHS, YOU WORRIED THAT YOUR FOOD WOULD RUN OUT BEFORE YOU GOT THE MONEY TO BUY MORE: NEVER TRUE
WITHIN THE LAST YEAR, HAVE TO BEEN RAPED OR FORCED TO HAVE ANY KIND OF SEXUAL ACTIVITY BY YOUR PARTNER OR EX-PARTNER?: NO
WITHIN THE LAST YEAR, HAVE YOU BEEN KICKED, HIT, SLAPPED, OR OTHERWISE PHYSICALLY HURT BY YOUR PARTNER OR EX-PARTNER?: NO
WITHIN THE PAST 12 MONTHS, THE FOOD YOU BOUGHT JUST DIDN'T LAST AND YOU DIDN'T HAVE MONEY TO GET MORE: NEVER TRUE

## 2024-12-11 ASSESSMENT — PAIN DESCRIPTION - PAIN TYPE
TYPE: ACUTE PAIN

## 2024-12-11 ASSESSMENT — CHA2DS2 SCORE
DIABETES: NO
PRIOR STROKE OR TIA OR THROMBOEMBOLISM: YES
CHA2DS2 VASC SCORE: 5
AGE 75 OR GREATER: NO
HYPERTENSION: YES
VASCULAR DISEASE: NO
CHF OR LEFT VENTRICULAR DYSFUNCTION: NO
SEX: FEMALE
AGE 65 TO 74: YES

## 2024-12-11 ASSESSMENT — LIFESTYLE VARIABLES
AVERAGE NUMBER OF DAYS PER WEEK YOU HAVE A DRINK CONTAINING ALCOHOL: 0
ON A TYPICAL DAY WHEN YOU DRINK ALCOHOL HOW MANY DRINKS DO YOU HAVE: 0
EVER HAD A DRINK FIRST THING IN THE MORNING TO STEADY YOUR NERVES TO GET RID OF A HANGOVER: NO
EVER FELT BAD OR GUILTY ABOUT YOUR DRINKING: NO
HOW MANY TIMES IN THE PAST YEAR HAVE YOU HAD 5 OR MORE DRINKS IN A DAY: 0
SUBSTANCE_ABUSE: 0
TOTAL SCORE: 0
DOES PATIENT WANT TO STOP DRINKING: NO
ALCOHOL_USE: NO
CONSUMPTION TOTAL: NEGATIVE
HAVE YOU EVER FELT YOU SHOULD CUT DOWN ON YOUR DRINKING: NO
HAVE PEOPLE ANNOYED YOU BY CRITICIZING YOUR DRINKING: NO
TOTAL SCORE: 0
TOTAL SCORE: 0

## 2024-12-11 ASSESSMENT — ACTIVITIES OF DAILY LIVING (ADL): TOILETING: INDEPENDENT

## 2024-12-11 ASSESSMENT — FIBROSIS 4 INDEX
FIB4 SCORE: 1.17
FIB4 SCORE: 1.17

## 2024-12-11 ASSESSMENT — GAIT ASSESSMENTS: GAIT LEVEL OF ASSIST: UNABLE TO PARTICIPATE

## 2024-12-11 NOTE — ASSESSMENT & PLAN NOTE
Patient has a history of mechanical aortic valve replacement 2007 Dr Awan and is on warfarin indefinitely. Goal INR: 2.5-3.5  Anticoagulation is currently on hold due to retroperitoneal hematoma    Comfort care measures only

## 2024-12-11 NOTE — ASSESSMENT & PLAN NOTE
Patient has left lower leg erythema, tenderness and swelling suspicious for a cellulitis and and left upper arm thrombophlebitis. In the setting of neutrophilic leukocytosis, and mildly elevated Procalcitonin at 0.76. Our suspicion for infection is very high. She was started on Unasyn on admission and blood cultures have been sent.  -U/S of left leg and left upper arm  -Continue IV Unasyn  -Follow up on Blood cultures    Comfort care measures only

## 2024-12-11 NOTE — PROGRESS NOTES
Inpatient Anticoagulation Service Note for 12/11/2024      Reason for Anticoagulation: Aortic Mechanical Valve Replacement             Hemoglobin Value: (!) 9.4  Hematocrit Value: (!) 30.2  Lab Platelet Value: 329  Target INR: 2.0 to 3.0    INR from last 7 days        Date/Time INR Value    12/10/24 2048 2.27                   Dose from last 7 days        Date/Time Dose (mg)    12/11/24 0400 6                   Average Dose (mg):  (Home regimen 4 mg TTSS; 6 mg all other days)  Significant Interactions: Not Applicable  Bridge Therapy: No   Reversal Agent Administered: Not Applicable    Comments: on warfarin for mechanical aortic valve. H/H anemic with no overt bleeding noted by nursing or providers. No significant DDI. INR therapeutic, plan to resume home dosing. Repeat INR tomorrow AM.    Plan:  warfarin 6 mg, INR tomorrow AM  Education Material Provided?: No    Pharmacist suggested discharge dosing: Warfarin 4 mg on Tues, Thurs, Sat, Sun; 6 mg all other days. Follow up INR within 48-72 hours of discharge.       Nash Weller, PharmD

## 2024-12-11 NOTE — ASSESSMENT & PLAN NOTE
Patient has a history of CAD and is s/p CABG. She's allergic to statin and is on Alirocumab and Ezetemibe.

## 2024-12-11 NOTE — ASSESSMENT & PLAN NOTE
Her Hb is low at 8.4 with a mCV of 95.1  -Continue to monitor  -Transfuse to a goal of >7    Comfort care measures only

## 2024-12-11 NOTE — DISCHARGE PLANNING
RYLAN called [facility] regarding pt returning tonight with 2L o2, pt can go back and does not need anything more regarding the oxygen.  [facility] RN stated she would like to have confirmation that the pt will be diuresed.  RYLAN spoke w RN, RN will provide report to [facility] and will let RYLAN know when pt is ready to be transported back to [facility].

## 2024-12-11 NOTE — PROGRESS NOTES
..4 Eyes Skin Assessment Completed by PREETHI Shepard and PREETHI Arzola.    Head WDL  Ears WDL  Nose WDL  Mouth WDL  Neck WDL  Breast/Chest WDL  Shoulder Blades WDL  Spine WDL  (R) Arm/Elbow/Hand Redness and Blanching, bruising edema   (L) Arm/Elbow/Hand Redness, Blanching, Bruising, and Edema  Abdomen Redness and Bruising edema   Groin WDL  Scrotum/Coccyx/Buttocks Redness and Blanching  (R) Leg Redness, Blanching, Bruising, Edema, and Incision - from femur fx surgery   (L) Leg Redness, Blanching, Bruising, and Edema  (R) Heel/Foot/Toe Redness and Blanching edema   (L) Heel/Foot/Toe Redness and Blanching edema           Devices In Places Tele Box, Blood Pressure Cuff, Pulse Ox, and Bull      Interventions In Place Pillows    Possible Skin Injury Yes    Pictures Uploaded Into Epic Yes  Wound Consult Placed Yes  RN Wound Prevention Protocol Ordered Yes

## 2024-12-11 NOTE — PROGRESS NOTES
[facility] Internal Medicine Daily Progress Note    Date of Service  12/11/2024    [facility] Team: R IM Blue Team   Attending: Zohreh De Dios M.d.  Senior Resident: Dr. Dick Dietrich  Intern:  Dr. Bundy  Contact Number: [phone]    Chief Complaint  Yenifer Beasley is a 74 y.o. female admitted 12/10/2024 with shortness of breath and left leg pain.    Hospital Course  Yenifer Beaslye is a 74 year ole female patient with PMHx of Atrial fibrillation, mechanical aortic valve replacement (on warfarin), Chronic kidney disease, and femur fracture from a ground level fall on 11/15 s/p ORIF presented to the hospital from SNF for shortness of breath and left leg pain. Patient was discharged yesterday from the hospital after undergoing management for femur fracture complicated by retroperitoneal hematoma. She was given Prothrombin complex concentrate and 2 units of RBCs and was started back on warfarin and INR was therapeutic. Blood work-up on admission shows elevated WBC at 28.9 with 95% neutrophils, Hb: 9.4, PC: 329, CMP showed normal RFTs with Cr: 1.24, BUN: 57, Troponin trended up from 267 to 377, elevated BNP: 35724, INR: 2.27. EKG on admission did not show any ST or T wave abnormalities. Chest X-ray showed increased intravascular congestion and bilateral pulmonary edema. Patient was started on IV Unasyn for left leg cellulitis. She has been started on IV lasix 40mg BID for pulmonary edema. In the setting of her tachypnea, up-trending troponin and recent femur fracture, it was decided to do a CTPE but patient refused. Her last echo was done on 11/23/2024 which showed LVEF: 75%.    Interval Problem Update  Overnight events:  Patient had no acute overnight events.    12/11:  This morning, Patient complained of left leg pain. She denies fever/chills, Shortness of breath, chest pain, palpitations and dizziness. She continues to refuse CTPE as per her, she has had enough CAT scans already and dont think it anton make a difference. She  was reluctant regarding antibiotics aswell but when I explained to her that the infection from her leg would spread to blood if we dont control with antibiotics. She agreed.        I have discussed this patient's plan of care and discharge plan at IDT rounds today with Case Management, Nursing, Nursing leadership, and other members of the IDT team.    Consultants/Specialty       Code Status  DNAR/DNI    Disposition  The patient is not medically cleared for discharge to home or a post-acute facility.      I have placed the appropriate orders for post-discharge needs.    Review of Systems  Review of Systems   Constitutional:  Negative for chills and fever.   Eyes:  Negative for blurred vision and double vision.   Respiratory:  Positive for shortness of breath. Negative for cough, hemoptysis and sputum production.    Cardiovascular:  Positive for leg swelling. Negative for chest pain and palpitations.   Gastrointestinal:  Negative for abdominal pain, nausea and vomiting.        Physical Exam  Temp:  [36.7 °C (98.1 °F)-37.4 °C (99.3 °F)] 36.7 °C (98.1 °F)  Pulse:  [67-91] 78  Resp:  [18-28] 20  BP: ()/(43-73) 105/53  SpO2:  [91 %-98 %] 91 %    Physical Exam  HENT:      Mouth/Throat:      Mouth: Mucous membranes are moist.   Eyes:      Pupils: Pupils are equal, round, and reactive to light.   Neck:      Comments: JVD present  Cardiovascular:      Rate and Rhythm: Normal rate.      Heart sounds: Murmur heard.   Pulmonary:      Breath sounds: Rales present.   Abdominal:      General: Bowel sounds are normal. There is no distension.      Palpations: There is no mass.      Tenderness: There is no abdominal tenderness. There is no guarding.   Musculoskeletal:      Cervical back: Normal range of motion.      Right lower leg: Edema present.      Left lower leg: Edema present.   Skin:     General: Skin is warm.   Neurological:      Mental Status: She is alert.         Fluids    Intake/Output Summary (Last 24 hours) at  12/11/2024 1431  Last data filed at 12/11/2024 0800  Gross per 24 hour   Intake 720 ml   Output 600 ml   Net 120 ml       Laboratory  Recent Labs     12/09/24  0108 12/10/24  2048 12/11/24  0701   WBC 12.0* 28.9* 21.2*   RBC 2.54* 3.11* 2.88*   HEMOGLOBIN 7.9* 9.4* 8.8*   HEMATOCRIT 23.6* 30.2* 27.4*   MCV 92.9 97.1 95.1   MCH 31.1 30.2 30.6   MCHC 33.5 31.1* 32.1*   RDW 58.3* 59.7* 59.7*   PLATELETCT 248 329 242   MPV 8.4* 8.5* 8.6*     Recent Labs     12/10/24  0815 12/10/24  2048 12/11/24  0701   SODIUM 133* 131* 130*   POTASSIUM 5.2 5.5 5.0   CHLORIDE 98 95* 96   CO2 26 23 24   GLUCOSE 101* 158* 180*   BUN 64* 57* 60*   CREATININE 1.28 1.24 1.58*   CALCIUM 8.6 8.7 8.5     Recent Labs     12/09/24  0108 12/10/24  0815 12/10/24  2048   APTT  --   --  33.2   INR 2.26* 2.48* 2.27*               Imaging  DX-CHEST-PORTABLE (1 VIEW)   Final Result         1.  Left basilar atelectasis and/or infiltrates   2.  Cardiomegaly   3.  Atherosclerosis      EC-ECHOCARDIOGRAM COMPLETE W/O CONT    (Results Pending)   US-EXTREMITY VENOUS LOWER BILAT    (Results Pending)   US-EXTREMITY VENOUS UPPER UNILAT LEFT    (Results Pending)        Assessment/Plan  Problem Representation:    * Acute respiratory failure with hypoxia (HCC)- (present on admission)  Assessment & Plan  Patient presented with AHRF and required 2L of O2. She does not use oxygen at baseline. Chest X-ray showed left basal infiltrates, Cardiomegaly and B/L pulmonary edema. Patient continues to be tachypneic. She refused CTPE on admission. This morning, I explained the need of a CTPE again and she refused once again.   -U/S duplex of B/L lower extremities to r/o DVT  -Continue IV Lasix 40mg BID  -RT protocol  -Covid-19 test  -Resp Profile      Thrombophlebitis of left arm  Assessment & Plan  Patient has a left arm thrombophlebitis in the setting of hx of IV line at the site which have been removed.  -U/S of left arm   -Continue IV Unasyn    Cellulitis  Assessment &  Plan  Patient has left lower leg erythema, tenderness and swelling suspicious for a cellulitis and and left upper arm thrombophlebitis. In the setting of neutrophilic leukocytosis, and mildly elevated Procalcitonin at 0.76. Our suspicion for infection is very high. She was started on Unasyn on admission and blood cultures have been sent.  -U/S of left leg and left upper arm  -Continue IV Unasyn  -Follow up on Blood cultures    Status post closed fracture of right femur  Assessment & Plan  Patient is s/p ORIF for femur fracture after a mechanical ground-level fall on 11/15  -Continue pain management  -PT/OT      NSTEMI (non-ST elevated myocardial infarction) (HCC)  Assessment & Plan  Patient continues to deny any any chest pain and did not have any ST segment changes on EKG. She has uptrending troponins with Trop T: 267 on admission, which up trended to 478. Last Echocardiography done on 11/23/24 showed a preserved LVEF of 75%.   -Transthoracic Echocardiography today  -Lipid Panel, TSH and Hb1C  -As needed nitro for chest pain        Acute pulmonary edema (HCC)  Assessment & Plan  Patient has B/L acute pulmonary edema seen on chest X-ray done on admission. Started on IV Lasix 40mg BID  -Continue on IV Lasix 40 mg BID  -Monitor strict Input and Output  -Continue monitoring on telemetry        Pulmonary hypertension (HCC)- (present on admission)  Assessment & Plan  Decompensated  Monitor volume status    Chronic kidney disease (CKD) stage G3b/A1, moderately decreased glomerular filtration rate (GFR) between 30-44 mL/min/1.73 square meter and albuminuria creatinine ratio less than 30 mg/g- (present on admission)  Assessment & Plan  Monitor BMP and assess response  nephrotoxins/NSAIDs  Dose adjust meds for decreased GFR      Hx of mechanical aortic valve replacement [V43.3]- (present on admission)  Assessment & Plan  Patient has a history of mechanical aortic valve replacement and is on warfarin indefinitely. Goal INR:  2.5-3.5  -Continue to monitor coagulation profile    Anemia- (present on admission)  Assessment & Plan  Her Hb is low at 8.4 with a mCV of 95.1  -Continue to monitor  -Transfuse to a goal of >7    Essential hypertension- (present on admission)  Assessment & Plan  Patient has a history Uncontrolled  Continue current home medications  IV as needed medications have been ordered      Coronary artery disease- (present on admission)  Assessment & Plan  Patient has a history of CAD and is s/p CABG. She's allergic to statin and is on Alirocumab and Ezetemibe.         VTE prophylaxis: therapeutic anticoagulation with Warfarin    I have performed a physical exam and reviewed and updated ROS and Plan today (12/11/2024). In review of yesterday's note (12/10/2024), there are no changes except as documented above.

## 2024-12-11 NOTE — ASSESSMENT & PLAN NOTE
Patient has a left arm thrombophlebitis in the setting of hx of IV line at the site which have been removed.  -U/S of left arm   -Continue IV Unasyn    Comfort care measures only

## 2024-12-11 NOTE — ASSESSMENT & PLAN NOTE
Patient has B/L acute pulmonary edema seen on chest X-ray done on admission. Started on IV Lasix 40mg BID  -Continue on IV Lasix 40 mg BID  -Monitor strict Input and Output    Comfort care measures only

## 2024-12-11 NOTE — CARE PLAN
The patient is Unstable - High likelihood or risk of patient condition declining or worsening    Shift Goals  Clinical Goals: monitor labs, pain management  Patient Goals: go home  Family Goals: updates    Progress made toward(s) clinical / shift goals:  RN to continue to monitor labs and provide pain management PRN. Patient continues to show signs of anxiousness. RN to continue purposeful rounding and update MD PRN    Patient is not progressing towards the following goals:

## 2024-12-11 NOTE — ED TRIAGE NOTES
BIB EMS from [facility]; patient was just d/c yesterday from the hospital and was sent there for rehab. Complaining of SOB started few hrs ago; on 2L oxygen via nasal cannula baseline.       AOX4 GCS15; noted bryant; in SITU, bilateral leg swelling.    /73   Pulse 91   Temp 37.4 °C (99.3 °F) (Temporal)   Resp (!) 21   Ht 1.524 m (5')   Wt 121 kg (267 lb)   LMP  (LMP Unknown)   SpO2 93%   BMI 52.14 kg/m²

## 2024-12-11 NOTE — PROGRESS NOTES
"MD notified RN for a stat CTA. CT called to check availability. RN went to have patient sign consent form for contrast. Patient declined RN educated patient on the importance of the CTA and looking for blood clots in lungs. Patient still declined due to contrast being \"dangerous. RN notified MD. MD came to bedside and explained risk if refused. Patient still refused. Patient then educated on code status per MD. MD changed code status to DNR/ DNI per patient request   "

## 2024-12-11 NOTE — ED PROVIDER NOTES
ED Provider Note    CHIEF COMPLAINT  Chief Complaint   Patient presents with    Shortness of Breath    Leg Pain       EXTERNAL RECORDS REVIEWED  Inpatient Notes the patient was just discharged today.  She was recently hospitalized for a femur fracture then came in on the 23rd for hypotension secondary to retroperitoneal hematoma.  She was just discharged today to a skilled nursing facility rehab    HPI/ROS  LIMITATION TO HISTORY   Select: : None  OUTSIDE HISTORIAN(S):  EMS brought in from [facility] for shortness of breath that started a few hours ago started on 2 L nasal cannula due to hypoxia for EMS    Yenifer Beasley is a 74 y.o. female who presents to the emergency department stating she is short of breath.  She states she was doing okay when she left but she never got oxygen when she got to the facility and felt like she needed it.  Chart review reveals that she is only supposed to be on CPAP at night but not oxygen during the day.  She states she has had some mild chronic redness to the left leg for months that is not new or worsening.  She has however had progressively worsening shortness of breath just since this afternoon.  She denies any chest pain currently but states maybe she had a little bit of back pain jaw pain earlier in the day.  Denies any lateral weakness numbness or tingling headache fevers or chills.  She does have an indwelling Bull and states was just replaced a few days ago    PAST MEDICAL HISTORY   has a past medical history of Allergic rhinitis, Anticoagulation monitoring, special range, Arthritis, Asthma (10/17/2019), Atrial fibrillation ([facility]), Back pain, Blood transfusion without reported diagnosis, CAD (coronary artery disease) (2007), Cataract, Chest pain at rest (11/30/2010), CHF (congestive heart failure) ([facility]), Chronic anticoagulation (04/23/2018), Constipation, Coronary heart disease, Delayed emergence from general anesthesia, Diabetes, Diabetic neuropathy ([facility])  (2011), Difficulty breathing, Fall, Gasping for breath, Gastritis (2010), Gastritis, GERD (gastroesophageal reflux disease), Pashto measles, GI bleed, Glaucoma, Heart murmur, Hyperlipidemia, Hypertension, Hyponatremia (2014), Hypothyroidism, Impaired fasting glucose (10/08/2019), Infectious disease, Kidney disease, Migraine, Need for influenza vaccination (2010), Neuropathy in diabetes ([facility]), HILDA (obstructive sleep apnea), Painful joint, Palpitations, Pneumonia, Pneumonia due to organism (2014), Pulmonary hypertension ([facility]), Rash, S/P aortic valve replacement (), Shortness of breath (2022), Skin infection (2018), Sleep apnea, Swelling of lower extremity, TIA (transient ischemic attack) (), TIA (transient ischemic attack), Tonsillitis, Tricuspid regurgitation mild- mod (2011), Unspecified hemorrhagic conditions, Upper GI bleed ON 2007 (2011), Upper GI bleed ON 2007 (2011), URTI (acute upper respiratory infection) (2010), UTI (urinary tract infection) (2014), Valvular heart disease, Weakness, and Wears glasses.    SURGICAL HISTORY   has a past surgical history that includes primary c section; hernia repair; mitral valve replace (); cholecystectomy; abdominal hysterectomy total; carpal tunnel release (); tonsillectomy (); shoulder surgery (2001); coronary artery bypass, 1; Sleeve,Gama Vaso Thigh; hysterectomy laparoscopy; Intubation; uvulopharyngopalatoplasty; Ventilator - Continuous; aortic valve replacement; tubal coagulation laparoscopic bilateral; and orif, fracture, femur (Right, 2024).    FAMILY HISTORY  Family History   Problem Relation Age of Onset    Heart Disease Mother         a fib    Cancer Mother         Cervical: palate:      Cancer Maternal Grandmother         Leukemia,  21 y.o. 193    Cancer Maternal Aunt         Lung CA (+3 dtrs  from [place])    Heart Disease Brother       "   Heart attack, open heart surgery,     Diabetes Brother         Insulin dependent    Heart Disease Brother         Heart attack, open heart surgery     Breast Cancer Cousin        SOCIAL HISTORY  Social History     Tobacco Use    Smoking status: Never    Smokeless tobacco: Never   Vaping Use    Vaping status: Never Used   Substance and Sexual Activity    Alcohol use: No    Drug use: Never    Sexual activity: Not Currently       CURRENT MEDICATIONS  Home Medications       Reviewed by Simran Bower R.N. (Registered Nurse) on 12/10/24 at 2030  Med List Status: Partial     Medication Last Dose Status   Alirocumab (PRALUENT) 150 MG/ML Solution Auto-injector  Active   diclofenac (VOLTAREN) 0.1 % ophthalmic solution  Active   doxazosin (CARDURA) 8 MG tablet  Active   ezetimibe (ZETIA) 10 MG Tab  Active   levothyroxine (SYNTHROID) 125 MCG Tab  Active   methocarbamol (ROBAXIN) 750 MG Tab  Active   oxyCODONE immediate release (ROXICODONE) 10 MG immediate release tablet  Active   polyethylene glycol/lytes (MIRALAX) Pack  Active   senna-docusate (PERICOLACE OR SENOKOT S) 8.6-50 MG Tab  Active   timolol (TIMOPTIC) 0.5 % Solution  Active   warfarin (COUMADIN) 2 MG Tab  Active   warfarin (COUMADIN) 4 MG Tab  Active                  Audit from Redirected Encounters    **Home medications have not yet been reviewed for this encounter**         ALLERGIES  Allergies   Allergen Reactions    Asa [Aspirin]      GI BLEED    Atorvastatin Shortness of Breath    Cymbalta [Duloxetine Hcl] Unspecified     Feels like a \"zombie\"    Hmg-Coa-R Inhibitors     Latex Swelling    Nsaids      GI BLEED    Tricor Shortness of Breath     Breathing problems      Trilipix [Choline Fenofibrate] Shortness of Breath     SOB    Lyrica [Pregabalin]      SPACED OUT       PHYSICAL EXAM  VITAL SIGNS: /73   Pulse 91   Temp 37.4 °C (99.3 °F) (Temporal)   Resp (!) 21   Ht 1.524 m (5')   Wt 121 kg (267 lb)   LMP  (LMP Unknown)   SpO2 93%   " BMI 52.14 kg/m²    Pulse OX: Pulse Oxygen level is within normal limits on 2 L nasal cannula  Constitutional: Somewhat sleepy and mildly ill-appearing  HENT: Normocephalic, Atraumatic, MMM  Eyes: PERound. Conjunctiva normal, non-icteric.   Heart: Regular rate and rhythm, intact distal pulses 4/6 systolic murmur  Lungs: Symmetrical movement, no resp distress diminished breath sounds at the bases with mild rales  Abdomen: Non-tender, non-distended, normal bowel sounds  EXT/Back 3+ pitting edema bilateral lower extremities left lower extremity mild erythema no induration or fluctuance  Skin: Warm, Dry, No erythema, No rash.   Neurologic: Alert and oriented, Grossly non-focal.       EKG/LABS  Labs Reviewed   CBC WITH DIFFERENTIAL - Abnormal; Notable for the following components:       Result Value    WBC 28.9 (*)     RBC 3.11 (*)     Hemoglobin 9.4 (*)     Hematocrit 30.2 (*)     MCHC 31.1 (*)     RDW 59.7 (*)     MPV 8.5 (*)     Neutrophils-Polys 95.90 (*)     Lymphocytes 1.20 (*)     Neutrophils (Absolute) 27.66 (*)     Lymphs (Absolute) 0.36 (*)     Immature Granulocytes (abs) 0.25 (*)     All other components within normal limits   COMP METABOLIC PANEL - Abnormal; Notable for the following components:    Sodium 131 (*)     Chloride 95 (*)     Glucose 158 (*)     Bun 57 (*)     Albumin 2.9 (*)     Total Protein 5.7 (*)     All other components within normal limits   PROBRAIN NATRIURETIC PEPTIDE, NT - Abnormal; Notable for the following components:    NT-proBNP 25374 (*)     All other components within normal limits   TROPONIN - Abnormal; Notable for the following components:    Troponin T 267 (*)     All other components within normal limits   ESTIMATED GFR - Abnormal; Notable for the following components:    GFR (CKD-EPI) 46 (*)     All other components within normal limits   PROTHROMBIN TIME - Abnormal; Notable for the following components:    PT 25.2 (*)     INR 2.27 (*)     All other components within normal  limits   TROPONIN - Abnormal; Notable for the following components:    Troponin T 377 (*)     All other components within normal limits   MAGNESIUM   PHOSPHORUS   APTT   BLOOD CULTURE   COV-2, FLU A/B, AND RSV BY PCR ([employer])   HEMOGLOBIN A1C   PROCALCITONIN   TROPONIN   URINALYSIS,CULTURE IF INDICATED     Results for orders placed or performed during the hospital encounter of 12/10/24   EKG   Result Value Ref Range    Report       [facility]    Test Date:  2024-12-10  Pt Name:    LIMA FIGUEREDO               Department: ER  MRN:        [id number]                      Room:        26  Gender:     Female                       Technician: [id number]  :        1950                   Requested By:ER TRIAGE PROTOCOL  Order #:    [id number]                    Reading MD: Tricia Maxwell MD    Measurements  Intervals                                Axis  Rate:       92                           P:          0  AK:         0                            QRS:        17  QRSD:       107                          T:          46  QT:         352  QTc:        436    Interpretive Statements  Type I AV block with baseline artifact no ST elevations or ST depressions  interventricular conduction delay still present no abnormal Q waves  Compared to ECG 2024 05:13:48  type 1 avb  Electronically Signed On 12- 21:57:40 PST by Tricia Maxwell MD     EKG   Result Value Ref Range    Report       [facility]    Test Date:  2024  Pt Name:    [facility] ER               Department: ER  MRN:        [id number]                      Room:       S173  Gender:     Female                       Technician: [id number]  :        1950                   Requested By:OSMIN MONAE  Order #:    [id number]                    Reading MD:    Measurements  Intervals                                Axis  Rate:       77                           P:          266  AK:         159                           QRS:        19  QRSD:       102                          T:          52  QT:         398  QTc:        451    Interpretive Statements  Sinus or ectopic atrial rhythm  Compared to ECG 12/10/2024 20:26:45  Ectopic atrial rhythm now present  ST (T wave) deviation no longer present  Q waves no longer present       *Note: Due to a large number of results and/or encounters for the requested time period, some results have not been displayed. A complete set of results can be found in Results Review.         I have independently interpreted this EKG    RADIOLOGY/PROCEDURES   I have independently interpreted the diagnostic imaging associated with this visit and am waiting the final reading from the radiologist.   My preliminary interpretation is as follows:     CXR - CM, atelectasis LLL    Radiologist interpretation:  DX-CHEST-PORTABLE (1 VIEW)   Final Result         1.  Left basilar atelectasis and/or infiltrates   2.  Cardiomegaly   3.  Atherosclerosis          COURSE & MEDICAL DECISION MAKING    ASSESSMENT, COURSE AND PLAN  Care Narrative:     Patient is a 74-year-old female presents emerged part with shortness of breath and just looking uncomfortable and tired appearing.  She denies any chest pain at this time but states maybe she had a little bit earlier as well as will dropping back pain.  States she feels better with the oxygen on board.  She does have significant signs of fluid overload on examination and is requiring oxygen.  She manage getting discharged back to facility she is only been there for a few hours but evaluate for any changes in the last 12 hours evaluate for ACS I doubt pulmonary embolism electrolyte abnormality or infectious process.        DISPOSITION AND DISCUSSIONS  10:44 PM  Unfortunately the patient's troponin came back greater than 200 proBNP is greater than 10,000 which is definitely beyond her baseline she has been compliant with her Coumadin as she was just here this morning and her INR is  appropriate.  Fortunately EKG shows no signs of acute STEMI at this time does not have any chest pain.  She is going to be hospitalized for further management of further cardiac evaluation was given Lasix for her fluid overload.    10:48 PM  Spoke w DR Alston with the medicine team and he is accepted the patient for the hospitalization the setting of an NSTEMI on Coumadin and therapeutic.  She does having significantly elevated white blood cell count I am still pending urinalysis but I do not see any other signs of infection beyond maybe a mild cellulitis but the patient endorses the left lower extremity has chronic erythema.      Heart score 7      I have discussed management of the patient with the following physicians and HANY's:  Kelly     Discussion of management with other Q or appropriate source(s): None       FINAL DIAGNOSIS  1. Shortness of breath    2. NSTEMI (non-ST elevated myocardial infarction) (HCC)    3.  Acute fluid overload     Electronically signed by: Tricia Maxwell M.D., 12/10/2024 8:46 PM

## 2024-12-11 NOTE — ED NOTES
Checked on bed, connected to monitor,asleep  with unlabored respirations. Vital signs is stable.   Denied any new complaints. No current needs identified.  Gurney in low position, side rail up for pt safety. Call light within reach.

## 2024-12-11 NOTE — ASSESSMENT & PLAN NOTE
Patient has a history Uncontrolled  Continue current home medications  IV as needed medications have been ordered    Comfort care measures only

## 2024-12-11 NOTE — PROGRESS NOTES
4 Eyes Skin Assessment Completed by PREETHI Canseco and PREETHI Howe.    Head WDL  Ears WDL  Nose Redness  Mouth WDL  Neck WDL  Breast/Chest WDL  Shoulder Blades WDL  Spine WDL  (R) Arm/Elbow/Hand Edema  (L) Arm/Elbow/Hand Redness, Blanching, Discoloration, and Edema  Abdomen Bruising  Groin WDL  Scrotum/Coccyx/Buttocks Redness  (R) Leg Redness, Blanching, and Edema  (L) Leg Redness, Blanching, Bruising, Weeping, and Edema  (R) Heel/Foot/Toe Redness and Edema  (L) Heel/Foot/Toe Redness and Edema          Devices In Places Tele Box and Nasal Cannula      Interventions In Place Heel Float Boots and Q2 Turns, wedges    Possible Skin Injury No    Pictures Uploaded Into Epic Yes  Wound Consult Placed Yes  RN Wound Prevention Protocol Ordered Yes

## 2024-12-11 NOTE — THERAPY
Occupational Therapy   Initial Evaluation     Patient Name: Yenifer Beasley  Age:  74 y.o., Sex:  female  Medical Record #: [id number]  Today's Date: 12/11/2024     Precautions: Fall Risk, Toe Touch Weight Bearing Right Lower Extremity  Comments: LAURA RAMIREZ (From previous admit notes)    Assessment  Patient is 74 y.o. female admitted from [facility] with SOB. Pt was recently discharged from [facility] after a femur fracture that was complicated by retroperitoneal hematoma. PMHx of hx of OHS, CAD, HILDA, neuropathy, type 2 DM, pulmonary hypertension, and a-fib. Pt seen for OT eval and tx. Prior to previous SNF admittance, pt was living with her daughter in a 1-story house and was independent with ADLs and IADLs. Pt has been transitioning between SNF and hospital since November 7.     During OT eval, pt presented with pain as well as deficits in self-care tasks, balance, functional mobility, cognition, strength, and activity tolerance. She required max A to mobilize to the EOB, min A to complete seated g/h tasks, and max A to complete LB dressing, toileting, and  UB dressing. Pt required a linen change as her sheets were soiled with fluid that appeared to be draining from BLE; RN notified. Pt was unable to attempt further OOB ADLs due to pain. Pt was provided education on role of acute, WB precautions, compensatory strategies to safely complete ADLs, and importance of OOB ADLs. Currently recommend post-acute placement prior to DC home. Will continue to follow for ongoing acute OT services.     Plan    Occupational Therapy Initial Treatment Plan   Treatment Interventions: Self Care / Activities of Daily Living, Adaptive Equipment, Neuro Re-Education / Balance, Therapeutic Exercises, Therapeutic Activity, Family / Caregiver Training  Treatment Frequency: 3 Times per Week  Duration: Until Therapy Goals Met    DC Equipment Recommendations: Unable to determine at this time  Discharge Recommendations: Recommend post-acute placement  for additional occupational therapy services prior to discharge home      Objective    Prior Living Situation   Prior Services Intermittent Physical Support for ADL Per Family   Housing / Facility 1 Story House   Steps Into Home   (Plans to have ramp installed)   Steps In Home 0   Bathroom Set up Bathtub / Shower Combination  (Reports they are working on acquiring shower chair vs tub txf bench)   Equipment Owned Front-Wheel Walker;Single Point Cane   Lives with - Patient's Self Care Capacity Adult Children   Comments Pt was admitted from [facility], but prior to initial admission (~November 7), pt was living with her daughter (Autumn) in a 1-story house. Pt has been going between SNF ad hospital since initial admission. Pt's DIL (Lurdes) is an RN at [employer]   Prior Level of ADL Function   Self Feeding Independent   Grooming / Hygiene Independent   Bathing Independent   Dressing Independent   Toileting Independent   Comments Prior to initial admission   Prior Level of IADL Function   Medication Management Independent   Laundry Independent   Kitchen Mobility Independent   Finances Independent   Home Management Independent   Shopping Independent   Prior Level Of Mobility Independent With Device in Community;Independent With Device in Home   Driving / Transportation Driving Independent   Comments Prior to initial admission   History of Falls   History of Falls Yes   Date of Last Fall   (Related to initial admit)   Precautions   Precautions Fall Risk;Toe Touch Weight Bearing Right Lower Extremity   Comments ROMAT R LE  (From previous admit notes)   Vitals   O2 (LPM) 1   O2 Delivery Device Silicone Nasal Cannula   Vitals Comments Reported a mild increase in dizziness following transition to EOB. SBP was 116. Resolved with increased time   Pain 0 - 10 Group   Therapist Pain Assessment Post Activity Pain Same as Prior to Activity;Nurse Notified  (Not rated, reported an increase in RLE pain with activity)   Cognition    Cognition /  Consciousness X   Level of Consciousness Alert   Attention Impaired   Sequencing Impaired   Initiation Impaired   Comments Pleasant and cooperative; demo an increase in anxiety throughout session   Active ROM Upper Body   Active ROM Upper Body  WDL   Strength Upper Body   Upper Body Strength  X   Gross Strength Generalized Weakness, Equal Bilaterally.    Sensation Upper Body   Upper Extremity Sensation  WDL   Balance Assessment   Sitting Balance (Static) Fair -   Sitting Balance (Dynamic) Fair -   Weight Shift Sitting Poor   Bed Mobility    Supine to Sit Maximal Assist   Sit to Supine Maximal Assist   Scooting Maximal Assist   Rolling Maximal Assist to Rt.;Maximum Assist to Lt.   Comments HOB slightly elevated; sleeps in recliner at baseline   ADL Assessment   Eating Supervision   Grooming Minimal Assist;Seated  (Washing face; assist with balance)   Upper Body Dressing Maximal Assist   Lower Body Dressing Maximal Assist   Toileting Maximal Assist  (Bull)   How much help from another person does the patient currently need...   6 Clicks Daily Activity Score 15   Functional Mobility   Sit to Stand Unable to Participate   Mobility EOB only   Activity Tolerance   Sitting Edge of Bed 10 min   Comments Limited by pain   Patient / Family Goals   Patient / Family Goal #1 To go home   Short Term Goals   Short Term Goal # 1 Pt will perform ADL transfer w/ min A   Short Term Goal # 2 Pt will perform LB dressing w/ AE PRN and min A   Short Term Goal # 3 Pt will perform seated g/h w/ supv   Education Group   Education Provided Role of Occupational Therapist;Activities of Daily Living;Pathology of bedrest;Weight Bearing Precautions   Role of Occupational Therapist Patient Response Patient;Acceptance;Explanation;Verbal Demonstration   ADL Patient Response Patient;Acceptance;Explanation;Verbal Demonstration;Action Demonstration;Reinforcement Needed   Weight Bearing Precautions Patient Response  Patient;Acceptance;Explanation;Reinforcement Needed   Pathology of Bedrest Patient Response Patient;Acceptance;Explanation;Verbal Demonstration;Reinforcement Needed;Action Demonstration     Pt seen for OT eval in coordination with PT due to the need for two skilled therapists due to limited mobility and high pain

## 2024-12-11 NOTE — PROGRESS NOTES
Telemetry Report:        Per Telestrip from Monitor Room     SR [id number] 1st degree   PAC  .21/.09/.40

## 2024-12-11 NOTE — ED NOTES
Med rec updated and complete. Allergies reviewed .    Per [facility] from [facility].    Pt was discharged from [facility] today 12/10/24 after being admitted on 11/23/24.  Only medication pt received at [facility] after transfer is methocarbamol 750 mg

## 2024-12-11 NOTE — THERAPY
Physical Therapy   Initial Evaluation     Patient Name: Yenifer Beasley  Age:  74 y.o., Sex:  female  Medical Record #: [id number]  Today's Date: 12/11/2024     Precautions  Precautions: Fall Risk;Toe Touch Weight Bearing Right Lower Extremity  Comments: right knee ROMAT per previous admit notes    Assessment  Patient is 74 y.o. female presenting from SNF for SOB. Pt found to have acute respiratory failure with hypoxia 2/2 pulmonary edema. Pt was discharged from the hospital same day as readmit for right femur fx that was complicated by retroperitoneal hematoma. Pt with PMH including atrial fibrillation, mechanical aortic valve on Coumadin. Pt is normally independent with household mobility using SPC however pt reports that she has not walked since initial hospital admit on 11/7. Lives in 1SH with dtr who works.     During current session, pt presents with BLE weeping edema, RLE pain, significant generalized weakness, impaired balance, high levels of anxiety, and decreased endurance requiring overall total A x2 people for mobility as detailed below. Able to maintain upright posture when sitting EOB for a couple seconds at a time but preferred to hold therapist's hand for physical and emotional support. Discovered pill in pt's bedding, removed it and gave it to RN. Educated pt about benefits of sitting EOB and ongoing TTWB RLE status, pt agreeable. Recommend post-acute placement to address these significant functional deficits below baseline. Pt would benefit from continued acute PT services to improve functional mobility prior to d/c.    Plan    Physical Therapy Initial Treatment Plan   Treatment Plan : Bed Mobility, Equipment, Family / Caregiver Training, Gait Training, Manual Therapy, Neuro Re-Education / Balance, Self Care / Home Evaluation, Stair Training, Therapeutic Activities, Therapeutic Exercise  Treatment Frequency: 3 Times per Week  Duration: Until Therapy Goals Met    DC Equipment Recommendations:  Unable to determine at this time  Discharge Recommendations: Recommend post-acute placement for additional physical therapy services prior to discharge home       Subjective    Pt received resting in bed, agreeable to participate.      Objective       12/11/24 1131   Initial Contact Note    Initial Contact Note Order Received and Verified, Physical Therapy Evaluation in Progress with Full Report to Follow.   Precautions   Precautions Fall Risk;Toe Touch Weight Bearing Right Lower Extremity   Comments right knee ROMAT per previous admit notes   Vitals   O2 (LPM) 1   O2 Delivery Device Silicone Nasal Cannula   Vitals Comments pt endorsed increased dizziness when sitting EOB, BP WNL   Pain 0 - 10 Group   Therapist Pain Assessment During Activity;Nurse Notified  (c/o increased RLE pain with mobility)   Prior Living Situation   Prior Services Continuous (24 Hour) Care Giving Per Service   Housing / Facility Skilled Nursing Facility   Lives with - Patient's Self Care Capacity Adult Children   Comments Pt transferred here from SNF. Pt normally lives in 1SH with dtr who works. Dtr is getting a ramp for [facility]. KHADAR is a RN at [employer]. Pt reports that she was last home on Nov 7th   Prior Level of Functional Mobility   Bed Mobility Independent   Transfer Status Independent   Ambulation Independent   Ambulation Distance household   Assistive Devices Used Single Point Cane   Stairs Required Assist   Comments prior to initial hospitalization on Nov 7th. Pt endorses sleeping in a recliner at home and normally uses SPC for mobility. Pt reported that she has not walked since Nov 7th, unclear whether she has stood up   History of Falls   History of Falls Yes   Date of Last Fall   (reason for initial admit)   Cognition    Cognition / Consciousness X   Level of Consciousness Alert   Safety Awareness Impaired   New Learning Impaired   Attention Impaired   Sequencing Impaired   Initiation Impaired   Comments cooperative, calm, very anxious,  asked to hide pill found in bedding from everyone except her DIL (notified RN)   Passive ROM Lower Body   Passive ROM Lower Body X   Comments limited by significant BLE edema and body habitus   Active ROM Lower Body    Active ROM Lower Body  X   Comments see above, RLE also limited by pain   Strength Lower Body   Lower Body Strength  X   Comments see above, R>L LE weakness d/t pain   Sensation Lower Body   Lower Extremity Sensation   X   Comments see above   Coordination Lower Body    Coordination Lower Body  X   Comments slow, weak, also limited by BLE edema and RLE pain   Balance Assessment   Sitting Balance (Static) Poor -   Sitting Balance (Dynamic) Poor -   Standing Balance (Static)   (NT)   Standing Balance (Dynamic)   (NT)   Weight Shift Sitting Poor   Weight Shift Standing   (NT)   Comments sitting EOB only   Bed Mobility    Supine to Sit Total Assist   Sit to Supine Total Assist   Scooting Total Assist   Rolling Total Assist to Rt.   Comments JUAN PABLO, 2 person assist   Gait Analysis   Gait Level Of Assist Unable to Participate   Functional Mobility   Sit to Stand Unable to Participate   Bed, Chair, Wheelchair Transfer Unable to Participate   Mobility sitting EOB only   6 Clicks Assessment - How much HELP from from another person do you currently need... (If the patient hasn't done an activity recently, how much help from another person do you think he/she would need if he/she tried?)   Turning from your back to your side while in a flat bed without using bedrails? 1   Moving from lying on your back to sitting on the side of a flat bed without using bedrails? 1   Moving to and from a bed to a chair (including a wheelchair)? 1   Standing up from a chair using your arms (e.g., wheelchair, or bedside chair)? 1   Walking in hospital room? 1   Climbing 3-5 steps with a railing? 1   6 clicks Mobility Score 6   Edema / Skin Assessment   Edema / Skin  X   Comments BLE weeping edema   Patient / Family Goals    Patient /  Family Goal #1   (pt did not state)   Short Term Goals    Short Term Goal # 1 Pt will perform supine<>sit with min A to progress bed mobility in 6 visits.   Short Term Goal # 2 Pt will demo fair- sitting balance at EOB to progress independence in 6 visits.   Short Term Goal # 3 Pt will perform sit<>stand and stand step txfer with FWW and mod A to progress OOB mobility in 6 visits.   Short Term Goal # 4 Pt will ambulate 5 ft with FWW and mod A to progress OOB mobility in 6 visits.   Education Group   Education Provided Role of Physical Therapist;Weight Bearing Precautions   Role of Physical Therapist Patient Response Patient;Acceptance;Explanation;Demonstration;Verbal Demonstration;Action Demonstration;Reinforcement Needed   Weight Bearing Precautions Patient Response Patient;Acceptance;Explanation;Demonstration;Verbal Demonstration;Action Demonstration;Reinforcement Needed   Physical Therapy Initial Treatment Plan    Treatment Plan  Bed Mobility;Equipment;Family / Caregiver Training;Gait Training;Manual Therapy;Neuro Re-Education / Balance;Self Care / Home Evaluation;Stair Training;Therapeutic Activities;Therapeutic Exercise   Treatment Frequency 3 Times per Week   Duration Until Therapy Goals Met   Problem List    Problems Pain;Impaired Bed Mobility;Impaired Transfers;Impaired Ambulation;Functional ROM Deficit;Functional Strength Deficit;Impaired Balance;Decreased Activity Tolerance;Safety Awareness Deficits / Cognition;Motor Planning / Sequencing   Anticipated Discharge Equipment and Recommendations   DC Equipment Recommendations Unable to determine at this time   Discharge Recommendations Recommend post-acute placement for additional physical therapy services prior to discharge home   Interdisciplinary Plan of Care Collaboration   IDT Collaboration with  Nursing;Occupational Therapist;Family / Caregiver   Patient Position at End of Therapy In Bed;Bed Alarm On;Call Light within Reach;Tray Table within Reach;Phone  within Reach   Collaboration Comments RN and OT updated, DIL present for subjective portion of eval   Session Information   Date / Session Number  12/11- 1(1/3, 12/17)     Patient seen for team evaluation with Occupational Therapist for the following reason(s):  Patient required 2 person assistance for safety and to provide effective interventions. Each discipline assisted patient with appropriate and separate goals. Due to the medical complexity, the skill of both practitioners is needed to monitor vitals, patient status, and adjust the intervention to fit the patient's needs and goals. Therapy sessions needed to be done by a certain time period for both disciplines, and did not impede patient's progress.. Physical Therapist facilitated bed mobility, balance training, and pt edu while Occupational Therapist simultaneously treated pt according to POC.

## 2024-12-11 NOTE — H&P
Hospital Medicine History & Physical Note    Date of Service  12/11/2024    Primary Care Physician  Jose Vela M.D.    Consultants      Specialist Names:     Code Status  DNAR/DNI    Chief Complaint  Chief Complaint   Patient presents with    Shortness of Breath    Leg Pain       History of Presenting Illness  Yenifer Beasley is a 74 y.o. female who presented 12/10/2024 with past medical history of atrial fibrillation, mechanical aortic valve on Coumadin who presents to the hospital from SNF for shortness of breath.  She denies cough, fevers, nausea, vomiting, dysphagia.  The patient was discharged from the hospital yesterday after a femur fracture that was complicated by retroperitoneal hematoma.  During that hospitalization she was given Kcentra and given 2 units of RBCs.  She was restarted on her Coumadin and INR was therapeutic prior to discharge.  The patient is unable to walk since her surgery.  She does have a Bull catheter in place.    Chest x-ray interpreted by me found increased intravascular congestion, pulm edema and bilateral atelectasis  EKG interpreted by me found normal sinus rhythm, first-degree block    I discussed the plan of care with patient.    Review of Systems  Review of Systems   Constitutional:  Negative for chills, diaphoresis, fever and malaise/fatigue.   HENT:  Negative for congestion, ear discharge, ear pain, hearing loss, nosebleeds, sinus pain, sore throat and tinnitus.    Eyes:  Negative for blurred vision, double vision, photophobia and pain.   Respiratory:  Positive for shortness of breath. Negative for cough, hemoptysis, sputum production, wheezing and stridor.    Cardiovascular:  Positive for orthopnea and leg swelling. Negative for chest pain, palpitations, claudication and PND.   Gastrointestinal:  Negative for abdominal pain, blood in stool, constipation, diarrhea, heartburn, melena, nausea and vomiting.   Genitourinary:  Negative for dysuria, flank pain,  frequency, hematuria and urgency.   Musculoskeletal:  Negative for back pain, falls, joint pain, myalgias and neck pain.   Skin:  Negative for itching and rash.   Neurological:  Negative for dizziness, tingling, tremors, weakness and headaches.   Endo/Heme/Allergies:  Negative for environmental allergies and polydipsia. Does not bruise/bleed easily.   Psychiatric/Behavioral:  Negative for depression, hallucinations, substance abuse and suicidal ideas.        Past Medical History   has a past medical history of Allergic rhinitis, Anticoagulation monitoring, special range, Arthritis, Asthma (10/17/2019), Atrial fibrillation ([facility]), Back pain, Blood transfusion without reported diagnosis, CAD (coronary artery disease) (2007), Cataract, Chest pain at rest (11/30/2010), CHF (congestive heart failure) ([facility]), Chronic anticoagulation (04/23/2018), Constipation, Coronary heart disease, Delayed emergence from general anesthesia, Diabetes, Diabetic neuropathy ([facility]) (01/06/2011), Difficulty breathing, Fall, Gasping for breath, Gastritis (07/2010), Gastritis, GERD (gastroesophageal reflux disease), Bengali measles, GI bleed, Glaucoma, Heart murmur, Hyperlipidemia, Hypertension, Hyponatremia (04/22/2014), Hypothyroidism, Impaired fasting glucose (10/08/2019), Infectious disease, Kidney disease, Migraine, Need for influenza vaccination (11/30/2010), Neuropathy in diabetes ([facility]), HILDA (obstructive sleep apnea), Painful joint, Palpitations, Pneumonia, Pneumonia due to organism (04/22/2014), Pulmonary hypertension ([facility]), Rash, S/P aortic valve replacement (2007), Shortness of breath (08/29/2022), Skin infection (05/11/2018), Sleep apnea, Swelling of lower extremity, TIA (transient ischemic attack) (2005), TIA (transient ischemic attack), Tonsillitis, Tricuspid regurgitation mild- mod (01/06/2011), Unspecified hemorrhagic conditions, Upper GI bleed ON JULY 2007 (01/06/2011), Upper GI bleed ON JULY 2007 (01/06/2011), URTI (acute upper  "respiratory infection) (11/30/2010), UTI (urinary tract infection) (04/24/2014), Valvular heart disease, Weakness, and Wears glasses.    Surgical History   has a past surgical history that includes primary c section; hernia repair; mitral valve replace (2007); cholecystectomy; abdominal hysterectomy total; carpal tunnel release (1998); tonsillectomy (1995); shoulder surgery (06/2001); coronary artery bypass, 1; Sleeve,Gama Vaso Thigh; hysterectomy laparoscopy; Intubation; uvulopharyngopalatoplasty; Ventilator - Continuous; aortic valve replacement; tubal coagulation laparoscopic bilateral; and orif, fracture, femur (Right, 11/8/2024).     Family History  family history includes Breast Cancer in her cousin; Cancer in her maternal aunt, maternal grandmother, and mother; Diabetes in her brother; Heart Disease in her brother, brother, and mother.   Family history reviewed with patient. There is no family history that is pertinent to the chief complaint.     Social History   reports that she has never smoked. She has never used smokeless tobacco. She reports that she does not drink alcohol and does not use drugs.    Allergies  Allergies   Allergen Reactions    Asa [Aspirin]      GI BLEED    Atorvastatin Shortness of Breath    Cymbalta [Duloxetine Hcl] Unspecified     Feels like a \"zombie\"    Hmg-Coa-R Inhibitors     Latex Swelling    Nsaids      GI BLEED    Tricor Shortness of Breath     Breathing problems      Trilipix [Choline Fenofibrate] Shortness of Breath     SOB    Lyrica [Pregabalin]      SPACED OUT       Medications  Prior to Admission Medications   Prescriptions Last Dose Informant Patient Reported? Taking?   Alirocumab (PRALUENT) 150 MG/ML Solution Auto-injector Unknown MAR from Other Facility No No   Sig: Inject 150 mg under the skin every 14 days.   acetaminophen (TYLENOL) 325 MG Tab Unknown  Yes No   Sig: Take 650 mg by mouth every four hours as needed for Mild Pain or Fever. 325 mg x 2 tablets = 650 mg "   diclofenac (VOLTAREN) 0.1 % ophthalmic solution Unknown MAR from Other Facility Yes No   Sig: Administer 1 Drop into both eyes 4 times a day.   doxazosin (CARDURA) 8 MG tablet Unknown MAR from Other Facility No No   Sig: Take 1 tablet by mouth at bedtime. To lower blood pressure   ezetimibe (ZETIA) 10 MG Tab Unknown MAR from Other Facility No No   Sig: TAKE 1 TABLET BY MOUTH EVERY DAY   Patient taking differently: Take 10 mg by mouth at bedtime.   levothyroxine (SYNTHROID) 125 MCG Tab Unknown MAR from Other Facility No No   Sig: Take 1 Tablet by mouth every morning on an empty stomach.   methocarbamol (ROBAXIN) 750 MG Tab 12/10/2024 at  5:10 PM MAR from Other Facility No Yes   Sig: Take 1 Tablet by mouth 4 times a day.   oxyCODONE immediate release (ROXICODONE) 10 MG immediate release tablet Unknown  No No   Sig: Take 1 Tablet by mouth every 8 hours as needed for Severe Pain for up to 1 day.   polyethylene glycol/lytes (MIRALAX) Pack Unknown MAR from Other Facility No No   Sig: Take 1 Packet by mouth every day.   Patient not taking: Reported on 11/23/2024   senna-docusate (PERICOLACE OR SENOKOT S) 8.6-50 MG Tab Unknown MAR from Other Facility No No   Sig: Take 2 Tablets by mouth 2 times a day.   Patient taking differently: Take 2 Tablets by mouth at bedtime.   timolol (TIMOPTIC) 0.5 % Solution Unknown MAR from Other Facility No No   Sig: Drop 1 Drop in both eyes 2 times a day.   warfarin (COUMADIN) 4 MG Tab Unknown  Yes No   Sig: Take 4 mg by mouth see administration instructions. On Sunday, Tuesday, Thursday, Saturday   warfarin (COUMADIN) 6 MG Tab Unknown  Yes No   Sig: Take 6 mg by mouth see administration instructions. On Monday, Wednesday , Friday      Facility-Administered Medications: None       Physical Exam  Temp:  [36.4 °C (97.5 °F)-37.4 °C (99.3 °F)] 37 °C (98.6 °F)  Pulse:  [62-91] 83  Resp:  [16-28] 20  BP: (115-157)/(48-73) 115/57  SpO2:  [93 %-100 %] 98 %  Blood Pressure : (!) 141/65   Temperature:  37.4 °C (99.3 °F)   Pulse: 87   Respiration: (!) 25   Pulse Oximetry: 94 %       Physical Exam  Vitals and nursing note reviewed.   Constitutional:       General: She is not in acute distress.     Appearance: Normal appearance. She is not ill-appearing, toxic-appearing or diaphoretic.   HENT:      Head: Normocephalic and atraumatic.      Nose: No congestion or rhinorrhea.      Mouth/Throat:      Pharynx: No oropharyngeal exudate or posterior oropharyngeal erythema.   Eyes:      General: No scleral icterus.  Neck:      Vascular: JVD present. No carotid bruit.   Cardiovascular:      Rate and Rhythm: Normal rate and regular rhythm.      Pulses: Normal pulses.      Heart sounds: Murmur heard.      No friction rub. No gallop.   Pulmonary:      Effort: Pulmonary effort is normal. No respiratory distress.      Breath sounds: No stridor. Rales present. No wheezing or rhonchi.   Abdominal:      General: Abdomen is flat. There is no distension.      Palpations: There is no mass.      Tenderness: There is no abdominal tenderness. There is no left CVA tenderness, guarding or rebound.      Hernia: No hernia is present.   Musculoskeletal:         General: No swelling. Normal range of motion.      Cervical back: No rigidity. No muscular tenderness.      Right lower leg: Edema present.      Left lower leg: Edema present.      Comments: Left leg cellulitis   Lymphadenopathy:      Cervical: No cervical adenopathy.   Skin:     General: Skin is warm and dry.      Capillary Refill: Capillary refill takes more than 3 seconds.      Coloration: Skin is not jaundiced or pale.      Findings: No bruising or erythema.   Neurological:      Mental Status: She is alert.         Laboratory:  Recent Labs     12/08/24  0610 12/09/24  0108 12/10/24  2048   WBC 15.2* 12.0* 28.9*   RBC 2.30* 2.54* 3.11*   HEMOGLOBIN 7.1* 7.9* 9.4*   HEMATOCRIT 21.3* 23.6* 30.2*   MCV 92.6 92.9 97.1   MCH 30.9 31.1 30.2   MCHC 33.3 33.5 31.1*   RDW 59.1* 58.3* 59.7*    PLATELETCT 291 248 329   MPV 8.8* 8.4* 8.5*     Recent Labs     12/09/24 0108 12/10/24  0815 12/10/24  2048   SODIUM 129* 133* 131*   POTASSIUM 4.9 5.2 5.5   CHLORIDE 98 98 95*   CO2 22 26 23   GLUCOSE 107* 101* 158*   BUN 69* 64* 57*   CREATININE 1.86* 1.28 1.24   CALCIUM 8.1* 8.6 8.7     Recent Labs     12/09/24  0108 12/10/24  0815 12/10/24  2048   ALTSGPT  --   --  13   ASTSGOT  --   --  19   ALKPHOSPHAT  --   --  64   TBILIRUBIN  --   --  1.2   GLUCOSE 107* 101* 158*     Recent Labs     12/09/24  0108 12/10/24  0815 12/10/24  2048   APTT  --   --  33.2   INR 2.26* 2.48* 2.27*     Recent Labs     12/10/24  2048   NTPROBNP 68807*         Recent Labs     12/10/24  2048 12/10/24  2306   TROPONINT 267* 377*       Imaging:  DX-CHEST-PORTABLE (1 VIEW)   Final Result         1.  Left basilar atelectasis and/or infiltrates   2.  Cardiomegaly   3.  Atherosclerosis      CT-CTA CHEST PULMONARY ARTERY W/ RECONS    (Results Pending)   EC-ECHOCARDIOGRAM COMPLETE W/O CONT    (Results Pending)       X-Ray:  I have personally reviewed the images and compared with prior images.  EKG:  I have personally reviewed the images and compared with prior images.    Assessment/Plan:  Justification for Admission Status  I anticipate this patient will require at least two midnights for appropriate medical management, necessitating inpatient admission because acute pulm edema    Patient will need a Telemetry bed on MEDICAL service .  The need is secondary to acute pulmonary edema.    * Acute respiratory failure with hypoxia (HCC)- (present on admission)  Assessment & Plan  On 2 L of O2 above baseline  Secondary to pulmonary edema   I explained to the patient that she is tachycardic, tachypneic and her troponin is increasing.  She recently had femur surgery and may be high risk for pulm embolism.  The patient is refusing a CT of the chest to rule out PE.  The patient is refusing medical treatment and would like to be  DNR/DNI.      Cellulitis  Assessment & Plan  Patient has left leg cellulitis   start IV Unasyn    Status post closed fracture of right femur  Assessment & Plan  Pain control  PT OT eval    NSTEMI (non-ST elevated myocardial infarction) (HCC)  Assessment & Plan  The patient denies any chest pain and does not have any ST segment changes on EKG  Rule out ACS  Cardiac echo has been ordered  Check lipid panel, TSH and hemoglobin A1c  Nitro when necessary for chest pain      Acute pulmonary edema (HCC)  Assessment & Plan  Start IV Lasix 40 twice daily  Strict ins and outs and daily weights  Monitor on telemetry    Pulmonary hypertension (HCC)- (present on admission)  Assessment & Plan  Decompensated  Monitor volume status    Chronic kidney disease (CKD) stage G3b/A1, moderately decreased glomerular filtration rate (GFR) between 30-44 mL/min/1.73 square meter and albuminuria creatinine ratio less than 30 mg/g- (present on admission)  Assessment & Plan  Monitor BMP and assess response  nephrotoxins/NSAIDs  Dose adjust meds for decreased GFR      Hx of mechanical aortic valve replacement [V43.3]- (present on admission)  Assessment & Plan  Monitor volume status  On Coumadin    Essential hypertension- (present on admission)  Assessment & Plan  Uncontrolled  Continue current home medications  IV as needed medications have been ordered          VTE prophylaxis: SCDs/TEDs        I discussed advance care planning for at least 20 minutes with the patient  including diagnosis, prognosis, plan of care, risks and benefits of any therapies that could be offered, as well as alternatives including palliation and hospice, as appropriate. The patient has opted DNR. Time spent is exclusive of evaluation and management or other separately billable procedures.

## 2024-12-11 NOTE — ASSESSMENT & PLAN NOTE
Patient is s/p ORIF for femur fracture after a mechanical ground-level fall on 11/15  WBAT  -Continue pain management  -PT/OT    Comfort care measures only

## 2024-12-11 NOTE — CARE PLAN
The patient is Watcher - Medium risk of patient condition declining or worsening    Shift Goals  Clinical Goals: monitor vitals, labs, pain management  Patient Goals: rest, go home  Family Goals: man    Progress made toward(s) clinical / shift goals:    Problem: Knowledge Deficit - Standard  Goal: Patient and family/care givers will demonstrate understanding of plan of care, disease process/condition, diagnostic tests and medications  Outcome: Not Progressing  Note: Patient educated on the need for a CTA to look for clots. Patient refused CT. MD came to bedside and and explained risk patient still declined. MD talked to patient about changing code status.      Problem: Pain - Standard  Goal: Alleviation of pain or a reduction in pain to the patient’s comfort goal  Outcome: Progressing  Note: Patient assessed per numeric scale patient medicated per MAR. Patient educated to call if feeling pain.        Patient is not progressing towards the following goals:      Problem: Knowledge Deficit - Standard  Goal: Patient and family/care givers will demonstrate understanding of plan of care, disease process/condition, diagnostic tests and medications  Outcome: Not Progressing  Note: Patient educated on the need for a CTA to look for clots. Patient refused CT. MD came to bedside and and explained risk patient still declined. MD talked to patient about changing code status.

## 2024-12-11 NOTE — PROGRESS NOTES
Patient brought up on zoll. With cell phone and shirt. Patient has no other belongings with her. Patient was confused on why she was being admitted the hospitalist in the ER educated patient. Patient has call light and bedside table within reach. Patient has belongings at bedside. Bed is locked and in lowest position with the bed alarm on.

## 2024-12-11 NOTE — DISCHARGE PLANNING
TCN following. HTH/SCP chart review completed. Note that pt dc'd yesterday to [facility], though readmitted within 8-9 hours. Per review, current plan is for dc back to [facility]. Discussed with HUM team/TCN covering [facility] and [facility] auth should remain intact for dc back to [facility] once medically cleared. TCN will monitor and assist with transitional dc planning as indicated, though as above, anticipating return to [facility] once medically cleared.

## 2024-12-11 NOTE — ASSESSMENT & PLAN NOTE
Patient continues to deny any any chest pain and did not have any ST segment changes on EKG. She has uptrending troponins with Trop T: 267 on admission, which up trended to 478. Last Echocardiography done on 11/23/24 showed a preserved LVEF of 75%.   -Transthoracic Echocardiography today  -Lipid Panel, TSH and Hb1C  -As needed nitro for chest pain

## 2024-12-11 NOTE — ED NOTES
Break RN: Lab called with critical result of troponin of 377 at 0008. Critical lab result read back.   Dr. Mendoza notified of critical lab result at 0011.  Critical lab result read back by Dr. Mendoza.

## 2024-12-11 NOTE — ASSESSMENT & PLAN NOTE
Patient presented with AHRF and required 2L of O2. She does not use oxygen at baseline. Chest X-ray showed left basal infiltrates, Cardiomegaly and B/L pulmonary edema. Patient continues to be tachypneic. She refused CTPE on admission. This morning, I explained the need of a CTPE again and she refused once again.   -U/S duplex of B/L lower extremities to r/o DVT  -Continue IV Lasix 40mg BID for now.  Increase dose tomorrow if renal function and BPs are tolerating it  -RT protocol  -Covid-19 test  -Resp Profile    Comfort care measures only

## 2024-12-11 NOTE — HOSPITAL COURSE
Yenifer Beasley is a 74 y.o. female admitted 12/10/2024 with shortness of breath.    Past medical history significant for paroxysmal atrial fibrillation, mechanical aortic valve replacement on Coumadin, hypertension, chronic diastolic heart failure, CKD stage IIIb hypothyroidism, glaucoma, recent admission for right femoral lateral condyle fracture s/p fixation on 11/8 who presented to the ED on 12/10 with shortness of breath    Patient had previous admission for right femoral lateral condyle fracture status post internal fixation on 11/8, postoperatively patient did have significant bleeding requiring 1 unit of PRBCs transfusion.  She was discharged to skilled nursing facility 11/15.  She returns back to the ER on 11/23 found to be hypotensive, noted to have a 17 x 10.7 x 9.5 cm retroperitoneal hematoma, requiring Kcentra and blood transfusions.      Patient returned here with shortness of breath, significant for shock with respiratory failure, DIAMOND and hypotension requiring pressor support.  Repeat CT imaging revealed worsening of the retroperitoneal hematoma.  Coumadin on hold.    Patient started on empiric antibiotic therapy for lower extremity cellulitis and UTI.  Bull catheter was exchanged.    Patient started on aggressive IV diuresis due to concerns for acute on chronic diastolic heart failure exacerbation.  GDMT therapy initially held due to shock. She did have pulmonary edema requiring supplemental oxygen.    12/15 Discussed with patient, her 2 sisters at bedside and her daughter-in-law over the phone, patient has communicated to her daughter-in-law previously that she does not want any further aggressive measures.  We discussed this at bedside and she reiterated that she wants to pursue comfort care measures.  She no longer wants to be in the hospital, no longer wants lab draws etc.    Patient was accepted to [facility] with [facility].  Medically stable to discharge home with  hospice

## 2024-12-12 ENCOUNTER — APPOINTMENT (OUTPATIENT)
Dept: CARDIOLOGY | Facility: MEDICAL CENTER | Age: 74
DRG: 371 | End: 2024-12-12
Attending: HOSPITALIST
Payer: MEDICARE

## 2024-12-12 ENCOUNTER — APPOINTMENT (OUTPATIENT)
Dept: RADIOLOGY | Facility: MEDICAL CENTER | Age: 74
DRG: 371 | End: 2024-12-12
Payer: MEDICARE

## 2024-12-12 PROBLEM — R79.89 ELEVATED TROPONIN: Status: ACTIVE | Noted: 2024-12-12

## 2024-12-12 PROBLEM — N18.32 ACUTE KIDNEY INJURY SUPERIMPOSED ON STAGE 3B CHRONIC KIDNEY DISEASE (HCC): Status: ACTIVE | Noted: 2024-12-12

## 2024-12-12 PROBLEM — K59.00 CONSTIPATION: Status: ACTIVE | Noted: 2024-12-12

## 2024-12-12 PROBLEM — R57.9 SHOCK (HCC): Status: ACTIVE | Noted: 2024-12-12

## 2024-12-12 PROBLEM — I50.33 ACUTE ON CHRONIC DIASTOLIC HEART FAILURE (HCC): Status: ACTIVE | Noted: 2024-12-12

## 2024-12-12 PROBLEM — N17.9 AKI (ACUTE KIDNEY INJURY) (HCC): Status: ACTIVE | Noted: 2024-12-12

## 2024-12-12 LAB
ABO GROUP BLD: ABNORMAL
ALBUMIN SERPL BCP-MCNC: 2.3 G/DL (ref 3.2–4.9)
ALBUMIN SERPL BCP-MCNC: 2.5 G/DL (ref 3.2–4.9)
ALBUMIN/GLOB SERPL: 0.9 G/DL
ALBUMIN/GLOB SERPL: 1 G/DL
ALP SERPL-CCNC: 106 U/L (ref 30–99)
ALP SERPL-CCNC: 77 U/L (ref 30–99)
ALT SERPL-CCNC: 11 U/L (ref 2–50)
ALT SERPL-CCNC: 13 U/L (ref 2–50)
ANION GAP SERPL CALC-SCNC: 11 MMOL/L (ref 7–16)
ANION GAP SERPL CALC-SCNC: 13 MMOL/L (ref 7–16)
ANION GAP SERPL CALC-SCNC: 18 MMOL/L (ref 7–16)
AST SERPL-CCNC: 32 U/L (ref 12–45)
AST SERPL-CCNC: 34 U/L (ref 12–45)
BARCODED ABORH UBTYP: 6200
BARCODED PRD CODE UBPRD: ABNORMAL
BARCODED UNIT NUM UBUNT: ABNORMAL
BASOPHILS # BLD AUTO: 0.1 % (ref 0–1.8)
BASOPHILS # BLD AUTO: 0.3 % (ref 0–1.8)
BASOPHILS # BLD: 0.02 K/UL (ref 0–0.12)
BASOPHILS # BLD: 0.06 K/UL (ref 0–0.12)
BILIRUB SERPL-MCNC: 1.1 MG/DL (ref 0.1–1.5)
BILIRUB SERPL-MCNC: 1.1 MG/DL (ref 0.1–1.5)
BLD GP AB SCN SERPL QL: ABNORMAL
BUN SERPL-MCNC: 67 MG/DL (ref 8–22)
BUN SERPL-MCNC: 68 MG/DL (ref 8–22)
BUN SERPL-MCNC: 68 MG/DL (ref 8–22)
CALCIUM ALBUM COR SERPL-MCNC: 9.4 MG/DL (ref 8.5–10.5)
CALCIUM ALBUM COR SERPL-MCNC: 9.6 MG/DL (ref 8.5–10.5)
CALCIUM SERPL-MCNC: 8 MG/DL (ref 8.5–10.5)
CALCIUM SERPL-MCNC: 8.2 MG/DL (ref 8.5–10.5)
CALCIUM SERPL-MCNC: 8.2 MG/DL (ref 8.5–10.5)
CFT BLD TEG: 8.9 MIN (ref 4.6–9.1)
CFT P HPASE BLD TEG: 7.5 MIN (ref 4.3–8.3)
CHLORIDE SERPL-SCNC: 92 MMOL/L (ref 96–112)
CHLORIDE SERPL-SCNC: 94 MMOL/L (ref 96–112)
CHLORIDE SERPL-SCNC: 95 MMOL/L (ref 96–112)
CLOT ANGLE BLD TEG: 77 DEGREES (ref 63–78)
CLOT LYSIS 30M P MA LENFR BLD TEG: 0 % (ref 0–2.6)
CO2 SERPL-SCNC: 21 MMOL/L (ref 20–33)
CO2 SERPL-SCNC: 21 MMOL/L (ref 20–33)
CO2 SERPL-SCNC: 22 MMOL/L (ref 20–33)
COMPONENT R 8504R: ABNORMAL
CREAT SERPL-MCNC: 2.06 MG/DL (ref 0.5–1.4)
CREAT SERPL-MCNC: 2.14 MG/DL (ref 0.5–1.4)
CREAT SERPL-MCNC: 2.14 MG/DL (ref 0.5–1.4)
CT.EXTRINSIC BLD ROTEM: 1 MIN (ref 0.8–2.1)
EKG IMPRESSION: NORMAL
EOSINOPHIL # BLD AUTO: 0 K/UL (ref 0–0.51)
EOSINOPHIL # BLD AUTO: 0 K/UL (ref 0–0.51)
EOSINOPHIL NFR BLD: 0 % (ref 0–6.9)
EOSINOPHIL NFR BLD: 0 % (ref 0–6.9)
ERYTHROCYTE [DISTWIDTH] IN BLOOD BY AUTOMATED COUNT: 55.6 FL (ref 35.9–50)
ERYTHROCYTE [DISTWIDTH] IN BLOOD BY AUTOMATED COUNT: 59.4 FL (ref 35.9–50)
ERYTHROCYTE [DISTWIDTH] IN BLOOD BY AUTOMATED COUNT: 59.9 FL (ref 35.9–50)
FERRITIN SERPL-MCNC: 1116 NG/ML (ref 10–291)
GFR SERPLBLD CREATININE-BSD FMLA CKD-EPI: 24 ML/MIN/1.73 M 2
GFR SERPLBLD CREATININE-BSD FMLA CKD-EPI: 24 ML/MIN/1.73 M 2
GFR SERPLBLD CREATININE-BSD FMLA CKD-EPI: 25 ML/MIN/1.73 M 2
GLOBULIN SER CALC-MCNC: 2.5 G/DL (ref 1.9–3.5)
GLOBULIN SER CALC-MCNC: 2.6 G/DL (ref 1.9–3.5)
GLUCOSE BLD STRIP.AUTO-MCNC: 119 MG/DL (ref 65–99)
GLUCOSE BLD STRIP.AUTO-MCNC: 146 MG/DL (ref 65–99)
GLUCOSE SERPL-MCNC: 132 MG/DL (ref 65–99)
GLUCOSE SERPL-MCNC: 140 MG/DL (ref 65–99)
GLUCOSE SERPL-MCNC: 163 MG/DL (ref 65–99)
HCT VFR BLD AUTO: 22.7 % (ref 37–47)
HCT VFR BLD AUTO: 23 % (ref 37–47)
HCT VFR BLD AUTO: 26.4 % (ref 37–47)
HGB BLD-MCNC: 7.6 G/DL (ref 12–16)
HGB BLD-MCNC: 7.7 G/DL (ref 12–16)
HGB BLD-MCNC: 8.2 G/DL (ref 12–16)
HGB BLD-MCNC: 8.5 G/DL (ref 12–16)
HGB BLD-MCNC: 8.7 G/DL (ref 12–16)
IMM GRANULOCYTES # BLD AUTO: 0.25 K/UL (ref 0–0.11)
IMM GRANULOCYTES # BLD AUTO: 0.39 K/UL (ref 0–0.11)
IMM GRANULOCYTES NFR BLD AUTO: 1.1 % (ref 0–0.9)
IMM GRANULOCYTES NFR BLD AUTO: 1.7 % (ref 0–0.9)
INR PPP: 1.46 (ref 0.87–1.13)
INR PPP: 2.96 (ref 0.87–1.13)
IRON SATN MFR SERPL: 9 % (ref 15–55)
IRON SERPL-MCNC: 14 UG/DL (ref 40–170)
LACTATE SERPL-SCNC: 0.9 MMOL/L (ref 0.5–2)
LACTATE SERPL-SCNC: 0.9 MMOL/L (ref 0.5–2)
LACTATE SERPL-SCNC: 1 MMOL/L (ref 0.5–2)
LACTATE SERPL-SCNC: 1.3 MMOL/L (ref 0.5–2)
LYMPHOCYTES # BLD AUTO: 0.7 K/UL (ref 1–4.8)
LYMPHOCYTES # BLD AUTO: 0.77 K/UL (ref 1–4.8)
LYMPHOCYTES NFR BLD: 3.1 % (ref 22–41)
LYMPHOCYTES NFR BLD: 3.4 % (ref 22–41)
MAGNESIUM SERPL-MCNC: 2.2 MG/DL (ref 1.5–2.5)
MCF BLD TEG: 70.9 MM (ref 52–69)
MCF.PLATELET INHIB BLD ROTEM: 47.4 MM (ref 15–32)
MCH RBC QN AUTO: 30.2 PG (ref 27–33)
MCH RBC QN AUTO: 31.7 PG (ref 27–33)
MCH RBC QN AUTO: 32.5 PG (ref 27–33)
MCHC RBC AUTO-ENTMCNC: 32.2 G/DL (ref 32.2–35.5)
MCHC RBC AUTO-ENTMCNC: 33 G/DL (ref 32.2–35.5)
MCHC RBC AUTO-ENTMCNC: 33.9 G/DL (ref 32.2–35.5)
MCV RBC AUTO: 94 FL (ref 81.4–97.8)
MCV RBC AUTO: 95.8 FL (ref 81.4–97.8)
MCV RBC AUTO: 95.8 FL (ref 81.4–97.8)
MONOCYTES # BLD AUTO: 0.59 K/UL (ref 0–0.85)
MONOCYTES # BLD AUTO: 0.62 K/UL (ref 0–0.85)
MONOCYTES NFR BLD AUTO: 2.6 % (ref 0–13.4)
MONOCYTES NFR BLD AUTO: 2.7 % (ref 0–13.4)
NEUTROPHILS # BLD AUTO: 20.73 K/UL (ref 1.82–7.42)
NEUTROPHILS # BLD AUTO: 21.04 K/UL (ref 1.82–7.42)
NEUTROPHILS NFR BLD: 91.9 % (ref 44–72)
NEUTROPHILS NFR BLD: 93.1 % (ref 44–72)
NRBC # BLD AUTO: 0 K/UL
NRBC # BLD AUTO: 0 K/UL
NRBC BLD-RTO: 0 /100 WBC (ref 0–0.2)
NRBC BLD-RTO: 0 /100 WBC (ref 0–0.2)
PA AA BLD-ACNC: ABNORMAL % (ref 0–11)
PA ADP BLD-ACNC: ABNORMAL % (ref 0–17)
PLATELET # BLD AUTO: 223 K/UL (ref 164–446)
PLATELET # BLD AUTO: 232 K/UL (ref 164–446)
PLATELET # BLD AUTO: 235 K/UL (ref 164–446)
PMV BLD AUTO: 8.6 FL (ref 9–12.9)
PMV BLD AUTO: 8.7 FL (ref 9–12.9)
PMV BLD AUTO: 8.8 FL (ref 9–12.9)
POTASSIUM SERPL-SCNC: 4.9 MMOL/L (ref 3.6–5.5)
POTASSIUM SERPL-SCNC: 4.9 MMOL/L (ref 3.6–5.5)
POTASSIUM SERPL-SCNC: 5.2 MMOL/L (ref 3.6–5.5)
PRODUCT TYPE UPROD: ABNORMAL
PROT SERPL-MCNC: 4.8 G/DL (ref 6–8.2)
PROT SERPL-MCNC: 5.1 G/DL (ref 6–8.2)
PROTHROMBIN TIME: 17.8 SEC (ref 12–14.6)
PROTHROMBIN TIME: 31 SEC (ref 12–14.6)
RBC # BLD AUTO: 2.37 M/UL (ref 4.2–5.4)
RBC # BLD AUTO: 2.4 M/UL (ref 4.2–5.4)
RBC # BLD AUTO: 2.81 M/UL (ref 4.2–5.4)
RH BLD: ABNORMAL
SCCMEC + MECA PNL NOSE NAA+PROBE: NEGATIVE
SODIUM SERPL-SCNC: 128 MMOL/L (ref 135–145)
SODIUM SERPL-SCNC: 128 MMOL/L (ref 135–145)
SODIUM SERPL-SCNC: 131 MMOL/L (ref 135–145)
TEG ALGORITHM TGALG: ABNORMAL
TIBC SERPL-MCNC: 159 UG/DL (ref 250–450)
TROPONIN T SERPL-MCNC: 406 NG/L (ref 6–19)
TROPONIN T SERPL-MCNC: 460 NG/L (ref 6–19)
TSH SERPL DL<=0.005 MIU/L-ACNC: 4.92 UIU/ML (ref 0.38–5.33)
UIBC SERPL-MCNC: 145 UG/DL (ref 110–370)
UNIT STATUS USTAT: ABNORMAL
WBC # BLD AUTO: 20.6 K/UL (ref 4.8–10.8)
WBC # BLD AUTO: 22.3 K/UL (ref 4.8–10.8)
WBC # BLD AUTO: 22.9 K/UL (ref 4.8–10.8)

## 2024-12-12 PROCEDURE — 83550 IRON BINDING TEST: CPT

## 2024-12-12 PROCEDURE — 80061 LIPID PANEL: CPT

## 2024-12-12 PROCEDURE — 99291 CRITICAL CARE FIRST HOUR: CPT | Mod: GC | Performed by: INTERNAL MEDICINE

## 2024-12-12 PROCEDURE — A9270 NON-COVERED ITEM OR SERVICE: HCPCS | Performed by: HOSPITALIST

## 2024-12-12 PROCEDURE — 85576 BLOOD PLATELET AGGREGATION: CPT | Mod: 91

## 2024-12-12 PROCEDURE — A9270 NON-COVERED ITEM OR SERVICE: HCPCS

## 2024-12-12 PROCEDURE — 700111 HCHG RX REV CODE 636 W/ 250 OVERRIDE (IP): Mod: JZ,JG

## 2024-12-12 PROCEDURE — 85025 COMPLETE CBC W/AUTO DIFF WBC: CPT

## 2024-12-12 PROCEDURE — 30233N1 TRANSFUSION OF NONAUTOLOGOUS RED BLOOD CELLS INTO PERIPHERAL VEIN, PERCUTANEOUS APPROACH: ICD-10-PCS | Performed by: INTERNAL MEDICINE

## 2024-12-12 PROCEDURE — 85027 COMPLETE CBC AUTOMATED: CPT

## 2024-12-12 PROCEDURE — 700111 HCHG RX REV CODE 636 W/ 250 OVERRIDE (IP)

## 2024-12-12 PROCEDURE — 700102 HCHG RX REV CODE 250 W/ 637 OVERRIDE(OP)

## 2024-12-12 PROCEDURE — 700105 HCHG RX REV CODE 258

## 2024-12-12 PROCEDURE — 85610 PROTHROMBIN TIME: CPT

## 2024-12-12 PROCEDURE — 97602 WOUND(S) CARE NON-SELECTIVE: CPT

## 2024-12-12 PROCEDURE — 86900 BLOOD TYPING SEROLOGIC ABO: CPT

## 2024-12-12 PROCEDURE — P9016 RBC LEUKOCYTES REDUCED: HCPCS

## 2024-12-12 PROCEDURE — 82533 TOTAL CORTISOL: CPT

## 2024-12-12 PROCEDURE — 700101 HCHG RX REV CODE 250

## 2024-12-12 PROCEDURE — 93971 EXTREMITY STUDY: CPT | Mod: LT

## 2024-12-12 PROCEDURE — 700111 HCHG RX REV CODE 636 W/ 250 OVERRIDE (IP): Mod: JZ | Performed by: INTERNAL MEDICINE

## 2024-12-12 PROCEDURE — 80053 COMPREHEN METABOLIC PANEL: CPT | Mod: 91

## 2024-12-12 PROCEDURE — 83605 ASSAY OF LACTIC ACID: CPT | Mod: 91

## 2024-12-12 PROCEDURE — 93010 ELECTROCARDIOGRAM REPORT: CPT | Performed by: INTERNAL MEDICINE

## 2024-12-12 PROCEDURE — 700102 HCHG RX REV CODE 250 W/ 637 OVERRIDE(OP): Performed by: HOSPITALIST

## 2024-12-12 PROCEDURE — 87641 MR-STAPH DNA AMP PROBE: CPT

## 2024-12-12 PROCEDURE — 84484 ASSAY OF TROPONIN QUANT: CPT

## 2024-12-12 PROCEDURE — 93970 EXTREMITY STUDY: CPT

## 2024-12-12 PROCEDURE — 94640 AIRWAY INHALATION TREATMENT: CPT

## 2024-12-12 PROCEDURE — 80048 BASIC METABOLIC PNL TOTAL CA: CPT

## 2024-12-12 PROCEDURE — 86901 BLOOD TYPING SEROLOGIC RH(D): CPT

## 2024-12-12 PROCEDURE — 84443 ASSAY THYROID STIM HORMONE: CPT

## 2024-12-12 PROCEDURE — 86850 RBC ANTIBODY SCREEN: CPT

## 2024-12-12 PROCEDURE — 93005 ELECTROCARDIOGRAM TRACING: CPT | Mod: TC

## 2024-12-12 PROCEDURE — 74176 CT ABD & PELVIS W/O CONTRAST: CPT

## 2024-12-12 PROCEDURE — 700111 HCHG RX REV CODE 636 W/ 250 OVERRIDE (IP): Mod: JZ | Performed by: HOSPITALIST

## 2024-12-12 PROCEDURE — 36415 COLL VENOUS BLD VENIPUNCTURE: CPT

## 2024-12-12 PROCEDURE — 83540 ASSAY OF IRON: CPT

## 2024-12-12 PROCEDURE — 87040 BLOOD CULTURE FOR BACTERIA: CPT

## 2024-12-12 PROCEDURE — 82728 ASSAY OF FERRITIN: CPT

## 2024-12-12 PROCEDURE — 83735 ASSAY OF MAGNESIUM: CPT

## 2024-12-12 PROCEDURE — 700111 HCHG RX REV CODE 636 W/ 250 OVERRIDE (IP): Mod: JZ,JG | Performed by: INTERNAL MEDICINE

## 2024-12-12 PROCEDURE — 99292 CRITICAL CARE ADDL 30 MIN: CPT | Performed by: INTERNAL MEDICINE

## 2024-12-12 PROCEDURE — 85347 COAGULATION TIME ACTIVATED: CPT

## 2024-12-12 PROCEDURE — 85384 FIBRINOGEN ACTIVITY: CPT | Mod: 91

## 2024-12-12 PROCEDURE — 86923 COMPATIBILITY TEST ELECTRIC: CPT

## 2024-12-12 PROCEDURE — 770022 HCHG ROOM/CARE - ICU (200)

## 2024-12-12 PROCEDURE — 82962 GLUCOSE BLOOD TEST: CPT

## 2024-12-12 PROCEDURE — 85018 HEMOGLOBIN: CPT | Mod: 91

## 2024-12-12 PROCEDURE — 700105 HCHG RX REV CODE 258: Performed by: HOSPITALIST

## 2024-12-12 PROCEDURE — 36430 TRANSFUSION BLD/BLD COMPNT: CPT

## 2024-12-12 RX ORDER — FUROSEMIDE 10 MG/ML
20 INJECTION INTRAMUSCULAR; INTRAVENOUS
Status: DISCONTINUED | OUTPATIENT
Start: 2024-12-12 | End: 2024-12-13

## 2024-12-12 RX ORDER — OXYCODONE HYDROCHLORIDE 5 MG/1
5 TABLET ORAL
Status: DISCONTINUED | OUTPATIENT
Start: 2024-12-12 | End: 2024-12-15

## 2024-12-12 RX ORDER — POLYETHYLENE GLYCOL 3350 17 G/17G
1 POWDER, FOR SOLUTION ORAL
Status: DISCONTINUED | OUTPATIENT
Start: 2024-12-12 | End: 2024-12-19 | Stop reason: HOSPADM

## 2024-12-12 RX ORDER — OXYCODONE HYDROCHLORIDE 10 MG/1
10 TABLET ORAL
Status: DISCONTINUED | OUTPATIENT
Start: 2024-12-12 | End: 2024-12-15

## 2024-12-12 RX ORDER — NOREPINEPHRINE BITARTRATE 0.03 MG/ML
0-1 INJECTION, SOLUTION INTRAVENOUS CONTINUOUS
Status: DISCONTINUED | OUTPATIENT
Start: 2024-12-12 | End: 2024-12-13 | Stop reason: ALTCHOICE

## 2024-12-12 RX ORDER — ACETAMINOPHEN 500 MG
1000 TABLET ORAL EVERY 6 HOURS PRN
Status: DISCONTINUED | OUTPATIENT
Start: 2024-12-17 | End: 2024-12-15

## 2024-12-12 RX ORDER — HYDROMORPHONE HYDROCHLORIDE 1 MG/ML
0.5 INJECTION, SOLUTION INTRAMUSCULAR; INTRAVENOUS; SUBCUTANEOUS
Status: DISCONTINUED | OUTPATIENT
Start: 2024-12-12 | End: 2024-12-15

## 2024-12-12 RX ORDER — ACETAMINOPHEN 500 MG
1000 TABLET ORAL EVERY 6 HOURS
Status: DISCONTINUED | OUTPATIENT
Start: 2024-12-12 | End: 2024-12-15

## 2024-12-12 RX ORDER — IPRATROPIUM BROMIDE AND ALBUTEROL SULFATE 2.5; .5 MG/3ML; MG/3ML
3 SOLUTION RESPIRATORY (INHALATION)
Status: DISCONTINUED | OUTPATIENT
Start: 2024-12-12 | End: 2024-12-15

## 2024-12-12 RX ORDER — OXYCODONE HYDROCHLORIDE 5 MG/1
5 TABLET ORAL
Status: DISCONTINUED | OUTPATIENT
Start: 2024-12-12 | End: 2024-12-12

## 2024-12-12 RX ORDER — OXYCODONE HYDROCHLORIDE 5 MG/1
2.5 TABLET ORAL
Status: DISCONTINUED | OUTPATIENT
Start: 2024-12-12 | End: 2024-12-12

## 2024-12-12 RX ORDER — HYDROMORPHONE HYDROCHLORIDE 1 MG/ML
0.25 INJECTION, SOLUTION INTRAMUSCULAR; INTRAVENOUS; SUBCUTANEOUS
Status: DISCONTINUED | OUTPATIENT
Start: 2024-12-12 | End: 2024-12-12

## 2024-12-12 RX ORDER — FUROSEMIDE 10 MG/ML
80 INJECTION INTRAMUSCULAR; INTRAVENOUS ONCE
Status: COMPLETED | OUTPATIENT
Start: 2024-12-12 | End: 2024-12-12

## 2024-12-12 RX ORDER — LINEZOLID 2 MG/ML
600 INJECTION, SOLUTION INTRAVENOUS EVERY 12 HOURS
Status: DISCONTINUED | OUTPATIENT
Start: 2024-12-12 | End: 2024-12-13

## 2024-12-12 RX ORDER — SODIUM CHLORIDE 9 MG/ML
INJECTION, SOLUTION INTRAVENOUS CONTINUOUS
Status: ACTIVE | OUTPATIENT
Start: 2024-12-12 | End: 2024-12-12

## 2024-12-12 RX ORDER — AMOXICILLIN 250 MG
2 CAPSULE ORAL EVERY EVENING
Status: DISCONTINUED | OUTPATIENT
Start: 2024-12-12 | End: 2024-12-19 | Stop reason: HOSPADM

## 2024-12-12 RX ADMIN — FUROSEMIDE 20 MG: 10 INJECTION, SOLUTION INTRAVENOUS at 16:00

## 2024-12-12 RX ADMIN — AMPICILLIN AND SULBACTAM 3 G: 1; 2 INJECTION, POWDER, FOR SOLUTION INTRAMUSCULAR; INTRAVENOUS at 17:26

## 2024-12-12 RX ADMIN — PHYTONADIONE 10 MG: 10 INJECTION, EMULSION INTRAMUSCULAR; INTRAVENOUS; SUBCUTANEOUS at 05:02

## 2024-12-12 RX ADMIN — IPRATROPIUM BROMIDE AND ALBUTEROL SULFATE 3 ML: 2.5; .5 SOLUTION RESPIRATORY (INHALATION) at 04:45

## 2024-12-12 RX ADMIN — LINEZOLID 600 MG: 600 INJECTION, SOLUTION INTRAVENOUS at 05:27

## 2024-12-12 RX ADMIN — ACETAMINOPHEN 1000 MG: 500 TABLET, FILM COATED ORAL at 23:33

## 2024-12-12 RX ADMIN — HYDROMORPHONE HYDROCHLORIDE 0.5 MG: 1 INJECTION, SOLUTION INTRAMUSCULAR; INTRAVENOUS; SUBCUTANEOUS at 09:55

## 2024-12-12 RX ADMIN — ACETAMINOPHEN 1000 MG: 500 TABLET, FILM COATED ORAL at 05:09

## 2024-12-12 RX ADMIN — OXYCODONE 5 MG: 5 TABLET ORAL at 11:59

## 2024-12-12 RX ADMIN — SODIUM CHLORIDE: 9 INJECTION, SOLUTION INTRAVENOUS at 04:45

## 2024-12-12 RX ADMIN — ACETAMINOPHEN 1000 MG: 500 TABLET, FILM COATED ORAL at 12:00

## 2024-12-12 RX ADMIN — ACETAMINOPHEN 1000 MG: 500 TABLET, FILM COATED ORAL at 17:33

## 2024-12-12 RX ADMIN — AMPICILLIN AND SULBACTAM 3 G: 1; 2 INJECTION, POWDER, FOR SOLUTION INTRAMUSCULAR; INTRAVENOUS at 01:19

## 2024-12-12 RX ADMIN — HYDROMORPHONE HYDROCHLORIDE 0.5 MG: 1 INJECTION, SOLUTION INTRAMUSCULAR; INTRAVENOUS; SUBCUTANEOUS at 05:18

## 2024-12-12 RX ADMIN — LINEZOLID 600 MG: 600 INJECTION, SOLUTION INTRAVENOUS at 18:00

## 2024-12-12 RX ADMIN — AMPICILLIN AND SULBACTAM 3 G: 1; 2 INJECTION, POWDER, FOR SOLUTION INTRAMUSCULAR; INTRAVENOUS at 05:22

## 2024-12-12 RX ADMIN — OXYCODONE HYDROCHLORIDE 10 MG: 10 TABLET ORAL at 19:20

## 2024-12-12 RX ADMIN — HYDROMORPHONE HYDROCHLORIDE 0.5 MG: 1 INJECTION, SOLUTION INTRAMUSCULAR; INTRAVENOUS; SUBCUTANEOUS at 17:32

## 2024-12-12 RX ADMIN — FUROSEMIDE 80 MG: 10 INJECTION INTRAMUSCULAR; INTRAVENOUS at 01:10

## 2024-12-12 RX ADMIN — NOREPINEPHRINE BITARTRATE 0.1 MCG/KG/MIN: 0.03 INJECTION, SOLUTION INTRAVENOUS at 15:16

## 2024-12-12 RX ADMIN — LEVOTHYROXINE SODIUM 125 MCG: 0.12 TABLET ORAL at 05:10

## 2024-12-12 RX ADMIN — VASOPRESSIN 0.03 UNITS/MIN: 0.2 SOLUTION INTRAVENOUS at 08:12

## 2024-12-12 RX ADMIN — NOREPINEPHRINE BITARTRATE 0.05 MCG/KG/MIN: 0.03 INJECTION, SOLUTION INTRAVENOUS at 00:36

## 2024-12-12 RX ADMIN — AMPICILLIN AND SULBACTAM 3 G: 1; 2 INJECTION, POWDER, FOR SOLUTION INTRAMUSCULAR; INTRAVENOUS at 12:01

## 2024-12-12 RX ADMIN — OMEPRAZOLE 20 MG: 20 CAPSULE, DELAYED RELEASE ORAL at 12:45

## 2024-12-12 RX ADMIN — AMPICILLIN AND SULBACTAM 3 G: 1; 2 INJECTION, POWDER, FOR SOLUTION INTRAMUSCULAR; INTRAVENOUS at 23:40

## 2024-12-12 RX ADMIN — OXYCODONE 5 MG: 5 TABLET ORAL at 15:59

## 2024-12-12 RX ADMIN — PROTHROMBIN, COAGULATION FACTOR VII HUMAN, COAGULATION FACTOR IX HUMAN, COAGULATION FACTOR X HUMAN, PROTEIN C, PROTEIN S HUMAN, AND WATER 2000 UNITS: KIT at 06:26

## 2024-12-12 RX ADMIN — FUROSEMIDE 20 MG: 10 INJECTION, SOLUTION INTRAVENOUS at 08:29

## 2024-12-12 ASSESSMENT — ENCOUNTER SYMPTOMS
CONSTIPATION: 0
FOCAL WEAKNESS: 0
ORTHOPNEA: 1
MYALGIAS: 0
HEADACHES: 0
SORE THROAT: 0
FEVER: 0
BLURRED VISION: 0
COUGH: 0
SENSORY CHANGE: 0
PALPITATIONS: 0
SPEECH CHANGE: 0
SHORTNESS OF BREATH: 0
CHILLS: 0
DIZZINESS: 1
DEPRESSION: 0
SHORTNESS OF BREATH: 1
WHEEZING: 0
VOMITING: 0
NERVOUS/ANXIOUS: 0
CONSTIPATION: 1
ABDOMINAL PAIN: 1
BACK PAIN: 0
ABDOMINAL PAIN: 0
SPUTUM PRODUCTION: 0
BRUISES/BLEEDS EASILY: 0
NAUSEA: 0
BLOOD IN STOOL: 0
BACK PAIN: 1

## 2024-12-12 ASSESSMENT — PAIN DESCRIPTION - PAIN TYPE
TYPE: ACUTE PAIN;CHRONIC PAIN
TYPE: CHRONIC PAIN
TYPE: ACUTE PAIN
TYPE: CHRONIC PAIN
TYPE: ACUTE PAIN;CHRONIC PAIN
TYPE: CHRONIC PAIN
TYPE: CHRONIC PAIN
TYPE: ACUTE PAIN
TYPE: CHRONIC PAIN
TYPE: ACUTE PAIN
TYPE: CHRONIC PAIN
TYPE: ACUTE PAIN
TYPE: CHRONIC PAIN
TYPE: ACUTE PAIN
TYPE: CHRONIC PAIN
TYPE: ACUTE PAIN
TYPE: CHRONIC PAIN
TYPE: ACUTE PAIN
TYPE: CHRONIC PAIN
TYPE: CHRONIC PAIN

## 2024-12-12 ASSESSMENT — FIBROSIS 4 INDEX: FIB4 SCORE: 2.85

## 2024-12-12 NOTE — PROGRESS NOTES
This RRT RN was called to bedside by Charge PREETHI Craft in regards to patient's hypotension. Dr. Caldwell present at bedside upon arrival.     Patient transported from S173-2 to new room T612 on cardiac zoll monitor + o2, via bed.  Levophed initiated prior to transfer by RRT PREETHI Dias.   New PIV placed by Hilario RRT PREETHI.

## 2024-12-12 NOTE — PROGRESS NOTES
Monitor summary:        Rhythm: Sinus rhythm   Rate: 63 - 95  Ectopy: (r)PAC, BBB  Measurements: .24/.08/.49        12hr chart check

## 2024-12-12 NOTE — DISCHARGE PLANNING
HTH/SCP chart review completed. Note, due to medical acuity, pt is now requiring ICU level of care. TCN services are not indicated at this time and therefore will defer to hospital CM. TCN will monitor pt for transfer to another floor or can be reached by Voalte if collaboration with the hospital discharge team is indicated. Thank you!

## 2024-12-12 NOTE — ASSESSMENT & PLAN NOTE
DIAMOND on CKD, baseline Cr 1.3~, improving, nonoliguric  Avoid nephrotoxins  Monitor creatinine, urine output, electrolytes

## 2024-12-12 NOTE — ASSESSMENT & PLAN NOTE
Improved from prior hospitalization with ABLA from retroperitoneal hematoma --> however, CT A&P shows increased size of hematoma.   Could be contributing to the shock.   -Iron panel/ferritin consistent with CASSANDRA  -See A&P under shock

## 2024-12-12 NOTE — PROGRESS NOTES
"Critical Care Progress Note    Date of admission  12/10/2024    Chief Complaint  74 y.o. female admitted 12/10/2024 with hemorrhagic shock    Hospital Course  \"74 yof with a pmhx of AF, AV replacement in 2007 (mechanical) on coumadin, CKD 3b, hypothyroidism, and glaucoma. 2 recent admissions for a right femoral fracture on 11/8 s/p ORIF and re-admitted on 11/23 for a RP bleed. She was restarted on her coumadin and discharged to SNF on 12/10. She returned to the ER later in the day for dyspnea. She was found to have pulmonary edema and DIAMOND. She was admitted to the floor for diuresis and started on abx for a possible LLE cellulitis and UTI. This evening her BP gradually decreased and CC was consulted for evaluation.     POCUS echo shows a preserved EF, RV not well visualized, IVC dilated and non-collapsible.     The patient states she feels \"off\", denies dizziness, SOB, CP, N/V, abd pain or back pain.\" - Dr. Caldwell 12/12    Interval Problem Update  Reviewed last 24 hour events:   - CT A/P with large L RP hematoma s/p Kcentra + vit K + pRBC   - levophed + vasopressin gtt   - AF (Tlow 35.6)   - Afib rate controlled, SBP    - AAox3 (confused on situation)   - c/o RLE pain from fracture   - WBC remains elevated   - NPO   - Hgb 7.6   - soft brown BMs   - low UOP   - Na unchanged at 128   - unchanged DIAMOND   - trop improving   - day 2 unasyn, day 1 linezolid (MRSA nares neg)   - TEG ok    Review of Systems  Review of Systems   Constitutional:  Positive for malaise/fatigue. Negative for chills and fever.   HENT:  Negative for congestion and sore throat.    Eyes:  Negative for blurred vision.   Respiratory:  Negative for cough, sputum production and shortness of breath.    Cardiovascular:  Negative for chest pain, palpitations and leg swelling.   Gastrointestinal:  Negative for abdominal pain, nausea and vomiting.   Genitourinary:  Negative for dysuria.   Musculoskeletal:  Positive for back pain. Negative for myalgias. "   Skin:  Negative for rash.   Neurological:  Positive for dizziness. Negative for sensory change, speech change and headaches.   Endo/Heme/Allergies:  Does not bruise/bleed easily.   Psychiatric/Behavioral:  Negative for depression. The patient is not nervous/anxious.    All other systems reviewed and are negative.       Vital Signs for last 24 hours   Temp:  [35.1 °C (95.2 °F)-37.1 °C (98.8 °F)] 36.1 °C (96.9 °F)  Pulse:  [61-87] 69  Resp:  [18-64] 26  BP: ()/(34-58) 85/46  SpO2:  [89 %-100 %] 98 %    Respiratory Information for the last 24 hours   4 lpm FM    Physical Exam   Physical Exam  Vitals and nursing note reviewed.   Constitutional:       General: She is sleeping.      Appearance: Normal appearance. She is well-developed. She is morbidly obese. She is ill-appearing.      Interventions: Face mask in place.   HENT:      Head: Normocephalic and atraumatic.      Nose: Nose normal. No congestion.      Mouth/Throat:      Mouth: Mucous membranes are dry.      Pharynx: Oropharynx is clear.   Eyes:      General: No scleral icterus.     Pupils: Pupils are equal, round, and reactive to light.      Comments: Conjunctival pallor   Neck:      Vascular: No JVD.      Trachea: No tracheal deviation.   Cardiovascular:      Rate and Rhythm: Normal rate and regular rhythm. Occasional Extrasystoles are present.     Chest Wall: PMI is displaced.      Pulses: Decreased pulses.           Radial pulses are 1+ on the right side and 1+ on the left side.      Heart sounds: Normal heart sounds. No murmur heard.     Comments: Hypotensive requiring vasopressor support  Pulmonary:      Effort: Pulmonary effort is normal. No respiratory distress.      Breath sounds: No stridor. Examination of the right-lower field reveals rhonchi. Examination of the left-lower field reveals rhonchi. Rhonchi present. No wheezing or rales.      Comments: Protecting airway, speaking in full sentences  Abdominal:      General: Bowel sounds are normal.  There is no distension.      Palpations: Abdomen is soft.      Tenderness: There is no abdominal tenderness. There is no guarding or rebound.   Genitourinary:     Comments: Bull catheter in place  Musculoskeletal:         General: Swelling present. No tenderness.      Cervical back: Neck supple. No tenderness.      Right lower leg: Edema present.      Left lower leg: Edema present.      Comments: Right lateral hip incision is oozing serous fluid, left lower extremity is erythematous and warm   Skin:     General: Skin is warm and dry.      Capillary Refill: Capillary refill takes 2 to 3 seconds.      Findings: Erythema present. No rash.   Neurological:      General: No focal deficit present.      Mental Status: She is oriented to person, place, and time and easily aroused.      Cranial Nerves: No cranial nerve deficit.      Sensory: No sensory deficit.   Psychiatric:         Mood and Affect: Mood normal.         Behavior: Behavior normal. Behavior is cooperative.         Thought Content: Thought content normal.         Medications  Current Facility-Administered Medications   Medication Dose Route Frequency Provider Last Rate Last Admin    norepinephrine (Levophed) 8 mg in 250 mL NS infusion (premix)  0-1 mcg/kg/min (Ideal) Intravenous Continuous Dorothy Maddox M.D. 17.1 mL/hr at 12/12/24 0534 0.2 mcg/kg/min at 12/12/24 0534    Linezolid (Zyvox) premix 600 mg  600 mg Intravenous Q12HRS Dorothy Maddox M.D. 300 mL/hr at 12/12/24 0527 600 mg at 12/12/24 0527    Pharmacy Consult Request ...Pain Management Review 1 Each  1 Each Other PHARMACY TO DOSE Dorothy Maddox M.D.        acetaminophen (Tylenol) tablet 1,000 mg  1,000 mg Oral Q6HRS Dorothy Maddox M.D.   1,000 mg at 12/12/24 0509    Followed by    [START ON 12/17/2024] acetaminophen (Tylenol) tablet 1,000 mg  1,000 mg Oral Q6HRS PRN Dorothy Maddox M.D.        oxyCODONE immediate-release (Roxicodone) tablet 5 mg  5 mg Oral Q3HRS PRN Dorothy Maddox M.D.        Or     oxyCODONE immediate release (Roxicodone) tablet 10 mg  10 mg Oral Q3HRS PRN Dorothy Maddox M.D.        Or    HYDROmorphone (Dilaudid) injection 0.5 mg  0.5 mg Intravenous Q3HRS PRN Dorothy Maddox M.D.   0.5 mg at 12/12/24 0518    NS infusion   Intravenous Continuous Dorothy Maddox M.D.        ipratropium-albuterol (DUONEB) nebulizer solution  3 mL Nebulization Q2HRS PRN Dorothy Maddxo M.D.   3 mL at 12/12/24 0445    vasopressin (Vasostrict) 20 units in  mL infusion  0.03 Units/min Intravenous Continuous Geri Khanna M.D.        ampicillin/sulbactam (Unasyn) 3 g in  mL IVPB  3 g Intravenous Q6HRS Yessenia Mendoza M.D.   Stopped at 12/12/24 0552    levothyroxine (Synthroid) tablet 125 mcg  125 mcg Oral AM ES Yessenia Mendoza M.D.   125 mcg at 12/12/24 0510    [Held by provider] methocarbamol (Robaxin) tablet 750 mg  750 mg Oral 4X/DAY Yessenia Mendoza M.D.   750 mg at 12/11/24 2022    timolol (Timoptic) 0.5 % ophthalmic solution 1 Drop  1 Drop Both Eyes BID Yessenia Mendoza M.D.   1 Drop at 12/11/24 1712    Respiratory Therapy Consult   Nebulization Continuous RT Yessenia Mendoza M.D.        ondansetron (Zofran) syringe/vial injection 4 mg  4 mg Intravenous Q4HRS PRN Yessenia Mendoza M.D.        ondansetron (Zofran ODT) dispertab 4 mg  4 mg Oral Q4HRS PRN Yessenia Mendoza M.D.        [Held by provider] warfarin (Coumadin) tablet 4 mg  4 mg Oral Once per day on Sunday Tuesday Thursday Saturday Yessenia Mendoza M.D.        And    [Held by provider] warfarin (Coumadin) tablet 6 mg  6 mg Oral Once per day on Monday Wednesday Friday Yessenia Mendoza M.D.   6 mg at 12/11/24 1413    [Held by provider] MD Alert...Warfarin per Pharmacy   Other PHARMACY TO DOSE Dick Dietrich M.D.           Fluids    Intake/Output Summary (Last 24 hours) at 12/12/2024 0656  Last data filed at 12/12/2024 0534  Gross per 24 hour   Intake 2226.26 ml   Output 1330 ml   Net 896.26 ml       Laboratory          Recent Labs     12/10/24  2048 12/11/24  0701  12/11/24 2028 12/12/24 0109 12/12/24 0440   SODIUM 131*   < > 128* 128* 128*   POTASSIUM 5.5   < > 5.1 5.2 4.9   CHLORIDE 95*   < > 93* 95* 94*   CO2 23   < > 23 22 21   BUN 57*   < > 61* 68* 68*   CREATININE 1.24   < > 1.82* 2.14* 2.14*   MAGNESIUM 2.0  --   --   --  2.2   PHOSPHORUS 3.0  --   --   --   --    CALCIUM 8.7   < > 8.1* 8.2* 8.2*    < > = values in this interval not displayed.     Recent Labs     12/11/24  0701 12/11/24  1507 12/11/24 2028 12/12/24 0109 12/12/24 0440   ALTSGPT 11  --   --  11 13   ASTSGOT 30  --   --  34 32   ALKPHOSPHAT 63  --   --  106* 77   TBILIRUBIN 1.1  --   --  1.1 1.1   GLUCOSE 180*   < > 147* 132* 140*    < > = values in this interval not displayed.     Recent Labs     12/11/24 0701 12/12/24 0109 12/12/24 0440   WBC 21.2* 22.3* 22.9*   NEUTSPOLYS 93.00* 93.10* 91.90*   LYMPHOCYTES 3.10* 3.10* 3.40*   MONOCYTES 2.20 2.60 2.70   EOSINOPHILS 0.80 0.00 0.00   BASOPHILS 0.20 0.10 0.30   ASTSGOT 30 34 32   ALTSGPT 11 11 13   ALKPHOSPHAT 63 106* 77   TBILIRUBIN 1.1 1.1 1.1     Recent Labs     12/10/24  0815 12/10/24  2048 12/10/24  2048 12/11/24  0701 12/12/24  0109 12/12/24  0440   RBC  --  3.11*   < > 2.88* 2.37* 2.40*   HEMOGLOBIN  --  9.4*   < > 8.8* 7.7* 7.6*   HEMATOCRIT  --  30.2*   < > 27.4* 22.7* 23.0*   PLATELETCT  --  329   < > 242 232 235   PROTHROMBTM 27.0* 25.2*  --   --   --   --    APTT  --  33.2  --   --   --   --    INR 2.48* 2.27*  --   --   --   --    IRON  --   --   --   --   --  14*   FERRITIN  --   --   --   --   --  1116.0*   TOTIRONBC  --   --   --   --   --  159*    < > = values in this interval not displayed.       Imaging  X-Ray:  I have personally reviewed the images and compared with prior images. and My impression is: Enlarged cardiac silhouette with sternal wires and prosthetic valve, bibasilar atelectasis with mild edema, no tubes nor lines  CT:    Reviewed  Ultrasound:  Reviewed    Assessment/Plan  * Shock (HCC)- (present on  admission)  Assessment & Plan  Undifferentiated -hemorrhagic versus septic.  Doubt cardiogenic versus obstructive  Continue to titrate norepinephrine and vasopressin drips to keep mean arterial pressure greater than 65  Serial H/H with conservative transfusion strategy  Hemorrhage management  Empiric antibiotics  Hold additional fluid resuscitation given evidence of fluid overload  Trend lactic acid, urine output, vital signs closely for adequacy of resuscitation    Cellulitis- (present on admission)  Assessment & Plan  SSTI of LLE  Continue Unasyn and linezolid for today, follow-up on final cultures  Wound care    Acute respiratory failure with hypoxia (HCC)- (present on admission)  Assessment & Plan  Due to pulmonary edema and shock  RT/O2 protocol  Titrate oxygen support to goal SpO2 greater than 92%, consider BiPAP if needed    Retroperitoneal hemorrhage- (present on admission)  Assessment & Plan  Spontaneous due to Coumadin -worsening on last CT scan and potential cause of shock  Status post Coumadin reversal with Kcentra and vitamin K, holding Coumadin for now  Serial H/H  Blood pressure management  Consider CTA to look for blush with IR consultation if worsens    Anemia- (present on admission)  Assessment & Plan  Acute blood loss anemia from retroperitoneal hematoma s/p PRBC transfusion 12/12  Serial H/H with conservative transfusion strategy  Reversal of Coumadin, monitor TEG    Acute kidney injury superimposed on stage 3b chronic kidney disease (HCC)- (present on admission)  Assessment & Plan  DIAMOND on CKD, baseline Cr 1.3~, oliguric - unchanged  Avoid nephrotoxins  Monitor creatinine, urine output, electrolytes closely while maintaining perfusion to the kidneys    Acute on chronic diastolic heart failure (HCC)- (present on admission)  Assessment & Plan  Admitted with acute hypoxemia, pulmonary edema, BNP 10K, 3+ pitting edema, troponin leak   Last echo 2 weeks ago with preserved EF  Continue diuresis  Hold  GDMT while in shock  No need for repeat echo given last 1 being 2 weeks ago    Elevated troponin- (present on admission)  Assessment & Plan  Demand ischemia in the setting of known CAD - improving  Continue to correct shock/hypoxia    Status post closed fracture of right femur- (present on admission)  Assessment & Plan  S/p ORIF in early November   Pain management   PT/OT when more stable     Acute pulmonary edema (HCC)- (present on admission)  Assessment & Plan  Diuresis with monitoring of strict ins/outs and respiratory status    Pulmonary hypertension (HCC)- (present on admission)  Assessment & Plan  Avoid hypoxia and hypercarbia  Strict blood pressure and fluid management    Paroxysmal atrial fibrillation (HCC)- (present on admission)  Assessment & Plan  Rate controlled  Optimize electrolytes, continuous telemetry monitoring  Hold Coumadin for now    Hx of mechanical aortic valve replacement [V43.3]- (present on admission)  Assessment & Plan  On warfarin chronically, on hold for now due to bleed  Last echo in November with grossly normal TV gradient/peak    Essential hypertension- (present on admission)  Assessment & Plan  Currently in shock  Hold scheduled antihypertensives and monitor    Constipation  Assessment & Plan  Aggressive bowel protocol    Thrombophlebitis of left arm- (present on admission)  Assessment & Plan  Supportive measures         VTE:  Contraindicated  Ulcer: Not Indicated  Lines: Bull Catheter  Ongoing indication addressed    I have performed a physical exam and reviewed and updated ROS and Plan today (12/12/2024). In review of yesterday's note (12/11/2024), there are no changes except as documented above.     Patient remains critically ill today requiring active titration of vasopressor support with resuscitation and blood transfusion, antibiotics. High risk of deterioration and worsening vital organ dysfunction and death without the above critical care interventions.    Discussed patient  condition and risk of morbidity and/or mortality with RN, RT, Pharmacy, [facility] resident, Charge nurse / hot rounds, and Patient. Critical care time = 40 minutes (in addition to the 72 minutes of critical care time performed by Dr. Hernandez on same date of service) in directly providing and coordinating critical care and extensive data review.  No time overlap and excludes procedures.    Please note that this dictation was created using voice recognition software. I have made every reasonable attempt to correct obvious errors, but there may be errors of grammar and possibly content that I did not discover before finalizing the note.

## 2024-12-12 NOTE — PROGRESS NOTES
"[facility] ICU Progress Note      Admit Date: 12/10/2024    Resident(s): Geri Khanna M.D.  Attending: Dr. Jeremy Gonda, M.D.    Date & Time:   12/12/2024   08:21 PM       Patient ID:    Name:             Yenifer Beasley   YOB: 1950  Age:                 74 y.o.  female   MRN:               [id number]    HPI:  From Dr. Maddox's note \" Yenifer Beasley is a 74 y.o. female with a past medical history of atrial fibrillation, mechanical aortic valve on Coumadin and a recent hospital admission for hemorrhagic shock in the setting of post-op retroperitoneal hematoma after a femoral fracture s/p ORIF. She presented 12/10 from SNF due to acute shortness of breath and left arm pain. She was found to be hypoxic, in acute diastolic heart failure exacerbation, started on diuresis and also started on Unasyn for LLE cellulitis and LUE phlebitis. Overnight 12/11 - 12/12 she developed hypotension with MAP down to 50s and increased O2 needs to 5-6 lt NC, no RVR poor UO for last 12h. Upon my evaluation, patient is lightheaded, has increased WOB, denies chest pain, complaining of pain in LLE and LUE.\"     Hospital Course/Overnight interval events:  12/12:  Hypotensive overnight, CT abdomen and pelvis w/o contrast was done that showed increasing size of the retroperitoneal hematoma. Hence she was given a dose of Kcentra + 10mg Vit K and a unit of PRBC overnight.   TEG looks good, done prior to PRBC.   Had soft brown Bms today, no melena noted.   Afib, rate controlled.   Continues to be hypotensive, on Levo + Vaso GTT for goal MAP >65.   NPO in case she needs IR procedure for embolization.     Interval Events:  Per patient report and chart review, Bull was changed on 12/08 prior to discharge during the most recent hospitalization. She declines changing the Bull at this time as the change during the previous hospitalization was traumatic and she doesn't want to go through that again.   UOP overall since admission is " good, non oliguric. Will continue to keep a close eye on this.   She may need a CVC if she continues to need 2 pressors, however, she absolutely declines placing a CVC at this time.     DVT US was negative in BLE, did show an occlusive SVT of the cephalic vein in the LUE.     Consultants:  Critical care     Procedures:  None this hospitalization       Review of Systems   Constitutional:  Positive for malaise/fatigue. Negative for chills and fever.   HENT:  Negative for sore throat.    Respiratory:  Positive for shortness of breath. Negative for cough, sputum production and wheezing.    Cardiovascular:  Positive for orthopnea and leg swelling. Negative for chest pain and palpitations.   Gastrointestinal:  Negative for abdominal pain (Had BM this morning, soft, brown, no melena), blood in stool, constipation, melena, nausea and vomiting.   Genitourinary:  Negative for dysuria (Denies dysuria prior to admission).   Musculoskeletal:  Negative for back pain.   Neurological:  Positive for dizziness. Negative for sensory change, speech change and focal weakness.       PHYSICAL EXAM    Vitals:    12/12/24 1045   BP:    Pulse: (!) 59   Resp: (!) 38   Temp:    SpO2: 99%       Physical Exam  Vitals and nursing note reviewed.   Constitutional:       General: She is not in acute distress.     Appearance: Normal appearance. She is obese. She is ill-appearing. She is not toxic-appearing.   HENT:      Head: Normocephalic and atraumatic.      Mouth/Throat:      Mouth: Mucous membranes are moist.   Eyes:      General: No scleral icterus.     Extraocular Movements: Extraocular movements intact.      Conjunctiva/sclera: Conjunctivae normal.      Pupils: Pupils are equal, round, and reactive to light.   Neck:      Comments: JVD present  Cardiovascular:      Rate and Rhythm: Normal rate. Rhythm irregular.      Heart sounds: Murmur heard.   Pulmonary:      Effort: Pulmonary effort is normal. No respiratory distress.      Breath sounds:  Rales present. No wheezing or rhonchi.   Abdominal:      General: Abdomen is flat. Bowel sounds are normal. There is no distension.      Palpations: Abdomen is soft. There is no mass.      Tenderness: There is no abdominal tenderness. There is no guarding.   Musculoskeletal:      Cervical back: Normal range of motion and neck supple.      Right lower leg: Edema present.      Left lower leg: Edema present.      Comments: 2+ throughout the entire BLE   Skin:     General: Skin is warm.      Capillary Refill: Capillary refill takes 2 to 3 seconds.   Neurological:      General: No focal deficit present.      Mental Status: She is alert and oriented to person, place, and time. Mental status is at baseline.      Cranial Nerves: No cranial nerve deficit.      Motor: No weakness.      Deep Tendon Reflexes: Reflexes normal.   Psychiatric:         Mood and Affect: Mood normal.         Behavior: Behavior normal.         Thought Content: Thought content normal.         Judgment: Judgment normal.            Fluids:  Date 12/12/24 0700 - 12/13/24 0659   Shift 9969-3090 2067-3070 6051-5730 24 Hour Total   INTAKE   P.O. 100   100     P.O. 100   100   I.V. 30.6   30.6     Norepinephrine Volume 30.6   30.6   Blood 656.7   656.7     Volume (RELEASE RED BLOOD CELLS) 656.7   656.7   IV Piggyback 439.6   439.6     Volume (mL) (ampicillin/sulbactam (Unasyn) 3 g in  mL IVPB) 96.5   96.5     Volume (mL) (Linezolid (Zyvox) premix 600 mg) 298.4   298.4     Volume (mL) (phytonadione (Aqua-Mephyton) 10 mg in NS 50 mL IVPB) 44.7   44.7   Shift Total 1226.8   1226.8   OUTPUT   Urine 250   250     Output (mL) (Urethral Catheter) 250   250   Stool         Number of Times Stooled 1 x   1 x   Shift Total 250   250   .8   976.8        Intake/Output Summary (Last 24 hours) at 12/12/2024 1221  Last data filed at 12/12/2024 1000  Gross per 24 hour   Intake 3093.1 ml   Output 980 ml   Net 2113.1 ml       Weight: 115 kg (252 lb 13.9 oz)  Body  mass index is 49.38 kg/m².    Recent Labs     12/10/24  2048 12/11/24  0724  0109 240 24  0945   SODIUM 131*   < > 128* 128* 131*   POTASSIUM 5.5   < > 5.2 4.9 4.9   CHLORIDE 95*   < > 95* 94* 92*   CO2 23   < > 22 21 21   BUN 57*   < > 68* 68* 67*   CREATININE 1.24   < > 2.14* 2.14* 2.06*   MAGNESIUM 2.0  --   --  2.2  --    PHOSPHORUS 3.0  --   --   --   --    CALCIUM 8.7   < > 8.2* 8.2* 8.0*    < > = values in this interval not displayed.       GI/Nutrition:  Recent Labs     24  1507 240 24  0945   ALTSGPT 11  --  11 13  --    ASTSGOT 30  --  34 32  --    ALKPHOSPHAT 63  --  106* 77  --    TBILIRUBIN 1.1  --  1.1 1.1  --    GLUCOSE 180*   < > 132* 140* 163*    < > = values in this interval not displayed.       Heme:  Recent Labs     12/10/24  2048 12/11/24  0701 12/12/24  0035 240 24  0945   RBC 3.11*   < >  --  2.37* 2.40* 2.81*   HEMOGLOBIN 9.4*   < >  --  7.7* 7.6* 8.5*   HEMATOCRIT 30.2*   < >  --  22.7* 23.0* 26.4*   PLATELETCT 329   < >  --  232 235 223   PROTHROMBTM 25.2*  --  31.0*  --   --  17.8*   APTT 33.2  --   --   --   --   --    INR 2.27*  --  2.96*  --   --  1.46*   IRON  --   --   --   --  14*  --    FERRITIN  --   --   --   --  1116.0*  --    TOTIRONBC  --   --   --   --  159*  --     < > = values in this interval not displayed.       Infectious Disease:  Monitored Temp 2  Av.6 °C (96.1 °F)  Min: 35.6 °C (96.1 °F)  Max: 35.6 °C (96.1 °F)  Temp  Av.3 °C (97.3 °F)  Min: 35.1 °C (95.2 °F)  Max: 37.1 °C (98.8 °F)  Recent Labs     24  0701 24  0109 24  0440 24  0945   WBC 21.2* 22.3* 22.9* 20.6*   NEUTSPOLYS 93.00* 93.10* 91.90*  --    LYMPHOCYTES 3.10* 3.10* 3.40*  --    MONOCYTES 2.20 2.60 2.70  --    EOSINOPHILS 0.80 0.00 0.00  --    BASOPHILS 0.20 0.10 0.30  --    ASTSGOT 30 34 32  --    ALTSGPT 11 11 13  --    ALKPHOSPHAT 63 106* 77  --    TBILIRUBIN  1.1 1.1 1.1  --        Meds:   NORepinephrine  0-1 mcg/kg/min (Ideal) 0.3 mcg/kg/min (12/12/24 0711)    linezolid (ZYVOX) IV  600 mg Stopped (12/12/24 0627)    Pharmacy Consult Request  1 Each      acetaminophen  1,000 mg      Followed by    [START ON 12/17/2024] acetaminophen  1,000 mg      oxyCODONE immediate-release  5 mg      Or    oxyCODONE immediate-release  10 mg      Or    HYDROmorphone  0.5 mg      NS   30 mL/hr at 12/12/24 0445    ipratropium-albuterol  3 mL      vasopressin (Vasostrict) infusion  0.03 Units/min 0.03 Units/min (12/12/24 0812)    furosemide  20 mg      ampicillin-sulbactam (UNASYN) IV  3 g 3 g (12/12/24 1201)    levothyroxine  125 mcg      [Held by provider] methocarbamol  750 mg      timolol  1 Drop      Respiratory Therapy Consult        ondansetron  4 mg      ondansetron  4 mg      [Held by provider] warfarin  4 mg      And    [Held by provider] warfarin  6 mg      [Held by provider] MD Alert...Warfarin per Pharmacy              Assessment and Plan:  * Shock (HCC)  Assessment & Plan  Unclear etiology at this time - Could be septic vs hemorrhagic. Doesn't appear to be cardiogenic or obstructive at this time.  (Patient is warm and has good capillary refill, IVC is dilated and poorly collapsing on bedside echo, LV has concentric hypertrophy but good EF/hyperdynamic, on warfarin at home with negative DVT US BLE)  Hemorrhagic -- has worsened retroperitoneal hematoma on CT abdomen and pelvis non contrast, however, can't assess for active bleeding without contrast.   Sepsis -- SSTI vs UTI    Abx: Unasyn and Linezolid for now  Pressors: Titrate levophed and vaso for goal MAP >65 --> Recommended placing CVC given two pressors, patient declined hence deferred for now.   H/H: Trend Q6 hours, if <7 -> transfuse PRBC. Received a unit of PRBC overnight on 12/12 due to hypotension.   If Hg drops again or concerns for worsening retroperitoneal hematoma, will get CTA abdomen and pelvis, possibly IR  embolization. Will keep NPO for now.   PPI prophylaxis  Holding additional IVFs as she appears to be fluid overloaded  Trending lactic acid, urine output, vital signs closely     Constipation  Assessment & Plan  Had 2+ episodes of soft brown stool in the AM on 12/12  - Aggressive bowel regimen in the setting of opioid regimen     Acute kidney injury superimposed on stage 3b chronic kidney disease (HCC)  Assessment & Plan  DIAMOND on CKD, baseline Cr 1.3~, decreased UO but not quite oliguric at this time. UA with inflammatory changes, yeast and bacteria (asymptomatic), no protein. Suspect prerenal/ATN.   - See plan for heart failure  - Strict I/Os, has bryant   - Renal US if no improvement   - Avoiding nephrotoxins    Acute on chronic diastolic heart failure (HCC)  Assessment & Plan  Admitted with acute hypoxemia, pulmonary edema, BNP 10K, 3+ pitting edema, troponin leak (downtrending). Known prior diastolic dysfunction, unable to estimate on last echo, moderate concentric hypertrophy, preserved EF, poorly visualized RV. Poor diuresis since admission, blood pressures dropping along with worsening pulmonary edema and DIAMOND - bedside echo with dilated IVC.    - Resumed diuresis with IV lasix 20mg BID (Received a dose of 80mg overnight)  - Nor epi/Vaso for goal MAP >65   - Strict I/Os - bryant     Elevated troponin  Assessment & Plan  Likely demand ischemia, downtrending with resuscitation.   Will stop trending  No chest pain    Thrombophlebitis of left arm  Assessment & Plan  Occlusive SVT of cephalic vein noted on US.   Ice, rest, elevate, pain meds    Cellulitis  Assessment & Plan  Possible SSTI of LLE with some abrasions and blistering. R hip incision with mild erythema, no obvious purulence.   Will continue Unasyn and Linezolid for now  Follow cultures.       Status post closed fracture of right femur  Assessment & Plan  S/p ORIF in early November   - Pain management   - PT/OT when more stable     Acute pulmonary edema  (HCC)  Assessment & Plan  See A&P under AHRF    Acute respiratory failure with hypoxia (HCC)  Assessment & Plan  Likely due to pulmonary edema from acute exacerbation of HFpEF. On 6lt NC, no O2 at baseline. Low suspicion for PE at this time (compressible LE proximal venous system on limited bedside DVT assessment, alternative cause, poorly visualized RV though), will consider CTAP if not improved.   - Cautious diuresis as tolerated  - ISS  - RT protocols   - Goal O2 >92%  - Formal DVT US BLE was negative, but there is occlusive SVT of cephalic vein in the LUE.   - See heart failure plan     Had recent Echo about 2 weeks ago, holding off on another repeat one at this time based on POCUS assessment appears to be stable.     Paroxysmal atrial fibrillation (HCC)  Assessment & Plan  Rate controlled, monitoring on tele  Optimize electrolytes    Hx of mechanical aortic valve replacement [V43.3]  Assessment & Plan  On warfarin which is held due to concern for hemorrhagic shock.   Last Echo in 11/2024 with grossly normal transvalvular gradient      Anemia  Assessment & Plan  Improved from prior hospitalization with ABLA from retroperitoneal hematoma --> however, CT A&P shows increased size of hematoma.   Could be contributing to the shock.   -Iron panel/ferritin consistent with CASSANDRA  -See A&P under shock    Essential hypertension  Assessment & Plan  Hold antihypertensives in the setting of shock             Quality Measures:  Feeding:  NPO, sips with meds and Ice chips ok  Analgesia: Tylenol/Oxy/Dilaudid PRN  Sedation: None, N/A  Thromboprophylaxis: SCDs, pharm ppx held due to retroperitoneal bleeding  Head of bed: >30 degrees  Ulcer prophylaxis: PPI   Glycemic control: N/A  Bowel care: Laxatives ordered prn  Indwelling lines: PIV x2  Deescalation of antibiotics: Unasyn and Linezolid      CODE STATUS:   DNR/DNI  DISPO:    Continue monitoring on ICU

## 2024-12-12 NOTE — ASSESSMENT & PLAN NOTE
Undifferentiated -hemorrhagic versus septic - improving  S/p norepinephrine and vasopressin drips (discontinued 12/13)  Serial H/H with conservative transfusion strategy  Continue empiric antibiotics, sepsis management

## 2024-12-12 NOTE — ASSESSMENT & PLAN NOTE
Spontaneous due to Coumadin - worsening on last CT scan 12/12 and potential cause of shock  Status post Coumadin reversal with Kcentra and vitamin K, holding Coumadin for an additional 24 hours  Serial H/H  Consider CTA to look for blush with IR consultation if worsens/recurs

## 2024-12-12 NOTE — ASSESSMENT & PLAN NOTE
Admitted with acute hypoxemia, pulmonary edema, BNP 10K, 3+ pitting edema, troponin leak (downtrending). Known prior diastolic dysfunction, unable to estimate on last echo, moderate concentric hypertrophy, preserved EF, poorly visualized RV. Poor diuresis since admission, blood pressures dropping along with worsening pulmonary edema and DIAOMND - bedside echo with dilated IVC.    - Resumed diuresis with IV lasix 20mg BID (Received a dose of 80mg overnight)  - Nor epi/Vaso for goal MAP >65   - Strict I/Os - annette

## 2024-12-12 NOTE — WOUND TEAM
"[facility]  Inpatient Services  Initial Wound and Skin Care Evaluation    Admission Date: 12/10/2024     Last order of IP CONSULT TO WOUND CARE was found on 12/12/2024 from Hospital Encounter on 12/10/2024     HPI, PMH, SH: Reviewed    Past Surgical History:   Procedure Laterality Date    ORIF, FRACTURE, FEMUR Right 11/8/2024    Procedure: ORIF, FRACTURE, FEMUR;  Surgeon: Jason Camargo M.D.;  Location: SURGERY [facility];  Service: Orthopedics    MITRAL VALVE REPLACE  2007    AORTIC VALVE    SHOULDER SURGERY  06/2001    NEER DECOMOPRESSION LEFT SHOULDER    CARPAL TUNNEL RELEASE  1998    B/L    TONSILLECTOMY  1995    PARTIAL PALATOPLASTY    ABDOMINAL HYSTERECTOMY TOTAL      TAHBSOO    AORTIC VALVE REPLACEMENT      CHOLECYSTECTOMY      CORONARY ARTERY BYPASS, 1      Triple Bypass    HERNIA REPAIR      VENTRAL HERNIA REPAIR    HYSTERECTOMY LAPAROSCOPY      INTUBATION      PRIMARY C SECTION      SLEEVE,MEJIA VASO THIGH      TUBAL COAGULATION LAPAROSCOPIC BILATERAL      UVULOPHARYNGOPALATOPLASTY      VENTILATOR CONTINUOUS       Social History     Tobacco Use    Smoking status: Never    Smokeless tobacco: Never   Substance Use Topics    Alcohol use: No     Chief Complaint   Patient presents with    Shortness of Breath    Leg Pain     Diagnosis: Acute respiratory failure with hypoxia (HCC) [J96.01]    Unit where seen by Wound Team: [id number]     WOUND CONSULT RELATED TO:  \"R hip, both legs, both heels, elbows, nose, legs\"    WOUND TEAM PLAN OF CARE - Frequency of Follow-up:   Nursing to follow dressing orders written for wound care. Contact wound team if area fails to progress, deteriorates or with any questions/concerns if something comes up before next scheduled follow up (See below as to whether wound is following and frequency of wound follow up)   Not following, consult as needed  - Not following any of the consulted areas    WOUND HISTORY:   Yenifer Beasley is a 74 y.o. female who presented " 12/10/2024 with past medical history of atrial fibrillation, mechanical aortic valve on Coumadin who presents to the hospital from SNF for shortness of breath.  She denies cough, fevers, nausea, vomiting, dysphagia.  The patient was discharged from the hospital yesterday after a femur fracture that was complicated by retroperitoneal hematoma.  During that hospitalization she was given Kcentra and given 2 units of RBCs.  She was restarted on her Coumadin and INR was therapeutic prior to discharge.  The patient is unable to walk since her surgery.  She does have a Bull catheter in place.        WOUND ASSESSMENT/LDA  Wound 11/24/24 Incision Thigh Right (Active)   Date First Assessed/Time First Assessed: 11/24/24 (c) 1200   Present on Original Admission: (c) Yes  Primary Wound Type: Incision  Location: Thigh  Laterality: (c) Right      Assessments 12/12/2024 12:00 PM   Wound Image     Site Assessment Edema;Drainage   Periwound Assessment Edema   Margins Defined edges;Attached edges   Closure Approximated   Drainage Amount Small   Drainage Description Serous   Treatments Cleansed   Wound Cleansing Foam Cleanser/Washcloth   Dressing Status Clean;Dry;Intact   Dressing Changed Changed   Dressing Cleansing/Solutions Not Applicable   Dressing Options Hydrofiber Silver;Offloading Dressing - Sacral   Dressing Change/Treatment Frequency Daily, and As Needed   NEXT Dressing Change/Treatment Date 12/13/24   NEXT Weekly Photo (Inpatient Only) 12/19/24   Wound Team Following Not following   Non-staged Wound Description Not applicable       Wound 12/11/24 Pressure Injury Nose Right POA Stage 1 (Active)   Date First Assessed/Time First Assessed: 12/11/24 1930   Present on Original Admission: Yes  Hand Hygiene Completed: Yes  Primary Wound Type: Pressure Injury  Location: Nose  Laterality: Right  Wound Description (Comments): POA Stage 1      Assessments 12/12/2024 12:00 PM   Wound Image     Site Assessment Red   Periwound Assessment  Clean;Dry;Intact   Margins Defined edges;Attached edges   Closure Adhesive bandage   Drainage Amount None   Treatments Offloading   Dressing Status Clean;Dry;Intact   Dressing Changed Observed   Dressing Cleansing/Solutions Not Applicable   Dressing Options Silicone Adhesive Foam   Dressing Change/Treatment Frequency As Needed   NEXT Weekly Photo (Inpatient Only) 12/19/24   Wound Team Following Not following   Pressure Injury Stage Stage 1                      Vascular:    GENEVIEVE:   No results found.    Lab Values:    Lab Results   Component Value Date/Time    WBC 20.6 (H) 12/12/2024 09:45 AM    RBC 2.81 (L) 12/12/2024 09:45 AM    HEMOGLOBIN 8.5 (L) 12/12/2024 09:45 AM    HEMATOCRIT 26.4 (L) 12/12/2024 09:45 AM    CREACTPROT 5.62 (H) 11/23/2024 05:17 AM    SEDRATEWES 27 12/20/2018 10:16 AM    HBA1C 5.1 12/10/2024 08:48 PM         Culture Results show:  No results found for this or any previous visit (from the past 720 hours).    Pain Level/Medicated:  None, Tolerated without pain medication       INTERVENTIONS BY WOUND TEAM:  Chart and images reviewed. Discussed with bedside RN. All areas of concern (based on picture review, LDA review and discussion with bedside RN) have been thoroughly assessed. Documentation of areas based on significant findings. This RN in to assess patient. Performed standard wound care which includes appropriate positioning, dressing removal and non-selective debridement. Pictures and measurements obtained weekly if/when required.    Wound:  R leg incision  Preparation for Dressing removal: Removed without difficulty  Cleansed/Non-selectively Debrided with:  Perineal Wipes (Barrier wipes)  Primary Dressing:  Aquacel Ag  Secondary (Outer) Dressing: Offloading adhesive foam    Advanced Wound Care Discharge Planning  Number of Clinicians necessary to complete wound care: 1  Is patient requiring IV pain medications for dressing changes:  No   Length of time for dressing change 15 min. (This does not  include chart review, pre-medication time, set up, clean up or time spent charting.)    Interdisciplinary consultation: Patient, Bedside RN (Josefina)    EVALUATION / RATIONALE FOR TREATMENT:     Date:  12/12/24  Wound Status:  Initial evaluation    R Nose with POA stage 1 pressure injury related to patient's glasses, recommend offloading.  BUE with edema and erythema, no open wounds. No interventions indicated at this time.  BL Heels with blanchable erythema  BLE with weeping edema, intact, mixture of self-deflated bullae and serous filled bullae are noted , mostly on BL inner thighs, these can remain SOREN. Recommend management of edema and keeping patient as dry as possible.  R lateral leg with incision, weeping serous edema exudating from incision. Aquacel Ag Hydrofiber applied to manage bioburden, absorb exudate, and maintain a moist wound environment without laterally wicking exudate therefore reducing aniceto-wound maceration.  Pannus with scant MASD, area cleansed with soap and wipes, and interdry placed to wick moisture and prevent further skin breakdown.  Sacrum intact and blanching, small moisture fissure to gluteal cleft.         Goals: Steady decrease in wound area and depth weekly.    NURSING PLAN OF CARE ORDERS:  Dressing changes: See Dressing Care orders  RN Prevention Protocol    NUTRITION RECOMMENDATIONS   Wound Team Recommendations:  N/A    DIET ORDERS (From admission to next 24h)       Start     Ordered    12/12/24 0835  Diet NPO Restrict to: Sips with Medications  ALL MEALS        Question:  Diet NPO Restrict to:  Answer:  Sips with Medications    12/12/24 0834                    PREVENTATIVE INTERVENTIONS:    Q shift Kamar - performed per nursing policy  Q shift pressure point assessments - performed per nursing policy    Surface/Positioning  ICU Low Airloss - Currently in Place  Reposition q 2 hours - Currently in Place  TAPs Turning system - Currently in Place    Offloading/Redistribution  Float  Heels off Bed with Pillows - Currently in Place         Respiratory  Silicone O2 tubing - Currently in Place  Gray Foam Ear protectors - Currently in Place    Containment/Moisture Prevention    Bull Catheter - Currently in Place  Interdry - Applied this Visit    Anticipated discharge plans:  TBD        Vac Discharge Needs:  Vac Discharge plan is purely a recommendation from wound team and not a requirement for discharge unless otherwise stated by physician.  Not Applicable Pt not on a wound vac

## 2024-12-12 NOTE — HOSPITAL COURSE
"\"74 yof with a pmhx of AF, AV replacement in 2007 (mechanical) on coumadin, CKD 3b, hypothyroidism, and glaucoma. 2 recent admissions for a right femoral fracture on 11/8 s/p ORIF and re-admitted on 11/23 for a RP bleed. She was restarted on her coumadin and discharged to SNF on 12/10. She returned to the ER later in the day for dyspnea. She was found to have pulmonary edema and DIAMOND. She was admitted to the floor for diuresis and started on abx for a possible LLE cellulitis and UTI. This evening her BP gradually decreased and CC was consulted for evaluation.     POCUS echo shows a preserved EF, RV not well visualized, IVC dilated and non-collapsible.     The patient states she feels \"off\", denies dizziness, SOB, CP, N/V, abd pain or back pain.\" - Dr. Caldwell 12/12  "

## 2024-12-12 NOTE — PROGRESS NOTES
Patient heart rhythm noted as afib on the monitor, MD Maddox aware. Monitor tech confirmed conversion to afib from sinus rhythm at 0550.

## 2024-12-12 NOTE — CARE PLAN
The patient is Watcher - Medium risk of patient condition declining or worsening    Shift Goals  Clinical Goals: Monitor labs and vitals, bryant care, wound care  Patient Goals: rest, pain management  Family Goals: man    Progress made toward(s) clinical / shift goals:    Problem: Pain - Standard  Goal: Alleviation of pain or a reduction in pain to the patient’s comfort goal  Outcome: Not Progressing     Problem: Knowledge Deficit - Standard  Goal: Patient and family/care givers will demonstrate understanding of plan of care, disease process/condition, diagnostic tests and medications  Outcome: Progressing     Problem: Skin Integrity  Goal: Skin integrity is maintained or improved  Outcome: Progressing     Problem: Fall Risk  Goal: Patient will remain free from falls  Outcome: Progressing       Patient is not progressing towards the following goals:      Problem: Pain - Standard  Goal: Alleviation of pain or a reduction in pain to the patient’s comfort goal  Outcome: Not Progressing

## 2024-12-12 NOTE — ASSESSMENT & PLAN NOTE
Admitted with acute hypoxemia, pulmonary edema, BNP 10K, 3+ pitting edema, troponin leak   Last echo 2 weeks ago with preserved EF  Continue diuresis  Hold GDMT today given recent hypotension

## 2024-12-12 NOTE — ASSESSMENT & PLAN NOTE
Possible SSTI of LLE with some abrasions and blistering. R hip incision with mild erythema, no obvious purulence.   Will continue Unasyn and Linezolid for now  Follow cultures.

## 2024-12-12 NOTE — ASSESSMENT & PLAN NOTE
Likely due to pulmonary edema from acute exacerbation of HFpEF. On 6lt NC, no O2 at baseline. Low suspicion for PE at this time (compressible LE proximal venous system on limited bedside DVT assessment, alternative cause, poorly visualized RV though), will consider CTAP if not improved.   - Cautious diuresis as tolerated  - ISS  - RT protocols   - Goal O2 >92%  - Formal DVT US BLE was negative, but there is occlusive SVT of cephalic vein in the LUE.   - See heart failure plan     Had recent Echo about 2 weeks ago, holding off on another repeat one at this time based on POCUS assessment appears to be stable.

## 2024-12-12 NOTE — PROGRESS NOTES
..4 Eyes Skin Assessment Completed by Devyn RN and July RN.    Head WDL  Ears Redness and Blanching  Nose Redness and Scab  Mouth Redness, scab on upper lip   Neck WDL  Breast/Chest WDL  Shoulder Blades WDL  Spine WDL  (R) Arm/Elbow/Hand Redness, Blanching, Bruising, and Edema  (L) Arm/Elbow/Hand Redness, Blanching, Bruising, and Edema  Abdomen Redness and Bruising  Groin WDL  Scrotum/Coccyx/Buttocks Redness and Blanching  (R) Leg Redness, Swelling, Weeping, and Edema, incision upper thigh   (L) Leg Redness, Blanching, Bruising, Weeping, and Edema  (R) Heel/Foot/Toe Redness, Blanching, and Edema  (L) Heel/Foot/Toe Redness, Blanching, and Edema          Devices In Places Tele Box, Blood Pressure Cuff, Pulse Ox, and Bull      Interventions In Place Heel Float Boots and Q2 Turns    Possible Skin Injury Yes    Pictures Uploaded Into Epic Yes  Wound Consult Placed Yes  RN Wound Prevention Protocol Ordered Yes

## 2024-12-12 NOTE — CONSULTS
Critical Care Consultation    Date of Service: 12/12/2024    Date of Admission:  12/10/2024  8:26 PM    Consulting Physician: Dr. Lemus - Dr. Rodriguez     Chief Complaint: Shock       History of Present Illness: Yenifer Beasley is a 74 y.o. female with a past medical history of atrial fibrillation, mechanical aortic valve on Coumadin and a recent hospital admission for hemorrhagic shock in the setting of post-op retroperitoneal hematoma after a femoral fracture s/p ORIF. She presented [facility]/10 from SNF due to acute shortness of breath and left arm pain. She was found to be hypoxic, in acute diastolic heart failure exacerbation, started on diuresis and also started on Unasyn for LLE cellulitis and LUE phlebitis. Overnight 12/11 - 12/12 she developed hypotension with MAP down to 50s and increased O2 needs to 5-6 lt NC, no RVR poor UO for last 12h. Upon my evaluation, patient is lightheaded, has increased WOB, denies chest pain, complaining of pain in LLE and LUE.     Review of Systems   Constitutional:  Positive for malaise/fatigue. Negative for chills and fever.   HENT:  Negative for sore throat.    Respiratory:  Positive for shortness of breath. Negative for cough, sputum production and wheezing.    Cardiovascular:  Positive for orthopnea and leg swelling. Negative for chest pain and palpitations.   Gastrointestinal:  Positive for abdominal pain (distension, bloating, absent BM for 2 days) and constipation. Negative for blood in stool, melena, nausea and vomiting.   Genitourinary:  Negative for dysuria (Denies dysuria prior to admission).   Musculoskeletal:  Negative for back pain.   Neurological:  Positive for dizziness. Negative for sensory change, speech change and focal weakness.       Home Medications       Reviewed by Devyn Newton R.N. (Registered Nurse) on 12/11/24 at 0103  Med List Status: Complete     Medication Last Dose Status   acetaminophen (TYLENOL) 325 MG Tab Unknown Active   Alirocumab  "(PRALUENT) 150 MG/ML Solution Auto-injector Unknown Active   diclofenac (VOLTAREN) 0.1 % ophthalmic solution Unknown Active   doxazosin (CARDURA) 8 MG tablet Unknown Active   ezetimibe (ZETIA) 10 MG Tab Unknown Active   levothyroxine (SYNTHROID) 125 MCG Tab Unknown Active   methocarbamol (ROBAXIN) 750 MG Tab 12/10/2024 Active   oxyCODONE immediate release (ROXICODONE) 10 MG immediate release tablet Unknown Active   polyethylene glycol/lytes (MIRALAX) Pack Unknown Active   senna-docusate (PERICOLACE OR SENOKOT S) 8.6-50 MG Tab Unknown Active   timolol (TIMOPTIC) 0.5 % Solution Unknown Active   warfarin (COUMADIN) 4 MG Tab Unknown Active   warfarin (COUMADIN) 6 MG Tab Unknown Active                  Audit from Redirected Encounters    **Home medications have not yet been reviewed for this encounter**         Social History     Tobacco Use    Smoking status: Never    Smokeless tobacco: Never   Vaping Use    Vaping status: Never Used   Substance Use Topics    Alcohol use: No    Drug use: Never        Past Medical History:   Diagnosis Date    Allergic rhinitis     Anticoagulation monitoring, special range     Arthritis     OA OF HANDS, KNEESM, HIPS AND BACK    Asthma 10/17/2019    childhood asthma    Atrial fibrillation (HCC)     history of    Back pain     Blood transfusion without reported diagnosis     CAD (coronary artery disease) 2007    CABG    Cataract     Chest pain at rest 11/30/2010    CHF (congestive heart failure) ([facility])     Chronic anticoagulation 04/23/2018    Constipation     off and on, pt manages    Coronary heart disease     Delayed emergence from general anesthesia     Diabetes     DIAB FEET EXAM:  1/6/11- Pt reports no history of diabetes    Diabetic neuropathy (HCC) 01/06/2011    Difficulty breathing     Fall     \"a couple years ago because of my neuopathy\" multiple falls    Gasping for breath     history of    Gastritis 07/2010    H/O UGIB    Gastritis     GERD (gastroesophageal reflux disease)     " [place] measles     GI bleed     history of    Glaucoma     Heart murmur     Hyperlipidemia     Hypertension     Hyponatremia 04/22/2014    Hypothyroidism     Impaired fasting glucose 10/08/2019    Infectious disease     History of MRSA    Kidney disease     Migraine     Need for influenza vaccination 11/30/2010    ICD-10 transition    Neuropathy in diabetes (HCC)     HILDA (obstructive sleep apnea)     no cpap at this time, on order    Painful joint     Palpitations     Pneumonia     Pneumonia due to organism 04/22/2014    Diagnois update 10/1/2016    Pulmonary hypertension (HCC)     Rash     S/P aortic valve replacement 2007    Shortness of breath 08/29/2022    no c/o at this time    Skin infection 05/11/2018    Sleep apnea     Swelling of lower extremity     TIA (transient ischemic attack) 2005    TIA (transient ischemic attack)     Tonsillitis     Tricuspid regurgitation mild- mod 01/06/2011    Unspecified hemorrhagic conditions     Bleeds easily-GI bleeds with NSAIDS and ASA    Upper GI bleed ON JULY 2007 01/06/2011    Upper GI bleed ON JULY 2007 01/06/2011    URTI (acute upper respiratory infection) 11/30/2010    UTI (urinary tract infection) 04/24/2014    Valvular heart disease     Weakness     Wears glasses        Past Surgical History:   Procedure Laterality Date    ORIF, FRACTURE, FEMUR Right 11/8/2024    Procedure: ORIF, FRACTURE, FEMUR;  Surgeon: Jason Camargo M.D.;  Location: SURGERY [facility];  Service: Orthopedics    MITRAL VALVE REPLACE  2007    AORTIC VALVE    SHOULDER SURGERY  06/2001    NEER DECOMOPRESSION LEFT SHOULDER    CARPAL TUNNEL RELEASE  1998    B/L    TONSILLECTOMY  1995    PARTIAL PALATOPLASTY    ABDOMINAL HYSTERECTOMY TOTAL      TAHBSOO    AORTIC VALVE REPLACEMENT      CHOLECYSTECTOMY      CORONARY ARTERY BYPASS, 1      Triple Bypass    HERNIA REPAIR      VENTRAL HERNIA REPAIR    HYSTERECTOMY LAPAROSCOPY      INTUBATION      PRIMARY C SECTION      SLEEVE,MEJIA VASO THIGH      TUBAL  COAGULATION LAPAROSCOPIC BILATERAL      UVULOPHARYNGOPALATOPLASTY      VENTILATOR CONTINUOUS         Allergies: Asa [aspirin], Atorvastatin, Cymbalta [duloxetine hcl], Hmg-coa-r inhibitors, Latex, Nsaids, Tricor, Trilipix [choline fenofibrate], and Lyrica [pregabalin]    Family History   Problem Relation Age of Onset    Heart Disease Mother         a fib    Cancer Mother         Cervical: palate:      Cancer Maternal Grandmother         Leukemia,  21 y.o. 193    Cancer Maternal Aunt         Lung CA (+3 dtrs  from CA)    Heart Disease Brother         Heart attack, open heart surgery,     Diabetes Brother         Insulin dependent    Heart Disease Brother         Heart attack, open heart surgery     Breast Cancer Cousin        Vitals:    12/10/24 2027 12/10/24 2030 12/10/24 2100 12/10/24 2200   Height: 1.524 m (5')      Weight: 121 kg (267 lb)      Weight % change since last entry.: 0 %      BP: 137/73 137/73 139/61 (!) 141/65   Pulse: 91 91 90 87   BMI (Calculated): 52.14      Resp: (!) 21 (!) 28 (!) 22 (!) 25   Temp: 37.4 °C (99.3 °F)      TempSrc: Temporal       24 0054 24 0426 24 0429 24 0800   Height:       Weight: 114 kg (252 lb 3.3 oz)  114 kg (252 lb 3.3 oz)    Weight % change since last entry.: -5.54 %  0 %    BP: 115/57 95/43  102/43   Pulse: 83 75  67   BMI (Calculated):       Resp: 20 20  18   Temp: 37 °C (98.6 °F) 37.1 °C (98.8 °F)  36.8 °C (98.2 °F)   TempSrc: Temporal Temporal  Temporal    24 1207 24 1600 24 19324   Height:       Weight:       Weight % change since last entry.:       BP: 105/53 130/45 107/42 (!) 93/37   Pulse: 78 87 69 61   BMI (Calculated):       Resp: 20 20 18 20   Temp: 36.7 °C (98.1 °F) 37 °C (98.6 °F) 37.1 °C (98.8 °F) 36.7 °C (98.1 °F)   TempSrc: Temporal Temporal Temporal Temporal    24 2318 24 2319 24 0001 24 0019   Height:       Weight:       Weight % change  since last entry.:       BP: 95/44 102/48 (!) 89/45 (!) 89/34   Pulse: 70   74   BMI (Calculated):       Resp: (!) 26      Temp:       TempSrc:        12/12/24 0100   Height:    Weight: 115 kg (252 lb 13.9 oz)   Weight % change since last entry.: 0.26 %   BP:    Pulse:    BMI (Calculated):    Resp:    Temp:    TempSrc:        Physical Examination  Physical Exam  Vitals and nursing note reviewed.   Constitutional:       Appearance: She is ill-appearing.   HENT:      Head: Normocephalic and atraumatic.      Mouth/Throat:      Mouth: Mucous membranes are moist.   Eyes:      Extraocular Movements: Extraocular movements intact.      Conjunctiva/sclera: Conjunctivae normal.      Pupils: Pupils are equal, round, and reactive to light.   Cardiovascular:      Rate and Rhythm: Normal rate and regular rhythm.      Heart sounds: Murmur (systolic, all points) heard.   Pulmonary:      Effort: Respiratory distress present.      Breath sounds: Rales (coarse sounds in bases) present.   Abdominal:      General: There is distension (tympanic).      Palpations: Abdomen is soft.      Tenderness: There is abdominal tenderness (mild, diffuse). There is no guarding or rebound.   Musculoskeletal:         General: Swelling (3+ pitting edema up to the thighs, LLE with a 10cm area of erythema and tenderness to palpation. LUE with n 8cm area of erythema in dorsal aspect of elbow, wamth, edema.) present.      Cervical back: Normal range of motion.      Comments: R hip longitudinal incision with mild erythema, no obvious purulence   Skin:     General: Skin is warm.      Capillary Refill: Capillary refill takes less than 2 seconds.   Neurological:      General: No focal deficit present.      Mental Status: She is alert and oriented to person, place, and time. Mental status is at baseline.       Bedside echo:   Poor windows. LV with significant concentric hypertrophy, EF seems preserved, RV poorly visualized but appears with good contractility, IVC  2.2 cm collapsing <25%.  Limited DVT assessment, iliac veins are compressible bilaterally.      Intake/Output Summary (Last 24 hours) at 12/12/2024 0041  Last data filed at 12/11/2024 2000  Gross per 24 hour   Intake 2020 ml   Output 1200 ml   Net 820 ml       Recent Labs     12/10/24  2048 12/11/24  0701 12/12/24  0109   WBC 28.9* 21.2* 22.3*   NEUTSPOLYS 95.90* 93.00* 93.10*   LYMPHOCYTES 1.20* 3.10* 3.10*   MONOCYTES 1.60 2.20 2.60   EOSINOPHILS 0.20 0.80 0.00   BASOPHILS 0.20 0.20 0.10   ASTSGOT 19 30  --    ALTSGPT 13 11  --    ALKPHOSPHAT 64 63  --    TBILIRUBIN 1.2 1.1  --      Recent Labs     12/10/24  2048 12/11/24  0701 12/11/24  1507 12/11/24 2028   SODIUM 131* 130* 128* 128*   POTASSIUM 5.5 5.0 5.0 5.1   CHLORIDE 95* 96 93* 93*   CO2 23 24 23 23   BUN 57* 60* 63* 61*   CREATININE 1.24 1.58* 1.64* 1.82*   MAGNESIUM 2.0  --   --   --    PHOSPHORUS 3.0  --   --   --    CALCIUM 8.7 8.5 8.1* 8.1*     Recent Labs     12/10/24  2048 12/11/24  0701 12/11/24  1507 12/11/24 2028   ALTSGPT 13 11  --   --    ASTSGOT 19 30  --   --    ALKPHOSPHAT 64 63  --   --    TBILIRUBIN 1.2 1.1  --   --    GLUCOSE 158* 180* 139* 147*         DX-CHEST-PORTABLE (1 VIEW)   Final Result         1.  Left basilar atelectasis and/or infiltrates   2.  Cardiomegaly   3.  Atherosclerosis      EC-ECHOCARDIOGRAM COMPLETE W/O CONT    (Results Pending)   US-EXTREMITY VENOUS LOWER BILAT    (Results Pending)   US-EXTREMITY VENOUS UPPER UNILAT LEFT    (Results Pending)       Patient Active Problem List   Diagnosis    Arthritis    Migraine    Hypothyroidism    Coronary artery disease    Hyperlipidemia    HILDA (obstructive sleep apnea)    GERD (gastroesophageal reflux disease)    Tricuspid regurgitation    Vitamin d deficiency    Diabetic neuropathy (HCC)    Essential hypertension    Hyponatremia    Elevated LFTs    Anemia    Hx of mechanical aortic valve replacement [V43.3]    Transient cerebral ischemia    Paroxysmal atrial fibrillation (HCC)     Asthma    Chronic kidney disease (CKD) stage G3b/A1, moderately decreased glomerular filtration rate (GFR) between 30-44 mL/min/1.73 square meter and albuminuria creatinine ratio less than 30 mg/g    Secondary hypercoagulable state ([facility])    Lower leg edema    Nonrheumatic mitral valve stenosis    Other specified glaucoma    Class 3 severe obesity due to excess calories with serious comorbidity and body mass index (BMI) of 40.0 to 44.9 in adult ([facility])    History of coronary artery bypass graft    Gouty arthropathy    Acquired renal cyst of right kidney    Chronic gout without tophus, unspecified cause, unspecified site    Pulmonary hypertension ([facility])    Ectatic thoracic aorta ([facility])    Nondisplaced unspecified condyle fracture of lower end of right femur, initial encounter for closed fracture ([facility])    Hyperglycemia    ABLA (acute blood loss anemia)    Intractable pain    Retroperitoneal hemorrhage    Hemorrhagic shock ([facility])    Hyperkalemia    Ileus ([facility])    Leukocytosis    Acute respiratory failure with hypoxia ([facility])    Acute pulmonary edema ([facility])    NSTEMI (non-ST elevated myocardial infarction) ([facility])    Status post closed fracture of right femur    Cellulitis    Thrombophlebitis of left arm    Elevated troponin    Acute on chronic diastolic heart failure ([facility])    DIAMOND (acute kidney injury) ([facility])    Shock ([facility])    Constipation       Assessment and Plan:    Acute on chronic diastolic heart failure ([facility])  Assessment & Plan  Admitted with acute hypoxemia, pulmonary edema, BNP 10K, 3+ pitting edema, troponin leak   Known prior diastolic dysfunction, unable to estimate on last echo, moderate concentric hypertrophy, preserved EF, poorly visualized RV  Poor diuresis since admission, blood pressures dropping along with worsening pulmonary edema and DIAMOND - bedside echo with dilated IVC   - Increase diuresis   - Start norepinephrine for goal MAP > 65   - Strict I/Os - bryant   - No GDMT for now, may consider SGLT2i upon GFR  improvement   - ICU level of care    * Shock (HCC)  Assessment & Plan  Undifferentiated at this time   Patient is warm and has good capillary refill, IVC is dilated and poorly collapsing on bedside echo, LV has concentric hypertrophy but good EF/hyperdynamic   Possible mixed picture from diastolic heart failure + sepsis from suspected SSTI, low concern for UTI, no concern for acute bleed (Hb improved since prior adm)  With worsening DIAMOND, normal lactic   - Start levophed goal MAP >65  - Diuresis   - See plan for heart failure   - Start linezolid, continue unasyn   - Follow blood cultures   - Check cortisol  - Close UO monitor     DIAMOND (acute kidney injury) (HCC)  Assessment & Plan  DIAMOND on CKD, baseline Cr 1.3~, decreased UO   UA with inflammatory changes, yeast and bacteria (asymptomatic), no protein. Suspect cardiorenal.  - See plan for heart failure  - Strict I/Os - bryant   - Renal US if no improvement   - Avoiding nephrotoxins    Cellulitis  Assessment & Plan  Possible SSTI of LLE with some abrasions and blistering   R hip incision with mild erythema, no obvious purulence  - Start MRSA coverage, reassess based on cultures and clinical trajectory   - Continue unasyn   - Wound care    Acute respiratory failure with hypoxia (HCC)  Assessment & Plan  Likely due to pulmonary edema - heart failure exacerbation   On 6lt NC, no O2 at baseline   Low suspicion for PE at this time (compressible LE proximal venous system on limited bedside DVT assessment, alternative cause, poorly visualized RV though), will consider CTAP if not improved   - Increase lasix   - ISS  - RT protocols   - Goal O2 >92%  - Formal DVT US bilat   - See heart failure plan     Anemia  Assessment & Plan  Improved from prior hospitalization with ABLA from retroperitoneal hematoma   Unlikely contributing to shock   - Check iron panel/ferritin   - Trend  - Transfuse if <7     Constipation  Assessment & Plan  Last BM 2 days ago, passing gas, distended abdomen  (dilated bowels in last KUB)  - Aggressive bowel regimen in the setting of opioid regimen     Elevated troponin  Assessment & Plan  EKG SR - no ST changes, flat trop in the 400s since admission likely in the setting of decompensated heart failure  Worsening this AM likely due to shock   - Trend    Thrombophlebitis of left arm  Assessment & Plan  Phlebitis vs SSTI of left forearm  - Start linezolid, continue unasyn  - Pending venous US  - Supportive measures    Status post closed fracture of right femur  Assessment & Plan  S/p ORIF in early November   - Pain management   - PT/OT when more stable     Paroxysmal atrial fibrillation (HCC)  Assessment & Plan  Currently in SR - normal HR  -     Hx of mechanical aortic valve replacement [V43.3]  Assessment & Plan  On warfarin  Last echo in November with grossly normal TV gradient/peak  - Continue anticoagulation       The patient continues to have: undifferentiated shock  The vital organ system that is affected is the: kidneys, lungs  If untreated there is a high chance of deterioration into: further organ failure   And eventually death.   The critical care that I am providing today is: pressors, diuresis, antibiotic management   The critical that has been undertaken is medically complex.     Dorothy MORAN. PGY-3  Internal Medicine [employer]

## 2024-12-12 NOTE — ASSESSMENT & PLAN NOTE
On warfarin chronically, on hold for now due to bleed  Last echo in November with grossly normal TV gradient/peak  Will need to discuss benefits/risks of anticoagulation and timing of reinitiation, will likely need bridged with heparin drip again

## 2024-12-12 NOTE — ASSESSMENT & PLAN NOTE
Unclear etiology at this time - Could be septic vs hemorrhagic. Doesn't appear to be cardiogenic or obstructive at this time.  (Patient is warm and has good capillary refill, IVC is dilated and poorly collapsing on bedside echo, LV has concentric hypertrophy but good EF/hyperdynamic, on warfarin at home with negative DVT US BLE)  Hemorrhagic -- has worsened retroperitoneal hematoma on CT abdomen and pelvis non contrast, however, can't assess for active bleeding without contrast.   Sepsis -- SSTI vs UTI    Abx: Unasyn and Linezolid for now  Pressors: Titrate levophed and vaso for goal MAP >65 --> Recommended placing CVC given two pressors, patient declined hence deferred for now.   H/H: Trend Q6 hours, if <7 -> transfuse PRBC. Received a unit of PRBC overnight on 12/12 due to hypotension.   If Hg drops again or concerns for worsening retroperitoneal hematoma, will get CTA abdomen and pelvis, possibly IR embolization. Will keep NPO for now.   PPI prophylaxis  Holding additional IVFs as she appears to be fluid overloaded  Trending lactic acid, urine output, vital signs closely

## 2024-12-12 NOTE — ASSESSMENT & PLAN NOTE
Had 2+ episodes of soft brown stool in the AM on 12/12  - Aggressive bowel regimen in the setting of opioid regimen

## 2024-12-12 NOTE — ASSESSMENT & PLAN NOTE
On warfarin which is held due to concern for hemorrhagic shock.   Last Echo in 11/2024 with grossly normal transvalvular gradient

## 2024-12-12 NOTE — CARE PLAN
The patient is Watcher - Medium risk of patient condition declining or worsening    Shift Goals  Clinical Goals: monitor blood pressure support,  Patient Goals: pain control  Family Goals: HEATHER    Progress made toward(s) clinical / shift goals:    Problem: Pain - Standard  Goal: Alleviation of pain or a reduction in pain to the patient’s comfort goal  Description: Target End Date:  Prior to discharge or change in level of care    Document on Vitals flowsheet    1.  Document pain using the appropriate pain scale per order or unit policy  2.  Educate and implement non-pharmacologic comfort measures (i.e. relaxation, distraction, massage, cold/heat therapy, etc.)  3.  Pain management medications as ordered  4.  Reassess pain after pain med administration per policy  5.  If opiods administered assess patient's response to pain medication is appropriate per POSS sedation scale  6.  Follow pain management plan developed in collaboration with patient and interdisciplinary team (including palliative care or pain specialists if applicable)  Outcome: Progressing  Flowsheets  Taken 12/12/2024 1400 by Josefina Hubbard RMARRY  Pain Rating Scale (NPRS): 2  Taken 12/11/2024 0248 by Devyn Newton RMARRY  Non Verbal Scale:   Calm   Sleeping     Problem: Skin Integrity  Goal: Skin integrity is maintained or improved  Description: Target End Date:  Prior to discharge or change in level of care    Document interventions on Skin Risk/Kamar flowsheet groups and corresponding LDA    1.  Assess and monitor skin integrity, appearance and/or temperature  2.  Assess risk factors for impaired skin integrity and/or pressures ulcers  3.  Implement precautions to protect skin integrity in collaboration with interdisciplinary team  4.  Implement pressure ulcer prevention protocol if at risk for skin breakdown  5.  Confirm wound care consult if at risk for skin breakdown  6.  Ensure patient use of pressure relieving devices  (Low air loss  bed, waffle overlay, heel protectors, ROHO cushion, etc)  Outcome: Progressing  Note: Q 2 turns in place  Wound care to bedside to assess skin and implement wound care       Patient is not progressing towards the following goals:

## 2024-12-12 NOTE — DIETARY
NUTRITION SERVICES: BMI - Pt with BMI >40 (=Body mass index is 49.38 kg/m².), weight measured via bed scale. Class III obesity. Edema: 2-3+ to RUE, 3+ to LUE, pitting edema LLE, 4+ pitting to RLE documented in flowsheets. Weight loss counseling not appropriate in acute care setting.     RECOMMEND - If appropriate at DC please refer to outpatient nutrition services for weight management.     RD will screen weekly per department policy; available PRN.

## 2024-12-12 NOTE — ASSESSMENT & PLAN NOTE
Remains rate controlled  Optimize electrolytes, continuous telemetry monitoring  Hold Coumadin for another 24 hours

## 2024-12-12 NOTE — PROGRESS NOTES
Blood products received from pharmacy. Pharmacy requesting that Kcentra medication be ran STAT before blood products.

## 2024-12-12 NOTE — PLAN OF CARE (IOPOC)
Critical Care Update     Shortly after arrival to the unit, patient's requirement of pressors increased, persistently low UO, repeat labs showed drop in Hb and platelets along with worsening DIAMOND. Worsening L flank hematoma/bruise noted. STAT CT abdomen/pelvis showed significant enlargement of L retroperitoneal hematoma with displacement of intraabdominal structures.   - STAT type and cross, 1 upRBC   - 10mg Vitamin K   - STAT TEG, repeat INR -> consider Kcentra vs FFP  - Trending CBC q6h   - Check bladder pressures   - If no stabilization of hemodynamics and Hb is achieved, IR consult for potential embolization   - Patient is aware of situation, verbalized understanding POC and I confirmed her DNR/DNI status. I called patient's brother Matthew per her request and updated him on current critical situation. Patient was able to speak with her brother over the phone.

## 2024-12-12 NOTE — PROGRESS NOTES
Inpatient Anticoagulation Service Note for 12/11/2024      Reason for Anticoagulation: Aortic Mechanical Valve Replacement  , Atrial Fibrillation, Transient Ischemic Attack   [id number] VASc Score: 5  HAS-BLED Score: 2    Hemoglobin Value: (!) 8.8  Hematocrit Value: (!) 27.4  Lab Platelet Value: 242  Target INR: 2.5 to 3.5    INR from last 7 days        Date/Time INR Value    12/10/24 2048 2.27                   Dose from last 7 days        Date/Time Dose (mg)    12/11/24 1557 6                        Average Dose (mg):  (Home dose: 6mg MWF and 4mg on all other days)  Significant Interactions: Antibiotics  Bridge Therapy: No     Reversal Agent Administered: Not Applicable  Comments: Admit for ACS rule out/work up. Continue home warfarin for history of a.fib, mechanical aortic valve and TIA. Home dose is noted above and TTR is > 65% per outpatient clinic notes. INR is subtherapeutic with a slight trend down. Will continue 6mg tonight; can consider increasing the total weekly dose given INR hasn't been at goal for over a week. H/H low but appears stable with no active bleeding noted. DDI noted above.    Plan:  Warfarin 6mg tonight  Education Material Provided?: No (on pta)    Pharmacist suggested discharge dosing: Warfarin 6mg every Mon, Wed, Fri and 4mg on all other days with an INR check in 2-3 days.     Brook Bae, PharmD., BCPS

## 2024-12-12 NOTE — ASSESSMENT & PLAN NOTE
Acute blood loss anemia from retroperitoneal hematoma s/p PRBC transfusion 12/12 -gradually worsening  Continue serial H/H with conservative transfusion strategy  S/p reversal of Coumadin 12/12

## 2024-12-12 NOTE — ASSESSMENT & PLAN NOTE
DIAMOND on CKD, baseline Cr 1.3~, decreased UO but not quite oliguric at this time. UA with inflammatory changes, yeast and bacteria (asymptomatic), no protein. Suspect prerenal/ATN.   - See plan for heart failure  - Strict I/Os, has bryant   - Renal US if no improvement   - Avoiding nephrotoxins

## 2024-12-12 NOTE — PROGRESS NOTES
12/11/24 2253 MD notified of patients bp 93/37 map of 56 (in semifowlers) . MD came to bedside to assess patient. BP was taken again bp was 95/44 with a map of 61 patient was in trendelenburg. MD reached out to icu MD.     12/12/24 0000  ICU MD at bedside assessing patient. BP 89/45 (in semifowlers) map of 60. Increased work of breathing with 90% on 4L nasal canula

## 2024-12-12 NOTE — PROGRESS NOTES
Pt bryant needs to be replaced, urinalysis shows yeast in bryant, bryant was present on admission. Discussed with patient regarding replacing bryant and patient is refusing. This RN educated patient on importance of replacing bryant.  Dr. Khanna bedside to educate patient on importance of replacing bryant as well. Patient still refusing bryant replacement.

## 2024-12-12 NOTE — PROGRESS NOTES
4 Eyes Skin Assessment Completed by PREETHI Rinaldi and Hilario RN.    Head WDL  Ears Redness and Blanching  Nose Redness and Non-Blanching - bridge  Mouth Bleeding - scab  Neck WDL  Breast/Chest Redness, Bruising, Discoloration, Weeping, and Edema  Shoulder Blades Redness and Blanching  Spine WDL  (R) Arm/Elbow/Hand Redness, Blanching, Bruising, Swelling, Discoloration, Weeping, and Edema  (L) Arm/Elbow/Hand Redness, Bruising, Abrasion, Swelling, Discoloration, and Edema  Abdomen Redness, Blanching, Abrasion, and Bruising  Groin Redness, Blanching, and Swelling  Scrotum/Coccyx/Buttocks Redness and Blanching  (R) Leg Redness, Non-Blanching, Scar, Bruising, Swelling, Shiny, Weeping, and Edema  (L) Leg Redness, Non-Blanching, Scar, Scab, Bruising, Swelling, Shiny, and Weeping  (R) Heel/Foot/Toe Redness, Non-Blanching, Discoloration, Swelling, and Edema  (L) Heel/Foot/Toe Redness, Non-Blanching, Discoloration, Swelling, and Edema          Devices In Places Tele Box, Blood Pressure Cuff, Pulse Ox, Bull, and Hypo Pads      Interventions In Place Heel Float Boots, TAP System, Pillows, Q2 Turns, Low Air Loss Mattress, Dri-Taiwo Pads, Heels Loaded W/Pillows, and Pressure Redistribution Mattress    Possible Skin Injury Yes    Pictures Uploaded Into Epic Yes  Wound Consult Placed Yes  RN Wound Prevention Protocol Ordered Yes

## 2024-12-12 NOTE — PROGRESS NOTES
Ok per Dr. Khanna to keep vasopressin  on while norepineephrine titrates below 0.2.   Every time vasopressin gets turned off patient blood pressure goes low to SBP 70-80

## 2024-12-12 NOTE — ASSESSMENT & PLAN NOTE
Due to pulmonary edema and shock -improving  RT/O2 protocol  Titrate oxygen support to goal SpO2 greater than 92%

## 2024-12-13 LAB
ALBUMIN SERPL BCP-MCNC: 2.3 G/DL (ref 3.2–4.9)
ALBUMIN/GLOB SERPL: 0.9 G/DL
ALP SERPL-CCNC: 75 U/L (ref 30–99)
ALT SERPL-CCNC: 15 U/L (ref 2–50)
ANION GAP SERPL CALC-SCNC: 14 MMOL/L (ref 7–16)
AST SERPL-CCNC: 34 U/L (ref 12–45)
BASOPHILS # BLD AUTO: 0.1 % (ref 0–1.8)
BASOPHILS # BLD: 0.01 K/UL (ref 0–0.12)
BILIRUB SERPL-MCNC: 1.2 MG/DL (ref 0.1–1.5)
BUN SERPL-MCNC: 62 MG/DL (ref 8–22)
CALCIUM ALBUM COR SERPL-MCNC: 9.6 MG/DL (ref 8.5–10.5)
CALCIUM SERPL-MCNC: 8.2 MG/DL (ref 8.5–10.5)
CHLORIDE SERPL-SCNC: 95 MMOL/L (ref 96–112)
CO2 SERPL-SCNC: 21 MMOL/L (ref 20–33)
CORTIS SERPL-MCNC: 19.2 UG/DL (ref 0–23)
CREAT SERPL-MCNC: 1.66 MG/DL (ref 0.5–1.4)
EOSINOPHIL # BLD AUTO: 0.04 K/UL (ref 0–0.51)
EOSINOPHIL NFR BLD: 0.3 % (ref 0–6.9)
ERYTHROCYTE [DISTWIDTH] IN BLOOD BY AUTOMATED COUNT: 55.2 FL (ref 35.9–50)
GFR SERPLBLD CREATININE-BSD FMLA CKD-EPI: 32 ML/MIN/1.73 M 2
GLOBULIN SER CALC-MCNC: 2.5 G/DL (ref 1.9–3.5)
GLUCOSE SERPL-MCNC: 143 MG/DL (ref 65–99)
HCT VFR BLD AUTO: 24.2 % (ref 37–47)
HGB BLD-MCNC: 7.8 G/DL (ref 12–16)
HGB BLD-MCNC: 9 G/DL (ref 12–16)
IMM GRANULOCYTES # BLD AUTO: 0.1 K/UL (ref 0–0.11)
IMM GRANULOCYTES NFR BLD AUTO: 0.8 % (ref 0–0.9)
INR PPP: 1.3 (ref 0.87–1.13)
LYMPHOCYTES # BLD AUTO: 0.41 K/UL (ref 1–4.8)
LYMPHOCYTES NFR BLD: 3.3 % (ref 22–41)
MAGNESIUM SERPL-MCNC: 2.2 MG/DL (ref 1.5–2.5)
MCH RBC QN AUTO: 30.4 PG (ref 27–33)
MCHC RBC AUTO-ENTMCNC: 32.2 G/DL (ref 32.2–35.5)
MCV RBC AUTO: 94.2 FL (ref 81.4–97.8)
MONOCYTES # BLD AUTO: 0.53 K/UL (ref 0–0.85)
MONOCYTES NFR BLD AUTO: 4.3 % (ref 0–13.4)
NEUTROPHILS # BLD AUTO: 11.33 K/UL (ref 1.82–7.42)
NEUTROPHILS NFR BLD: 91.2 % (ref 44–72)
NRBC # BLD AUTO: 0 K/UL
NRBC BLD-RTO: 0 /100 WBC (ref 0–0.2)
PLATELET # BLD AUTO: 210 K/UL (ref 164–446)
PMV BLD AUTO: 8.8 FL (ref 9–12.9)
POTASSIUM SERPL-SCNC: 4.6 MMOL/L (ref 3.6–5.5)
PROT SERPL-MCNC: 4.8 G/DL (ref 6–8.2)
PROTHROMBIN TIME: 16.2 SEC (ref 12–14.6)
RBC # BLD AUTO: 2.57 M/UL (ref 4.2–5.4)
SODIUM SERPL-SCNC: 130 MMOL/L (ref 135–145)
WBC # BLD AUTO: 12.4 K/UL (ref 4.8–10.8)

## 2024-12-13 PROCEDURE — A9270 NON-COVERED ITEM OR SERVICE: HCPCS | Performed by: HOSPITALIST

## 2024-12-13 PROCEDURE — 99233 SBSQ HOSP IP/OBS HIGH 50: CPT | Performed by: INTERNAL MEDICINE

## 2024-12-13 PROCEDURE — 700105 HCHG RX REV CODE 258: Performed by: INTERNAL MEDICINE

## 2024-12-13 PROCEDURE — 700101 HCHG RX REV CODE 250: Performed by: HOSPITALIST

## 2024-12-13 PROCEDURE — 85610 PROTHROMBIN TIME: CPT

## 2024-12-13 PROCEDURE — 770020 HCHG ROOM/CARE - TELE (206)

## 2024-12-13 PROCEDURE — 82533 TOTAL CORTISOL: CPT

## 2024-12-13 PROCEDURE — 80053 COMPREHEN METABOLIC PANEL: CPT

## 2024-12-13 PROCEDURE — A9270 NON-COVERED ITEM OR SERVICE: HCPCS

## 2024-12-13 PROCEDURE — 85025 COMPLETE CBC W/AUTO DIFF WBC: CPT

## 2024-12-13 PROCEDURE — 99233 SBSQ HOSP IP/OBS HIGH 50: CPT | Performed by: HOSPITALIST

## 2024-12-13 PROCEDURE — 700105 HCHG RX REV CODE 258: Performed by: HOSPITALIST

## 2024-12-13 PROCEDURE — 700102 HCHG RX REV CODE 250 W/ 637 OVERRIDE(OP)

## 2024-12-13 PROCEDURE — 700102 HCHG RX REV CODE 250 W/ 637 OVERRIDE(OP): Performed by: HOSPITALIST

## 2024-12-13 PROCEDURE — 700111 HCHG RX REV CODE 636 W/ 250 OVERRIDE (IP): Mod: JZ | Performed by: INTERNAL MEDICINE

## 2024-12-13 PROCEDURE — 83735 ASSAY OF MAGNESIUM: CPT

## 2024-12-13 PROCEDURE — 85018 HEMOGLOBIN: CPT

## 2024-12-13 PROCEDURE — 700111 HCHG RX REV CODE 636 W/ 250 OVERRIDE (IP): Mod: JZ | Performed by: HOSPITALIST

## 2024-12-13 PROCEDURE — 700111 HCHG RX REV CODE 636 W/ 250 OVERRIDE (IP)

## 2024-12-13 PROCEDURE — 700111 HCHG RX REV CODE 636 W/ 250 OVERRIDE (IP): Performed by: INTERNAL MEDICINE

## 2024-12-13 RX ORDER — OXYCODONE HYDROCHLORIDE 5 MG/1
5 TABLET ORAL
Status: CANCELLED | OUTPATIENT
Start: 2024-12-13

## 2024-12-13 RX ORDER — AMIODARONE HYDROCHLORIDE 200 MG/1
50 TABLET ORAL DAILY
Status: ON HOLD | COMMUNITY
End: 2024-12-16

## 2024-12-13 RX ORDER — OXYCODONE HYDROCHLORIDE 10 MG/1
10 TABLET ORAL
Status: CANCELLED | OUTPATIENT
Start: 2024-12-13

## 2024-12-13 RX ORDER — HYDROMORPHONE HYDROCHLORIDE 1 MG/ML
0.5 INJECTION, SOLUTION INTRAMUSCULAR; INTRAVENOUS; SUBCUTANEOUS
Status: CANCELLED | OUTPATIENT
Start: 2024-12-13

## 2024-12-13 RX ORDER — FUROSEMIDE 10 MG/ML
40 INJECTION INTRAMUSCULAR; INTRAVENOUS
Status: DISCONTINUED | OUTPATIENT
Start: 2024-12-13 | End: 2024-12-19 | Stop reason: HOSPADM

## 2024-12-13 RX ADMIN — AMPICILLIN AND SULBACTAM 3 G: 1; 2 INJECTION, POWDER, FOR SOLUTION INTRAMUSCULAR; INTRAVENOUS at 05:09

## 2024-12-13 RX ADMIN — OXYCODONE HYDROCHLORIDE 10 MG: 10 TABLET ORAL at 08:21

## 2024-12-13 RX ADMIN — TIMOLOL MALEATE 1 DROP: 5 SOLUTION OPHTHALMIC at 17:17

## 2024-12-13 RX ADMIN — LINEZOLID 600 MG: 600 INJECTION, SOLUTION INTRAVENOUS at 05:06

## 2024-12-13 RX ADMIN — ACETAMINOPHEN 1000 MG: 500 TABLET, FILM COATED ORAL at 17:17

## 2024-12-13 RX ADMIN — FUROSEMIDE 20 MG: 10 INJECTION, SOLUTION INTRAVENOUS at 05:05

## 2024-12-13 RX ADMIN — LEVOTHYROXINE SODIUM 125 MCG: 0.12 TABLET ORAL at 05:05

## 2024-12-13 RX ADMIN — OXYCODONE HYDROCHLORIDE 10 MG: 10 TABLET ORAL at 05:05

## 2024-12-13 RX ADMIN — AMPICILLIN AND SULBACTAM 3 G: 1; 2 INJECTION, POWDER, FOR SOLUTION INTRAMUSCULAR; INTRAVENOUS at 23:48

## 2024-12-13 RX ADMIN — OXYCODONE HYDROCHLORIDE 10 MG: 10 TABLET ORAL at 19:20

## 2024-12-13 RX ADMIN — FUROSEMIDE 40 MG: 10 INJECTION INTRAMUSCULAR; INTRAVENOUS at 15:23

## 2024-12-13 RX ADMIN — OXYCODONE HYDROCHLORIDE 10 MG: 10 TABLET ORAL at 01:13

## 2024-12-13 RX ADMIN — ACETAMINOPHEN 1000 MG: 500 TABLET, FILM COATED ORAL at 11:50

## 2024-12-13 RX ADMIN — ACETAMINOPHEN 1000 MG: 500 TABLET, FILM COATED ORAL at 23:42

## 2024-12-13 RX ADMIN — AMPICILLIN AND SULBACTAM 3 G: 1; 2 INJECTION, POWDER, FOR SOLUTION INTRAMUSCULAR; INTRAVENOUS at 17:16

## 2024-12-13 RX ADMIN — AMPICILLIN AND SULBACTAM 3 G: 1; 2 INJECTION, POWDER, FOR SOLUTION INTRAMUSCULAR; INTRAVENOUS at 11:51

## 2024-12-13 RX ADMIN — TIMOLOL MALEATE 1 DROP: 5 SOLUTION OPHTHALMIC at 05:05

## 2024-12-13 RX ADMIN — OMEPRAZOLE 20 MG: 20 CAPSULE, DELAYED RELEASE ORAL at 05:05

## 2024-12-13 RX ADMIN — ACETAMINOPHEN 1000 MG: 500 TABLET, FILM COATED ORAL at 05:05

## 2024-12-13 ASSESSMENT — COGNITIVE AND FUNCTIONAL STATUS - GENERAL
DRESSING REGULAR LOWER BODY CLOTHING: A LOT
TURNING FROM BACK TO SIDE WHILE IN FLAT BAD: A LOT
TOILETING: TOTAL
HELP NEEDED FOR BATHING: TOTAL
MOVING TO AND FROM BED TO CHAIR: TOTAL
SUGGESTED CMS G CODE MODIFIER MOBILITY: CM
MOVING FROM LYING ON BACK TO SITTING ON SIDE OF FLAT BED: TOTAL
EATING MEALS: A LITTLE
MOBILITY SCORE: 7
WALKING IN HOSPITAL ROOM: TOTAL
STANDING UP FROM CHAIR USING ARMS: TOTAL
PERSONAL GROOMING: A LITTLE
CLIMB 3 TO 5 STEPS WITH RAILING: TOTAL

## 2024-12-13 ASSESSMENT — ENCOUNTER SYMPTOMS
ABDOMINAL PAIN: 0
FOCAL WEAKNESS: 0
WEAKNESS: 1
NERVOUS/ANXIOUS: 0
INSOMNIA: 0
MYALGIAS: 0
VOMITING: 0
SHORTNESS OF BREATH: 0
FLANK PAIN: 0
BACK PAIN: 0
COUGH: 0
DOUBLE VISION: 0
DIZZINESS: 0
HEADACHES: 0
NAUSEA: 0
CHILLS: 0
FEVER: 0
LOSS OF CONSCIOUSNESS: 0
DIARRHEA: 0
PALPITATIONS: 0

## 2024-12-13 ASSESSMENT — PAIN DESCRIPTION - PAIN TYPE
TYPE: ACUTE PAIN
TYPE: ACUTE PAIN;CHRONIC PAIN
TYPE: ACUTE PAIN
TYPE: ACUTE PAIN;CHRONIC PAIN
TYPE: ACUTE PAIN
TYPE: ACUTE PAIN;CHRONIC PAIN

## 2024-12-13 ASSESSMENT — FIBROSIS 4 INDEX
FIB4 SCORE: 3.05
FIB4 SCORE: 3.09

## 2024-12-13 NOTE — PROGRESS NOTES
"Critical Care Progress Note    Date of admission  12/10/2024    Chief Complaint  74 y.o. female admitted 12/10/2024 with shock    Hospital Course  \"74 yof with a pmhx of AF, AV replacement in 2007 (mechanical) on coumadin, CKD 3b, hypothyroidism, and glaucoma. 2 recent admissions for a right femoral fracture on 11/8 s/p ORIF and re-admitted on 11/23 for a RP bleed. She was restarted on her coumadin and discharged to SNF on 12/10. She returned to the ER later in the day for dyspnea. She was found to have pulmonary edema and DIAMOND. She was admitted to the floor for diuresis and started on abx for a possible LLE cellulitis and UTI. This evening her BP gradually decreased and CC was consulted for evaluation.     POCUS echo shows a preserved EF, RV not well visualized, IVC dilated and non-collapsible.     The patient states she feels \"off\", denies dizziness, SOB, CP, N/V, abd pain or back pain.\" - Dr. Caldwell 12/12    Interval Problem Update  Reviewed last 24 hour events:   - weaned off vasopressin, ongoing NE gtt   - AF, improving WBC   - AAOx4, c/o leg pain   - Afib HR controlled, SBP    - Hgb down to 7.8   - Na down to 130   - bryant exchanged overnight   - improving DIAMOND, ok UOP   - INR 1.3 (coumadin remains on hold)   - day 3/7 unasyn, day 2 linezolid, cultures NGTD, MRSA nares    Review of Systems  Review of Systems   Constitutional:  Negative for fever and malaise/fatigue.   Eyes:  Negative for double vision.   Respiratory:  Negative for shortness of breath.    Cardiovascular:  Positive for leg swelling. Negative for chest pain.   Gastrointestinal:  Negative for abdominal pain and nausea.   Genitourinary:  Negative for flank pain.   Musculoskeletal:  Negative for back pain and myalgias.   Neurological:  Negative for dizziness and focal weakness.   Psychiatric/Behavioral:  The patient is not nervous/anxious and does not have insomnia.    All other systems reviewed and are negative.       Vital Signs for last 24 " hours   Temp:  [35.7 °C (96.3 °F)-37.1 °C (98.8 °F)] 36.7 °C (98.1 °F)  Pulse:  [50-97] 66  Resp:  [10-41] 23  BP: ()/(37-70) 107/49  SpO2:  [90 %-100 %] 98 %    Respiratory Information for the last 24 hours   1 lpm n/c    Physical Exam   Physical Exam  Vitals and nursing note reviewed.   Constitutional:       General: She is awake.      Appearance: Normal appearance. She is well-developed. She is morbidly obese.      Interventions: Nasal cannula in place.   HENT:      Head: Normocephalic.      Nose: Nose normal. No congestion.      Mouth/Throat:      Mouth: Mucous membranes are moist.      Pharynx: Oropharynx is clear.   Eyes:      General: No scleral icterus.     Pupils: Pupils are equal, round, and reactive to light.   Neck:      Vascular: No JVD.      Trachea: No tracheal deviation.   Cardiovascular:      Rate and Rhythm: Normal rate. Rhythm irregularly irregular. Frequent Extrasystoles are present.     Chest Wall: PMI is displaced.      Pulses: No decreased pulses.      Heart sounds: Normal heart sounds. No murmur heard.  Pulmonary:      Effort: Pulmonary effort is normal. No respiratory distress.      Breath sounds: No stridor. Examination of the right-lower field reveals rhonchi. Examination of the left-lower field reveals rhonchi. Rhonchi present. No wheezing or rales.      Comments: Protecting airway, speaking in full sentences  Abdominal:      General: Bowel sounds are normal. There is no distension.      Palpations: Abdomen is soft.      Tenderness: There is no abdominal tenderness. There is no guarding or rebound.   Genitourinary:     Comments: Bull catheter in place  Musculoskeletal:         General: Swelling present. No tenderness.      Cervical back: Neck supple. No rigidity.      Right lower leg: Edema present.      Left lower leg: Edema present.      Comments: Right lateral hip incision is oozing serous fluid, left lower extremity is erythematous and warm   Skin:     General: Skin is warm and  dry.      Capillary Refill: Capillary refill takes less than 2 seconds.      Findings: Erythema present.   Neurological:      General: No focal deficit present.      Mental Status: She is alert and oriented to person, place, and time.      Cranial Nerves: No cranial nerve deficit.      Sensory: No sensory deficit.   Psychiatric:         Mood and Affect: Mood normal.         Behavior: Behavior normal. Behavior is cooperative.         Thought Content: Thought content normal.         Medications  Current Facility-Administered Medications   Medication Dose Route Frequency Provider Last Rate Last Admin    norepinephrine (Levophed) 8 mg in 250 mL NS infusion (premix)  0-1 mcg/kg/min (Ideal) Intravenous Continuous Dorothy Maddox M.D. 2.6 mL/hr at 12/13/24 0200 0.03 mcg/kg/min at 12/13/24 0200    Linezolid (Zyvox) premix 600 mg  600 mg Intravenous Q12HRS Dorothy Maddox M.D.   Stopped at 12/13/24 0606    Pharmacy Consult Request ...Pain Management Review 1 Each  1 Each Other PHARMACY TO DOSE Dorothy Maddox M.D.        acetaminophen (Tylenol) tablet 1,000 mg  1,000 mg Oral Q6HRS Dorothy Maddox M.D.   1,000 mg at 12/13/24 0505    Followed by    [START ON 12/17/2024] acetaminophen (Tylenol) tablet 1,000 mg  1,000 mg Oral Q6HRS PRANGELINE Maddox M.D.        oxyCODONE immediate-release (Roxicodone) tablet 5 mg  5 mg Oral Q3HRS PRANGELINE Maddox M.D.   5 mg at 12/12/24 1559    Or    oxyCODONE immediate release (Roxicodone) tablet 10 mg  10 mg Oral Q3HRS PRANGELINE Maddox M.D.   10 mg at 12/13/24 0505    Or    HYDROmorphone (Dilaudid) injection 0.5 mg  0.5 mg Intravenous Q3HRS PRANGELINE Maddox M.D.   0.5 mg at 12/12/24 1732    ipratropium-albuterol (DUONEB) nebulizer solution  3 mL Nebulization Q2HRS PRANGELINE Maddox M.D.   3 mL at 12/12/24 0445    [Held by provider] vasopressin (Vasostrict) 20 units in  mL infusion  0.03 Units/min Intravenous Continuous Geri Khanna M.D.   Stopped at 12/13/24 3595     furosemide (Lasix) injection 20 mg  20 mg Intravenous BID DIURETIC Jeremy M Gonda, M.D.   20 mg at 12/13/24 0505    omeprazole (PriLOSEC) capsule 20 mg  20 mg Oral DAILY Geri Khanna M.D.   20 mg at 12/13/24 0505    senna-docusate (Pericolace Or Senokot S) 8.6-50 MG per tablet 2 Tablet  2 Tablet Oral Q EVENING Geri Khanna M.D.        And    polyethylene glycol/lytes (Miralax) Packet 1 Packet  1 Packet Oral QDAY PRN Geri Khanna M.D.        ampicillin/sulbactam (Unasyn) 3 g in  mL IVPB  3 g Intravenous Q6HRS Yessenia Mendoza M.D.   Stopped at 12/13/24 0539    levothyroxine (Synthroid) tablet 125 mcg  125 mcg Oral AM ES Yessenia Mendoza M.D.   125 mcg at 12/13/24 0505    [Held by provider] methocarbamol (Robaxin) tablet 750 mg  750 mg Oral 4X/DAY Yessenia Mendoza M.D.   750 mg at 12/11/24 2022    timolol (Timoptic) 0.5 % ophthalmic solution 1 Drop  1 Drop Both Eyes BID Yessenia Mendoza M.D.   1 Drop at 12/13/24 0505    Respiratory Therapy Consult   Nebulization Continuous RT Yessenia Mendoza M.D.        ondansetron (Zofran) syringe/vial injection 4 mg  4 mg Intravenous Q4HRS PRN Yessenia Mendoza M.D.        ondansetron (Zofran ODT) dispertab 4 mg  4 mg Oral Q4HRS PRN Yessenia Mendoza M.D.        [Held by provider] warfarin (Coumadin) tablet 4 mg  4 mg Oral Once per day on Sunday Tuesday Thursday Saturday Yessenia Mendoza M.D.        And    [Held by provider] warfarin (Coumadin) tablet 6 mg  6 mg Oral Once per day on Monday Wednesday Friday Yessenia Mendoza M.D.   6 mg at 12/11/24 1712    [Held by provider] MD Alert...Warfarin per Pharmacy   Other PHARMACY TO DOSE Dick S Karlo, M.D.           Fluids    Intake/Output Summary (Last 24 hours) at 12/13/2024 0654  Last data filed at 12/13/2024 0628  Gross per 24 hour   Intake 1946.71 ml   Output 1775 ml   Net 171.71 ml       Laboratory          Recent Labs     12/10/24  2048 12/11/24  0701 12/12/24  0109 12/12/24  0440 12/12/24  0945   SODIUM 131*   < > 128* 128* 131*   POTASSIUM 5.5   <  > 5.2 4.9 4.9   CHLORIDE 95*   < > 95* 94* 92*   CO2 23   < > 22 21 21   BUN 57*   < > 68* 68* 67*   CREATININE 1.24   < > 2.14* 2.14* 2.06*   MAGNESIUM 2.0  --   --  2.2  --    PHOSPHORUS 3.0  --   --   --   --    CALCIUM 8.7   < > 8.2* 8.2* 8.0*    < > = values in this interval not displayed.     Recent Labs     12/11/24  0701 12/11/24  1507 12/12/24  0109 12/12/24 0440 12/12/24  0945   ALTSGPT 11  --  11 13  --    ASTSGOT 30  --  34 32  --    ALKPHOSPHAT 63  --  106* 77  --    TBILIRUBIN 1.1  --  1.1 1.1  --    GLUCOSE 180*   < > 132* 140* 163*    < > = values in this interval not displayed.     Recent Labs     12/11/24  0701 12/12/24 0109 12/12/24 0440 12/12/24 0945 12/13/24  0347   WBC 21.2* 22.3* 22.9* 20.6* 12.4*   NEUTSPOLYS 93.00* 93.10* 91.90*  --  91.20*   LYMPHOCYTES 3.10* 3.10* 3.40*  --  3.30*   MONOCYTES 2.20 2.60 2.70  --  4.30   EOSINOPHILS 0.80 0.00 0.00  --  0.30   BASOPHILS 0.20 0.10 0.30  --  0.10   ASTSGOT 30 34 32  --   --    ALTSGPT 11 11 13  --   --    ALKPHOSPHAT 63 106* 77  --   --    TBILIRUBIN 1.1 1.1 1.1  --   --      Recent Labs     12/10/24  2048 12/11/24  0701 12/12/24  0035 12/12/24  0109 12/12/24  0440 12/12/24  0945 12/12/24  1608 12/12/24  2251 12/13/24  0347   RBC 3.11*   < >  --    < > 2.40* 2.81*  --   --  2.57*   HEMOGLOBIN 9.4*   < >  --    < > 7.6* 8.5* 8.7* 8.2* 7.8*   HEMATOCRIT 30.2*   < >  --    < > 23.0* 26.4*  --   --  24.2*   PLATELETCT 329   < >  --    < > 235 223  --   --  210   PROTHROMBTM 25.2*  --  31.0*  --   --  17.8*  --   --  16.2*   APTT 33.2  --   --   --   --   --   --   --   --    INR 2.27*  --  2.96*  --   --  1.46*  --   --  1.30*   IRON  --   --   --   --  14*  --   --   --   --    FERRITIN  --   --   --   --  1116.0*  --   --   --   --    TOTIRONBC  --   --   --   --  159*  --   --   --   --     < > = values in this interval not displayed.       Imaging  X-Ray:  I have personally reviewed the images and compared with prior images. and No film  today    Assessment/Plan  * Shock (HCC)- (present on admission)  Assessment & Plan  Undifferentiated -hemorrhagic versus septic - improving  S/p norepinephrine and vasopressin drips (discontinued 12/13)  Serial H/H with conservative transfusion strategy  Continue empiric antibiotics, sepsis management    Cellulitis- (present on admission)  Assessment & Plan  SSTI of LLE  Continue Unasyn, discontinue linezolid, follow-up on final cultures  Wound care    Acute respiratory failure with hypoxia (HCC)- (present on admission)  Assessment & Plan  Due to pulmonary edema and shock -improving  RT/O2 protocol  Titrate oxygen support to goal SpO2 greater than 92%    Retroperitoneal hemorrhage- (present on admission)  Assessment & Plan  Spontaneous due to Coumadin - worsening on last CT scan 12/12 and potential cause of shock  Status post Coumadin reversal with Kcentra and vitamin K, holding Coumadin for an additional 24 hours  Serial H/H  Consider CTA to look for blush with IR consultation if worsens/recurs    Urinary tract infection associated with indwelling urethral catheter (HCC)- (present on admission)  Assessment & Plan  Bull catheter exchange 12/12  Continue antibiotics x 7 days  Follow-up on final urine cultures    Anemia- (present on admission)  Assessment & Plan  Acute blood loss anemia from retroperitoneal hematoma s/p PRBC transfusion 12/12 -gradually worsening  Continue serial H/H with conservative transfusion strategy  S/p reversal of Coumadin 12/12    Acute kidney injury superimposed on stage 3b chronic kidney disease (HCC)- (present on admission)  Assessment & Plan  DIAMOND on CKD, baseline Cr 1.3~, improving, nonoliguric  Avoid nephrotoxins  Monitor creatinine, urine output, electrolytes    Acute on chronic diastolic heart failure (HCC)- (present on admission)  Assessment & Plan  Admitted with acute hypoxemia, pulmonary edema, BNP 10K, 3+ pitting edema, troponin leak   Last echo 2 weeks ago with preserved  EF  Continue diuresis  Hold GDMT today given recent hypotension    Elevated troponin- (present on admission)  Assessment & Plan  Demand ischemia in the setting of known CAD - improving    Status post closed fracture of right femur- (present on admission)  Assessment & Plan  S/p ORIF in early November   Pain management   PT/OT when more stable     Acute pulmonary edema (HCC)- (present on admission)  Assessment & Plan  Ongoing diuresis with monitoring of strict ins/outs and respiratory status    Pulmonary hypertension (HCC)- (present on admission)  Assessment & Plan  Avoid hypoxia and hypercarbia  Strict blood pressure and fluid management    Paroxysmal atrial fibrillation (HCC)- (present on admission)  Assessment & Plan  Remains rate controlled  Optimize electrolytes, continuous telemetry monitoring  Hold Coumadin for another 24 hours    Hx of mechanical aortic valve replacement [V43.3]- (present on admission)  Assessment & Plan  On warfarin chronically, on hold for now due to bleed  Last echo in November with grossly normal TV gradient/peak  Will need to discuss benefits/risks of anticoagulation and timing of reinitiation, will likely need bridged with heparin drip again    Essential hypertension- (present on admission)  Assessment & Plan  Controlled  Continue to hold scheduled antihypertensives and monitor    Constipation  Assessment & Plan  Improved  Ongoing bowel protocol    Thrombophlebitis of left arm- (present on admission)  Assessment & Plan  Supportive measures         VTE:  Contraindicated  Ulcer: Not Indicated  Lines: Bull Catheter  Ongoing indication addressed -exchanged 12/12    I have performed a physical exam and reviewed and updated ROS and Plan today (12/13/2024). In review of yesterday's note (12/12/2024), there are no changes except as documented above.     Discussed patient condition and risk of morbidity and/or mortality with Hospitalist, RN, RT, Pharmacy, [facility] resident, Charge nurse / hot  rounds, and Patient. Critical care services will sign off at this time.  Please call with any questions or concerns.    Please note that this dictation was created using voice recognition software. I have made every reasonable attempt to correct obvious errors, but there may be errors of grammar and possibly content that I did not discover before finalizing the note.

## 2024-12-13 NOTE — ASSESSMENT & PLAN NOTE
In setting of chronic anticoagulation on Warfarin in pt with hx of mechanical AVR  First Dx'd on 11/23  Enlarged on repeat imaging on this admission 12/10  Cont to monitor off of anticoagulation  STAT CTA Abd for possible IR embolization if any signs of acute bleeding  Serial H&H    Comfort care measures only

## 2024-12-13 NOTE — CARE PLAN
The patient is Stable - Low risk of patient condition declining or worsening    Shift Goals  Clinical Goals: wean off vasopressors, pain control, mobilization  Patient Goals: pain control  Family Goals: HEATHER    Progress made toward(s) clinical / shift goals:    Problem: Skin Integrity  Goal: Skin integrity is maintained or improved  Outcome: Not Progressing  Note: Pt continues to have large amounts of edema and weeping of her legs     Problem: Pain - Standard  Goal: Alleviation of pain or a reduction in pain to the patient’s comfort goal  Outcome: Progressing  Note: Medicated per MAR and pt mobilized to help with discomfort     Problem: Knowledge Deficit - Standard  Goal: Patient and family/care givers will demonstrate understanding of plan of care, disease process/condition, diagnostic tests and medications  Outcome: Progressing  Note: Pt updated on POC and current condition       Patient is not progressing towards the following goals:      Problem: Skin Integrity  Goal: Skin integrity is maintained or improved  Outcome: Not Progressing  Note: Pt continues to have large amounts of edema and weeping of her legs

## 2024-12-13 NOTE — CARE PLAN
Problem: Pain - Standard  Goal: Alleviation of pain or a reduction in pain to the patient’s comfort goal  Outcome: Progressing     Problem: Knowledge Deficit - Standard  Goal: Patient and family/care givers will demonstrate understanding of plan of care, disease process/condition, diagnostic tests and medications  Outcome: Progressing     Problem: Skin Integrity  Goal: Skin integrity is maintained or improved  Outcome: Progressing     Problem: Fall Risk  Goal: Patient will remain free from falls  Outcome: Progressing   The patient is Watcher - Medium risk of patient condition declining or worsening    Shift Goals  Clinical Goals: monitor blood pressure support,  Patient Goals: pain control  Family Goals: HEATHER    Progress made toward(s) clinical / shift goals:      Patient is not progressing towards the following goals:

## 2024-12-13 NOTE — DOCUMENTATION QUERY
[facility]                                                                       Query Response Note      PATIENT:               LIMA FIGUEREDO  ACCT #:                  [id number]  MRN:                     [id number]  :                      1950  ADMIT DATE:       12/10/2024 8:26 PM  DISCH DATE:          RESPONDING  PROVIDER #:        [id number]           QUERY TEXT:    NSTEMI is documented in a few PN's.   Per  PN - Dx NSTEMI - continues to deny chest pain... did not have any ST segment changes on EKG...up trending troponins.   CC consult NSTEMI documentation replaced with Dx elevated troponin - EKG SR - no ST changes, flat trop in the 400s since admission likely in the setting of decompensated heart failure...worsening this AM likely due to shock and Dx Shock - Undifferentiated -hemorrhagic versus septic.  Per  PN Dx Elevated troponin - demand ischemia in the setting of known CAD - improving...Continue to correct shock/hypoxia.  On  received 1 PRBC and KCentra.    Please clarify the diagnosis of NSTEMI.    NOTE:  If an appropriate response is not listed below, please respond with a new note.     Note: If you agree with a diagnosis listed, please remember to include it in your concurrent daily documentation and onto the Discharge Summary.    The patient's Clinical Indicators include:  Troponin T since arrival: 267 -> 377 -> 478 -> 409 -> 460 -> 406  Hgb since arrival: 9.4 -> 8.8 -> 7.6 (on ) -> 8.5    12/10 EKG - Type I AV block with baseline artifact no ST elevations or ST depressions; interventricular conduction delay still present     PN - Dx NSTEMI - continues to deny chest pain... did not have any ST segment changes on EKG...up trending troponins   CC consult (Paulette) - Dx Elevated troponin - EKG SR - no ST changes, flat trop in the 400s since admission likely in the setting of decompensated heart  failure...worsening this AM likely due to shock...Dx Shock - Undifferentiated -hemorrhagic versus septic  12/12 PN (Gonda) - Elevated troponin- Demand ischemia in the setting of known CAD - improving...Continue to correct shock/hypoxia    Treatment - Repeat EKG; Echocardiogram; Repeat Troponin T and monitoring; 1 PRBC on 12/12; K Centra on 12/12    Risk factors - Retroperitoneal hematoma; Acute blood loss anemia; Acute on chronic diastolic CHF; Acute respiratory failure with hypoxia; Shock; Atrial fibrillation    Thank you,  Ayaan Newell BSN  Clinical   Connect via [employer]  Options provided:   -- NSTEMI related to coronary artery disease, present on admission   -- NSTEMI is ruled out, however Type 2 MI due to hemorrhagic shock is ruled in and present after admission   -- NSTEMI is ruled out, only elevated troponin exists during this encounter and is related to acute on chronic diastolic heart failure   -- Other explanation -, (Please document other explanation)   -- Unable to determine      Query created by: Ayana Newell on 12/13/2024 11:08 AM    RESPONSE TEXT:    NSTEMI related to coronary artery disease, present on admission       QUERY TEXT:    There is conflicting documentation with regards to the diagnosis of UTI.  Per 12/12 CC consult -  started on abx for a possible LLE cellulitis and UTI...+ sepsis from suspected SSTI, low concern for UTI.  Treated with IV Unasyn and Zyvox.    Please provide the most accurate diagnosis based upon the clinical indicators and treatment.      Note: If you agree with a diagnosis listed, please remember to include it in your concurrent daily documentation and onto the Discharge Summary.    The patient's Clinical Indicators include:  12/11 UA - small leukocyte esterase; 6-10 WBC; Many bacteria    12/12 CC consult -  started on abx for a possible LLE cellulitis and UTI...+ sepsis from suspected SSTI, low concern for UTI    Treatment   12/11  - IV Unasyn 3g x3  12/12 - IV Unasyn 3g x5;  IV Zyvox 600 mg x3  12/13 - IV Unasyn 3g x1; IV Zyvox 600 mgx1    Risk factors - possible UTI    Thank you,  Ayana Newell BSN  Clinical   Connect via [employer]  Options provided:   -- Urinary tract infection is ruled in   -- Urinary tract infection is ruled out   -- Other explanation, (please specify other explanation)   -- Unable to determine      Query created by: Ayana Newell on 12/13/2024 12:01 PM    RESPONSE TEXT:    Urinary tract infection is ruled in          Electronically signed by:  JEREMY M GONDA MD 12/13/2024 1:54 PM

## 2024-12-13 NOTE — WOUND TEAM
Received wound consult for sacrum.  Patient previously evaluated yesterday in which sacrum was already evaluated then.  No change in appearance of moisture fissure.  See this clinician's note for details.    Consult completed. Wound team not following, re-consult if there is noted deterioration to previously evaluated areas or further advanced wound care needs.

## 2024-12-13 NOTE — PROGRESS NOTES
Hospital Medicine Daily Progress Note    Date of Service  12/13/2024    Chief Complaint  Yenifer Beasley is a 74 y.o. female admitted 12/10/2024 with hypotension    Hospital Course  75yo PMHx PAFib, mechanical AVR on warfarin, CAD, CDH, DM, GERD, glaucoma, HLD, HTN, hypothyroidism, HILDA, pulmonary HTN.  Of significance she had just been in the hospital (11/7-11/15) after suffering a femoral Fx treated with ORIF on 11/8.  DC'd to rehab back on her coumadin.  Came back on 11/23 with hypotension and Hgb 6.3 found to have aretroperitoneal hematoma.  Given 2 units of PRBCs and KCentra.  DC'd back to rehab 12/10.  Presented back on 12/11 with SOB and pulmonary edema on CXR she was also hypotensive.  Admitted to ICU on cautious diuresis and vasopressor support    Interval Problem Update  Pt feels OK this afternoon.  Cont's to have problems with leg swelling.  Did get up with therapy and stood bedside.  Required assist of 3 but states it felt good to stand    I have discussed this patient's plan of care and discharge plan at IDT rounds today with Case Management, Nursing, Nursing leadership, and other members of the IDT team.    Consultants/Specialty      Code Status  DNAR/DNI    Disposition  The patient is not medically cleared for discharge to home or a post-acute facility.      I have placed the appropriate orders for post-discharge needs.    Review of Systems  Review of Systems   Constitutional:  Negative for chills and fever.   Respiratory:  Negative for cough and shortness of breath.    Cardiovascular:  Positive for leg swelling. Negative for chest pain and palpitations.   Gastrointestinal:  Negative for abdominal pain, diarrhea, nausea and vomiting.   Genitourinary:  Negative for dysuria and urgency.   Musculoskeletal:  Negative for back pain.   Skin:  Negative for rash.   Neurological:  Positive for weakness. Negative for dizziness, loss of consciousness and headaches.        Physical Exam  Temp:  [36.2 °C (97.2  °F)-37.1 °C (98.8 °F)] 36.6 °C (97.9 °F)  Pulse:  [50-97] 72  Resp:  [13-40] 40  BP: ()/(41-65) 114/53  SpO2:  [91 %-100 %] 97 %    Physical Exam  Constitutional:       General: She is not in acute distress.     Appearance: Normal appearance. She is well-developed. She is obese. She is not diaphoretic.   HENT:      Head: Normocephalic and atraumatic.   Neck:      Vascular: No JVD.   Cardiovascular:      Rate and Rhythm: Normal rate and regular rhythm.      Heart sounds: Murmur heard.   Pulmonary:      Effort: Pulmonary effort is normal. No respiratory distress.      Breath sounds: No stridor. No wheezing or rales.   Abdominal:      Palpations: Abdomen is soft.      Tenderness: There is no abdominal tenderness. There is no guarding or rebound.   Musculoskeletal:      Right lower leg: Edema present.      Left lower leg: Edema present.      Comments: 3+ edema to the groin   Skin:     General: Skin is warm and dry.      Coloration: Skin is pale.      Findings: No rash.   Neurological:      Mental Status: She is oriented to person, place, and time.   Psychiatric:         Mood and Affect: Mood normal.         Behavior: Behavior normal.         Thought Content: Thought content normal.         Fluids    Intake/Output Summary (Last 24 hours) at 12/13/2024 1421  Last data filed at 12/13/2024 1200  Gross per 24 hour   Intake 607.16 ml   Output 1375 ml   Net -767.84 ml        Laboratory  Recent Labs     12/12/24  0440 12/12/24  0945 12/12/24  1608 12/12/24  2251 12/13/24  0347   WBC 22.9* 20.6*  --   --  12.4*   RBC 2.40* 2.81*  --   --  2.57*   HEMOGLOBIN 7.6* 8.5* 8.7* 8.2* 7.8*   HEMATOCRIT 23.0* 26.4*  --   --  24.2*   MCV 95.8 94.0  --   --  94.2   MCH 31.7 30.2  --   --  30.4   MCHC 33.0 32.2  --   --  32.2   RDW 59.4* 55.6*  --   --  55.2*   PLATELETCT 235 223  --   --  210   MPV 8.7* 8.6*  --   --  8.8*     Recent Labs     12/12/24  0440 12/12/24  0945 12/13/24  0347   SODIUM 128* 131* 130*   POTASSIUM 4.9 4.9 4.6    CHLORIDE 94* 92* 95*   CO2 21 21 21   GLUCOSE 140* 163* 143*   BUN 68* 67* 62*   CREATININE 2.14* 2.06* 1.66*   CALCIUM 8.2* 8.0* 8.2*     Recent Labs     12/10/24  2048 12/12/24  0035 12/12/24  0945 12/13/24  0347   APTT 33.2  --   --   --    INR 2.27* 2.96* 1.46* 1.30*               Imaging  CT-ABDOMEN-PELVIS W/O   Final Result         1.  Large left retroperitoneal hematoma displacing the spleen superiorly, significantly increased in size since prior study, noncontrast technique limits evaluation for active hemorrhage   2.  Small layering bilateral pleural effusions   3.  Linear densities the bilateral lung bases favoring atelectasis, component of left lower lobe infiltrate not excluded   4.  Diverticulosis   5.  Hepatomegaly   6.  Atherosclerosis and atherosclerotic coronary artery disease      These findings were discussed with the patient's clinician, Dorothy Maddox, on 12/12/2024 4:33 AM.      US-EXTREMITY VENOUS LOWER BILAT   Final Result      US-EXTREMITY VENOUS UPPER UNILAT LEFT   Final Result      DX-CHEST-PORTABLE (1 VIEW)   Final Result         1.  Left basilar atelectasis and/or infiltrates   2.  Cardiomegaly   3.  Atherosclerosis           Assessment/Plan  * Shock (HCC)- (present on admission)  Assessment & Plan  Hemorrhagic vs septic vs adrenal insuficiency vs cardiogenic  Has responed to resuscitation  Now off pressors  Cultures neg  Starting IV lasix  Complete 5 days of Abx's assuming pt conts to do well clinically    Thrombophlebitis of left arm- (present on admission)  Assessment & Plan  Patient has a left arm thrombophlebitis in the setting of hx of IV line at the site which have been removed.  -U/S of left arm   -Continue IV Unasyn    Cellulitis- (present on admission)  Assessment & Plan  Patient has left lower leg erythema, tenderness and swelling suspicious for a cellulitis and and left upper arm thrombophlebitis. In the setting of neutrophilic leukocytosis, and mildly elevated  Procalcitonin at 0.76. Our suspicion for infection is very high. She was started on Unasyn on admission and blood cultures have been sent.  -U/S of left leg and left upper arm  -Continue IV Unasyn  -Follow up on Blood cultures    Status post closed fracture of right femur- (present on admission)  Assessment & Plan  Patient is s/p ORIF for femur fracture after a mechanical ground-level fall on 11/15  WBAT  -Continue pain management  -PT/OT      NSTEMI (non-ST elevated myocardial infarction) (HCC)  Assessment & Plan  Patient continues to deny any any chest pain and did not have any ST segment changes on EKG. She has uptrending troponins with Trop T: 267 on admission, which up trended to 478. Last Echocardiography done on 11/23/24 showed a preserved LVEF of 75%.   -Transthoracic Echocardiography today  -Lipid Panel, TSH and Hb1C  -As needed nitro for chest pain        Acute pulmonary edema (HCC)- (present on admission)  Assessment & Plan  Patient has B/L acute pulmonary edema seen on chest X-ray done on admission. Started on IV Lasix 40mg BID  -Continue on IV Lasix 40 mg BID  -Monitor strict Input and Output  -Continue monitoring on telemetry        Acute respiratory failure with hypoxia (HCC)- (present on admission)  Assessment & Plan  Patient presented with AHRF and required 2L of O2. She does not use oxygen at baseline. Chest X-ray showed left basal infiltrates, Cardiomegaly and B/L pulmonary edema. Patient continues to be tachypneic. She refused CTPE on admission. This morning, I explained the need of a CTPE again and she refused once again.   -U/S duplex of B/L lower extremities to r/o DVT  -Continue IV Lasix 40mg BID for now.  Increase dose tomorrow if renal function and BPs are tolerating it  -RT protocol  -Covid-19 test  -Resp Profile      Retroperitoneal hemorrhage- (present on admission)  Assessment & Plan  In setting of chronic anticoagulation on Warfarin in pt with hx of mechanical AVR  First Dx'd on  11/23  Enlarged on repeat imaging on this admission 12/10  Cont to monitor off of anticoagulation  STAT CTA Abd for possible IR embolization if any signs of acute bleeding  Serial H&H    Pulmonary hypertension (HCC)- (present on admission)  Assessment & Plan  Decompensated  Monitor volume status    Chronic kidney disease (CKD) stage G3b/A1, moderately decreased glomerular filtration rate (GFR) between 30-44 mL/min/1.73 square meter and albuminuria creatinine ratio less than 30 mg/g  Assessment & Plan  Monitor BMP and assess response  nephrotoxins/NSAIDs  Dose adjust meds for decreased GFR      Paroxysmal atrial fibrillation (HCC)- (present on admission)  Assessment & Plan  Anticoagulation on hold in setting of retroperitoneal hematoma    Hx of mechanical aortic valve replacement [V43.3]- (present on admission)  Assessment & Plan  Patient has a history of mechanical aortic valve replacement 2007 Dr Awan and is on warfarin indefinitely. Goal INR: 2.5-3.5  Anticoagulation is currently on hold due to retroperitoneal hematoma    Anemia- (present on admission)  Assessment & Plan  Her Hb is low at 8.4 with a mCV of 95.1  -Continue to monitor  -Transfuse to a goal of >7    Essential hypertension- (present on admission)  Assessment & Plan  Patient has a history Uncontrolled  Continue current home medications  IV as needed medications have been ordered      Coronary artery disease  Assessment & Plan  Patient has a history of CAD and is s/p CABG. She's allergic to statin and is on Alirocumab and Ezetemibe.         VTE prophylaxis: VTE Selection    I have performed a physical exam and reviewed and updated ROS and Plan today (12/13/2024). In review of yesterday's note (12/12/2024), there are no changes except as documented above.

## 2024-12-13 NOTE — DISCHARGE PLANNING
Care Transition Team Assessment    Patient is a 74 year old female admitted for shock. Please see patient's H&P for prior medical history. Patient was recently admitted 11/23/24-12/10/24 and [facility] to [facility]. Prior to discharging to SNF, patient lived with her daughter and niece in a single story house with no steps to enter, Fatimah Colon (p)[phone]; emergency contact and NOK. No advanced directive on file. Patient requires some assistance with ADL's and IADL's. Patient does not use any DME at baseline. Patient's PCP is Jose Vela. Patient's preferred pharmacy is [facility]. Patient has been to SNF in the past. Patent denies any SA or MH concerns. Patient confirmed medical coverage via [facility] SCP. Patient will need assistance with transportation upon discharge.     Patient was discussed in IDT rounds, patient had 3 BM's overnight. A new bryant was placed and blood cultures are NGTD.     Information Source  Orientation Level: Oriented to place, Oriented to time, Oriented to situation, Oriented to person  Information Given By: Patient  Who is responsible for making decisions for patient? : Patient    Readmission Evaluation  Is this a readmission?: Yes - unplanned readmission    Elopement Risk  Legal Hold: No  Ambulatory or Self Mobile in Wheelchair: No-Not an Elopement Risk  Disoriented: No  Psychiatric Symptoms: None  History of Wandering: No  Elopement this Admit: No  Vocalizing Wanting to Leave: No  Displays Behaviors, Body Language Wanting to Leave: No-Not at Risk for Elopement  Elopement Risk: Not at Risk for Elopement    Interdisciplinary Discharge Planning  Lives with - Patient's Self Care Capacity: Adult Children  Patient or legal guardian wants to designate a caregiver: No  Support Systems: Family Member(s)  Housing / Facility: Skilled Nursing Facility  [facility]  Prior Services: Continuous (24 Hour) Care Giving Per Service    Discharge Preparedness  What is your  plan after discharge?: Skilled nursing facility  What are your discharge supports?: Child, Other (comment)  Prior Functional Level: Needs Assist with Activities of Daily Living, Needs Assist with Medication Management  Difficulity with IADLs: None    Functional Assesment  Prior Functional Level: Needs Assist with Activities of Daily Living, Needs Assist with Medication Management    Finances  Financial Barriers to Discharge: No  Prescription Coverage: Yes    Vision / Hearing Impairment  Vision Impairment : Yes  Right Eye Vision: Impaired, Wears Glasses  Left Eye Vision: Impaired, Wears Glasses  Hearing Impairment : No         Advance Directive  Advance Directive?: None  Advance Directive offered?: AD Booklet refused    Domestic Abuse  Have you ever been the victim of abuse or violence?: No  Possible Abuse/Neglect Reported to:: Not Applicable    Psychological Assessment  History of Substance Abuse: None  History of Psychiatric Problems: No  Non-compliant with Treatment: No  Newly Diagnosed Illness: No    Discharge Risks or Barriers  Discharge risks or barriers?: Complex medical needs  Patient risk factors: Complex medical needs    Anticipated Discharge Information  Discharge Disposition: D/T to SNF with Medicare cert in anticipation of skilled care (03)

## 2024-12-13 NOTE — ASSESSMENT & PLAN NOTE
Hemorrhagic vs septic vs adrenal insuficiency vs cardiogenic  Has responed to resuscitation  Now off pressors  Cultures neg  Starting IV lasix  Complete 5 days of Abx's assuming pt conts to do well clinically    Comfort care measures only

## 2024-12-14 PROBLEM — Z71.89 ACP (ADVANCE CARE PLANNING): Status: ACTIVE | Noted: 2024-12-14

## 2024-12-14 LAB
ANION GAP SERPL CALC-SCNC: 10 MMOL/L (ref 7–16)
BASOPHILS # BLD AUTO: 0.2 % (ref 0–1.8)
BASOPHILS # BLD: 0.02 K/UL (ref 0–0.12)
BUN SERPL-MCNC: 63 MG/DL (ref 8–22)
CALCIUM SERPL-MCNC: 8.4 MG/DL (ref 8.5–10.5)
CHLORIDE SERPL-SCNC: 96 MMOL/L (ref 96–112)
CO2 SERPL-SCNC: 24 MMOL/L (ref 20–33)
CREAT SERPL-MCNC: 1.72 MG/DL (ref 0.5–1.4)
EOSINOPHIL # BLD AUTO: 0.05 K/UL (ref 0–0.51)
EOSINOPHIL NFR BLD: 0.6 % (ref 0–6.9)
ERYTHROCYTE [DISTWIDTH] IN BLOOD BY AUTOMATED COUNT: 56.6 FL (ref 35.9–50)
GFR SERPLBLD CREATININE-BSD FMLA CKD-EPI: 31 ML/MIN/1.73 M 2
GLUCOSE SERPL-MCNC: 114 MG/DL (ref 65–99)
HCT VFR BLD AUTO: 28.4 % (ref 37–47)
HGB BLD-MCNC: 8.9 G/DL (ref 12–16)
HGB BLD-MCNC: 9.1 G/DL (ref 12–16)
IMM GRANULOCYTES # BLD AUTO: 0.1 K/UL (ref 0–0.11)
IMM GRANULOCYTES NFR BLD AUTO: 1.1 % (ref 0–0.9)
INR PPP: 1.33 (ref 0.87–1.13)
LYMPHOCYTES # BLD AUTO: 0.61 K/UL (ref 1–4.8)
LYMPHOCYTES NFR BLD: 6.7 % (ref 22–41)
MAGNESIUM SERPL-MCNC: 2.3 MG/DL (ref 1.5–2.5)
MCH RBC QN AUTO: 30.6 PG (ref 27–33)
MCHC RBC AUTO-ENTMCNC: 32 G/DL (ref 32.2–35.5)
MCV RBC AUTO: 95.6 FL (ref 81.4–97.8)
MONOCYTES # BLD AUTO: 0.51 K/UL (ref 0–0.85)
MONOCYTES NFR BLD AUTO: 5.6 % (ref 0–13.4)
NEUTROPHILS # BLD AUTO: 7.79 K/UL (ref 1.82–7.42)
NEUTROPHILS NFR BLD: 85.8 % (ref 44–72)
NRBC # BLD AUTO: 0 K/UL
NRBC BLD-RTO: 0 /100 WBC (ref 0–0.2)
PLATELET # BLD AUTO: 271 K/UL (ref 164–446)
PMV BLD AUTO: 9.2 FL (ref 9–12.9)
POTASSIUM SERPL-SCNC: 4.1 MMOL/L (ref 3.6–5.5)
PROTHROMBIN TIME: 16.6 SEC (ref 12–14.6)
RBC # BLD AUTO: 2.97 M/UL (ref 4.2–5.4)
SODIUM SERPL-SCNC: 130 MMOL/L (ref 135–145)
WBC # BLD AUTO: 9.1 K/UL (ref 4.8–10.8)

## 2024-12-14 PROCEDURE — 85610 PROTHROMBIN TIME: CPT

## 2024-12-14 PROCEDURE — 700105 HCHG RX REV CODE 258: Performed by: INTERNAL MEDICINE

## 2024-12-14 PROCEDURE — 85018 HEMOGLOBIN: CPT

## 2024-12-14 PROCEDURE — 700111 HCHG RX REV CODE 636 W/ 250 OVERRIDE (IP)

## 2024-12-14 PROCEDURE — 85025 COMPLETE CBC W/AUTO DIFF WBC: CPT

## 2024-12-14 PROCEDURE — A9270 NON-COVERED ITEM OR SERVICE: HCPCS

## 2024-12-14 PROCEDURE — 99233 SBSQ HOSP IP/OBS HIGH 50: CPT | Performed by: GENERAL PRACTICE

## 2024-12-14 PROCEDURE — 700111 HCHG RX REV CODE 636 W/ 250 OVERRIDE (IP): Performed by: INTERNAL MEDICINE

## 2024-12-14 PROCEDURE — 700102 HCHG RX REV CODE 250 W/ 637 OVERRIDE(OP)

## 2024-12-14 PROCEDURE — 83735 ASSAY OF MAGNESIUM: CPT

## 2024-12-14 PROCEDURE — 700102 HCHG RX REV CODE 250 W/ 637 OVERRIDE(OP): Performed by: HOSPITALIST

## 2024-12-14 PROCEDURE — 700102 HCHG RX REV CODE 250 W/ 637 OVERRIDE(OP): Performed by: GENERAL PRACTICE

## 2024-12-14 PROCEDURE — A9270 NON-COVERED ITEM OR SERVICE: HCPCS | Performed by: HOSPITALIST

## 2024-12-14 PROCEDURE — 80048 BASIC METABOLIC PNL TOTAL CA: CPT

## 2024-12-14 PROCEDURE — 36415 COLL VENOUS BLD VENIPUNCTURE: CPT

## 2024-12-14 PROCEDURE — A9270 NON-COVERED ITEM OR SERVICE: HCPCS | Performed by: GENERAL PRACTICE

## 2024-12-14 PROCEDURE — 770020 HCHG ROOM/CARE - TELE (206)

## 2024-12-14 PROCEDURE — 700111 HCHG RX REV CODE 636 W/ 250 OVERRIDE (IP): Mod: JZ | Performed by: INTERNAL MEDICINE

## 2024-12-14 RX ADMIN — AMOXICILLIN AND CLAVULANATE POTASSIUM 1 TABLET: 875; 125 TABLET, FILM COATED ORAL at 18:11

## 2024-12-14 RX ADMIN — OXYCODONE HYDROCHLORIDE 10 MG: 10 TABLET ORAL at 01:01

## 2024-12-14 RX ADMIN — ACETAMINOPHEN 1000 MG: 500 TABLET, FILM COATED ORAL at 18:10

## 2024-12-14 RX ADMIN — OXYCODONE HYDROCHLORIDE 10 MG: 10 TABLET ORAL at 14:03

## 2024-12-14 RX ADMIN — OMEPRAZOLE 20 MG: 20 CAPSULE, DELAYED RELEASE ORAL at 06:08

## 2024-12-14 RX ADMIN — TIMOLOL MALEATE 1 DROP: 5 SOLUTION OPHTHALMIC at 06:08

## 2024-12-14 RX ADMIN — TIMOLOL MALEATE 1 DROP: 5 SOLUTION OPHTHALMIC at 18:11

## 2024-12-14 RX ADMIN — FUROSEMIDE 40 MG: 10 INJECTION INTRAMUSCULAR; INTRAVENOUS at 06:09

## 2024-12-14 RX ADMIN — ACETAMINOPHEN 1000 MG: 500 TABLET, FILM COATED ORAL at 06:08

## 2024-12-14 RX ADMIN — HYDROMORPHONE HYDROCHLORIDE 0.5 MG: 1 INJECTION, SOLUTION INTRAMUSCULAR; INTRAVENOUS; SUBCUTANEOUS at 11:35

## 2024-12-14 RX ADMIN — AMPICILLIN AND SULBACTAM 3 G: 1; 2 INJECTION, POWDER, FOR SOLUTION INTRAMUSCULAR; INTRAVENOUS at 06:15

## 2024-12-14 RX ADMIN — OXYCODONE HYDROCHLORIDE 10 MG: 10 TABLET ORAL at 09:10

## 2024-12-14 RX ADMIN — FUROSEMIDE 40 MG: 10 INJECTION INTRAMUSCULAR; INTRAVENOUS at 16:37

## 2024-12-14 RX ADMIN — ACETAMINOPHEN 1000 MG: 500 TABLET, FILM COATED ORAL at 23:25

## 2024-12-14 RX ADMIN — AMOXICILLIN AND CLAVULANATE POTASSIUM 1 TABLET: 875; 125 TABLET, FILM COATED ORAL at 09:11

## 2024-12-14 RX ADMIN — ACETAMINOPHEN 1000 MG: 500 TABLET, FILM COATED ORAL at 11:36

## 2024-12-14 RX ADMIN — OXYCODONE HYDROCHLORIDE 10 MG: 10 TABLET ORAL at 18:10

## 2024-12-14 RX ADMIN — LEVOTHYROXINE SODIUM 125 MCG: 0.12 TABLET ORAL at 06:09

## 2024-12-14 RX ADMIN — OXYCODONE HYDROCHLORIDE 10 MG: 10 TABLET ORAL at 22:37

## 2024-12-14 RX ADMIN — OXYCODONE HYDROCHLORIDE 10 MG: 10 TABLET ORAL at 06:09

## 2024-12-14 ASSESSMENT — COGNITIVE AND FUNCTIONAL STATUS - GENERAL
TURNING FROM BACK TO SIDE WHILE IN FLAT BAD: A LOT
WALKING IN HOSPITAL ROOM: TOTAL
PERSONAL GROOMING: A LITTLE
STANDING UP FROM CHAIR USING ARMS: TOTAL
TOILETING: TOTAL
MOVING TO AND FROM BED TO CHAIR: TOTAL
DRESSING REGULAR UPPER BODY CLOTHING: A LOT
SUGGESTED CMS G CODE MODIFIER MOBILITY: CM
CLIMB 3 TO 5 STEPS WITH RAILING: TOTAL
HELP NEEDED FOR BATHING: TOTAL
DRESSING REGULAR LOWER BODY CLOTHING: A LOT
SUGGESTED CMS G CODE MODIFIER DAILY ACTIVITY: CL
EATING MEALS: A LITTLE
MOBILITY SCORE: 7
DAILY ACTIVITIY SCORE: 12
MOVING FROM LYING ON BACK TO SITTING ON SIDE OF FLAT BED: TOTAL

## 2024-12-14 ASSESSMENT — PAIN DESCRIPTION - PAIN TYPE
TYPE: ACUTE PAIN

## 2024-12-14 ASSESSMENT — FIBROSIS 4 INDEX: FIB4 SCORE: 3.05

## 2024-12-14 ASSESSMENT — ENCOUNTER SYMPTOMS: SHORTNESS OF BREATH: 1

## 2024-12-14 NOTE — PROGRESS NOTES
[facility] Medicine Daily Progress Note    Date of Service  12/14/2024    Chief Complaint  Yenifer Beasley is a 74 y.o. female admitted 12/10/2024 with shortness of breath    Hospital Course  paroxysmal atrial fibrillation, mechanical aortic valve replacement on Coumadin, hypertension, chronic diastolic heart failure, CKD stage IIIb hypothyroidism, glaucoma, recent admission for right femoral lateral condyle fracture s/p fixation on 11/8 who presented to the ED on 12/10 with shortness of breath    Patient had previous admission for right femoral lateral condyle fracture status post internal fixation on 11/8, postoperatively patient did have significant bleeding requiring 1 unit of PRBCs transfusion.  She was discharged to skilled nursing facility 11/15.  She returns back to the ER on 11/23 found to be hypotensive, noted to have a 17 x 10.7 x 9.5 cm retroperitoneal hematoma, requiring Kcentra and blood transfusions.      Patient returned here with shortness of breath, significant for shock with respiratory failure, DIAMOND and hypotension requiring pressor support.  Repeat CT imaging revealed worsening of the retroperitoneal hematoma.  Coumadin on hold.    Patient started on empiric antibiotic therapy for lower extremity cellulitis and UTI.  Bull catheter was exchanged.    Patient started on aggressive IV diuresis due to concerns for acute on chronic diastolic heart failure exacerbation.  GDMT therapy initially held due to shock. Restart as clinically indicated and as tolerated.    Interval Problem Update  Patient admitted with shock, DIAMOND and hypotension requiring pressor support.  Noted worsening retroperitoneal hematoma, Coumadin held.  Continue to monitor hemoglobin, stable for now.    Blood pressure is improving, will initiate aggressive IV diuresis as patient has acute on chronic diastolic heart failure exacerbation.    Discussed with patient's daughter-in-law in regards to overall goals of care, it appears she  has discussed this with the patient prior who indicated that she did not want further hospitalizations and did not want further poking and prodding. Patient's mentation is fluctuating. I have asked for a family meeting tomorrow to discuss the extent of care for the patient.  Patient's mentation is fluctuating.    Patient's daughter-in-law was called, updated on plan of care, all questions answered.    I have discussed this patient's plan of care and discharge plan at IDT rounds today with Case Management, Nursing, Nursing leadership, and other members of the IDT team.    Consultants/Specialty  critical care    Code Status  DNAR/DNI    Disposition  Patient is not medically clear to discharge.    I have placed the appropriate orders for post-discharge needs.    Review of Systems  Review of Systems   Respiratory:  Positive for shortness of breath.    Cardiovascular:  Positive for leg swelling.   All other systems reviewed and are negative.       Physical Exam  Temp:  [36.3 °C (97.3 °F)-36.5 °C (97.7 °F)] 36.4 °C (97.5 °F)  Pulse:  [57-81] 60  Resp:  [16-40] 16  BP: ()/(43-68) 106/43  SpO2:  [89 %-99 %] 91 %    Physical Exam  Vitals and nursing note reviewed.   Constitutional:       General: She is not in acute distress.     Appearance: She is ill-appearing.   HENT:      Head: Normocephalic and atraumatic.      Mouth/Throat:      Mouth: Mucous membranes are moist.      Pharynx: No oropharyngeal exudate.   Eyes:      Extraocular Movements: Extraocular movements intact.      Pupils: Pupils are equal, round, and reactive to light.   Cardiovascular:      Rate and Rhythm: Normal rate and regular rhythm.      Pulses: Normal pulses.      Heart sounds: No murmur heard.     No friction rub. No gallop.   Pulmonary:      Effort: Pulmonary effort is normal. No respiratory distress.      Breath sounds: No wheezing, rhonchi or rales.   Abdominal:      General: Bowel sounds are normal. There is no distension.      Palpations:  Abdomen is soft. There is no mass.      Tenderness: There is no abdominal tenderness.   Musculoskeletal:         General: No swelling or tenderness. Normal range of motion.      Cervical back: Normal range of motion. No rigidity. No muscular tenderness.      Right lower leg: Edema present.      Left lower leg: Edema present.      Comments: +3 pitting edema with blisters   Skin:     General: Skin is warm and dry.      Capillary Refill: Capillary refill takes less than 2 seconds.      Findings: No erythema or rash.   Neurological:      General: No focal deficit present.      Mental Status: She is alert and oriented to person, place, and time.      Motor: No weakness.      Gait: Gait normal.         Fluids    Intake/Output Summary (Last 24 hours) at 12/14/2024 1315  Last data filed at 12/14/2024 0524  Gross per 24 hour   Intake 1000 ml   Output 855 ml   Net 145 ml        Laboratory  Recent Labs     12/12/24  0945 12/12/24  1608 12/13/24  0347 12/13/24  2147 12/14/24  0100 12/14/24  0914   WBC 20.6*  --  12.4*  --  9.1  --    RBC 2.81*  --  2.57*  --  2.97*  --    HEMOGLOBIN 8.5*   < > 7.8* 9.0* 9.1* 8.9*   HEMATOCRIT 26.4*  --  24.2*  --  28.4*  --    MCV 94.0  --  94.2  --  95.6  --    MCH 30.2  --  30.4  --  30.6  --    MCHC 32.2  --  32.2  --  32.0*  --    RDW 55.6*  --  55.2*  --  56.6*  --    PLATELETCT 223  --  210  --  271  --    MPV 8.6*  --  8.8*  --  9.2  --     < > = values in this interval not displayed.     Recent Labs     12/12/24  0945 12/13/24  0347 12/14/24  0100   SODIUM 131* 130* 130*   POTASSIUM 4.9 4.6 4.1   CHLORIDE 92* 95* 96   CO2 21 21 24   GLUCOSE 163* 143* 114*   BUN 67* 62* 63*   CREATININE 2.06* 1.66* 1.72*   CALCIUM 8.0* 8.2* 8.4*     Recent Labs     12/12/24  0945 12/13/24  0347 12/14/24  0100   INR 1.46* 1.30* 1.33*               Imaging  CT-ABDOMEN-PELVIS W/O   Final Result         1.  Large left retroperitoneal hematoma displacing the spleen superiorly, significantly increased in size  since prior study, noncontrast technique limits evaluation for active hemorrhage   2.  Small layering bilateral pleural effusions   3.  Linear densities the bilateral lung bases favoring atelectasis, component of left lower lobe infiltrate not excluded   4.  Diverticulosis   5.  Hepatomegaly   6.  Atherosclerosis and atherosclerotic coronary artery disease      These findings were discussed with the patient's clinician, Dorothy Maddox, on 12/12/2024 4:33 AM.      US-EXTREMITY VENOUS LOWER BILAT   Final Result      US-EXTREMITY VENOUS UPPER UNILAT LEFT   Final Result      DX-CHEST-PORTABLE (1 VIEW)   Final Result         1.  Left basilar atelectasis and/or infiltrates   2.  Cardiomegaly   3.  Atherosclerosis           Assessment/Plan  * Shock (HCC)- (present on admission)  Assessment & Plan  Hemorrhagic vs septic vs adrenal insuficiency vs cardiogenic  Has responed to resuscitation  Now off pressors  Cultures neg  Starting IV lasix  Complete 5 days of Abx's assuming pt conts to do well clinically    ACP (advance care planning)  Assessment & Plan  Discussed with patient's daughter-in-law in regards to overall goals of care, it appears she has discussed this with the patient prior who indicated that she did not want further hospitalizations and did not want further poking and prodding. Patient's mentation is fluctuating. I have asked for a family meeting tomorrow to discuss the extent of care for the patient.  Patient's mentation is fluctuating.    Acute pulmonary edema (HCC)- (present on admission)  Assessment & Plan  Patient has B/L acute pulmonary edema seen on chest X-ray done on admission. Started on IV Lasix 40mg BID  -Continue on IV Lasix 40 mg BID  -Monitor strict Input and Output  -Continue monitoring on telemetry        Acute respiratory failure with hypoxia (HCC)- (present on admission)  Assessment & Plan  Patient presented with AHRF and required 2L of O2. She does not use oxygen at baseline. Chest X-ray  showed left basal infiltrates, Cardiomegaly and B/L pulmonary edema. Patient continues to be tachypneic. She refused CTPE on admission. This morning, I explained the need of a CTPE again and she refused once again.   -U/S duplex of B/L lower extremities to r/o DVT  -Continue IV Lasix 40mg BID for now.  Increase dose tomorrow if renal function and BPs are tolerating it  -RT protocol  -Covid-19 test  -Resp Profile      Retroperitoneal hemorrhage- (present on admission)  Assessment & Plan  In setting of chronic anticoagulation on Warfarin in pt with hx of mechanical AVR  First Dx'd on 11/23  Enlarged on repeat imaging on this admission 12/10  Cont to monitor off of anticoagulation  STAT CTA Abd for possible IR embolization if any signs of acute bleeding  Serial H&H    Paroxysmal atrial fibrillation (HCC)- (present on admission)  Assessment & Plan  Anticoagulation on hold in setting of retroperitoneal hematoma    Hx of mechanical aortic valve replacement [V43.3]- (present on admission)  Assessment & Plan  Patient has a history of mechanical aortic valve replacement 2007 Dr Awan and is on warfarin indefinitely. Goal INR: 2.5-3.5  Anticoagulation is currently on hold due to retroperitoneal hematoma    Cellulitis- (present on admission)  Assessment & Plan  Patient has left lower leg erythema, tenderness and swelling suspicious for a cellulitis and and left upper arm thrombophlebitis. In the setting of neutrophilic leukocytosis, and mildly elevated Procalcitonin at 0.76. Our suspicion for infection is very high. She was started on Unasyn on admission and blood cultures have been sent.  -U/S of left leg and left upper arm  -Continue IV Unasyn  -Follow up on Blood cultures    Chronic kidney disease (CKD) stage G3b/A1, moderately decreased glomerular filtration rate (GFR) between 30-44 mL/min/1.73 square meter and albuminuria creatinine ratio less than 30 mg/g  Assessment & Plan  Monitor BMP and assess  response  nephrotoxins/NSAIDs  Dose adjust meds for decreased GFR      Urinary tract infection associated with indwelling urethral catheter (HCC)- (present on admission)  Assessment & Plan  Completing antibiotic course  Bull catheter exchanged    Thrombophlebitis of left arm- (present on admission)  Assessment & Plan  Patient has a left arm thrombophlebitis in the setting of hx of IV line at the site which have been removed.  -U/S of left arm   -Continue IV Unasyn    Status post closed fracture of right femur- (present on admission)  Assessment & Plan  Patient is s/p ORIF for femur fracture after a mechanical ground-level fall on 11/15  WBAT  -Continue pain management  -PT/OT      NSTEMI (non-ST elevated myocardial infarction) ([facility])  Assessment & Plan  Patient continues to deny any any chest pain and did not have any ST segment changes on EKG. She has uptrending troponins with Trop T: 267 on admission, which up trended to 478. Last Echocardiography done on 11/23/24 showed a preserved LVEF of 75%.   -Transthoracic Echocardiography today  -Lipid Panel, TSH and Hb1C  -As needed nitro for chest pain        Pulmonary hypertension (HCC)- (present on admission)  Assessment & Plan  Decompensated  Monitor volume status    Anemia- (present on admission)  Assessment & Plan  Her Hb is low at 8.4 with a mCV of 95.1  -Continue to monitor  -Transfuse to a goal of >7    Essential hypertension- (present on admission)  Assessment & Plan  Patient has a history Uncontrolled  Continue current home medications  IV as needed medications have been ordered      Coronary artery disease  Assessment & Plan  Patient has a history of CAD and is s/p CABG. She's allergic to statin and is on Alirocumab and Ezetemibe.         VTE prophylaxis: SCDs    I have performed a physical exam and reviewed and updated ROS and Plan today (12/14/2024). In review of yesterday's note (12/13/2024), there are no changes except as documented above.      Greater than  51 minutes spent prepping to see patient (e.g. reviewing EMR) obtaining and/or reviewing separately obtained history. Performing a medically appropriate examination and evaluation. Independently interpreting results and communicating results to patient/family/caregiver. Counseling and educating the patient/family/caregiver. Ordering medications, tests, and/or procedures. Referring and communicating with other health care professionals.  Documenting clinical information in EPIC. Care coordination.

## 2024-12-14 NOTE — PROGRESS NOTES
4 Eyes Skin Assessment Completed by PREETHI Thompson and PREETHI English.    Patient refusing full skin check stating she has already had two done today and doesn't think anything has changed. Patient states skin checks are too painful to do it again. Education and reinforcement provided. Patient still refusing. Charge RN Donna in room at time of refusal. Documented below is what was able to be visualized/assessed while transfering patient.     Head WDL  Ears HEATHER  Nose Redness, Non-Blanching, and Discoloration  Mouth WDL  Neck HEATHER  Breast/Chest WDL  Shoulder Blades WDL  Spine WDL  (R) Arm/Elbow/Hand Edema  (L) Arm/Elbow/Hand Redness, Blanching, Swelling, and Edema  Abdomen HEATHER  Groin HEATHER  Scrotum/Coccyx/Buttocks Redness and Blanching  (R) Leg Weeping and Edema, incision/scar  (L) Leg Redness, Rash, Weeping, and Edema  (R) Heel/Foot/Toe Redness and Boggy, appears blanching on sides unable to assess fully  (L) Heel/Foot/Toe Redness and Boggy, appears blanching on sides, unable to assess fully          Devices In Places Tele Box, Blood Pressure Cuff, Pulse Ox, Bull, and Nasal Cannula      Interventions In Place NC W/Ear Foams, Heel Float Boots, TAP System, Pillows, Q2 Turns, Low Air Loss Mattress, and Heels Loaded W/Pillows    Possible Skin Injury Yes    Pictures Uploaded Into Epic Yes  Wound Consult Placed Yes  RN Wound Prevention Protocol Ordered Yes (wound already consulted and aware, new consult placed for heels)

## 2024-12-14 NOTE — WOUND TEAM
Wound team reconsulted for heels. Pt was seen on 12/12. No need for consult following patient refusal. Please continue offloading measures. Consult completed.

## 2024-12-14 NOTE — CARE PLAN
The patient is Watcher - Medium risk of patient condition declining or worsening    Shift Goals  Clinical Goals: Stable hemodynamics, monitor VS/Labs, abx  Patient Goals: pain control, get better  Family Goals: na    Progress made toward(s) clinical / shift goals:    Problem: Hemodynamics  Goal: Patient's hemodynamics, fluid balance and neurologic status will be stable or improve  Outcome: Progressing     Problem: Respiratory  Goal: Patient will achieve/maintain optimum respiratory ventilation and gas exchange  Outcome: Progressing     Problem: Fluid Volume  Goal: Fluid volume balance will be maintained  Outcome: Progressing       Patient is not progressing towards the following goals:       Throat cultures pending  Covid test is pending  Go home and rest  Tylenol/Motrin for fever or aches  Keep well-hydrated   over-the-counter cold and cough medication  Quarantine at home until results are obtained

## 2024-12-14 NOTE — PROGRESS NOTES
Phone report received from Sandra ORO. Patient transported to T8 via gurney, RN, and monitor, on 1 L O2. Upon arrival patient A&Ox 4. VS obtained and hooked up to tele monitor, monitors notified. All bedside belongings accounted for. Pt educated regarding medications, current POC, safety, and diet. Fall precautions in place, call light within reach.

## 2024-12-14 NOTE — DISCHARGE PLANNING
"HTH/SCP TCN chart review completed. CM currently in rounds. The most current review of medical record, knowledge of pt's PLOF and social support, LACE+ score of 75, 6 clicks scores of 7 mobility were considered.      Pt seen at bedside. Introduced TCN program. Provided education regarding post acute levels of care. Education provided regarding case management policy for blanket SNF referrals. Discussed HTH/SCP plan benefits. Pt verbalizes understanding.     PT and OT recommending post acute placement on 12/11 and pt in agreement. Note that pt was re-admitted to [facility] from [facility] 2' to SOB and had ICU stay this admission 2' to \"cautious diuresis and vasopressor suppoprt\", though is now on telemetry floor. Pt reports she is anticipating return to [facility] once medically cleared. Note that referral to [facility] has been placed as well. TCN will monitor for acceptance and assist with SCP auth as indicated.    No additional provider requests at this time. Note referral sent to [facility] and per verbal discussion with pt this remains her 1st choice as well (as she was re-admitted from this SNF facility).    Current discharge considerations are anticipated for pt to return to [facility] once medically cleared.  TCN will continue to follow and collaborate with discharge planning team as additional post acute needs arise. Thank you.     Completed today:  PT/OT with recommendations for post acute placement on 12/11  Choice obtained: none  Pt aware of [facility]'s blanket referral policy if unable to dc back to [facility] (currently referral to [facility] pending; TCN will monitor for acceptance and need for SCP auth); pt reports if [facility] does not accept, she is aware of blanket SNF referrals and will select from accepting facilities at that time; currently pt would like to return to [facility]  "

## 2024-12-14 NOTE — PROGRESS NOTES
Bedside report received from off going RN/tech: Amador, assumed care of patient.     Fall Risk Score: HIGH RISK  Fall risk interventions in place: Place yellow fall risk ID band on patient, Provide patient/family education based on risk assessment, Educate patient/family to call staff for assistance when getting out of bed, Place fall precaution signage outside patient door, Place patient in room close to nursing station, Utilize bed/chair fall alarm, Notify charge of high risk for huddle, and Bed alarm connected correctly  Bed type: Low air loss (Kamar Score less than 17 interventions in place)  Patient on cardiac monitor: Yes  IVF/IV medications: Infusion per MAR (List Med(s)) unasyn  Oxygen: How many liters 0.5L, Traced the line to wall oxygen, and No oxygen tank in room  Bedside sitter: Not Applicable   Isolation: Not applicable

## 2024-12-14 NOTE — ASSESSMENT & PLAN NOTE
Discussed with patient, her 2 sisters at bedside and her daughter-in-law over the phone, patient has communicated to her daughter-in-law previously that she does not want any further aggressive measures.  We discussed this at bedside and she reiterated that she wants to pursue comfort care measures.  She no longer wants to be in the hospital, no longer wants lab draws etc.    Patient transition to comfort care measures 12/15 p.m.  Hospice referral placed.  [facility] pending.

## 2024-12-15 LAB
ANION GAP SERPL CALC-SCNC: 11 MMOL/L (ref 7–16)
BASOPHILS # BLD AUTO: 0.5 % (ref 0–1.8)
BASOPHILS # BLD: 0.04 K/UL (ref 0–0.12)
BUN SERPL-MCNC: 68 MG/DL (ref 8–22)
CALCIUM SERPL-MCNC: 7.9 MG/DL (ref 8.5–10.5)
CHLORIDE SERPL-SCNC: 98 MMOL/L (ref 96–112)
CHOLEST SERPL-MCNC: 103 MG/DL (ref 100–199)
CO2 SERPL-SCNC: 23 MMOL/L (ref 20–33)
CORTIS SERPL-MCNC: 26.5 UG/DL (ref 0–23)
CREAT SERPL-MCNC: 1.94 MG/DL (ref 0.5–1.4)
EOSINOPHIL # BLD AUTO: 0.18 K/UL (ref 0–0.51)
EOSINOPHIL NFR BLD: 2.1 % (ref 0–6.9)
ERYTHROCYTE [DISTWIDTH] IN BLOOD BY AUTOMATED COUNT: 56.9 FL (ref 35.9–50)
GFR SERPLBLD CREATININE-BSD FMLA CKD-EPI: 27 ML/MIN/1.73 M 2
GLUCOSE SERPL-MCNC: 151 MG/DL (ref 65–99)
HCT VFR BLD AUTO: 29.8 % (ref 37–47)
HDLC SERPL-MCNC: 48 MG/DL
HGB BLD-MCNC: 9.4 G/DL (ref 12–16)
IMM GRANULOCYTES # BLD AUTO: 0.24 K/UL (ref 0–0.11)
IMM GRANULOCYTES NFR BLD AUTO: 2.7 % (ref 0–0.9)
INR PPP: 1.58 (ref 0.87–1.13)
LDLC SERPL CALC-MCNC: 37 MG/DL
LYMPHOCYTES # BLD AUTO: 0.83 K/UL (ref 1–4.8)
LYMPHOCYTES NFR BLD: 9.5 % (ref 22–41)
MAGNESIUM SERPL-MCNC: 2.2 MG/DL (ref 1.5–2.5)
MCH RBC QN AUTO: 30.1 PG (ref 27–33)
MCHC RBC AUTO-ENTMCNC: 31.5 G/DL (ref 32.2–35.5)
MCV RBC AUTO: 95.5 FL (ref 81.4–97.8)
MONOCYTES # BLD AUTO: 0.53 K/UL (ref 0–0.85)
MONOCYTES NFR BLD AUTO: 6 % (ref 0–13.4)
NEUTROPHILS # BLD AUTO: 6.96 K/UL (ref 1.82–7.42)
NEUTROPHILS NFR BLD: 79.2 % (ref 44–72)
NRBC # BLD AUTO: 0 K/UL
NRBC BLD-RTO: 0 /100 WBC (ref 0–0.2)
PLATELET # BLD AUTO: 293 K/UL (ref 164–446)
PMV BLD AUTO: 8.8 FL (ref 9–12.9)
POTASSIUM SERPL-SCNC: 4.1 MMOL/L (ref 3.6–5.5)
PROTHROMBIN TIME: 19 SEC (ref 12–14.6)
RBC # BLD AUTO: 3.12 M/UL (ref 4.2–5.4)
SODIUM SERPL-SCNC: 132 MMOL/L (ref 135–145)
TRIGL SERPL-MCNC: 90 MG/DL (ref 0–149)
WBC # BLD AUTO: 8.8 K/UL (ref 4.8–10.8)

## 2024-12-15 PROCEDURE — 700111 HCHG RX REV CODE 636 W/ 250 OVERRIDE (IP): Performed by: INTERNAL MEDICINE

## 2024-12-15 PROCEDURE — 99497 ADVNCD CARE PLAN 30 MIN: CPT | Performed by: GENERAL PRACTICE

## 2024-12-15 PROCEDURE — 80048 BASIC METABOLIC PNL TOTAL CA: CPT

## 2024-12-15 PROCEDURE — 700102 HCHG RX REV CODE 250 W/ 637 OVERRIDE(OP): Performed by: GENERAL PRACTICE

## 2024-12-15 PROCEDURE — A9270 NON-COVERED ITEM OR SERVICE: HCPCS | Performed by: HOSPITALIST

## 2024-12-15 PROCEDURE — 86480 TB TEST CELL IMMUN MEASURE: CPT

## 2024-12-15 PROCEDURE — A9270 NON-COVERED ITEM OR SERVICE: HCPCS | Performed by: GENERAL PRACTICE

## 2024-12-15 PROCEDURE — 700111 HCHG RX REV CODE 636 W/ 250 OVERRIDE (IP)

## 2024-12-15 PROCEDURE — 83735 ASSAY OF MAGNESIUM: CPT

## 2024-12-15 PROCEDURE — 700102 HCHG RX REV CODE 250 W/ 637 OVERRIDE(OP)

## 2024-12-15 PROCEDURE — 700102 HCHG RX REV CODE 250 W/ 637 OVERRIDE(OP): Performed by: HOSPITALIST

## 2024-12-15 PROCEDURE — 700111 HCHG RX REV CODE 636 W/ 250 OVERRIDE (IP): Performed by: GENERAL PRACTICE

## 2024-12-15 PROCEDURE — 99233 SBSQ HOSP IP/OBS HIGH 50: CPT | Performed by: GENERAL PRACTICE

## 2024-12-15 PROCEDURE — 36415 COLL VENOUS BLD VENIPUNCTURE: CPT

## 2024-12-15 PROCEDURE — 85610 PROTHROMBIN TIME: CPT

## 2024-12-15 PROCEDURE — A9270 NON-COVERED ITEM OR SERVICE: HCPCS

## 2024-12-15 PROCEDURE — 770001 HCHG ROOM/CARE - MED/SURG/GYN PRIV*

## 2024-12-15 PROCEDURE — 85025 COMPLETE CBC W/AUTO DIFF WBC: CPT

## 2024-12-15 RX ORDER — ONDANSETRON 2 MG/ML
8 INJECTION INTRAMUSCULAR; INTRAVENOUS EVERY 8 HOURS PRN
Status: DISCONTINUED | OUTPATIENT
Start: 2024-12-15 | End: 2024-12-19 | Stop reason: HOSPADM

## 2024-12-15 RX ORDER — MORPHINE SULFATE 100 MG/5ML
10 SOLUTION ORAL
Status: DISCONTINUED | OUTPATIENT
Start: 2024-12-15 | End: 2024-12-19 | Stop reason: HOSPADM

## 2024-12-15 RX ORDER — MORPHINE SULFATE 100 MG/5ML
20 SOLUTION ORAL
Status: DISCONTINUED | OUTPATIENT
Start: 2024-12-15 | End: 2024-12-19 | Stop reason: HOSPADM

## 2024-12-15 RX ORDER — HYDROMORPHONE HYDROCHLORIDE 1 MG/ML
1 INJECTION, SOLUTION INTRAMUSCULAR; INTRAVENOUS; SUBCUTANEOUS
Status: DISCONTINUED | OUTPATIENT
Start: 2024-12-15 | End: 2024-12-15

## 2024-12-15 RX ORDER — LORAZEPAM 2 MG/ML
1 INJECTION INTRAMUSCULAR
Status: DISCONTINUED | OUTPATIENT
Start: 2024-12-15 | End: 2024-12-19 | Stop reason: HOSPADM

## 2024-12-15 RX ORDER — OXYCODONE HYDROCHLORIDE 5 MG/1
5 TABLET ORAL
Status: DISCONTINUED | OUTPATIENT
Start: 2024-12-15 | End: 2024-12-15

## 2024-12-15 RX ORDER — OXYCODONE HYDROCHLORIDE 10 MG/1
10 TABLET ORAL
Status: DISCONTINUED | OUTPATIENT
Start: 2024-12-15 | End: 2024-12-15

## 2024-12-15 RX ORDER — LORAZEPAM 2 MG/ML
1 CONCENTRATE ORAL
Status: DISCONTINUED | OUTPATIENT
Start: 2024-12-15 | End: 2024-12-19 | Stop reason: HOSPADM

## 2024-12-15 RX ORDER — ONDANSETRON 8 MG/1
8 TABLET, ORALLY DISINTEGRATING ORAL EVERY 8 HOURS PRN
Status: DISCONTINUED | OUTPATIENT
Start: 2024-12-15 | End: 2024-12-19 | Stop reason: HOSPADM

## 2024-12-15 RX ADMIN — TIMOLOL MALEATE 1 DROP: 5 SOLUTION OPHTHALMIC at 17:27

## 2024-12-15 RX ADMIN — ACETAMINOPHEN 1000 MG: 500 TABLET, FILM COATED ORAL at 12:09

## 2024-12-15 RX ADMIN — OXYCODONE HYDROCHLORIDE 10 MG: 10 TABLET ORAL at 12:09

## 2024-12-15 RX ADMIN — MORPHINE SULFATE 20 MG: 100 SOLUTION ORAL at 21:23

## 2024-12-15 RX ADMIN — AMOXICILLIN AND CLAVULANATE POTASSIUM 1 TABLET: 875; 125 TABLET, FILM COATED ORAL at 04:57

## 2024-12-15 RX ADMIN — OXYCODONE HYDROCHLORIDE 10 MG: 10 TABLET ORAL at 08:42

## 2024-12-15 RX ADMIN — LEVOTHYROXINE SODIUM 125 MCG: 0.12 TABLET ORAL at 04:57

## 2024-12-15 RX ADMIN — FUROSEMIDE 40 MG: 10 INJECTION INTRAMUSCULAR; INTRAVENOUS at 16:00

## 2024-12-15 RX ADMIN — HYDROMORPHONE HYDROCHLORIDE 1 MG: 1 INJECTION, SOLUTION INTRAMUSCULAR; INTRAVENOUS; SUBCUTANEOUS at 14:13

## 2024-12-15 RX ADMIN — TIMOLOL MALEATE 1 DROP: 5 SOLUTION OPHTHALMIC at 04:58

## 2024-12-15 RX ADMIN — HYDROMORPHONE HYDROCHLORIDE 0.5 MG: 1 INJECTION, SOLUTION INTRAMUSCULAR; INTRAVENOUS; SUBCUTANEOUS at 10:32

## 2024-12-15 RX ADMIN — OXYCODONE HYDROCHLORIDE 10 MG: 10 TABLET ORAL at 04:57

## 2024-12-15 RX ADMIN — MORPHINE SULFATE 20 MG: 100 SOLUTION ORAL at 23:47

## 2024-12-15 RX ADMIN — FUROSEMIDE 40 MG: 10 INJECTION INTRAMUSCULAR; INTRAVENOUS at 04:58

## 2024-12-15 RX ADMIN — OMEPRAZOLE 20 MG: 20 CAPSULE, DELAYED RELEASE ORAL at 04:57

## 2024-12-15 ASSESSMENT — PAIN DESCRIPTION - PAIN TYPE
TYPE: ACUTE PAIN

## 2024-12-15 ASSESSMENT — FIBROSIS 4 INDEX: FIB4 SCORE: 2.36

## 2024-12-15 NOTE — CARE PLAN
The patient is Stable - Low risk of patient condition declining or worsening    Shift Goals  Clinical Goals: skin integrity, pain control, hemodynamically stable  Patient Goals: pain control, rest  Family Goals: na    Progress made toward(s) clinical / shift goals:  Pain management ongoing. Q2hr turns.    Patient is not progressing towards the following goals:      Problem: Skin Integrity  Goal: Skin integrity is maintained or improved  Outcome: Progressing     Problem: Hemodynamics  Goal: Patient's hemodynamics, fluid balance and neurologic status will be stable or improve  Outcome: Progressing    Problem: Fluid Volume  Goal: Fluid volume balance will be maintained  Outcome: Progressing

## 2024-12-15 NOTE — PROGRESS NOTES
Monitor Summary:   Rhythm: Afib  Rate: 61-76  Measurement: .-/.10/.43  Ectopy: r pvc, pvc

## 2024-12-15 NOTE — PROGRESS NOTES
Bedside report received from off going RN: Faith, assumed care of patient.     Fall Risk Score: HIGH RISK  Fall risk interventions in place: Place yellow fall risk ID band on patient, Provide patient/family education based on risk assessment, Educate patient/family to call staff for assistance when getting out of bed, Place fall precaution signage outside patient door, Place patient in room close to nursing station, Utilize bed/chair fall alarm, Notify charge of high risk for huddle, and Bed alarm connected correctly  Bed type: Regular and Low air loss (Kamar Score less than 17 interventions in place)  Patient on cardiac monitor: Yes  IVF/IV medications: Not Applicable   Oxygen: How many liters 0.5L, Traced the line to wall oxygen, and No oxygen tank in room  Bedside sitter: Not Applicable   Isolation: Not applicable

## 2024-12-15 NOTE — PROGRESS NOTES
Bedside report received from off going RN/tech: Amador, assumed care of patient.     Fall Risk Score: HIGH RISK  Fall risk interventions in place: Place yellow fall risk ID band on patient, Provide patient/family education based on risk assessment, Educate patient/family to call staff for assistance when getting out of bed, Place fall precaution signage outside patient door, Place patient in room close to nursing station, Utilize bed/chair fall alarm, Notify charge of high risk for huddle, and Bed alarm connected correctly  Bed type: Low air loss (Kamar Score less than 17 interventions in place)  Patient on cardiac monitor: Yes  IVF/IV medications: Not Applicable   Oxygen: How many liters 0.5L, Traced the line to wall oxygen, and No oxygen tank in room  Bedside sitter: Not Applicable   Isolation: Not applicable

## 2024-12-15 NOTE — CARE PLAN
The patient is Watcher - Medium risk of patient condition declining or worsening    Shift Goals  Clinical Goals: monitor labs/VS, hemodynamically stable, skin integrity  Patient Goals: pain control, rest  Family Goals: na    Progress made toward(s) clinical / shift goals:  Pain control ongoing. Needs encouragement with repositioning, microturns because of pain.     Patient is not progressing towards the following goals:      Problem: Skin Integrity  Goal: Skin integrity is maintained or improved  Outcome: Progressing     Problem: Pain - Standard  Goal: Alleviation of pain or a reduction in pain to the patient’s comfort goal  Outcome: Progressing     Problem: Fall Risk  Goal: Patient will remain free from falls  Outcome: Progressing     Problem: Hemodynamics  Goal: Patient's hemodynamics, fluid balance and neurologic status will be stable or improve  Outcome: Progressing

## 2024-12-15 NOTE — CARE PLAN
The patient is Stable - Low risk of patient condition declining or worsening    Shift Goals  Clinical Goals: Maintain skin integrity, monitor labs/VS  Patient Goals: pain control rest  Family Goals: na    Progress made toward(s) clinical / shift goals:    Problem: Hemodynamics  Goal: Patient's hemodynamics, fluid balance and neurologic status will be stable or improve  Outcome: Progressing     Problem: Fluid Volume  Goal: Fluid volume balance will be maintained  Outcome: Progressing       Patient is not progressing towards the following goals:

## 2024-12-16 LAB
BACTERIA BLD CULT: NORMAL
GAMMA INTERFERON BACKGROUND BLD IA-ACNC: 0.05 IU/ML
M TB IFN-G BLD-IMP: NEGATIVE
M TB IFN-G CD4+ BCKGRND COR BLD-ACNC: 0 IU/ML
MITOGEN IGNF BCKGRD COR BLD-ACNC: 1.72 IU/ML
QFT TB2 - NIL TBQ2: -0.01 IU/ML
SIGNIFICANT IND 70042: NORMAL
SITE SITE: NORMAL
SOURCE SOURCE: NORMAL

## 2024-12-16 PROCEDURE — A9270 NON-COVERED ITEM OR SERVICE: HCPCS

## 2024-12-16 PROCEDURE — 99232 SBSQ HOSP IP/OBS MODERATE 35: CPT | Performed by: GENERAL PRACTICE

## 2024-12-16 PROCEDURE — A9270 NON-COVERED ITEM OR SERVICE: HCPCS | Performed by: HOSPITALIST

## 2024-12-16 PROCEDURE — 770004 HCHG ROOM/CARE - ONCOLOGY PRIVATE *

## 2024-12-16 PROCEDURE — 700102 HCHG RX REV CODE 250 W/ 637 OVERRIDE(OP): Performed by: GENERAL PRACTICE

## 2024-12-16 PROCEDURE — 700102 HCHG RX REV CODE 250 W/ 637 OVERRIDE(OP): Performed by: HOSPITALIST

## 2024-12-16 PROCEDURE — 700102 HCHG RX REV CODE 250 W/ 637 OVERRIDE(OP)

## 2024-12-16 PROCEDURE — A9270 NON-COVERED ITEM OR SERVICE: HCPCS | Performed by: GENERAL PRACTICE

## 2024-12-16 PROCEDURE — 700111 HCHG RX REV CODE 636 W/ 250 OVERRIDE (IP): Mod: JZ | Performed by: INTERNAL MEDICINE

## 2024-12-16 RX ADMIN — MORPHINE SULFATE 20 MG: 100 SOLUTION ORAL at 03:14

## 2024-12-16 RX ADMIN — MORPHINE SULFATE 10 MG: 100 SOLUTION ORAL at 13:09

## 2024-12-16 RX ADMIN — FUROSEMIDE 40 MG: 10 INJECTION INTRAMUSCULAR; INTRAVENOUS at 04:22

## 2024-12-16 RX ADMIN — OMEPRAZOLE 20 MG: 20 CAPSULE, DELAYED RELEASE ORAL at 04:22

## 2024-12-16 RX ADMIN — MORPHINE SULFATE 10 MG: 100 SOLUTION ORAL at 16:10

## 2024-12-16 RX ADMIN — TIMOLOL MALEATE 1 DROP: 5 SOLUTION OPHTHALMIC at 16:01

## 2024-12-16 RX ADMIN — LEVOTHYROXINE SODIUM 125 MCG: 0.12 TABLET ORAL at 04:22

## 2024-12-16 RX ADMIN — TIMOLOL MALEATE 1 DROP: 5 SOLUTION OPHTHALMIC at 04:22

## 2024-12-16 RX ADMIN — MORPHINE SULFATE 20 MG: 100 SOLUTION ORAL at 19:27

## 2024-12-16 RX ADMIN — MORPHINE SULFATE 20 MG: 100 SOLUTION ORAL at 09:21

## 2024-12-16 RX ADMIN — MORPHINE SULFATE 20 MG: 100 SOLUTION ORAL at 06:30

## 2024-12-16 RX ADMIN — FUROSEMIDE 40 MG: 10 INJECTION INTRAMUSCULAR; INTRAVENOUS at 15:56

## 2024-12-16 ASSESSMENT — PAIN DESCRIPTION - PAIN TYPE
TYPE: ACUTE PAIN

## 2024-12-16 NOTE — DISCHARGE PLANNING
Care Transition Team Discharge Planning    Anticipated Discharge Information  Discharge Disposition: D/T to hospice home (50)    Discharge Plan:  Chart reviewed, patient with hospice referral pending assessment from referral management team. RNCM to remain available for DCP assistance.

## 2024-12-16 NOTE — PROGRESS NOTES
Monitor Summary    Rhythm: Afib  Rate: 74-77  Ectopy: (R) PVC  Measurements: - /0.07/ -

## 2024-12-16 NOTE — PROGRESS NOTES
Bedside report received from off going RN/tech: Faith, assumed care of patient. POC updated. Pt denies pain or any other needs at this time  Pt is COMFORT CARE   Fall Risk Score: HIGH RISK  Fall risk interventions in place: Place yellow fall risk ID band on patient, Provide patient/family education based on risk assessment, Educate patient/family to call staff for assistance when getting out of bed, Place fall precaution signage outside patient door, Place patient in room close to nursing station, and Utilize bed/chair fall alarm  Bed type: Low air loss (Kamar Score less than 17 interventions in place)  Patient on cardiac monitor: No   IVF/IV medications: Not Applicable   Oxygen: How many liters 1L  Bedside sitter: Not Applicable   Isolation: Not applicable

## 2024-12-16 NOTE — CARE PLAN
The patient is Unstable - High likelihood or risk of patient condition declining or worsening    Shift Goals  Clinical Goals: pt comfort, skn integrity  Patient Goals: pain control, rest  Family Goals: pt comfort    Progress made toward(s) clinical / shift goals:    Problem: Pain - Standard  Goal: Alleviation of pain or a reduction in pain to the patient’s comfort goal  Outcome: Progressing  Note: Pt states her pain has been controled with her medication regimen.      Problem: Hemodynamics  Goal: Patient's hemodynamics, fluid balance and neurologic status will be stable or improve  Outcome: Progressing       Patient is not progressing towards the following goals:

## 2024-12-16 NOTE — PROGRESS NOTES
Bedside report received from off going RN/tech: Faith, assumed care of patient.     Fall Risk Score: HIGH RISK  Fall risk interventions in place: Place yellow fall risk ID band on patient, Provide patient/family education based on risk assessment, Educate patient/family to call staff for assistance when getting out of bed, Place fall precaution signage outside patient door, Place patient in room close to nursing station, Utilize bed/chair fall alarm, Notify charge of high risk for huddle, and Bed alarm connected correctly  Bed type: Low air loss (Kamar Score less than 17 interventions in place)  Patient on cardiac monitor: Yes  IVF/IV medications: Not Applicable   Oxygen: How many liters 1L, Traced the line to wall oxygen, and No oxygen tank in room  Bedside sitter: Not Applicable   Isolation: Not applicable

## 2024-12-16 NOTE — PROGRESS NOTES
Hospital Medicine Daily Progress Note    Date of Service  12/15/2024    Chief Complaint  Yenifer Beasley is a 74 y.o. female admitted 12/10/2024 with shortness of breath    Hospital Course  paroxysmal atrial fibrillation, mechanical aortic valve replacement on Coumadin, hypertension, chronic diastolic heart failure, CKD stage IIIb hypothyroidism, glaucoma, recent admission for right femoral lateral condyle fracture s/p fixation on 11/8 who presented to the ED on 12/10 with shortness of breath    Patient had previous admission for right femoral lateral condyle fracture status post internal fixation on 11/8, postoperatively patient did have significant bleeding requiring 1 unit of PRBCs transfusion.  She was discharged to skilled nursing facility 11/15.  She returns back to the ER on 11/23 found to be hypotensive, noted to have a 17 x 10.7 x 9.5 cm retroperitoneal hematoma, requiring Kcentra and blood transfusions.      Patient returned here with shortness of breath, significant for shock with respiratory failure, DIAMOND and hypotension requiring pressor support.  Repeat CT imaging revealed worsening of the retroperitoneal hematoma.  Coumadin on hold.    Patient started on empiric antibiotic therapy for lower extremity cellulitis and UTI.  Bull catheter was exchanged.    Patient started on aggressive IV diuresis due to concerns for acute on chronic diastolic heart failure exacerbation.  GDMT therapy initially held due to shock. Restart as clinically indicated and as tolerated.    12/15 Discussed with patient, her 2 sisters at bedside and her daughter-in-law over the phone, patient has communicated to her daughter-in-law previously that she does not want any further aggressive measures.  We discussed this at bedside and she reiterated that she wants to pursue comfort care measures.  She no longer wants to be in the hospital, no longer wants lab draws etc.    Interval Problem Update  Discussed with patient, her 2  sisters at bedside and her daughter-in-law over the phone, patient has communicated to her daughter-in-law previously that she does not want any further aggressive measures.  We discussed this at bedside and she reiterated that she wants to pursue comfort care measures.  She no longer wants to be in the hospital, no longer wants lab draws etc.    Patient transition to comfort care measures 12/15 p.m.  Hospice referral placed.  QuantiFERON gold pending.    I have discussed this patient's plan of care and discharge plan at IDT rounds today with Case Management, Nursing, Nursing leadership, and other members of the IDT team.    Consultants/Specialty  critical care    Code Status  Comfort Care/DNR    Disposition  Patient is not medically clear to discharge.    I have placed the appropriate orders for post-discharge needs.    Review of Systems  Review of Systems   Cardiovascular:  Positive for leg swelling.   All other systems reviewed and are negative.       Physical Exam  Temp:  [36.3 °C (97.3 °F)-36.5 °C (97.7 °F)] 36.4 °C (97.5 °F)  Pulse:  [58-79] 79  Resp:  [16-18] 18  BP: (107-148)/(46-55) 148/54  SpO2:  [92 %-96 %] 92 %    Physical Exam  Vitals and nursing note reviewed.   Constitutional:       General: She is not in acute distress.     Appearance: She is ill-appearing.   HENT:      Head: Normocephalic and atraumatic.      Mouth/Throat:      Mouth: Mucous membranes are moist.      Pharynx: No oropharyngeal exudate.   Eyes:      Extraocular Movements: Extraocular movements intact.      Pupils: Pupils are equal, round, and reactive to light.   Cardiovascular:      Rate and Rhythm: Normal rate and regular rhythm.      Pulses: Normal pulses.      Heart sounds: No murmur heard.     No friction rub. No gallop.   Pulmonary:      Effort: Pulmonary effort is normal. No respiratory distress.      Breath sounds: No wheezing, rhonchi or rales.   Abdominal:      General: Bowel sounds are normal. There is no distension.       Palpations: Abdomen is soft. There is no mass.      Tenderness: There is no abdominal tenderness.   Musculoskeletal:         General: No swelling or tenderness. Normal range of motion.      Cervical back: Normal range of motion. No rigidity. No muscular tenderness.      Right lower leg: Edema present.      Left lower leg: Edema present.      Comments: +3 pitting edema with blisters   Skin:     General: Skin is warm and dry.      Capillary Refill: Capillary refill takes less than 2 seconds.      Findings: No erythema or rash.   Neurological:      General: No focal deficit present.      Mental Status: She is alert and oriented to person, place, and time.      Motor: No weakness.      Gait: Gait normal.         Fluids    Intake/Output Summary (Last 24 hours) at 12/15/2024 1755  Last data filed at 12/15/2024 1700  Gross per 24 hour   Intake 980 ml   Output 1750 ml   Net -770 ml        Laboratory  Recent Labs     12/13/24 0347 12/13/24  2147 12/14/24 0100 12/14/24  0914 12/15/24  0534   WBC 12.4*  --  9.1  --  8.8   RBC 2.57*  --  2.97*  --  3.12*   HEMOGLOBIN 7.8*   < > 9.1* 8.9* 9.4*   HEMATOCRIT 24.2*  --  28.4*  --  29.8*   MCV 94.2  --  95.6  --  95.5   MCH 30.4  --  30.6  --  30.1   MCHC 32.2  --  32.0*  --  31.5*   RDW 55.2*  --  56.6*  --  56.9*   PLATELETCT 210  --  271  --  293   MPV 8.8*  --  9.2  --  8.8*    < > = values in this interval not displayed.     Recent Labs     12/13/24  0347 12/14/24  0100 12/15/24  0003   SODIUM 130* 130* 132*   POTASSIUM 4.6 4.1 4.1   CHLORIDE 95* 96 98   CO2 21 24 23   GLUCOSE 143* 114* 151*   BUN 62* 63* 68*   CREATININE 1.66* 1.72* 1.94*   CALCIUM 8.2* 8.4* 7.9*     Recent Labs     12/13/24  0347 12/14/24  0100 12/15/24  0003   INR 1.30* 1.33* 1.58*               Imaging  CT-ABDOMEN-PELVIS W/O   Final Result         1.  Large left retroperitoneal hematoma displacing the spleen superiorly, significantly increased in size since prior study, noncontrast technique limits  evaluation for active hemorrhage   2.  Small layering bilateral pleural effusions   3.  Linear densities the bilateral lung bases favoring atelectasis, component of left lower lobe infiltrate not excluded   4.  Diverticulosis   5.  Hepatomegaly   6.  Atherosclerosis and atherosclerotic coronary artery disease      These findings were discussed with the patient's clinician, Dorothy Maddox, on 12/12/2024 4:33 AM.      US-EXTREMITY VENOUS LOWER BILAT   Final Result      US-EXTREMITY VENOUS UPPER UNILAT LEFT   Final Result      DX-CHEST-PORTABLE (1 VIEW)   Final Result         1.  Left basilar atelectasis and/or infiltrates   2.  Cardiomegaly   3.  Atherosclerosis           Assessment/Plan  * Shock (HCC)- (present on admission)  Assessment & Plan  Hemorrhagic vs septic vs adrenal insuficiency vs cardiogenic  Has responed to resuscitation  Now off pressors  Cultures neg  Starting IV lasix  Complete 5 days of Abx's assuming pt conts to do well clinically    ACP (advance care planning)  Assessment & Plan  Discussed with patient, her 2 sisters at bedside and her daughter-in-law over the phone, patient has communicated to her daughter-in-law previously that she does not want any further aggressive measures.  We discussed this at bedside and she reiterated that she wants to pursue comfort care measures.  She no longer wants to be in the hospital, no longer wants lab draws etc.    Patient transition to comfort care measures 12/15 p.m.  Hospice referral placed.  QuantiFERON gold pending.    Acute pulmonary edema (HCC)- (present on admission)  Assessment & Plan  Patient has B/L acute pulmonary edema seen on chest X-ray done on admission. Started on IV Lasix 40mg BID  -Continue on IV Lasix 40 mg BID  -Monitor strict Input and Output  -Continue monitoring on telemetry        Acute respiratory failure with hypoxia (HCC)- (present on admission)  Assessment & Plan  Patient presented with AHRF and required 2L of O2. She does not use  oxygen at baseline. Chest X-ray showed left basal infiltrates, Cardiomegaly and B/L pulmonary edema. Patient continues to be tachypneic. She refused CTPE on admission. This morning, I explained the need of a CTPE again and she refused once again.   -U/S duplex of B/L lower extremities to r/o DVT  -Continue IV Lasix 40mg BID for now.  Increase dose tomorrow if renal function and BPs are tolerating it  -RT protocol  -Covid-19 test  -Resp Profile      Retroperitoneal hemorrhage- (present on admission)  Assessment & Plan  In setting of chronic anticoagulation on Warfarin in pt with hx of mechanical AVR  First Dx'd on 11/23  Enlarged on repeat imaging on this admission 12/10  Cont to monitor off of anticoagulation  STAT CTA Abd for possible IR embolization if any signs of acute bleeding  Serial H&H    Paroxysmal atrial fibrillation (HCC)- (present on admission)  Assessment & Plan  Anticoagulation on hold in setting of retroperitoneal hematoma    Hx of mechanical aortic valve replacement [V43.3]- (present on admission)  Assessment & Plan  Patient has a history of mechanical aortic valve replacement 2007 Dr Awan and is on warfarin indefinitely. Goal INR: 2.5-3.5  Anticoagulation is currently on hold due to retroperitoneal hematoma    Cellulitis- (present on admission)  Assessment & Plan  Patient has left lower leg erythema, tenderness and swelling suspicious for a cellulitis and and left upper arm thrombophlebitis. In the setting of neutrophilic leukocytosis, and mildly elevated Procalcitonin at 0.76. Our suspicion for infection is very high. She was started on Unasyn on admission and blood cultures have been sent.  -U/S of left leg and left upper arm  -Continue IV Unasyn  -Follow up on Blood cultures    Chronic kidney disease (CKD) stage G3b/A1, moderately decreased glomerular filtration rate (GFR) between 30-44 mL/min/1.73 square meter and albuminuria creatinine ratio less than 30 mg/g  Assessment & Plan  Monitor BMP  and assess response  nephrotoxins/NSAIDs  Dose adjust meds for decreased GFR      Urinary tract infection associated with indwelling urethral catheter (HCC)- (present on admission)  Assessment & Plan  Completing antibiotic course  Bull catheter exchanged    Thrombophlebitis of left arm- (present on admission)  Assessment & Plan  Patient has a left arm thrombophlebitis in the setting of hx of IV line at the site which have been removed.  -U/S of left arm   -Continue IV Unasyn    Status post closed fracture of right femur- (present on admission)  Assessment & Plan  Patient is s/p ORIF for femur fracture after a mechanical ground-level fall on 11/15  WBAT  -Continue pain management  -PT/OT      NSTEMI (non-ST elevated myocardial infarction) ([facility])  Assessment & Plan  Patient continues to deny any any chest pain and did not have any ST segment changes on EKG. She has uptrending troponins with Trop T: 267 on admission, which up trended to 478. Last Echocardiography done on 11/23/24 showed a preserved LVEF of 75%.   -Transthoracic Echocardiography today  -Lipid Panel, TSH and Hb1C  -As needed nitro for chest pain        Pulmonary hypertension (HCC)- (present on admission)  Assessment & Plan  Decompensated  Monitor volume status    Anemia- (present on admission)  Assessment & Plan  Her Hb is low at 8.4 with a mCV of 95.1  -Continue to monitor  -Transfuse to a goal of >7    Essential hypertension- (present on admission)  Assessment & Plan  Patient has a history Uncontrolled  Continue current home medications  IV as needed medications have been ordered      Coronary artery disease  Assessment & Plan  Patient has a history of CAD and is s/p CABG. She's allergic to statin and is on Alirocumab and Ezetemibe.         VTE prophylaxis: SCDs    I have performed a physical exam and reviewed and updated ROS and Plan today (12/15/2024). In review of yesterday's note (12/14/2024), there are no changes except as documented  above.      Greater than 55 minutes spent prepping to see patient (e.g. reviewing EMR) obtaining and/or reviewing separately obtained history. Performing a medically appropriate examination and evaluation. Independently interpreting results and communicating results to patient/family/caregiver. Counseling and educating the patient/family/caregiver. Ordering medications, tests, and/or procedures. Referring and communicating with other health care professionals.  Documenting clinical information in EPIC. Care coordination.

## 2024-12-16 NOTE — DISCHARGE PLANNING
Referral Management Team    Hospice referral received via [facility] will round to obtain choice ASAP  If there are any questions, please contact me directly.    1010  Went to patients bedside and gave basic hospice information. Yenifer asked to give me until this afternoon for hospice choice. Yenifer will speak to her daughter-in-law Lurdes. Lurdes is an Oncology RN Navigator for [employer].     Patient was living with daughter Fatimah but family asked for her not to return to her home.   Patient was asking about placement options. Explained group homes she states she is familiar with.     Patient currently makes $1500 from [employer] with no other income. She states she has about $700 in bills.       Will follow up this afternoon.     1530  Went to patients room. She has not been able to speak to her daughter-in-law Lurdes today. She reached out when I was in the room with no answer. I offered to call Lurdes myself she declined at this time.   Yenifer has my number  to let me know choice.     Will follow up in am.     Called Joao Figueredo from [facility] he might have a bed available. He will call back once he confirms

## 2024-12-16 NOTE — CARE PLAN
The patient is Stable - Low risk of patient condition declining or worsening    Shift Goals  Clinical Goals: Maintain skin integrity, pain control  Patient Goals: pain control, sleep  Family Goals: man    Progress made toward(s) clinical / shift goals:    Problem: Pain - Standard  Goal: Alleviation of pain or a reduction in pain to the patient’s comfort goal  Outcome: Progressing  Note: Assess and monitor for pain. Provide pharmacological and non-pharmacological interventions as appropriate. Re-evaluate and continue to monitor.     PRN morphine ordered     Problem: Knowledge Deficit - Standard  Goal: Patient and family/care givers will demonstrate understanding of plan of care, disease process/condition, diagnostic tests and medications  Outcome: Progressing  Note: Pt updated on POC, all current questions answered at this time         Patient is not progressing towards the following goals:

## 2024-12-16 NOTE — PROGRESS NOTES
[facility] Medicine Daily Progress Note    Date of Service  12/16/2024    Chief Complaint  Yenifer Beasley is a 74 y.o. female admitted 12/10/2024 with shortness of breath    Hospital Course  paroxysmal atrial fibrillation, mechanical aortic valve replacement on Coumadin, hypertension, chronic diastolic heart failure, CKD stage IIIb hypothyroidism, glaucoma, recent admission for right femoral lateral condyle fracture s/p fixation on 11/8 who presented to the ED on 12/10 with shortness of breath    Patient had previous admission for right femoral lateral condyle fracture status post internal fixation on 11/8, postoperatively patient did have significant bleeding requiring 1 unit of PRBCs transfusion.  She was discharged to skilled nursing facility 11/15.  She returns back to the ER on 11/23 found to be hypotensive, noted to have a 17 x 10.7 x 9.5 cm retroperitoneal hematoma, requiring Kcentra and blood transfusions.      Patient returned here with shortness of breath, significant for shock with respiratory failure, DIAMOND and hypotension requiring pressor support.  Repeat CT imaging revealed worsening of the retroperitoneal hematoma.  Coumadin on hold.    Patient started on empiric antibiotic therapy for lower extremity cellulitis and UTI.  Bull catheter was exchanged.    Patient started on aggressive IV diuresis due to concerns for acute on chronic diastolic heart failure exacerbation.  GDMT therapy initially held due to shock. Restart as clinically indicated and as tolerated.    12/15 Discussed with patient, her 2 sisters at bedside and her daughter-in-law over the phone, patient has communicated to her daughter-in-law previously that she does not want any further aggressive measures.  We discussed this at bedside and she reiterated that she wants to pursue comfort care measures.  She no longer wants to be in the hospital, no longer wants lab draws etc.    Interval Problem Update  Discussed with patient, her 2  sisters at bedside and her daughter-in-law over the phone, patient has communicated to her daughter-in-law previously that she does not want any further aggressive measures.  We discussed this at bedside and she reiterated that she wants to pursue comfort care measures.  She no longer wants to be in the hospital, no longer wants lab draws etc.    Patient transitioned to comfort care measures 12/15 p.m.  Hospice referral placed.  QuantiFERON gold pending.    I have discussed this patient's plan of care and discharge plan at IDT rounds today with Case Management, Nursing, Nursing leadership, and other members of the IDT team.    Consultants/Specialty  critical care    Code Status  Comfort Care/DNR    Disposition  Patient is not medically clear to discharge.    I have placed the appropriate orders for post-discharge needs.    Review of Systems  Review of Systems   Cardiovascular:  Positive for leg swelling.   All other systems reviewed and are negative.       Physical Exam  Temp:  [36.5 °C (97.7 °F)-36.6 °C (97.9 °F)] 36.6 °C (97.9 °F)  Pulse:  [63-79] 72  Resp:  [16-18] 16  BP: (131-148)/(46-72) 131/56  SpO2:  [92 %-98 %] 97 %    Physical Exam  Vitals and nursing note reviewed.   Constitutional:       General: She is not in acute distress.     Appearance: She is ill-appearing.   HENT:      Head: Normocephalic and atraumatic.      Mouth/Throat:      Mouth: Mucous membranes are moist.      Pharynx: No oropharyngeal exudate.   Eyes:      Extraocular Movements: Extraocular movements intact.      Pupils: Pupils are equal, round, and reactive to light.   Cardiovascular:      Rate and Rhythm: Normal rate and regular rhythm.      Pulses: Normal pulses.      Heart sounds: No murmur heard.     No friction rub. No gallop.   Pulmonary:      Effort: Pulmonary effort is normal. No respiratory distress.      Breath sounds: No wheezing, rhonchi or rales.   Abdominal:      General: Bowel sounds are normal. There is no distension.       Palpations: Abdomen is soft. There is no mass.      Tenderness: There is no abdominal tenderness.   Musculoskeletal:         General: No swelling or tenderness. Normal range of motion.      Cervical back: Normal range of motion. No rigidity. No muscular tenderness.      Right lower leg: Edema present.      Left lower leg: Edema present.      Comments: +3 pitting edema with blisters   Skin:     General: Skin is warm and dry.      Capillary Refill: Capillary refill takes less than 2 seconds.      Findings: No erythema or rash.   Neurological:      General: No focal deficit present.      Mental Status: She is alert and oriented to person, place, and time.      Motor: No weakness.      Gait: Gait normal.         Fluids    Intake/Output Summary (Last 24 hours) at 12/16/2024 1437  Last data filed at 12/16/2024 1118  Gross per 24 hour   Intake 1200 ml   Output 1400 ml   Net -200 ml        Laboratory  Recent Labs     12/14/24  0100 12/14/24  0914 12/15/24  0534   WBC 9.1  --  8.8   RBC 2.97*  --  3.12*   HEMOGLOBIN 9.1* 8.9* 9.4*   HEMATOCRIT 28.4*  --  29.8*   MCV 95.6  --  95.5   MCH 30.6  --  30.1   MCHC 32.0*  --  31.5*   RDW 56.6*  --  56.9*   PLATELETCT 271  --  293   MPV 9.2  --  8.8*     Recent Labs     12/14/24  0100 12/15/24  0003   SODIUM 130* 132*   POTASSIUM 4.1 4.1   CHLORIDE 96 98   CO2 24 23   GLUCOSE 114* 151*   BUN 63* 68*   CREATININE 1.72* 1.94*   CALCIUM 8.4* 7.9*     Recent Labs     12/14/24  0100 12/15/24  0003   INR 1.33* 1.58*               Imaging  CT-ABDOMEN-PELVIS W/O   Final Result         1.  Large left retroperitoneal hematoma displacing the spleen superiorly, significantly increased in size since prior study, noncontrast technique limits evaluation for active hemorrhage   2.  Small layering bilateral pleural effusions   3.  Linear densities the bilateral lung bases favoring atelectasis, component of left lower lobe infiltrate not excluded   4.  Diverticulosis   5.  Hepatomegaly   6.   Atherosclerosis and atherosclerotic coronary artery disease      These findings were discussed with the patient's clinician, Dorothy Maddox, on 12/12/2024 4:33 AM.      US-EXTREMITY VENOUS LOWER BILAT   Final Result      US-EXTREMITY VENOUS UPPER UNILAT LEFT   Final Result      DX-CHEST-PORTABLE (1 VIEW)   Final Result         1.  Left basilar atelectasis and/or infiltrates   2.  Cardiomegaly   3.  Atherosclerosis           Assessment/Plan  * Shock (HCC)- (present on admission)  Assessment & Plan  Hemorrhagic vs septic vs adrenal insuficiency vs cardiogenic  Has responed to resuscitation  Now off pressors  Cultures neg  Starting IV lasix  Complete 5 days of Abx's assuming pt conts to do well clinically    ACP (advance care planning)  Assessment & Plan  Discussed with patient, her 2 sisters at bedside and her daughter-in-law over the phone, patient has communicated to her daughter-in-law previously that she does not want any further aggressive measures.  We discussed this at bedside and she reiterated that she wants to pursue comfort care measures.  She no longer wants to be in the hospital, no longer wants lab draws etc.    Patient transition to comfort care measures 12/15 p.m.  Hospice referral placed.  QuantiFERON gold pending.    Acute pulmonary edema (HCC)- (present on admission)  Assessment & Plan  Patient has B/L acute pulmonary edema seen on chest X-ray done on admission. Started on IV Lasix 40mg BID  -Continue on IV Lasix 40 mg BID  -Monitor strict Input and Output  -Continue monitoring on telemetry        Acute respiratory failure with hypoxia (HCC)- (present on admission)  Assessment & Plan  Patient presented with AHRF and required 2L of O2. She does not use oxygen at baseline. Chest X-ray showed left basal infiltrates, Cardiomegaly and B/L pulmonary edema. Patient continues to be tachypneic. She refused CTPE on admission. This morning, I explained the need of a CTPE again and she refused once again.    -U/S duplex of B/L lower extremities to r/o DVT  -Continue IV Lasix 40mg BID for now.  Increase dose tomorrow if renal function and BPs are tolerating it  -RT protocol  -Covid-19 test  -Resp Profile      Retroperitoneal hemorrhage- (present on admission)  Assessment & Plan  In setting of chronic anticoagulation on Warfarin in pt with hx of mechanical AVR  First Dx'd on 11/23  Enlarged on repeat imaging on this admission 12/10  Cont to monitor off of anticoagulation  STAT CTA Abd for possible IR embolization if any signs of acute bleeding  Serial H&H    Paroxysmal atrial fibrillation (HCC)- (present on admission)  Assessment & Plan  Anticoagulation on hold in setting of retroperitoneal hematoma    Hx of mechanical aortic valve replacement [V43.3]- (present on admission)  Assessment & Plan  Patient has a history of mechanical aortic valve replacement 2007 Dr Awan and is on warfarin indefinitely. Goal INR: 2.5-3.5  Anticoagulation is currently on hold due to retroperitoneal hematoma    Cellulitis- (present on admission)  Assessment & Plan  Patient has left lower leg erythema, tenderness and swelling suspicious for a cellulitis and and left upper arm thrombophlebitis. In the setting of neutrophilic leukocytosis, and mildly elevated Procalcitonin at 0.76. Our suspicion for infection is very high. She was started on Unasyn on admission and blood cultures have been sent.  -U/S of left leg and left upper arm  -Continue IV Unasyn  -Follow up on Blood cultures    Chronic kidney disease (CKD) stage G3b/A1, moderately decreased glomerular filtration rate (GFR) between 30-44 mL/min/1.73 square meter and albuminuria creatinine ratio less than 30 mg/g  Assessment & Plan  Monitor BMP and assess response  nephrotoxins/NSAIDs  Dose adjust meds for decreased GFR      Urinary tract infection associated with indwelling urethral catheter (HCC)- (present on admission)  Assessment & Plan  Completing antibiotic course  Bull catheter  exchanged    Thrombophlebitis of left arm- (present on admission)  Assessment & Plan  Patient has a left arm thrombophlebitis in the setting of hx of IV line at the site which have been removed.  -U/S of left arm   -Continue IV Unasyn    Status post closed fracture of right femur- (present on admission)  Assessment & Plan  Patient is s/p ORIF for femur fracture after a mechanical ground-level fall on 11/15  WBAT  -Continue pain management  -PT/OT      NSTEMI (non-ST elevated myocardial infarction) (HCC)  Assessment & Plan  Patient continues to deny any any chest pain and did not have any ST segment changes on EKG. She has uptrending troponins with Trop T: 267 on admission, which up trended to 478. Last Echocardiography done on 11/23/24 showed a preserved LVEF of 75%.   -Transthoracic Echocardiography today  -Lipid Panel, TSH and Hb1C  -As needed nitro for chest pain        Pulmonary hypertension (HCC)- (present on admission)  Assessment & Plan  Decompensated  Monitor volume status    Anemia- (present on admission)  Assessment & Plan  Her Hb is low at 8.4 with a mCV of 95.1  -Continue to monitor  -Transfuse to a goal of >7    Essential hypertension- (present on admission)  Assessment & Plan  Patient has a history Uncontrolled  Continue current home medications  IV as needed medications have been ordered      Coronary artery disease  Assessment & Plan  Patient has a history of CAD and is s/p CABG. She's allergic to statin and is on Alirocumab and Ezetemibe.         VTE prophylaxis: SCDs    I have performed a physical exam and reviewed and updated ROS and Plan today (12/16/2024). In review of yesterday's note (12/15/2024), there are no changes except as documented above.

## 2024-12-16 NOTE — PROGRESS NOTES
Bedside report received from off going RN/tech: aMame assumed care of patient.     Fall Risk Score: HIGH RISK  Fall risk interventions in place: Place yellow fall risk ID band on patient, Provide patient/family education based on risk assessment, Educate patient/family to call staff for assistance when getting out of bed, Place fall precaution signage outside patient door, Place patient in room close to nursing station, Utilize bed/chair fall alarm, Notify charge of high risk for huddle, and Bed alarm connected correctly  Bed type: Low air loss (Kamar Score less than 17 interventions in place)  Patient on cardiac monitor: No   IVF/IV medications: Not Applicable   Oxygen: How many liters 1L  Bedside sitter: Not Applicable   Isolation: Not applicable

## 2024-12-17 LAB
BACTERIA BLD CULT: NORMAL
SIGNIFICANT IND 70042: NORMAL
SITE SITE: NORMAL
SOURCE SOURCE: NORMAL

## 2024-12-17 PROCEDURE — 700102 HCHG RX REV CODE 250 W/ 637 OVERRIDE(OP): Performed by: GENERAL PRACTICE

## 2024-12-17 PROCEDURE — A9270 NON-COVERED ITEM OR SERVICE: HCPCS | Performed by: HOSPITALIST

## 2024-12-17 PROCEDURE — 302118 SHAMPOO,NO RINSE: Performed by: INTERNAL MEDICINE

## 2024-12-17 PROCEDURE — 700102 HCHG RX REV CODE 250 W/ 637 OVERRIDE(OP)

## 2024-12-17 PROCEDURE — 700101 HCHG RX REV CODE 250: Performed by: HOSPITALIST

## 2024-12-17 PROCEDURE — 770004 HCHG ROOM/CARE - ONCOLOGY PRIVATE *

## 2024-12-17 PROCEDURE — 700111 HCHG RX REV CODE 636 W/ 250 OVERRIDE (IP): Performed by: INTERNAL MEDICINE

## 2024-12-17 PROCEDURE — 99232 SBSQ HOSP IP/OBS MODERATE 35: CPT | Performed by: INTERNAL MEDICINE

## 2024-12-17 PROCEDURE — 700102 HCHG RX REV CODE 250 W/ 637 OVERRIDE(OP): Performed by: HOSPITALIST

## 2024-12-17 PROCEDURE — A9270 NON-COVERED ITEM OR SERVICE: HCPCS | Performed by: GENERAL PRACTICE

## 2024-12-17 PROCEDURE — A9270 NON-COVERED ITEM OR SERVICE: HCPCS

## 2024-12-17 RX ADMIN — MORPHINE SULFATE 10 MG: 100 SOLUTION ORAL at 17:51

## 2024-12-17 RX ADMIN — OMEPRAZOLE 20 MG: 20 CAPSULE, DELAYED RELEASE ORAL at 05:28

## 2024-12-17 RX ADMIN — TIMOLOL MALEATE 1 DROP: 5 SOLUTION OPHTHALMIC at 05:59

## 2024-12-17 RX ADMIN — LEVOTHYROXINE SODIUM 125 MCG: 0.12 TABLET ORAL at 05:28

## 2024-12-17 RX ADMIN — MORPHINE SULFATE 10 MG: 100 SOLUTION ORAL at 16:44

## 2024-12-17 RX ADMIN — MORPHINE SULFATE 10 MG: 100 SOLUTION ORAL at 09:44

## 2024-12-17 RX ADMIN — MORPHINE SULFATE 20 MG: 100 SOLUTION ORAL at 05:27

## 2024-12-17 RX ADMIN — MORPHINE SULFATE 10 MG: 100 SOLUTION ORAL at 11:38

## 2024-12-17 RX ADMIN — FUROSEMIDE 40 MG: 10 INJECTION INTRAMUSCULAR; INTRAVENOUS at 05:28

## 2024-12-17 RX ADMIN — FUROSEMIDE 40 MG: 10 INJECTION INTRAMUSCULAR; INTRAVENOUS at 16:44

## 2024-12-17 RX ADMIN — MORPHINE SULFATE 20 MG: 100 SOLUTION ORAL at 18:58

## 2024-12-17 RX ADMIN — TIMOLOL MALEATE 1 DROP: 5 SOLUTION OPHTHALMIC at 16:50

## 2024-12-17 RX ADMIN — MORPHINE SULFATE 20 MG: 100 SOLUTION ORAL at 22:40

## 2024-12-17 ASSESSMENT — COGNITIVE AND FUNCTIONAL STATUS - GENERAL
EATING MEALS: A LITTLE
DAILY ACTIVITIY SCORE: 10
DRESSING REGULAR UPPER BODY CLOTHING: TOTAL
PERSONAL GROOMING: A LOT
TURNING FROM BACK TO SIDE WHILE IN FLAT BAD: A LOT
WALKING IN HOSPITAL ROOM: TOTAL
SUGGESTED CMS G CODE MODIFIER DAILY ACTIVITY: CL
MOVING FROM LYING ON BACK TO SITTING ON SIDE OF FLAT BED: TOTAL
MOVING TO AND FROM BED TO CHAIR: TOTAL
TOILETING: TOTAL
DRESSING REGULAR LOWER BODY CLOTHING: A LOT
STANDING UP FROM CHAIR USING ARMS: TOTAL
SUGGESTED CMS G CODE MODIFIER MOBILITY: CM
HELP NEEDED FOR BATHING: TOTAL
CLIMB 3 TO 5 STEPS WITH RAILING: TOTAL
MOBILITY SCORE: 7

## 2024-12-17 ASSESSMENT — PAIN DESCRIPTION - PAIN TYPE
TYPE: ACUTE PAIN

## 2024-12-17 NOTE — PROGRESS NOTES
Bedside report received from off going RN/tech: Dina, assumed care of patient.     Fall Risk Score: HIGH RISK  Fall risk interventions in place: Place yellow fall risk ID band on patient, Provide patient/family education based on risk assessment, Educate patient/family to call staff for assistance when getting out of bed, Place fall precaution signage outside patient door, Place patient in room close to nursing station, Utilize bed/chair fall alarm, Notify charge of high risk for huddle, and Bed alarm connected correctly  Bed type: Low air loss (Kamar Score less than 17 interventions in place)  Patient on cardiac monitor: No   IVF/IV medications: Not Applicable   Oxygen: How many liters 1L, Traced the line to wall oxygen, and No oxygen tank in room  Bedside sitter: Not Applicable   Isolation: Not applicable

## 2024-12-17 NOTE — DISCHARGE PLANNING
@1440 Met with patient to see if I could answer any questions, discuss options for discharge with patient. She is trying to work things through, and she wishes to wait for her sister to come discuss this with her.   Case management will be available to assist with the discharge plan if the plan changes from hospice discharge.

## 2024-12-17 NOTE — DISCHARGE PLANNING
Referral Management Team    Received hospice referral via [employer].    Choice obtained for: Pending  Agency acceptance status: Pending    Family involved in care: Yes  Support available at home: No   Placement needed: Yes    Barriers to discharge: Hospice choice, GENIA   Care team members informed: Yes    0840  Went to meet patient for hospice choice. She initially signed choice for [facility]. Then she stated she felt pressured and asked for signed choice form back. She stated she will have a friend coming tomorrow and they want to be in conversations with the hospice company also. She also would like to discuss with daughter-in-law Lurdes who works for [employer] as a navigator.     Called Joao at [facility]. They have a bed available due to size of patient and level of care. The bed will cost $5000 a month.     Team updated.     0945  Went to patients room to give [facility] name, address and phone number. Daughter-in-law Lurdes was bedside as well as Chantell ORO. Explained that patient needs to have a discharge plan.   GENIA was submitted to  [employer]. Patient to pay $1100 and [facility] to pay $3900.    Dr Miguel given group home paperwork to sign. He will bring to rounds today.   BOBBI albarran    Called placed [facility] she has availability at there [place] location. She would like to come evaluate the patient but due to level of care and size of patient it would be about $5000.00    [facility] unable to accept due to size of the patient.     1325  Went to speak to patient. She has not heard from her sisters she would not get choice yet.     Mimi MARTINES updated    GENIA sent to Joao Figueredo     Met with sister Yudy and Yenifer. Received choice for [facility] and [facility]. They would like to speak to both agencies. They are aware that they will be discharged to [facility] tomorrow.     Wen at [facility] and Vernon at [facility] made aware.     Magaly GUAMAN and Cinthia Charge nurse made aware via voalte.

## 2024-12-17 NOTE — CARE PLAN
The patient is Watcher - Medium risk of patient condition declining or worsening    Shift Goals  Clinical Goals: Pt will have improved pain control, discharge planning  Patient Goals: Rest and pain control  Family Goals: N/A    Pt is not wanting to make a hospice choice at this time. RMT is on the discharge planning and pt is waiting for sister to show up to make hospice choice.     Progress made toward(s) clinical / shift goals:        Problem: Pain - Standard  Goal: Alleviation of pain or a reduction in pain to the patient’s comfort goal  Outcome: Progressing  Note: Pt reluctant to take morphine as it makes her sleepy.  Pts pain is controlled with PRN roxanol.   Repositioning pt as needed.       Patient is not progressing towards the following goals:

## 2024-12-17 NOTE — PROGRESS NOTES
4 Eyes Skin Assessment Completed by PREETHI Coronel and PREETHI Tesfaye.    Head WDL  Ears Redness  Nose Redness  Mouth WDL  Neck WDL  Breast/Chest WDL  Shoulder Blades WDL  Spine WDL  (R) Arm/Elbow/Hand Bruising  (L) Arm/Elbow/Hand Bruising  Abdomen Redness and Bruising, lf side midsection  Groin Redness  Scrotum/Coccyx/Buttocks Redness  (R) Leg Redness, Swelling, Weeping, and Edema  (L) Leg Redness, Swelling, Weeping, and Edema  (R) Heel/Foot/Toe WDL dry skin  (L) Heel/Foot/Toe WDL dry skin          Devices In Places Blood Pressure Cuff, Bull, and Nasal Cannula      Interventions In Place Gray Ear Foams, InterDry, Pillows, Q2 Turns, and Barrier Cream    Possible Skin Injury Yes    Pictures Uploaded Into Epic No, needs to be completed  Wound Consult Placed N/A  RN Wound Prevention Protocol Ordered No

## 2024-12-17 NOTE — CARE PLAN
The patient is Stable - Low risk of patient condition declining or worsening    Shift Goals  Clinical Goals: Comfort, pain contrl  Patient Goals: Pain contrl  Family Goals: HEATHER    Progress made toward(s) clinical / shift goals:    Problem: Pain - Standard  Goal: Alleviation of pain or a reduction in pain to the patient’s comfort goal  Description: Target End Date:  Prior to discharge or change in level of care    Document on Vitals flowsheet    1.  Document pain using the appropriate pain scale per order or unit policy  2.  Educate and implement non-pharmacologic comfort measures (i.e. relaxation, distraction, massage, cold/heat therapy, etc.)  3.  Pain management medications as ordered  4.  Reassess pain after pain med administration per policy  5.  If opiods administered assess patient's response to pain medication is appropriate per POSS sedation scale  6.  Follow pain management plan developed in collaboration with patient and interdisciplinary team (including palliative care or pain specialists if applicable)  Outcome: Progressing  Note: Patient will continue to notify RN of pain medication needs. Patient will continue to rate pain using a scale of 1-10.      Problem: Fall Risk  Goal: Patient will remain free from falls  Description: Target End Date:  Prior to discharge or change in level of care    Document interventions on the [facility] Fall Risk Assessment    1.  Assess for fall risk factors  2.  Implement fall precautions  Outcome: Progressing  Note: Patient will continue to use call light appropriately.        Patient is not progressing towards the following goals:      Problem: Skin Integrity  Goal: Skin integrity is maintained or improved  Description: Target End Date:  Prior to discharge or change in level of care    Document interventions on Skin Risk/Kamar flowsheet groups and corresponding LDA    1.  Assess and monitor skin integrity, appearance and/or temperature  2.  Assess risk factors for  impaired skin integrity and/or pressures ulcers  3.  Implement precautions to protect skin integrity in collaboration with interdisciplinary team  4.  Implement pressure ulcer prevention protocol if at risk for skin breakdown  5.  Confirm wound care consult if at risk for skin breakdown  6.  Ensure patient use of pressure relieving devices  (Low air loss bed, waffle overlay, heel protectors, ROHO cushion, etc)  Outcome: Not Progressing  Note: Patient will allow staff to do Q2 turns. Patient understands the need for monitoring of skin integrity.

## 2024-12-17 NOTE — DISCHARGE PLANNING
TCN following. HTH/SCP chart review Completed.  Collaborated with BOBBI Montoya.   Pt has transitioned to comfort care with hospice referral placed.  TCN will monitor if change in POC/status though will not actively be involved given comfort care status/hospice plan.

## 2024-12-17 NOTE — PROGRESS NOTES
[facility] Medicine Daily Progress Note    Date of Service  12/17/2024    Chief Complaint  Yenifer Beasley is a 74 y.o. female admitted 12/10/2024 with shortness of breath    Hospital Course  paroxysmal atrial fibrillation, mechanical aortic valve replacement on Coumadin, hypertension, chronic diastolic heart failure, CKD stage IIIb hypothyroidism, glaucoma, recent admission for right femoral lateral condyle fracture s/p fixation on 11/8 who presented to the ED on 12/10 with shortness of breath    Patient had previous admission for right femoral lateral condyle fracture status post internal fixation on 11/8, postoperatively patient did have significant bleeding requiring 1 unit of PRBCs transfusion.  She was discharged to skilled nursing facility 11/15.  She returns back to the ER on 11/23 found to be hypotensive, noted to have a 17 x 10.7 x 9.5 cm retroperitoneal hematoma, requiring Kcentra and blood transfusions.      Patient returned here with shortness of breath, significant for shock with respiratory failure, DIAMOND and hypotension requiring pressor support.  Repeat CT imaging revealed worsening of the retroperitoneal hematoma.  Coumadin on hold.    Patient started on empiric antibiotic therapy for lower extremity cellulitis and UTI.  Bull catheter was exchanged.    Patient started on aggressive IV diuresis due to concerns for acute on chronic diastolic heart failure exacerbation.  GDMT therapy initially held due to shock. Restart as clinically indicated and as tolerated.    12/15 Discussed with patient, her 2 sisters at bedside and her daughter-in-law over the phone, patient has communicated to her daughter-in-law previously that she does not want any further aggressive measures.  We discussed this at bedside and she reiterated that she wants to pursue comfort care measures.  She no longer wants to be in the hospital, no longer wants lab draws etc.    Interval Problem Update  Discussed with patient, her 2  sisters at bedside and her daughter-in-law over the phone, patient has communicated to her daughter-in-law previously that she does not want any further aggressive measures.  We discussed this at bedside and she reiterated that she wants to pursue comfort care measures.  She no longer wants to be in the hospital, no longer wants lab draws etc.    Patient transitioned to comfort care measures 12/15 p.m.  Hospice referral placed.  QuantiFERON gold pending.    Patient was seen and examined at bedside.  I have personally reviewed and interpreted vitals, labs, and imaging.    12/17.  Afebrile.  Stable vitals.  On 1 L nasal cannula.  Denies any fever, chills, chest pains.  Shortness of breath is improved.  Complains of right knee pain.  Patient has concerns about taking group home and hospice.  She would like to discuss this with her sisters who have been doing research on local hospice companies.  Discussed with social work.  Continue comfort care measures only.    I have discussed this patient's plan of care and discharge plan at IDT rounds today with Case Management, Nursing, Nursing leadership, and other members of the IDT team.    Consultants/Specialty  critical care    Code Status  Comfort Care/DNR    Disposition  Patient is not medically clear to discharge.    I have placed the appropriate orders for post-discharge needs.    Review of Systems  Review of Systems   Cardiovascular:  Positive for leg swelling.   All other systems reviewed and are negative.       Physical Exam  Temp:  [36.1 °C (97 °F)-36.6 °C (97.9 °F)] 36.3 °C (97.3 °F)  Pulse:  [65-72] 71  Resp:  [16-18] 16  BP: (124-138)/(53-65) 138/65  SpO2:  [96 %-100 %] 96 %    Physical Exam  Vitals and nursing note reviewed.   Constitutional:       General: She is not in acute distress.     Appearance: She is ill-appearing.   HENT:      Head: Normocephalic and atraumatic.      Mouth/Throat:      Mouth: Mucous membranes are moist.      Pharynx: No oropharyngeal  exudate.   Eyes:      Extraocular Movements: Extraocular movements intact.      Pupils: Pupils are equal, round, and reactive to light.   Cardiovascular:      Rate and Rhythm: Normal rate and regular rhythm.      Pulses: Normal pulses.      Heart sounds: No murmur heard.     No friction rub. No gallop.   Pulmonary:      Effort: Pulmonary effort is normal. No respiratory distress.      Breath sounds: No wheezing, rhonchi or rales.   Abdominal:      General: Bowel sounds are normal. There is no distension.      Palpations: Abdomen is soft. There is no mass.      Tenderness: There is no abdominal tenderness.   Musculoskeletal:         General: No swelling or tenderness. Normal range of motion.      Cervical back: Normal range of motion. No rigidity. No muscular tenderness.      Right lower leg: Edema present.      Left lower leg: Edema present.      Comments: +3 pitting edema with blisters   Skin:     General: Skin is warm and dry.      Capillary Refill: Capillary refill takes less than 2 seconds.      Findings: No erythema or rash.   Neurological:      General: No focal deficit present.      Mental Status: She is alert and oriented to person, place, and time.      Motor: No weakness.      Gait: Gait normal.         Fluids    Intake/Output Summary (Last 24 hours) at 12/17/2024 0545  Last data filed at 12/16/2024 1118  Gross per 24 hour   Intake 400 ml   Output 875 ml   Net -475 ml        Laboratory  Recent Labs     12/14/24  0914 12/15/24  0534   WBC  --  8.8   RBC  --  3.12*   HEMOGLOBIN 8.9* 9.4*   HEMATOCRIT  --  29.8*   MCV  --  95.5   MCH  --  30.1   MCHC  --  31.5*   RDW  --  56.9*   PLATELETCT  --  293   MPV  --  8.8*     Recent Labs     12/15/24  0003   SODIUM 132*   POTASSIUM 4.1   CHLORIDE 98   CO2 23   GLUCOSE 151*   BUN 68*   CREATININE 1.94*   CALCIUM 7.9*     Recent Labs     12/15/24  0003   INR 1.58*               Imaging  CT-ABDOMEN-PELVIS W/O   Final Result         1.  Large left retroperitoneal  hematoma displacing the spleen superiorly, significantly increased in size since prior study, noncontrast technique limits evaluation for active hemorrhage   2.  Small layering bilateral pleural effusions   3.  Linear densities the bilateral lung bases favoring atelectasis, component of left lower lobe infiltrate not excluded   4.  Diverticulosis   5.  Hepatomegaly   6.  Atherosclerosis and atherosclerotic coronary artery disease      These findings were discussed with the patient's clinician, Dorothy Maddox, on 12/12/2024 4:33 AM.      US-EXTREMITY VENOUS LOWER BILAT   Final Result      US-EXTREMITY VENOUS UPPER UNILAT LEFT   Final Result      DX-CHEST-PORTABLE (1 VIEW)   Final Result         1.  Left basilar atelectasis and/or infiltrates   2.  Cardiomegaly   3.  Atherosclerosis           Assessment/Plan  * Shock (HCC)- (present on admission)  Assessment & Plan  Hemorrhagic vs septic vs adrenal insuficiency vs cardiogenic  Has responed to resuscitation  Now off pressors  Cultures neg  Starting IV lasix  Complete 5 days of Abx's assuming pt conts to do well clinically    Comfort care measures only    ACP (advance care planning)  Assessment & Plan  Discussed with patient, her 2 sisters at bedside and her daughter-in-law over the phone, patient has communicated to her daughter-in-law previously that she does not want any further aggressive measures.  We discussed this at bedside and she reiterated that she wants to pursue comfort care measures.  She no longer wants to be in the hospital, no longer wants lab draws etc.    Patient transition to comfort care measures 12/15 p.m.  Hospice referral placed.  QuantiFERON gold pending.    Acute kidney injury superimposed on stage 3b chronic kidney disease (HCC)- (present on admission)  Assessment & Plan  Comfort care measures only    Acute on chronic diastolic heart failure (HCC)- (present on admission)  Assessment & Plan  Comfort care measures only    Thrombophlebitis of  left arm- (present on admission)  Assessment & Plan  Patient has a left arm thrombophlebitis in the setting of hx of IV line at the site which have been removed.  -U/S of left arm   -Continue IV Unasyn    Comfort care measures only    Cellulitis- (present on admission)  Assessment & Plan  Patient has left lower leg erythema, tenderness and swelling suspicious for a cellulitis and and left upper arm thrombophlebitis. In the setting of neutrophilic leukocytosis, and mildly elevated Procalcitonin at 0.76. Our suspicion for infection is very high. She was started on Unasyn on admission and blood cultures have been sent.  -U/S of left leg and left upper arm  -Continue IV Unasyn  -Follow up on Blood cultures    Comfort care measures only    Status post closed fracture of right femur- (present on admission)  Assessment & Plan  Patient is s/p ORIF for femur fracture after a mechanical ground-level fall on 11/15  WBAT  -Continue pain management  -PT/OT    Comfort care measures only    NSTEMI (non-ST elevated myocardial infarction) (HCC)  Assessment & Plan  Patient continues to deny any any chest pain and did not have any ST segment changes on EKG. She has uptrending troponins with Trop T: 267 on admission, which up trended to 478. Last Echocardiography done on 11/23/24 showed a preserved LVEF of 75%.   -Transthoracic Echocardiography today  -Lipid Panel, TSH and Hb1C  -As needed nitro for chest pain        Acute pulmonary edema (HCC)- (present on admission)  Assessment & Plan  Patient has B/L acute pulmonary edema seen on chest X-ray done on admission. Started on IV Lasix 40mg BID  -Continue on IV Lasix 40 mg BID  -Monitor strict Input and Output    Comfort care measures only    Acute respiratory failure with hypoxia (HCC)- (present on admission)  Assessment & Plan  Patient presented with AHRF and required 2L of O2. She does not use oxygen at baseline. Chest X-ray showed left basal infiltrates, Cardiomegaly and B/L pulmonary  edema. Patient continues to be tachypneic. She refused CTPE on admission. This morning, I explained the need of a CTPE again and she refused once again.   -U/S duplex of B/L lower extremities to r/o DVT  -Continue IV Lasix 40mg BID for now.  Increase dose tomorrow if renal function and BPs are tolerating it  -RT protocol  -Covid-19 test  -Resp Profile    Comfort care measures only    Retroperitoneal hemorrhage- (present on admission)  Assessment & Plan  In setting of chronic anticoagulation on Warfarin in pt with hx of mechanical AVR  First Dx'd on 11/23  Enlarged on repeat imaging on this admission 12/10  Cont to monitor off of anticoagulation  STAT CTA Abd for possible IR embolization if any signs of acute bleeding  Serial H&H    Comfort care measures only    Pulmonary hypertension (HCC)- (present on admission)  Assessment & Plan  Decompensated  Monitor volume status    Comfort care measures only    Chronic kidney disease (CKD) stage G3b/A1, moderately decreased glomerular filtration rate (GFR) between 30-44 mL/min/1.73 square meter and albuminuria creatinine ratio less than 30 mg/g  Assessment & Plan  Monitor BMP and assess response  nephrotoxins/NSAIDs  Dose adjust meds for decreased GFR      Paroxysmal atrial fibrillation (HCC)- (present on admission)  Assessment & Plan  Anticoagulation on hold in setting of retroperitoneal hematoma    Comfort care measures only    Hx of mechanical aortic valve replacement [V43.3]- (present on admission)  Assessment & Plan  Patient has a history of mechanical aortic valve replacement 2007 Dr Awan and is on warfarin indefinitely. Goal INR: 2.5-3.5  Anticoagulation is currently on hold due to retroperitoneal hematoma    Comfort care measures only    Urinary tract infection associated with indwelling urethral catheter (HCC)- (present on admission)  Assessment & Plan  Completing antibiotic course  Bull catheter exchanged    Comfort care measures only    Anemia- (present on  admission)  Assessment & Plan  Her Hb is low at 8.4 with a mCV of 95.1  -Continue to monitor  -Transfuse to a goal of >7    Comfort care measures only    Essential hypertension- (present on admission)  Assessment & Plan  Patient has a history Uncontrolled  Continue current home medications  IV as needed medications have been ordered    Comfort care measures only    Coronary artery disease  Assessment & Plan  Patient has a history of CAD and is s/p CABG. She's allergic to statin and is on Alirocumab and Ezetemibe.         VTE prophylaxis: SCDs    I have performed a physical exam and reviewed and updated ROS and Plan today (12/17/2024). In review of yesterday's note (12/16/2024), there are no changes except as documented above.    Greater than 40 minutes spent prepping to see patient (e.g. review of tests) obtaining and/or reviewing separately obtained history. Performing a medically appropriate examination and/ evaluation.  Counseling and educating the patient/family/caregiver.  Ordering medications, tests, or procedures.  Referring and communicating with other health care professionals.  Documenting clinical information in EPIC.  Independently interpreting results and communicating results to patient/family/caregiver.  Care coordination.

## 2024-12-17 NOTE — PROGRESS NOTES
RN gave report to PREETHI Balbuena  Pt left floor with transporter, Ty, to [facility]. All belongings sent with pt.   Son, Daniel [phone] notified of pt transfer to [facility].

## 2024-12-18 VITALS
BODY MASS INDEX: 53.58 KG/M2 | OXYGEN SATURATION: 93 % | HEIGHT: 60 IN | SYSTOLIC BLOOD PRESSURE: 105 MMHG | DIASTOLIC BLOOD PRESSURE: 53 MMHG | HEART RATE: 68 BPM | RESPIRATION RATE: 18 BRPM | WEIGHT: 272.93 LBS | TEMPERATURE: 97.7 F

## 2024-12-18 PROCEDURE — 700102 HCHG RX REV CODE 250 W/ 637 OVERRIDE(OP)

## 2024-12-18 PROCEDURE — 700102 HCHG RX REV CODE 250 W/ 637 OVERRIDE(OP): Performed by: HOSPITALIST

## 2024-12-18 PROCEDURE — 770004 HCHG ROOM/CARE - ONCOLOGY PRIVATE *

## 2024-12-18 PROCEDURE — 99232 SBSQ HOSP IP/OBS MODERATE 35: CPT | Performed by: INTERNAL MEDICINE

## 2024-12-18 PROCEDURE — 700111 HCHG RX REV CODE 636 W/ 250 OVERRIDE (IP): Performed by: INTERNAL MEDICINE

## 2024-12-18 PROCEDURE — 700102 HCHG RX REV CODE 250 W/ 637 OVERRIDE(OP): Performed by: GENERAL PRACTICE

## 2024-12-18 PROCEDURE — A9270 NON-COVERED ITEM OR SERVICE: HCPCS | Performed by: GENERAL PRACTICE

## 2024-12-18 PROCEDURE — A9270 NON-COVERED ITEM OR SERVICE: HCPCS | Performed by: HOSPITALIST

## 2024-12-18 PROCEDURE — A9270 NON-COVERED ITEM OR SERVICE: HCPCS

## 2024-12-18 RX ADMIN — OMEPRAZOLE 20 MG: 20 CAPSULE, DELAYED RELEASE ORAL at 05:21

## 2024-12-18 RX ADMIN — FUROSEMIDE 40 MG: 10 INJECTION INTRAMUSCULAR; INTRAVENOUS at 05:22

## 2024-12-18 RX ADMIN — TIMOLOL MALEATE 1 DROP: 5 SOLUTION OPHTHALMIC at 16:51

## 2024-12-18 RX ADMIN — LORAZEPAM 1 MG: 2 LIQUID ORAL at 22:40

## 2024-12-18 RX ADMIN — MORPHINE SULFATE 20 MG: 100 SOLUTION ORAL at 12:23

## 2024-12-18 RX ADMIN — MORPHINE SULFATE 20 MG: 100 SOLUTION ORAL at 05:21

## 2024-12-18 RX ADMIN — FUROSEMIDE 40 MG: 10 INJECTION INTRAMUSCULAR; INTRAVENOUS at 16:51

## 2024-12-18 RX ADMIN — SENNOSIDES AND DOCUSATE SODIUM 2 TABLET: 50; 8.6 TABLET ORAL at 16:51

## 2024-12-18 RX ADMIN — MORPHINE SULFATE 20 MG: 100 SOLUTION ORAL at 15:21

## 2024-12-18 RX ADMIN — MORPHINE SULFATE 20 MG: 100 SOLUTION ORAL at 02:31

## 2024-12-18 RX ADMIN — MORPHINE SULFATE 20 MG: 100 SOLUTION ORAL at 07:45

## 2024-12-18 RX ADMIN — TIMOLOL MALEATE 1 DROP: 5 SOLUTION OPHTHALMIC at 05:23

## 2024-12-18 RX ADMIN — MORPHINE SULFATE 20 MG: 100 SOLUTION ORAL at 22:40

## 2024-12-18 RX ADMIN — MORPHINE SULFATE 20 MG: 100 SOLUTION ORAL at 10:05

## 2024-12-18 RX ADMIN — LEVOTHYROXINE SODIUM 125 MCG: 0.12 TABLET ORAL at 05:21

## 2024-12-18 ASSESSMENT — PAIN DESCRIPTION - PAIN TYPE
TYPE: ACUTE PAIN

## 2024-12-18 NOTE — DISCHARGE PLANNING
Referral Management Team     Received hospice referral via [facility].     Choice obtained for: Pending between [facility] and [facility]  Agency acceptance status: Pending     Family involved in care: Yes  Support available at home: No   Placement needed: Yes     Barriers to discharge: Finalize hospice choice, signed GENIA, transportation   Care team members informed: Yes    Met with Yenifer and sister Yudy they would like to interview both [facility] and [facility] to meet family at 10:45 this am  [facility] referral still pending.     Family and both hospices aware that patient is to discharge today to [facility]   [address] [place] [address]    Transportation to be set up once I get confirmation from hospice/family about agency    1015  Received fax from Joao Figueredo from [facility] that he is no longer comfortable taking the patient after reviewing the GENIA.     Call placed to Brayan at [facility]. [phone]  Medical records sent and Brayan will be by to see the patient.   Brayan approved patient to come once we get GENIA and completed paperwork.     Spoke to Wen at [facility]. They will have DME out by end of day. The patient can discharge tomorrow morning at 9:30.     1430  Still awaiting GENIA.     1450  Received GENIA sent to  for signature.   Spoke to  and [facility]. DME was delivered but not assembled as furniture needs to be move. Spouse of owner will complete once off work this evening. NO ETA on completion time.     Received signed copy of GENIA back sent to  leadership.     All group home paperwork signed by physician and faxed.     Spoke to Brayan and she has received all paperwork  Informed transportation will be scheduled for 0930 tomorrow morning.   Notified patient patient as well. She was woken up and appeared confused

## 2024-12-18 NOTE — PROGRESS NOTES
[facility] Medicine Daily Progress Note    Date of Service  12/18/2024    Chief Complaint  Yenifer Beasley is a 74 y.o. female admitted 12/10/2024 with shortness of breath    Hospital Course  paroxysmal atrial fibrillation, mechanical aortic valve replacement on Coumadin, hypertension, chronic diastolic heart failure, CKD stage IIIb hypothyroidism, glaucoma, recent admission for right femoral lateral condyle fracture s/p fixation on 11/8 who presented to the ED on 12/10 with shortness of breath    Patient had previous admission for right femoral lateral condyle fracture status post internal fixation on 11/8, postoperatively patient did have significant bleeding requiring 1 unit of PRBCs transfusion.  She was discharged to skilled nursing facility 11/15.  She returns back to the ER on 11/23 found to be hypotensive, noted to have a 17 x 10.7 x 9.5 cm retroperitoneal hematoma, requiring Kcentra and blood transfusions.      Patient returned here with shortness of breath, significant for shock with respiratory failure, DIAMOND and hypotension requiring pressor support.  Repeat CT imaging revealed worsening of the retroperitoneal hematoma.  Coumadin on hold.    Patient started on empiric antibiotic therapy for lower extremity cellulitis and UTI.  Bull catheter was exchanged.    Patient started on aggressive IV diuresis due to concerns for acute on chronic diastolic heart failure exacerbation.  GDMT therapy initially held due to shock. Restart as clinically indicated and as tolerated.    12/15 Discussed with patient, her 2 sisters at bedside and her daughter-in-law over the phone, patient has communicated to her daughter-in-law previously that she does not want any further aggressive measures.  We discussed this at bedside and she reiterated that she wants to pursue comfort care measures.  She no longer wants to be in the hospital, no longer wants lab draws etc.    Interval Problem Update  Discussed with patient, her 2  sisters at bedside and her daughter-in-law over the phone, patient has communicated to her daughter-in-law previously that she does not want any further aggressive measures.  We discussed this at bedside and she reiterated that she wants to pursue comfort care measures.  She no longer wants to be in the hospital, no longer wants lab draws etc.    Patient transitioned to comfort care measures 12/15 p.m.  Hospice referral placed.  QuantiFERON gold pending.    Patient was seen and examined at bedside.  I have personally reviewed and interpreted vitals, labs, and imaging.    12/17.  Afebrile.  Stable vitals.  On 1 L nasal cannula.  Denies any fever, chills, chest pains.  Shortness of breath is improved.  Complains of right knee pain.  Patient has concerns about taking group home and hospice.  She would like to discuss this with her sisters who have been doing research on local hospice companies.  Discussed with social work.  Continue comfort care measures only.  12/18.  Afebrile.  Stable vitals.  On 1 L nasal cannula.  Denies fevers, chills, chest pains, shortness of breath.  Complains of chronic back pain and pain in right knee.  Discussed with patient family bedside.  She does have a new group home and has been accepted by [facility].  Unfortunately DME cannot be delivered until late this afternoon.  Continue comfort care measures only.    I have discussed this patient's plan of care and discharge plan at IDT rounds today with Case Management, Nursing, Nursing leadership, and other members of the IDT team.    Consultants/Specialty  critical care    Code Status  Comfort Care/DNR    Disposition  Patient is not medically clear to discharge.    I have placed the appropriate orders for post-discharge needs.    Review of Systems  Review of Systems   Cardiovascular:  Positive for leg swelling.   All other systems reviewed and are negative.       Physical Exam  Temp:  [36.7 °C (98 °F)-37 °C (98.6 °F)] 37 °C (98.6  °F)  Pulse:  [66-76] 76  Resp:  [18] 18  BP: (105-124)/(39-52) 124/39  SpO2:  [90 %-93 %] 90 %    Physical Exam  Vitals and nursing note reviewed.   Constitutional:       General: She is not in acute distress.     Appearance: She is ill-appearing.   HENT:      Head: Normocephalic and atraumatic.      Mouth/Throat:      Mouth: Mucous membranes are moist.      Pharynx: No oropharyngeal exudate.   Eyes:      Extraocular Movements: Extraocular movements intact.      Pupils: Pupils are equal, round, and reactive to light.   Cardiovascular:      Rate and Rhythm: Normal rate and regular rhythm.      Pulses: Normal pulses.      Heart sounds: No murmur heard.     No friction rub. No gallop.   Pulmonary:      Effort: Pulmonary effort is normal. No respiratory distress.      Breath sounds: No wheezing, rhonchi or rales.   Abdominal:      General: Bowel sounds are normal. There is no distension.      Palpations: Abdomen is soft. There is no mass.      Tenderness: There is no abdominal tenderness.   Musculoskeletal:         General: No swelling or tenderness. Normal range of motion.      Cervical back: Normal range of motion. No rigidity. No muscular tenderness.      Right lower leg: Edema present.      Left lower leg: Edema present.      Comments: +3 pitting edema with blisters   Skin:     General: Skin is warm and dry.      Capillary Refill: Capillary refill takes less than 2 seconds.      Findings: No erythema or rash.   Neurological:      General: No focal deficit present.      Mental Status: She is alert and oriented to person, place, and time.      Motor: No weakness.      Gait: Gait normal.         Fluids    Intake/Output Summary (Last 24 hours) at 12/18/2024 0629  Last data filed at 12/17/2024 2324  Gross per 24 hour   Intake --   Output 550 ml   Net -550 ml        Laboratory                              Imaging  CT-ABDOMEN-PELVIS W/O   Final Result         1.  Large left retroperitoneal hematoma displacing the spleen  superiorly, significantly increased in size since prior study, noncontrast technique limits evaluation for active hemorrhage   2.  Small layering bilateral pleural effusions   3.  Linear densities the bilateral lung bases favoring atelectasis, component of left lower lobe infiltrate not excluded   4.  Diverticulosis   5.  Hepatomegaly   6.  Atherosclerosis and atherosclerotic coronary artery disease      These findings were discussed with the patient's clinician, Dorothy Maddox, on 12/12/2024 4:33 AM.      US-EXTREMITY VENOUS LOWER BILAT   Final Result      US-EXTREMITY VENOUS UPPER UNILAT LEFT   Final Result      DX-CHEST-PORTABLE (1 VIEW)   Final Result         1.  Left basilar atelectasis and/or infiltrates   2.  Cardiomegaly   3.  Atherosclerosis           Assessment/Plan  * Shock (HCC)- (present on admission)  Assessment & Plan  Hemorrhagic vs septic vs adrenal insuficiency vs cardiogenic  Has responed to resuscitation  Now off pressors  Cultures neg  Starting IV lasix  Complete 5 days of Abx's assuming pt conts to do well clinically    Comfort care measures only    ACP (advance care planning)  Assessment & Plan  Discussed with patient, her 2 sisters at bedside and her daughter-in-law over the phone, patient has communicated to her daughter-in-law previously that she does not want any further aggressive measures.  We discussed this at bedside and she reiterated that she wants to pursue comfort care measures.  She no longer wants to be in the hospital, no longer wants lab draws etc.    Patient transition to comfort care measures 12/15 p.m.  Hospice referral placed.  QuantiFERON gold pending.    Acute kidney injury superimposed on stage 3b chronic kidney disease (HCC)- (present on admission)  Assessment & Plan  Comfort care measures only    Acute on chronic diastolic heart failure (HCC)- (present on admission)  Assessment & Plan  Comfort care measures only    Thrombophlebitis of left arm- (present on  admission)  Assessment & Plan  Patient has a left arm thrombophlebitis in the setting of hx of IV line at the site which have been removed.  -U/S of left arm   -Continue IV Unasyn    Comfort care measures only    Cellulitis- (present on admission)  Assessment & Plan  Patient has left lower leg erythema, tenderness and swelling suspicious for a cellulitis and and left upper arm thrombophlebitis. In the setting of neutrophilic leukocytosis, and mildly elevated Procalcitonin at 0.76. Our suspicion for infection is very high. She was started on Unasyn on admission and blood cultures have been sent.  -U/S of left leg and left upper arm  -Continue IV Unasyn  -Follow up on Blood cultures    Comfort care measures only    Status post closed fracture of right femur- (present on admission)  Assessment & Plan  Patient is s/p ORIF for femur fracture after a mechanical ground-level fall on 11/15  WBAT  -Continue pain management  -PT/OT    Comfort care measures only    NSTEMI (non-ST elevated myocardial infarction) (HCC)  Assessment & Plan  Patient continues to deny any any chest pain and did not have any ST segment changes on EKG. She has uptrending troponins with Trop T: 267 on admission, which up trended to 478. Last Echocardiography done on 11/23/24 showed a preserved LVEF of 75%.   -Transthoracic Echocardiography today  -Lipid Panel, TSH and Hb1C  -As needed nitro for chest pain        Acute pulmonary edema (HCC)- (present on admission)  Assessment & Plan  Patient has B/L acute pulmonary edema seen on chest X-ray done on admission. Started on IV Lasix 40mg BID  -Continue on IV Lasix 40 mg BID  -Monitor strict Input and Output    Comfort care measures only    Acute respiratory failure with hypoxia (HCC)- (present on admission)  Assessment & Plan  Patient presented with AHRF and required 2L of O2. She does not use oxygen at baseline. Chest X-ray showed left basal infiltrates, Cardiomegaly and B/L pulmonary edema. Patient  continues to be tachypneic. She refused CTPE on admission. This morning, I explained the need of a CTPE again and she refused once again.   -U/S duplex of B/L lower extremities to r/o DVT  -Continue IV Lasix 40mg BID for now.  Increase dose tomorrow if renal function and BPs are tolerating it  -RT protocol  -Covid-19 test  -Resp Profile    Comfort care measures only    Retroperitoneal hemorrhage- (present on admission)  Assessment & Plan  In setting of chronic anticoagulation on Warfarin in pt with hx of mechanical AVR  First Dx'd on 11/23  Enlarged on repeat imaging on this admission 12/10  Cont to monitor off of anticoagulation  STAT CTA Abd for possible IR embolization if any signs of acute bleeding  Serial H&H    Comfort care measures only    Pulmonary hypertension (HCC)- (present on admission)  Assessment & Plan  Decompensated  Monitor volume status    Comfort care measures only    Chronic kidney disease (CKD) stage G3b/A1, moderately decreased glomerular filtration rate (GFR) between 30-44 mL/min/1.73 square meter and albuminuria creatinine ratio less than 30 mg/g  Assessment & Plan  Monitor BMP and assess response  nephrotoxins/NSAIDs  Dose adjust meds for decreased GFR      Paroxysmal atrial fibrillation (HCC)- (present on admission)  Assessment & Plan  Anticoagulation on hold in setting of retroperitoneal hematoma    Comfort care measures only    Hx of mechanical aortic valve replacement [V43.3]- (present on admission)  Assessment & Plan  Patient has a history of mechanical aortic valve replacement 2007 Dr Awan and is on warfarin indefinitely. Goal INR: 2.5-3.5  Anticoagulation is currently on hold due to retroperitoneal hematoma    Comfort care measures only    Urinary tract infection associated with indwelling urethral catheter (HCC)- (present on admission)  Assessment & Plan  Completing antibiotic course  Bull catheter exchanged    Comfort care measures only    Anemia- (present on  admission)  Assessment & Plan  Her Hb is low at 8.4 with a mCV of 95.1  -Continue to monitor  -Transfuse to a goal of >7    Comfort care measures only    Essential hypertension- (present on admission)  Assessment & Plan  Patient has a history Uncontrolled  Continue current home medications  IV as needed medications have been ordered    Comfort care measures only    Coronary artery disease  Assessment & Plan  Patient has a history of CAD and is s/p CABG. She's allergic to statin and is on Alirocumab and Ezetemibe.         VTE prophylaxis: SCDs    I have performed a physical exam and reviewed and updated ROS and Plan today (12/18/2024). In review of yesterday's note (12/17/2024), there are no changes except as documented above.    Greater than 39 minutes spent prepping to see patient (e.g. review of tests) obtaining and/or reviewing separately obtained history. Performing a medically appropriate examination and/ evaluation.  Counseling and educating the patient/family/caregiver.  Ordering medications, tests, or procedures.  Referring and communicating with other health care professionals.  Documenting clinical information in EPIC.  Independently interpreting results and communicating results to patient/family/caregiver.  Care coordination.

## 2024-12-18 NOTE — CARE PLAN
The patient is Stable - Low risk of patient condition declining or worsening    Shift Goals  Clinical Goals: comfort and pain control  Patient Goals: pain control and rest  Family Goals: HEATHER    Progress made toward(s) clinical / shift goals:        Problem: Skin Integrity  Goal: Skin integrity is maintained or improved  Outcome: Not Progressing  Note: Patient understands the need for Q2 turns and is refusing. In the future patient will allow for Q2 turns to prevent skin breakdown.     Problem: Pain - Standard  Goal: Alleviation of pain or a reduction in pain to the patient’s comfort goal  Outcome: Progressing  Note: Patient understands pain control measures and will request pain medication as needed. Patient understands pain scale rating.     Problem: Fall Risk  Goal: Patient will remain free from falls  Outcome: Progressing  Note: Patient will continue to use call light appropriately and will remain free from falls.

## 2024-12-19 PROBLEM — N18.32 ACUTE KIDNEY INJURY SUPERIMPOSED ON STAGE 3B CHRONIC KIDNEY DISEASE (HCC): Status: RESOLVED | Noted: 2024-12-12 | Resolved: 2024-12-19

## 2024-12-19 PROBLEM — I80.8 THROMBOPHLEBITIS OF LEFT ARM: Status: RESOLVED | Noted: 2024-12-11 | Resolved: 2024-12-19

## 2024-12-19 PROBLEM — R79.89 ELEVATED TROPONIN: Status: RESOLVED | Noted: 2024-12-12 | Resolved: 2024-12-19

## 2024-12-19 PROBLEM — K59.00 CONSTIPATION: Status: RESOLVED | Noted: 2024-12-12 | Resolved: 2024-12-19

## 2024-12-19 PROBLEM — N17.9 ACUTE KIDNEY INJURY SUPERIMPOSED ON STAGE 3B CHRONIC KIDNEY DISEASE (HCC): Status: RESOLVED | Noted: 2024-12-12 | Resolved: 2024-12-19

## 2024-12-19 PROBLEM — R57.9 SHOCK (HCC): Status: RESOLVED | Noted: 2024-12-12 | Resolved: 2024-12-19

## 2024-12-19 PROBLEM — L03.90 CELLULITIS: Status: RESOLVED | Noted: 2024-12-11 | Resolved: 2024-12-19

## 2024-12-19 PROCEDURE — A9270 NON-COVERED ITEM OR SERVICE: HCPCS

## 2024-12-19 PROCEDURE — 700102 HCHG RX REV CODE 250 W/ 637 OVERRIDE(OP)

## 2024-12-19 PROCEDURE — 700111 HCHG RX REV CODE 636 W/ 250 OVERRIDE (IP): Performed by: INTERNAL MEDICINE

## 2024-12-19 PROCEDURE — A9270 NON-COVERED ITEM OR SERVICE: HCPCS | Performed by: GENERAL PRACTICE

## 2024-12-19 PROCEDURE — 99239 HOSP IP/OBS DSCHRG MGMT >30: CPT | Performed by: INTERNAL MEDICINE

## 2024-12-19 PROCEDURE — 700102 HCHG RX REV CODE 250 W/ 637 OVERRIDE(OP): Performed by: GENERAL PRACTICE

## 2024-12-19 PROCEDURE — 700102 HCHG RX REV CODE 250 W/ 637 OVERRIDE(OP): Performed by: HOSPITALIST

## 2024-12-19 PROCEDURE — A9270 NON-COVERED ITEM OR SERVICE: HCPCS | Performed by: HOSPITALIST

## 2024-12-19 RX ORDER — MORPHINE SULFATE 100 MG/5ML
20 SOLUTION ORAL
Qty: 30 ML | Refills: 0
Start: 2024-12-19 | End: 2024-12-24

## 2024-12-19 RX ORDER — FUROSEMIDE 20 MG/1
20 TABLET ORAL DAILY
Qty: 30 TABLET | Refills: 0
Start: 2024-12-19

## 2024-12-19 RX ORDER — LORAZEPAM 2 MG/ML
1 CONCENTRATE ORAL
Qty: 30 ML | Refills: 0
Start: 2024-12-19 | End: 2024-12-24

## 2024-12-19 RX ADMIN — MORPHINE SULFATE 20 MG: 100 SOLUTION ORAL at 09:21

## 2024-12-19 RX ADMIN — MORPHINE SULFATE 20 MG: 100 SOLUTION ORAL at 12:12

## 2024-12-19 RX ADMIN — OMEPRAZOLE 20 MG: 20 CAPSULE, DELAYED RELEASE ORAL at 05:24

## 2024-12-19 RX ADMIN — LEVOTHYROXINE SODIUM 125 MCG: 0.12 TABLET ORAL at 05:23

## 2024-12-19 RX ADMIN — TIMOLOL MALEATE 1 DROP: 5 SOLUTION OPHTHALMIC at 05:24

## 2024-12-19 RX ADMIN — FUROSEMIDE 40 MG: 10 INJECTION INTRAMUSCULAR; INTRAVENOUS at 05:24

## 2024-12-19 NOTE — CARE PLAN
The patient is Stable - Low risk of patient condition declining or worsening    Shift Goals  Clinical Goals: Discharge to group home on hospice  Patient Goals: Pain control  Family Goals: HEATHER    Progress made toward(s) clinical / shift goals:        Problem: Pain - Standard  Goal: Alleviation of pain or a reduction in pain to the patient’s comfort goal  Outcome: Progressing  Note: PRN roxanol in use for pain control       Problem: Knowledge Deficit - Standard  Goal: Patient and family/care givers will demonstrate understanding of plan of care, disease process/condition, diagnostic tests and medications  Outcome: Progressing  Note: Pt aware of discharge plan for tomorrow to group home on hospice       Patient is not progressing towards the following goals:

## 2024-12-19 NOTE — DISCHARGE PLANNING
Transport cancelled with [facility] and rescheduled with [facility], per  request, to 1230. Care team notified.

## 2024-12-19 NOTE — DISCHARGE PLANNING
DC Transport Scheduled    Transport Company Scheduled:  [employer]    Scheduled Date: 12/19/2024  Scheduled Time: 1030    Transport Type: [employer]  Destination: [facility]   Address: [address] [place] [address]    Notified care team of scheduled transport via [employer].     If there are any changes needed to the DC transportation scheduled, please contact [facility] at ext. [phone] between the hours of [phone]. If outside those hours, contact the ED Case Manager at ext. [phone].

## 2024-12-19 NOTE — CARE PLAN
The patient is Watcher - Medium risk of patient condition declining or worsening    Shift Goals  Clinical Goals: anxiety management and discharge  Patient Goals: anxiety management, pain management and rest  Family Goals: ensuring patient is comfortable    Progress made toward(s) clinical / shift goals:   Problem: Pain - Standard  Goal: Alleviation of pain or a reduction in pain to the patient’s comfort goal  Outcome: Progressing  Note: Patient requests medication for pain as needed. Patient understands how to use pain rating scale to report pain.     Problem: Knowledge Deficit - Standard  Goal: Patient and family/care givers will demonstrate understanding of plan of care, disease process/condition, diagnostic tests and medications  Outcome: Progressing  Note: Patient and family understand comfort care measures and plan of discharge to hospice care.

## 2024-12-19 NOTE — DISCHARGE INSTRUCTIONS
Discharge Instructions per Dr. Matthew Miguel D.OSabrina    DIET: Supplements  Diet Order Diet: Regular    ACTIVITY: As tolerated    DIAGNOSIS: Shock (HCC)    Follow up with your Hospice Provider as scheduled or sooner if your symptoms persist or worsen.

## 2024-12-19 NOTE — DISCHARGE PLANNING
Referral Management Team     Received hospice referral via [employer].     Choice obtained for:  [facility]  Agency acceptance status: Accepted     Family involved in care: Yes  Support available at home: No   Placement needed: Yes     Barriers to discharge: Transportation   Care team members informed: Yes    Patient to discharge today at 9:30 to [facility]  [address] [facility] [address]  With [facility] hosp[facility]    Patient is on approved [facility] patient to pay $1100 and [facility] $3900   [facility] request submitted  [phone]  Called harika they state transportation has been confirmed for 9:30 am

## 2024-12-19 NOTE — DISCHARGE SUMMARY
Discharge Summary    CHIEF COMPLAINT ON ADMISSION  Chief Complaint   Patient presents with    Shortness of Breath    Leg Pain       Reason for Admission  EMS     Admission Date  12/10/2024    CODE STATUS  Comfort Care/DNR    HPI & HOSPITAL COURSE  Yenifer Beasley is a 74 y.o. female admitted 12/10/2024 with shortness of breath.    Past medical history significant for paroxysmal atrial fibrillation, mechanical aortic valve replacement on Coumadin, hypertension, chronic diastolic heart failure, CKD stage IIIb hypothyroidism, glaucoma, recent admission for right femoral lateral condyle fracture s/p fixation on 11/8 who presented to the ED on 12/10 with shortness of breath    Patient had previous admission for right femoral lateral condyle fracture status post internal fixation on 11/8, postoperatively patient did have significant bleeding requiring 1 unit of PRBCs transfusion.  She was discharged to skilled nursing facility 11/15.  She returns back to the ER on 11/23 found to be hypotensive, noted to have a 17 x 10.7 x 9.5 cm retroperitoneal hematoma, requiring Kcentra and blood transfusions.      Patient returned here with shortness of breath, significant for shock with respiratory failure, DIAMOND and hypotension requiring pressor support.  Repeat CT imaging revealed worsening of the retroperitoneal hematoma.  Coumadin on hold.    Patient started on empiric antibiotic therapy for lower extremity cellulitis and UTI.  Bull catheter was exchanged.    Patient started on aggressive IV diuresis due to concerns for acute on chronic diastolic heart failure exacerbation.  GDMT therapy initially held due to shock. She did have pulmonary edema requiring supplemental oxygen.    12/15 Discussed with patient, her 2 sisters at bedside and her daughter-in-law over the phone, patient has communicated to her daughter-in-law previously that she does not want any further aggressive measures.  We discussed this at bedside and she  reiterated that she wants to pursue comfort care measures.  She no longer wants to be in the hospital, no longer wants lab draws etc.    Patient was accepted to [facility] with [facility].  Medically stable to discharge home with hospice    Therefore, she is discharged in fair and stable condition to hospice.    The patient met 2-midnight criteria for an inpatient stay at the time of discharge.    Discharge Date  12/19/2024      FOLLOW UP ITEMS POST DISCHARGE  None    DISCHARGE DIAGNOSES  Principal Problem (Resolved):    Shock (HCC) (POA: Yes)  Active Problems:    Essential hypertension (POA: Yes)    Anemia (POA: Yes)    Hx of mechanical aortic valve replacement [V43.3] (POA: Yes)    Paroxysmal atrial fibrillation (HCC) (POA: Yes)      Overview: Chronic, continues on Amiodarone 200 mg daily, Torsemide 10 mg daily,       Warfarin, Doxazosin 2 mg daily.  She notes that she had a cardioversion       done 2 years ago.  Her Coumadin is followed by anticoagulation clinic.    Pulmonary hypertension (HCC) (POA: Yes)    Retroperitoneal hemorrhage (POA: Yes)    Acute respiratory failure with hypoxia (HCC) (POA: Yes)    Acute pulmonary edema (HCC) (POA: Yes)    Status post closed fracture of right femur (POA: Yes)    Acute on chronic diastolic heart failure (HCC) (POA: Yes)    ACP (advance care planning) (POA: Yes)  Resolved Problems:    Urinary tract infection associated with indwelling urethral catheter (HCC) (POA: Yes)    Cellulitis (POA: Yes)    Thrombophlebitis of left arm (POA: Yes)    Elevated troponin (POA: Yes)    Acute kidney injury superimposed on stage 3b chronic kidney disease (HCC) (POA: Yes)    Constipation (POA: Yes)      FOLLOW UP  Future Appointments   Date Time Provider Department Center   8/25/2025 10:15 AM [facility] EXAM 10 ECHO [facility]   9/18/2025 11:15 AM [facility] None     Jose Vela M.D.  [address]  "[id number]  [phone]    Follow up        MEDICATIONS ON DISCHARGE     Medication List        START taking these medications        Instructions   furosemide 20 MG Tabs  Commonly known as: Lasix   Take 1 Tablet by mouth every day.  Dose: 20 mg     LORazepam 2 MG/ML Conc  Commonly known as: Ativan   Place 0.5 mL under the tongue every 1 hour as needed (Anxiety/Restlessness) for up to 5 days.  Dose: 1 mg     morphine 20 MG/ML Soln  Commonly known as: Roxanol   Take 1 mL by mouth every 1 hour as needed (Severe pain) for up to 5 days.  Dose: 20 mg            STOP taking these medications      acetaminophen 325 MG Tabs  Commonly known as: Tylenol     amiodarone 200 MG Tabs  Commonly known as: Cordarone     diclofenac 0.1 % ophthalmic solution  Commonly known as: Voltaren     doxazosin 8 MG tablet  Commonly known as: Cardura     ezetimibe 10 MG Tabs  Commonly known as: Zetia     levothyroxine 125 MCG Tabs  Commonly known as: Synthroid     methocarbamol 750 MG Tabs  Commonly known as: Robaxin     oxyCODONE immediate release 10 MG immediate release tablet  Commonly known as: Roxicodone     polyethylene glycol/lytes Pack  Commonly known as: Miralax     Praluent 150 MG/ML Soaj  Generic drug: Alirocumab     senna-docusate 8.6-50 MG Tabs  Commonly known as: Pericolace Or Senokot S     timolol 0.5 % Soln  Commonly known as: Timoptic     warfarin 4 MG Tabs  Commonly known as: Coumadin     warfarin 6 MG Tabs  Commonly known as: Coumadin              Allergies  Allergies   Allergen Reactions    Asa [Aspirin]      GI BLEED    Atorvastatin Shortness of Breath    Cymbalta [Duloxetine Hcl] Unspecified     Feels like a \"zombie\"    Hmg-Coa-R Inhibitors     Latex Swelling    Nsaids      GI BLEED    Tricor Shortness of Breath     Breathing problems      Trilipix [Choline Fenofibrate] Shortness of Breath     SOB    Lyrica [Pregabalin]      SPACED OUT       DIET  Orders Placed This Encounter   Procedures    Diet Order Diet: Regular     " Standing Status:   Standing     Number of Occurrences:   1     Order Specific Question:   Diet:     Answer:   Regular [1]       ACTIVITY  As tolerated.  Weight bearing as tolerated    CONSULTATIONS  Critical care    PROCEDURES  Central line    LABORATORY  Lab Results   Component Value Date    SODIUM 132 (L) 12/15/2024    POTASSIUM 4.1 12/15/2024    CHLORIDE 98 12/15/2024    CO2 23 12/15/2024    GLUCOSE 151 (H) 12/15/2024    BUN 68 (H) 12/15/2024    CREATININE 1.94 (H) 12/15/2024    CREATININE 1.6 (H) 11/26/2007        Lab Results   Component Value Date    WBC 8.8 12/15/2024    HEMOGLOBIN 9.4 (L) 12/15/2024    HEMATOCRIT 29.8 (L) 12/15/2024    PLATELETCT 293 12/15/2024        I discussed medications and side effects with the patient.  I discussed prognosis and importance of medical compliance with the patient.  All questions and concerns have been addressed.  Total time of the discharge process was 38 minutes.

## 2024-12-20 DIAGNOSIS — E78.5 HYPERLIPIDEMIA, UNSPECIFIED HYPERLIPIDEMIA TYPE: ICD-10-CM

## 2024-12-23 RX ORDER — ALIROCUMAB 150 MG/ML
150 INJECTION, SOLUTION SUBCUTANEOUS
Qty: 6 ML | Refills: 3 | OUTPATIENT
Start: 2024-12-23

## 2024-12-23 RX ORDER — EZETIMIBE 10 MG/1
TABLET ORAL
Qty: 90 TABLET | Refills: 3 | OUTPATIENT
Start: 2024-12-23

## 2024-12-30 ENCOUNTER — PATIENT OUTREACH (OUTPATIENT)
Dept: HEALTH INFORMATION MANAGEMENT | Facility: OTHER | Age: 74
End: 2024-12-30
Payer: MEDICARE

## 2024-12-30 DIAGNOSIS — I10 PRIMARY HYPERTENSION: ICD-10-CM

## 2024-12-30 DIAGNOSIS — I25.810 CORONARY ARTERY DISEASE INVOLVING CORONARY BYPASS GRAFT OF NATIVE HEART WITHOUT ANGINA PECTORIS: ICD-10-CM

## 2024-12-30 DIAGNOSIS — E11.22 TYPE 2 DIABETES MELLITUS WITH STAGE 3B CHRONIC KIDNEY DISEASE, WITHOUT LONG-TERM CURRENT USE OF INSULIN (HCC): ICD-10-CM

## 2024-12-30 DIAGNOSIS — E78.5 HYPERLIPIDEMIA, UNSPECIFIED HYPERLIPIDEMIA TYPE: ICD-10-CM

## 2024-12-30 DIAGNOSIS — N18.32 CHRONIC KIDNEY DISEASE (CKD) STAGE G3B/A1, MODERATELY DECREASED GLOMERULAR FILTRATION RATE (GFR) BETWEEN 30-44 ML/MIN/1.73 SQUARE METER AND ALBUMINURIA CREATININE RATIO LESS THAN 30 MG/G: ICD-10-CM

## 2024-12-30 DIAGNOSIS — N18.32 TYPE 2 DIABETES MELLITUS WITH STAGE 3B CHRONIC KIDNEY DISEASE, WITHOUT LONG-TERM CURRENT USE OF INSULIN (HCC): ICD-10-CM

## 2024-12-30 NOTE — PROGRESS NOTES
Called pt today for monthly outreach. Pt is now in a Hospice Home at [facility]. Pt has signed hospice admit papers as this was the only way that her care could be paid for. Pt is interested in living but did not feel that she had any other recourse nor did she have an advocate as stated by herself. Pt is in a home and is struggling. Pt states that every morning that she wakes up she, and her bed, is soaked from urine, this pt has a catheter. I asked her how often they are emptying her catheter bag and she stated that they come in to do it maybe twice a day, she has a 1000ml bag. I told her that she is going to have to be her jenny advocate and insist that it be emptied more frequently and definitely before bedtime as it is most likely going retroactive and back up into her bladder and leaking around the tubing. I asked the pt how much she is interested in getting better on a scale of 0-10 and she stated a 10. I told her that she was going to have to be her best advocate. Pt is bed bound at this time, or at least they do not transfer her into a wheelchair or regular chair, pt stated that she has been in the bed since she arrived. Pt stated that she was being transferred transferred to a wheelchair when she was in the SNF. Pt was telling me that she is not getting her regular medications. I told her that this could be because she is now on hospice and they reduce medications to those that are needed for comfort. I told her to ask the nurse about this. Pt states that she is fearful and not trusting of what they are giving her medication wise, but this could be because they are not all of her normal meds. She stated that she is working to get somewhere better, a better facility. I asked her about her family relations, if they were still strained, and she stated that they seem better in some respects except with her daughter. Will remove her from my list of patients as she is on hospice.

## 2025-01-13 ENCOUNTER — DOCUMENTATION (OUTPATIENT)
Dept: HEALTH INFORMATION MANAGEMENT | Facility: OTHER | Age: 75
End: 2025-01-13
Payer: MEDICARE

## 2025-02-20 DIAGNOSIS — Z79.01 CHRONIC ANTICOAGULATION: ICD-10-CM

## 2025-08-25 ENCOUNTER — APPOINTMENT (OUTPATIENT)
Dept: CARDIOLOGY | Facility: MEDICAL CENTER | Age: 75
End: 2025-08-25
Attending: NURSE PRACTITIONER

## (undated) DEVICE — Device

## (undated) DEVICE — DRAPE C ARMOR (12EA/CA)

## (undated) DEVICE — PEN SKIN MARKER W/RULER - (50EA/BX)

## (undated) DEVICE — COVER LIGHT HANDLE ALC PLUS DISP (18EA/BX)

## (undated) DEVICE — SUCTION INSTRUMENT YANKAUER OPEN TIP W/O VENT (50EA/CA)

## (undated) DEVICE — BLADE SURGICAL #10 - (50/BX)

## (undated) DEVICE — DRAPE U ORTHOPEDIC - (10/BX)

## (undated) DEVICE — SUTURE GENERAL

## (undated) DEVICE — DRESSING AQUACEL AG ADVANTAGE 3.5 X 10" (10EA/BX)"

## (undated) DEVICE — GLOVE BIOGEL PI ORTHO SZ 7.5 PF LF (40PR/BX)

## (undated) DEVICE — PACK MAJOR ORTHO - (2EA/CA)

## (undated) DEVICE — GOWN WARMING STANDARD FLEX - (30/CA)

## (undated) DEVICE — SUTURE 2-0 VICRYL PLUS CT-1 36 (36PK/BX)"

## (undated) DEVICE — PENCIL ELECTSURG 10FT BTN SWH - (50/CA)

## (undated) DEVICE — CANISTER SUCTION 3000ML MECHANICAL FILTER AUTO SHUTOFF MEDI-VAC NONSTERILE LF DISP (40EA/CA)

## (undated) DEVICE — SENSOR OXIMETER ADULT SPO2 RD SET (20EA/BX)

## (undated) DEVICE — DRAPE SURG STERI-DRAPE 7X11OD - (40EA/CA)

## (undated) DEVICE — SODIUM CHL IRRIGATION 0.9% 1000ML (12EA/CA)

## (undated) DEVICE — SUCTION INSTRUMENT YANKAUER BULBOUS TIP W/O VENT (50EA/CA)

## (undated) DEVICE — CHLORAPREP 26 ML APPLICATOR - ORANGE TINT(25/CA)

## (undated) DEVICE — GOWN SURGEONS X-LARGE - DISP. (30/CA)

## (undated) DEVICE — WATER IRRIGATION STERILE 1000ML (12EA/CA)

## (undated) DEVICE — SET LEADWIRE 5 LEAD BEDSIDE DISPOSABLE ECG (1SET OF 5/EA)

## (undated) DEVICE — SUTURE 0 VICRYL PLUS CT-1 - 36 INCH (36/BX)

## (undated) DEVICE — SPONGE GAUZESTER 4 X 4 4PLY - (128PK/CA)

## (undated) DEVICE — BANDAGE ELASTIC 6 HONEYCOMB - 6X5YD LF (20/CA)"

## (undated) DEVICE — ELECTRODE DUAL RETURN W/ CORD - (50/PK)

## (undated) DEVICE — GLOVE BIOGEL INDICATOR SZ 7.5 SURGICAL PF LTX - (50PR/BX 4BX/CA)

## (undated) DEVICE — LACTATED RINGERS INJ 1000 ML - (14EA/CA 60CA/PF)

## (undated) DEVICE — GLOVE SZ 7 BIOGEL PI MICRO - PF LF (50PR/BX 4BX/CA)

## (undated) DEVICE — GLOVE BIOGEL PI INDICATOR SZ 7.5 SURGICAL PF LF -(50/BX 4BX/CA)

## (undated) DEVICE — BOVIE BLADE COATED - (50/PK)

## (undated) DEVICE — BANDAGE ELASTIC STERILE VELCRO 6 X 5 YDS (25EA/CA)

## (undated) DEVICE — WRAP COBAN SELF-ADHERENT 6 IN X 5YDS STERILE TAN (12/CA)

## (undated) DEVICE — BIT DRILL DIA2.6MM SCALED FOR VARIAX 2 WRIST FUSION LOCKING PLATE SYSTEM

## (undated) DEVICE — DRAPE LARGE 3 QUARTER - (20/CA)

## (undated) DEVICE — SET EXTENSION WITH 2 PORTS (48EA/CA) ***PART #2C8610 IS A SUBSTITUTE*****

## (undated) DEVICE — PADDING CAST 6 IN STERILE - 6 X 4 YDS (24/CA)

## (undated) DEVICE — PAD LAP STERILE 18 X 18 - (5/PK 40PK/CA)

## (undated) DEVICE — DRAPESURG STERI-DRAPE LONG - (10/BX 4BX/CA)

## (undated) DEVICE — TOWELS CLOTH SURGICAL - (4/PK 20PK/CA)

## (undated) DEVICE — STAPLER SKIN DISP - (6/BX 10BX/CA) VISISTAT

## (undated) DEVICE — DRAPE 36X28IN RAD CARM BND BG - (25/CA) O

## (undated) DEVICE — STOCKINET TUBULAR 6IN STERILE - 6 X 48YDS (25/CA)

## (undated) DEVICE — GLOVE BIOGEL PI ORTHO SZ 7 PF LF (40PR/BX)

## (undated) DEVICE — BANDAGE ELASTIC STERILE MATRIX 6 X 10 (20EA/CA)

## (undated) DEVICE — SLEEVE, VASO, THIGH, MED

## (undated) DEVICE — TUBING CLEARLINK DUO-VENT - C-FLO (48EA/CA)